# Patient Record
Sex: MALE | Race: WHITE | NOT HISPANIC OR LATINO | Employment: OTHER | URBAN - METROPOLITAN AREA
[De-identification: names, ages, dates, MRNs, and addresses within clinical notes are randomized per-mention and may not be internally consistent; named-entity substitution may affect disease eponyms.]

---

## 2017-06-13 ENCOUNTER — CONVERSION ENCOUNTER (OUTPATIENT)
Dept: FAMILY MEDICINE CLINIC | Facility: CLINIC | Age: 77
End: 2017-06-13

## 2017-08-03 ENCOUNTER — CONVERSION ENCOUNTER (OUTPATIENT)
Dept: FAMILY MEDICINE CLINIC | Facility: CLINIC | Age: 77
End: 2017-08-03

## 2017-08-18 ENCOUNTER — CONVERSION ENCOUNTER (OUTPATIENT)
Dept: FAMILY MEDICINE CLINIC | Facility: CLINIC | Age: 77
End: 2017-08-18

## 2017-11-09 ENCOUNTER — CONVERSION ENCOUNTER (OUTPATIENT)
Dept: FAMILY MEDICINE CLINIC | Facility: CLINIC | Age: 77
End: 2017-11-09

## 2017-11-17 ENCOUNTER — HOSPITAL ENCOUNTER (OUTPATIENT)
Dept: CARDIOLOGY | Facility: HOSPITAL | Age: 77
Discharge: HOME | End: 2017-11-17
Attending: INTERNAL MEDICINE | Admitting: INTERNAL MEDICINE

## 2017-12-08 ENCOUNTER — CONVERSION ENCOUNTER (OUTPATIENT)
Dept: FAMILY MEDICINE CLINIC | Facility: CLINIC | Age: 77
End: 2017-12-08

## 2018-03-13 ENCOUNTER — CONVERSION ENCOUNTER (OUTPATIENT)
Dept: FAMILY MEDICINE CLINIC | Facility: CLINIC | Age: 78
End: 2018-03-13

## 2018-06-08 ENCOUNTER — CONVERSION ENCOUNTER (OUTPATIENT)
Dept: FAMILY MEDICINE CLINIC | Facility: CLINIC | Age: 78
End: 2018-06-08

## 2018-11-02 ENCOUNTER — CONVERSION ENCOUNTER (OUTPATIENT)
Dept: FAMILY MEDICINE CLINIC | Facility: CLINIC | Age: 78
End: 2018-11-02

## 2019-02-01 ENCOUNTER — CONVERSION ENCOUNTER (OUTPATIENT)
Dept: FAMILY MEDICINE CLINIC | Facility: CLINIC | Age: 79
End: 2019-02-01

## 2019-04-05 ENCOUNTER — CONVERSION ENCOUNTER (OUTPATIENT)
Dept: FAMILY MEDICINE CLINIC | Facility: CLINIC | Age: 79
End: 2019-04-05

## 2019-05-17 ENCOUNTER — HOSPITAL ENCOUNTER (OUTPATIENT)
Dept: CARDIOLOGY | Facility: HOSPITAL | Age: 79
Discharge: HOME | End: 2019-05-17
Attending: INTERNAL MEDICINE | Admitting: INTERNAL MEDICINE

## 2019-06-04 VITALS
HEIGHT: 70 IN | HEIGHT: 70 IN | DIASTOLIC BLOOD PRESSURE: 62 MMHG | BODY MASS INDEX: 28.49 KG/M2 | OXYGEN SATURATION: 98 % | DIASTOLIC BLOOD PRESSURE: 70 MMHG | HEIGHT: 70 IN | WEIGHT: 210.4 LBS | OXYGEN SATURATION: 95 % | BODY MASS INDEX: 28.87 KG/M2 | WEIGHT: 212 LBS | RESPIRATION RATE: 18 BRPM | DIASTOLIC BLOOD PRESSURE: 69 MMHG | HEIGHT: 70 IN | DIASTOLIC BLOOD PRESSURE: 74 MMHG | HEIGHT: 70 IN | DIASTOLIC BLOOD PRESSURE: 71 MMHG | WEIGHT: 211 LBS | WEIGHT: 201.2 LBS | WEIGHT: 197.2 LBS | HEART RATE: 88 BPM | SYSTOLIC BLOOD PRESSURE: 122 MMHG | HEART RATE: 69 BPM | RESPIRATION RATE: 18 BRPM | HEART RATE: 84 BPM | BODY MASS INDEX: 29.12 KG/M2 | BODY MASS INDEX: 30.21 KG/M2 | DIASTOLIC BLOOD PRESSURE: 81 MMHG | HEIGHT: 70 IN | HEART RATE: 79 BPM | OXYGEN SATURATION: 95 % | BODY MASS INDEX: 30.42 KG/M2 | BODY MASS INDEX: 28.23 KG/M2 | HEIGHT: 70 IN | OXYGEN SATURATION: 97 % | RESPIRATION RATE: 18 BRPM | DIASTOLIC BLOOD PRESSURE: 65 MMHG | SYSTOLIC BLOOD PRESSURE: 124 MMHG | OXYGEN SATURATION: 97 % | HEIGHT: 70 IN | SYSTOLIC BLOOD PRESSURE: 118 MMHG | BODY MASS INDEX: 30.35 KG/M2 | BODY MASS INDEX: 30.12 KG/M2 | HEART RATE: 88 BPM | BODY MASS INDEX: 28.55 KG/M2 | DIASTOLIC BLOOD PRESSURE: 64 MMHG | SYSTOLIC BLOOD PRESSURE: 104 MMHG | WEIGHT: 212 LBS | SYSTOLIC BLOOD PRESSURE: 110 MMHG | HEART RATE: 115 BPM | WEIGHT: 212 LBS | OXYGEN SATURATION: 94 % | HEART RATE: 97 BPM | OXYGEN SATURATION: 95 % | OXYGEN SATURATION: 95 % | DIASTOLIC BLOOD PRESSURE: 69 MMHG | SYSTOLIC BLOOD PRESSURE: 99 MMHG | SYSTOLIC BLOOD PRESSURE: 119 MMHG | HEART RATE: 57 BPM | HEART RATE: 89 BPM | SYSTOLIC BLOOD PRESSURE: 116 MMHG | SYSTOLIC BLOOD PRESSURE: 119 MMHG | BODY MASS INDEX: 30.35 KG/M2 | WEIGHT: 203.4 LBS | OXYGEN SATURATION: 96 % | WEIGHT: 199 LBS | DIASTOLIC BLOOD PRESSURE: 69 MMHG | SYSTOLIC BLOOD PRESSURE: 128 MMHG | HEART RATE: 84 BPM | RESPIRATION RATE: 18 BRPM

## 2019-06-18 ENCOUNTER — TELEPHONE (OUTPATIENT)
Dept: CARDIOLOGY | Facility: CLINIC | Age: 79
End: 2019-06-18

## 2019-06-18 NOTE — TELEPHONE ENCOUNTER
Returned patient's call.  Patient states that he found his prescription in question.  Pt states no other questions at this time.

## 2019-06-19 ENCOUNTER — TELEPHONE (OUTPATIENT)
Dept: CARDIOLOGY | Facility: CLINIC | Age: 79
End: 2019-06-19

## 2019-06-19 NOTE — TELEPHONE ENCOUNTER
Returned call to patient in regards to question about Clindamycin interacting with any of his current medications.  Patient made aware that Dr. Sanford reviewed his medications and said he is ok to take Clindamycin.  Patient verbalized understanding.

## 2019-06-22 ENCOUNTER — APPOINTMENT (OUTPATIENT)
Dept: GENERAL RADIOLOGY | Facility: HOSPITAL | Age: 79
End: 2019-06-22

## 2019-06-22 ENCOUNTER — APPOINTMENT (OUTPATIENT)
Dept: CT IMAGING | Facility: HOSPITAL | Age: 79
End: 2019-06-22

## 2019-06-22 ENCOUNTER — HOSPITAL ENCOUNTER (INPATIENT)
Facility: HOSPITAL | Age: 79
LOS: 1 days | Discharge: HOME | End: 2019-06-25
Attending: HOSPITALIST | Admitting: INTERNAL MEDICINE

## 2019-06-22 DIAGNOSIS — I30.9 ACUTE PERICARDIAL EFFUSION: ICD-10-CM

## 2019-06-22 DIAGNOSIS — R06.02 SHORTNESS OF BREATH: Primary | ICD-10-CM

## 2019-06-22 DIAGNOSIS — R13.19 ESOPHAGEAL DYSPHAGIA: ICD-10-CM

## 2019-06-22 LAB
ALBUMIN SERPL-MCNC: 3.8 G/DL (ref 3.5–4.8)
ALBUMIN/GLOB SERPL: 1.2 G/DL (ref 1–1.7)
ALP SERPL-CCNC: 72 U/L (ref 32–91)
ALT SERPL W P-5'-P-CCNC: 19 U/L (ref 17–63)
ANION GAP SERPL CALCULATED.3IONS-SCNC: 9 MMOL/L (ref 10–20)
AST SERPL-CCNC: 22 U/L (ref 15–41)
BASOPHILS # BLD AUTO: 0 10*3/MM3 (ref 0–0.2)
BASOPHILS NFR BLD AUTO: 0.4 % (ref 0–1.5)
BILIRUB SERPL-MCNC: 0.6 MG/DL (ref 0.3–1.2)
BUN BLD-MCNC: 20 MG/DL (ref 8–20)
BUN/CREAT SERPL: 20 (ref 6.2–20.3)
CALCIUM SPEC-SCNC: 9.2 MG/DL (ref 8.9–10.3)
CHLORIDE SERPL-SCNC: 106 MMOL/L (ref 101–111)
CO2 SERPL-SCNC: 18 MMOL/L (ref 22–32)
CREAT BLD-MCNC: 1 MG/DL (ref 0.7–1.2)
D DIMER PPP FEU-MCNC: 3.85 MCGFEU/ML (ref 0.17–0.59)
DEPRECATED RDW RBC AUTO: 47.7 FL (ref 37–54)
EOSINOPHIL # BLD AUTO: 0.1 10*3/MM3 (ref 0–0.4)
EOSINOPHIL NFR BLD AUTO: 0.7 % (ref 0.3–6.2)
ERYTHROCYTE [DISTWIDTH] IN BLOOD BY AUTOMATED COUNT: 13.9 % (ref 12.3–15.4)
GFR SERPL CREATININE-BSD FRML MDRD: 73 ML/MIN/1.73
GLOBULIN UR ELPH-MCNC: 3.3 GM/DL (ref 2.5–3.8)
GLUCOSE BLD-MCNC: 162 MG/DL (ref 65–99)
HCT VFR BLD AUTO: 35.8 % (ref 37.5–51)
HGB BLD-MCNC: 12 G/DL (ref 13–17.7)
HOLD SPECIMEN: NORMAL
HOLD SPECIMEN: NORMAL
LIPASE SERPL-CCNC: 23 U/L (ref 22–51)
LYMPHOCYTES # BLD AUTO: 1.3 10*3/MM3 (ref 0.7–3.1)
LYMPHOCYTES NFR BLD AUTO: 14.1 % (ref 19.6–45.3)
MCH RBC QN AUTO: 32.9 PG (ref 26.6–33)
MCHC RBC AUTO-ENTMCNC: 33.6 G/DL (ref 31.5–35.7)
MCV RBC AUTO: 98 FL (ref 79–97)
MONOCYTES # BLD AUTO: 0.5 10*3/MM3 (ref 0.1–0.9)
MONOCYTES NFR BLD AUTO: 6 % (ref 5–12)
NEUTROPHILS # BLD AUTO: 7.1 10*3/MM3 (ref 1.7–7)
NEUTROPHILS NFR BLD AUTO: 78.8 % (ref 42.7–76)
NRBC BLD AUTO-RTO: 0.1 /100 WBC (ref 0–0.2)
PLATELET # BLD AUTO: 312 10*3/MM3 (ref 140–450)
PMV BLD AUTO: 7.6 FL (ref 6–12)
POTASSIUM BLD-SCNC: 4.3 MMOL/L (ref 3.6–5.1)
PROT SERPL-MCNC: 7.1 G/DL (ref 6.1–7.9)
RBC # BLD AUTO: 3.66 10*6/MM3 (ref 4.14–5.8)
SODIUM BLD-SCNC: 133 MMOL/L (ref 136–144)
TROPONIN I SERPL-MCNC: 0.03 NG/ML (ref 0–0.03)
WBC NRBC COR # BLD: 9 10*3/MM3 (ref 3.4–10.8)
WHOLE BLOOD HOLD SPECIMEN: NORMAL
WHOLE BLOOD HOLD SPECIMEN: NORMAL

## 2019-06-22 PROCEDURE — 99284 EMERGENCY DEPT VISIT MOD MDM: CPT

## 2019-06-22 PROCEDURE — 80053 COMPREHEN METABOLIC PANEL: CPT | Performed by: NURSE PRACTITIONER

## 2019-06-22 PROCEDURE — 85379 FIBRIN DEGRADATION QUANT: CPT | Performed by: NURSE PRACTITIONER

## 2019-06-22 PROCEDURE — 85025 COMPLETE CBC W/AUTO DIFF WBC: CPT | Performed by: NURSE PRACTITIONER

## 2019-06-22 PROCEDURE — 93005 ELECTROCARDIOGRAM TRACING: CPT

## 2019-06-22 PROCEDURE — 71275 CT ANGIOGRAPHY CHEST: CPT

## 2019-06-22 PROCEDURE — 71046 X-RAY EXAM CHEST 2 VIEWS: CPT

## 2019-06-22 PROCEDURE — 93005 ELECTROCARDIOGRAM TRACING: CPT | Performed by: HOSPITALIST

## 2019-06-22 PROCEDURE — 87040 BLOOD CULTURE FOR BACTERIA: CPT | Performed by: NURSE PRACTITIONER

## 2019-06-22 PROCEDURE — 84484 ASSAY OF TROPONIN QUANT: CPT | Performed by: NURSE PRACTITIONER

## 2019-06-22 PROCEDURE — 83690 ASSAY OF LIPASE: CPT | Performed by: NURSE PRACTITIONER

## 2019-06-22 RX ORDER — SODIUM CHLORIDE 0.9 % (FLUSH) 0.9 %
10 SYRINGE (ML) INJECTION AS NEEDED
Status: DISCONTINUED | OUTPATIENT
Start: 2019-06-22 | End: 2019-06-25 | Stop reason: HOSPADM

## 2019-06-23 ENCOUNTER — INPATIENT HOSPITAL (OUTPATIENT)
Dept: URBAN - METROPOLITAN AREA HOSPITAL 84 | Facility: HOSPITAL | Age: 79
End: 2019-06-23

## 2019-06-23 DIAGNOSIS — R13.10 DYSPHAGIA, UNSPECIFIED: ICD-10-CM

## 2019-06-23 DIAGNOSIS — K21.9 GASTRO-ESOPHAGEAL REFLUX DISEASE WITHOUT ESOPHAGITIS: ICD-10-CM

## 2019-06-23 DIAGNOSIS — K22.2 ESOPHAGEAL OBSTRUCTION: ICD-10-CM

## 2019-06-23 PROBLEM — R06.02 SHORTNESS OF BREATH: Status: ACTIVE | Noted: 2019-06-23

## 2019-06-23 PROBLEM — R13.19 ESOPHAGEAL DYSPHAGIA: Status: ACTIVE | Noted: 2019-06-22

## 2019-06-23 LAB
BILIRUB UR QL STRIP: NEGATIVE
CLARITY UR: CLEAR
COLOR UR: YELLOW
GLUCOSE UR STRIP-MCNC: NEGATIVE MG/DL
HGB UR QL STRIP.AUTO: NEGATIVE
KETONES UR QL STRIP: NEGATIVE
LEUKOCYTE ESTERASE UR QL STRIP.AUTO: NEGATIVE
NITRITE UR QL STRIP: NEGATIVE
PH UR STRIP.AUTO: 7 [PH] (ref 5–8)
PROT UR QL STRIP: NEGATIVE
SP GR UR STRIP: 1.02 (ref 1–1.03)
TROPONIN I SERPL-MCNC: 0.03 NG/ML (ref 0–0.03)
UROBILINOGEN UR QL STRIP: NORMAL

## 2019-06-23 PROCEDURE — G0378 HOSPITAL OBSERVATION PER HR: HCPCS

## 2019-06-23 PROCEDURE — 81003 URINALYSIS AUTO W/O SCOPE: CPT | Performed by: HOSPITALIST

## 2019-06-23 PROCEDURE — 92610 EVALUATE SWALLOWING FUNCTION: CPT

## 2019-06-23 PROCEDURE — 84484 ASSAY OF TROPONIN QUANT: CPT | Performed by: NURSE PRACTITIONER

## 2019-06-23 PROCEDURE — 99220 PR INITIAL OBSERVATION CARE/DAY 70 MINUTES: CPT | Performed by: HOSPITALIST

## 2019-06-23 PROCEDURE — 99214 OFFICE O/P EST MOD 30 MIN: CPT | Performed by: INTERNAL MEDICINE

## 2019-06-23 PROCEDURE — 0 IOPAMIDOL PER 1 ML: Performed by: NURSE PRACTITIONER

## 2019-06-23 PROCEDURE — 99222 1ST HOSP IP/OBS MODERATE 55: CPT | Performed by: NURSE PRACTITIONER

## 2019-06-23 RX ORDER — LISINOPRIL 5 MG/1
2.5 TABLET ORAL DAILY
Status: DISCONTINUED | OUTPATIENT
Start: 2019-06-23 | End: 2019-06-25 | Stop reason: HOSPADM

## 2019-06-23 RX ORDER — SUCRALFATE 1 G/1
1 TABLET ORAL
Status: DISCONTINUED | OUTPATIENT
Start: 2019-06-23 | End: 2019-06-25 | Stop reason: HOSPADM

## 2019-06-23 RX ORDER — CHLORAL HYDRATE 500 MG
2000 CAPSULE ORAL 2 TIMES DAILY WITH MEALS
COMMUNITY
End: 2021-05-14 | Stop reason: HOSPADM

## 2019-06-23 RX ORDER — AMIODARONE HYDROCHLORIDE 200 MG/1
200 TABLET ORAL DAILY
COMMUNITY
End: 2020-08-07

## 2019-06-23 RX ORDER — SODIUM CHLORIDE 0.9 % (FLUSH) 0.9 %
3 SYRINGE (ML) INJECTION EVERY 12 HOURS SCHEDULED
Status: DISCONTINUED | OUTPATIENT
Start: 2019-06-23 | End: 2019-06-25 | Stop reason: HOSPADM

## 2019-06-23 RX ORDER — SPIRONOLACTONE 25 MG/1
25 TABLET ORAL DAILY
COMMUNITY
End: 2019-06-25 | Stop reason: HOSPADM

## 2019-06-23 RX ORDER — SODIUM CHLORIDE 0.9 % (FLUSH) 0.9 %
3-10 SYRINGE (ML) INJECTION AS NEEDED
Status: DISCONTINUED | OUTPATIENT
Start: 2019-06-23 | End: 2019-06-25 | Stop reason: HOSPADM

## 2019-06-23 RX ORDER — MELATONIN
1000 DAILY
Status: DISCONTINUED | OUTPATIENT
Start: 2019-06-23 | End: 2019-06-25 | Stop reason: HOSPADM

## 2019-06-23 RX ORDER — AMIODARONE HYDROCHLORIDE 200 MG/1
200 TABLET ORAL 2 TIMES DAILY
Status: DISCONTINUED | OUTPATIENT
Start: 2019-06-23 | End: 2019-06-25 | Stop reason: HOSPADM

## 2019-06-23 RX ORDER — LISINOPRIL 2.5 MG/1
2.5 TABLET ORAL DAILY
COMMUNITY
End: 2019-07-08 | Stop reason: SINTOL

## 2019-06-23 RX ORDER — CLOPIDOGREL BISULFATE 75 MG/1
75 TABLET ORAL DAILY
Status: DISCONTINUED | OUTPATIENT
Start: 2019-06-23 | End: 2019-06-23

## 2019-06-23 RX ORDER — ONDANSETRON 2 MG/ML
4 INJECTION INTRAMUSCULAR; INTRAVENOUS EVERY 6 HOURS PRN
Status: DISCONTINUED | OUTPATIENT
Start: 2019-06-23 | End: 2019-06-25 | Stop reason: HOSPADM

## 2019-06-23 RX ORDER — CYANOCOBALAMIN 1000 UG/ML
1000 INJECTION, SOLUTION INTRAMUSCULAR; SUBCUTANEOUS
COMMUNITY

## 2019-06-23 RX ORDER — PANTOPRAZOLE SODIUM 40 MG/1
40 TABLET, DELAYED RELEASE ORAL
Status: DISCONTINUED | OUTPATIENT
Start: 2019-06-24 | End: 2019-06-25 | Stop reason: HOSPADM

## 2019-06-23 RX ORDER — ASPIRIN 81 MG/1
81 TABLET ORAL DAILY
Status: DISCONTINUED | OUTPATIENT
Start: 2019-06-23 | End: 2019-06-23

## 2019-06-23 RX ORDER — OMEGA-3S/DHA/EPA/FISH OIL/D3 300MG-1000
800 CAPSULE ORAL DAILY
COMMUNITY
End: 2019-08-02 | Stop reason: SDUPTHER

## 2019-06-23 RX ORDER — EZETIMIBE 10 MG/1
10 TABLET ORAL DAILY
COMMUNITY

## 2019-06-23 RX ORDER — SODIUM CHLORIDE 9 MG/ML
100 INJECTION, SOLUTION INTRAVENOUS CONTINUOUS
Status: DISCONTINUED | OUTPATIENT
Start: 2019-06-23 | End: 2019-06-23

## 2019-06-23 RX ORDER — ASPIRIN 81 MG/1
81 TABLET ORAL DAILY
Status: ON HOLD | COMMUNITY
End: 2022-10-27

## 2019-06-23 RX ORDER — CYANOCOBALAMIN 1000 UG/ML
1000 INJECTION, SOLUTION INTRAMUSCULAR; SUBCUTANEOUS
Status: DISCONTINUED | OUTPATIENT
Start: 2019-06-26 | End: 2019-06-25 | Stop reason: HOSPADM

## 2019-06-23 RX ORDER — CLOPIDOGREL BISULFATE 75 MG/1
75 TABLET ORAL DAILY
COMMUNITY
End: 2019-07-08

## 2019-06-23 RX ADMIN — SUCRALFATE 1 G: 1 TABLET ORAL at 21:46

## 2019-06-23 RX ADMIN — LISINOPRIL 2.5 MG: 5 TABLET ORAL at 09:23

## 2019-06-23 RX ADMIN — AMIODARONE HYDROCHLORIDE 200 MG: 200 TABLET ORAL at 21:45

## 2019-06-23 RX ADMIN — MELATONIN 1000 UNITS: at 09:23

## 2019-06-23 RX ADMIN — AMIODARONE HYDROCHLORIDE 200 MG: 200 TABLET ORAL at 09:23

## 2019-06-23 RX ADMIN — SODIUM CHLORIDE, PRESERVATIVE FREE 3 ML: 5 INJECTION INTRAVENOUS at 21:48

## 2019-06-23 RX ADMIN — IOPAMIDOL 100 ML: 755 INJECTION, SOLUTION INTRAVENOUS at 00:29

## 2019-06-23 RX ADMIN — SUCRALFATE 1 G: 1 TABLET ORAL at 17:30

## 2019-06-23 RX ADMIN — CLOPIDOGREL BISULFATE 75 MG: 75 TABLET ORAL at 09:23

## 2019-06-23 RX ADMIN — SODIUM CHLORIDE, PRESERVATIVE FREE 3 ML: 5 INJECTION INTRAVENOUS at 09:32

## 2019-06-23 RX ADMIN — SODIUM CHLORIDE 100 ML/HR: 900 INJECTION, SOLUTION INTRAVENOUS at 05:32

## 2019-06-24 ENCOUNTER — ANESTHESIA EVENT (OUTPATIENT)
Dept: GASTROENTEROLOGY | Facility: HOSPITAL | Age: 79
End: 2019-06-24

## 2019-06-24 ENCOUNTER — ANESTHESIA (OUTPATIENT)
Dept: GASTROENTEROLOGY | Facility: HOSPITAL | Age: 79
End: 2019-06-24

## 2019-06-24 ENCOUNTER — INPATIENT HOSPITAL (OUTPATIENT)
Dept: URBAN - METROPOLITAN AREA HOSPITAL 84 | Facility: HOSPITAL | Age: 79
End: 2019-06-24

## 2019-06-24 DIAGNOSIS — R13.10 DYSPHAGIA, UNSPECIFIED: ICD-10-CM

## 2019-06-24 DIAGNOSIS — K20.8 OTHER ESOPHAGITIS: ICD-10-CM

## 2019-06-24 DIAGNOSIS — K22.2 ESOPHAGEAL OBSTRUCTION: ICD-10-CM

## 2019-06-24 DIAGNOSIS — K44.9 DIAPHRAGMATIC HERNIA WITHOUT OBSTRUCTION OR GANGRENE: ICD-10-CM

## 2019-06-24 PROBLEM — D64.9 ANEMIA: Status: ACTIVE | Noted: 2019-06-24

## 2019-06-24 PROBLEM — R06.02 SHORTNESS OF BREATH: Status: RESOLVED | Noted: 2019-06-23 | Resolved: 2019-06-24

## 2019-06-24 PROBLEM — R13.19 ESOPHAGEAL DYSPHAGIA: Status: RESOLVED | Noted: 2019-06-22 | Resolved: 2019-06-24

## 2019-06-24 PROBLEM — R06.02 SHORTNESS OF BREATH: Status: ACTIVE | Noted: 2019-06-24

## 2019-06-24 LAB
ALBUMIN SERPL-MCNC: 3.5 G/DL (ref 3.5–4.8)
ALBUMIN/GLOB SERPL: 1.1 G/DL (ref 1–1.7)
ALP SERPL-CCNC: 74 U/L (ref 32–91)
ALT SERPL W P-5'-P-CCNC: 17 U/L (ref 17–63)
ANION GAP SERPL CALCULATED.3IONS-SCNC: 8 MMOL/L (ref 10–20)
AST SERPL-CCNC: 18 U/L (ref 15–41)
BASOPHILS # BLD AUTO: 0 10*3/MM3 (ref 0–0.2)
BASOPHILS NFR BLD AUTO: 0.7 % (ref 0–1.5)
BILIRUB SERPL-MCNC: 0.7 MG/DL (ref 0.3–1.2)
BUN BLD-MCNC: 12 MG/DL (ref 8–20)
BUN/CREAT SERPL: 13.3 (ref 6.2–20.3)
CALCIUM SPEC-SCNC: 9 MG/DL (ref 8.9–10.3)
CHLORIDE SERPL-SCNC: 103 MMOL/L (ref 101–111)
CO2 SERPL-SCNC: 22 MMOL/L (ref 22–32)
CREAT BLD-MCNC: 0.9 MG/DL (ref 0.7–1.2)
DEPRECATED RDW RBC AUTO: 47.7 FL (ref 37–54)
EOSINOPHIL # BLD AUTO: 0.2 10*3/MM3 (ref 0–0.4)
EOSINOPHIL NFR BLD AUTO: 3 % (ref 0.3–6.2)
ERYTHROCYTE [DISTWIDTH] IN BLOOD BY AUTOMATED COUNT: 14.1 % (ref 12.3–15.4)
GFR SERPL CREATININE-BSD FRML MDRD: 82 ML/MIN/1.73
GLOBULIN UR ELPH-MCNC: 3.3 GM/DL (ref 2.5–3.8)
GLUCOSE BLD-MCNC: 106 MG/DL (ref 65–99)
HCT VFR BLD AUTO: 36.7 % (ref 37.5–51)
HGB BLD-MCNC: 12.5 G/DL (ref 13–17.7)
INR PPP: 1.02 (ref 0.9–1.1)
LYMPHOCYTES # BLD AUTO: 1.5 10*3/MM3 (ref 0.7–3.1)
LYMPHOCYTES NFR BLD AUTO: 19.6 % (ref 19.6–45.3)
MCH RBC QN AUTO: 32.8 PG (ref 26.6–33)
MCHC RBC AUTO-ENTMCNC: 34 G/DL (ref 31.5–35.7)
MCV RBC AUTO: 96.4 FL (ref 79–97)
MONOCYTES # BLD AUTO: 0.6 10*3/MM3 (ref 0.1–0.9)
MONOCYTES NFR BLD AUTO: 7.8 % (ref 5–12)
NEUTROPHILS # BLD AUTO: 5.2 10*3/MM3 (ref 1.7–7)
NEUTROPHILS NFR BLD AUTO: 68.9 % (ref 42.7–76)
NRBC BLD AUTO-RTO: 0.1 /100 WBC (ref 0–0.2)
PLATELET # BLD AUTO: 274 10*3/MM3 (ref 140–450)
PMV BLD AUTO: 7.6 FL (ref 6–12)
POTASSIUM BLD-SCNC: 4.4 MMOL/L (ref 3.6–5.1)
PROT SERPL-MCNC: 6.8 G/DL (ref 6.1–7.9)
PROTHROMBIN TIME: 10.5 SECONDS (ref 9.6–11.7)
RBC # BLD AUTO: 3.81 10*6/MM3 (ref 4.14–5.8)
SODIUM BLD-SCNC: 133 MMOL/L (ref 136–144)
WBC NRBC COR # BLD: 7.5 10*3/MM3 (ref 3.4–10.8)

## 2019-06-24 PROCEDURE — 0D738ZZ DILATION OF LOWER ESOPHAGUS, VIA NATURAL OR ARTIFICIAL OPENING ENDOSCOPIC: ICD-10-PCS | Performed by: INTERNAL MEDICINE

## 2019-06-24 PROCEDURE — 99232 SBSQ HOSP IP/OBS MODERATE 35: CPT | Performed by: INTERNAL MEDICINE

## 2019-06-24 PROCEDURE — 85025 COMPLETE CBC W/AUTO DIFF WBC: CPT | Performed by: NURSE PRACTITIONER

## 2019-06-24 PROCEDURE — 25010000002 PROPOFOL 10 MG/ML EMULSION: Performed by: ANESTHESIOLOGY

## 2019-06-24 PROCEDURE — 92526 ORAL FUNCTION THERAPY: CPT

## 2019-06-24 PROCEDURE — 43248 EGD GUIDE WIRE INSERTION: CPT | Performed by: INTERNAL MEDICINE

## 2019-06-24 PROCEDURE — 0DJ08ZZ INSPECTION OF UPPER INTESTINAL TRACT, VIA NATURAL OR ARTIFICIAL OPENING ENDOSCOPIC: ICD-10-PCS | Performed by: INTERNAL MEDICINE

## 2019-06-24 PROCEDURE — 85610 PROTHROMBIN TIME: CPT | Performed by: NURSE PRACTITIONER

## 2019-06-24 PROCEDURE — 94799 UNLISTED PULMONARY SVC/PX: CPT

## 2019-06-24 PROCEDURE — 80053 COMPREHEN METABOLIC PANEL: CPT | Performed by: NURSE PRACTITIONER

## 2019-06-24 RX ORDER — SODIUM CHLORIDE 0.9 % (FLUSH) 0.9 %
3 SYRINGE (ML) INJECTION EVERY 12 HOURS SCHEDULED
Status: DISCONTINUED | OUTPATIENT
Start: 2019-06-24 | End: 2019-06-24 | Stop reason: HOSPADM

## 2019-06-24 RX ORDER — SODIUM CHLORIDE 0.9 % (FLUSH) 0.9 %
3-10 SYRINGE (ML) INJECTION AS NEEDED
Status: DISCONTINUED | OUTPATIENT
Start: 2019-06-24 | End: 2019-06-24 | Stop reason: HOSPADM

## 2019-06-24 RX ORDER — SODIUM CHLORIDE, SODIUM LACTATE, POTASSIUM CHLORIDE, CALCIUM CHLORIDE 600; 310; 30; 20 MG/100ML; MG/100ML; MG/100ML; MG/100ML
9 INJECTION, SOLUTION INTRAVENOUS CONTINUOUS PRN
Status: DISCONTINUED | OUTPATIENT
Start: 2019-06-24 | End: 2019-06-25 | Stop reason: HOSPADM

## 2019-06-24 RX ORDER — PROPOFOL 10 MG/ML
VIAL (ML) INTRAVENOUS AS NEEDED
Status: DISCONTINUED | OUTPATIENT
Start: 2019-06-24 | End: 2019-06-24 | Stop reason: SURG

## 2019-06-24 RX ADMIN — SODIUM CHLORIDE, PRESERVATIVE FREE 3 ML: 5 INJECTION INTRAVENOUS at 21:17

## 2019-06-24 RX ADMIN — SUCRALFATE 1 G: 1 TABLET ORAL at 21:17

## 2019-06-24 RX ADMIN — MELATONIN 1000 UNITS: at 08:31

## 2019-06-24 RX ADMIN — AMIODARONE HYDROCHLORIDE 200 MG: 200 TABLET ORAL at 21:17

## 2019-06-24 RX ADMIN — SODIUM CHLORIDE, PRESERVATIVE FREE 3 ML: 5 INJECTION INTRAVENOUS at 08:32

## 2019-06-24 RX ADMIN — PANTOPRAZOLE SODIUM 40 MG: 40 TABLET, DELAYED RELEASE ORAL at 08:31

## 2019-06-24 RX ADMIN — SUCRALFATE 1 G: 1 TABLET ORAL at 08:31

## 2019-06-24 RX ADMIN — SUCRALFATE 1 G: 1 TABLET ORAL at 16:58

## 2019-06-24 RX ADMIN — AMIODARONE HYDROCHLORIDE 200 MG: 200 TABLET ORAL at 08:31

## 2019-06-24 RX ADMIN — Medication 3 ML: at 10:24

## 2019-06-24 RX ADMIN — PROPOFOL 130 MG: 10 INJECTION, EMULSION INTRAVENOUS at 09:05

## 2019-06-24 RX ADMIN — LISINOPRIL 2.5 MG: 5 TABLET ORAL at 08:31

## 2019-06-24 RX ADMIN — SUCRALFATE 1 G: 1 TABLET ORAL at 11:18

## 2019-06-24 NOTE — ANESTHESIA POSTPROCEDURE EVALUATION
Patient: Deion Nicholas    Procedure Summary     Date:  06/24/19 Room / Location:  [facility] ENDOSCOPY 1 / [facility] ENDOSCOPY    Anesthesia Start:  0900 Anesthesia Stop:  0926    Procedure:  ESOPHAGOGASTRODUODENOSCOPY with dilitation Bougie 50, 54 (N/A ) Diagnosis:       Esophageal dysphagia      (Esophageal dysphagia [R13.10])    Surgeon:  Roddy Coronel MD Provider:  Aroldo Dc MD    Anesthesia Type:  MAC ASA Status:  4          Anesthesia Type: MAC  Last vitals  BP   120/71 (06/24/19 0833)   Temp   98 °F (36.7 °C) (06/24/19 0250)   Pulse   82 (06/24/19 0833)   Resp   18 (06/24/19 0250)     SpO2   97 % (06/24/19 0250)     Post Anesthesia Care and Evaluation    Patient location during evaluation: PACU  Patient participation: complete - patient participated  Level of consciousness: awake  Pain management: adequate  Airway patency: patent  Anesthetic complications: No anesthetic complications  PONV Status: none  Cardiovascular status: acceptable  Respiratory status: acceptable  Hydration status: acceptable    Comments: Patient seen and examined postoperatively; vital signs stable; SpO2 greater than or equal to 90%; cardiopulmonary status stable; nausea/vomiting adequately controlled; pain adequately controlled; no apparent anesthesia complications; patient discharged from anesthesia care when discharge criteria were met

## 2019-06-24 NOTE — ANESTHESIA PREPROCEDURE EVALUATION
Anesthesia Evaluation     Patient summary reviewed                Airway   Mallampati: II  Dental    (+) partials    Pulmonary - normal exam   Cardiovascular - normal exam    (+) pacemaker pacemaker, ICD, hypertension, past MI , CAD, CABG,       Neuro/Psych  GI/Hepatic/Renal/Endo      Musculoskeletal     Abdominal    Substance History      OB/GYN          Other                      Anesthesia Plan    ASA 4     MAC     intravenous induction   Anesthetic plan, all risks, benefits, and alternatives have been provided, discussed and informed consent has been obtained with: patient.

## 2019-06-25 VITALS
DIASTOLIC BLOOD PRESSURE: 69 MMHG | RESPIRATION RATE: 12 BRPM | OXYGEN SATURATION: 97 % | HEART RATE: 85 BPM | BODY MASS INDEX: 27.2 KG/M2 | WEIGHT: 205.25 LBS | HEIGHT: 73 IN | SYSTOLIC BLOOD PRESSURE: 120 MMHG | TEMPERATURE: 97.8 F

## 2019-06-25 LAB
ANION GAP SERPL CALCULATED.3IONS-SCNC: 8 MMOL/L (ref 10–20)
BASOPHILS # BLD AUTO: 0.1 10*3/MM3 (ref 0–0.2)
BASOPHILS NFR BLD AUTO: 0.6 % (ref 0–1.5)
BUN BLD-MCNC: 17 MG/DL (ref 8–20)
BUN/CREAT SERPL: 15.5 (ref 6.2–20.3)
CALCIUM SPEC-SCNC: 9.1 MG/DL (ref 8.9–10.3)
CHLORIDE SERPL-SCNC: 103 MMOL/L (ref 101–111)
CO2 SERPL-SCNC: 23 MMOL/L (ref 22–32)
CREAT BLD-MCNC: 1.1 MG/DL (ref 0.7–1.2)
DEPRECATED RDW RBC AUTO: 48.1 FL (ref 37–54)
EOSINOPHIL # BLD AUTO: 0.3 10*3/MM3 (ref 0–0.4)
EOSINOPHIL NFR BLD AUTO: 3.2 % (ref 0.3–6.2)
ERYTHROCYTE [DISTWIDTH] IN BLOOD BY AUTOMATED COUNT: 14.2 % (ref 12.3–15.4)
GFR SERPL CREATININE-BSD FRML MDRD: 65 ML/MIN/1.73
GLUCOSE BLD-MCNC: 117 MG/DL (ref 65–99)
HCT VFR BLD AUTO: 38.5 % (ref 37.5–51)
HGB BLD-MCNC: 13.1 G/DL (ref 13–17.7)
LYMPHOCYTES # BLD AUTO: 1.5 10*3/MM3 (ref 0.7–3.1)
LYMPHOCYTES NFR BLD AUTO: 16.8 % (ref 19.6–45.3)
MCH RBC QN AUTO: 32.8 PG (ref 26.6–33)
MCHC RBC AUTO-ENTMCNC: 33.9 G/DL (ref 31.5–35.7)
MCV RBC AUTO: 96.8 FL (ref 79–97)
MONOCYTES # BLD AUTO: 0.8 10*3/MM3 (ref 0.1–0.9)
MONOCYTES NFR BLD AUTO: 9.3 % (ref 5–12)
NEUTROPHILS # BLD AUTO: 6.3 10*3/MM3 (ref 1.7–7)
NEUTROPHILS NFR BLD AUTO: 70.1 % (ref 42.7–76)
NRBC BLD AUTO-RTO: 0.1 /100 WBC (ref 0–0.2)
PLATELET # BLD AUTO: 271 10*3/MM3 (ref 140–450)
PMV BLD AUTO: 7.7 FL (ref 6–12)
POTASSIUM BLD-SCNC: 5.1 MMOL/L (ref 3.6–5.1)
RBC # BLD AUTO: 3.98 10*6/MM3 (ref 4.14–5.8)
SODIUM BLD-SCNC: 134 MMOL/L (ref 136–144)
WBC NRBC COR # BLD: 9 10*3/MM3 (ref 3.4–10.8)

## 2019-06-25 PROCEDURE — 99239 HOSP IP/OBS DSCHRG MGMT >30: CPT | Performed by: INTERNAL MEDICINE

## 2019-06-25 PROCEDURE — 80048 BASIC METABOLIC PNL TOTAL CA: CPT | Performed by: INTERNAL MEDICINE

## 2019-06-25 PROCEDURE — 85025 COMPLETE CBC W/AUTO DIFF WBC: CPT | Performed by: INTERNAL MEDICINE

## 2019-06-25 RX ORDER — PANTOPRAZOLE SODIUM 40 MG/1
40 TABLET, DELAYED RELEASE ORAL DAILY
Qty: 30 TABLET | Refills: 0 | Status: SHIPPED | OUTPATIENT
Start: 2019-06-25 | End: 2019-07-25

## 2019-06-25 RX ORDER — SUCRALFATE 1 G/1
1 TABLET ORAL
Qty: 120 TABLET | Refills: 0 | Status: SHIPPED | OUTPATIENT
Start: 2019-06-25 | End: 2019-07-25

## 2019-06-25 RX ADMIN — PANTOPRAZOLE SODIUM 40 MG: 40 TABLET, DELAYED RELEASE ORAL at 06:14

## 2019-06-25 RX ADMIN — LISINOPRIL 2.5 MG: 5 TABLET ORAL at 09:07

## 2019-06-25 RX ADMIN — AMIODARONE HYDROCHLORIDE 200 MG: 200 TABLET ORAL at 09:05

## 2019-06-25 RX ADMIN — MELATONIN 1000 UNITS: at 09:05

## 2019-06-25 RX ADMIN — SODIUM CHLORIDE, PRESERVATIVE FREE 3 ML: 5 INJECTION INTRAVENOUS at 09:09

## 2019-06-25 RX ADMIN — SUCRALFATE 1 G: 1 TABLET ORAL at 09:05

## 2019-06-25 RX ADMIN — SUCRALFATE 1 G: 1 TABLET ORAL at 11:33

## 2019-06-26 ENCOUNTER — READMISSION MANAGEMENT (OUTPATIENT)
Dept: CALL CENTER | Facility: HOSPITAL | Age: 79
End: 2019-06-26

## 2019-06-27 ENCOUNTER — READMISSION MANAGEMENT (OUTPATIENT)
Dept: CALL CENTER | Facility: HOSPITAL | Age: 79
End: 2019-06-27

## 2019-06-27 LAB — BACTERIA SPEC AEROBE CULT: NORMAL

## 2019-06-27 NOTE — OUTREACH NOTE
Medical Week 1 Survey      Responses   Facility patient discharged from?  [facility]   Does the patient have one of the following disease processes/diagnoses(primary or secondary)?  Other   Is there a successful TCM telephone encounter documented?  No   Week 1 attempt successful?  Yes   Call start time  0920   Call end time  0930   Discharge diagnosis  Esophageal dysphagia   Meds reviewed with patient/caregiver?  Yes   Is the patient having any side effects they believe may be caused by any medication additions or changes?  No   Does the patient have all medications ordered at discharge?  Yes   Is the patient taking all medications as directed (includes completed medication regime)?  Yes   Comments regarding appointments  PT QUESTIONED APPT WITH DR. CARRILLO JULY 1 STATING HE CALLED OFFICE TO CHANGE THIS APPT TO A LATER TIME AND  STATED HE DID NOT HAVE AN APPT ON JULY 1. THIS APPT CLEARLY SHOWS IN EPIC. PT REQUESTED THIS NURSE TO CALL SAME OFFICE TO VERIFY THIS VISIBLE APPT.    Does the patient have a primary care provider?   Yes   Does the patient have an appointment with their PCP within 7 days of discharge?  No   What is preventing the patient from scheduling follow up appointments within 7 days of discharge?  Haven't had time   Nursing Interventions  Educated patient on importance of making appointment   Urgent appointment interventions  Facilitated patient appointment [PT REQUESTED THIS NURSE CALL AND ASSIST IN GETTING FOLLOW [facility]. ]   Has home health visited the patient within 72 hours of discharge?  N/A   Did the patient receive a copy of their discharge instructions?  Yes   Nursing interventions  Reviewed instructions with patient   What is the patient's perception of their health status since discharge?  Improving   Is the patient/caregiver able to teach back signs and symptoms related to disease process for when to call PCP?  Yes   Is the patient/caregiver able to teach back signs  and symptoms related to disease process for when to call 911?  Yes   Is the patient/caregiver able to teach back the hierarchy of who to call/visit for symptoms/problems? PCP, Specialist, Home health nurse, Urgent Care, ED, 911  Yes   Week 1 call completed?  Yes   Graduated  Yes   Did the patient feel the follow up calls were helpful during their recovery period?  Yes   Was the number of calls appropriate?  Yes      APPOINTMENT AT [facility] IN [place] MADE FOR July 12, 2019 AT 1200, AND 7/1/19 APPT WITH DR CARRILLO CHANGED TO 7/8/19 AT 1430. CALL TO PATIENT TO NOTIFY OF BOTH APPTS MADE ON HIS BEHALF. PT VOICED UNDERSTANDING AND PUT SAME APPTS ON HIS CALENDAR.     Estefania Price LPN

## 2019-06-27 NOTE — OUTREACH NOTE
Prep Survey      Responses   Facility patient discharged from?  [facility]   Is patient eligible?  Yes   Discharge diagnosis  Esophageal dysphagia   Does the patient have one of the following disease processes/diagnoses(primary or secondary)?  Other   Does the patient have Home health ordered?  No   Is there a DME ordered?  No   Prep survey completed?  Yes          Luz Maria Delgado RN

## 2019-07-03 NOTE — TELEPHONE ENCOUNTER
Returned patient's call in regards to sending in a refill on his spironolactone 25 mg daily.  Pt made aware that his discharge instructions from 06/25/19 state he is to stop taking the medication.  Pt states he is still supposed to be taking the medication.  Pt made aware that this will need to be clarified by Dr. Arvizu.  Pt verbalized understanding.  Pt. Confirms appointment with Dr. Arvizu on 07/08/19.

## 2019-07-05 ENCOUNTER — TELEPHONE (OUTPATIENT)
Dept: CARDIOLOGY | Facility: CLINIC | Age: 79
End: 2019-07-05

## 2019-07-08 ENCOUNTER — OFFICE VISIT (OUTPATIENT)
Dept: CARDIOLOGY | Facility: CLINIC | Age: 79
End: 2019-07-08

## 2019-07-08 VITALS
WEIGHT: 192 LBS | SYSTOLIC BLOOD PRESSURE: 105 MMHG | OXYGEN SATURATION: 96 % | DIASTOLIC BLOOD PRESSURE: 59 MMHG | HEART RATE: 80 BPM | HEIGHT: 73 IN | RESPIRATION RATE: 16 BRPM | BODY MASS INDEX: 25.45 KG/M2

## 2019-07-08 DIAGNOSIS — I25.5 ISCHEMIC CARDIOMYOPATHY: Primary | ICD-10-CM

## 2019-07-08 DIAGNOSIS — I47.20 VT (VENTRICULAR TACHYCARDIA) (HCC): ICD-10-CM

## 2019-07-08 DIAGNOSIS — I50.22 CHRONIC SYSTOLIC CONGESTIVE HEART FAILURE (HCC): ICD-10-CM

## 2019-07-08 DIAGNOSIS — I10 ESSENTIAL HYPERTENSION: ICD-10-CM

## 2019-07-08 PROBLEM — I50.9 CONGESTIVE HEART FAILURE (CHF) (HCC): Status: ACTIVE | Noted: 2017-12-19

## 2019-07-08 PROBLEM — I47.29 PAROXYSMAL VENTRICULAR TACHYCARDIA: Status: ACTIVE | Noted: 2019-05-03

## 2019-07-08 PROBLEM — R09.89 DECREASED CARDIAC FUNCTION: Status: ACTIVE | Noted: 2017-12-19

## 2019-07-08 PROBLEM — Z95.810 PRESENCE OF AUTOMATIC IMPLANTABLE CARDIOVERTER-DEFIBRILLATOR: Status: ACTIVE | Noted: 2018-01-31

## 2019-07-08 PROCEDURE — 93000 ELECTROCARDIOGRAM COMPLETE: CPT | Performed by: INTERNAL MEDICINE

## 2019-07-08 PROCEDURE — 99214 OFFICE O/P EST MOD 30 MIN: CPT | Performed by: INTERNAL MEDICINE

## 2019-07-08 RX ORDER — CHLORHEXIDINE/GLYCERIN/HE-CELL
JELLY (GRAM) TOPICAL
COMMUNITY
End: 2019-08-02 | Stop reason: SDUPTHER

## 2019-07-08 RX ORDER — SPIRONOLACTONE 25 MG/1
25 TABLET ORAL DAILY
Qty: 30 TABLET | Refills: 2 | Status: SHIPPED | OUTPATIENT
Start: 2019-07-08 | End: 2021-11-01

## 2019-07-08 RX ORDER — EZETIMIBE 10 MG/1
10 TABLET ORAL EVERY OTHER DAY
COMMUNITY
Start: 2018-11-16 | End: 2019-08-02 | Stop reason: SDUPTHER

## 2019-07-08 RX ORDER — ASPIRIN 325 MG
TABLET ORAL EVERY 24 HOURS
COMMUNITY
Start: 2017-06-13 | End: 2019-07-08

## 2019-07-08 RX ORDER — CLOPIDOGREL BISULFATE 75 MG/1
75 TABLET ORAL DAILY
COMMUNITY
Start: 2015-09-10 | End: 2021-11-11

## 2019-07-08 RX ORDER — ISOSORBIDE MONONITRATE 30 MG/1
TABLET, EXTENDED RELEASE ORAL
COMMUNITY
Start: 2017-08-03 | End: 2019-08-02

## 2019-07-08 RX ORDER — NITROGLYCERIN 0.4 MG/1
0.4 TABLET SUBLINGUAL
COMMUNITY

## 2019-07-08 NOTE — PROGRESS NOTES
History of Present Illness:  CC--Recurrent VT    .  Pt here for follow up s/p ablation.  patient started having recurrent VT needing a redo VT ablation few weeks ago and post ablation a week later developed dysphagia and needed endoscopy the patient underwent esophageal stricture dilatation with improvement of symptoms and resolution of dysphagia and comes in for follow-up   Patient is on amiodarone because of extensive scarring and multiple episodes of VT and since ablation without recurrent ICD therapy-- patient is a pleasant 78 years old gentleman, a  with history of chronic ischemic heart disease previous myocardial infarction, s/p previous CABG,  LV systolic dysfunction with   Last  LV ejection fraction of 25 30% which came up to 30- 35% by latest echocardiogram.   He is    status post ICD implant.  denies any chest pain or shortness of breath or syncope  Patient complains of dry cough with lisinopril and low normal blood pressure     assessment plan   recent VT storm status post VT re do ablation without any postprocedure complications  Without  recurrent VT   Dysphagia status post esophageal stricture dilatation with resolution of symptoms   single-chamber ICD in situ VT VDD lead without recurrent arrhythmias with normal function   hypertension--well-controlled   coronary artery disease   ischemic cardiomyopathy   medications were reviewed    follow-up in 1 month  Stop lisinopril because of side effects  Continue spironolactone and a refill given      Vital Signs:    Blood pressure 105/60 pulse rate is 80 patient is afebrile and respiration 12 times a minute    Medications: Medications were reviewed with the patient during this visit.  Allergies: Allergies were reviewed with the patient during this visit.        Current Allergies (reviewed today):  LOVASTATIN (LOVASTATIN TABS) (Critical)  * PRAVASTATIN (Critical)  ZOCOR (Moderate)      Past Medical History:     Reviewed history from 03/13/2018 and no  changes required:        Coronary Heart Disease:S/P CABG and PCI         Hypertension        Myocardial Infarction: Inferior MI         Hx: Cellulitis of left lower leg        Dyslipidemia         Pinched nerve L4-L5         Prostate cancer         Cardiomyopathy : ICD implantation     Past Surgical History:     Reviewed history from 03/13/2018 and no changes required:        Angioplasty/Stent: April 1996- Palmaz-Huy stent / LAD:         July 1996- RCA:         2000- Tetra stent Guidant  to proximal RCA, mid to distal artery 2 sequential Guidant Tetra stents         C A B G:May 2010 x 5 vessel: LIMA to diagonal , LAD, intermedius, obtuse marginal, RCA        Cardiac Cath: 1996 x 2, Nov. 2000, May 2010 - Cath 8/2015        Left Knee open reduction         Multiple colonoscopy's for polyp resection         Appendectomy        Prostate Robiotic surgery - Cyber Knife : 2015        Heart Catherization 7/2017 - No Stents         ICD implantation : Jan. 2018        Social History:     Reviewed history from 10/02/2013 and no changes required:        Disability , due to shrapnel of left heel         Marital Status:          Children: 3             Review of Systems   General: No fatigue or tiredness, No change in weight   Eyes: No redness  Cardiovascular: No chest pain, no palpitations  Respiratory: No shortness of breath  Gastrointestinal: No nausea or vomiting, bleeding  Genitourinary: no hematuria or dysuria  Musculoskeletal: No arthralgia or myalgia  Skin: No rash  Neurologic: No numbness, tingling, syncope  Hematologic/Lymphatic: No abnormal bleeding          Physical Exam    General:      well developed, well nourished, in no acute distress.    Head:      normocephalic and atraumatic.    Eyes:      PERRL/EOM intact, conjunctiva and sclera clear with out nystagmus.    Neck:      no masses, thyromegaly, trachea central with normal respiratory effort  Lungs:      clear bilaterally to auscultation.    Heart:       non-displaced PMI, chest non-tender; regular rate and rhythm, S1, S2 without murmurs, rubs, or gallops  Skin:      intact without lesions or rashes.    Psych:      alert and cooperative; normal mood and affect; normal attention span and concentration.      Extremities with trace ankle edema without any cyanosis or clubbing    Muscular skeletal patient walks with a cane with mild kyphosis    Neurological no focal deficits and alert oriented x3    Abdomen soft nontender no hepatomegaly      EKG shows sinus rhythm with occasional PVC

## 2019-07-12 ENCOUNTER — TELEPHONE (OUTPATIENT)
Dept: CARDIOLOGY | Facility: CLINIC | Age: 79
End: 2019-07-12

## 2019-07-12 NOTE — TELEPHONE ENCOUNTER
Patient made aware that Dr. Arvizu took him off lisionpril during his last appointment.  Pt verbalized understanding.

## 2019-08-02 ENCOUNTER — OFFICE VISIT (OUTPATIENT)
Dept: CARDIOLOGY | Facility: CLINIC | Age: 79
End: 2019-08-02

## 2019-08-02 VITALS
WEIGHT: 197 LBS | HEIGHT: 70 IN | SYSTOLIC BLOOD PRESSURE: 142 MMHG | BODY MASS INDEX: 28.2 KG/M2 | OXYGEN SATURATION: 96 % | HEART RATE: 81 BPM | DIASTOLIC BLOOD PRESSURE: 67 MMHG

## 2019-08-02 VITALS
OXYGEN SATURATION: 95 % | SYSTOLIC BLOOD PRESSURE: 157 MMHG | RESPIRATION RATE: 18 BRPM | BODY MASS INDEX: 28.27 KG/M2 | WEIGHT: 197 LBS | HEART RATE: 88 BPM | DIASTOLIC BLOOD PRESSURE: 78 MMHG

## 2019-08-02 DIAGNOSIS — I25.5 ISCHEMIC CARDIOMYOPATHY: Primary | ICD-10-CM

## 2019-08-02 DIAGNOSIS — I25.10 CORONARY ARTERY DISEASE INVOLVING NATIVE CORONARY ARTERY OF NATIVE HEART WITHOUT ANGINA PECTORIS: Primary | ICD-10-CM

## 2019-08-02 DIAGNOSIS — I10 ESSENTIAL HYPERTENSION: ICD-10-CM

## 2019-08-02 DIAGNOSIS — I47.29 VENTRICULAR TACHYCARDIA (PAROXYSMAL) (HCC): ICD-10-CM

## 2019-08-02 DIAGNOSIS — Z95.810 ICD (IMPLANTABLE CARDIOVERTER-DEFIBRILLATOR), SINGLE, IN SITU: ICD-10-CM

## 2019-08-02 DIAGNOSIS — Z95.810 PRESENCE OF BIVENTRICULAR IMPLANTABLE CARDIOVERTER-DEFIBRILLATOR (ICD): ICD-10-CM

## 2019-08-02 DIAGNOSIS — I25.5 ISCHEMIC CARDIOMYOPATHY: ICD-10-CM

## 2019-08-02 DIAGNOSIS — Z95.1 S/P CABG (CORONARY ARTERY BYPASS GRAFT): ICD-10-CM

## 2019-08-02 PROCEDURE — 99214 OFFICE O/P EST MOD 30 MIN: CPT | Performed by: INTERNAL MEDICINE

## 2019-08-02 PROCEDURE — 93000 ELECTROCARDIOGRAM COMPLETE: CPT | Performed by: INTERNAL MEDICINE

## 2019-08-02 PROCEDURE — 99213 OFFICE O/P EST LOW 20 MIN: CPT | Performed by: INTERNAL MEDICINE

## 2019-08-02 RX ORDER — AMLODIPINE BESYLATE 5 MG/1
5 TABLET ORAL DAILY
Qty: 30 TABLET | Refills: 11 | Status: SHIPPED | OUTPATIENT
Start: 2019-08-02 | End: 2020-06-30 | Stop reason: ALTCHOICE

## 2019-08-02 RX ORDER — LISINOPRIL 2.5 MG/1
2.5 TABLET ORAL DAILY
COMMUNITY
End: 2019-08-02 | Stop reason: ALTCHOICE

## 2019-08-02 NOTE — PROGRESS NOTES
History of Present Illness:  CC--Recurrent VT    .  Pt here for follow up s/p ablation.  patient started having recurrent VT needing a redo VT ablation few weeks ago and post ablation a week later developed dysphagia and needed endoscopy the patient underwent esophageal stricture dilatation with improvement of symptoms and resolution of dysphagia and comes in for follow-up   Patient is on amiodarone because of extensive scarring and multiple episodes of VT and since ablation without recurrent ICD therapy-- patient is a pleasant 78 years old gentleman, a  with history of chronic ischemic heart disease previous myocardial infarction, s/p previous CABG,  LV systolic dysfunction with   Last  LV ejection fraction of 25 30% which came up to 30- 35% by latest echocardiogram.   He is    status post ICD implant.  denies any chest pain or shortness of breath or syncope  Patient complains of dry cough with lisinopril and lisinopril has been stopped--started noticing increasing blood pressure with mild hypertension and denies any recurrent palpitations  He was intolerant of beta-blockers     assessment plan   recent VT storm status post VT re do ablation without any postprocedure complications  Without  recurrent VT   Dysphagia status post esophageal stricture dilatation with resolution of symptoms   single-chamber ICD in situ VT VDD lead without recurrent arrhythmias with normal function   hypertension--to be optimized with addition of amlodipine 5 mg a day and prescription given   coronary artery disease   ischemic cardiomyopathy   medications were reviewed    follow-up in 1 month  Reduce amiodarone to 200 mg once daily    Vital Signs:    Blood pressure 157/78  pulse rate is 88 patient is afebrile and respiration 12 times a minute    Medications: Medications were reviewed with the patient during this visit.  Allergies: Allergies were reviewed with the patient during this visit.    EKG shows sinus rhythm with diffuse  ST-T wave changes with a heart rate of 88 with left atrial enlargement and no significant changes compared to prior ECG and indication for ECG includes prior history of ventricular tachycardia and patient is on antiarrhythmic treatment and has normal axis    Current Allergies (reviewed today):  LOVASTATIN (LOVASTATIN TABS) (Critical)  * PRAVASTATIN (Critical)  ZOCOR (Moderate)      Past Medical History:     Reviewed history from 03/13/2018 and no changes required:        Coronary Heart Disease:S/P CABG and PCI         Hypertension        Myocardial Infarction: Inferior MI         Hx: Cellulitis of left lower leg        Dyslipidemia         Pinched nerve L4-L5         Prostate cancer         Cardiomyopathy : ICD implantation     Past Surgical History:     Reviewed history from 03/13/2018 and no changes required:        Angioplasty/Stent: April 1996- Palmaz-Huy stent / LAD:         July 1996- RCA:         2000- Tetra stent Guidant  to proximal RCA, mid to distal artery 2 sequential Guidant Tetra stents         C A B G:May 2010 x 5 vessel: LIMA to diagonal , LAD, intermedius, obtuse marginal, RCA        Cardiac Cath: 1996 x 2, Nov. 2000, May 2010 - Cath 8/2015        Left Knee open reduction         Multiple colonoscopy's for polyp resection         Appendectomy        Prostate Robiotic surgery - Cyber Knife : 2015        Heart Catherization 7/2017 - No Stents         ICD implantation : Jan. 2018        Social History:     Reviewed history from 10/02/2013 and no changes required:        Disability , due to shrapnel of left heel         Marital Status:          Children: 3             Review of Systems   General: No fatigue or tiredness, No change in weight   Eyes: No redness  Cardiovascular: No chest pain, no palpitations  Respiratory: No shortness of breath  Gastrointestinal: No nausea or vomiting, bleeding  Genitourinary: no hematuria or dysuria  Musculoskeletal: No arthralgia or myalgia  Skin: No  rash  Neurologic: No numbness, tingling, syncope  Hematologic/Lymphatic: No abnormal bleeding          Physical Exam    General:      well developed, well nourished, in no acute distress.    Head:      normocephalic and atraumatic.    Eyes:      PERRL/EOM intact, conjunctiva and sclera clear with out nystagmus.    Neck:      no masses, thyromegaly, trachea central with normal respiratory effort  Lungs:      clear bilaterally to auscultation.    Heart:      non-displaced PMI, chest non-tender; regular rate and rhythm, S1, S2 without murmurs, rubs, or gallops  Skin:      intact without lesions or rashes.    Psych:      alert and cooperative; normal mood and affect; normal attention span and concentration.      Extremities with trace ankle edema without any cyanosis or clubbing    Muscular skeletal patient walks with a cane with mild kyphosis    Neurological no focal deficits and alert oriented x3    Abdomen soft nontender no hepatomegaly

## 2019-08-02 NOTE — PROGRESS NOTES
"Visit Type:  Follow-up Visit  Referring Provider:  PA Sanford MD  Primary Provider:  [facility]- Dr. Velarde         Dear Dr. Velarde      History of Present Illness:    CC--Recurrent VTCC: Edema of both lower extremeties, CAD, Previous CABG,  Ischemic cardiomyopathy, status post ICD implant.     .  Mr. Deion Nicholas is a pleasant 78 years old gentleman, a  with history of chronic ischemic heart disease previous myocardial infarction, s/p previous CABG,  LV systolic dysfunction with   Last  LV ejection fraction of 25 30% which came up to 30- 35% by latest echocardiogram.   He is  status post ICD implant.  He was admitted on at least 2 occasions with his ICD discharge and he was found to be in ventricular tachycardia with rapid rate.  Patient underwent ablation twice.  The last one was in May 2019.  He feels weak and has very labile hypertension now.  He has been reportedly off of his metoprolol and lisinopril although lisinopril was listed but I took him off because he does have cough particularly at night.    He denies any angina dyspnea palpitations presyncope or syncope.  Vitals:    08/02/19 1203   BP: 142/67   BP Location: Left arm   Pulse: 81   SpO2: 96%   Weight: 89.4 kg (197 lb)   Height: 177.8 cm (70\")            /P  1-history of ventricular tachycardia storm with recurrent ICD discharges.  Status post ablation and is currently on amiodarone 200 mg twice daily.  He has an appointment with Dr. REED this afternoon    2-Bilateral lower leg edema probable cellulitis.  He is currently on spinal lactone 25 mg daily ibiotic therapy  2-  coronary artery disease ischemic cardiomyopathy status post ICD implant.        Problems: Active problems were reviewed with the patient during this visit.  Medications: Medications were reviewed with the patient during this visit.  Allergies: Allergies were reviewed with the patient during this visit.              Past Medical History:     Reviewed history from 03/13/2018 " and no changes required:        Coronary Heart Disease:S/P CABG and PCI         Hypertension        Myocardial Infarction: Inferior MI         Hx: Cellulitis of left lower leg        Dyslipidemia         Pinched nerve L4-L5         Prostate cancer         Cardiomyopathy : ICD implantation     Past Surgical History:     Reviewed history from 03/13/2018 and no changes required:        Angioplasty/Stent: April 1996- Palmaz-Huy stent / LAD:         July 1996- RCA:         2000- Tetra stent Guidant  to proximal RCA, mid to distal artery 2 sequential Guidant Tetra stents         C A B G:May 2010 x 5 vessel: LIMA to diagonal , LAD, intermedius, obtuse marginal, RCA        Cardiac Cath: 1996 x 2, Nov. 2000, May 2010 - Cath 8/2015        Left Knee open reduction         Multiple colonoscopy's for polyp resection         Appendectomy        Prostate Robiotic surgery - Cyber Knife : 2015        Heart Catherization 7/2017 - No Stents         ICD implantation : Jan. 2018    Active Medications (reviewed today):  ZETIA 10 MG ORAL TABLET (EZETIMIBE) Take 1 tablet by mouth daily  FUROSEMIDE 20 MG ORAL TABLET (FUROSEMIDE) Take 1 tablet by mouth daily  ISOSORBIDE MONONITRATE ER 30 MG ORAL TABLET EXTENDED RELEASE 24 HOUR (ISOSORBIDE MONONITRATE) Take 1 tablet by mouth daily  NITROSTAT 0.4 MG SUBLINGUAL TABLET SUBLINGUAL (NITROGLYCERIN) Place one tablet under tongue for chest pain as directed - PRN  * AQUAFOR CREAM daily    Amiodarone 200 mg twice daily    Spironolactone 25 mg daily  PLAVIX 75 MG ORAL TABLET (CLOPIDOGREL BISULFATE) Take 1 tablet by mouth daily  * B 12 INJECTION Twice monthly  VITAMIN D-400 400 UNIT ORAL TABLET (CHOLECALCIFEROL) Take 2 by mouth daily    ASPIRIN 325 MG ORAL TABLET (ASPIRIN) Take one by mouth daily    Current Allergies (reviewed today):  LOVASTATIN (LOVASTATIN TABS) (Critical)  * PRAVASTATIN (Critical)  ZOCOR (Moderate)    Family History Summary:      Reviewed history Last on 02/01/2019 and no changes  required:04/05/2019      General Comments - FH:  FH Other Cancer: mother / pancreatic cancer   father liver cancer   FH Heart Disease: father       Social History:     Reviewed history from 10/02/2013 and no changes required:        Disability , due to shrapnel of left heel         Marital Status:          Children: 3                 Risk Factors:     Smoked Tobacco Use:  Former smoker     Cigarettes:  Yes -- 1 pack(s) per day,      Year started:  age 27         Year quit:  2-1-2010  Passive smoke exposure:  no  Drug use:  no  HIV high-risk behavior:  no  Caffeine use:  4 drinks per day  Alcohol use:  no  Exercise:  yes     Type of Exercise:  stationary exercises  Seatbelt use:  100 %  Sun Exposure:  occasionally    Family History Risk Factors:     Family History of MI in females < 65 years old:  no     Family History of MI in males < 55 years old:  no        Review of Systems   General: denies fevers, chills, sweats, anorexia, fatigue, malaise, weight loss  Cardiovascular:   Coronary artery disease previous CABG.  Ischemic cardiomyopathy.  Bilateral leg edema with possible cellulitis.  History of ICD implant  Respiratory: Denies cough, dyspnea, excessive sputum, hemoptysis, wheezing  Gastrointestinal: Denies nausea, vomiting, diarrhea, constipation, change in bowel habits, abdominal pain, melena, hematochezia, jaundice  Musculoskeletal: denies back pain, joint pain, joint swelling, muscle cramps, muscle weakness, stiffness, arthritis  Neurologic: denies transient paralysis, weakness, paresthesias, seizures, syncope, tremors, vertigo  Endocrine: denies cold intolerance, heat intolerance, polydipsia, polyphagia, polyuria, weight change      Physical Exam    General:      well developed, well nourished, in no acute distress.    Neck:      no masses, thyromegaly, or abnormal cervical nodes.   no JVD. No carotid bruit  Lungs:      clear bilaterally to auscultation.    Heart:      non-displaced PMI, chest  non-tender; regular rate and rhythm, S1, S2 without murmurs, rubs, or gallops  Pulses:       distal pulses in both lower extremities of febrile.  Extremities:        2+ edema involving left lower leg and 1+ edema right lower leg with redness suggestive of cellulitis.  Homans sign is negative bilaterally

## 2019-08-09 ENCOUNTER — OFFICE (OUTPATIENT)
Dept: URBAN - METROPOLITAN AREA CLINIC 64 | Facility: CLINIC | Age: 79
End: 2019-08-09

## 2019-08-09 VITALS
HEIGHT: 73 IN | SYSTOLIC BLOOD PRESSURE: 132 MMHG | DIASTOLIC BLOOD PRESSURE: 70 MMHG | WEIGHT: 196 LBS | HEART RATE: 84 BPM

## 2019-08-09 DIAGNOSIS — R13.10 DYSPHAGIA, UNSPECIFIED: ICD-10-CM

## 2019-08-09 DIAGNOSIS — K22.2 ESOPHAGEAL OBSTRUCTION: ICD-10-CM

## 2019-08-09 PROCEDURE — 99213 OFFICE O/P EST LOW 20 MIN: CPT | Performed by: NURSE PRACTITIONER

## 2019-08-24 ENCOUNTER — HOSPITAL ENCOUNTER (EMERGENCY)
Facility: HOSPITAL | Age: 79
Discharge: HOME | End: 2019-08-24
Attending: EMERGENCY MEDICINE | Admitting: EMERGENCY MEDICINE

## 2019-08-24 ENCOUNTER — APPOINTMENT (OUTPATIENT)
Dept: GENERAL RADIOLOGY | Facility: HOSPITAL | Age: 79
End: 2019-08-24

## 2019-08-24 ENCOUNTER — APPOINTMENT (OUTPATIENT)
Dept: CT IMAGING | Facility: HOSPITAL | Age: 79
End: 2019-08-24

## 2019-08-24 VITALS
BODY MASS INDEX: 25.95 KG/M2 | TEMPERATURE: 98 F | WEIGHT: 195.77 LBS | OXYGEN SATURATION: 100 % | RESPIRATION RATE: 16 BRPM | SYSTOLIC BLOOD PRESSURE: 127 MMHG | HEART RATE: 74 BPM | HEIGHT: 73 IN | DIASTOLIC BLOOD PRESSURE: 89 MMHG

## 2019-08-24 DIAGNOSIS — I25.10 CORONARY ARTERY DISEASE INVOLVING NATIVE HEART WITHOUT ANGINA PECTORIS, UNSPECIFIED VESSEL OR LESION TYPE: ICD-10-CM

## 2019-08-24 DIAGNOSIS — R07.9 CHEST PAIN IN ADULT: Primary | ICD-10-CM

## 2019-08-24 DIAGNOSIS — F41.9 ANXIETY: ICD-10-CM

## 2019-08-24 LAB
ANION GAP SERPL CALCULATED.3IONS-SCNC: 16.4 MMOL/L (ref 5–15)
BASOPHILS # BLD AUTO: 0 10*3/MM3 (ref 0–0.2)
BASOPHILS NFR BLD AUTO: 0.5 % (ref 0–1.5)
BNP SERPL-MCNC: 107 PG/ML
BUN BLD-MCNC: 20 MG/DL (ref 8–20)
BUN/CREAT SERPL: 16.7 (ref 6.2–20.3)
CALCIUM SPEC-SCNC: 9.1 MG/DL (ref 8.9–10.3)
CHLORIDE SERPL-SCNC: 103 MMOL/L (ref 101–111)
CO2 SERPL-SCNC: 19 MMOL/L (ref 22–32)
CREAT BLD-MCNC: 1.2 MG/DL (ref 0.7–1.2)
D DIMER PPP FEU-MCNC: 0.75 MCGFEU/ML (ref 0.17–0.59)
DEPRECATED RDW RBC AUTO: 47.7 FL (ref 37–54)
EOSINOPHIL # BLD AUTO: 0.2 10*3/MM3 (ref 0–0.4)
EOSINOPHIL NFR BLD AUTO: 2.4 % (ref 0.3–6.2)
ERYTHROCYTE [DISTWIDTH] IN BLOOD BY AUTOMATED COUNT: 14.1 % (ref 12.3–15.4)
GFR SERPL CREATININE-BSD FRML MDRD: 59 ML/MIN/1.73
GLUCOSE BLD-MCNC: 118 MG/DL (ref 65–99)
HCT VFR BLD AUTO: 39 % (ref 37.5–51)
HGB BLD-MCNC: 13.3 G/DL (ref 13–17.7)
LYMPHOCYTES # BLD AUTO: 1.5 10*3/MM3 (ref 0.7–3.1)
LYMPHOCYTES NFR BLD AUTO: 21.3 % (ref 19.6–45.3)
MCH RBC QN AUTO: 32.9 PG (ref 26.6–33)
MCHC RBC AUTO-ENTMCNC: 34.1 G/DL (ref 31.5–35.7)
MCV RBC AUTO: 96.2 FL (ref 79–97)
MONOCYTES # BLD AUTO: 0.5 10*3/MM3 (ref 0.1–0.9)
MONOCYTES NFR BLD AUTO: 7.3 % (ref 5–12)
NEUTROPHILS # BLD AUTO: 4.7 10*3/MM3 (ref 1.7–7)
NEUTROPHILS NFR BLD AUTO: 68.5 % (ref 42.7–76)
NRBC BLD AUTO-RTO: 0 /100 WBC (ref 0–0.2)
PLATELET # BLD AUTO: 224 10*3/MM3 (ref 140–450)
PMV BLD AUTO: 8 FL (ref 6–12)
POTASSIUM BLD-SCNC: 4.4 MMOL/L (ref 3.6–5.1)
RBC # BLD AUTO: 4.05 10*6/MM3 (ref 4.14–5.8)
SODIUM BLD-SCNC: 134 MMOL/L (ref 136–144)
TROPONIN I SERPL-MCNC: <0.03 NG/ML (ref 0–0.03)
WBC NRBC COR # BLD: 6.9 10*3/MM3 (ref 3.4–10.8)

## 2019-08-24 PROCEDURE — 93005 ELECTROCARDIOGRAM TRACING: CPT | Performed by: EMERGENCY MEDICINE

## 2019-08-24 PROCEDURE — 71045 X-RAY EXAM CHEST 1 VIEW: CPT

## 2019-08-24 PROCEDURE — 71275 CT ANGIOGRAPHY CHEST: CPT

## 2019-08-24 PROCEDURE — 85379 FIBRIN DEGRADATION QUANT: CPT | Performed by: EMERGENCY MEDICINE

## 2019-08-24 PROCEDURE — 96374 THER/PROPH/DIAG INJ IV PUSH: CPT

## 2019-08-24 PROCEDURE — 80048 BASIC METABOLIC PNL TOTAL CA: CPT | Performed by: EMERGENCY MEDICINE

## 2019-08-24 PROCEDURE — 0 IOPAMIDOL PER 1 ML: Performed by: EMERGENCY MEDICINE

## 2019-08-24 PROCEDURE — 85025 COMPLETE CBC W/AUTO DIFF WBC: CPT | Performed by: EMERGENCY MEDICINE

## 2019-08-24 PROCEDURE — 25010000002 LORAZEPAM PER 2 MG: Performed by: EMERGENCY MEDICINE

## 2019-08-24 PROCEDURE — 83880 ASSAY OF NATRIURETIC PEPTIDE: CPT | Performed by: EMERGENCY MEDICINE

## 2019-08-24 PROCEDURE — 84484 ASSAY OF TROPONIN QUANT: CPT | Performed by: EMERGENCY MEDICINE

## 2019-08-24 PROCEDURE — 99284 EMERGENCY DEPT VISIT MOD MDM: CPT

## 2019-08-24 RX ORDER — LORAZEPAM 2 MG/ML
1 INJECTION INTRAMUSCULAR ONCE
Status: COMPLETED | OUTPATIENT
Start: 2019-08-24 | End: 2019-08-24

## 2019-08-24 RX ORDER — LORAZEPAM 2 MG/ML
INJECTION INTRAMUSCULAR
Status: DISCONTINUED
Start: 2019-08-24 | End: 2019-08-24 | Stop reason: HOSPADM

## 2019-08-24 RX ORDER — SODIUM CHLORIDE 0.9 % (FLUSH) 0.9 %
10 SYRINGE (ML) INJECTION AS NEEDED
Status: DISCONTINUED | OUTPATIENT
Start: 2019-08-24 | End: 2019-08-24 | Stop reason: HOSPADM

## 2019-08-24 RX ORDER — NITROGLYCERIN 0.4 MG/1
0.4 TABLET SUBLINGUAL
Status: DISCONTINUED | OUTPATIENT
Start: 2019-08-24 | End: 2019-08-24 | Stop reason: HOSPADM

## 2019-08-24 RX ADMIN — SODIUM CHLORIDE 1000 ML: 900 INJECTION, SOLUTION INTRAVENOUS at 18:59

## 2019-08-24 RX ADMIN — LORAZEPAM 1 MG: 2 INJECTION, SOLUTION INTRAMUSCULAR; INTRAVENOUS at 16:48

## 2019-08-24 RX ADMIN — NITROGLYCERIN 0.4 MG: 0.4 TABLET SUBLINGUAL at 16:48

## 2019-08-24 RX ADMIN — IOPAMIDOL 100 ML: 755 INJECTION, SOLUTION INTRAVENOUS at 18:36

## 2019-08-26 ENCOUNTER — APPOINTMENT (OUTPATIENT)
Dept: GENERAL RADIOLOGY | Facility: HOSPITAL | Age: 79
End: 2019-08-26

## 2019-08-26 ENCOUNTER — HOSPITAL ENCOUNTER (OUTPATIENT)
Facility: HOSPITAL | Age: 79
Setting detail: OBSERVATION
Discharge: HOME | End: 2019-08-28
Attending: EMERGENCY MEDICINE | Admitting: HOSPITALIST

## 2019-08-26 DIAGNOSIS — R07.9 CHEST PAIN, UNSPECIFIED TYPE: Primary | ICD-10-CM

## 2019-08-26 LAB
ALBUMIN SERPL-MCNC: 3.9 G/DL (ref 3.5–4.8)
ALBUMIN/GLOB SERPL: 1.2 G/DL (ref 1–1.7)
ALP SERPL-CCNC: 73 U/L (ref 32–91)
ALT SERPL W P-5'-P-CCNC: 24 U/L (ref 17–63)
ANION GAP SERPL CALCULATED.3IONS-SCNC: 15.7 MMOL/L (ref 5–15)
AST SERPL-CCNC: 32 U/L (ref 15–41)
BASOPHILS # BLD AUTO: 0 10*3/MM3 (ref 0–0.2)
BASOPHILS NFR BLD AUTO: 0.6 % (ref 0–1.5)
BILIRUB SERPL-MCNC: 0.7 MG/DL (ref 0.3–1.2)
BUN BLD-MCNC: 18 MG/DL (ref 8–20)
BUN/CREAT SERPL: 16.4 (ref 6.2–20.3)
CALCIUM SPEC-SCNC: 9.3 MG/DL (ref 8.9–10.3)
CHLORIDE SERPL-SCNC: 101 MMOL/L (ref 101–111)
CO2 SERPL-SCNC: 22 MMOL/L (ref 22–32)
CREAT BLD-MCNC: 1.1 MG/DL (ref 0.7–1.2)
DEPRECATED RDW RBC AUTO: 46.8 FL (ref 37–54)
EOSINOPHIL # BLD AUTO: 0.1 10*3/MM3 (ref 0–0.4)
EOSINOPHIL NFR BLD AUTO: 1.8 % (ref 0.3–6.2)
ERYTHROCYTE [DISTWIDTH] IN BLOOD BY AUTOMATED COUNT: 14.1 % (ref 12.3–15.4)
GFR SERPL CREATININE-BSD FRML MDRD: 65 ML/MIN/1.73
GLOBULIN UR ELPH-MCNC: 3.2 GM/DL (ref 2.5–3.8)
GLUCOSE BLD-MCNC: 118 MG/DL (ref 65–99)
HCT VFR BLD AUTO: 37.5 % (ref 37.5–51)
HGB BLD-MCNC: 13.3 G/DL (ref 13–17.7)
HOLD SPECIMEN: NORMAL
HOLD SPECIMEN: NORMAL
LYMPHOCYTES # BLD AUTO: 1.3 10*3/MM3 (ref 0.7–3.1)
LYMPHOCYTES NFR BLD AUTO: 15.8 % (ref 19.6–45.3)
MCH RBC QN AUTO: 33.8 PG (ref 26.6–33)
MCHC RBC AUTO-ENTMCNC: 35.4 G/DL (ref 31.5–35.7)
MCV RBC AUTO: 95.4 FL (ref 79–97)
MONOCYTES # BLD AUTO: 0.6 10*3/MM3 (ref 0.1–0.9)
MONOCYTES NFR BLD AUTO: 7.6 % (ref 5–12)
NEUTROPHILS # BLD AUTO: 5.9 10*3/MM3 (ref 1.7–7)
NEUTROPHILS NFR BLD AUTO: 74.2 % (ref 42.7–76)
NRBC BLD AUTO-RTO: 0 /100 WBC (ref 0–0.2)
PLATELET # BLD AUTO: 214 10*3/MM3 (ref 140–450)
PMV BLD AUTO: 8 FL (ref 6–12)
POTASSIUM BLD-SCNC: 4.7 MMOL/L (ref 3.6–5.1)
PROT SERPL-MCNC: 7.1 G/DL (ref 6.1–7.9)
RBC # BLD AUTO: 3.93 10*6/MM3 (ref 4.14–5.8)
SODIUM BLD-SCNC: 134 MMOL/L (ref 136–144)
TROPONIN I SERPL-MCNC: 0.03 NG/ML (ref 0–0.03)
TROPONIN I SERPL-MCNC: <0.03 NG/ML (ref 0–0.03)
WBC NRBC COR # BLD: 8 10*3/MM3 (ref 3.4–10.8)
WHOLE BLOOD HOLD SPECIMEN: NORMAL
WHOLE BLOOD HOLD SPECIMEN: NORMAL

## 2019-08-26 PROCEDURE — 80053 COMPREHEN METABOLIC PANEL: CPT | Performed by: NURSE PRACTITIONER

## 2019-08-26 PROCEDURE — 84484 ASSAY OF TROPONIN QUANT: CPT | Performed by: NURSE PRACTITIONER

## 2019-08-26 PROCEDURE — G0378 HOSPITAL OBSERVATION PER HR: HCPCS

## 2019-08-26 PROCEDURE — 85025 COMPLETE CBC W/AUTO DIFF WBC: CPT | Performed by: NURSE PRACTITIONER

## 2019-08-26 PROCEDURE — 99219 PR INITIAL OBSERVATION CARE/DAY 50 MINUTES: CPT | Performed by: PHYSICIAN ASSISTANT

## 2019-08-26 PROCEDURE — 71045 X-RAY EXAM CHEST 1 VIEW: CPT

## 2019-08-26 PROCEDURE — 99284 EMERGENCY DEPT VISIT MOD MDM: CPT

## 2019-08-26 PROCEDURE — 93005 ELECTROCARDIOGRAM TRACING: CPT | Performed by: NURSE PRACTITIONER

## 2019-08-26 RX ORDER — SODIUM CHLORIDE 0.9 % (FLUSH) 0.9 %
10 SYRINGE (ML) INJECTION AS NEEDED
Status: DISCONTINUED | OUTPATIENT
Start: 2019-08-26 | End: 2019-08-28 | Stop reason: HOSPADM

## 2019-08-26 RX ORDER — ASPIRIN 81 MG/1
324 TABLET, CHEWABLE ORAL ONCE
Status: COMPLETED | OUTPATIENT
Start: 2019-08-26 | End: 2019-08-26

## 2019-08-26 RX ADMIN — ASPIRIN 81 MG 324 MG: 81 TABLET ORAL at 18:29

## 2019-08-27 ENCOUNTER — TELEPHONE (OUTPATIENT)
Dept: CARDIOLOGY | Facility: CLINIC | Age: 79
End: 2019-08-27

## 2019-08-27 ENCOUNTER — APPOINTMENT (OUTPATIENT)
Dept: NUCLEAR MEDICINE | Facility: HOSPITAL | Age: 79
End: 2019-08-27

## 2019-08-27 PROBLEM — E78.49 OTHER HYPERLIPIDEMIA: Chronic | Status: ACTIVE | Noted: 2019-08-27

## 2019-08-27 PROBLEM — I10 ESSENTIAL HYPERTENSION: Chronic | Status: ACTIVE | Noted: 2019-08-27

## 2019-08-27 LAB
ANION GAP SERPL CALCULATED.3IONS-SCNC: 12.6 MMOL/L (ref 5–15)
ANION GAP SERPL CALCULATED.3IONS-SCNC: 15.3 MMOL/L (ref 5–15)
BASOPHILS # BLD AUTO: 0 10*3/MM3 (ref 0–0.2)
BASOPHILS # BLD AUTO: 0 10*3/MM3 (ref 0–0.2)
BASOPHILS NFR BLD AUTO: 0.8 % (ref 0–1.5)
BASOPHILS NFR BLD AUTO: 1 % (ref 0–1.5)
BUN BLD-MCNC: 13 MG/DL (ref 8–20)
BUN BLD-MCNC: 14 MG/DL (ref 8–20)
BUN/CREAT SERPL: 13 (ref 6.2–20.3)
BUN/CREAT SERPL: 14 (ref 6.2–20.3)
CALCIUM SPEC-SCNC: 8.7 MG/DL (ref 8.9–10.3)
CALCIUM SPEC-SCNC: 9 MG/DL (ref 8.9–10.3)
CHLORIDE SERPL-SCNC: 103 MMOL/L (ref 101–111)
CHLORIDE SERPL-SCNC: 105 MMOL/L (ref 101–111)
CO2 SERPL-SCNC: 23 MMOL/L (ref 22–32)
CO2 SERPL-SCNC: 26 MMOL/L (ref 22–32)
CREAT BLD-MCNC: 1 MG/DL (ref 0.7–1.2)
CREAT BLD-MCNC: 1 MG/DL (ref 0.7–1.2)
DEPRECATED RDW RBC AUTO: 47.3 FL (ref 37–54)
DEPRECATED RDW RBC AUTO: 48.6 FL (ref 37–54)
EOSINOPHIL # BLD AUTO: 0.2 10*3/MM3 (ref 0–0.4)
EOSINOPHIL # BLD AUTO: 0.2 10*3/MM3 (ref 0–0.4)
EOSINOPHIL NFR BLD AUTO: 4 % (ref 0.3–6.2)
EOSINOPHIL NFR BLD AUTO: 4.8 % (ref 0.3–6.2)
ERYTHROCYTE [DISTWIDTH] IN BLOOD BY AUTOMATED COUNT: 14.1 % (ref 12.3–15.4)
ERYTHROCYTE [DISTWIDTH] IN BLOOD BY AUTOMATED COUNT: 14.3 % (ref 12.3–15.4)
GFR SERPL CREATININE-BSD FRML MDRD: 73 ML/MIN/1.73
GFR SERPL CREATININE-BSD FRML MDRD: 73 ML/MIN/1.73
GLUCOSE BLD-MCNC: 103 MG/DL (ref 65–99)
GLUCOSE BLD-MCNC: 119 MG/DL (ref 65–99)
HCT VFR BLD AUTO: 36.6 % (ref 37.5–51)
HCT VFR BLD AUTO: 37.6 % (ref 37.5–51)
HGB BLD-MCNC: 12.7 G/DL (ref 13–17.7)
HGB BLD-MCNC: 12.9 G/DL (ref 13–17.7)
LYMPHOCYTES # BLD AUTO: 1.3 10*3/MM3 (ref 0.7–3.1)
LYMPHOCYTES # BLD AUTO: 1.5 10*3/MM3 (ref 0.7–3.1)
LYMPHOCYTES NFR BLD AUTO: 26.3 % (ref 19.6–45.3)
LYMPHOCYTES NFR BLD AUTO: 28.9 % (ref 19.6–45.3)
MCH RBC QN AUTO: 33.3 PG (ref 26.6–33)
MCH RBC QN AUTO: 33.4 PG (ref 26.6–33)
MCHC RBC AUTO-ENTMCNC: 34.3 G/DL (ref 31.5–35.7)
MCHC RBC AUTO-ENTMCNC: 34.7 G/DL (ref 31.5–35.7)
MCV RBC AUTO: 96.4 FL (ref 79–97)
MCV RBC AUTO: 97 FL (ref 79–97)
MONOCYTES # BLD AUTO: 0.5 10*3/MM3 (ref 0.1–0.9)
MONOCYTES # BLD AUTO: 0.6 10*3/MM3 (ref 0.1–0.9)
MONOCYTES NFR BLD AUTO: 10.7 % (ref 5–12)
MONOCYTES NFR BLD AUTO: 10.8 % (ref 5–12)
NEUTROPHILS # BLD AUTO: 2.9 10*3/MM3 (ref 1.7–7)
NEUTROPHILS # BLD AUTO: 2.9 10*3/MM3 (ref 1.7–7)
NEUTROPHILS NFR BLD AUTO: 55.5 % (ref 42.7–76)
NEUTROPHILS NFR BLD AUTO: 57.2 % (ref 42.7–76)
NRBC BLD AUTO-RTO: 0.1 /100 WBC (ref 0–0.2)
NRBC BLD AUTO-RTO: 0.1 /100 WBC (ref 0–0.2)
PLATELET # BLD AUTO: 192 10*3/MM3 (ref 140–450)
PLATELET # BLD AUTO: 197 10*3/MM3 (ref 140–450)
PMV BLD AUTO: 7.6 FL (ref 6–12)
PMV BLD AUTO: 8.2 FL (ref 6–12)
POTASSIUM BLD-SCNC: 4.3 MMOL/L (ref 3.6–5.1)
POTASSIUM BLD-SCNC: 4.6 MMOL/L (ref 3.6–5.1)
RBC # BLD AUTO: 3.79 10*6/MM3 (ref 4.14–5.8)
RBC # BLD AUTO: 3.88 10*6/MM3 (ref 4.14–5.8)
SODIUM BLD-SCNC: 137 MMOL/L (ref 136–144)
SODIUM BLD-SCNC: 139 MMOL/L (ref 136–144)
TROPONIN I SERPL-MCNC: <0.03 NG/ML (ref 0–0.03)
WBC NRBC COR # BLD: 5.1 10*3/MM3 (ref 3.4–10.8)
WBC NRBC COR # BLD: 5.3 10*3/MM3 (ref 3.4–10.8)

## 2019-08-27 PROCEDURE — 80048 BASIC METABOLIC PNL TOTAL CA: CPT | Performed by: PHYSICIAN ASSISTANT

## 2019-08-27 PROCEDURE — G0378 HOSPITAL OBSERVATION PER HR: HCPCS

## 2019-08-27 PROCEDURE — 93016 CV STRESS TEST SUPVJ ONLY: CPT | Performed by: INTERNAL MEDICINE

## 2019-08-27 PROCEDURE — 0 TECHNETIUM SESTAMIBI: Performed by: HOSPITALIST

## 2019-08-27 PROCEDURE — 96372 THER/PROPH/DIAG INJ SC/IM: CPT

## 2019-08-27 PROCEDURE — 84484 ASSAY OF TROPONIN QUANT: CPT | Performed by: PHYSICIAN ASSISTANT

## 2019-08-27 PROCEDURE — A9500 TC99M SESTAMIBI: HCPCS | Performed by: HOSPITALIST

## 2019-08-27 PROCEDURE — 93017 CV STRESS TEST TRACING ONLY: CPT

## 2019-08-27 PROCEDURE — 96374 THER/PROPH/DIAG INJ IV PUSH: CPT

## 2019-08-27 PROCEDURE — 85025 COMPLETE CBC W/AUTO DIFF WBC: CPT | Performed by: PHYSICIAN ASSISTANT

## 2019-08-27 PROCEDURE — 99214 OFFICE O/P EST MOD 30 MIN: CPT | Performed by: INTERNAL MEDICINE

## 2019-08-27 PROCEDURE — 25010000002 CYANOCOBALAMIN PER 1000 MCG: Performed by: PHYSICIAN ASSISTANT

## 2019-08-27 PROCEDURE — 99225 PR SBSQ OBSERVATION CARE/DAY 25 MINUTES: CPT | Performed by: HOSPITALIST

## 2019-08-27 PROCEDURE — 78452 HT MUSCLE IMAGE SPECT MULT: CPT

## 2019-08-27 PROCEDURE — 25010000002 ONDANSETRON PER 1 MG: Performed by: PHYSICIAN ASSISTANT

## 2019-08-27 RX ORDER — POTASSIUM CHLORIDE 20 MEQ/1
40 TABLET, EXTENDED RELEASE ORAL AS NEEDED
Status: DISCONTINUED | OUTPATIENT
Start: 2019-08-27 | End: 2019-08-28 | Stop reason: HOSPADM

## 2019-08-27 RX ORDER — ALUMINA, MAGNESIA, AND SIMETHICONE 2400; 2400; 240 MG/30ML; MG/30ML; MG/30ML
15 SUSPENSION ORAL EVERY 6 HOURS PRN
Status: DISCONTINUED | OUTPATIENT
Start: 2019-08-27 | End: 2019-08-28 | Stop reason: HOSPADM

## 2019-08-27 RX ORDER — ASPIRIN 81 MG/1
81 TABLET ORAL DAILY
Status: DISCONTINUED | OUTPATIENT
Start: 2019-08-27 | End: 2019-08-28 | Stop reason: HOSPADM

## 2019-08-27 RX ORDER — MAGNESIUM SULFATE HEPTAHYDRATE 40 MG/ML
2 INJECTION, SOLUTION INTRAVENOUS AS NEEDED
Status: DISCONTINUED | OUTPATIENT
Start: 2019-08-27 | End: 2019-08-28 | Stop reason: HOSPADM

## 2019-08-27 RX ORDER — AMLODIPINE BESYLATE 5 MG/1
5 TABLET ORAL DAILY
Status: DISCONTINUED | OUTPATIENT
Start: 2019-08-27 | End: 2019-08-28 | Stop reason: HOSPADM

## 2019-08-27 RX ORDER — ACETAMINOPHEN 160 MG/5ML
650 SOLUTION ORAL EVERY 4 HOURS PRN
Status: DISCONTINUED | OUTPATIENT
Start: 2019-08-27 | End: 2019-08-28 | Stop reason: HOSPADM

## 2019-08-27 RX ORDER — MAGNESIUM SULFATE HEPTAHYDRATE 40 MG/ML
4 INJECTION, SOLUTION INTRAVENOUS AS NEEDED
Status: DISCONTINUED | OUTPATIENT
Start: 2019-08-27 | End: 2019-08-28 | Stop reason: HOSPADM

## 2019-08-27 RX ORDER — POTASSIUM CHLORIDE 7.45 MG/ML
10 INJECTION INTRAVENOUS
Status: DISCONTINUED | OUTPATIENT
Start: 2019-08-27 | End: 2019-08-28 | Stop reason: HOSPADM

## 2019-08-27 RX ORDER — ONDANSETRON 2 MG/ML
4 INJECTION INTRAMUSCULAR; INTRAVENOUS EVERY 6 HOURS PRN
Status: DISCONTINUED | OUTPATIENT
Start: 2019-08-27 | End: 2019-08-28 | Stop reason: HOSPADM

## 2019-08-27 RX ORDER — ACETAMINOPHEN 325 MG/1
650 TABLET ORAL EVERY 4 HOURS PRN
Status: DISCONTINUED | OUTPATIENT
Start: 2019-08-27 | End: 2019-08-28 | Stop reason: HOSPADM

## 2019-08-27 RX ORDER — BISACODYL 10 MG
10 SUPPOSITORY, RECTAL RECTAL DAILY PRN
Status: DISCONTINUED | OUTPATIENT
Start: 2019-08-27 | End: 2019-08-28 | Stop reason: HOSPADM

## 2019-08-27 RX ORDER — CYANOCOBALAMIN 1000 UG/ML
1000 INJECTION, SOLUTION INTRAMUSCULAR; SUBCUTANEOUS
Status: DISCONTINUED | OUTPATIENT
Start: 2019-08-27 | End: 2019-08-28 | Stop reason: HOSPADM

## 2019-08-27 RX ORDER — CLOPIDOGREL BISULFATE 75 MG/1
75 TABLET ORAL DAILY
Status: DISCONTINUED | OUTPATIENT
Start: 2019-08-27 | End: 2019-08-28 | Stop reason: HOSPADM

## 2019-08-27 RX ORDER — SPIRONOLACTONE 25 MG/1
25 TABLET ORAL DAILY
Status: DISCONTINUED | OUTPATIENT
Start: 2019-08-27 | End: 2019-08-28 | Stop reason: HOSPADM

## 2019-08-27 RX ORDER — OMEGA-3S/DHA/EPA/FISH OIL/D3 300MG-1000
400 CAPSULE ORAL DAILY
Status: DISCONTINUED | OUTPATIENT
Start: 2019-08-27 | End: 2019-08-27

## 2019-08-27 RX ORDER — ONDANSETRON 4 MG/1
4 TABLET, FILM COATED ORAL EVERY 6 HOURS PRN
Status: DISCONTINUED | OUTPATIENT
Start: 2019-08-27 | End: 2019-08-28 | Stop reason: HOSPADM

## 2019-08-27 RX ORDER — MELATONIN
1000 DAILY
Status: DISCONTINUED | OUTPATIENT
Start: 2019-08-27 | End: 2019-08-28 | Stop reason: HOSPADM

## 2019-08-27 RX ORDER — DOCUSATE SODIUM 100 MG/1
100 CAPSULE, LIQUID FILLED ORAL 2 TIMES DAILY PRN
Status: DISCONTINUED | OUTPATIENT
Start: 2019-08-27 | End: 2019-08-28 | Stop reason: HOSPADM

## 2019-08-27 RX ORDER — NITROGLYCERIN 0.4 MG/1
0.4 TABLET SUBLINGUAL
Status: DISCONTINUED | OUTPATIENT
Start: 2019-08-27 | End: 2019-08-28 | Stop reason: HOSPADM

## 2019-08-27 RX ORDER — ACETAMINOPHEN 650 MG/1
650 SUPPOSITORY RECTAL EVERY 4 HOURS PRN
Status: DISCONTINUED | OUTPATIENT
Start: 2019-08-27 | End: 2019-08-28 | Stop reason: HOSPADM

## 2019-08-27 RX ORDER — SODIUM CHLORIDE 0.9 % (FLUSH) 0.9 %
3 SYRINGE (ML) INJECTION EVERY 12 HOURS SCHEDULED
Status: DISCONTINUED | OUTPATIENT
Start: 2019-08-27 | End: 2019-08-28 | Stop reason: HOSPADM

## 2019-08-27 RX ORDER — POTASSIUM CHLORIDE 1.5 G/1.77G
40 POWDER, FOR SOLUTION ORAL AS NEEDED
Status: DISCONTINUED | OUTPATIENT
Start: 2019-08-27 | End: 2019-08-28 | Stop reason: HOSPADM

## 2019-08-27 RX ORDER — AMIODARONE HYDROCHLORIDE 200 MG/1
200 TABLET ORAL DAILY
Status: DISCONTINUED | OUTPATIENT
Start: 2019-08-27 | End: 2019-08-28 | Stop reason: HOSPADM

## 2019-08-27 RX ORDER — SODIUM CHLORIDE 0.9 % (FLUSH) 0.9 %
3-10 SYRINGE (ML) INJECTION AS NEEDED
Status: DISCONTINUED | OUTPATIENT
Start: 2019-08-27 | End: 2019-08-28 | Stop reason: HOSPADM

## 2019-08-27 RX ADMIN — MELATONIN 1000 UNITS: at 09:18

## 2019-08-27 RX ADMIN — CLOPIDOGREL BISULFATE 75 MG: 75 TABLET ORAL at 09:19

## 2019-08-27 RX ADMIN — Medication 3 ML: at 09:18

## 2019-08-27 RX ADMIN — Medication 3 ML: at 20:31

## 2019-08-27 RX ADMIN — TECHNETIUM TC 99M SESTAMIBI 1 DOSE: 1 INJECTION INTRAVENOUS at 13:30

## 2019-08-27 RX ADMIN — CYANOCOBALAMIN 1000 MCG: 1000 INJECTION, SOLUTION INTRAMUSCULAR; SUBCUTANEOUS at 15:09

## 2019-08-27 RX ADMIN — ASPIRIN 81 MG: 81 TABLET ORAL at 09:18

## 2019-08-27 RX ADMIN — DOCUSATE SODIUM 100 MG: 100 CAPSULE, LIQUID FILLED ORAL at 22:20

## 2019-08-27 RX ADMIN — OYSTER SHELL CALCIUM WITH VITAMIN D 1 TABLET: 500; 200 TABLET, FILM COATED ORAL at 09:19

## 2019-08-27 RX ADMIN — SPIRONOLACTONE 25 MG: 25 TABLET ORAL at 09:18

## 2019-08-27 RX ADMIN — AMIODARONE HYDROCHLORIDE 200 MG: 200 TABLET ORAL at 09:18

## 2019-08-27 RX ADMIN — ONDANSETRON 4 MG: 2 INJECTION INTRAMUSCULAR; INTRAVENOUS at 23:22

## 2019-08-27 RX ADMIN — AMLODIPINE BESYLATE 5 MG: 5 TABLET ORAL at 09:19

## 2019-08-28 ENCOUNTER — APPOINTMENT (OUTPATIENT)
Dept: NUCLEAR MEDICINE | Facility: HOSPITAL | Age: 79
End: 2019-08-28

## 2019-08-28 VITALS
TEMPERATURE: 97.5 F | OXYGEN SATURATION: 98 % | SYSTOLIC BLOOD PRESSURE: 114 MMHG | HEART RATE: 57 BPM | HEIGHT: 74 IN | RESPIRATION RATE: 15 BRPM | WEIGHT: 192.02 LBS | DIASTOLIC BLOOD PRESSURE: 73 MMHG | BODY MASS INDEX: 24.64 KG/M2

## 2019-08-28 LAB
ANION GAP SERPL CALCULATED.3IONS-SCNC: 16.2 MMOL/L (ref 5–15)
BASOPHILS # BLD AUTO: 0 10*3/MM3 (ref 0–0.2)
BASOPHILS NFR BLD AUTO: 0.3 % (ref 0–1.5)
BH CV NUCLEAR PRIOR STUDY: 3
BH CV STRESS BP STAGE 1: NORMAL
BH CV STRESS BP STAGE 2: NORMAL
BH CV STRESS COMMENTS STAGE 1: NORMAL
BH CV STRESS COMMENTS STAGE 2: NORMAL
BH CV STRESS DOSE REGADENOSON STAGE 1: 0.4
BH CV STRESS DURATION MIN STAGE 1: 0
BH CV STRESS DURATION MIN STAGE 2: 4
BH CV STRESS DURATION SEC STAGE 1: 10
BH CV STRESS DURATION SEC STAGE 2: 0
BH CV STRESS HR STAGE 1: 87
BH CV STRESS HR STAGE 2: 80
BH CV STRESS PROTOCOL 1: NORMAL
BH CV STRESS RECOVERY BP: NORMAL MMHG
BH CV STRESS RECOVERY HR: 82 BPM
BH CV STRESS STAGE 1: 1
BH CV STRESS STAGE 2: 2
BUN BLD-MCNC: 19 MG/DL (ref 8–20)
BUN/CREAT SERPL: 19 (ref 6.2–20.3)
CALCIUM SPEC-SCNC: 9.1 MG/DL (ref 8.9–10.3)
CHLORIDE SERPL-SCNC: 97 MMOL/L (ref 101–111)
CO2 SERPL-SCNC: 22 MMOL/L (ref 22–32)
CREAT BLD-MCNC: 1 MG/DL (ref 0.7–1.2)
DEPRECATED RDW RBC AUTO: 47.7 FL (ref 37–54)
EOSINOPHIL # BLD AUTO: 0.2 10*3/MM3 (ref 0–0.4)
EOSINOPHIL NFR BLD AUTO: 1.9 % (ref 0.3–6.2)
ERYTHROCYTE [DISTWIDTH] IN BLOOD BY AUTOMATED COUNT: 14.2 % (ref 12.3–15.4)
GFR SERPL CREATININE-BSD FRML MDRD: 73 ML/MIN/1.73
GLUCOSE BLD-MCNC: 119 MG/DL (ref 65–99)
HCT VFR BLD AUTO: 44.3 % (ref 37.5–51)
HGB BLD-MCNC: 15.3 G/DL (ref 13–17.7)
LV EF NUC BP: 30 %
LYMPHOCYTES # BLD AUTO: 1.6 10*3/MM3 (ref 0.7–3.1)
LYMPHOCYTES NFR BLD AUTO: 12.5 % (ref 19.6–45.3)
MAXIMAL PREDICTED HEART RATE: 144 BPM
MCH RBC QN AUTO: 33.6 PG (ref 26.6–33)
MCHC RBC AUTO-ENTMCNC: 34.5 G/DL (ref 31.5–35.7)
MCV RBC AUTO: 97.4 FL (ref 79–97)
MONOCYTES # BLD AUTO: 0.8 10*3/MM3 (ref 0.1–0.9)
MONOCYTES NFR BLD AUTO: 6.4 % (ref 5–12)
NEUTROPHILS # BLD AUTO: 10.3 10*3/MM3 (ref 1.7–7)
NEUTROPHILS NFR BLD AUTO: 78.9 % (ref 42.7–76)
NRBC BLD AUTO-RTO: 0 /100 WBC (ref 0–0.2)
PERCENT MAX PREDICTED HR: 62.5 %
PLATELET # BLD AUTO: 248 10*3/MM3 (ref 140–450)
PMV BLD AUTO: 7.8 FL (ref 6–12)
POTASSIUM BLD-SCNC: 4.2 MMOL/L (ref 3.6–5.1)
RBC # BLD AUTO: 4.55 10*6/MM3 (ref 4.14–5.8)
SODIUM BLD-SCNC: 131 MMOL/L (ref 136–144)
STRESS BASELINE BP: NORMAL MMHG
STRESS BASELINE HR: 70 BPM
STRESS PERCENT HR: 74 %
STRESS POST PEAK BP: NORMAL MMHG
STRESS POST PEAK HR: 90 BPM
STRESS TARGET HR: 122 BPM
WBC NRBC COR # BLD: 13 10*3/MM3 (ref 3.4–10.8)

## 2019-08-28 PROCEDURE — 25010000002 REGADENOSON 0.4 MG/5ML SOLUTION: Performed by: HOSPITALIST

## 2019-08-28 PROCEDURE — 80048 BASIC METABOLIC PNL TOTAL CA: CPT | Performed by: PHYSICIAN ASSISTANT

## 2019-08-28 PROCEDURE — 78452 HT MUSCLE IMAGE SPECT MULT: CPT | Performed by: INTERNAL MEDICINE

## 2019-08-28 PROCEDURE — 99217 PR OBSERVATION CARE DISCHARGE MANAGEMENT: CPT | Performed by: HOSPITALIST

## 2019-08-28 PROCEDURE — 93018 CV STRESS TEST I&R ONLY: CPT | Performed by: INTERNAL MEDICINE

## 2019-08-28 PROCEDURE — 0 TECHNETIUM SESTAMIBI: Performed by: HOSPITALIST

## 2019-08-28 PROCEDURE — A9500 TC99M SESTAMIBI: HCPCS | Performed by: HOSPITALIST

## 2019-08-28 PROCEDURE — G0378 HOSPITAL OBSERVATION PER HR: HCPCS

## 2019-08-28 PROCEDURE — 85025 COMPLETE CBC W/AUTO DIFF WBC: CPT | Performed by: PHYSICIAN ASSISTANT

## 2019-08-28 PROCEDURE — 99214 OFFICE O/P EST MOD 30 MIN: CPT | Performed by: INTERNAL MEDICINE

## 2019-08-28 RX ADMIN — MELATONIN 1000 UNITS: at 09:42

## 2019-08-28 RX ADMIN — AMIODARONE HYDROCHLORIDE 200 MG: 200 TABLET ORAL at 14:43

## 2019-08-28 RX ADMIN — REGADENOSON 0.4 MG: 0.08 INJECTION, SOLUTION INTRAVENOUS at 11:15

## 2019-08-28 RX ADMIN — Medication 3 ML: at 14:46

## 2019-08-28 RX ADMIN — ASPIRIN 81 MG: 81 TABLET ORAL at 09:41

## 2019-08-28 RX ADMIN — SPIRONOLACTONE 25 MG: 25 TABLET ORAL at 14:44

## 2019-08-28 RX ADMIN — AMLODIPINE BESYLATE 5 MG: 5 TABLET ORAL at 09:42

## 2019-08-28 RX ADMIN — TECHNETIUM TC 99M SESTAMIBI 1 DOSE: 1 INJECTION INTRAVENOUS at 11:15

## 2019-08-28 RX ADMIN — OYSTER SHELL CALCIUM WITH VITAMIN D 1 TABLET: 500; 200 TABLET, FILM COATED ORAL at 09:41

## 2019-08-28 RX ADMIN — CLOPIDOGREL BISULFATE 75 MG: 75 TABLET ORAL at 09:42

## 2019-08-28 RX ADMIN — AMLODIPINE BESYLATE 5 MG: 5 TABLET ORAL at 14:43

## 2019-08-29 ENCOUNTER — READMISSION MANAGEMENT (OUTPATIENT)
Dept: CALL CENTER | Facility: HOSPITAL | Age: 79
End: 2019-08-29

## 2019-08-30 ENCOUNTER — READMISSION MANAGEMENT (OUTPATIENT)
Dept: CALL CENTER | Facility: HOSPITAL | Age: 79
End: 2019-08-30

## 2019-08-30 ENCOUNTER — OFFICE VISIT (OUTPATIENT)
Dept: CARDIOLOGY | Facility: CLINIC | Age: 79
End: 2019-08-30

## 2019-08-30 ENCOUNTER — CLINICAL SUPPORT NO REQUIREMENTS (OUTPATIENT)
Dept: CARDIOLOGY | Facility: CLINIC | Age: 79
End: 2019-08-30

## 2019-08-30 VITALS
DIASTOLIC BLOOD PRESSURE: 71 MMHG | WEIGHT: 192 LBS | BODY MASS INDEX: 24.65 KG/M2 | HEART RATE: 85 BPM | OXYGEN SATURATION: 95 % | SYSTOLIC BLOOD PRESSURE: 120 MMHG

## 2019-08-30 DIAGNOSIS — I47.29 PAROXYSMAL VENTRICULAR TACHYCARDIA (HCC): ICD-10-CM

## 2019-08-30 DIAGNOSIS — I47.29 PAROXYSMAL VENTRICULAR TACHYCARDIA (HCC): Primary | ICD-10-CM

## 2019-08-30 DIAGNOSIS — I10 ESSENTIAL HYPERTENSION: Primary | Chronic | ICD-10-CM

## 2019-08-30 DIAGNOSIS — Z95.810 PRESENCE OF AUTOMATIC IMPLANTABLE CARDIOVERTER-DEFIBRILLATOR: ICD-10-CM

## 2019-08-30 DIAGNOSIS — I25.5 ISCHEMIC CARDIOMYOPATHY: ICD-10-CM

## 2019-08-30 PROCEDURE — 99213 OFFICE O/P EST LOW 20 MIN: CPT | Performed by: INTERNAL MEDICINE

## 2019-08-30 PROCEDURE — 93282 PRGRMG EVAL IMPLANTABLE DFB: CPT | Performed by: NURSE PRACTITIONER

## 2019-08-30 NOTE — OUTREACH NOTE
Medical Week 1 Survey      Responses   Facility patient discharged from?  [facility]   Does the patient have one of the following disease processes/diagnoses(primary or secondary)?  Other   Is there a successful TCM telephone encounter documented?  No   Week 1 attempt successful?  Yes   Call start time  1313   Call end time  1318   Discharge diagnosis  Chest pain   Meds reviewed with patient/caregiver?  Yes   Is the patient having any side effects they believe may be caused by any medication additions or changes?  No   Does the patient have all medications ordered at discharge?  Yes   Is the patient taking all medications as directed (includes completed medication regime)?  Yes   Does the patient have a primary care provider?   Yes   Does the patient have an appointment with their PCP within 7 days of discharge?  Greater than 7 days   What is preventing the patient from scheduling follow up appointments within 7 days of discharge?  Earlier appointment not available   Nursing Interventions  Verified appointment date/time/provider   Has the patient kept scheduled appointments due by today?  N/A   Has home health visited the patient within 72 hours of discharge?  N/A   Did the patient receive a copy of their discharge instructions?  Yes   Nursing interventions  Reviewed instructions with patient   What is the patient's perception of their health status since discharge?  Improving   Is the patient/caregiver able to teach back signs and symptoms related to disease process for when to call PCP?  Yes   Is the patient/caregiver able to teach back signs and symptoms related to disease process for when to call 911?  Yes   Is the patient/caregiver able to teach back the hierarchy of who to call/visit for symptoms/problems? PCP, Specialist, Home health nurse, Urgent Care, ED, 911  Yes   Week 1 call completed?  Yes   Graduated  Yes   Did the patient feel the follow up calls were helpful during their recovery period?  Yes   Was the  number of calls appropriate?  Yes          Estefania Price LPN

## 2019-08-30 NOTE — PROGRESS NOTES
"Cardiology Office Follow Up Visit      Primary Care Provider:  Makenna Motta MD    Reason for f/u: Ventricular tachycardia      Subjective     \"Just got out of the hospital\"       Makenna Motta MD    History of Present Illness       It was nice to see Deion Nicholas in follow-up for recurrent ventricular tachycardia. He is a 78 y.o. male with a history of recurrent VT status post redo VT ablation.  He was recently hospitalized and underwent a stress test that did not demonstrate any reversible ischemia on 8/27/2019, his EF was 30%.  He comes in today for routine follow-up for recent hospitalization and follow-up ablation.  His device was interrogated and has normal functionality, see attached for specifics.  He denies shortness of breath, chest/back pain, syncopal or near syncopal events since his hospitalization.    Primary history: Ischemic cardiomyopathy, VT storm status post redo VT ablation, dysphasia status post esophageal stricture dilatation, hypertension-beta-blocker intolerant and chronic cough with lisinopril, CAD status post CABG 2010, hyperlipidemia- statin intolerant, PTSD/anxiety      Past Medical History:   Diagnosis Date   • Cardiomyopathy (CMS/HCC)     ICD implantation   • Cellulitis of lower leg    • CHD (coronary heart disease)     S/P CABG & PCI   • Dyslipidemia    • History of ventricular tachycardia    • Hypertension    • Myocardial infarction (CMS/HCC)     Inferior MI   • Pinched nerve     L4-L5   • Prostate cancer (CMS/HCC)        Past Surgical History:   Procedure Laterality Date   • ANGIOPLASTY  2000 04/1996 Stent: Palmaz- Huy stent/LAD 07/1996-RCA: 2000-Tetra stent Guidant to proximal RCA, mid to distal artery 2 sequential Guidant Tetra stents   • APPENDECTOMY     • CARDIAC ABLATION  April and May 2019   • CARDIAC CATHETERIZATION  07/2017    1996 x2, Nov. 2000, 05/2010-cath 08/2015-no stents 2017   • CARDIAC DEFIBRILLATOR PLACEMENT     • COLONOSCOPY W/ POLYPECTOMY "      Mult colonoscopy's for polyp resection    • CORONARY ARTERY BYPASS GRAFT  05/2010    x5 vessel: LMA to diagonal, LAD, intermedius, obtuse marginal, RCA   • CORONARY STENT PLACEMENT     • ENDOSCOPY N/A 6/24/2019    Procedure: ESOPHAGOGASTRODUODENOSCOPY with dilitation Bougie 50, 54;  Surgeon: Roddy Coronel MD;  Location: [facility] ENDOSCOPY;  Service: Gastroenterology   • INSERT / REPLACE / REMOVE PACEMAKER     • KNEE OPEN LATERAL RELEASE Left     reduction   • OTHER SURGICAL HISTORY  01/2018    ICD implantation   • PROSTATE SURGERY  2015    Robiotic surgery- Cyber Knife:         Current Outpatient Medications:   •  amiodarone (PACERONE) 200 MG tablet, Take 200 mg by mouth Daily., Disp: , Rfl:   •  amLODIPine (NORVASC) 5 MG tablet, Take 1 tablet by mouth Daily., Disp: 30 tablet, Rfl: 11  •  aspirin 81 MG EC tablet, Take 81 mg by mouth Daily., Disp: , Rfl:   •  Cholecalciferol (VITAMIN D-400) 400 units tablet, Take 2 tablets daily, Disp: , Rfl:   •  clopidogrel (PLAVIX) 75 MG tablet, Take 75 mg by mouth Daily., Disp: , Rfl:   •  cyanocobalamin 1000 MCG/ML injection, Inject 1,000 mcg into the appropriate muscle as directed by prescriber Every 14 (Fourteen) Days., Disp: , Rfl:   •  ezetimibe (ZETIA) 10 MG tablet, Take 10 mg by mouth Every Other Day., Disp: , Rfl:   •  nitroglycerin (NITROSTAT) 0.4 MG SL tablet, NITROSTAT 0.4 MG SUBL, Disp: , Rfl:   •  Omega-3 Fatty Acids (FISH OIL) 1000 MG capsule capsule, Take 2,000 mg by mouth 2 (Two) Times a Day With Meals., Disp: , Rfl:   •  spironolactone (ALDACTONE) 25 MG tablet, Take 1 tablet by mouth Daily., Disp: 30 tablet, Rfl: 2    Social History     Socioeconomic History   • Marital status:      Spouse name: Not on file   • Number of children: Not on file   • Years of education: Not on file   • Highest education level: Not on file   Tobacco Use   • Smoking status: Former Smoker     Packs/day: 1.00     Years: 17.00     Pack years: 17.00     Types: Cigarettes      Last attempt to quit: 2002     Years since quittin.1   • Smokeless tobacco: Never Used   Substance and Sexual Activity   • Alcohol use: No     Frequency: Never   • Drug use: No   • Sexual activity: Defer       Family History   Problem Relation Age of Onset   • Pancreatic cancer Mother    • Heart disease Father        The following portions of the patient's history were reviewed and updated as appropriate: allergies, current medications, past medical history, past social history, past surgical history and problem list.        Review of Systems   Constitution: Negative for diaphoresis, fever and weight gain.   Cardiovascular: Negative for chest pain, dyspnea on exertion, leg swelling, near-syncope, palpitations and syncope.   Respiratory: Negative for hemoptysis and shortness of breath.    Gastrointestinal: Negative for nausea and vomiting.   Neurological: Negative for light-headedness, numbness, paresthesias and seizures.   Psychiatric/Behavioral: The patient is nervous/anxious.    All other systems reviewed and are negative.    /71   Pulse 85   Wt 87.1 kg (192 lb)   SpO2 95%   BMI 24.65 kg/m² .  Objective     Physical Exam   Constitutional: He is oriented to person, place, and time. He appears well-developed and well-nourished. He is cooperative.   Neck: No JVD present.   Cardiovascular: Normal rate, regular rhythm, normal heart sounds and intact distal pulses. Exam reveals no gallop and no friction rub.   No murmur heard.  Pulses:       Carotid pulses are 2+ on the right side, and 2+ on the left side.       Radial pulses are 2+ on the right side, and 2+ on the left side.        Posterior tibial pulses are 2+ on the right side, and 2+ on the left side.   Pulmonary/Chest: Effort normal and breath sounds normal. He has no decreased breath sounds. He has no wheezes. He has no rhonchi. He has no rales.   Abdominal: Soft. Bowel sounds are normal. He exhibits no distension and no mass. There is no  tenderness. There is no guarding.   Musculoskeletal: He exhibits no edema.   Neurological: He is alert and oriented to person, place, and time.   Skin: Skin is warm and dry. No rash noted. No erythema. No pallor.   Psychiatric: He has a normal mood and affect. His speech is normal and behavior is normal. Judgment and thought content normal.         Procedures    Assessment/Plan:    1.  VT storm status post VT ablation----no recurrent arrhythmia since last interrogation, continue amiodarone  2.  Hypertension with multiple medication intolerances-----well-controlled on current regimen  3.  Hyperlipidemia-----statin intolerant, on Zetia and fish oil    Follow-up in my office in 1 month for reevaluation, return sooner should he develop any issues     NIMCO Zarco  EP cardiology

## 2019-08-30 NOTE — OUTREACH NOTE
Prep Survey      Responses   Facility patient discharged from?  [facility]   Is patient eligible?  Yes   Discharge diagnosis  Chest pain   Does the patient have one of the following disease processes/diagnoses(primary or secondary)?  Other   Does the patient have Home health ordered?  No   Is there a DME ordered?  No   Prep survey completed?  Yes          Mariana Butcher RN

## 2019-10-25 ENCOUNTER — CLINICAL SUPPORT NO REQUIREMENTS (OUTPATIENT)
Dept: CARDIOLOGY | Facility: CLINIC | Age: 79
End: 2019-10-25

## 2019-10-25 ENCOUNTER — OFFICE VISIT (OUTPATIENT)
Dept: CARDIOLOGY | Facility: CLINIC | Age: 79
End: 2019-10-25

## 2019-10-25 VITALS
WEIGHT: 190 LBS | OXYGEN SATURATION: 96 % | RESPIRATION RATE: 18 BRPM | SYSTOLIC BLOOD PRESSURE: 133 MMHG | BODY MASS INDEX: 24.39 KG/M2 | DIASTOLIC BLOOD PRESSURE: 68 MMHG | HEART RATE: 87 BPM

## 2019-10-25 DIAGNOSIS — I47.20 VT (VENTRICULAR TACHYCARDIA) (HCC): Primary | ICD-10-CM

## 2019-10-25 DIAGNOSIS — I83.892 VARICOSE VEINS OF LEFT LOWER EXTREMITY WITH EDEMA: ICD-10-CM

## 2019-10-25 DIAGNOSIS — I25.5 ISCHEMIC CARDIOMYOPATHY: ICD-10-CM

## 2019-10-25 DIAGNOSIS — I47.29 PAROXYSMAL VENTRICULAR TACHYCARDIA (HCC): Primary | ICD-10-CM

## 2019-10-25 DIAGNOSIS — I10 ESSENTIAL HYPERTENSION: ICD-10-CM

## 2019-10-25 DIAGNOSIS — Z95.810 ICD (IMPLANTABLE CARDIOVERTER-DEFIBRILLATOR), SINGLE, IN SITU: ICD-10-CM

## 2019-10-25 PROCEDURE — 93282 PRGRMG EVAL IMPLANTABLE DFB: CPT | Performed by: INTERNAL MEDICINE

## 2019-10-25 PROCEDURE — 99214 OFFICE O/P EST MOD 30 MIN: CPT | Performed by: INTERNAL MEDICINE

## 2019-10-25 NOTE — PROGRESS NOTES
History of Present Illness:  CC--Recurrent VT    .  Pt here for follow up s/p ablation.  patient started having recurrent VT needing a redo VT ablation few weeks ago and post ablation a week later developed dysphagia and needed endoscopy the patient underwent esophageal stricture dilatation with improvement of symptoms and resolution of dysphagia and comes in for follow-up   Patient is on amiodarone because of extensive scarring and multiple episodes of VT and since ablation without recurrent ICD therapy-- patient is a pleasant 78 years old gentleman, a  with history of chronic ischemic heart disease previous myocardial infarction, s/p previous CABG,  LV systolic dysfunction with   Last  LV ejection fraction of 25 30% which came up to 30- 35% by latest echocardiogram.   He is    status post ICD implant.  denies any chest pain or shortness of breath or syncope  Patient complains of dry cough with lisinopril and lisinopril has been stopped--started noticing increasing blood pressure with mild hypertension which is optimized with amlodipine addition   He was intolerant of beta-blockers  Patient complains of recurrent left lower extremity edema with redness     assessment plan   recent VT storm status post VT re do ablation without any postprocedure complications  Without  recurrent VT   Dysphagia status post esophageal stricture dilatation with resolution of symptoms   single-chamber ICD in situ VT VDD lead without recurrent arrhythmias with normal function   hypertension--optimized    coronary artery disease   ischemic cardiomyopathy  Left lower extremity edema with prominent varicose veins--with mild redness with normal peripheral pulses and no groin bruit--patient to be referred to vascular surgery for symptomatic relief    Vital Signs:    Blood pressure 133/68  pulse rate is 87 patient is afebrile and respiration 12 times a minute    Medications: Medications were reviewed with the patient during this  visit.  Allergies: Allergies were reviewed with the patient during this visit.    Current Allergies (reviewed today):  LOVASTATIN (LOVASTATIN TABS) (Critical)  * PRAVASTATIN (Critical)  ZOCOR (Moderate)      Past Medical History:     Reviewed history from 03/13/2018 and no changes required:        Coronary Heart Disease:S/P CABG and PCI         Hypertension        Myocardial Infarction: Inferior MI         Hx: Cellulitis of left lower leg        Dyslipidemia         Pinched nerve L4-L5         Prostate cancer         Cardiomyopathy : ICD implantation     Past Surgical History:     Reviewed history from 03/13/2018 and no changes required:        Angioplasty/Stent: April 1996- Palmaz-Huy stent / LAD:         July 1996- RCA:         2000- Tetra stent Guidant  to proximal RCA, mid to distal artery 2 sequential Guidant Tetra stents         C A B G:May 2010 x 5 vessel: LIMA to diagonal , LAD, intermedius, obtuse marginal, RCA        Cardiac Cath: 1996 x 2, Nov. 2000, May 2010 - Cath 8/2015        Left Knee open reduction         Multiple colonoscopy's for polyp resection         Appendectomy        Prostate Robiotic surgery - Cyber Knife : 2015        Heart Catherization 7/2017 - No Stents         ICD implantation : Jan. 2018        Social History:     Reviewed history from 10/02/2013 and no changes required:        Disability , due to shrapnel of left heel         Marital Status:          Children: 3             Review of Systems   General: No fatigue or tiredness, No change in weight   Eyes: No redness  Cardiovascular: No chest pain, no palpitations  Respiratory: No shortness of breath  Gastrointestinal: No nausea or vomiting, bleeding  Genitourinary: no hematuria or dysuria  Musculoskeletal: No arthralgia or myalgia  Skin: No rash  Neurologic: No numbness, tingling, syncope  Hematologic/Lymphatic: No abnormal bleeding          Physical Exam    General:      well developed, well nourished, in no acute  distress.    Head:      normocephalic and atraumatic.    Eyes:      PERRL/EOM intact, conjunctiva and sclera clear with out nystagmus.    Neck:      no masses, thyromegaly, trachea central with normal respiratory effort  Lungs:      clear bilaterally to auscultation.    Heart:      non-displaced PMI, chest non-tender; regular rate and rhythm, S1, S2 without murmurs, rubs, or gallops  Skin:      intact without lesions or rashes.    Psych:      alert and cooperative; normal mood and affect; normal attention span and concentration.      Extremities with trace ankle edema without any cyanosis or clubbing--left lower extremity edema more than right side with varicose veins    Muscular skeletal patient walks with a cane with mild kyphosis    Neurological no focal deficits and alert oriented x3    Abdomen soft nontender no hepatomegaly

## 2019-11-11 ENCOUNTER — TELEPHONE (OUTPATIENT)
Dept: CARDIOLOGY | Facility: CLINIC | Age: 79
End: 2019-11-11

## 2019-11-11 NOTE — TELEPHONE ENCOUNTER
Pt left a voicemail wanting to know about his referral for vascular surgeon appt that he missed.  I called pt let him know he can call the vascular surgeons  office and reschedule with them.

## 2019-11-22 ENCOUNTER — TELEPHONE (OUTPATIENT)
Dept: CARDIOLOGY | Facility: CLINIC | Age: 79
End: 2019-11-22

## 2019-11-22 ENCOUNTER — OFFICE VISIT (OUTPATIENT)
Dept: CARDIOLOGY | Facility: CLINIC | Age: 79
End: 2019-11-22

## 2019-11-22 VITALS
WEIGHT: 193.6 LBS | SYSTOLIC BLOOD PRESSURE: 123 MMHG | OXYGEN SATURATION: 97 % | HEART RATE: 92 BPM | DIASTOLIC BLOOD PRESSURE: 64 MMHG | HEIGHT: 74 IN | BODY MASS INDEX: 24.85 KG/M2

## 2019-11-22 DIAGNOSIS — I25.5 ISCHEMIC CARDIOMYOPATHY: ICD-10-CM

## 2019-11-22 DIAGNOSIS — I47.29 VENTRICULAR TACHYCARDIA (PAROXYSMAL) (HCC): ICD-10-CM

## 2019-11-22 DIAGNOSIS — I25.810 CORONARY ARTERY DISEASE INVOLVING CORONARY BYPASS GRAFT OF NATIVE HEART WITHOUT ANGINA PECTORIS: Primary | ICD-10-CM

## 2019-11-22 PROCEDURE — 99213 OFFICE O/P EST LOW 20 MIN: CPT | Performed by: INTERNAL MEDICINE

## 2019-11-22 RX ORDER — CARVEDILOL 3.12 MG/1
3.12 TABLET ORAL 2 TIMES DAILY WITH MEALS
Qty: 180 TABLET | Refills: 4 | Status: SHIPPED | OUTPATIENT
Start: 2019-11-22 | End: 2020-01-22 | Stop reason: SDUPTHER

## 2019-11-22 RX ORDER — LISINOPRIL 2.5 MG/1
2.5 TABLET ORAL DAILY
Qty: 90 TABLET | Refills: 3 | Status: SHIPPED | OUTPATIENT
Start: 2019-11-22 | End: 2019-11-25 | Stop reason: ALTCHOICE

## 2019-11-22 NOTE — PROGRESS NOTES
"History of Present Illness:  CC--Recurrent VT, ischemic cardiomyopathy previous CABG, history of chronic cellulitis involving the left lower leg.  Status post ICD implant.    Mr.Daniel Nicholas is a delightful 79 years old  with multiple comorbidities including history of chronic ischemic heart disease previous myocardial infarction ischemic cardiomyopathy with LV ejection fraction of 25 to 30% which came up to 30 to 35% by latest echocardiographic study.  He had a history of recurrent ventricular tachycardia and does have ICD and was shocked multiple times.  He underwent ventricular tachycardia ablation and a redo ventricular tachycardia ablation.  He is being followed by Dr. REED on a regular basis.    He is status post ICD implant.  He denies any chest pain dyspnea palpitations presyncope or syncope.  He is going to see a vascular surgeon for his chronic cellulitis problem involving left lower leg and has chronic venous insufficiency.      /64   Pulse 92 Comment: rare skip beat  Ht 188 cm (74\")   Wt 87.8 kg (193 lb 9.6 oz)   SpO2 97%   BMI 24.86 kg/m²       assessment plan   1-history of ventricular tachycardia storm status post ablation on 2 occasions.      2-  single-chamber ICD in situ VT VDD lead without recurrent arrhythmias with normal function  3-  hypertension--under excellent control.    4. coronary artery disease.  No history of angina.  5-  ischemic cardiomyopathy patient is on spinal lactone.  I will start him on carvedilol 3.125 mg twice daily and lisinopril 2.5 mg daily.  6- Left lower extremity edema with prominent varicose veins--        Medications: Medications were reviewed with the patient during this visit.  Allergies: Allergies were reviewed with the patient during this visit.     Current Allergies (reviewed today):  LOVASTATIN (LOVASTATIN TABS) (Critical)  * PRAVASTATIN (Critical)  ZOCOR (Moderate)        Past Medical History:     Reviewed history from 03/13/2018 and no changes " required:        Coronary Heart Disease:S/P CABG and PCI         Hypertension        Myocardial Infarction: Inferior MI         Hx: Cellulitis of left lower leg        Dyslipidemia         Pinched nerve L4-L5         Prostate cancer         Cardiomyopathy : ICD implantation      Past Surgical History:     Reviewed history from 03/13/2018 and no changes required:        Angioplasty/Stent: April 1996- Palmaz-Huy stent / LAD:         July 1996- RCA:         2000- Tetra stent Guidant  to proximal RCA, mid to distal artery 2 sequential Guidant Tetra stents         C A B G:May 2010 x 5 vessel: LIMA to diagonal , LAD, intermedius, obtuse marginal, RCA        Cardiac Cath: 1996 x 2, Nov. 2000, May 2010 - Cath 8/2015        Left Knee open reduction         Multiple colonoscopy's for polyp resection         Appendectomy        Prostate Robiotic surgery - Cyber Knife : 2015        Heart Catherization 7/2017 - No Stents         ICD implantation : Jan. 2018           Social History:     Reviewed history from 10/02/2013 and no changes required:        Disability , due to shrapnel of left heel         Marital Status:          Children: 3              Review of Systems   General: No fatigue or tiredness, No change in weight   Eyes: No redness  Cardiovascular: No chest pain, no palpitations  Respiratory: No shortness of breath  Gastrointestinal: No nausea or vomiting, bleeding  Genitourinary: no hematuria or dysuria  Musculoskeletal: No arthralgia or myalgia  Skin: No rash  Neurologic: No numbness, tingling, syncope  Hematologic/Lymphatic: No abnormal bleeding              Physical Exam     General:      well developed, well nourished, in no acute distress.    Head:      normocephalic and atraumatic.    Eyes:      PERRL/EOM intact, conjunctiva and sclera clear with out nystagmus.    Neck:      no masses, thyromegaly, trachea central with normal respiratory effort  Lungs:      clear bilaterally to auscultation.    Heart:       non-displaced PMI, chest non-tender; regular rate and rhythm, S1, S2 without murmurs, rubs, or gallops  Skin:      intact without lesions or rashes.    Psych:      alert and cooperative; normal mood and affect; normal attention span and concentration.       Extremities with trace ankle edema without any cyanosis or clubbing--left lower extremity edema more than right side with varicose veins     Muscular skeletal patient walks with a cane with mild kyphosis     Neurological no focal deficits and alert oriented x3     Abdomen soft nontender no hepatomegaly

## 2019-11-22 NOTE — TELEPHONE ENCOUNTER
Patient seen by Dr. Sanford today / after patient left office Dr. Sanford ordered to call patient and inform him with his cardiac history he needed to be on ACE and Beta Blocker therapy.     I noted in chart that Coreg 3.125mg BID and Lisinopril 2.5mg were already listed on prior OV's with Dr. Arvizu.   Called patient to verify .   Patient stated intolerant of Lisinopril / cough and it was stopped.   Patient checked home med bottles and did not see Carvedilol or Coreg bottle.   Then noted in Dr. Arvizu's OV dictation of 10-25-19 listed: intolerant of beta blockers and d/c Lisinopril d/t cough.     Instructed patient if [facility] contacts him for new rx's for this meds sent today to inform them to hold for now / will gagan with Dr. Sanford.     Unsuccessful call to Dr. Sanford to address today.

## 2019-11-25 NOTE — TELEPHONE ENCOUNTER
Dr. Sanford returned call at end of day .   Ordered Losartan 25mg daily in place of Lisinopril. D/C Lisinopril and Carvedilol ordered .     Called patient today and informed of Losartan order to replace Lisinopril.   Patient stated will try , but if has dizziness on this medication he will not take it.   Instructed patient to call and report if has problems/ symptoms on Losartan.   Informed him the only response to Lisinopril noted in chart was cough , but is intolerant of beta blockers.   Called [facility] and cancelled rx's sent Friday for Lisinopril and Carvediolol.     When attempted to send rx for Losartan today a contraindication flag came up between Losartan and Aldactone.   Will check with Dr. Sanford first before sending. Dr. Sanford out this week.

## 2019-12-02 RX ORDER — LOSARTAN POTASSIUM 25 MG/1
25 TABLET ORAL DAILY
Qty: 30 TABLET | Refills: 6 | OUTPATIENT
Start: 2019-12-02 | End: 2020-06-30 | Stop reason: ALTCHOICE

## 2019-12-05 DIAGNOSIS — I10 HYPERTENSION, UNSPECIFIED TYPE: ICD-10-CM

## 2019-12-05 DIAGNOSIS — E87.8 ELECTROLYTE IMBALANCE: Primary | ICD-10-CM

## 2020-01-22 ENCOUNTER — OFFICE VISIT (OUTPATIENT)
Dept: CARDIOLOGY | Facility: CLINIC | Age: 80
End: 2020-01-22

## 2020-01-22 VITALS
WEIGHT: 202 LBS | BODY MASS INDEX: 25.94 KG/M2 | HEART RATE: 91 BPM | DIASTOLIC BLOOD PRESSURE: 71 MMHG | SYSTOLIC BLOOD PRESSURE: 130 MMHG | OXYGEN SATURATION: 95 %

## 2020-01-22 DIAGNOSIS — I10 ESSENTIAL HYPERTENSION: Primary | Chronic | ICD-10-CM

## 2020-01-22 DIAGNOSIS — I25.5 ISCHEMIC CARDIOMYOPATHY: ICD-10-CM

## 2020-01-22 DIAGNOSIS — Z95.810 PRESENCE OF AUTOMATIC IMPLANTABLE CARDIOVERTER-DEFIBRILLATOR: ICD-10-CM

## 2020-01-22 DIAGNOSIS — I25.810 CORONARY ARTERY DISEASE INVOLVING CORONARY BYPASS GRAFT OF NATIVE HEART WITHOUT ANGINA PECTORIS: ICD-10-CM

## 2020-01-22 DIAGNOSIS — E78.2 HYPERLIPIDEMIA, MIXED: Chronic | ICD-10-CM

## 2020-01-22 DIAGNOSIS — I50.22 CHRONIC SYSTOLIC CONGESTIVE HEART FAILURE (HCC): ICD-10-CM

## 2020-01-22 PROCEDURE — 99214 OFFICE O/P EST MOD 30 MIN: CPT | Performed by: INTERNAL MEDICINE

## 2020-01-22 RX ORDER — CARVEDILOL 6.25 MG/1
6.25 TABLET ORAL 2 TIMES DAILY
Qty: 180 TABLET | Refills: 3 | Status: SHIPPED | OUTPATIENT
Start: 2020-01-22 | End: 2020-06-30 | Stop reason: ALTCHOICE

## 2020-01-22 NOTE — PROGRESS NOTES
Cardiology Office Visit      Encounter Date:  01/22/2020    Patient ID:   Deion Nicholas is a 79 y.o. male.    Reason For Followup:  Ischemic cardiomyopathy  Coronary artery disease  Hypertension  Hypertensive cardiovascular disease  Amiodarone therapy  Antiplatelet therapy  Hyperlipidemia    Brief Clinical History:  Dear Makenna Jimenez MD    I had the pleasure of seeing Deion Nicholas today. As you are well aware, this is a 79 y.o. male with an established history of ischemic heart disease.  He has undergone coronary arterial bypass grafting in the past.  He is additional history that includes ischemic cardiomyopathy with most recent ejection fraction of 30 to 35%, ICD implantation, hypertension, hypertensive cardiovascular disease, amiodarone therapy, antiplatelet therapy, and hyperlipidemia.  He presents today for follow-up on the above conditions.    Interval History:  He denies any chest pain pressure heaviness or tightness.  He denies any shortness of breath out of character.  He denies any PND orthopnea.  He denies any syncope or near syncope.  He continues to report unsteady gait.    His initial blood pressure was markedly elevated at 161/94.  A recheck of his blood pressure was 130/71.  Although better this is still not at target.  We discussed options and will increase his beta-blocker.  He will notify us if this exacerbates his unsteady gait.    Assessment & Plan    Impressions:  Ischemic cardiomyopathy  Coronary artery disease  Hypertension-suboptimal  Hypertensive cardiovascular disease  Amiodarone therapy  Antiplatelet therapy  Hyperlipidemia  AICD in situ  History of VT storm status post radiofrequency ablation    Recommendations:  Increase Coreg to 6.25 mg twice daily.  Continuation of his current cardiovascular regimen with above changes  Follow-up in 6 months time sooner should there be difficulties  Follow-up with EP as scheduled.    Objective:    Vitals:  Vitals:    01/22/20 1403  01/22/20 1427   BP: 161/94 130/71   Pulse: 91    SpO2: 95%    Weight: 91.6 kg (202 lb)        Physical Exam:    General: Alert, cooperative, no distress, appears stated age  Head:  Normocephalic, atraumatic, mucous membranes moist  Eyes:  Conjunctiva/corneas clear, EOM's intact     Neck:  Supple,  no bruit  Lungs: Clear to auscultation bilaterally, no wheezes rhonchi rales are noted  Chest wall: No tenderness  Heart::  Regular rate and rhythm, S1 and S2 normal, 1/6 holosystolic murmur.  No rub or gallop  Abdomen: Soft, non-tender, nondistended bowel sounds active  Extremities: No cyanosis, clubbing, or edema.  Ambulates with a cane  Pulses: 2+ and symmetric all extremities  Skin:  No rashes or lesions  Neuro/psych: A&O x3. CN II through XII are grossly intact with appropriate affect      Allergies:  Allergies   Allergen Reactions   • Lovastatin Unknown (See Comments) and Myalgia     unknown   • Pravastatin Unknown (See Comments) and Myalgia     unknown   • Simvastatin Unknown (See Comments) and Myalgia     unknown       Medication Review:     Current Outpatient Medications:   •  amiodarone (PACERONE) 200 MG tablet, Take 200 mg by mouth Daily., Disp: , Rfl:   •  amLODIPine (NORVASC) 5 MG tablet, Take 1 tablet by mouth Daily., Disp: 30 tablet, Rfl: 11  •  aspirin 81 MG EC tablet, Take 81 mg by mouth Daily., Disp: , Rfl:   •  carvedilol (COREG) 6.25 MG tablet, Take 1 tablet by mouth 2 (Two) Times a Day., Disp: 180 tablet, Rfl: 3  •  Cholecalciferol (VITAMIN D-400) 400 units tablet, Take 2 tablets daily, Disp: , Rfl:   •  clopidogrel (PLAVIX) 75 MG tablet, Take 75 mg by mouth Daily., Disp: , Rfl:   •  cyanocobalamin 1000 MCG/ML injection, Inject 1,000 mcg into the appropriate muscle as directed by prescriber Every 14 (Fourteen) Days., Disp: , Rfl:   •  ezetimibe (ZETIA) 10 MG tablet, Take 10 mg by mouth Every Other Day., Disp: , Rfl:   •  losartan (COZAAR) 25 MG tablet, Take 1 tablet by mouth Daily., Disp: 30 tablet,  Rfl: 6  •  nitroglycerin (NITROSTAT) 0.4 MG SL tablet, NITROSTAT 0.4 MG SUBL, Disp: , Rfl:   •  Omega-3 Fatty Acids (FISH OIL) 1000 MG capsule capsule, Take 2,000 mg by mouth 2 (Two) Times a Day With Meals., Disp: , Rfl:   •  spironolactone (ALDACTONE) 25 MG tablet, Take 1 tablet by mouth Daily., Disp: 30 tablet, Rfl: 2    Family History:  Family History   Problem Relation Age of Onset   • Pancreatic cancer Mother    • Heart disease Father        Past Medical History:  Past Medical History:   Diagnosis Date   • Cardiomyopathy (CMS/HCC)     ICD implantation   • Cellulitis of lower leg    • CHD (coronary heart disease)     S/P CABG & PCI   • Dyslipidemia    • History of ventricular tachycardia    • Hypertension    • Myocardial infarction (CMS/HCC)     Inferior MI   • Pinched nerve     L4-L5   • Prostate cancer (CMS/HCC)        Past surgical History:  Past Surgical History:   Procedure Laterality Date   • ANGIOPLASTY  2000 04/1996 Stent: Palmaz- Huy stent/LAD 07/1996-RCA: 2000-Tetra stent Guidant to proximal RCA, mid to distal artery 2 sequential Guidant Tetra stents   • APPENDECTOMY     • CARDIAC ABLATION  April and May 2019   • CARDIAC CATHETERIZATION  07/2017    1996 x2, Nov. 2000, 05/2010-cath 08/2015-no stents 2017   • CARDIAC DEFIBRILLATOR PLACEMENT     • COLONOSCOPY W/ POLYPECTOMY      Mult colonoscopy's for polyp resection    • CORONARY ARTERY BYPASS GRAFT  05/2010    x5 vessel: LMA to diagonal, LAD, intermedius, obtuse marginal, RCA   • CORONARY STENT PLACEMENT     • ENDOSCOPY N/A 6/24/2019    Procedure: ESOPHAGOGASTRODUODENOSCOPY with dilitation Bougie 50, 54;  Surgeon: Roddy Coronel MD;  Location: [facility] ENDOSCOPY;  Service: Gastroenterology   • INSERT / REPLACE / REMOVE PACEMAKER     • KNEE OPEN LATERAL RELEASE Left     reduction   • OTHER SURGICAL HISTORY  01/2018    ICD implantation   • PROSTATE SURGERY  2015    Robiotic surgery- Cyber Knife:       Social History:  Social History      Socioeconomic History   • Marital status:      Spouse name: Not on file   • Number of children: Not on file   • Years of education: Not on file   • Highest education level: Not on file   Tobacco Use   • Smoking status: Former Smoker     Packs/day: 1.00     Years: 17.00     Pack years: 17.00     Types: Cigarettes     Last attempt to quit: 2002     Years since quittin.5   • Smokeless tobacco: Never Used   Substance and Sexual Activity   • Alcohol use: No     Frequency: Never   • Drug use: No   • Sexual activity: Defer       Review of Systems:  The following systems were reviewed as they relate to the cardiovascular system: Constitutional, Eyes, ENT, Cardiovascular, Respiratory, Gastrointestinal, Integumentary, Neurological, Psychiatric, Hematologic, Endocrine, Musculoskeletal, and Genitourinary. The pertinent cardiovascular findings are reported above with all other cardiovascular points within those systems being negative.    Diagnostic Study Review:     Current Electrocardiogram:  Procedures no new EKG noted.  EKG dated 2019 demonstrates sinus rhythm with a ventricular rate of 75 bpm.  Ventricular premature complexes noted.  Normal QT interval is noted.  Normal QRS axis noted.      NOTE: The following portions of the patient's history were reviewed and updated this visit as appropriate: allergies, current medications, past family history, past medical history, past social history, past surgical history and problem list.

## 2020-01-31 ENCOUNTER — CLINICAL SUPPORT NO REQUIREMENTS (OUTPATIENT)
Dept: CARDIOLOGY | Facility: CLINIC | Age: 80
End: 2020-01-31

## 2020-01-31 ENCOUNTER — OFFICE VISIT (OUTPATIENT)
Dept: CARDIOLOGY | Facility: CLINIC | Age: 80
End: 2020-01-31

## 2020-01-31 VITALS
BODY MASS INDEX: 24.52 KG/M2 | WEIGHT: 191 LBS | SYSTOLIC BLOOD PRESSURE: 129 MMHG | DIASTOLIC BLOOD PRESSURE: 67 MMHG | HEART RATE: 99 BPM

## 2020-01-31 DIAGNOSIS — Z95.810 PRESENCE OF AUTOMATIC IMPLANTABLE CARDIOVERTER-DEFIBRILLATOR: ICD-10-CM

## 2020-01-31 DIAGNOSIS — I10 ESSENTIAL HYPERTENSION: ICD-10-CM

## 2020-01-31 DIAGNOSIS — I47.29 VENTRICULAR TACHYCARDIA (PAROXYSMAL) (HCC): Primary | ICD-10-CM

## 2020-01-31 DIAGNOSIS — I25.5 ISCHEMIC CARDIOMYOPATHY: ICD-10-CM

## 2020-01-31 DIAGNOSIS — I25.5 ISCHEMIC CARDIOMYOPATHY: Primary | ICD-10-CM

## 2020-01-31 PROCEDURE — 99214 OFFICE O/P EST MOD 30 MIN: CPT | Performed by: INTERNAL MEDICINE

## 2020-01-31 PROCEDURE — 93000 ELECTROCARDIOGRAM COMPLETE: CPT | Performed by: INTERNAL MEDICINE

## 2020-01-31 NOTE — PROGRESS NOTES
CC--Recurrent VT    .  Sub--Pt here for follow up and he had incessant VT needing an ablation several months ago .  patient started having recurrent VT needing a redo VT ablation several months  ago and post ablation a week later developed dysphagia and needed endoscopy the patient underwent esophageal stricture dilatation with improvement of symptoms and resolution of dysphagia   Patient is on amiodarone because of extensive scarring and multiple episodes of VT and since ablation without recurrent ICD therapy--patient has history of chronic ischemic heart disease previous myocardial infarction, s/p previous CABG,  LV systolic dysfunction with   Last  LV ejection fraction of 25 30% which came up to 30- 35% by latest echocardiogram.   He is  status post ICD implant.  denies any chest pain or shortness of breath or syncope--patient had prior ACE inhibitor induced cough and currently is on losartan  Patient denies any new symptoms of VT or syncope and is compliant with current medical regimen       assessment plan   recent VT storm status post VT re do ablation without any   recurrent VT   Dysphagia status post esophageal stricture dilatation with resolution of symptoms   single-chamber ICD in situ with VDD lead without recurrent arrhythmias with normal function--thoracic impedance sensor was activated today under my guidance   hypertension--optimized    coronary artery disease   ischemic cardiomyopathy  Occasions reviewed and follow-up in 3 months        Vital Signs:Blood pressure 129/67  pulse rate is 87 patient is afebrile and respiration 12 times a minute      EKG shows sinus rhythm with a heart rate of 87 VA interval 159 QRS of 120  nonspecific diffuse ST-T wave changes and compared to prior EKG no significant EKG changes noted an EKG indication includes prior history of ventricular arrhythmias and antiarrhythmic treatment      Current Allergies (reviewed today):  LOVASTATIN (LOVASTATIN TABS) (Critical)  *  PRAVASTATIN (Critical)  ZOCOR (Moderate)      Past Medical History:     Reviewed history from 03/13/2018 and no changes required:        Coronary Heart Disease:S/P CABG and PCI         Hypertension        Myocardial Infarction: Inferior MI         Hx: Cellulitis of left lower leg        Dyslipidemia         Pinched nerve L4-L5         Prostate cancer         Cardiomyopathy : ICD implantation     Past Surgical History:     Reviewed history from 03/13/2018 and no changes required:        Angioplasty/Stent: April 1996- Palmaz-Huy stent / LAD:         July 1996- RCA:         2000- Tetra stent Guidant  to proximal RCA, mid to distal artery 2 sequential Guidant Tetra stents         C A B G:May 2010 x 5 vessel: LIMA to diagonal , LAD, intermedius, obtuse marginal, RCA        Cardiac Cath: 1996 x 2, Nov. 2000, May 2010 - Cath 8/2015        Left Knee open reduction         Multiple colonoscopy's for polyp resection         Appendectomy        Prostate Robiotic surgery - Cyber Knife : 2015        Heart Catherization 7/2017 - No Stents         ICD implantation : Jan. 2018      Review of Systems   General: No fatigue or tiredness, No change in weight   Eyes: No redness  Cardiovascular: No chest pain, no palpitations  Respiratory: No shortness of breath  Gastrointestinal: No nausea or vomiting, bleeding  Genitourinary: no hematuria or dysuria  Musculoskeletal: No arthralgia or myalgia  Skin: No rash  Neurologic: No numbness, tingling, syncope  Hematologic/Lymphatic: No abnormal bleeding          Physical Exam    General:      well developed, well nourished, in no acute distress.    Head:      normocephalic and atraumatic.    Eyes:      PERRL/EOM intact, conjunctiva and sclera clear with out nystagmus.    Neck:      no masses, thyromegaly, trachea central with normal respiratory effort  Lungs:      clear bilaterally to auscultation.    Heart:      non-displaced PMI, chest non-tender; regular rate and rhythm, S1, S2 without  murmurs, rubs, or gallops  Skin:      intact without lesions or rashes.    Psych:      alert and cooperative; normal mood and affect; normal attention span and concentration.      Extremities with trace ankle edema without any cyanosis or clubbing  Muscular skeletal patient walks with a cane with mild kyphosis    Neurological no focal deficits and alert oriented x3    Abdomen soft nontender no hepatomegaly      Electronically signed by Constantino Arvizu MD, 01/31/20, 2:10 PM.

## 2020-02-06 PROCEDURE — 93282 PRGRMG EVAL IMPLANTABLE DFB: CPT | Performed by: INTERNAL MEDICINE

## 2020-02-14 ENCOUNTER — TELEPHONE (OUTPATIENT)
Dept: CARDIOLOGY | Facility: CLINIC | Age: 80
End: 2020-02-14

## 2020-02-14 NOTE — TELEPHONE ENCOUNTER
Patient called to report Dr. Childers gave new rx for BID at OV 1-22-20. ( Chart has to increase Coreg to 6.25mg BID)   BP dropped low and Dr. Velarde [facility] instructed him to take it only daily.   Reported bp dropped to ~ 109/60 and he is dizzy quite often.   Quoted bp now has been 119/59 and today 125/65. HR 91bpm.   Noted in chart OV with Dr. Arvizu on 1-31-20 was 129/67, p-87.   Instructed patient to follow Dr. Velarde's orders for now and will have addressed by Dr. Childers.     See phone note 11-22-19 re: meds.

## 2020-02-23 NOTE — TELEPHONE ENCOUNTER
Have patient go back to 3.125 mg twice daily and continue to monitor blood pressure.  Follow-up as scheduled.  Notify if pressure remains too low.

## 2020-02-26 NOTE — TELEPHONE ENCOUNTER
Called patient to address approval by Dr. Childers  to reduce Coreg to daily d/t low bp.   Patient stated went to [facility] ER on Monday d/t passed out at home / bp low. Stated he felt like he was having MI or felt like he did last time ICD fired.   Patient reported  ER MD instructed him if bp  < 120 systolic to hold Coreg and call cardiologist's office to make f/u appt.   Patient stated his bp has been < 120 systolic since home . Instructed patient to call and make f/u to be seen and I will call [facility] for those records.   Patient verbalized understanding.

## 2020-05-01 ENCOUNTER — OFFICE VISIT (OUTPATIENT)
Dept: CARDIOLOGY | Facility: CLINIC | Age: 80
End: 2020-05-01

## 2020-05-01 ENCOUNTER — CLINICAL SUPPORT NO REQUIREMENTS (OUTPATIENT)
Dept: CARDIOLOGY | Facility: CLINIC | Age: 80
End: 2020-05-01

## 2020-05-01 VITALS
WEIGHT: 195 LBS | DIASTOLIC BLOOD PRESSURE: 70 MMHG | SYSTOLIC BLOOD PRESSURE: 117 MMHG | BODY MASS INDEX: 25.04 KG/M2 | HEART RATE: 86 BPM

## 2020-05-01 VITALS
HEART RATE: 86 BPM | WEIGHT: 195 LBS | OXYGEN SATURATION: 97 % | BODY MASS INDEX: 25.04 KG/M2 | DIASTOLIC BLOOD PRESSURE: 70 MMHG | SYSTOLIC BLOOD PRESSURE: 117 MMHG

## 2020-05-01 DIAGNOSIS — I25.5 ISCHEMIC CARDIOMYOPATHY: ICD-10-CM

## 2020-05-01 DIAGNOSIS — I47.29 PAROXYSMAL VENTRICULAR TACHYCARDIA (HCC): ICD-10-CM

## 2020-05-01 DIAGNOSIS — I25.5 ISCHEMIC CARDIOMYOPATHY: Primary | ICD-10-CM

## 2020-05-01 DIAGNOSIS — I10 ESSENTIAL HYPERTENSION: Chronic | ICD-10-CM

## 2020-05-01 DIAGNOSIS — Z95.810 PRESENCE OF AUTOMATIC IMPLANTABLE CARDIOVERTER-DEFIBRILLATOR: ICD-10-CM

## 2020-05-01 DIAGNOSIS — E78.2 HYPERLIPIDEMIA, MIXED: Chronic | ICD-10-CM

## 2020-05-01 DIAGNOSIS — I50.22 CHRONIC SYSTOLIC CONGESTIVE HEART FAILURE (HCC): ICD-10-CM

## 2020-05-01 DIAGNOSIS — I25.810 CORONARY ARTERY DISEASE INVOLVING CORONARY BYPASS GRAFT OF NATIVE HEART WITHOUT ANGINA PECTORIS: Primary | ICD-10-CM

## 2020-05-01 PROCEDURE — 99214 OFFICE O/P EST MOD 30 MIN: CPT | Performed by: INTERNAL MEDICINE

## 2020-05-01 PROCEDURE — 93282 PRGRMG EVAL IMPLANTABLE DFB: CPT | Performed by: INTERNAL MEDICINE

## 2020-05-01 NOTE — PROGRESS NOTES
CC--Recurrent VT, ischemic cardiomyopathy    Sub--Pt here for follow up and he had incessant VT needing an ablation several months ago .  patient started having recurrent VT needing a redo VT ablation several months  ago and post ablation a week later developed dysphagia and needed endoscopy the patient underwent esophageal stricture dilatation with improvement of symptoms and resolution of dysphagia   Patient is on amiodarone because of extensive scarring and multiple episodes of VT and since ablation without recurrent ICD therapy--patient has history of chronic ischemic heart disease previous myocardial infarction, s/p previous CABG,  LV systolic dysfunction with   Last  LV ejection fraction of 25 30% which came up to 30- 35% by latest echocardiogram.   He is  status post ICD implant.  denies any chest pain or shortness of breath or syncope--patient had prior ACE inhibitor induced cough and currently is on losartan  Patient denies any new symptoms of VT or syncope and is compliant with current medical regimen  Patient was intolerant to Coreg at 6.25 mg twice daily because of orthostatic hypotension  He also developed some right shoulder pain being [facility] and uses Biofreeze for relief of symptoms since last 1 month  He denies any palpitations or syncope or any chest pain or dyspnea         assessment plan   recent VT storm status post VT re do ablation without any   recurrent VT --ICD interrogated without recurrent arrhythmias  Dysphagia status post esophageal stricture dilatation with resolution of symptoms   single-chamber ICD in situ with VDD lead without recurrent arrhythmias with normal function-ICD interrogation attached to the chart   hypertension--optimized    coronary artery disease   ischemic cardiomyopathy  Medications  reviewed and follow-up in 3 months        Vital Signs:Blood pressure 117/70,  pulse rate is 70 patient is afebrile and respiration 12 times a minute      Past medical  history reviewed below and unchanged and verified    Current Allergies (reviewed today):  LOVASTATIN (LOVASTATIN TABS) (Critical)  * PRAVASTATIN (Critical)  ZOCOR (Moderate)      Past Medical History:     Reviewed history from 03/13/2018 and no changes required:        Coronary Heart Disease:S/P CABG and PCI         Hypertension        Myocardial Infarction: Inferior MI         Hx: Cellulitis of left lower leg        Dyslipidemia         Pinched nerve L4-L5         Prostate cancer         Cardiomyopathy : ICD implantation     Past Surgical History:     Reviewed history from 03/13/2018 and no changes required:        Angioplasty/Stent: April 1996- Palmaz-Huy stent / LAD:         July 1996- RCA:         2000- Tetra stent Guidant  to proximal RCA, mid to distal artery 2 sequential Guidant Tetra stents         C A B G:May 2010 x 5 vessel: LIMA to diagonal , LAD, intermedius, obtuse marginal, RCA        Cardiac Cath: 1996 x 2, Nov. 2000, May 2010 - Cath 8/2015        Left Knee open reduction         Multiple colonoscopy's for polyp resection         Appendectomy        Prostate Robiotic surgery - Cyber Knife : 2015        Heart Catherization 7/2017 - No Stents         ICD implantation : Jan. 2018      Review of Systems   General: No fatigue or tiredness, No change in weight   Eyes: No redness  Cardiovascular: No chest pain, no palpitations  Respiratory: No shortness of breath  Gastrointestinal: No nausea or vomiting, bleeding  Genitourinary: no hematuria or dysuria  Musculoskeletal: Positive for right shoulder arthralgia  Skin: No rash  Neurologic: No numbness, tingling, syncope  Hematologic/Lymphatic: No abnormal bleeding          Physical Exam    General:      well developed, well nourished, in no acute distress.    Head:      normocephalic and atraumatic.    Eyes:      PERRL/EOM intact, conjunctiva and sclera clear with out nystagmus.    Neck:      no masses, thyromegaly, trachea central with normal respiratory  effort  Lungs:      clear bilaterally to auscultation.    Heart:      non-displaced PMI, chest non-tender; regular rate and rhythm, S1, S2 without murmurs, rubs, or gallops  Skin:      intact without lesions or rashes.    Psych:      alert and cooperative; normal mood and affect; normal attention span and concentration.      Extremities with trace ankle edema without any cyanosis or clubbing  Muscular skeletal patient walks with a cane with mild kyphosis    Neurological no focal deficits and alert oriented x3    Electronically signed by Constantino Arvizu MD, 05/01/20, 11:33 AM.

## 2020-05-01 NOTE — PROGRESS NOTES
In clinic device interrogation. No episodes. See scanned report. Battery 75%. Charges per Dr. Arvizu's clinic .

## 2020-06-29 ENCOUNTER — OFFICE VISIT (OUTPATIENT)
Dept: CARDIOLOGY | Facility: CLINIC | Age: 80
End: 2020-06-29

## 2020-06-29 VITALS
DIASTOLIC BLOOD PRESSURE: 72 MMHG | SYSTOLIC BLOOD PRESSURE: 143 MMHG | BODY MASS INDEX: 23.88 KG/M2 | HEART RATE: 93 BPM | OXYGEN SATURATION: 94 % | WEIGHT: 186 LBS

## 2020-06-29 DIAGNOSIS — I47.20 VT (VENTRICULAR TACHYCARDIA) (HCC): Primary | ICD-10-CM

## 2020-06-29 DIAGNOSIS — I50.22 CHRONIC SYSTOLIC CONGESTIVE HEART FAILURE (HCC): ICD-10-CM

## 2020-06-29 DIAGNOSIS — I47.29 PAROXYSMAL VENTRICULAR TACHYCARDIA (HCC): ICD-10-CM

## 2020-06-29 DIAGNOSIS — Z95.810 PRESENCE OF AUTOMATIC IMPLANTABLE CARDIOVERTER-DEFIBRILLATOR: ICD-10-CM

## 2020-06-29 DIAGNOSIS — I25.810 CORONARY ARTERY DISEASE INVOLVING CORONARY BYPASS GRAFT OF NATIVE HEART WITHOUT ANGINA PECTORIS: ICD-10-CM

## 2020-06-29 DIAGNOSIS — I25.5 ISCHEMIC CARDIOMYOPATHY: ICD-10-CM

## 2020-06-29 PROCEDURE — 93000 ELECTROCARDIOGRAM COMPLETE: CPT | Performed by: INTERNAL MEDICINE

## 2020-06-29 PROCEDURE — 99214 OFFICE O/P EST MOD 30 MIN: CPT | Performed by: INTERNAL MEDICINE

## 2020-06-29 NOTE — PROGRESS NOTES
CC--Recurrent VT, ischemic cardiomyopathy    Sub--Pt here for follow up and he had incessant VT needing an ablation several months ago .  patient started having recurrent VT needing a redo VT ablation several months  ago and post ablation a week later developed dysphagia and needed endoscopy the patient underwent esophageal stricture dilatation with improvement of symptoms and resolution of dysphagia   Patient is on amiodarone because of extensive scarring and multiple episodes of VT and since ablation without recurrent ICD therapy--patient has history of chronic ischemic heart disease previous myocardial infarction, s/p previous CABG,  LV systolic dysfunction with   Last  LV ejection fraction of 25 30% which came up to 30- 35% by latest echocardiogram.   He is  status post ICD implant.  denies any chest pain or shortness of breath or syncope--patient had prior ACE inhibitor induced cough and currently is on losartan  Patient denies any new symptoms of VT or syncope and is compliant with current medical regimen  He also developed some right shoulder pain being [facility] and uses Biofreeze for relief of symptoms   He denies any palpitations or syncope or any chest pain or dyspnea  Patient has noticed photosensitivity of his skin in the last few weeks and came for evaluation         assessment plan   recent VT storm status post VT re do ablation without any   recurrent VT   Dysphagia status post esophageal stricture dilatation with resolution of symptoms   single-chamber ICD in situ with VDD lead   hypertension--optimized    coronary artery disease   ischemic cardiomyopathy  Photosensitivity--likely from amiodarone and amiodarone dose to be reduced to 100 mg a day and reevaluate in 4 weeks and patient educated about drug profile of amiodarone        Vital Signs:Blood pressure 143/72,  pulse rate is 84 patient is afebrile and respiration 12 times a minute  EKG shows sinus rhythm with diffuse ST-T wave  changes with mild interventricular conduction delay and left atrial enlargement and NJ interval is 164 heart rate of 84 QRS is 126 QTC is 465 and indication for EKG includes ventricular arrhythmias cardiomyopathy and no significant EKG changes compared to prior EKG      Past medical history reviewed below and unchanged and verified      Past Medical History:     Reviewed history from 03/13/2018 and no changes required:        Coronary Heart Disease:S/P CABG and PCI         Hypertension        Myocardial Infarction: Inferior MI         Hx: Cellulitis of left lower leg        Dyslipidemia         Pinched nerve L4-L5         Prostate cancer         Cardiomyopathy : ICD implantation     Past Surgical History:     Reviewed history from 03/13/2018 and no changes required:        Angioplasty/Stent: April 1996- Palmaz-Huy stent / LAD:         July 1996- RCA:         2000- Tetra stent Guidant  to proximal RCA, mid to distal artery 2 sequential Guidant Tetra stents         C A B G:May 2010 x 5 vessel: LIMA to diagonal , LAD, intermedius, obtuse marginal, RCA        Cardiac Cath: 1996 x 2, Nov. 2000, May 2010 - Cath 8/2015        Left Knee open reduction         Multiple colonoscopy's for polyp resection         Appendectomy        Prostate Robiotic surgery - Cyber Knife : 2015        Heart Catherization 7/2017 - No Stents         ICD implantation : Jan. 2018      Review of Systems   General: No fatigue or tiredness, No change in weight   Eyes: No redness  Cardiovascular: No chest pain, no palpitations  Respiratory: No shortness of breath  Gastrointestinal: No nausea or vomiting, bleeding  Genitourinary: no hematuria or dysuria  Musculoskeletal: Positive for right shoulder arthralgia  Skin: Positive for photosensitivity   Neurologic: No numbness, tingling, syncope  Hematologic/Lymphatic: No abnormal bleeding          Physical Exam    General:      well developed, well nourished, in no acute distress.    Head:       normocephalic and atraumatic.    Eyes:      PERRL/EOM intact, conjunctiva and sclera clear with out nystagmus.    Neck:      no masses, thyromegaly, trachea central with normal respiratory effort  Lungs:      clear bilaterally to auscultation.    Heart:      non-displaced PMI, chest non-tender; regular rate and rhythm, S1, S2 without murmurs, rubs, or gallops  Skin:      intact without lesions or rashes.    Psych:      alert and cooperative; normal mood and affect; normal attention span and concentration.      Extremities with trace ankle edema without any cyanosis or clubbing  Muscular skeletal patient walks with a cane with mild kyphosis    Neurological no focal deficits and alert oriented x3    Electronically signed by Constantino Arvizu MD, 06/29/20, 12:24 PM.

## 2020-06-30 ENCOUNTER — TELEPHONE (OUTPATIENT)
Dept: CARDIOLOGY | Facility: CLINIC | Age: 80
End: 2020-06-30

## 2020-06-30 RX ORDER — PANTOPRAZOLE SODIUM 40 MG/1
40 TABLET, DELAYED RELEASE ORAL DAILY
COMMUNITY

## 2020-06-30 RX ORDER — ISOSORBIDE MONONITRATE 30 MG/1
30 TABLET, EXTENDED RELEASE ORAL DAILY
COMMUNITY
End: 2021-02-01

## 2020-06-30 RX ORDER — SUCRALFATE 1 G/1
1 TABLET ORAL 4 TIMES DAILY
COMMUNITY

## 2020-06-30 NOTE — TELEPHONE ENCOUNTER
Pt called to update medications list.  I went through all medications to verify with pt that list is up to date as of 6/30/20 per pt information.

## 2020-08-07 ENCOUNTER — CLINICAL SUPPORT NO REQUIREMENTS (OUTPATIENT)
Dept: CARDIOLOGY | Facility: CLINIC | Age: 80
End: 2020-08-07

## 2020-08-07 ENCOUNTER — OFFICE VISIT (OUTPATIENT)
Dept: CARDIOLOGY | Facility: CLINIC | Age: 80
End: 2020-08-07

## 2020-08-07 VITALS
OXYGEN SATURATION: 97 % | SYSTOLIC BLOOD PRESSURE: 128 MMHG | HEART RATE: 95 BPM | WEIGHT: 186 LBS | DIASTOLIC BLOOD PRESSURE: 64 MMHG | BODY MASS INDEX: 23.88 KG/M2

## 2020-08-07 DIAGNOSIS — I47.29 PAROXYSMAL VENTRICULAR TACHYCARDIA (HCC): Primary | ICD-10-CM

## 2020-08-07 DIAGNOSIS — I47.29 PAROXYSMAL VENTRICULAR TACHYCARDIA (HCC): ICD-10-CM

## 2020-08-07 DIAGNOSIS — Z95.810 PRESENCE OF AUTOMATIC IMPLANTABLE CARDIOVERTER-DEFIBRILLATOR: ICD-10-CM

## 2020-08-07 DIAGNOSIS — I25.5 ISCHEMIC CARDIOMYOPATHY: Primary | ICD-10-CM

## 2020-08-07 DIAGNOSIS — I10 ESSENTIAL HYPERTENSION: ICD-10-CM

## 2020-08-07 DIAGNOSIS — I25.5 ISCHEMIC CARDIOMYOPATHY: ICD-10-CM

## 2020-08-07 PROCEDURE — 93000 ELECTROCARDIOGRAM COMPLETE: CPT | Performed by: INTERNAL MEDICINE

## 2020-08-07 PROCEDURE — 99213 OFFICE O/P EST LOW 20 MIN: CPT | Performed by: INTERNAL MEDICINE

## 2020-08-07 RX ORDER — AMIODARONE HYDROCHLORIDE 200 MG/1
100 TABLET ORAL DAILY
COMMUNITY
End: 2021-10-29 | Stop reason: SINTOL

## 2020-08-07 NOTE — PROGRESS NOTES
CC--Recurrent VT, ischemic cardiomyopathy    Sub--Pt here for follow up and he had incessant VT needing an ablation several months ago .  patient started having recurrent VT needing a redo VT ablation several months  ago and post ablation a week later developed dysphagia and needed endoscopy the patient underwent esophageal stricture dilatation with improvement of symptoms and resolution of dysphagia   Patient is on amiodarone because of extensive scarring and multiple episodes of VT and since ablation without recurrent ICD therapy--patient has history of chronic ischemic heart disease previous myocardial infarction, s/p previous CABG,  LV systolic dysfunction with   Last  LV ejection fraction of 25 30% which came up to 30- 35% by latest echocardiogram.   He is  status post ICD implant.  denies any chest pain or shortness of breath or syncope--patient had prior ACE inhibitor induced cough and currently is on losartan  Patient denies any new symptoms of VT or syncope and is compliant with current medical regimen  He also developed some right shoulder pain [facility] and uses Biofreeze for relief of symptoms   He denies any palpitations or syncope or any chest pain or dyspnea  Patient has noticed photosensitivity of his skin in the last few weeks and amiodarone dose reduced to 100 mg a day and his symptoms have reduced and comes in for follow-up          assessment plan   recent VT storm status post VT re do ablation without any   recurrent VT   Dysphagia status post esophageal stricture dilatation with resolution of symptoms   single-chamber ICD in situ with VDD lead   hypertension--optimized    coronary artery disease   ischemic cardiomyopathy  Photosensitivity--likely from amiodarone and amiodarone dose  reduced to 100 mg a day and symptoms reduced and follow-up in few months         Vital Signs:Blood pressure 128/64,  pulse rate is 95 patient is afebrile and respiration 12 times a minute  EKG  shows sinus rhythm with diffuse ST-T wave changes with mild interventricular conduction delay and left atrial enlargement and KS interval is 166 heart rate of 84 QRS is 126 QTC is 419 and indication for EKG includes ventricular arrhythmias cardiomyopathy and no significant EKG changes compared to prior EKG, 1 PVC noted on EKG       Past medical history reviewed below and unchanged and verified      Past Medical History:     Reviewed history from 03/13/2018 and no changes required:        Coronary Heart Disease:S/P CABG and PCI         Hypertension        Myocardial Infarction: Inferior MI         Hx: Cellulitis of left lower leg        Dyslipidemia         Pinched nerve L4-L5         Prostate cancer         Cardiomyopathy : ICD implantation     Past Surgical History:     Reviewed history from 03/13/2018 and no changes required:        Angioplasty/Stent: April 1996- Palmaz-Huy stent / LAD:         July 1996- RCA:         2000- Tetra stent Guidant  to proximal RCA, mid to distal artery 2 sequential Guidant Tetra stents         C A B G:May 2010 x 5 vessel: LIMA to diagonal , LAD, intermedius, obtuse marginal, RCA        Cardiac Cath: 1996 x 2, Nov. 2000, May 2010 - Cath 8/2015        Left Knee open reduction         Multiple colonoscopy's for polyp resection         Appendectomy        Prostate Robiotic surgery - Cyber Knife : 2015        Heart Catherization 7/2017 - No Stents         ICD implantation : Jan. 2018      Review of Systems   General: No fatigue or tiredness, No change in weight   Eyes: No redness  Cardiovascular: No chest pain, no palpitations  Respiratory: No shortness of breath  Gastrointestinal: No nausea or vomiting, bleeding  Genitourinary: no hematuria or dysuria  Musculoskeletal: Positive for right shoulder arthralgia  Skin: Positive for photosensitivity   Neurologic: No numbness, tingling, syncope  Hematologic/Lymphatic: No abnormal bleeding          Physical Exam    General:      well  developed, well nourished, in no acute distress.    Head:      normocephalic and atraumatic.    Eyes:      PERRL/EOM intact, conjunctiva and sclera clear with out nystagmus.    Neck:      no masses, thyromegaly, trachea central with normal respiratory effort  Lungs:      clear bilaterally to auscultation.    Heart:      non-displaced PMI, chest non-tender; regular rate and rhythm, S1, S2 without murmurs, rubs, or gallops  Skin:      intact without lesions or rashes.    Psych:      alert and cooperative; normal mood and affect; normal attention span and concentration.      Extremities with trace ankle edema without any cyanosis or clubbing  Muscular skeletal patient walks with a cane with mild kyphosis    Neurological no focal deficits and alert oriented x3    Electronically signed by Constantino Arvizu MD, 08/07/20, 1:19 PM.

## 2020-08-07 NOTE — PROGRESS NOTES
In clinic device interrogation. No new episodes. No new events. Battery Ok. Charges per Dr. Arvizu.

## 2020-09-25 ENCOUNTER — TELEPHONE (OUTPATIENT)
Dept: CARDIOLOGY | Facility: CLINIC | Age: 80
End: 2020-09-25

## 2020-09-25 NOTE — TELEPHONE ENCOUNTER
Mr. Nicholas called and wanted his device checked remotely. I reassured him that the interrogation and shown no new episodes. Mr. Nicholas was complaining of a lot of arthritis type pain in his shoulders. I asked if him he has ever tried the drug Neurontin. He said no . I do believed with the type of pain he is experiencing he may benefit from this drug.

## 2020-10-22 ENCOUNTER — OFFICE VISIT (OUTPATIENT)
Dept: CARDIOLOGY | Facility: CLINIC | Age: 80
End: 2020-10-22

## 2020-10-22 VITALS
HEART RATE: 94 BPM | DIASTOLIC BLOOD PRESSURE: 72 MMHG | BODY MASS INDEX: 23.62 KG/M2 | OXYGEN SATURATION: 95 % | SYSTOLIC BLOOD PRESSURE: 137 MMHG | WEIGHT: 184 LBS

## 2020-10-22 DIAGNOSIS — E78.2 HYPERLIPIDEMIA, MIXED: ICD-10-CM

## 2020-10-22 DIAGNOSIS — I73.9 PVD (PERIPHERAL VASCULAR DISEASE) WITH CLAUDICATION (HCC): ICD-10-CM

## 2020-10-22 DIAGNOSIS — Z95.810 PRESENCE OF AUTOMATIC IMPLANTABLE CARDIOVERTER-DEFIBRILLATOR: ICD-10-CM

## 2020-10-22 DIAGNOSIS — I47.20 VT (VENTRICULAR TACHYCARDIA) (HCC): ICD-10-CM

## 2020-10-22 DIAGNOSIS — I25.810 CORONARY ARTERY DISEASE INVOLVING CORONARY BYPASS GRAFT OF NATIVE HEART WITHOUT ANGINA PECTORIS: Primary | ICD-10-CM

## 2020-10-22 DIAGNOSIS — I50.22 CHRONIC SYSTOLIC CONGESTIVE HEART FAILURE (HCC): ICD-10-CM

## 2020-10-22 PROCEDURE — 93000 ELECTROCARDIOGRAM COMPLETE: CPT | Performed by: INTERNAL MEDICINE

## 2020-10-22 PROCEDURE — 99214 OFFICE O/P EST MOD 30 MIN: CPT | Performed by: INTERNAL MEDICINE

## 2020-10-22 RX ORDER — GABAPENTIN 400 MG/1
400 CAPSULE ORAL 4 TIMES DAILY
COMMUNITY

## 2020-10-22 NOTE — PROGRESS NOTES
CC--Recurrent VT, ischemic cardiomyopathy    Sub--Pt here for follow up and he had incessant VT needing an ablation several months ago .  patient started having recurrent VT needing a redo VT ablation several months  ago and post ablation a week later developed dysphagia and needed endoscopy the patient underwent esophageal stricture dilatation with improvement of symptoms and resolution of dysphagia .Patient is on amiodarone because of extensive scarring and multiple episodes of VT and since ablation without recurrent ICD therapy--patient has history of chronic ischemic heart disease previous myocardial infarction, s/p previous CABG,  LV systolic dysfunction with   Last  LV ejection fraction of 25 30% which came up to 30- 35% by latest echocardiogram.   He is  status post ICD implant.  denies any chest pain or shortness of breath or syncope--patient had prior ACE inhibitor induced cough and currently is on Aldactone .Patient denies any new symptoms of VT or syncope and is compliant with current medical regimen  He also developed some right shoulder pain [facility] and uses Biofreeze for relief of symptoms   He denies any palpitations or syncope or any chest pain or dyspnea.  Patient could not tolerate additional beta-blockers on top of amiodarone in the past  Patient has noticed photosensitivity of his skin in the past and amiodarone dose reduced to 100 mg a day and his symptoms have reduced and comes in for follow-up   Patient has noticed some burning sensation in lower extremities and was seen at [facility] on last clinic prescription for gabapentin and he also complains of some calf pain when he is ambulating with some blocks gets better after increased activity and it is not nocturnal in nature         assessment plan   recent VT storm status post VT re do ablation without any   recurrent VT   Dysphagia status post esophageal stricture dilatation with resolution of symptoms    single-chamber ICD in situ with VDD lead   hypertension--optimized    coronary artery disease   ischemic cardiomyopathy  Photosensitivity--likely from amiodarone and amiodarone dose  reduced to 100 mg a day with resolution of symptoms   B12 deficiency on supplementation  Symptoms of burning and possible claudication in lower extremities with slightly reduced pulses distally to be evaluated by arterial Doppler  Follow-up in 3 months and medications reviewed        Vital Signs:Blood pressure 137/72,  pulse rate is 94 patient is afebrile and respiration 12 times a minute    Past medical history reviewed below and unchanged and verified      Past Medical History:     Reviewed history from 03/13/2018 and no changes required:        Coronary Heart Disease:S/P CABG and PCI         Hypertension        Myocardial Infarction: Inferior MI         Hx: Cellulitis of left lower leg        Dyslipidemia         Pinched nerve L4-L5         Prostate cancer         Cardiomyopathy : ICD implantation     Past Surgical History:     Reviewed history from 03/13/2018 and no changes required:        Angioplasty/Stent: April 1996- Palmaz-Huy stent / LAD:         July 1996- RCA:         2000- Tetra stent Guidant  to proximal RCA, mid to distal artery 2 sequential Guidant Tetra stents         C A B G:May 2010 x 5 vessel: LIMA to diagonal , LAD, intermedius, obtuse marginal, RCA        Cardiac Cath: 1996 x 2, Nov. 2000, May 2010 - Cath 8/2015        Left Knee open reduction         Multiple colonoscopy's for polyp resection         Appendectomy        Prostate Robiotic surgery - Cyber Knife : 2015        Heart Catherization 7/2017 - No Stents         ICD implantation : Jan. 2018      Review of Systems   General: No fatigue or tiredness, No change in weight   Eyes: No redness  Cardiovascular: No chest pain, no palpitations  Respiratory: No shortness of breath  Gastrointestinal: No nausea or vomiting, bleeding  Genitourinary: no hematuria or  dysuria  Musculoskeletal: Positive for right shoulder arthralgia  Skin: Positive for photosensitivity   Neurologic: Complains of burning sensation in lower extremities , no syncope  Hematologic/Lymphatic: No abnormal bleeding          Physical Exam    General:      well developed, well nourished, in no acute distress.    Head:      normocephalic and atraumatic.    Eyes:      PERRL/EOM intact, conjunctiva and sclera clear with out nystagmus.    Neck:      no masses, thyromegaly, trachea central with normal respiratory effort  Lungs:      clear bilaterally to auscultation.    Heart:      non-displaced PMI, chest non-tender; regular rate and rhythm, S1, S2 without murmurs, rubs, or gallops  Skin:      intact without lesions or rashes.    Psych:      alert and cooperative; normal mood and affect; normal attention span and concentration.      Extremities with trace ankle edema without any cyanosis or clubbing  Muscular skeletal patient walks with a cane with mild kyphosis    Neurological no focal deficits and alert oriented x3    Peripheral pulses mildly diminished bilaterally in the lower extremities          ECG 12 Lead    Date/Time: 10/22/2020 4:05 PM  Performed by: Constantino Arvizu MD  Authorized by: Constantino Arvizu MD   Comparison: compared with previous ECG   Similar to previous ECG  Rhythm: sinus rhythm  Rate: normal  BPM: 94  Conduction: non-specific intraventricular conduction delay  QRS axis: normal  Other findings: non-specific ST-T wave changes and left atrial abnormality    Clinical impression: non-specific ECG            Electronically signed by Constantino Arvizu MD, 10/22/20, 4:05 PM EDT.

## 2020-10-30 ENCOUNTER — OFFICE VISIT (OUTPATIENT)
Dept: CARDIOLOGY | Facility: CLINIC | Age: 80
End: 2020-10-30

## 2020-10-30 VITALS
DIASTOLIC BLOOD PRESSURE: 74 MMHG | WEIGHT: 184 LBS | BODY MASS INDEX: 23.62 KG/M2 | HEART RATE: 95 BPM | SYSTOLIC BLOOD PRESSURE: 130 MMHG | OXYGEN SATURATION: 97 % | RESPIRATION RATE: 18 BRPM

## 2020-10-30 DIAGNOSIS — E78.2 HYPERLIPIDEMIA, MIXED: Chronic | ICD-10-CM

## 2020-10-30 DIAGNOSIS — Z95.810 PRESENCE OF AUTOMATIC IMPLANTABLE CARDIOVERTER-DEFIBRILLATOR: ICD-10-CM

## 2020-10-30 DIAGNOSIS — I50.22 CHRONIC SYSTOLIC CONGESTIVE HEART FAILURE (HCC): ICD-10-CM

## 2020-10-30 DIAGNOSIS — I10 ESSENTIAL HYPERTENSION: Primary | Chronic | ICD-10-CM

## 2020-10-30 DIAGNOSIS — I25.810 CORONARY ARTERY DISEASE INVOLVING CORONARY BYPASS GRAFT OF NATIVE HEART WITHOUT ANGINA PECTORIS: ICD-10-CM

## 2020-10-30 DIAGNOSIS — I25.5 ISCHEMIC CARDIOMYOPATHY: ICD-10-CM

## 2020-10-30 PROCEDURE — 99214 OFFICE O/P EST MOD 30 MIN: CPT | Performed by: INTERNAL MEDICINE

## 2020-10-30 RX ORDER — MIDODRINE HYDROCHLORIDE 5 MG/1
5 TABLET ORAL
Qty: 270 TABLET | Refills: 3 | Status: SHIPPED | OUTPATIENT
Start: 2020-10-30 | End: 2021-04-22 | Stop reason: SDUPTHER

## 2020-10-30 NOTE — PATIENT INSTRUCTIONS
[facility] in [facility] last 6 months  Get most recent office note from Dr Motta  F/U 3 months

## 2020-10-30 NOTE — PROGRESS NOTES
Cardiology Office Visit      Encounter Date:  10/30/2020    Patient ID:   Deion Nicholas is a 80 y.o. male.    Reason For Followup:  Ischemic cardiomyopathy  Coronary artery disease  Hypertension  Hypertensive cardiovascular disease  Amiodarone therapy  Antiplatelet therapy  Hyperlipidemia    Brief Clinical History:  Dear Makenna Jimenez MD    I had the pleasure of seeing Deion Nicholas today. As you are well aware, this is a 80 y.o. male with an established history of ischemic heart disease.  He has undergone coronary arterial bypass grafting in the past.  He is additional history that includes ischemic cardiomyopathy with most recent ejection fraction of 30 to 35%, ICD implantation, hypertension, hypertensive cardiovascular disease, amiodarone therapy, antiplatelet therapy, and hyperlipidemia.  He presents today for follow-up on the above conditions.     Interval History:  He denies any chest pain pressure heaviness or tightness.  He denies any shortness of breath out of character.  He denies any PND orthopnea.  He denies any syncope or near syncope.  He continues to report unsteady gait.    Since his last visit all of his antihypertensives have been discontinued because of his gait instability and low blood pressure.  Specifically, he is not been able to tolerate his beta-blocker and/or ACE inhibitor.  He additionally is been suffering from significant neuropathy.  He was advised that he had an [facility].  This information is not available for my review today.    He asked about the appropriateness of nonsteroidal anti-inflammatories.  I have advised him that he would be at high risk for cardiovascular events given his past history.  I did advise him to discuss some low back with you as the data on this 1 appears a little more favorable than other typical nonsteroidals.     Assessment & Plan     Impressions:  Ischemic cardiomyopathy  Coronary artery disease  Hypotension with  an orthostatic component  Hypertensive cardiovascular disease  Amiodarone therapy  Antiplatelet therapy  Hyperlipidemia  AICD in situ  History of VT storm status post radiofrequency ablation     Recommendations:  Midodrine 5 mg 3 times daily.  Follow-up with electrophysiology as scheduled  [facility] and [facility]  Follow-up in 3 months time sooner should there be difficulties.    Objective:    Vitals:  Vitals:    10/30/20 1108   BP: 130/74   BP Location: Left arm   Patient Position: Sitting   Cuff Size: Large Adult   Pulse: 95   Resp: 18   SpO2: 97%   Weight: 83.5 kg (184 lb)       Physical Exam:    General:          Alert, cooperative, no distress, appears stated age  Head:              Normocephalic, atraumatic, mucous membranes moist  Eyes:               Conjunctiva/corneas clear, EOM's intact     Neck:              Supple,  no bruit  Lungs:            Clear to auscultation bilaterally, no wheezes rhonchi rales are noted  Chest wall:     No tenderness  Heart::             Regular rate and rhythm, S1 and S2 normal, 1/6 holosystolic murmur.  No rub or gallop  Abdomen:      Soft, non-tender, nondistended bowel sounds active  Extremities:    No cyanosis, clubbing, or edema.  Ambulates with a cane.  Significant varus deformity of lower extremities.  Pulses:           2+ and symmetric all extremities  Skin:                No rashes or lesions  Neuro/psych: A&O x3. CN II through XII are grossly intact with appropriate affect      Allergies:  Allergies   Allergen Reactions   • Lovastatin Unknown (See Comments) and Myalgia     unknown   • Pravastatin Unknown (See Comments) and Myalgia     unknown   • Simvastatin Unknown (See Comments) and Myalgia     unknown       Medication Review:     Current Outpatient Medications:   •  amiodarone (PACERONE) 200 MG tablet, Take 100 mg by mouth Daily., Disp: , Rfl:   •  aspirin 81 MG EC tablet, Take 81 mg by mouth Daily., Disp: , Rfl:   •  Cholecalciferol (VITAMIN D-400) 400  units tablet, Take 2 tablets daily, Disp: , Rfl:   •  clopidogrel (PLAVIX) 75 MG tablet, Take 75 mg by mouth Daily., Disp: , Rfl:   •  cyanocobalamin 1000 MCG/ML injection, Inject 1,000 mcg into the appropriate muscle as directed by prescriber Every 14 (Fourteen) Days., Disp: , Rfl:   •  ezetimibe (ZETIA) 10 MG tablet, Take 10 mg by mouth Every Other Day., Disp: , Rfl:   •  gabapentin (NEURONTIN) 300 MG capsule, Take 300 mg by mouth 3 (Three) Times a Day., Disp: , Rfl:   •  isosorbide mononitrate (IMDUR) 30 MG 24 hr tablet, Take 30 mg by mouth Daily., Disp: , Rfl:   •  nitroglycerin (NITROSTAT) 0.4 MG SL tablet, NITROSTAT 0.4 MG SUBL, Disp: , Rfl:   •  Omega-3 Fatty Acids (FISH OIL) 1000 MG capsule capsule, Take 2,000 mg by mouth 2 (Two) Times a Day With Meals., Disp: , Rfl:   •  pantoprazole (PROTONIX) 40 MG EC tablet, Take 40 mg by mouth Daily., Disp: , Rfl:   •  spironolactone (ALDACTONE) 25 MG tablet, Take 1 tablet by mouth Daily., Disp: 30 tablet, Rfl: 2  •  sucralfate (CARAFATE) 1 g tablet, Take 1 g by mouth 3 (Three) Times a Day., Disp: , Rfl:   •  midodrine (PROAMATINE) 5 MG tablet, Take 1 tablet by mouth 3 (Three) Times a Day Before Meals., Disp: 270 tablet, Rfl: 3    Family History:  Family History   Problem Relation Age of Onset   • Pancreatic cancer Mother    • Heart disease Father        Past Medical History:  Past Medical History:   Diagnosis Date   • Aneurysm (CMS/HCC)    • Cardiomyopathy (CMS/HCC)     ICD implantation   • Cellulitis of lower leg    • CHD (coronary heart disease)     S/P CABG & PCI   • Dyslipidemia    • History of ventricular tachycardia    • Hypertension    • Myocardial infarction (CMS/HCC)     Inferior MI   • Pinched nerve     L4-L5   • Prostate cancer (CMS/HCC)        Past surgical History:  Past Surgical History:   Procedure Laterality Date   • ANGIOPLASTY  2000 04/1996 Stent: Palmaz- Huy stent/LAD 07/1996-RCA: 2000-Tetra stent Guidant to proximal RCA, mid to distal artery 2  sequential Guidant Tetra stents   • APPENDECTOMY     • CARDIAC ABLATION  April and May 2019   • CARDIAC CATHETERIZATION  2017    1996 x2, Nov. 2000, 2010-cath 2015-no stents    • CARDIAC DEFIBRILLATOR PLACEMENT     • COLONOSCOPY W/ POLYPECTOMY      Mult colonoscopy's for polyp resection    • CORONARY ARTERY BYPASS GRAFT  05/2010    x5 vessel: LMA to diagonal, LAD, intermedius, obtuse marginal, RCA   • CORONARY STENT PLACEMENT     • ENDOSCOPY N/A 2019    Procedure: ESOPHAGOGASTRODUODENOSCOPY with dilitation Bougie 50, 54;  Surgeon: Roddy Coronel MD;  Location: [facility] ENDOSCOPY;  Service: Gastroenterology   • INSERT / REPLACE / REMOVE PACEMAKER     • KNEE OPEN LATERAL RELEASE Left     reduction   • OTHER SURGICAL HISTORY  2018    ICD implantation   • PROSTATE SURGERY      Robiotic surgery- Cyber Knife:       Social History:  Social History     Socioeconomic History   • Marital status:      Spouse name: Not on file   • Number of children: Not on file   • Years of education: Not on file   • Highest education level: Not on file   Tobacco Use   • Smoking status: Former Smoker     Packs/day: 1.00     Years: 17.00     Pack years: 17.00     Types: Cigarettes     Quit date: 2002     Years since quittin.3   • Smokeless tobacco: Never Used   Substance and Sexual Activity   • Alcohol use: No     Frequency: Never   • Drug use: No   • Sexual activity: Defer       Review of Systems:  The following systems were reviewed as they relate to the cardiovascular system: Constitutional, Eyes, ENT, Cardiovascular, Respiratory, Gastrointestinal, Integumentary, Neurological, Psychiatric, Hematologic, Endocrine, Musculoskeletal, and Genitourinary. The pertinent cardiovascular findings are reported above with all other cardiovascular points within those systems being negative.    Diagnostic Study Review:     Current Electrocardiogram:  Procedures no new EKG. EKG dated 10/22/2020 demonstrates sinus  rhythm with a ventricular rate of 93 bpm.      NOTE: The following portions of the patient's history were reviewed and updated this visit as appropriate: allergies, current medications, past family history, past medical history, past social history, past surgical history and problem list.

## 2020-11-17 NOTE — TELEPHONE ENCOUNTER
RX completed and signed by [employer]  Faxed to [phone]      Marcin Childers DO [place], [place], MA             See if Richa can do this since I will be gone for 2 more weeks        Previous Messages    ----- Message -----   From: Ruba Metz MA   Sent: 11/17/2020   3:27 PM EST   To: Marcin Childers DO   Subject: Written script                                   Patient called states you were to hand write [facility].     RX was escribed to [facility].   Patient went to [facility] and states it was 70.00 for 3 months and [facility]. He did get enough for 2 weeks.     midodrine (PROAMATINE) 5 MG tablet [[id number]]     Order Details   Dose: 5 mg Route: Oral Frequency: 3 Times Daily Before Meals   Dispense Quantity: 270 tablet Refills: 3 Fills remaining: --       Sig: Take 1 tablet by mouth 3 (Three) Times a Day Before Meals.     Explained to him that you are out of the office this week and we may not be able to get a RX faxed this week.     He told me to reach out to regardless.     Please advise

## 2020-11-20 ENCOUNTER — HOSPITAL ENCOUNTER (OUTPATIENT)
Dept: CARDIOLOGY | Facility: HOSPITAL | Age: 80
Discharge: HOME | End: 2020-11-20
Admitting: INTERNAL MEDICINE

## 2020-11-20 DIAGNOSIS — I73.9 PVD (PERIPHERAL VASCULAR DISEASE) WITH CLAUDICATION (HCC): ICD-10-CM

## 2020-11-20 LAB
BH CV LOWER ARTERIAL LEFT ABI RATIO: 1.02
BH CV LOWER ARTERIAL LEFT DORSALIS PEDIS SYS MAX: 124 MMHG
BH CV LOWER ARTERIAL LEFT GREAT TOE SYS MAX: 125 MMHG
BH CV LOWER ARTERIAL LEFT POST TIBIAL SYS MAX: 133 MMHG
BH CV LOWER ARTERIAL LEFT TBI RATIO: 0.96
BH CV LOWER ARTERIAL RIGHT ABI RATIO: 1.07
BH CV LOWER ARTERIAL RIGHT DORSALIS PEDIS SYS MAX: 127 MMHG
BH CV LOWER ARTERIAL RIGHT GREAT TOE SYS MAX: 83 MMHG
BH CV LOWER ARTERIAL RIGHT POST TIBIAL SYS MAX: 139 MMHG
BH CV LOWER ARTERIAL RIGHT TBI RATIO: 0.64
UPPER ARTERIAL LEFT ARM BRACHIAL SYS MAX: 130 MMHG
UPPER ARTERIAL RIGHT ARM BRACHIAL SYS MAX: 128 MMHG

## 2020-11-20 PROCEDURE — 93922 UPR/L XTREMITY ART 2 LEVELS: CPT

## 2020-12-01 ENCOUNTER — TELEPHONE (OUTPATIENT)
Dept: CARDIOLOGY | Facility: CLINIC | Age: 80
End: 2020-12-01

## 2020-12-01 NOTE — TELEPHONE ENCOUNTER
Pt left message with after hours service, I attempted to return call.  I left voice mail requesting he return call during regular office hours.

## 2020-12-09 ENCOUNTER — TELEPHONE (OUTPATIENT)
Dept: CARDIOLOGY | Facility: CLINIC | Age: 80
End: 2020-12-09
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
## 2021-01-26 ENCOUNTER — TELEPHONE (OUTPATIENT)
Dept: CARDIOLOGY | Facility: CLINIC | Age: 81
End: 2021-01-26

## 2021-01-26 NOTE — TELEPHONE ENCOUNTER
Called patient and relayed instructions per Dr Childers, patient verbalized understanding.      ------Message-------  Marcin Childers, Ruba Alonso MA             I do not see anything in his cardiac history or in his allergies that would preclude him from getting this.  I think it is a good idea.      Previous Messages    ----- Message -----   From: Ruba Metz MA   Sent: 1/25/2021   3:28 PM EST   To: Marcin Childers DO   Subject: Covid Vaccine                                     [facility]. He wants to know if this ok.     Please advise

## 2021-02-01 ENCOUNTER — OFFICE VISIT (OUTPATIENT)
Dept: CARDIOLOGY | Facility: CLINIC | Age: 81
End: 2021-02-01

## 2021-02-01 ENCOUNTER — CLINICAL SUPPORT NO REQUIREMENTS (OUTPATIENT)
Dept: CARDIOLOGY | Facility: CLINIC | Age: 81
End: 2021-02-01

## 2021-02-01 VITALS
OXYGEN SATURATION: 98 % | BODY MASS INDEX: 23.62 KG/M2 | DIASTOLIC BLOOD PRESSURE: 74 MMHG | HEART RATE: 98 BPM | SYSTOLIC BLOOD PRESSURE: 144 MMHG | WEIGHT: 184 LBS

## 2021-02-01 DIAGNOSIS — I47.29 PAROXYSMAL VENTRICULAR TACHYCARDIA (HCC): Primary | ICD-10-CM

## 2021-02-01 DIAGNOSIS — I50.22 CHRONIC SYSTOLIC CONGESTIVE HEART FAILURE (HCC): ICD-10-CM

## 2021-02-01 DIAGNOSIS — I10 ESSENTIAL HYPERTENSION: ICD-10-CM

## 2021-02-01 DIAGNOSIS — I47.20 VT (VENTRICULAR TACHYCARDIA) (HCC): ICD-10-CM

## 2021-02-01 DIAGNOSIS — Z95.810 PRESENCE OF AUTOMATIC IMPLANTABLE CARDIOVERTER-DEFIBRILLATOR: Primary | ICD-10-CM

## 2021-02-01 DIAGNOSIS — I25.810 CORONARY ARTERY DISEASE INVOLVING CORONARY BYPASS GRAFT OF NATIVE HEART WITHOUT ANGINA PECTORIS: ICD-10-CM

## 2021-02-01 PROCEDURE — 93000 ELECTROCARDIOGRAM COMPLETE: CPT | Performed by: INTERNAL MEDICINE

## 2021-02-01 PROCEDURE — 99214 OFFICE O/P EST MOD 30 MIN: CPT | Performed by: INTERNAL MEDICINE

## 2021-02-01 NOTE — PROGRESS NOTES
CC--Recurrent VT, ischemic cardiomyopathy    Sub--Pt here for follow up and he had incessant VT needing an ablation several months ago .  patient started having recurrent VT needing a redo VT ablation several months  ago and post ablation a week later developed dysphagia and needed endoscopy the patient underwent esophageal stricture dilatation with improvement of symptoms and resolution of dysphagia .Patient is on amiodarone because of extensive scarring and multiple episodes of VT and since ablation without recurrent ICD therapy--patient has history of chronic ischemic heart disease previous myocardial infarction, s/p previous CABG,  LV systolic dysfunction with   Last  LV ejection fraction of 25 30% which came up to 30- 35% by latest echocardiogram.   He is  status post ICD implant.  denies any chest pain or shortness of breath or syncope--patient had prior ACE inhibitor induced cough and currently is on Aldactone .Patient denies any new symptoms of VT or syncope and is compliant with current medical regimen  He also developed some right shoulder pain [facility] and uses Biofreeze for relief of symptoms   He denies any palpitations or syncope or any chest pain or dyspnea.  Patient could not tolerate additional beta-blockers on top of amiodarone in the past  Patient has noticed photosensitivity of his skin in the past and amiodarone dose reduced to 100 mg a day and his symptoms have reduced and comes in for follow-up   Patient has noticed some burning sensation in lower extremities and was seen at [facility] on last clinic prescription for gabapentin and he also complains of some calf pain when he is ambulating with some blocks gets better after increased activity and it is not nocturnal in nature--arterial duplex scan without any significant abnormalities  He also has history of orthostatic hypotension started on midodrine and complains of intermittent headache            Past Medical  History:     Reviewed history from 03/13/2018 and no changes required:        Coronary Heart Disease:S/P CABG and PCI         Hypertension        Myocardial Infarction: Inferior MI         Hx: Cellulitis of left lower leg        Dyslipidemia         Pinched nerve L4-L5         Prostate cancer         Cardiomyopathy : ICD implantation         Physical Exam    General:      well developed, well nourished, in no acute distress.    Head:      normocephalic and atraumatic.    Eyes:      PERRL/EOM intact, conjunctiva and sclera clear with out nystagmus.    Neck:      no masses, thyromegaly, trachea central with normal respiratory effort  Lungs:      clear bilaterally to auscultation.    Heart:      regular rate and rhythm, S1, S2 without murmurs, rubs, or gallops               assessment plan  Orthostatic hypotension--stop isosorbide, titration of midodrine to reduce headache educated to the patient  History of  VT storm status post VT re do ablation without any   recurrent VT   Dysphagia status post esophageal stricture dilatation with resolution of symptoms   single-chamber ICD in situ with VDD lead    coronary artery disease   ischemic cardiomyopathy  Recent vascular screening revealing normal arterial flow in the lower extremities educated  Medications reviewed  ICD home monitoring and in office recordings discussed with the patient  Follow-up in 3 months      ECG 12 Lead    Date/Time: 2/1/2021 4:33 PM  Performed by: Constantino Arvizu MD  Authorized by: Constantino Arvizu MD   Comparison: compared with previous ECG   Similar to previous ECG  Rhythm: sinus rhythm  Rate: normal  Conduction: non-specific intraventricular conduction delay  QRS axis: right  Other findings: non-specific ST-T wave changes            Electronically signed by Constantino Arvizu MD, 02/01/21, 4:33 PM EST.

## 2021-03-02 PROCEDURE — 93295 DEV INTERROG REMOTE 1/2/MLT: CPT | Performed by: INTERNAL MEDICINE

## 2021-03-02 PROCEDURE — 93296 REM INTERROG EVL PM/IDS: CPT | Performed by: INTERNAL MEDICINE

## 2021-03-12 ENCOUNTER — OFFICE VISIT (OUTPATIENT)
Dept: CARDIOLOGY | Facility: CLINIC | Age: 81
End: 2021-03-12

## 2021-03-12 VITALS
BODY MASS INDEX: 24.92 KG/M2 | SYSTOLIC BLOOD PRESSURE: 153 MMHG | RESPIRATION RATE: 18 BRPM | HEIGHT: 73 IN | HEART RATE: 88 BPM | WEIGHT: 188 LBS | DIASTOLIC BLOOD PRESSURE: 71 MMHG | OXYGEN SATURATION: 98 %

## 2021-03-12 DIAGNOSIS — I25.810 CORONARY ARTERY DISEASE INVOLVING CORONARY BYPASS GRAFT OF NATIVE HEART WITHOUT ANGINA PECTORIS: ICD-10-CM

## 2021-03-12 DIAGNOSIS — R07.9 CHEST PAIN, UNSPECIFIED TYPE: Primary | ICD-10-CM

## 2021-03-12 DIAGNOSIS — E78.2 HYPERLIPIDEMIA, MIXED: Chronic | ICD-10-CM

## 2021-03-12 DIAGNOSIS — I10 ESSENTIAL HYPERTENSION: Chronic | ICD-10-CM

## 2021-03-12 DIAGNOSIS — I25.5 ISCHEMIC CARDIOMYOPATHY: ICD-10-CM

## 2021-03-12 DIAGNOSIS — Z95.810 PRESENCE OF AUTOMATIC IMPLANTABLE CARDIOVERTER-DEFIBRILLATOR: ICD-10-CM

## 2021-03-12 DIAGNOSIS — I50.22 CHRONIC SYSTOLIC CONGESTIVE HEART FAILURE (HCC): ICD-10-CM

## 2021-03-12 PROCEDURE — 99214 OFFICE O/P EST MOD 30 MIN: CPT | Performed by: INTERNAL MEDICINE

## 2021-03-12 PROCEDURE — 93000 ELECTROCARDIOGRAM COMPLETE: CPT | Performed by: INTERNAL MEDICINE

## 2021-03-12 NOTE — PROGRESS NOTES
Cardiology Office Visit      Encounter Date:  03/12/2021    Patient ID:   Deion Nicholas is a 80 y.o. male.    Reason For Followup:  Ischemic cardiomyopathy  Coronary artery disease  Hypertension  Hypertensive cardiovascular disease  Amiodarone therapy  Antiplatelet therapy  Hyperlipidemia    Brief Clinical History:  Dear Makenna Jimenez MD    I had the pleasure of seeing Deion Nicholas today. As you are well aware, this is a 80 y.o. male with an established history of ischemic heart disease.  He has undergone coronary arterial bypass grafting in the past.  He is additional history that includes ischemic cardiomyopathy with most recent ejection fraction of 30 to 35%, ICD implantation, hypertension, hypertensive cardiovascular disease, amiodarone therapy, antiplatelet therapy, and hyperlipidemia.  He presents today for follow-up on the above conditions.     Interval History:  He denies any chest pain pressure heaviness or tightness.  He denies any shortness of breath out of character.  He denies any PND orthopnea.  He denies any syncope or near syncope.  He continues to report unsteady gait.    The patient is concerned about his elevated blood pressure.  We discussed how under normal circumstances we would be more aggressive with managing his pressure however because of his history of orthostasis and neuropathy we have been generous with his blood pressure control to prevent falls.  Indeed he is on midodrine due to his significant orthostasis.  In fact he had an episode a few months ago where he fell in the garage when he was helping his son with a project.  He hit his head and had a mild concussion.    He is reporting some musculoskeletal chest discomfort on his right side.  It is reproducible on examination today.  He is reporting that he had his first Covid shot and will be getting his next one soon.      Assessment & Plan     Impressions:  Ischemic cardiomyopathy  Coronary artery disease  Hypotension  "with an orthostatic component  Hypertensive cardiovascular disease  Amiodarone therapy  Antiplatelet therapy  Hyperlipidemia  AICD in situ  History of VT storm status post radiofrequency ablation     Recommendations:  Continuation of his current cardiovascular regimen at the present time.  Follow-up in 6 months time sooner should there be difficulties.    Objective:    Vitals:  Vitals:    03/12/21 1118   BP: 153/71   BP Location: Left arm   Patient Position: Sitting   Cuff Size: Large Adult   Pulse: 88   Resp: 18   SpO2: 98%   Weight: 85.3 kg (188 lb)   Height: 185.4 cm (73\")       Physical Exam:    General:          Alert, cooperative, no distress, appears stated age  Head:              Normocephalic, atraumatic, mucous membranes moist  Eyes:               Conjunctiva/corneas clear, EOM's intact     Neck:              Supple,  no bruit  Lungs:            Clear to auscultation bilaterally, no wheezes rhonchi rales are noted  Chest wall:      Reproducible chest discomfort on the right pectoral area  Heart::             Regular rate and rhythm, S1 and S2 normal, 1/6 holosystolic murmur.  No rub or gallop  Abdomen:      Soft, non-tender, nondistended bowel sounds active  Extremities:    No cyanosis, clubbing, or edema.  Ambulates with a cane.  Significant varus deformity of lower extremities.  Pulses:           2+ and symmetric all extremities  Skin:                No rashes or lesions  Neuro/psych: A&O x3. CN II through XII are grossly intact with appropriate affect      Allergies:  Allergies   Allergen Reactions   • Lovastatin Unknown (See Comments) and Myalgia     unknown   • Pravastatin Unknown (See Comments) and Myalgia     unknown   • Simvastatin Unknown (See Comments) and Myalgia     unknown       Medication Review:     Current Outpatient Medications:   •  amiodarone (PACERONE) 200 MG tablet, Take 100 mg by mouth Daily., Disp: , Rfl:   •  aspirin 81 MG EC tablet, Take 81 mg by mouth Daily., Disp: , Rfl:   •  " Cholecalciferol (VITAMIN D-400) 400 units tablet, Take 2 tablets daily, Disp: , Rfl:   •  clopidogrel (PLAVIX) 75 MG tablet, Take 75 mg by mouth Daily., Disp: , Rfl:   •  cyanocobalamin 1000 MCG/ML injection, Inject 1,000 mcg into the appropriate muscle as directed by prescriber Every 14 (Fourteen) Days., Disp: , Rfl:   •  ezetimibe (ZETIA) 10 MG tablet, Take 10 mg by mouth Every Other Day., Disp: , Rfl:   •  gabapentin (NEURONTIN) 300 MG capsule, Take 300 mg by mouth 3 (Three) Times a Day., Disp: , Rfl:   •  midodrine (PROAMATINE) 5 MG tablet, Take 1 tablet by mouth 3 (Three) Times a Day Before Meals., Disp: 270 tablet, Rfl: 3  •  nitroglycerin (NITROSTAT) 0.4 MG SL tablet, NITROSTAT 0.4 MG SUBL, Disp: , Rfl:   •  Omega-3 Fatty Acids (FISH OIL) 1000 MG capsule capsule, Take 2,000 mg by mouth 2 (Two) Times a Day With Meals., Disp: , Rfl:   •  pantoprazole (PROTONIX) 40 MG EC tablet, Take 40 mg by mouth Daily., Disp: , Rfl:   •  spironolactone (ALDACTONE) 25 MG tablet, Take 1 tablet by mouth Daily., Disp: 30 tablet, Rfl: 2  •  sucralfate (CARAFATE) 1 g tablet, Take 1 g by mouth 3 (Three) Times a Day., Disp: , Rfl:     Family History:  Family History   Problem Relation Age of Onset   • Pancreatic cancer Mother    • Heart disease Father        Past Medical History:  Past Medical History:   Diagnosis Date   • Aneurysm (CMS/HCC)    • Cardiomyopathy (CMS/HCC)     ICD implantation   • Cellulitis of lower leg    • CHD (coronary heart disease)     S/P CABG & PCI   • Dyslipidemia    • History of ventricular tachycardia    • Hypertension    • Myocardial infarction (CMS/HCC)     Inferior MI   • Pinched nerve     L4-L5   • Prostate cancer (CMS/HCC)        Past surgical History:  Past Surgical History:   Procedure Laterality Date   • ANGIOPLASTY  2000 04/1996 Stent: Palmaz- Huy stent/LAD 07/1996-RCA: 2000-Tetra stent Guidant to proximal RCA, mid to distal artery 2 sequential Guidant Tetra stents   • APPENDECTOMY     •  CARDIAC ABLATION  April and May 2019   • CARDIAC CATHETERIZATION  2017    1996 x2, Nov. 2000, 2010-cath 2015-no stents    • CARDIAC DEFIBRILLATOR PLACEMENT     • COLONOSCOPY W/ POLYPECTOMY      Mult colonoscopy's for polyp resection    • CORONARY ARTERY BYPASS GRAFT  05/2010    x5 vessel: LMA to diagonal, LAD, intermedius, obtuse marginal, RCA   • CORONARY STENT PLACEMENT     • ENDOSCOPY N/A 2019    Procedure: ESOPHAGOGASTRODUODENOSCOPY with dilitation Bougie 50, 54;  Surgeon: Roddy Coronel MD;  Location: [facility] ENDOSCOPY;  Service: Gastroenterology   • INSERT / REPLACE / REMOVE PACEMAKER     • KNEE OPEN LATERAL RELEASE Left     reduction   • OTHER SURGICAL HISTORY  2018    ICD implantation   • PROSTATE SURGERY      Robiotic surgery- Cyber Knife:       Social History:  Social History     Socioeconomic History   • Marital status:      Spouse name: Not on file   • Number of children: Not on file   • Years of education: Not on file   • Highest education level: Not on file   Tobacco Use   • Smoking status: Former Smoker     Packs/day: 1.00     Years: 17.00     Pack years: 17.00     Types: Cigarettes     Quit date: 2002     Years since quittin.6   • Smokeless tobacco: Never Used   Vaping Use   • Vaping Use: Never used   Substance and Sexual Activity   • Alcohol use: No   • Drug use: No   • Sexual activity: Defer       Review of Systems:  The following systems were reviewed as they relate to the cardiovascular system: Constitutional, Eyes, ENT, Cardiovascular, Respiratory, Gastrointestinal, Integumentary, Neurological, Psychiatric, Hematologic, Endocrine, Musculoskeletal, and Genitourinary. The pertinent cardiovascular findings are reported above with all other cardiovascular points within those systems being negative.    Diagnostic Study Review:     Current Electrocardiogram:    ECG 12 Lead    Date/Time: 3/12/2021 2:08 PM  Performed by: Marcin Childers,  DO  Authorized by: Marcin Childers DO   Comparison: not compared with previous ECG   Previous ECG: no previous ECG available  Comments: Normal sinus rhythm with a ventricular rate of 86 bpm.  Consider left atrial enlargement.  Nonspecific interventricular conduction delay.  Normal QT and QTc intervals.               NOTE: The following portions of the patient's note were reviewed, confirmed and/or updated this visit as appropriate: History of present illness/Interval history, physical examination, assessment & plan, allergies, current medications, past family history, past medical history, past social history, past surgical history and problem list.

## 2021-04-22 RX ORDER — MIDODRINE HYDROCHLORIDE 10 MG/1
10 TABLET ORAL
Qty: 45 TABLET | Refills: 0 | Status: SHIPPED | OUTPATIENT
Start: 2021-04-22 | End: 2021-10-18 | Stop reason: SDUPTHER

## 2021-04-22 NOTE — TELEPHONE ENCOUNTER
Patient asked for just a half of months qty be [facility] to [facility]. Sent to Dr Childers for approval

## 2021-04-23 ENCOUNTER — TELEPHONE (OUTPATIENT)
Dept: CARDIOLOGY | Facility: CLINIC | Age: 81
End: 2021-04-23

## 2021-04-23 NOTE — TELEPHONE ENCOUNTER
Patient has been called and notified of medication changes per Dr Childers. Patient verified understanding.    ------Message------  Marcin Childers DO [place], [place], [place]  Phone Number: [phone]   Increase midodrine to 10 mg TID   Send new RX if needed           Previous Messages       ----- Message -----   From: Ruba Metz MA   Sent: 4/20/2021   2:54 PM EDT   To: Marcin Childers DO   Subject: b/p                                               Patient called and states his b/p has been between 88//56.   He states he hasn't had any symptoms but it very worried about the bottom number be very low.   He said he has been taking the midodrine 3 times daily.   He states the [facility] told him to take an extra pill at bedtime to see if his b/p would be normal in the morning. He states there was no change. He said he has to jump up and do pushups to raise his b/p because he is so nervous.     Please advise

## 2021-05-07 ENCOUNTER — OFFICE VISIT (OUTPATIENT)
Dept: CARDIOLOGY | Facility: CLINIC | Age: 81
End: 2021-05-07

## 2021-05-07 VITALS
OXYGEN SATURATION: 94 % | DIASTOLIC BLOOD PRESSURE: 73 MMHG | SYSTOLIC BLOOD PRESSURE: 137 MMHG | HEART RATE: 90 BPM | WEIGHT: 187.8 LBS | BODY MASS INDEX: 24.78 KG/M2

## 2021-05-07 DIAGNOSIS — I25.810 CORONARY ARTERY DISEASE INVOLVING CORONARY BYPASS GRAFT OF NATIVE HEART WITHOUT ANGINA PECTORIS: ICD-10-CM

## 2021-05-07 DIAGNOSIS — I47.29 PAROXYSMAL VENTRICULAR TACHYCARDIA (HCC): ICD-10-CM

## 2021-05-07 DIAGNOSIS — I25.5 ISCHEMIC CARDIOMYOPATHY: Primary | ICD-10-CM

## 2021-05-07 DIAGNOSIS — I50.22 CHRONIC SYSTOLIC CONGESTIVE HEART FAILURE (HCC): ICD-10-CM

## 2021-05-07 DIAGNOSIS — Z95.810 PRESENCE OF AUTOMATIC IMPLANTABLE CARDIOVERTER-DEFIBRILLATOR: ICD-10-CM

## 2021-05-07 PROCEDURE — 99214 OFFICE O/P EST MOD 30 MIN: CPT | Performed by: INTERNAL MEDICINE

## 2021-05-07 PROCEDURE — 93000 ELECTROCARDIOGRAM COMPLETE: CPT | Performed by: INTERNAL MEDICINE

## 2021-05-07 NOTE — PROGRESS NOTES
CC--Recurrent VT, ischemic cardiomyopathy    Sub--Pt here for follow up and he had prior history of incessant VT needing and ablation several months ago .  patient started having recurrent VT needing a redo VT ablation  and post ablation a week later developed dysphagia and needed endoscopy the patient underwent esophageal stricture dilatation with improvement of symptoms and resolution of dysphagia .Patient is on amiodarone because of extensive scarring and multiple episodes of VT and since ablation without recurrent ICD therapy--patient has history of chronic ischemic heart disease previous myocardial infarction, s/p previous CABG,  LV systolic dysfunction with   Last  LV ejection fraction of 25 30% which came up to 30- 35% by latest echocardiogram.   He is  status post ICD implant.  denies any chest pain or shortness of breath or syncope--patient had prior ACE inhibitor induced cough and currently is on Aldactone .Patient denies any new symptoms of VT or syncope and is compliant with current medical regimen  He also developed some right shoulder pain being [facility] and uses Biofreeze for relief of symptoms   He denies any palpitations or syncope or any chest pain or dyspnea.  Patient could not tolerate additional beta-blockers on top of amiodarone in the past  Patient has noticed photosensitivity of his skin in the past and amiodarone dose reduced to 100 mg a day and his symptoms have reduced and comes in for follow-up   Patient has noticed some burning sensation in lower extremities and was seen at [facility] on last clinic prescription for gabapentin and he also complains of some calf pain when he is ambulating with some blocks gets better after increased activity and it is not nocturnal in nature--arterial duplex scan without any significant abnormalities  He also has history of orthostatic hypotension started on midodrine and complains of intermittent headache  Patient is doing  clinically well from cardiac standpoint of view  Complains of recurrent redness in the left lower extremity with tenderness and warmth around the ankle joint and is being treated with antibiotics and has been to [place] [facility] twice            Past Medical History:     Reviewed history from 03/13/2018 and no changes required:        Coronary Heart Disease:S/P CABG and PCI         Hypertension        Myocardial Infarction: Inferior MI         Hx: Cellulitis of left lower leg        Dyslipidemia         Pinched nerve L4-L5         Prostate cancer         Cardiomyopathy : ICD implantation         Physical Exam    General:      well developed, well nourished, in no acute distress.    Head:      normocephalic and atraumatic.    Eyes:      PERRL/EOM intact, conjunctiva and sclera clear with out nystagmus.    Neck:      no masses, thyromegaly, trachea central with normal respiratory effort  Lungs:      clear bilaterally to auscultation.    Heart:      regular rate and rhythm, S1, S2 without murmurs, rubs, or gallops    Left ankle area and left calf area and below the knee on the left side is mildly erythematous and mildly warm to touch and the ankle area is mildly tender           assessment plan  Possible left lower extremity cellulitis and tender over the left ankle and patient may need admission to the hospital with intravenous antibiotics and possible exclusion for any underlying osteomyelitis--discussed with the patient and patient wants to continue current oral antibiotic regimen and go to the ER if needed  Orthostatic hypotension  History of  VT storm status post VT re do ablation without any   recurrent VT   Dysphagia status post esophageal stricture dilatation with resolution of symptoms   single-chamber ICD in situ with VDD lead    coronary artery disease   ischemic cardiomyopathy  Recent vascular screening revealing normal arterial flow in the lower extremities educated  Medications reviewed  ICD home monitoring and  in office recordings discussed with the patient  Follow-up in 3 months      ECG 12 Lead    Date/Time: 5/7/2021 12:59 PM  Performed by: Constantino Arvizu MD  Authorized by: Constantino Arvizu MD   Comparison: compared with previous ECG   Similar to previous ECG  Rhythm: sinus rhythm  Ectopy: infrequent PVCs  Rate: normal  QRS axis: normal  Other findings: non-specific ST-T wave changes and left atrial abnormality          Electronically signed by Constantino Arvizu MD, 05/07/21, 12:59 PM EDT.

## 2021-05-10 ENCOUNTER — TELEPHONE (OUTPATIENT)
Dept: CARDIOLOGY | Facility: CLINIC | Age: 81
End: 2021-05-10

## 2021-05-10 NOTE — TELEPHONE ENCOUNTER
Pt called in regards to his cellulitis in his legs. I let him know per Dr Arvizu he should go to the er for possible need for Iv antibiotic. Pt states he will go tomorrow morning.

## 2021-05-11 ENCOUNTER — HOSPITAL ENCOUNTER (INPATIENT)
Facility: HOSPITAL | Age: 81
LOS: 3 days | Discharge: HOME | End: 2021-05-14
Attending: EMERGENCY MEDICINE | Admitting: INTERNAL MEDICINE

## 2021-05-11 ENCOUNTER — APPOINTMENT (OUTPATIENT)
Dept: CARDIOLOGY | Facility: HOSPITAL | Age: 81
End: 2021-05-11

## 2021-05-11 DIAGNOSIS — L03.116 LEFT LEG CELLULITIS: Primary | ICD-10-CM

## 2021-05-11 DIAGNOSIS — M79.605 LEFT LEG PAIN: ICD-10-CM

## 2021-05-11 LAB
ANION GAP SERPL CALCULATED.3IONS-SCNC: 8 MMOL/L (ref 5–15)
BASOPHILS # BLD AUTO: 0.1 10*3/MM3 (ref 0–0.2)
BASOPHILS NFR BLD AUTO: 0.9 % (ref 0–1.5)
BH CV LOWER VASCULAR LEFT COMMON FEMORAL AUGMENT: NORMAL
BH CV LOWER VASCULAR LEFT COMMON FEMORAL COMPETENT: NORMAL
BH CV LOWER VASCULAR LEFT COMMON FEMORAL COMPRESS: NORMAL
BH CV LOWER VASCULAR LEFT COMMON FEMORAL PHASIC: NORMAL
BH CV LOWER VASCULAR LEFT COMMON FEMORAL SPONT: NORMAL
BH CV LOWER VASCULAR LEFT DISTAL FEMORAL COMPRESS: NORMAL
BH CV LOWER VASCULAR LEFT GASTRONEMIUS COMPRESS: NORMAL
BH CV LOWER VASCULAR LEFT GREATER SAPH AK COMPRESS: NORMAL
BH CV LOWER VASCULAR LEFT GREATER SAPH BK COMPRESS: NORMAL
BH CV LOWER VASCULAR LEFT LESSER SAPH COMPRESS: NORMAL
BH CV LOWER VASCULAR LEFT MID FEMORAL AUGMENT: NORMAL
BH CV LOWER VASCULAR LEFT MID FEMORAL COMPETENT: NORMAL
BH CV LOWER VASCULAR LEFT MID FEMORAL COMPRESS: NORMAL
BH CV LOWER VASCULAR LEFT MID FEMORAL PHASIC: NORMAL
BH CV LOWER VASCULAR LEFT MID FEMORAL SPONT: NORMAL
BH CV LOWER VASCULAR LEFT PERONEAL COMPRESS: NORMAL
BH CV LOWER VASCULAR LEFT POPLITEAL AUGMENT: NORMAL
BH CV LOWER VASCULAR LEFT POPLITEAL COMPETENT: NORMAL
BH CV LOWER VASCULAR LEFT POPLITEAL COMPRESS: NORMAL
BH CV LOWER VASCULAR LEFT POPLITEAL PHASIC: NORMAL
BH CV LOWER VASCULAR LEFT POPLITEAL SPONT: NORMAL
BH CV LOWER VASCULAR LEFT POSTERIOR TIBIAL COMPRESS: NORMAL
BH CV LOWER VASCULAR LEFT PROXIMAL FEMORAL COMPRESS: NORMAL
BH CV LOWER VASCULAR LEFT SAPHENOFEMORAL JUNCTION COMPRESS: NORMAL
BH CV LOWER VASCULAR RIGHT COMMON FEMORAL AUGMENT: NORMAL
BH CV LOWER VASCULAR RIGHT COMMON FEMORAL COMPETENT: NORMAL
BH CV LOWER VASCULAR RIGHT COMMON FEMORAL COMPRESS: NORMAL
BH CV LOWER VASCULAR RIGHT COMMON FEMORAL PHASIC: NORMAL
BH CV LOWER VASCULAR RIGHT COMMON FEMORAL SPONT: NORMAL
BUN SERPL-MCNC: 23 MG/DL (ref 8–23)
BUN/CREAT SERPL: 23.2 (ref 7–25)
CALCIUM SPEC-SCNC: 9.4 MG/DL (ref 8.6–10.5)
CHLORIDE SERPL-SCNC: 103 MMOL/L (ref 98–107)
CO2 SERPL-SCNC: 25 MMOL/L (ref 22–29)
CREAT SERPL-MCNC: 0.99 MG/DL (ref 0.76–1.27)
CRP SERPL-MCNC: 0.34 MG/DL (ref 0–0.5)
DEPRECATED RDW RBC AUTO: 45.1 FL (ref 37–54)
EOSINOPHIL # BLD AUTO: 0.2 10*3/MM3 (ref 0–0.4)
EOSINOPHIL NFR BLD AUTO: 3.3 % (ref 0.3–6.2)
ERYTHROCYTE [DISTWIDTH] IN BLOOD BY AUTOMATED COUNT: 13.8 % (ref 12.3–15.4)
ERYTHROCYTE [SEDIMENTATION RATE] IN BLOOD: 37 MM/HR (ref 0–20)
GFR SERPL CREATININE-BSD FRML MDRD: 73 ML/MIN/1.73
GLUCOSE SERPL-MCNC: 101 MG/DL (ref 65–99)
HCT VFR BLD AUTO: 33.2 % (ref 37.5–51)
HGB BLD-MCNC: 11.3 G/DL (ref 13–17.7)
LYMPHOCYTES # BLD AUTO: 1.4 10*3/MM3 (ref 0.7–3.1)
LYMPHOCYTES NFR BLD AUTO: 20.7 % (ref 19.6–45.3)
MAXIMAL PREDICTED HEART RATE: 142 BPM
MCH RBC QN AUTO: 32.2 PG (ref 26.6–33)
MCHC RBC AUTO-ENTMCNC: 34 G/DL (ref 31.5–35.7)
MCV RBC AUTO: 94.7 FL (ref 79–97)
MONOCYTES # BLD AUTO: 0.7 10*3/MM3 (ref 0.1–0.9)
MONOCYTES NFR BLD AUTO: 10.2 % (ref 5–12)
NEUTROPHILS NFR BLD AUTO: 4.4 10*3/MM3 (ref 1.7–7)
NEUTROPHILS NFR BLD AUTO: 64.9 % (ref 42.7–76)
NRBC BLD AUTO-RTO: 0 /100 WBC (ref 0–0.2)
PLATELET # BLD AUTO: 191 10*3/MM3 (ref 140–450)
PMV BLD AUTO: 7.1 FL (ref 6–12)
POTASSIUM SERPL-SCNC: 4.5 MMOL/L (ref 3.5–5.2)
RBC # BLD AUTO: 3.5 10*6/MM3 (ref 4.14–5.8)
SARS-COV-2 RNA PNL SPEC NAA+PROBE: NOT DETECTED
SODIUM SERPL-SCNC: 136 MMOL/L (ref 136–145)
STRESS TARGET HR: 121 BPM
WBC # BLD AUTO: 6.8 10*3/MM3 (ref 3.4–10.8)
WHOLE BLOOD HOLD SPECIMEN: NORMAL

## 2021-05-11 PROCEDURE — 99283 EMERGENCY DEPT VISIT LOW MDM: CPT

## 2021-05-11 PROCEDURE — 93971 EXTREMITY STUDY: CPT

## 2021-05-11 PROCEDURE — G0378 HOSPITAL OBSERVATION PER HR: HCPCS

## 2021-05-11 PROCEDURE — 87040 BLOOD CULTURE FOR BACTERIA: CPT | Performed by: NURSE PRACTITIONER

## 2021-05-11 PROCEDURE — 80048 BASIC METABOLIC PNL TOTAL CA: CPT | Performed by: NURSE PRACTITIONER

## 2021-05-11 PROCEDURE — U0003 INFECTIOUS AGENT DETECTION BY NUCLEIC ACID (DNA OR RNA); SEVERE ACUTE RESPIRATORY SYNDROME CORONAVIRUS 2 (SARS-COV-2) (CORONAVIRUS DISEASE [COVID-19]), AMPLIFIED PROBE TECHNIQUE, MAKING USE OF HIGH THROUGHPUT TECHNOLOGIES AS DESCRIBED BY CMS-2020-01-R: HCPCS | Performed by: EMERGENCY MEDICINE

## 2021-05-11 PROCEDURE — 85025 COMPLETE CBC W/AUTO DIFF WBC: CPT | Performed by: NURSE PRACTITIONER

## 2021-05-11 PROCEDURE — 85652 RBC SED RATE AUTOMATED: CPT | Performed by: NURSE PRACTITIONER

## 2021-05-11 PROCEDURE — 86140 C-REACTIVE PROTEIN: CPT | Performed by: NURSE PRACTITIONER

## 2021-05-11 PROCEDURE — 25010000002 CEFTRIAXONE PER 250 MG: Performed by: NURSE PRACTITIONER

## 2021-05-11 RX ORDER — ACETAMINOPHEN 325 MG/1
650 TABLET ORAL EVERY 4 HOURS PRN
Status: DISCONTINUED | OUTPATIENT
Start: 2021-05-11 | End: 2021-05-14 | Stop reason: HOSPADM

## 2021-05-11 RX ORDER — SODIUM CHLORIDE 0.9 % (FLUSH) 0.9 %
10 SYRINGE (ML) INJECTION EVERY 12 HOURS SCHEDULED
Status: DISCONTINUED | OUTPATIENT
Start: 2021-05-11 | End: 2021-05-14 | Stop reason: HOSPADM

## 2021-05-11 RX ORDER — SUCRALFATE 1 G/1
1 TABLET ORAL 3 TIMES DAILY
Status: DISCONTINUED | OUTPATIENT
Start: 2021-05-11 | End: 2021-05-14 | Stop reason: HOSPADM

## 2021-05-11 RX ORDER — CLOPIDOGREL BISULFATE 75 MG/1
75 TABLET ORAL DAILY
Status: DISCONTINUED | OUTPATIENT
Start: 2021-05-11 | End: 2021-05-14 | Stop reason: HOSPADM

## 2021-05-11 RX ORDER — SODIUM CHLORIDE 0.9 % (FLUSH) 0.9 %
10 SYRINGE (ML) INJECTION AS NEEDED
Status: DISCONTINUED | OUTPATIENT
Start: 2021-05-11 | End: 2021-05-14 | Stop reason: HOSPADM

## 2021-05-11 RX ORDER — MIDODRINE HYDROCHLORIDE 5 MG/1
10 TABLET ORAL
Status: DISCONTINUED | OUTPATIENT
Start: 2021-05-12 | End: 2021-05-14 | Stop reason: HOSPADM

## 2021-05-11 RX ORDER — PANTOPRAZOLE SODIUM 40 MG/1
40 TABLET, DELAYED RELEASE ORAL DAILY
Status: DISCONTINUED | OUTPATIENT
Start: 2021-05-11 | End: 2021-05-14 | Stop reason: HOSPADM

## 2021-05-11 RX ORDER — ASPIRIN 81 MG/1
81 TABLET ORAL DAILY
Status: DISCONTINUED | OUTPATIENT
Start: 2021-05-11 | End: 2021-05-14 | Stop reason: HOSPADM

## 2021-05-11 RX ORDER — GABAPENTIN 300 MG/1
300 CAPSULE ORAL 3 TIMES DAILY
Status: DISCONTINUED | OUTPATIENT
Start: 2021-05-11 | End: 2021-05-14 | Stop reason: HOSPADM

## 2021-05-11 RX ORDER — ONDANSETRON 2 MG/ML
4 INJECTION INTRAMUSCULAR; INTRAVENOUS EVERY 6 HOURS PRN
Status: DISCONTINUED | OUTPATIENT
Start: 2021-05-11 | End: 2021-05-14 | Stop reason: HOSPADM

## 2021-05-11 RX ORDER — CYANOCOBALAMIN 1000 UG/ML
1000 INJECTION, SOLUTION INTRAMUSCULAR; SUBCUTANEOUS
Status: DISCONTINUED | OUTPATIENT
Start: 2021-05-11 | End: 2021-05-11

## 2021-05-11 RX ORDER — AMIODARONE HYDROCHLORIDE 200 MG/1
100 TABLET ORAL DAILY
Status: DISCONTINUED | OUTPATIENT
Start: 2021-05-12 | End: 2021-05-14 | Stop reason: HOSPADM

## 2021-05-11 RX ORDER — NITROGLYCERIN 0.4 MG/1
0.4 TABLET SUBLINGUAL
Status: DISCONTINUED | OUTPATIENT
Start: 2021-05-11 | End: 2021-05-14 | Stop reason: HOSPADM

## 2021-05-11 RX ORDER — CHOLECALCIFEROL (VITAMIN D3) 125 MCG
5 CAPSULE ORAL NIGHTLY PRN
Status: DISCONTINUED | OUTPATIENT
Start: 2021-05-11 | End: 2021-05-14 | Stop reason: HOSPADM

## 2021-05-11 RX ORDER — SPIRONOLACTONE 25 MG/1
25 TABLET ORAL DAILY
Status: DISCONTINUED | OUTPATIENT
Start: 2021-05-11 | End: 2021-05-14 | Stop reason: HOSPADM

## 2021-05-11 RX ADMIN — Medication 10 ML: at 20:33

## 2021-05-11 RX ADMIN — SUCRALFATE 1 G: 1 TABLET ORAL at 20:33

## 2021-05-11 RX ADMIN — WATER 1 G: 100 INJECTION, SOLUTION INTRAVENOUS at 16:45

## 2021-05-11 RX ADMIN — GABAPENTIN 300 MG: 300 CAPSULE ORAL at 20:33

## 2021-05-12 PROBLEM — I95.1 ORTHOSTATIC HYPOTENSION: Chronic | Status: ACTIVE | Noted: 2021-05-12

## 2021-05-12 PROBLEM — K21.9 GERD WITHOUT ESOPHAGITIS: Chronic | Status: ACTIVE | Noted: 2021-05-12

## 2021-05-12 PROBLEM — I47.29 PAROXYSMAL VENTRICULAR TACHYCARDIA: Chronic | Status: ACTIVE | Noted: 2019-05-03

## 2021-05-12 PROBLEM — L03.90 CELLULITIS: Status: ACTIVE | Noted: 2021-05-12

## 2021-05-12 PROBLEM — I10 ESSENTIAL HYPERTENSION: Status: ACTIVE | Noted: 2019-08-27

## 2021-05-12 PROBLEM — R06.02 SHORTNESS OF BREATH: Status: RESOLVED | Noted: 2019-06-24 | Resolved: 2021-05-12

## 2021-05-12 PROBLEM — Z95.810 PRESENCE OF AUTOMATIC IMPLANTABLE CARDIOVERTER-DEFIBRILLATOR: Chronic | Status: ACTIVE | Noted: 2018-01-31

## 2021-05-12 PROBLEM — R07.9 CHEST PAIN: Status: RESOLVED | Noted: 2019-08-26 | Resolved: 2021-05-12

## 2021-05-12 PROBLEM — G62.9 PERIPHERAL NEUROPATHY: Chronic | Status: ACTIVE | Noted: 2021-05-11

## 2021-05-12 PROBLEM — I47.20 PAROXYSMAL VENTRICULAR TACHYCARDIA: Chronic | Status: ACTIVE | Noted: 2019-05-03

## 2021-05-12 PROBLEM — E53.8 B12 DEFICIENCY: Chronic | Status: ACTIVE | Noted: 2021-05-12

## 2021-05-12 PROBLEM — L03.116 CELLULITIS OF LEFT LOWER EXTREMITY: Status: ACTIVE | Noted: 2021-05-12

## 2021-05-12 LAB
ANION GAP SERPL CALCULATED.3IONS-SCNC: 9 MMOL/L (ref 5–15)
BASOPHILS # BLD AUTO: 0 10*3/MM3 (ref 0–0.2)
BASOPHILS NFR BLD AUTO: 0.8 % (ref 0–1.5)
BUN SERPL-MCNC: 19 MG/DL (ref 8–23)
BUN/CREAT SERPL: 21.3 (ref 7–25)
CALCIUM SPEC-SCNC: 9.1 MG/DL (ref 8.6–10.5)
CHLORIDE SERPL-SCNC: 102 MMOL/L (ref 98–107)
CO2 SERPL-SCNC: 25 MMOL/L (ref 22–29)
CREAT SERPL-MCNC: 0.89 MG/DL (ref 0.76–1.27)
DEPRECATED RDW RBC AUTO: 45.9 FL (ref 37–54)
EOSINOPHIL # BLD AUTO: 0.3 10*3/MM3 (ref 0–0.4)
EOSINOPHIL NFR BLD AUTO: 4.9 % (ref 0.3–6.2)
ERYTHROCYTE [DISTWIDTH] IN BLOOD BY AUTOMATED COUNT: 13.8 % (ref 12.3–15.4)
GFR SERPL CREATININE-BSD FRML MDRD: 83 ML/MIN/1.73
GLUCOSE SERPL-MCNC: 102 MG/DL (ref 65–99)
HCT VFR BLD AUTO: 33.3 % (ref 37.5–51)
HGB BLD-MCNC: 11.5 G/DL (ref 13–17.7)
LYMPHOCYTES # BLD AUTO: 1.4 10*3/MM3 (ref 0.7–3.1)
LYMPHOCYTES NFR BLD AUTO: 25.7 % (ref 19.6–45.3)
MCH RBC QN AUTO: 32.7 PG (ref 26.6–33)
MCHC RBC AUTO-ENTMCNC: 34.4 G/DL (ref 31.5–35.7)
MCV RBC AUTO: 95 FL (ref 79–97)
MONOCYTES # BLD AUTO: 0.6 10*3/MM3 (ref 0.1–0.9)
MONOCYTES NFR BLD AUTO: 10.4 % (ref 5–12)
NEUTROPHILS NFR BLD AUTO: 3.2 10*3/MM3 (ref 1.7–7)
NEUTROPHILS NFR BLD AUTO: 58.2 % (ref 42.7–76)
NRBC BLD AUTO-RTO: 0.1 /100 WBC (ref 0–0.2)
PLATELET # BLD AUTO: 178 10*3/MM3 (ref 140–450)
PMV BLD AUTO: 7.5 FL (ref 6–12)
POTASSIUM SERPL-SCNC: 4.4 MMOL/L (ref 3.5–5.2)
PROCALCITONIN SERPL-MCNC: 0.03 NG/ML (ref 0–0.25)
RBC # BLD AUTO: 3.51 10*6/MM3 (ref 4.14–5.8)
SODIUM SERPL-SCNC: 136 MMOL/L (ref 136–145)
WBC # BLD AUTO: 5.5 10*3/MM3 (ref 3.4–10.8)

## 2021-05-12 PROCEDURE — G0378 HOSPITAL OBSERVATION PER HR: HCPCS

## 2021-05-12 PROCEDURE — 25010000002 LINEZOLID 600 MG/300ML SOLUTION: Performed by: INTERNAL MEDICINE

## 2021-05-12 PROCEDURE — 84145 PROCALCITONIN (PCT): CPT | Performed by: PHYSICIAN ASSISTANT

## 2021-05-12 PROCEDURE — 25010000002 VANCOMYCIN 10 G RECONSTITUTED SOLUTION: Performed by: INTERNAL MEDICINE

## 2021-05-12 PROCEDURE — 99220 PR INITIAL OBSERVATION CARE/DAY 70 MINUTES: CPT | Performed by: INTERNAL MEDICINE

## 2021-05-12 PROCEDURE — 80048 BASIC METABOLIC PNL TOTAL CA: CPT | Performed by: NURSE PRACTITIONER

## 2021-05-12 PROCEDURE — 85025 COMPLETE CBC W/AUTO DIFF WBC: CPT | Performed by: NURSE PRACTITIONER

## 2021-05-12 RX ORDER — TRIAMCINOLONE ACETONIDE 1 MG/G
1 CREAM TOPICAL DAILY PRN
COMMUNITY
End: 2021-05-14 | Stop reason: HOSPADM

## 2021-05-12 RX ORDER — LIDOCAINE 50 MG/G
1 PATCH TOPICAL DAILY
Status: DISCONTINUED | OUTPATIENT
Start: 2021-05-12 | End: 2021-05-14 | Stop reason: HOSPADM

## 2021-05-12 RX ORDER — LINEZOLID 2 MG/ML
600 INJECTION, SOLUTION INTRAVENOUS EVERY 12 HOURS
Status: DISCONTINUED | OUTPATIENT
Start: 2021-05-12 | End: 2021-05-13

## 2021-05-12 RX ORDER — LIDOCAINE 50 MG/G
1 PATCH TOPICAL EVERY 24 HOURS
COMMUNITY

## 2021-05-12 RX ORDER — METHOCARBAMOL 500 MG/1
500 TABLET, FILM COATED ORAL 3 TIMES DAILY PRN
Status: DISCONTINUED | OUTPATIENT
Start: 2021-05-12 | End: 2021-05-14 | Stop reason: HOSPADM

## 2021-05-12 RX ORDER — METHOCARBAMOL 500 MG/1
500 TABLET, FILM COATED ORAL 3 TIMES DAILY PRN
COMMUNITY

## 2021-05-12 RX ORDER — VANCOMYCIN 1.75 GRAM/500 ML IN 0.9 % SODIUM CHLORIDE INTRAVENOUS
20 ONCE
Status: COMPLETED | OUTPATIENT
Start: 2021-05-12 | End: 2021-05-12

## 2021-05-12 RX ORDER — MELATONIN
500 DAILY
Status: DISCONTINUED | OUTPATIENT
Start: 2021-05-12 | End: 2021-05-14 | Stop reason: HOSPADM

## 2021-05-12 RX ORDER — CLINDAMYCIN PHOSPHATE 900 MG/50ML
900 INJECTION, SOLUTION INTRAVENOUS EVERY 8 HOURS
Status: DISCONTINUED | OUTPATIENT
Start: 2021-05-12 | End: 2021-05-13

## 2021-05-12 RX ADMIN — VANCOMYCIN HYDROCHLORIDE 1750 MG: 10 INJECTION, POWDER, LYOPHILIZED, FOR SOLUTION INTRAVENOUS at 14:16

## 2021-05-12 RX ADMIN — LINEZOLID 600 MG: 600 INJECTION, SOLUTION INTRAVENOUS at 21:04

## 2021-05-12 RX ADMIN — MIDODRINE HYDROCHLORIDE 10 MG: 5 TABLET ORAL at 08:19

## 2021-05-12 RX ADMIN — MICONAZOLE NITRATE: 20 CREAM TOPICAL at 21:04

## 2021-05-12 RX ADMIN — MIDODRINE HYDROCHLORIDE 10 MG: 5 TABLET ORAL at 16:31

## 2021-05-12 RX ADMIN — GABAPENTIN 300 MG: 300 CAPSULE ORAL at 21:03

## 2021-05-12 RX ADMIN — SUCRALFATE 1 G: 1 TABLET ORAL at 21:03

## 2021-05-12 RX ADMIN — SUCRALFATE 1 G: 1 TABLET ORAL at 08:19

## 2021-05-12 RX ADMIN — CLOPIDOGREL BISULFATE 75 MG: 75 TABLET ORAL at 08:19

## 2021-05-12 RX ADMIN — SPIRONOLACTONE 25 MG: 25 TABLET ORAL at 08:19

## 2021-05-12 RX ADMIN — GABAPENTIN 300 MG: 300 CAPSULE ORAL at 08:19

## 2021-05-12 RX ADMIN — PANTOPRAZOLE SODIUM 40 MG: 40 TABLET, DELAYED RELEASE ORAL at 08:19

## 2021-05-12 RX ADMIN — ASPIRIN 81 MG: 81 TABLET, COATED ORAL at 08:19

## 2021-05-12 RX ADMIN — Medication 10 ML: at 08:20

## 2021-05-12 RX ADMIN — AMIODARONE HYDROCHLORIDE 100 MG: 200 TABLET ORAL at 08:20

## 2021-05-12 RX ADMIN — MIDODRINE HYDROCHLORIDE 10 MG: 5 TABLET ORAL at 12:35

## 2021-05-12 RX ADMIN — Medication 500 UNITS: at 12:35

## 2021-05-12 RX ADMIN — SUCRALFATE 1 G: 1 TABLET ORAL at 16:32

## 2021-05-12 RX ADMIN — GABAPENTIN 300 MG: 300 CAPSULE ORAL at 16:32

## 2021-05-12 RX ADMIN — LIDOCAINE 1 PATCH: 50 PATCH TOPICAL at 12:35

## 2021-05-12 RX ADMIN — CLINDAMYCIN PHOSPHATE 900 MG: 900 INJECTION, SOLUTION INTRAVENOUS at 20:59

## 2021-05-13 PROBLEM — L03.116 LEFT LEG CELLULITIS: Status: ACTIVE | Noted: 2021-05-13

## 2021-05-13 PROBLEM — B35.3 TINEA PEDIS OF LEFT FOOT: Chronic | Status: ACTIVE | Noted: 2021-05-13

## 2021-05-13 LAB
ALBUMIN SERPL-MCNC: 3.4 G/DL (ref 3.5–5.2)
ALBUMIN/GLOB SERPL: 1 G/DL
ALP SERPL-CCNC: 75 U/L (ref 39–117)
ALT SERPL W P-5'-P-CCNC: 19 U/L (ref 1–41)
ANION GAP SERPL CALCULATED.3IONS-SCNC: 8 MMOL/L (ref 5–15)
AST SERPL-CCNC: 23 U/L (ref 1–40)
BASOPHILS # BLD AUTO: 0.1 10*3/MM3 (ref 0–0.2)
BASOPHILS NFR BLD AUTO: 1.3 % (ref 0–1.5)
BILIRUB SERPL-MCNC: 0.2 MG/DL (ref 0–1.2)
BUN SERPL-MCNC: 18 MG/DL (ref 8–23)
BUN/CREAT SERPL: 18 (ref 7–25)
CALCIUM SPEC-SCNC: 9.2 MG/DL (ref 8.6–10.5)
CHLORIDE SERPL-SCNC: 103 MMOL/L (ref 98–107)
CHOLEST SERPL-MCNC: 113 MG/DL (ref 0–200)
CO2 SERPL-SCNC: 23 MMOL/L (ref 22–29)
CREAT SERPL-MCNC: 1 MG/DL (ref 0.76–1.27)
DEPRECATED RDW RBC AUTO: 45.5 FL (ref 37–54)
EOSINOPHIL # BLD AUTO: 0.3 10*3/MM3 (ref 0–0.4)
EOSINOPHIL NFR BLD AUTO: 5.6 % (ref 0.3–6.2)
ERYTHROCYTE [DISTWIDTH] IN BLOOD BY AUTOMATED COUNT: 13.8 % (ref 12.3–15.4)
GFR SERPL CREATININE-BSD FRML MDRD: 72 ML/MIN/1.73
GLOBULIN UR ELPH-MCNC: 3.3 GM/DL
GLUCOSE SERPL-MCNC: 117 MG/DL (ref 65–99)
HCT VFR BLD AUTO: 32.6 % (ref 37.5–51)
HDLC SERPL-MCNC: 42 MG/DL (ref 40–60)
HGB BLD-MCNC: 11.4 G/DL (ref 13–17.7)
LDLC SERPL CALC-MCNC: 58 MG/DL (ref 0–100)
LDLC/HDLC SERPL: 1.41 {RATIO}
LYMPHOCYTES # BLD AUTO: 1.5 10*3/MM3 (ref 0.7–3.1)
LYMPHOCYTES NFR BLD AUTO: 27.9 % (ref 19.6–45.3)
MCH RBC QN AUTO: 33.1 PG (ref 26.6–33)
MCHC RBC AUTO-ENTMCNC: 35.1 G/DL (ref 31.5–35.7)
MCV RBC AUTO: 94.5 FL (ref 79–97)
MONOCYTES # BLD AUTO: 0.7 10*3/MM3 (ref 0.1–0.9)
MONOCYTES NFR BLD AUTO: 12.4 % (ref 5–12)
NEUTROPHILS NFR BLD AUTO: 2.8 10*3/MM3 (ref 1.7–7)
NEUTROPHILS NFR BLD AUTO: 52.8 % (ref 42.7–76)
NRBC BLD AUTO-RTO: 0.1 /100 WBC (ref 0–0.2)
PLATELET # BLD AUTO: 200 10*3/MM3 (ref 140–450)
PMV BLD AUTO: 7.4 FL (ref 6–12)
POTASSIUM SERPL-SCNC: 4.4 MMOL/L (ref 3.5–5.2)
PROT SERPL-MCNC: 6.7 G/DL (ref 6–8.5)
RBC # BLD AUTO: 3.45 10*6/MM3 (ref 4.14–5.8)
SODIUM SERPL-SCNC: 134 MMOL/L (ref 136–145)
TRIGL SERPL-MCNC: 59 MG/DL (ref 0–150)
VIT B12 BLD-MCNC: 300 PG/ML (ref 211–946)
VLDLC SERPL-MCNC: 13 MG/DL (ref 5–40)
WBC # BLD AUTO: 5.4 10*3/MM3 (ref 3.4–10.8)

## 2021-05-13 PROCEDURE — 82607 VITAMIN B-12: CPT | Performed by: PHYSICIAN ASSISTANT

## 2021-05-13 PROCEDURE — 80053 COMPREHEN METABOLIC PANEL: CPT | Performed by: INTERNAL MEDICINE

## 2021-05-13 PROCEDURE — 99233 SBSQ HOSP IP/OBS HIGH 50: CPT | Performed by: INTERNAL MEDICINE

## 2021-05-13 PROCEDURE — 80061 LIPID PANEL: CPT | Performed by: PHYSICIAN ASSISTANT

## 2021-05-13 PROCEDURE — 25010000002 LINEZOLID 600 MG/300ML SOLUTION: Performed by: INTERNAL MEDICINE

## 2021-05-13 PROCEDURE — 85025 COMPLETE CBC W/AUTO DIFF WBC: CPT | Performed by: INTERNAL MEDICINE

## 2021-05-13 RX ORDER — LINEZOLID 600 MG/1
600 TABLET, FILM COATED ORAL EVERY 12 HOURS SCHEDULED
Status: DISCONTINUED | OUTPATIENT
Start: 2021-05-13 | End: 2021-05-14 | Stop reason: HOSPADM

## 2021-05-13 RX ADMIN — LIDOCAINE 1 PATCH: 50 PATCH TOPICAL at 08:53

## 2021-05-13 RX ADMIN — CLOPIDOGREL BISULFATE 75 MG: 75 TABLET ORAL at 08:51

## 2021-05-13 RX ADMIN — Medication 10 ML: at 21:17

## 2021-05-13 RX ADMIN — LINEZOLID 600 MG: 600 INJECTION, SOLUTION INTRAVENOUS at 08:53

## 2021-05-13 RX ADMIN — SPIRONOLACTONE 25 MG: 25 TABLET ORAL at 08:51

## 2021-05-13 RX ADMIN — CLINDAMYCIN PHOSPHATE 900 MG: 900 INJECTION, SOLUTION INTRAVENOUS at 12:15

## 2021-05-13 RX ADMIN — GABAPENTIN 300 MG: 300 CAPSULE ORAL at 21:16

## 2021-05-13 RX ADMIN — Medication 10 ML: at 08:52

## 2021-05-13 RX ADMIN — SUCRALFATE 1 G: 1 TABLET ORAL at 21:16

## 2021-05-13 RX ADMIN — SUCRALFATE 1 G: 1 TABLET ORAL at 08:51

## 2021-05-13 RX ADMIN — AMIODARONE HYDROCHLORIDE 100 MG: 200 TABLET ORAL at 08:51

## 2021-05-13 RX ADMIN — MICONAZOLE NITRATE: 20 CREAM TOPICAL at 08:53

## 2021-05-13 RX ADMIN — GABAPENTIN 300 MG: 300 CAPSULE ORAL at 08:51

## 2021-05-13 RX ADMIN — CLINDAMYCIN PHOSPHATE 900 MG: 900 INJECTION, SOLUTION INTRAVENOUS at 03:01

## 2021-05-13 RX ADMIN — LINEZOLID 600 MG: 600 TABLET, FILM COATED ORAL at 21:16

## 2021-05-13 RX ADMIN — MIDODRINE HYDROCHLORIDE 10 MG: 5 TABLET ORAL at 08:51

## 2021-05-13 RX ADMIN — SUCRALFATE 1 G: 1 TABLET ORAL at 15:16

## 2021-05-13 RX ADMIN — GABAPENTIN 300 MG: 300 CAPSULE ORAL at 15:16

## 2021-05-13 RX ADMIN — PANTOPRAZOLE SODIUM 40 MG: 40 TABLET, DELAYED RELEASE ORAL at 08:51

## 2021-05-13 RX ADMIN — ASPIRIN 81 MG: 81 TABLET, COATED ORAL at 08:51

## 2021-05-13 RX ADMIN — MIDODRINE HYDROCHLORIDE 10 MG: 5 TABLET ORAL at 12:15

## 2021-05-13 RX ADMIN — Medication 500 UNITS: at 08:50

## 2021-05-13 RX ADMIN — MICONAZOLE NITRATE: 20 CREAM TOPICAL at 21:16

## 2021-05-14 VITALS
WEIGHT: 196.65 LBS | TEMPERATURE: 98 F | HEART RATE: 77 BPM | OXYGEN SATURATION: 96 % | DIASTOLIC BLOOD PRESSURE: 71 MMHG | BODY MASS INDEX: 26.06 KG/M2 | RESPIRATION RATE: 20 BRPM | HEIGHT: 73 IN | SYSTOLIC BLOOD PRESSURE: 132 MMHG

## 2021-05-14 PROCEDURE — 99238 HOSP IP/OBS DSCHRG MGMT 30/<: CPT | Performed by: INTERNAL MEDICINE

## 2021-05-14 RX ORDER — LINEZOLID 600 MG/1
600 TABLET, FILM COATED ORAL EVERY 12 HOURS SCHEDULED
Qty: 34 TABLET | Refills: 0 | Status: SHIPPED | OUTPATIENT
Start: 2021-05-14 | End: 2021-06-02 | Stop reason: HOSPADM

## 2021-05-14 RX ADMIN — PANTOPRAZOLE SODIUM 40 MG: 40 TABLET, DELAYED RELEASE ORAL at 08:28

## 2021-05-14 RX ADMIN — Medication 10 ML: at 08:28

## 2021-05-14 RX ADMIN — ASPIRIN 81 MG: 81 TABLET, COATED ORAL at 08:28

## 2021-05-14 RX ADMIN — GABAPENTIN 300 MG: 300 CAPSULE ORAL at 08:19

## 2021-05-14 RX ADMIN — LINEZOLID 600 MG: 600 TABLET, FILM COATED ORAL at 08:19

## 2021-05-14 RX ADMIN — LIDOCAINE 1 PATCH: 50 PATCH TOPICAL at 08:32

## 2021-05-14 RX ADMIN — CLOPIDOGREL BISULFATE 75 MG: 75 TABLET ORAL at 08:19

## 2021-05-14 RX ADMIN — AMIODARONE HYDROCHLORIDE 100 MG: 200 TABLET ORAL at 08:28

## 2021-05-14 RX ADMIN — Medication 500 UNITS: at 08:27

## 2021-05-14 RX ADMIN — MICONAZOLE NITRATE: 20 CREAM TOPICAL at 08:29

## 2021-05-14 RX ADMIN — SUCRALFATE 1 G: 1 TABLET ORAL at 08:27

## 2021-05-14 RX ADMIN — SPIRONOLACTONE 25 MG: 25 TABLET ORAL at 08:28

## 2021-05-16 LAB
BACTERIA SPEC AEROBE CULT: NORMAL
BACTERIA SPEC AEROBE CULT: NORMAL

## 2021-05-29 ENCOUNTER — HOSPITAL ENCOUNTER (INPATIENT)
Facility: HOSPITAL | Age: 81
LOS: 3 days | Discharge: HOME | End: 2021-06-02
Attending: EMERGENCY MEDICINE | Admitting: INTERNAL MEDICINE

## 2021-05-29 DIAGNOSIS — M79.605 LOWER EXTREMITY PAIN, LEFT: Primary | ICD-10-CM

## 2021-05-29 DIAGNOSIS — L03.116 CELLULITIS OF LEFT LOWER EXTREMITY WITHOUT FOOT: ICD-10-CM

## 2021-05-29 LAB
ANION GAP SERPL CALCULATED.3IONS-SCNC: 13 MMOL/L (ref 5–15)
BASOPHILS # BLD AUTO: 0 10*3/MM3 (ref 0–0.2)
BASOPHILS NFR BLD AUTO: 0.9 % (ref 0–1.5)
BUN SERPL-MCNC: 22 MG/DL (ref 8–23)
BUN/CREAT SERPL: 22.7 (ref 7–25)
CALCIUM SPEC-SCNC: 8.8 MG/DL (ref 8.6–10.5)
CHLORIDE SERPL-SCNC: 100 MMOL/L (ref 98–107)
CO2 SERPL-SCNC: 21 MMOL/L (ref 22–29)
CREAT SERPL-MCNC: 0.97 MG/DL (ref 0.76–1.27)
D DIMER PPP FEU-MCNC: 0.56 MG/L (FEU) (ref 0–0.59)
DEPRECATED RDW RBC AUTO: 44.2 FL (ref 37–54)
EOSINOPHIL # BLD AUTO: 0.2 10*3/MM3 (ref 0–0.4)
EOSINOPHIL NFR BLD AUTO: 3.8 % (ref 0.3–6.2)
ERYTHROCYTE [DISTWIDTH] IN BLOOD BY AUTOMATED COUNT: 13.6 % (ref 12.3–15.4)
GFR SERPL CREATININE-BSD FRML MDRD: 75 ML/MIN/1.73
GLUCOSE SERPL-MCNC: 107 MG/DL (ref 65–99)
HCT VFR BLD AUTO: 30.8 % (ref 37.5–51)
HGB BLD-MCNC: 10.5 G/DL (ref 13–17.7)
HOLD SPECIMEN: NORMAL
LYMPHOCYTES # BLD AUTO: 1.4 10*3/MM3 (ref 0.7–3.1)
LYMPHOCYTES NFR BLD AUTO: 26.7 % (ref 19.6–45.3)
MCH RBC QN AUTO: 32.3 PG (ref 26.6–33)
MCHC RBC AUTO-ENTMCNC: 34.2 G/DL (ref 31.5–35.7)
MCV RBC AUTO: 94.4 FL (ref 79–97)
MONOCYTES # BLD AUTO: 0.5 10*3/MM3 (ref 0.1–0.9)
MONOCYTES NFR BLD AUTO: 8.7 % (ref 5–12)
NEUTROPHILS NFR BLD AUTO: 3.2 10*3/MM3 (ref 1.7–7)
NEUTROPHILS NFR BLD AUTO: 59.9 % (ref 42.7–76)
NRBC BLD AUTO-RTO: 0 /100 WBC (ref 0–0.2)
PLATELET # BLD AUTO: 99 10*3/MM3 (ref 140–450)
PMV BLD AUTO: 7.9 FL (ref 6–12)
POTASSIUM SERPL-SCNC: 4.7 MMOL/L (ref 3.5–5.2)
RBC # BLD AUTO: 3.26 10*6/MM3 (ref 4.14–5.8)
SODIUM SERPL-SCNC: 134 MMOL/L (ref 136–145)
WBC # BLD AUTO: 5.3 10*3/MM3 (ref 3.4–10.8)

## 2021-05-29 PROCEDURE — 99284 EMERGENCY DEPT VISIT MOD MDM: CPT

## 2021-05-29 PROCEDURE — 85025 COMPLETE CBC W/AUTO DIFF WBC: CPT | Performed by: NURSE PRACTITIONER

## 2021-05-29 PROCEDURE — G0378 HOSPITAL OBSERVATION PER HR: HCPCS

## 2021-05-29 PROCEDURE — 87040 BLOOD CULTURE FOR BACTERIA: CPT | Performed by: NURSE PRACTITIONER

## 2021-05-29 PROCEDURE — 80048 BASIC METABOLIC PNL TOTAL CA: CPT | Performed by: NURSE PRACTITIONER

## 2021-05-29 PROCEDURE — 85379 FIBRIN DEGRADATION QUANT: CPT | Performed by: NURSE PRACTITIONER

## 2021-05-29 RX ORDER — SODIUM CHLORIDE 0.9 % (FLUSH) 0.9 %
10 SYRINGE (ML) INJECTION AS NEEDED
Status: DISCONTINUED | OUTPATIENT
Start: 2021-05-29 | End: 2021-06-02 | Stop reason: HOSPADM

## 2021-05-29 RX ORDER — VANCOMYCIN 1.75 GRAM/500 ML IN 0.9 % SODIUM CHLORIDE INTRAVENOUS
20 ONCE
Status: COMPLETED | OUTPATIENT
Start: 2021-05-29 | End: 2021-05-30

## 2021-05-29 RX ORDER — LINEZOLID 600 MG/1
600 TABLET, FILM COATED ORAL ONCE
Status: DISCONTINUED | OUTPATIENT
Start: 2021-05-29 | End: 2021-05-29

## 2021-05-30 LAB
ALBUMIN SERPL-MCNC: 3.8 G/DL (ref 3.5–5.2)
ALP SERPL-CCNC: 77 U/L (ref 39–117)
ALT SERPL W P-5'-P-CCNC: 40 U/L (ref 1–41)
ANION GAP SERPL CALCULATED.3IONS-SCNC: 9 MMOL/L (ref 5–15)
APTT PPP: 28.8 SECONDS (ref 24–31)
AST SERPL-CCNC: 33 U/L (ref 1–40)
BASOPHILS # BLD AUTO: 0 10*3/MM3 (ref 0–0.2)
BASOPHILS NFR BLD AUTO: 0.8 % (ref 0–1.5)
BILIRUB CONJ SERPL-MCNC: <0.2 MG/DL (ref 0–0.3)
BILIRUB INDIRECT SERPL-MCNC: NORMAL MG/DL
BILIRUB SERPL-MCNC: 0.4 MG/DL (ref 0–1.2)
BUN SERPL-MCNC: 18 MG/DL (ref 8–23)
BUN/CREAT SERPL: 20 (ref 7–25)
CALCIUM SPEC-SCNC: 9 MG/DL (ref 8.6–10.5)
CHLORIDE SERPL-SCNC: 102 MMOL/L (ref 98–107)
CO2 SERPL-SCNC: 26 MMOL/L (ref 22–29)
CREAT SERPL-MCNC: 0.9 MG/DL (ref 0.76–1.27)
D-LACTATE SERPL-SCNC: 1.9 MMOL/L (ref 0.5–2)
DEPRECATED RDW RBC AUTO: 44.2 FL (ref 37–54)
EOSINOPHIL # BLD AUTO: 0.2 10*3/MM3 (ref 0–0.4)
EOSINOPHIL NFR BLD AUTO: 5.1 % (ref 0.3–6.2)
ERYTHROCYTE [DISTWIDTH] IN BLOOD BY AUTOMATED COUNT: 13.3 % (ref 12.3–15.4)
GFR SERPL CREATININE-BSD FRML MDRD: 82 ML/MIN/1.73
GLUCOSE SERPL-MCNC: 87 MG/DL (ref 65–99)
HCT VFR BLD AUTO: 31.3 % (ref 37.5–51)
HGB BLD-MCNC: 10.7 G/DL (ref 13–17.7)
INR PPP: 0.99 (ref 0.93–1.1)
LYMPHOCYTES # BLD AUTO: 1.3 10*3/MM3 (ref 0.7–3.1)
LYMPHOCYTES NFR BLD AUTO: 32.9 % (ref 19.6–45.3)
MCH RBC QN AUTO: 32.3 PG (ref 26.6–33)
MCHC RBC AUTO-ENTMCNC: 34.3 G/DL (ref 31.5–35.7)
MCV RBC AUTO: 94 FL (ref 79–97)
MONOCYTES # BLD AUTO: 0.4 10*3/MM3 (ref 0.1–0.9)
MONOCYTES NFR BLD AUTO: 10.7 % (ref 5–12)
NEUTROPHILS NFR BLD AUTO: 1.9 10*3/MM3 (ref 1.7–7)
NEUTROPHILS NFR BLD AUTO: 50.5 % (ref 42.7–76)
NRBC BLD AUTO-RTO: 0.5 /100 WBC (ref 0–0.2)
PLATELET # BLD AUTO: 101 10*3/MM3 (ref 140–450)
PMV BLD AUTO: 8.2 FL (ref 6–12)
POTASSIUM SERPL-SCNC: 4.6 MMOL/L (ref 3.5–5.2)
PROCALCITONIN SERPL-MCNC: 0.05 NG/ML (ref 0–0.25)
PROT SERPL-MCNC: 6.5 G/DL (ref 6–8.5)
PROTHROMBIN TIME: 10.9 SECONDS (ref 9.6–11.7)
RBC # BLD AUTO: 3.33 10*6/MM3 (ref 4.14–5.8)
SODIUM SERPL-SCNC: 137 MMOL/L (ref 136–145)
VIT B12 BLD-MCNC: 296 PG/ML (ref 211–946)
WBC # BLD AUTO: 3.8 10*3/MM3 (ref 3.4–10.8)

## 2021-05-30 PROCEDURE — 25010000002 PIPERACILLIN SOD-TAZOBACTAM PER 1 G: Performed by: NURSE PRACTITIONER

## 2021-05-30 PROCEDURE — 25010000002 VANCOMYCIN 10 G RECONSTITUTED SOLUTION: Performed by: NURSE PRACTITIONER

## 2021-05-30 PROCEDURE — 85730 THROMBOPLASTIN TIME PARTIAL: CPT | Performed by: PHYSICIAN ASSISTANT

## 2021-05-30 PROCEDURE — 80076 HEPATIC FUNCTION PANEL: CPT | Performed by: PHYSICIAN ASSISTANT

## 2021-05-30 PROCEDURE — 83605 ASSAY OF LACTIC ACID: CPT

## 2021-05-30 PROCEDURE — 25010000002 CEFTAROLINE FOSAMIL PER 10 MG: Performed by: INTERNAL MEDICINE

## 2021-05-30 PROCEDURE — 99222 1ST HOSP IP/OBS MODERATE 55: CPT | Performed by: INTERNAL MEDICINE

## 2021-05-30 PROCEDURE — 82607 VITAMIN B-12: CPT | Performed by: PHYSICIAN ASSISTANT

## 2021-05-30 PROCEDURE — 84145 PROCALCITONIN (PCT): CPT | Performed by: PHYSICIAN ASSISTANT

## 2021-05-30 PROCEDURE — 25010000002 VANCOMYCIN PER 500 MG: Performed by: NURSE PRACTITIONER

## 2021-05-30 PROCEDURE — 85025 COMPLETE CBC W/AUTO DIFF WBC: CPT | Performed by: PHYSICIAN ASSISTANT

## 2021-05-30 PROCEDURE — 80048 BASIC METABOLIC PNL TOTAL CA: CPT | Performed by: PHYSICIAN ASSISTANT

## 2021-05-30 PROCEDURE — 85610 PROTHROMBIN TIME: CPT | Performed by: PHYSICIAN ASSISTANT

## 2021-05-30 RX ORDER — ACETAMINOPHEN 325 MG/1
650 TABLET ORAL EVERY 4 HOURS PRN
Status: DISCONTINUED | OUTPATIENT
Start: 2021-05-30 | End: 2021-06-02 | Stop reason: HOSPADM

## 2021-05-30 RX ORDER — ASPIRIN 81 MG/1
81 TABLET ORAL DAILY
Status: DISCONTINUED | OUTPATIENT
Start: 2021-05-30 | End: 2021-06-02 | Stop reason: HOSPADM

## 2021-05-30 RX ORDER — MIDODRINE HYDROCHLORIDE 5 MG/1
10 TABLET ORAL
Status: DISCONTINUED | OUTPATIENT
Start: 2021-05-30 | End: 2021-06-02 | Stop reason: HOSPADM

## 2021-05-30 RX ORDER — SACCHAROMYCES BOULARDII 250 MG
500 CAPSULE ORAL 2 TIMES DAILY
Status: DISCONTINUED | OUTPATIENT
Start: 2021-05-30 | End: 2021-06-02 | Stop reason: HOSPADM

## 2021-05-30 RX ORDER — TRAMADOL HYDROCHLORIDE 50 MG/1
50 TABLET ORAL EVERY 6 HOURS PRN
Status: DISCONTINUED | OUTPATIENT
Start: 2021-05-30 | End: 2021-06-02 | Stop reason: HOSPADM

## 2021-05-30 RX ORDER — CHOLECALCIFEROL (VITAMIN D3) 125 MCG
5 CAPSULE ORAL NIGHTLY PRN
Status: DISCONTINUED | OUTPATIENT
Start: 2021-05-30 | End: 2021-06-02 | Stop reason: HOSPADM

## 2021-05-30 RX ORDER — AMIODARONE HYDROCHLORIDE 200 MG/1
100 TABLET ORAL DAILY
Status: DISCONTINUED | OUTPATIENT
Start: 2021-05-30 | End: 2021-06-02 | Stop reason: HOSPADM

## 2021-05-30 RX ORDER — SPIRONOLACTONE 25 MG/1
25 TABLET ORAL DAILY
Status: DISCONTINUED | OUTPATIENT
Start: 2021-05-30 | End: 2021-06-02 | Stop reason: HOSPADM

## 2021-05-30 RX ORDER — SODIUM CHLORIDE 0.9 % (FLUSH) 0.9 %
10 SYRINGE (ML) INJECTION EVERY 12 HOURS SCHEDULED
Status: DISCONTINUED | OUTPATIENT
Start: 2021-05-30 | End: 2021-06-02 | Stop reason: HOSPADM

## 2021-05-30 RX ORDER — PANTOPRAZOLE SODIUM 40 MG/1
40 TABLET, DELAYED RELEASE ORAL DAILY
Status: DISCONTINUED | OUTPATIENT
Start: 2021-05-30 | End: 2021-06-02 | Stop reason: HOSPADM

## 2021-05-30 RX ORDER — CLOPIDOGREL BISULFATE 75 MG/1
75 TABLET ORAL DAILY
Status: DISCONTINUED | OUTPATIENT
Start: 2021-05-30 | End: 2021-06-02 | Stop reason: HOSPADM

## 2021-05-30 RX ORDER — GABAPENTIN 400 MG/1
400 CAPSULE ORAL 3 TIMES DAILY
Status: DISCONTINUED | OUTPATIENT
Start: 2021-05-30 | End: 2021-06-02 | Stop reason: HOSPADM

## 2021-05-30 RX ORDER — ONDANSETRON 2 MG/ML
4 INJECTION INTRAMUSCULAR; INTRAVENOUS EVERY 6 HOURS PRN
Status: DISCONTINUED | OUTPATIENT
Start: 2021-05-30 | End: 2021-06-02 | Stop reason: HOSPADM

## 2021-05-30 RX ORDER — SUCRALFATE 1 G/1
1 TABLET ORAL 4 TIMES DAILY
Status: DISCONTINUED | OUTPATIENT
Start: 2021-05-30 | End: 2021-06-02 | Stop reason: HOSPADM

## 2021-05-30 RX ORDER — METHOCARBAMOL 500 MG/1
500 TABLET, FILM COATED ORAL 3 TIMES DAILY PRN
Status: DISCONTINUED | OUTPATIENT
Start: 2021-05-30 | End: 2021-06-02 | Stop reason: HOSPADM

## 2021-05-30 RX ORDER — SODIUM CHLORIDE 0.9 % (FLUSH) 0.9 %
10 SYRINGE (ML) INJECTION AS NEEDED
Status: DISCONTINUED | OUTPATIENT
Start: 2021-05-30 | End: 2021-06-02 | Stop reason: HOSPADM

## 2021-05-30 RX ORDER — LIDOCAINE 50 MG/G
1 PATCH TOPICAL EVERY 24 HOURS
Status: DISCONTINUED | OUTPATIENT
Start: 2021-05-30 | End: 2021-06-02 | Stop reason: HOSPADM

## 2021-05-30 RX ADMIN — GABAPENTIN 400 MG: 400 CAPSULE ORAL at 21:05

## 2021-05-30 RX ADMIN — VANCOMYCIN HYDROCHLORIDE 1750 MG: 10 INJECTION, POWDER, LYOPHILIZED, FOR SOLUTION INTRAVENOUS at 02:00

## 2021-05-30 RX ADMIN — CEFTAROLINE FOSAMIL 600 MG: 600 POWDER, FOR SOLUTION INTRAVENOUS at 16:51

## 2021-05-30 RX ADMIN — SUCRALFATE 1 G: 1 TABLET ORAL at 12:14

## 2021-05-30 RX ADMIN — MICONAZOLE NITRATE 1 APPLICATION: 20 CREAM TOPICAL at 21:06

## 2021-05-30 RX ADMIN — TRAMADOL HYDROCHLORIDE 50 MG: 50 TABLET, COATED ORAL at 07:48

## 2021-05-30 RX ADMIN — SUCRALFATE 1 G: 1 TABLET ORAL at 18:05

## 2021-05-30 RX ADMIN — MIDODRINE HYDROCHLORIDE 10 MG: 5 TABLET ORAL at 12:14

## 2021-05-30 RX ADMIN — GABAPENTIN 400 MG: 400 CAPSULE ORAL at 08:14

## 2021-05-30 RX ADMIN — Medication 10 ML: at 01:05

## 2021-05-30 RX ADMIN — Medication 500 MG: at 08:12

## 2021-05-30 RX ADMIN — PIPERACILLIN AND TAZOBACTAM 3.38 G: 3; .375 INJECTION, POWDER, LYOPHILIZED, FOR SOLUTION INTRAVENOUS at 07:53

## 2021-05-30 RX ADMIN — MIDODRINE HYDROCHLORIDE 10 MG: 5 TABLET ORAL at 18:05

## 2021-05-30 RX ADMIN — CLOPIDOGREL BISULFATE 75 MG: 75 TABLET ORAL at 08:12

## 2021-05-30 RX ADMIN — VANCOMYCIN HYDROCHLORIDE 1250 MG: 10 INJECTION, POWDER, LYOPHILIZED, FOR SOLUTION INTRAVENOUS at 13:43

## 2021-05-30 RX ADMIN — PANTOPRAZOLE SODIUM 40 MG: 40 TABLET, DELAYED RELEASE ORAL at 08:12

## 2021-05-30 RX ADMIN — SPIRONOLACTONE 25 MG: 25 TABLET ORAL at 08:14

## 2021-05-30 RX ADMIN — TRAMADOL HYDROCHLORIDE 50 MG: 50 TABLET, COATED ORAL at 01:03

## 2021-05-30 RX ADMIN — TRAMADOL HYDROCHLORIDE 50 MG: 50 TABLET, COATED ORAL at 15:44

## 2021-05-30 RX ADMIN — PIPERACILLIN AND TAZOBACTAM 3.38 G: 3; .375 INJECTION, POWDER, LYOPHILIZED, FOR SOLUTION INTRAVENOUS at 01:05

## 2021-05-30 RX ADMIN — Medication 10 ML: at 07:53

## 2021-05-30 RX ADMIN — GABAPENTIN 400 MG: 400 CAPSULE ORAL at 15:34

## 2021-05-30 RX ADMIN — ASPIRIN 81 MG: 81 TABLET, COATED ORAL at 08:14

## 2021-05-30 RX ADMIN — Medication 10 ML: at 21:07

## 2021-05-30 RX ADMIN — AMIODARONE HYDROCHLORIDE 100 MG: 200 TABLET ORAL at 08:12

## 2021-05-30 RX ADMIN — LIDOCAINE 1 PATCH: 50 PATCH TOPICAL at 21:06

## 2021-05-30 RX ADMIN — Medication 500 MG: at 21:05

## 2021-05-30 RX ADMIN — PHENOL 2 SPRAY: 1.4 SPRAY ORAL at 08:14

## 2021-05-31 LAB
ALBUMIN SERPL-MCNC: 3.5 G/DL (ref 3.5–5.2)
ALBUMIN/GLOB SERPL: 1.3 G/DL
ALP SERPL-CCNC: 72 U/L (ref 39–117)
ALT SERPL W P-5'-P-CCNC: 36 U/L (ref 1–41)
ANION GAP SERPL CALCULATED.3IONS-SCNC: 7 MMOL/L (ref 5–15)
AST SERPL-CCNC: 29 U/L (ref 1–40)
BASOPHILS # BLD AUTO: 0 10*3/MM3 (ref 0–0.2)
BASOPHILS NFR BLD AUTO: 0.8 % (ref 0–1.5)
BILIRUB SERPL-MCNC: 0.2 MG/DL (ref 0–1.2)
BUN SERPL-MCNC: 16 MG/DL (ref 8–23)
BUN/CREAT SERPL: 20.3 (ref 7–25)
CALCIUM SPEC-SCNC: 8.2 MG/DL (ref 8.6–10.5)
CHLORIDE SERPL-SCNC: 101 MMOL/L (ref 98–107)
CO2 SERPL-SCNC: 25 MMOL/L (ref 22–29)
CREAT SERPL-MCNC: 0.79 MG/DL (ref 0.76–1.27)
DEPRECATED RDW RBC AUTO: 43.8 FL (ref 37–54)
EOSINOPHIL # BLD AUTO: 0.2 10*3/MM3 (ref 0–0.4)
EOSINOPHIL NFR BLD AUTO: 4.5 % (ref 0.3–6.2)
ERYTHROCYTE [DISTWIDTH] IN BLOOD BY AUTOMATED COUNT: 13.3 % (ref 12.3–15.4)
GFR SERPL CREATININE-BSD FRML MDRD: 95 ML/MIN/1.73
GLOBULIN UR ELPH-MCNC: 2.8 GM/DL
GLUCOSE SERPL-MCNC: 93 MG/DL (ref 65–99)
HCT VFR BLD AUTO: 29.4 % (ref 37.5–51)
HGB BLD-MCNC: 10 G/DL (ref 13–17.7)
LYMPHOCYTES # BLD AUTO: 1.2 10*3/MM3 (ref 0.7–3.1)
LYMPHOCYTES NFR BLD AUTO: 28.3 % (ref 19.6–45.3)
MCH RBC QN AUTO: 31.9 PG (ref 26.6–33)
MCHC RBC AUTO-ENTMCNC: 33.9 G/DL (ref 31.5–35.7)
MCV RBC AUTO: 94.1 FL (ref 79–97)
MONOCYTES # BLD AUTO: 0.4 10*3/MM3 (ref 0.1–0.9)
MONOCYTES NFR BLD AUTO: 8.8 % (ref 5–12)
NEUTROPHILS NFR BLD AUTO: 2.5 10*3/MM3 (ref 1.7–7)
NEUTROPHILS NFR BLD AUTO: 57.6 % (ref 42.7–76)
NRBC BLD AUTO-RTO: 0 /100 WBC (ref 0–0.2)
PLATELET # BLD AUTO: 92 10*3/MM3 (ref 140–450)
PMV BLD AUTO: 8 FL (ref 6–12)
POTASSIUM SERPL-SCNC: 4.7 MMOL/L (ref 3.5–5.2)
PROT SERPL-MCNC: 6.3 G/DL (ref 6–8.5)
RBC # BLD AUTO: 3.13 10*6/MM3 (ref 4.14–5.8)
SODIUM SERPL-SCNC: 133 MMOL/L (ref 136–145)
WBC # BLD AUTO: 4.3 10*3/MM3 (ref 3.4–10.8)

## 2021-05-31 PROCEDURE — 99232 SBSQ HOSP IP/OBS MODERATE 35: CPT | Performed by: INTERNAL MEDICINE

## 2021-05-31 PROCEDURE — 25010000002 CEFTAROLINE FOSAMIL PER 10 MG: Performed by: INTERNAL MEDICINE

## 2021-05-31 PROCEDURE — 80053 COMPREHEN METABOLIC PANEL: CPT | Performed by: INTERNAL MEDICINE

## 2021-05-31 PROCEDURE — 85025 COMPLETE CBC W/AUTO DIFF WBC: CPT | Performed by: INTERNAL MEDICINE

## 2021-05-31 RX ADMIN — GABAPENTIN 400 MG: 400 CAPSULE ORAL at 14:38

## 2021-05-31 RX ADMIN — SPIRONOLACTONE 25 MG: 25 TABLET ORAL at 08:23

## 2021-05-31 RX ADMIN — Medication 10 ML: at 20:29

## 2021-05-31 RX ADMIN — LIDOCAINE 1 PATCH: 50 PATCH TOPICAL at 08:25

## 2021-05-31 RX ADMIN — MIDODRINE HYDROCHLORIDE 10 MG: 5 TABLET ORAL at 14:39

## 2021-05-31 RX ADMIN — CEFTAROLINE FOSAMIL 600 MG: 600 POWDER, FOR SOLUTION INTRAVENOUS at 03:53

## 2021-05-31 RX ADMIN — SUCRALFATE 1 G: 1 TABLET ORAL at 14:38

## 2021-05-31 RX ADMIN — MICONAZOLE NITRATE: 20 CREAM TOPICAL at 14:38

## 2021-05-31 RX ADMIN — SUCRALFATE 1 G: 1 TABLET ORAL at 16:29

## 2021-05-31 RX ADMIN — Medication 500 MG: at 08:24

## 2021-05-31 RX ADMIN — MIDODRINE HYDROCHLORIDE 10 MG: 5 TABLET ORAL at 08:23

## 2021-05-31 RX ADMIN — SUCRALFATE 1 G: 1 TABLET ORAL at 08:23

## 2021-05-31 RX ADMIN — MICONAZOLE NITRATE: 20 CREAM TOPICAL at 20:28

## 2021-05-31 RX ADMIN — CLOPIDOGREL BISULFATE 75 MG: 75 TABLET ORAL at 08:23

## 2021-05-31 RX ADMIN — ASPIRIN 81 MG: 81 TABLET, COATED ORAL at 08:24

## 2021-05-31 RX ADMIN — PANTOPRAZOLE SODIUM 40 MG: 40 TABLET, DELAYED RELEASE ORAL at 08:24

## 2021-05-31 RX ADMIN — SUCRALFATE 1 G: 1 TABLET ORAL at 20:28

## 2021-05-31 RX ADMIN — Medication 10 ML: at 08:25

## 2021-05-31 RX ADMIN — GABAPENTIN 400 MG: 400 CAPSULE ORAL at 20:28

## 2021-05-31 RX ADMIN — CEFTAROLINE FOSAMIL 600 MG: 600 POWDER, FOR SOLUTION INTRAVENOUS at 16:29

## 2021-05-31 RX ADMIN — AMIODARONE HYDROCHLORIDE 100 MG: 200 TABLET ORAL at 08:23

## 2021-05-31 RX ADMIN — Medication 500 MG: at 20:28

## 2021-05-31 RX ADMIN — GABAPENTIN 400 MG: 400 CAPSULE ORAL at 08:23

## 2021-06-01 LAB
ALBUMIN SERPL-MCNC: 3.9 G/DL (ref 3.5–5.2)
ALBUMIN/GLOB SERPL: 1.3 G/DL
ALP SERPL-CCNC: 81 U/L (ref 39–117)
ALT SERPL W P-5'-P-CCNC: 37 U/L (ref 1–41)
ANION GAP SERPL CALCULATED.3IONS-SCNC: 10 MMOL/L (ref 5–15)
AST SERPL-CCNC: 32 U/L (ref 1–40)
BASOPHILS # BLD AUTO: 0 10*3/MM3 (ref 0–0.2)
BASOPHILS NFR BLD AUTO: 0.7 % (ref 0–1.5)
BILIRUB SERPL-MCNC: 0.2 MG/DL (ref 0–1.2)
BUN SERPL-MCNC: 18 MG/DL (ref 8–23)
BUN/CREAT SERPL: 20.2 (ref 7–25)
CALCIUM SPEC-SCNC: 8.8 MG/DL (ref 8.6–10.5)
CHLORIDE SERPL-SCNC: 102 MMOL/L (ref 98–107)
CO2 SERPL-SCNC: 24 MMOL/L (ref 22–29)
CREAT SERPL-MCNC: 0.89 MG/DL (ref 0.76–1.27)
DEPRECATED RDW RBC AUTO: 43.3 FL (ref 37–54)
EOSINOPHIL # BLD AUTO: 0.2 10*3/MM3 (ref 0–0.4)
EOSINOPHIL NFR BLD AUTO: 4.9 % (ref 0.3–6.2)
ERYTHROCYTE [DISTWIDTH] IN BLOOD BY AUTOMATED COUNT: 13.3 % (ref 12.3–15.4)
GFR SERPL CREATININE-BSD FRML MDRD: 83 ML/MIN/1.73
GLOBULIN UR ELPH-MCNC: 2.9 GM/DL
GLUCOSE SERPL-MCNC: 109 MG/DL (ref 65–99)
HCT VFR BLD AUTO: 31.3 % (ref 37.5–51)
HGB BLD-MCNC: 11 G/DL (ref 13–17.7)
LYMPHOCYTES # BLD AUTO: 1.3 10*3/MM3 (ref 0.7–3.1)
LYMPHOCYTES NFR BLD AUTO: 29.4 % (ref 19.6–45.3)
MCH RBC QN AUTO: 33.1 PG (ref 26.6–33)
MCHC RBC AUTO-ENTMCNC: 35.1 G/DL (ref 31.5–35.7)
MCV RBC AUTO: 94.5 FL (ref 79–97)
MONOCYTES # BLD AUTO: 0.5 10*3/MM3 (ref 0.1–0.9)
MONOCYTES NFR BLD AUTO: 12.1 % (ref 5–12)
NEUTROPHILS NFR BLD AUTO: 2.3 10*3/MM3 (ref 1.7–7)
NEUTROPHILS NFR BLD AUTO: 52.9 % (ref 42.7–76)
NRBC BLD AUTO-RTO: 0.1 /100 WBC (ref 0–0.2)
PLATELET # BLD AUTO: 97 10*3/MM3 (ref 140–450)
PMV BLD AUTO: 7.7 FL (ref 6–12)
POTASSIUM SERPL-SCNC: 5.1 MMOL/L (ref 3.5–5.2)
PROT SERPL-MCNC: 6.8 G/DL (ref 6–8.5)
RBC # BLD AUTO: 3.31 10*6/MM3 (ref 4.14–5.8)
SODIUM SERPL-SCNC: 136 MMOL/L (ref 136–145)
WBC # BLD AUTO: 4.3 10*3/MM3 (ref 3.4–10.8)

## 2021-06-01 PROCEDURE — 85025 COMPLETE CBC W/AUTO DIFF WBC: CPT | Performed by: INTERNAL MEDICINE

## 2021-06-01 PROCEDURE — 05HB33Z INSERTION OF INFUSION DEVICE INTO RIGHT BASILIC VEIN, PERCUTANEOUS APPROACH: ICD-10-PCS | Performed by: INTERNAL MEDICINE

## 2021-06-01 PROCEDURE — 25010000002 CEFTAROLINE FOSAMIL PER 10 MG: Performed by: INTERNAL MEDICINE

## 2021-06-01 PROCEDURE — 93295 DEV INTERROG REMOTE 1/2/MLT: CPT | Performed by: INTERNAL MEDICINE

## 2021-06-01 PROCEDURE — C1751 CATH, INF, PER/CENT/MIDLINE: HCPCS

## 2021-06-01 PROCEDURE — 80053 COMPREHEN METABOLIC PANEL: CPT | Performed by: INTERNAL MEDICINE

## 2021-06-01 PROCEDURE — 36410 VNPNXR 3YR/> PHY/QHP DX/THER: CPT

## 2021-06-01 PROCEDURE — 93296 REM INTERROG EVL PM/IDS: CPT | Performed by: INTERNAL MEDICINE

## 2021-06-01 PROCEDURE — 99232 SBSQ HOSP IP/OBS MODERATE 35: CPT | Performed by: INTERNAL MEDICINE

## 2021-06-01 RX ORDER — SODIUM CHLORIDE 0.9 % (FLUSH) 0.9 %
10 SYRINGE (ML) INJECTION AS NEEDED
Status: DISCONTINUED | OUTPATIENT
Start: 2021-06-01 | End: 2021-06-02 | Stop reason: HOSPADM

## 2021-06-01 RX ORDER — SODIUM CHLORIDE 0.9 % (FLUSH) 0.9 %
10 SYRINGE (ML) INJECTION EVERY 12 HOURS SCHEDULED
Status: DISCONTINUED | OUTPATIENT
Start: 2021-06-01 | End: 2021-06-02 | Stop reason: HOSPADM

## 2021-06-01 RX ADMIN — CLOPIDOGREL BISULFATE 75 MG: 75 TABLET ORAL at 08:18

## 2021-06-01 RX ADMIN — GABAPENTIN 400 MG: 400 CAPSULE ORAL at 17:03

## 2021-06-01 RX ADMIN — Medication 10 ML: at 08:20

## 2021-06-01 RX ADMIN — Medication 500 MG: at 20:43

## 2021-06-01 RX ADMIN — Medication 500 MG: at 08:18

## 2021-06-01 RX ADMIN — CEFTAROLINE FOSAMIL 600 MG: 600 POWDER, FOR SOLUTION INTRAVENOUS at 17:03

## 2021-06-01 RX ADMIN — SPIRONOLACTONE 25 MG: 25 TABLET ORAL at 08:18

## 2021-06-01 RX ADMIN — MICONAZOLE NITRATE 1 APPLICATION: 20 CREAM TOPICAL at 08:20

## 2021-06-01 RX ADMIN — MIDODRINE HYDROCHLORIDE 5 MG: 5 TABLET ORAL at 12:46

## 2021-06-01 RX ADMIN — MICONAZOLE NITRATE: 20 CREAM TOPICAL at 20:43

## 2021-06-01 RX ADMIN — MIDODRINE HYDROCHLORIDE 10 MG: 5 TABLET ORAL at 08:17

## 2021-06-01 RX ADMIN — SUCRALFATE 1 G: 1 TABLET ORAL at 20:43

## 2021-06-01 RX ADMIN — SUCRALFATE 1 G: 1 TABLET ORAL at 17:03

## 2021-06-01 RX ADMIN — SUCRALFATE 1 G: 1 TABLET ORAL at 08:18

## 2021-06-01 RX ADMIN — PANTOPRAZOLE SODIUM 40 MG: 40 TABLET, DELAYED RELEASE ORAL at 08:18

## 2021-06-01 RX ADMIN — ASPIRIN 81 MG: 81 TABLET, COATED ORAL at 08:18

## 2021-06-01 RX ADMIN — Medication 10 ML: at 20:43

## 2021-06-01 RX ADMIN — GABAPENTIN 400 MG: 400 CAPSULE ORAL at 20:43

## 2021-06-01 RX ADMIN — Medication 10 ML: at 20:44

## 2021-06-01 RX ADMIN — GABAPENTIN 400 MG: 400 CAPSULE ORAL at 08:18

## 2021-06-01 RX ADMIN — AMIODARONE HYDROCHLORIDE 100 MG: 200 TABLET ORAL at 08:18

## 2021-06-01 RX ADMIN — CEFTAROLINE FOSAMIL 600 MG: 600 POWDER, FOR SOLUTION INTRAVENOUS at 03:56

## 2021-06-01 RX ADMIN — SUCRALFATE 1 G: 1 TABLET ORAL at 12:46

## 2021-06-01 NOTE — PROGRESS NOTES
Infectious Diseases Progress Note      LOS: 2 days   Patient Care Team:  Makenna Motta MD as PCP - General  Makenna Motta MD as PCP - Family Medicine    Chief Complaint: Left leg edema    Subjective     The patient has no fever during the last 24 hours.  The patient not having any new complaints today.  Patient is feeling better with less pain and edema of the left leg    Review of Systems:   Review of Systems   Constitutional: Negative.    HENT: Negative.    Eyes: Negative.    Respiratory: Negative.    Cardiovascular: Positive for leg swelling.   Gastrointestinal: Negative.    Genitourinary: Negative.    Musculoskeletal: Negative.    Skin: Negative.    Neurological: Negative.    Hematological: Negative.    Psychiatric/Behavioral: Negative.         Objective     Vital Signs  Temp:  [97.5 °F (36.4 °C)-98.1 °F (36.7 °C)] 97.8 °F (36.6 °C)  Heart Rate:  [72-93] 79  Resp:  [14-18] 14  BP: (122-149)/(54-73) 126/56    Physical Exam:  Physical Exam  Vitals and nursing note reviewed.   Constitutional:       Appearance: He is well-developed.   HENT:      Head: Normocephalic and atraumatic.   Eyes:      Pupils: Pupils are equal, round, and reactive to light.   Cardiovascular:      Rate and Rhythm: Normal rate and regular rhythm.      Heart sounds: Normal heart sounds.   Pulmonary:      Effort: Pulmonary effort is normal. No respiratory distress.      Breath sounds: Normal breath sounds. No wheezing or rales.   Abdominal:      General: Bowel sounds are normal. There is no distension.      Palpations: Abdomen is soft. There is no mass.      Tenderness: There is no abdominal tenderness. There is no guarding or rebound.   Musculoskeletal:         General: No deformity. Normal range of motion.      Cervical back: Normal range of motion and neck supple.      Left lower leg: Edema present.      Comments: Vague erythema of the left leg    Signs of mild tinea pedis   Skin:     General: Skin is warm.      Findings:  No erythema or rash.   Neurological:      Mental Status: He is alert and oriented to person, place, and time.      Cranial Nerves: No cranial nerve deficit.          Results Review:    I have reviewed all clinical data, test, lab, and imaging results.     Radiology  No Radiology Exams Resulted Within Past 24 Hours    Cardiology    Laboratory    Results from last 7 days   Lab Units 06/01/21 0327 05/31/21  0345 05/30/21  0943 05/29/21 2125   WBC 10*3/mm3 4.30 4.30 3.80 5.30   HEMOGLOBIN g/dL 11.0* 10.0* 10.7* 10.5*   HEMATOCRIT % 31.3* 29.4* 31.3* 30.8*   PLATELETS 10*3/mm3 97* 92* 101* 99*     Results from last 7 days   Lab Units 06/01/21 0327 05/31/21  0257 05/30/21 0943 05/29/21 2125   SODIUM mmol/L 136 133* 137 134*   POTASSIUM mmol/L 5.1 4.7 4.6 4.7   CHLORIDE mmol/L 102 101 102 100   CO2 mmol/L 24.0 25.0 26.0 21.0*   BUN mg/dL 18 16 18 22   CREATININE mg/dL 0.89 0.79 0.90 0.97   GLUCOSE mg/dL 109* 93 87 107*   ALBUMIN g/dL 3.90 3.50 3.80  --    BILIRUBIN mg/dL 0.2 0.2 0.4  --    ALK PHOS U/L 81 72 77  --    AST (SGOT) U/L 32 29 33  --    ALT (SGPT) U/L 37 36 40  --    CALCIUM mg/dL 8.8 8.2* 9.0 8.8                 Microbiology   Microbiology Results (last 10 days)     Procedure Component Value - Date/Time    Blood Culture - Blood, Arm, Right [[id number]] Collected: 05/29/21 2357    Lab Status: Preliminary result Specimen: Blood from Arm, Right Updated: 06/01/21 0015     Blood Culture No growth at 2 days    Blood Culture - Blood, Blood, Venous Line [[id number]] Collected: 05/29/21 2357    Lab Status: Preliminary result Specimen: Blood, Venous Line Updated: 06/01/21 0015     Blood Culture No growth at 2 days          Medication Review:       Schedule Meds  amiodarone, 100 mg, Oral, Daily  aspirin, 81 mg, Oral, Daily  ceftaroline, 600 mg, Intravenous, Q12H  clopidogrel, 75 mg, Oral, Daily  gabapentin, 400 mg, Oral, TID  lidocaine, 1 patch, Transdermal, Q24H  miconazole, , Topical, Q12H  midodrine, 10 mg, Oral,  TID AC  pantoprazole, 40 mg, Oral, Daily  saccharomyces boulardii, 500 mg, Oral, BID  sodium chloride, 10 mL, Intravenous, Q12H  sodium chloride, 10 mL, Intravenous, Q12H  spironolactone, 25 mg, Oral, Daily  sucralfate, 1 g, Oral, 4x Daily        Infusion Meds       PRN Meds  •  acetaminophen  •  melatonin  •  methocarbamol  •  ondansetron  •  phenol  •  [COMPLETED] Insert peripheral IV **AND** sodium chloride  •  sodium chloride  •  sodium chloride  •  traMADol        Assessment/Plan       Antimicrobial Therapy   1.  Teflaro      day  2.      Day  3.      Day  4.      Day  5.      Day      Assessment     Recurrent left leg cellulitis. The patient had breakthrough edema and erythema during Zyvox treatment. The patient was supposed to finish Zyvox on May 23, 2021 but apparently he still has 2 days left with his medication according to him. I'm not certain if patient was prescribed longer duration of Zyvox on discharge.   This type of infection usually we see associated with Staph aureus or beta-hemolytic Streptococcus.  Symptoms had improved on today's examination     Tinea pedis slightly better     History of CABG with vein harvested from the left leg in the past     Mild thrombocytopenia. Possibly secondary to Zyvox     Plan     Continue Teflaro 600 mg IV every 12 hours for 7 more days  Place midline before discharge and arrange for home IV antibiotic  Remove midline after finishing antibiotic  Okay to discharge home from [facility]  Try to keep the left leg elevated as much as possible  Recommend compressing dressing but patient prefers to wear his on compressing hose  Continue miconazole ointment twice daily between the toes of both feet        Panda Lopes MD  06/01/21  17:51 EDT      Note is dictated utilizing voice recognition software/Dragon

## 2021-06-01 NOTE — PLAN OF CARE
Goal Outcome Evaluation:  Plan of Care Reviewed With: patient  Progress: no change  Outcome Summary: Patient continues with IV antibiotics. Refuses bed alarm. Ambulates with asssit x1 and cane. No complaints of pain noted during shift. Vital signs stable. Call light within reach. Care plan on going,

## 2021-06-01 NOTE — PROGRESS NOTES
Case Management Readmission Assessment Note       Case Management Readmission Assessment (all recorded)      Readmission Interview     [facility] Name 06/01/21 1413             Readmission Indications    Is this hospitalization related to the prior hospital diagnosis?  Yes      What was the reason you were admitted?  On 5/11-5/14, patient presented to ER with cellulitis to left lower extremity that had failed abx. He was given iv abx and dc home on PO zyvox. On 5/29 patient presented with worsening pain to left leg. He is admitted for lower extremity pain and cellulits      [facility] Name 06/01/21 1413             Recommendation for rehospitalization    Did you speak with your physician prior to coming to the hospital  No      Who recommended you return to the hospital?  Other (comment) self      Row Name 06/01/21 1413             Follow up appointment    Do you have a PCP?  Yes      Did you have an appointment with PCP/specialist after hospitalization within 7 days?  Yes      Did you keep your appointment?  Yes      Row Name 06/01/21 1413             Medications    Did you have newly prescribed medications at discharge?  Yes      Did you understand the reasons for your medications at discharge and how to take them?  Yes      Did you understand the side effects of your medications?  Yes      Are you taking all of you prescribed medications?  Yes      Row Name 06/01/21 1413             Discharge Instructions    Did you understand your discharge instructions?  Yes      Did your family/caregiver hear your instructions?  No      Were you told to eat a special diet?  No      Did you adhere to the diet?  No      Row Name 06/01/21 [facility]3             Index discharge location/services    Where did you go upon discharge?  Home      Do you have supportive family or friends in the home?  Yes      Row Name 06/01/21 1413             Discharge Readiness    On a scale of 1-5 (5 being well prepared), how ready were you for discharge  3       Recommendation based on interview  Education on diagnosis/self management;Additional caregiver support         Phone communication or documentation only - no physical contact with patient or family.  Ingrid Vargas RN  Complex Case Manager  [facility] Care Coordination  [phone]-cell  [phone]-office  [phone]-fax  [email or web address]

## 2021-06-01 NOTE — PLAN OF CARE
Pt resting. Pt not complaining of pain at this time. Pt ambulatory with one person assist with cane. Pt from home by self. Will continue to monitor.    Problem: Adult Inpatient Plan of Care  Goal: Plan of Care Review  Outcome: Ongoing, Progressing  Flowsheets (Taken 6/1/2021 0122)  Progress: improving  Plan of Care Reviewed With: patient

## 2021-06-01 NOTE — CASE MANAGEMENT/SOCIAL WORK
Discharge Planning Assessment   [facility]     Patient Name: Deion Nicholas  MRN: [id number]  Today's Date: 6/1/2021    Admit Date: 5/29/2021    Discharge Needs Assessment     Row Name 06/01/21 1452       Resource/Environmental Concerns    Transportation Concerns  car, none       Discharge Needs Assessment    Readmission Within the Last 30 Days  no previous admission in last 30 days    Equipment Currently Used at Home  cane, straight;walker, standard    Concerns to be Addressed  discharge planning    Anticipated Changes Related to Illness  none    Equipment Needed After Discharge  none    Outpatient/Agency/Support Group Needs  homecare agency    Discharge Facility/Level of Care Needs  home with home health    Provided Post Acute Provider List?  N/A    Provided Post Acute Provider Quality & Resource List?  N/A    Row Name 06/01/21 1448       Living Environment    Lives With  alone    Current Living Arrangements  home/apartment/condo    Primary Care Provided by  self    Provides Primary Care For  no one    Family Caregiver if Needed  child(tai), adult    Able to Return to Prior Arrangements  yes       Resource/Environmental Concerns    Resource/Environmental Concerns  none       Transition Planning    Patient/Family Anticipates Transition to  home with help/services        Discharge Plan     Row Name 06/01/21 1452       Plan    Plan  anticipate routine home with HHC and Infusion- [facility] referral pending 6/1. [facility] referral sent 6/1-pending    Patient/Family in Agreement with Plan  yes    Plan Comments  met with patient at bedside. patient lives at home alone. patient drives. patient son will provide transportation at DC. patient pcp and pharm confirmed. patient denies issues affording food or meds. patient independent with ADLs. patient denies DME at this time. Patient will DC with Iv abx. Patient wishes to receive abx at home. Patient does not wish to use his VA benefits at this time. Referral sent  to [facility]- denied. Referral sent to [facility]- denied. [facility]. [facility] following and sent in [facility] and [place] notified. [place] barriers: ID following, iv abx        Continued Care and Services - Admitted Since 5/29/2021     Dialysis/Infusion     Service Provider Request Status Selected Services Address Phone Fax Patient Preferred    [facility]  Pending - Request Sent N/A [id number] [address] [phone] --          Home Medical Care     Service Provider Request Status Selected Services Address Phone Fax Patient Preferred    [facility]  Pending - Request Sent N/A [address] [place] [address] [phone] --    [facility]  Declined  Insurance Denial N/A [address] [address] [phone] --    [facility] [place]  Declined  unable to accept payer at this time N/A [address] [place] [address] [phone] --              Expected Discharge Date and Time     Expected Discharge Date Expected Discharge Time    Jun 2, 2021         Demographic Summary     Row Name 06/01/21 1447       General Information    Admission Type  inpatient    Required Notices Provided  Important Message from Medicare    Referral Source  admission list    Reason for Consult  discharge planning    Preferred Language  English     Used During This Interaction  no        Functional Status     Row Name 06/01/21 1447       Functional Status    Usual Activity Tolerance  good    Current Activity Tolerance  good       Functional Status, IADL    Medications  independent    Meal Preparation  independent    Housekeeping  independent    Laundry  independent    Shopping  independent        Patient Forms     Row Name 06/01/21 1458       Patient Forms    Important Message from Medicare (IMM)   Delivered IMM 6/1    Delivered to  Patient        Met with patient in room wearing PPE: mask, face shield/goggles    Maintained distance greater than six feet and spent less than 15 minutes in the room.          Saray Clayton RN

## 2021-06-01 NOTE — CASE MANAGEMENT/SOCIAL WORK
Continued Stay Note  [facility]     Patient Name: Deion Nicholas  MRN: [id number]  Today's Date: 6/1/2021    Admit Date: 5/29/2021    Discharge Plan     Row Name 06/01/21 1653       Plan    Plan  anticipate routine home with [facility] and home infusion. Critical access hospital referral pending 6/1. [facility] infusion following.    Plan Comments  update: received notification that [facility] cannot take patient at [place]. referral sent to Critical access hospital. liaison notified.    Row Name 06/01/21 6284       Plan    Plan  anticipate routine home with [facility] and Infusion- [facility] referral pending 6/1. [facility] referral sent 6/1-pending    Patient/Family in Agreement with Plan  yes    Plan Comments  met with patient at bedside. patient lives at home alone. patient drives. patient son will provide transportation at [place]. patient pcp and pharm confirmed. patient denies issues affording food or meds. patient independent with ADLs. patient denies DME at this time. Patient will DC with Iv abx. Patient wishes to receive abx at home. Patient does not wish to use his VA benefits at this time. Referral sent to [facility]- denied. Referral sent to [facility]- denied. Referral sent to [facility]- pending. [facility] infusion following and sent in [facility] and [facility] notified. [place] barriers: ID following, iv abx        Expected Discharge Date and Time     Expected Discharge Date Expected Discharge Time    Jun 2, 2021         Phone communication or documentation only - no physical contact with patient or family.      Saray Clayton RN

## 2021-06-01 NOTE — DISCHARGE PLACEMENT REQUEST
"Deion Cantu (80 y.o. Male)     Date of Birth Social Security Number Address Home Phone MRN    1940  [address] [place] [address] [phone] [id number]    Jainism Marital Status          Confucianist        Admission Date Admission Type Admitting Provider Attending Provider Department, Room/Bed    5/29/21 Emergency Thaddeus Paul MD Salcedo, Federico N, MD [facility] SURGICAL INPATIENT, 4124/1    Discharge Date Discharge Disposition Discharge Destination                       Attending Provider: Lei Álvarez MD    Allergies: Lovastatin, Pravastatin, Simvastatin    Isolation: None   Infection: None   Code Status: CPR    Ht: 185.4 cm (73\")   Wt: 90 kg (198 lb 6.6 oz)    Admission Cmt: None   Principal Problem: None                Active Insurance as of 5/29/2021     Primary Coverage     Payor Plan Insurance Group Employer/Plan Group    ANTHEM MEDICARE REPLACEMENT ANTHEM MEDICARE ADVANTAGE [id number]     Payor Plan Address Payor Plan Phone Number Payor Plan Fax Number Effective Dates    [address] [phone]  10/1/2019 - None Entered    [facility] [address]       Subscriber Name Subscriber Birth Date Member ID       DEION CANTU 1940 [id number]                 Emergency Contacts      (Rel.) Home Phone Work Phone Mobile Phone    suze cantu (Son) [phone] -- --    SIM MOROCHO (Friend) [phone] -- --              "

## 2021-06-01 NOTE — PROGRESS NOTES
[facility] Medicine Services Daily Progress Note      Hospitalist Team  LOS 2 days      Patient Care Team:  Makenna Motta MD as PCP - General  Makenna Motta MD as PCP - Family Medicine    Patient Location: 4124/1      Subjective   Subjective     Chief Complaint / Subjective  Chief Complaint   Patient presents with   • Leg Swelling         Brief Synopsis of Hospital Course/HPI  80 y.o. history of CAD status post CABG, HLD, recurrent cellulitis of the left lower extremity, presents to the ER again with complaints of left lower extremity redness and swelling from his ankle all the way to his mid leg.  Recently seen in the hospital about 4 weeks ago at that time was treated with IV clindamycin and IV Zyvox which was then weaned to p.o. Zyvox was discharged about 2 weeks ago.  Patient apparently was supposed to be on p.o. Zyvox for 10 more days which she claims to have been taking but started having worsening redness in his left leg again in the last 3 to 4 days with pain for which he presented to the ER.  He was seen in the ER yesterday and was started on IV vancomycin and Zosyn.  Infectious disease was consulted who recommended switching of antibiotics to IV ceftaroline recommending ongoing IV treatment inpatient.  Patient seen at bedside denies any headache, no blurry vision, no cough, no shortness of breath, no fever, denies abdominal pain, no urinary frequency no dysuria or hematuria.      Date:5/31/21:doing better today, some knee pain    6/1/21 patient seen and examined in bed no acute distress, edema erythema and left leg pain improving.      Review of Systems   Constitutional: Negative.   HENT: Negative.    Eyes: Negative.    Cardiovascular: Negative.    Respiratory: Negative.    Endocrine: Negative.    Hematologic/Lymphatic: Negative.    Skin: Negative.    Musculoskeletal: Positive for joint pain.   Gastrointestinal: Negative.    Genitourinary: Negative.   "  Neurological: Negative.    Psychiatric/Behavioral: Negative.    Allergic/Immunologic: Negative.    All other systems reviewed and are negative.        Objective   Objective      Vital Signs  Temp:  [97.3 °F (36.3 °C)-98.1 °F (36.7 °C)] 97.8 °F (36.6 °C)  Heart Rate:  [72-93] 93  Resp:  [14-18] 14  BP: (122-149)/(54-73) 146/73  Oxygen Therapy  SpO2: 96 %  Pulse Oximetry Type: Intermittent  Device (Oxygen Therapy): room air  Flowsheet Rows      First Filed Value   Admission Height  185.4 cm (73\") Documented at 05/29/2021 2039   Admission Weight  88.5 kg (195 lb) Documented at 05/29/2021 2039        Intake & Output (last 3 days)       05/29 0701 - 05/30 0700 05/30 0701 - 05/31 0700 05/31 0701 - 06/01 0700 06/01 0701 - 06/02 0700    P.O.  360 720 480    Total Intake(mL/kg)  360 (4) 720 (8) 480 (5.3)    Urine (mL/kg/hr) 1200 950 (0.4) 3400 (1.6) 915 (1.8)    Stool  0      Total Output 0883 664 1887 915    Net -1200 -590 -2680 -435            Stool Unmeasured Occurrence  2 x          Lines, Drains & Airways    Active LDAs     Name:   Placement date:   Placement time:   Site:   Days:    Peripheral IV 05/29/21 2124 Left Forearm   05/29/21 2124    Forearm   1                Physical Exam  Vitals and nursing note reviewed.   Constitutional:       General: He is not in acute distress.     Appearance: Normal appearance. He is well-developed. He is not ill-appearing, toxic-appearing or diaphoretic.   HENT:      Head: Normocephalic and atraumatic.      Nose: Nose normal. No congestion or rhinorrhea.      Mouth/Throat:      Mouth: Mucous membranes are moist.      Pharynx: No oropharyngeal exudate.   Eyes:      General: No scleral icterus.        Right eye: No discharge.         Left eye: No discharge.      Extraocular Movements: Extraocular movements intact.      Conjunctiva/sclera: Conjunctivae normal.      Pupils: Pupils are equal, round, and reactive to light.   Neck:      Thyroid: No thyromegaly.      Vascular: No carotid " bruit or JVD.      Trachea: No tracheal deviation.   Cardiovascular:      Rate and Rhythm: Normal rate and regular rhythm.      Pulses: Normal pulses.      Heart sounds: Normal heart sounds. No murmur heard.   No friction rub. No gallop.    Pulmonary:      Effort: Pulmonary effort is normal. No respiratory distress.      Breath sounds: Normal breath sounds. No stridor. No wheezing, rhonchi or rales.   Chest:      Chest wall: No tenderness.   Abdominal:      General: Bowel sounds are normal. There is no distension.      Palpations: Abdomen is soft. There is no mass.      Tenderness: There is no abdominal tenderness. There is no guarding or rebound.      Hernia: No hernia is present.   Musculoskeletal:         General: Tenderness present. No swelling, deformity or signs of injury. Normal range of motion.      Cervical back: Normal range of motion and neck supple. No rigidity. No muscular tenderness.      Right lower leg: No edema.      Left lower leg: No edema.      Comments: Left knee pain   Lymphadenopathy:      Cervical: No cervical adenopathy.   Skin:     General: Skin is warm and dry.      Coloration: Skin is not jaundiced or pale.      Findings: No bruising, erythema or rash.   Neurological:      General: No focal deficit present.      Mental Status: He is alert and oriented to person, place, and time. Mental status is at baseline.      Cranial Nerves: No cranial nerve deficit.      Sensory: No sensory deficit.      Motor: No weakness or abnormal muscle tone.      Coordination: Coordination normal.   Psychiatric:         Mood and Affect: Mood normal.         Behavior: Behavior normal.         Thought Content: Thought content normal.         Judgment: Judgment normal.       Procedures:    Results Review:     I reviewed the patient's new clinical results.    Lab Results (last 24 hours)     Procedure Component Value Units Date/Time    Comprehensive Metabolic Panel [[id number]]  (Abnormal) Collected: 06/01/21 0291     Specimen: Blood Updated: 06/01/21 0425     Glucose 109 mg/dL      BUN 18 mg/dL      Creatinine 0.89 mg/dL      Sodium 136 mmol/L      Potassium 5.1 mmol/L      Comment: Slight hemolysis detected by analyzer. Results may be affected.        Chloride 102 mmol/L      CO2 24.0 mmol/L      Calcium 8.8 mg/dL      Total Protein 6.8 g/dL      Albumin 3.90 g/dL      ALT (SGPT) 37 U/L      AST (SGOT) 32 U/L      Alkaline Phosphatase 81 U/L      Total Bilirubin 0.2 mg/dL      eGFR Non African Amer 83 mL/min/1.73      Globulin 2.9 gm/dL      A/G Ratio 1.3 g/dL      BUN/Creatinine Ratio 20.2     Anion Gap 10.0 mmol/L     Narrative:      GFR Normal >60  Chronic Kidney Disease <60  Kidney Failure <15      CBC & Differential [[id number]]  (Abnormal) Collected: 06/01/21 0327    Specimen: Blood Updated: 06/01/21 0354    Narrative:      The following orders were created for panel order CBC & Differential.  Procedure                               Abnormality         Status                     ---------                               -----------         ------                     CBC Auto Differential[[id number]]        Abnormal            Final result                 Please view results for these tests on the individual orders.    CBC Auto Differential [[id number]]  (Abnormal) Collected: 06/01/21 0327    Specimen: Blood Updated: 06/01/21 0354     WBC 4.30 10*3/mm3      RBC 3.31 10*6/mm3      Hemoglobin 11.0 g/dL      Hematocrit 31.3 %      MCV 94.5 fL      MCH 33.1 pg      MCHC 35.1 g/dL      RDW 13.3 %      RDW-SD 43.3 fl      MPV 7.7 fL      Platelets 97 10*3/mm3      Neutrophil % 52.9 %      Lymphocyte % 29.4 %      Monocyte % 12.1 %      Eosinophil % 4.9 %      Basophil % 0.7 %      Neutrophils, Absolute 2.30 10*3/mm3      Lymphocytes, Absolute 1.30 10*3/mm3      Monocytes, Absolute 0.50 10*3/mm3      Eosinophils, Absolute 0.20 10*3/mm3      Basophils, Absolute 0.00 10*3/mm3      nRBC 0.1 /100 WBC     Blood Culture - Blood, Arm, Right  [[id number]] Collected: 05/29/21 2357    Specimen: Blood from Arm, Right Updated: 06/01/21 0015     Blood Culture No growth at 2 days    Blood Culture - Blood, Blood, Venous Line [[id number]] Collected: 05/29/21 2357    Specimen: Blood, Venous Line Updated: 06/01/21 0015     Blood Culture No growth at 2 days        No results found for: HGBA1C  Results from last 7 days   Lab Units 05/30/21  0943   INR  0.99           Lab Results   Component Value Date    LIPASE 23 06/22/2019     Lab Results   Component Value Date    CHOL 113 05/13/2021    TRIG 59 05/13/2021    HDL 42 05/13/2021    LDL 58 05/13/2021       No results found for: INTRAOP, PREDX, FINALDX, COMDX    Microbiology Results (last 10 days)     Procedure Component Value - Date/Time    Blood Culture - Blood, Arm, Right [[id number]] Collected: 05/29/21 2357    Lab Status: Preliminary result Specimen: Blood from Arm, Right Updated: 06/01/21 0015     Blood Culture No growth at 2 days    Blood Culture - Blood, Blood, Venous Line [[id number]] Collected: 05/29/21 2357    Lab Status: Preliminary result Specimen: Blood, Venous Line Updated: 06/01/21 0015     Blood Culture No growth at 2 days          ECG/EMG Results (most recent)     None          Results for orders placed during the hospital encounter of 05/11/21    Duplex Venous Lower Extremity - Left    Interpretation Summary  · Normal left lower extremity venous duplex scan.  · Gastrocnemius veins not identified.      No radiology results for the last 7 days    Xrays, labs reviewed personally by physician.    Medication Review:   I have reviewed the patient's current medication list      Scheduled Meds  amiodarone, 100 mg, Oral, Daily  aspirin, 81 mg, Oral, Daily  ceftaroline, 600 mg, Intravenous, Q12H  clopidogrel, 75 mg, Oral, Daily  gabapentin, 400 mg, Oral, TID  lidocaine, 1 patch, Transdermal, Q24H  miconazole, , Topical, Q12H  midodrine, 10 mg, Oral, TID AC  pantoprazole, 40 mg, Oral, Daily  saccharomyces  boulardii, 500 mg, Oral, BID  sodium chloride, 10 mL, Intravenous, Q12H  spironolactone, 25 mg, Oral, Daily  sucralfate, 1 g, Oral, 4x Daily      Meds Infusions     Meds PRN  •  acetaminophen  •  melatonin  •  methocarbamol  •  ondansetron  •  phenol  •  [COMPLETED] Insert peripheral IV **AND** sodium chloride  •  sodium chloride  •  traMADol    Assessment/Plan   Assessment/Plan     Active Hospital Problems    Diagnosis  POA   • Lower extremity pain, left [M79.605]  Yes   • Cellulitis of left lower extremity without foot [L03.116]  Unknown      Resolved Hospital Problems   No resolved problems to display.       MEDICAL DECISION MAKING COMPLEXITY BY PROBLEM:     Assessment and plan.  Left leg cellulitis, extensive requiring IV antibiotic therapy.  -Admitted, continue IV ceftaroline for 10 days  -Infectious disease consulted, appreciate their input.  -Continue wound care.     Ischemic cardiomyopathy EF of 30%  -Status post AICD placement, euvolemic     CAD status post CABG-PCI  -Continue aspirin, Plavix, statin therapy     History of V. tach, stable  -Continue amiodarone     -Hypertension, controlled  -Continue cardioprotective therapy     Hyperlipidemia-controlled  -Statin therapy     Prostate cancer, in remission    VTE Prophylaxis -   Mechanical Order History:      Ordered        05/30/21 0809  Maintain Sequential Compression Device  Continuous     Comments: On unaffected extremity       05/30/21 0018  Place Sequential Compression Device  Once         05/30/21 0018  Maintain Sequential Compression Device  Continuous,   Status:  Canceled                 Pharmalogical Order History:     None        Code Status -   Code Status and Medical Interventions:   Ordered at: 05/29/21 2311     Code Status:    CPR     Medical Interventions (Level of Support Prior to Arrest):    Full       This patient has been examined wearing appropriate Personal Protective Equipment and discussed with rn. 06/01/21    Discharge  Planning  home    Electronically signed by Lei Álvarez MD, 06/01/21, 12:46 EDT.  [facility] Hospitalist Team

## 2021-06-01 NOTE — CONSULTS
powerglide midline placed in right upper arm using u/s guidance. Flushes easily and blood return noted. Pt tolerated well. nurse notified ok to use.

## 2021-06-02 ENCOUNTER — APPOINTMENT (OUTPATIENT)
Dept: CARDIOLOGY | Facility: HOSPITAL | Age: 81
End: 2021-06-02

## 2021-06-02 VITALS
SYSTOLIC BLOOD PRESSURE: 108 MMHG | DIASTOLIC BLOOD PRESSURE: 64 MMHG | RESPIRATION RATE: 16 BRPM | HEART RATE: 86 BPM | WEIGHT: 198.41 LBS | BODY MASS INDEX: 26.3 KG/M2 | HEIGHT: 73 IN | TEMPERATURE: 97.4 F | OXYGEN SATURATION: 96 %

## 2021-06-02 LAB
ANION GAP SERPL CALCULATED.3IONS-SCNC: 10 MMOL/L (ref 5–15)
BASOPHILS # BLD AUTO: 0.1 10*3/MM3 (ref 0–0.2)
BASOPHILS NFR BLD AUTO: 1.2 % (ref 0–1.5)
BH CV LOWER VASCULAR LEFT COMMON FEMORAL AUGMENT: NORMAL
BH CV LOWER VASCULAR LEFT COMMON FEMORAL COMPETENT: NORMAL
BH CV LOWER VASCULAR LEFT COMMON FEMORAL COMPRESS: NORMAL
BH CV LOWER VASCULAR LEFT COMMON FEMORAL PHASIC: NORMAL
BH CV LOWER VASCULAR LEFT COMMON FEMORAL SPONT: NORMAL
BH CV LOWER VASCULAR LEFT DISTAL FEMORAL COMPRESS: NORMAL
BH CV LOWER VASCULAR LEFT GASTRONEMIUS COMPRESS: NORMAL
BH CV LOWER VASCULAR LEFT GREATER SAPH BK COMPRESS: NORMAL
BH CV LOWER VASCULAR LEFT LESSER SAPH COMPRESS: NORMAL
BH CV LOWER VASCULAR LEFT MID FEMORAL AUGMENT: NORMAL
BH CV LOWER VASCULAR LEFT MID FEMORAL COMPETENT: NORMAL
BH CV LOWER VASCULAR LEFT MID FEMORAL COMPRESS: NORMAL
BH CV LOWER VASCULAR LEFT MID FEMORAL PHASIC: NORMAL
BH CV LOWER VASCULAR LEFT MID FEMORAL SPONT: NORMAL
BH CV LOWER VASCULAR LEFT PERONEAL COMPRESS: NORMAL
BH CV LOWER VASCULAR LEFT POPLITEAL AUGMENT: NORMAL
BH CV LOWER VASCULAR LEFT POPLITEAL COMPETENT: NORMAL
BH CV LOWER VASCULAR LEFT POPLITEAL COMPRESS: NORMAL
BH CV LOWER VASCULAR LEFT POPLITEAL PHASIC: NORMAL
BH CV LOWER VASCULAR LEFT POPLITEAL SPONT: NORMAL
BH CV LOWER VASCULAR LEFT POSTERIOR TIBIAL COMPRESS: NORMAL
BH CV LOWER VASCULAR LEFT PROXIMAL FEMORAL COMPRESS: NORMAL
BH CV LOWER VASCULAR LEFT SAPHENOFEMORAL JUNCTION COMPRESS: NORMAL
BH CV LOWER VASCULAR RIGHT COMMON FEMORAL AUGMENT: NORMAL
BH CV LOWER VASCULAR RIGHT COMMON FEMORAL COMPETENT: NORMAL
BH CV LOWER VASCULAR RIGHT COMMON FEMORAL COMPRESS: NORMAL
BH CV LOWER VASCULAR RIGHT COMMON FEMORAL PHASIC: NORMAL
BH CV LOWER VASCULAR RIGHT COMMON FEMORAL SPONT: NORMAL
BH CV POP FLUID COLLECT LEFT: 1
BUN SERPL-MCNC: 20 MG/DL (ref 8–23)
BUN/CREAT SERPL: 24.7 (ref 7–25)
CALCIUM SPEC-SCNC: 8.7 MG/DL (ref 8.6–10.5)
CHLORIDE SERPL-SCNC: 103 MMOL/L (ref 98–107)
CO2 SERPL-SCNC: 24 MMOL/L (ref 22–29)
CREAT SERPL-MCNC: 0.81 MG/DL (ref 0.76–1.27)
DEPRECATED RDW RBC AUTO: 43.3 FL (ref 37–54)
EOSINOPHIL # BLD AUTO: 0.2 10*3/MM3 (ref 0–0.4)
EOSINOPHIL NFR BLD AUTO: 4.4 % (ref 0.3–6.2)
ERYTHROCYTE [DISTWIDTH] IN BLOOD BY AUTOMATED COUNT: 13.3 % (ref 12.3–15.4)
GFR SERPL CREATININE-BSD FRML MDRD: 92 ML/MIN/1.73
GLUCOSE SERPL-MCNC: 105 MG/DL (ref 65–99)
HCT VFR BLD AUTO: 29.9 % (ref 37.5–51)
HGB BLD-MCNC: 10.3 G/DL (ref 13–17.7)
LYMPHOCYTES # BLD AUTO: 1.6 10*3/MM3 (ref 0.7–3.1)
LYMPHOCYTES NFR BLD AUTO: 33.8 % (ref 19.6–45.3)
MAXIMAL PREDICTED HEART RATE: 142 BPM
MCH RBC QN AUTO: 32 PG (ref 26.6–33)
MCHC RBC AUTO-ENTMCNC: 34.4 G/DL (ref 31.5–35.7)
MCV RBC AUTO: 93 FL (ref 79–97)
MONOCYTES # BLD AUTO: 0.7 10*3/MM3 (ref 0.1–0.9)
MONOCYTES NFR BLD AUTO: 14.5 % (ref 5–12)
NEUTROPHILS NFR BLD AUTO: 2.1 10*3/MM3 (ref 1.7–7)
NEUTROPHILS NFR BLD AUTO: 46.1 % (ref 42.7–76)
NRBC BLD AUTO-RTO: 0.1 /100 WBC (ref 0–0.2)
PLATELET # BLD AUTO: 102 10*3/MM3 (ref 140–450)
PMV BLD AUTO: 7.5 FL (ref 6–12)
POTASSIUM SERPL-SCNC: 4.9 MMOL/L (ref 3.5–5.2)
RBC # BLD AUTO: 3.22 10*6/MM3 (ref 4.14–5.8)
SODIUM SERPL-SCNC: 137 MMOL/L (ref 136–145)
STRESS TARGET HR: 121 BPM
WBC # BLD AUTO: 4.6 10*3/MM3 (ref 3.4–10.8)

## 2021-06-02 PROCEDURE — 99239 HOSP IP/OBS DSCHRG MGMT >30: CPT | Performed by: INTERNAL MEDICINE

## 2021-06-02 PROCEDURE — 25010000002 CEFTAROLINE FOSAMIL PER 10 MG: Performed by: INTERNAL MEDICINE

## 2021-06-02 PROCEDURE — 80048 BASIC METABOLIC PNL TOTAL CA: CPT | Performed by: PHYSICIAN ASSISTANT

## 2021-06-02 PROCEDURE — 93971 EXTREMITY STUDY: CPT

## 2021-06-02 PROCEDURE — 85025 COMPLETE CBC W/AUTO DIFF WBC: CPT | Performed by: PHYSICIAN ASSISTANT

## 2021-06-02 RX ORDER — CEPHALEXIN 500 MG/1
500 CAPSULE ORAL 3 TIMES DAILY
Qty: 21 CAPSULE | Refills: 0 | Status: SHIPPED | OUTPATIENT
Start: 2021-06-02 | End: 2021-06-16

## 2021-06-02 RX ORDER — DOXYCYCLINE HYCLATE 100 MG/1
100 TABLET, DELAYED RELEASE ORAL 2 TIMES DAILY
Qty: 14 TABLET | Refills: 0 | Status: SHIPPED | OUTPATIENT
Start: 2021-06-02 | End: 2021-06-16

## 2021-06-02 RX ADMIN — Medication 10 ML: at 09:07

## 2021-06-02 RX ADMIN — CEFTAROLINE FOSAMIL 600 MG: 600 POWDER, FOR SOLUTION INTRAVENOUS at 03:13

## 2021-06-02 RX ADMIN — AMIODARONE HYDROCHLORIDE 100 MG: 200 TABLET ORAL at 09:06

## 2021-06-02 RX ADMIN — GABAPENTIN 400 MG: 400 CAPSULE ORAL at 09:06

## 2021-06-02 RX ADMIN — Medication 500 MG: at 09:05

## 2021-06-02 RX ADMIN — GABAPENTIN 400 MG: 400 CAPSULE ORAL at 18:19

## 2021-06-02 RX ADMIN — PANTOPRAZOLE SODIUM 40 MG: 40 TABLET, DELAYED RELEASE ORAL at 09:06

## 2021-06-02 RX ADMIN — SUCRALFATE 1 G: 1 TABLET ORAL at 12:53

## 2021-06-02 RX ADMIN — MIDODRINE HYDROCHLORIDE 5 MG: 5 TABLET ORAL at 07:21

## 2021-06-02 RX ADMIN — MICONAZOLE NITRATE: 20 CREAM TOPICAL at 09:06

## 2021-06-02 RX ADMIN — ASPIRIN 81 MG: 81 TABLET, COATED ORAL at 09:06

## 2021-06-02 RX ADMIN — MIDODRINE HYDROCHLORIDE 10 MG: 5 TABLET ORAL at 12:53

## 2021-06-02 RX ADMIN — SPIRONOLACTONE 25 MG: 25 TABLET ORAL at 09:06

## 2021-06-02 RX ADMIN — CLOPIDOGREL BISULFATE 75 MG: 75 TABLET ORAL at 09:06

## 2021-06-02 RX ADMIN — SUCRALFATE 1 G: 1 TABLET ORAL at 07:21

## 2021-06-02 NOTE — PLAN OF CARE
Detail Level: Detailed Goal Outcome Evaluation:        Outcome Summary: pt is now going home with PO abx and not IV abx due to anxiety with Midline and Abx. He will discharge today after doppler is completed.

## 2021-06-02 NOTE — PROGRESS NOTES
Infectious Diseases Progress Note      LOS: 3 days   Patient Care Team:  Makenna Motta MD as PCP - General  Makenna Motta MD as PCP - Family Medicine    Chief Complaint: Left leg edema    Subjective     The patient has no fever during the last 24 hours.  The patient not having any new complaints today.  Patient is feeling better with less pain and edema of the left leg    Review of Systems:   Review of Systems   Constitutional: Negative.    HENT: Negative.    Eyes: Negative.    Respiratory: Negative.    Cardiovascular: Positive for leg swelling.   Gastrointestinal: Negative.    Genitourinary: Negative.    Musculoskeletal: Negative.    Skin: Negative.    Neurological: Negative.    Hematological: Negative.    Psychiatric/Behavioral: Negative.         Objective     Vital Signs  Temp:  [97.4 °F (36.3 °C)-97.5 °F (36.4 °C)] 97.4 °F (36.3 °C)  Heart Rate:  [79-90] 86  Resp:  [15-16] 16  BP: (108-149)/(56-73) 108/64    Physical Exam:  Physical Exam  Vitals and nursing note reviewed.   Constitutional:       Appearance: He is well-developed.   HENT:      Head: Normocephalic and atraumatic.   Eyes:      Pupils: Pupils are equal, round, and reactive to light.   Cardiovascular:      Rate and Rhythm: Normal rate and regular rhythm.      Heart sounds: Normal heart sounds.   Pulmonary:      Effort: Pulmonary effort is normal. No respiratory distress.      Breath sounds: Normal breath sounds. No wheezing or rales.   Abdominal:      General: Bowel sounds are normal. There is no distension.      Palpations: Abdomen is soft. There is no mass.      Tenderness: There is no abdominal tenderness. There is no guarding or rebound.   Musculoskeletal:         General: No deformity. Normal range of motion.      Cervical back: Normal range of motion and neck supple.      Left lower leg: Edema present.      Comments: Vague erythema of the left leg    Signs of mild tinea pedis   Skin:     General: Skin is warm.      Findings:  No erythema or rash.   Neurological:      Mental Status: He is alert and oriented to person, place, and time.      Cranial Nerves: No cranial nerve deficit.          Results Review:    I have reviewed all clinical data, test, lab, and imaging results.     Radiology  No Radiology Exams Resulted Within Past 24 Hours    Cardiology    Laboratory    Results from last 7 days   Lab Units 06/02/21  0314 06/01/21  0327 05/31/21  0345 05/30/21  0943 05/29/21  2125   WBC 10*3/mm3 4.60 4.30 4.30 3.80 5.30   HEMOGLOBIN g/dL 10.3* 11.0* 10.0* 10.7* 10.5*   HEMATOCRIT % 29.9* 31.3* 29.4* 31.3* 30.8*   PLATELETS 10*3/mm3 102* 97* 92* 101* 99*     Results from last 7 days   Lab Units 06/02/21  0314 06/01/21  0327 05/31/21  0257 05/30/21  0943 05/29/21  2125   SODIUM mmol/L 137 136 133* 137 134*   POTASSIUM mmol/L 4.9 5.1 4.7 4.6 4.7   CHLORIDE mmol/L 103 102 101 102 100   CO2 mmol/L 24.0 24.0 25.0 26.0 21.0*   BUN mg/dL 20 18 16 18 22   CREATININE mg/dL 0.81 0.89 0.79 0.90 0.97   GLUCOSE mg/dL 105* 109* 93 87 107*   ALBUMIN g/dL  --  3.90 3.50 3.80  --    BILIRUBIN mg/dL  --  0.2 0.2 0.4  --    ALK PHOS U/L  --  81 72 77  --    AST (SGOT) U/L  --  32 29 33  --    ALT (SGPT) U/L  --  37 36 40  --    CALCIUM mg/dL 8.7 8.8 8.2* 9.0 8.8                 Microbiology   Microbiology Results (last 10 days)     Procedure Component Value - Date/Time    Blood Culture - Blood, Arm, Right [[id number]] Collected: 05/29/21 6320    Lab Status: Preliminary result Specimen: Blood from Arm, Right Updated: 06/02/21 0015     Blood Culture No growth at 3 days    Blood Culture - Blood, Blood, Venous Line [[id number]] Collected: 05/29/21 3839    Lab Status: Preliminary result Specimen: Blood, Venous Line Updated: 06/02/21 0015     Blood Culture No growth at 3 days          Medication Review:       Schedule Meds  amiodarone, 100 mg, Oral, Daily  aspirin, 81 mg, Oral, Daily  clopidogrel, 75 mg, Oral, Daily  gabapentin, 400 mg, Oral, TID  lidocaine, 1 patch,  Transdermal, Q24H  miconazole, , Topical, Q12H  midodrine, 10 mg, Oral, TID AC  pantoprazole, 40 mg, Oral, Daily  saccharomyces boulardii, 500 mg, Oral, BID  sodium chloride, 10 mL, Intravenous, Q12H  sodium chloride, 10 mL, Intravenous, Q12H  spironolactone, 25 mg, Oral, Daily  sucralfate, 1 g, Oral, 4x Daily        Infusion Meds       PRN Meds  •  acetaminophen  •  melatonin  •  methocarbamol  •  ondansetron  •  phenol  •  [COMPLETED] Insert peripheral IV **AND** sodium chloride  •  sodium chloride  •  sodium chloride  •  traMADol        Assessment/Plan       Antimicrobial Therapy   1.  Teflaro      day  2.      Day  3.      Day  4.      Day  5.      Day      Assessment     Recurrent left leg cellulitis. The patient had breakthrough edema and erythema during Zyvox treatment. The patient was supposed to finish Zyvox on May 23, 2021 but apparently he still has 2 days left with his medication according to him. I'm not certain if patient was prescribed longer duration of Zyvox on discharge.   This type of infection usually we see associated with Staph aureus or beta-hemolytic Streptococcus.  Symptoms had improved on today's examination     Tinea pedis slightly better     History of CABG with vein harvested from the left leg in the past     Mild thrombocytopenia. Possibly secondary to Zyvox     Plan     Okay to discharge home today  Can switch to p.o. antibiotics on discharge.  Patient is very nervous about going home with IV antibiotics.  His left leg looks much better and the edema has almost resolved  Request venous Doppler of the left leg before discharge to rule out DVT  He can be discharged home on p.o. Keflex 500 mg 3 times daily for 7 days and p.o. doxycycline 100 mg twice daily for 7 days    Try to keep the left leg elevated as much as possible  Recommend compressing dressing but patient prefers to wear his on compressing hose  Continue miconazole ointment twice daily between the toes of both feet        Ammar  MD Moses  06/02/21  15:53 EDT      Note is dictated utilizing voice recognition software/Dragon

## 2021-06-02 NOTE — PLAN OF CARE
Pt resting. Pt not complaining of pain at this time. Pt ambulatory with stand by assist and cane. Pt to discharge home with home health when appropriate. Will continue to monitor.    Problem: Adult Inpatient Plan of Care  Goal: Plan of Care Review  Outcome: Ongoing, Progressing  Flowsheets (Taken 6/2/2021 0153)  Progress: improving  Plan of Care Reviewed With: patient

## 2021-06-02 NOTE — CASE MANAGEMENT/SOCIAL WORK
Continued Stay Note   [facility]     Patient Name: Deion Nicholas  MRN: [id number]  Today's Date: 6/2/2021    Admit Date: 5/29/2021    Discharge Plan     Row Name 06/02/21 0938       Plan    Plan Comments  caretenders denied due to insurance denial. CM called [facility]- denied due to staffing, CM called [facility]- denied due to staffing, referral sent to [facility] and liaison notified.        Expected Discharge Date and Time     Expected Discharge Date Expected Discharge Time    Jun 2, 2021         Phone communication or documentation only - no physical contact with patient or family.      Saray Clayton RN

## 2021-06-02 NOTE — CASE MANAGEMENT/SOCIAL WORK
Continued Stay Note  EMIR Sanchez     Patient Name: Deion Nicholas  MRN: [id number]  Today's Date: 6/2/2021    Admit Date: 5/29/2021    Discharge Plan     Row Name 06/02/21 1334       Plan    Plan  anticipate routine home on PO abx per ID MD.    Patient/Family in Agreement with Plan  yes    Plan Comments  [facility] denied due to insurance. Met with patient at bedside and discussed different options if HHC cannot be arranged in time of DC. patient agreeable to going to ambulatory care for line care. CM called Mat with [facility] and updated that patient will be getting line care at ambulatory care center and is DC today. [facility] and updated [facility] that patient will be going to ambulatory care center for line care and will get iv abx through [facility]. update: received call from Mat with [facility]- patient now refusing to do home infusion. CM called patient- patient states he no longer wants to do iv abx at home. CM offered for patient to come to ambulatory care for iv abx or to go to inpt rehab for duration of IV abx. patient states that neither one of those options will work for him and patient would rather stay in hospital to receive iv abx. CM informed patient that this was not an option and went over options of home infusion, ambulatory care infusion, or inpatient rehab for iv abx infusion. patient then stated that he wants to go to ambulatory care for infusion. patient states he can drive but does not have a car and will have to arrange transportation with his family and friends, CM attempted to call patients son and did not receive an answer. CM reached out to ID MD about patient wanted ambulatory care and MD states that Ambulatory care cannot accomodate BID infusions and that patient malka DC on something PO. LORETTA reached out to Ambulatory care and pallavi at ambulatory care states that it is difficult to accomodate BID transfusions but it is difficult and often times complicated for patients and decreases  patient satisfaction rate. Bedside RN states that patient prefers PO abx. DC barriers: ID Following.        Expected Discharge Date and Time     Expected Discharge Date Expected Discharge Time    Jun 2, 2021         Phone communication or documentation only - no physical contact with patient or family.      Saray Clayton, RN

## 2021-06-02 NOTE — DISCHARGE SUMMARY
[facility]  DISCHARGE SUMMARY        Prepared For PCP:  Makenna Motta MD    Patient Name: Deion Nicholas  : 1940  MRN: [id number]      Date of Admission:   2021    Date of Discharge:  2021    Length of stay:  LOS: 3 days     Hospital Course     Presenting Problem:   Lower extremity pain, left [M79.605]  Cellulitis of left lower extremity without foot [L03.116]      Active Hospital Problems    Diagnosis  POA   • **Cellulitis of left lower extremity without foot [L03.116]  Unknown   • Lower extremity pain, left [M79.605]  Yes      Resolved Hospital Problems   No resolved problems to display.       Assessment and plan.  Left leg cellulitis, extensive requiring IV antibiotic therapy.  -Admitted, tx IV ceftaroline>po doxy, keflex for 7 days  -Infectious disease consulted, appreciate their input.  -Continue wound care.     Ischemic cardiomyopathy EF of 30%  -Status post AICD placement, euvolemic     CAD status post CABG-PCI  -Continue aspirin, Plavix, statin therapy     History of V. tach, stable  -Continue amiodarone     -Hypertension, controlled  -Continue cardioprotective therapy     Hyperlipidemia-controlled  -Statin therapy     Prostate cancer, in remission       Hospital Course:  Deion Nicholas is a 80 y.o. male with history of CAD status post CABG, HLD, recurrent cellulitis of the left lower extremity, presents to the ER again with complaints of left lower extremity redness and swelling from his ankle all the way to his mid leg.  Recently seen in the hospital about 4 weeks ago at that time was treated with IV clindamycin and IV Zyvox which was then weaned to p.o. Zyvox was discharged about 2 weeks ago.  Patient apparently was supposed to be on p.o. Zyvox for 10 more days which she claims to have been taking but started having worsening redness in his left leg again in the last 3 to 4 days with pain for which he presented to the ER.  He was seen in  the ER yesterday and was started on IV vancomycin and Zosyn.  Infectious disease was consulted who recommended switching of antibiotics to IV ceftaroline recommending ongoing IV treatment inpatient.  Patient seen at bedside denies any headache, no blurry vision, no cough, no shortness of breath, no fever, denies abdominal pain, no urinary frequency no dysuria or hematuria.        Date:  5/31/21:doing better today, some knee pain    6/1/21 patient seen and examined in bed no acute distress, edema erythema and left leg pain improving.   6/2/21 doing better today will dc home.      Reasons For Change In Medications and Indications for New Medications:  Doxy and keflex  Day of Discharge     Vital Signs:   Temp:  [97.4 °F (36.3 °C)-97.5 °F (36.4 °C)] 97.4 °F (36.3 °C)  Heart Rate:  [79-90] 86  Resp:  [15-16] 16  BP: (108-149)/(56-73) 108/64     Physical Exam  Vitals and nursing note reviewed.   Constitutional:       General: He is not in acute distress.     Appearance: Normal appearance. He is well-developed. He is not ill-appearing, toxic-appearing or diaphoretic.   HENT:      Head: Normocephalic and atraumatic.      Nose: Nose normal. No congestion or rhinorrhea.      Mouth/Throat:      Mouth: Mucous membranes are moist.      Pharynx: No oropharyngeal exudate.   Eyes:      General: No scleral icterus.        Right eye: No discharge.         Left eye: No discharge.      Extraocular Movements: Extraocular movements intact.      Conjunctiva/sclera: Conjunctivae normal.      Pupils: Pupils are equal, round, and reactive to light.   Neck:      Thyroid: No thyromegaly.      Vascular: No carotid bruit or JVD.      Trachea: No tracheal deviation.   Cardiovascular:      Rate and Rhythm: Normal rate and regular rhythm.      Pulses: Normal pulses.      Heart sounds: Normal heart sounds. No murmur heard.   No friction rub. No gallop.    Pulmonary:      Effort: Pulmonary effort is normal. No respiratory distress.      Breath  sounds: Normal breath sounds. No stridor. No wheezing, rhonchi or rales.   Chest:      Chest wall: No tenderness.   Abdominal:      General: Bowel sounds are normal. There is no distension.      Palpations: Abdomen is soft. There is no mass.      Tenderness: There is no abdominal tenderness. There is no guarding or rebound.      Hernia: No hernia is present.   Musculoskeletal:         General: Tenderness present. No swelling, deformity or signs of injury. Normal range of motion.      Cervical back: Normal range of motion and neck supple. No rigidity. No muscular tenderness.      Right lower leg: No edema.      Left lower leg: No edema.   Lymphadenopathy:      Cervical: No cervical adenopathy.   Skin:     General: Skin is warm and dry.      Coloration: Skin is not jaundiced or pale.      Findings: No bruising, erythema or rash.   Neurological:      General: No focal deficit present.      Mental Status: He is alert and oriented to person, place, and time. Mental status is at baseline.      Cranial Nerves: No cranial nerve deficit.      Sensory: No sensory deficit.      Motor: No weakness or abnormal muscle tone.      Coordination: Coordination normal.   Psychiatric:         Mood and Affect: Mood normal.         Behavior: Behavior normal.         Thought Content: Thought content normal.         Pertinent  and/or Most Recent Results     Results from last 7 days   Lab Units 06/02/21  0314 06/01/21  0327 05/31/21  0345 05/31/21  0257 05/30/21  0943 05/29/21  2125   WBC 10*3/mm3 4.60 4.30 4.30  --  3.80 5.30   HEMOGLOBIN g/dL 10.3* 11.0* 10.0*  --  10.7* 10.5*   HEMATOCRIT % 29.9* 31.3* 29.4*  --  31.3* 30.8*   PLATELETS 10*3/mm3 102* 97* 92*  --  101* 99*   SODIUM mmol/L 137 136  --  133* 137 134*   POTASSIUM mmol/L 4.9 5.1  --  4.7 4.6 4.7   CHLORIDE mmol/L 103 102  --  101 102 100   CO2 mmol/L 24.0 24.0  --  25.0 26.0 21.0*   BUN mg/dL 20 18  --  16 18 22   CREATININE mg/dL 0.81 0.89  --  0.79 0.90 0.97   GLUCOSE mg/dL  105* 109*  --  93 87 107*   CALCIUM mg/dL 8.7 8.8  --  8.2* 9.0 8.8     Results from last 7 days   Lab Units 06/01/21  0327 05/31/21  0257 05/30/21  0943   BILIRUBIN mg/dL 0.2 0.2 0.4   ALK PHOS U/L 81 72 77   ALT (SGPT) U/L 37 36 40   AST (SGOT) U/L 32 29 33   PROTIME Seconds  --   --  10.9   INR   --   --  0.99   APTT seconds  --   --  28.8           Invalid input(s): TG, LDLCALC, LDLREALC  Results from last 7 days   Lab Units 05/30/21  0943 05/30/21  0001   PROCALCITONIN ng/mL 0.05  --    LACTATE mmol/L  --  1.9       Brief Urine Lab Results     None          Microbiology Results Abnormal     Procedure Component Value - Date/Time    Blood Culture - Blood, Arm, Right [[id number]] Collected: 05/29/21 2357    Lab Status: Preliminary result Specimen: Blood from Arm, Right Updated: 06/02/21 0015     Blood Culture No growth at 3 days    Blood Culture - Blood, Blood, Venous Line [[id number]] Collected: 05/29/21 2357    Lab Status: Preliminary result Specimen: Blood, Venous Line Updated: 06/02/21 0015     Blood Culture No growth at 3 days               Results for orders placed during the hospital encounter of 05/11/21    Duplex Venous Lower Extremity - Left    Interpretation Summary  · Normal left lower extremity venous duplex scan.  · Gastrocnemius veins not identified.      Results for orders placed during the hospital encounter of 05/11/21    Duplex Venous Lower Extremity - Left    Interpretation Summary  · Normal left lower extremity venous duplex scan.  · Gastrocnemius veins not identified.                  Test Results Pending at Discharge  Pending Labs     Order Current Status    Blood Culture - Blood, Arm, Right Preliminary result    Blood Culture - Blood, Blood, Venous Line Preliminary result            Procedures Performed           Consults:   Consults     Date and Time Order Name Status Description    5/30/2021  3:47 PM Inpatient Hospitalist Consult      5/30/2021  7:44 AM Inpatient Infectious Diseases Consult  Completed     5/12/2021 10:20 AM Inpatient Infectious Diseases Consult Completed         Assessment     Recurrent left leg cellulitis. The patient had breakthrough edema and erythema during Zyvox treatment. The patient was supposed to finish Zyvox on May 23, 2021 but apparently he still has 2 days left with his medication according to him. I'm not certain if patient was prescribed longer duration of Zyvox on discharge.   This type of infection usually we see associated with Staph aureus or beta-hemolytic Streptococcus.  Symptoms had improved on today's examination     Tinea pedis slightly better     History of CABG with vein harvested from the left leg in the past     Mild thrombocytopenia. Possibly secondary to Zyvox     Plan     Continue Teflaro 600 mg IV every 12 hours for 7 more days  Place midline before discharge and arrange for home IV antibiotic  Remove midline after finishing antibiotic  Okay to [facility]  Try to keep the left leg elevated as much as possible  Recommend compressing dressing but patient prefers to wear his on compressing hose  Continue miconazole ointment twice daily between the toes of both feet           Panda Lopes MD    Discharge Details        Discharge Medications      New Medications      Instructions Start Date   cephalexin 500 MG capsule  Commonly known as: Keflex   500 mg, Oral, 3 Times Daily      doxycycline 100 MG enteric coated tablet  Commonly known as: DORYX   100 mg, Oral, 2 Times Daily         Continue These Medications      Instructions Start Date   amiodarone 200 MG tablet  Commonly known as: PACERONE   100 mg, Oral, Daily      aspirin 81 MG EC tablet   81 mg, Oral, Daily      cyanocobalamin 1000 MCG/ML injection   1,000 mcg, Intramuscular, Every 14 Days      ezetimibe 10 MG tablet  Commonly known as: ZETIA   10 mg, Oral, Daily      gabapentin 400 MG capsule  Commonly known as: NEURONTIN   400 mg, Oral, 3 Times Daily      lidocaine 5  %  Commonly known as: LIDODERM   1 patch, Transdermal, Every 24 Hours, Remove & Discard patch within 12 hours or as directed by MD       methocarbamol 500 MG tablet  Commonly known as: ROBAXIN   500 mg, Oral, 3 Times Daily PRN      midodrine 10 MG tablet  Commonly known as: PROAMATINE   10 mg, Oral, 3 Times Daily Before Meals      Nitrostat 0.4 MG SL tablet  Generic drug: nitroglycerin   0.4 mg, Sublingual, Every 5 Minutes PRN      pantoprazole 40 MG EC tablet  Commonly known as: PROTONIX   40 mg, Oral, Daily      Plavix 75 MG tablet  Generic drug: clopidogrel   75 mg, Oral, Daily      spironolactone 25 MG tablet  Commonly known as: ALDACTONE   25 mg, Oral, Daily      sucralfate 1 g tablet  Commonly known as: CARAFATE   1 g, Oral, 4 Times Daily      Vitamin D-400 10 MCG (400 UNIT) tablet  Generic drug: Cholecalciferol   Take 2 tablets daily          Stop These Medications    linezolid 600 MG tablet  Commonly known as: ZYVOX        ASK your doctor about these medications      Instructions Start Date   miconazole 2 % cream  Commonly known as: MICOTIN  Ask about: Should I take this medication?   Topical, Every 12 Hours Scheduled             Allergies   Allergen Reactions   • Lovastatin Unknown (See Comments) and Myalgia     unknown   • Pravastatin Unknown (See Comments) and Myalgia     unknown   • Simvastatin Unknown (See Comments) and Myalgia     unknown         Discharge Disposition:  Home or Self Care    Diet:  Hospital:  Diet Order   Procedures   • Diet Regular         Discharge Activity:   Activity Instructions     Gradually Increase Activity Until at Pre-Hospitalization Level              CODE STATUS:    Code Status and Medical Interventions:   Ordered at: 05/29/21 7171     Code Status:    CPR     Medical Interventions (Level of Support Prior to Arrest):    Full         Follow-up Appointments  Future Appointments   Date Time Provider Department Center   8/20/2021 10:15 AM Constantino Arvizu MD [facility]    9/17/2021 11:15 AM Marcin Childers, DO [facility]       Additional Instructions for the Follow-ups that You Need to Schedule     Ambulatory Referral to Home Health   As directed      Face to Face Visit Date: 6/1/2021    Follow-up provider for Plan of Care?: I treated the patient in an acute care facility and will not continue treatment after discharge.    Follow-up provider: PRIYA MOTTA [[id number]]    Reason/Clinical Findings: post hospital admission    Describe mobility limitations that make leaving home difficult: weakness    Nursing/Therapeutic Services Requested: Other (HHC to eval )    Frequency: 1 Week 1         Discharge Follow-up with PCP   As directed       Currently Documented PCP:    Priya Motta MD    PCP Phone Number:    [phone]     Follow Up Details: 1 week                 Condition on Discharge:      Stable      This patient has been examined wearing appropriate Personal Protective Equipment and discussed with rn. 06/02/21      Electronically signed by Lei Álvarez MD, 06/02/21, 3:18 PM EDT.      Time: I spent  34  minutes on this discharge activity which included face-to-face encounter with the patient/reviewing the data in the system/coordination of the care with the nursing staff as well as consultants/documentation/entering orders.

## 2021-06-03 ENCOUNTER — READMISSION MANAGEMENT (OUTPATIENT)
Dept: CALL CENTER | Facility: HOSPITAL | Age: 81
End: 2021-06-03

## 2021-06-03 NOTE — OUTREACH NOTE
Prep Survey      Responses   [facility] patient discharged from?  [facility]   Is LACE score < 7 ?  No   Emergency Room discharge w/ pulse ox?  No   Eligibility  Readm Mgmt   Discharge diagnosis  Cellulitis of left lower extremity without foot Left leg cellulitis, extensive requiring IV antibiotic therapy   Does the patient have one of the following disease processes/diagnoses(primary or secondary)?  Other   Prep survey completed?  Yes          [facility] Almonte RN

## 2021-06-03 NOTE — CASE MANAGEMENT/SOCIAL WORK
Case Management Discharge Note      Final Note: home    Provided Post Acute Provider List?: N/A  Provided Post Acute Provider Quality & Resource List?: N/A    Selected Continued Care - Discharged on 6/2/2021 Admission date: 5/29/2021 - Discharge disposition: Home or Self Care        Final Discharge Disposition Code: 01 - home or self-care

## 2021-06-04 LAB
BACTERIA SPEC AEROBE CULT: NORMAL
BACTERIA SPEC AEROBE CULT: NORMAL

## 2021-06-09 ENCOUNTER — READMISSION MANAGEMENT (OUTPATIENT)
Dept: CALL CENTER | Facility: HOSPITAL | Age: 81
End: 2021-06-09

## 2021-06-09 NOTE — OUTREACH NOTE
Medical Week 1 Survey      Responses   [facility] patient discharged from?  [facility]   Does the patient have one of the following disease processes/diagnoses(primary or secondary)?  Other   Week 1 attempt successful?  Yes   Call start time  1023   Call end time  1030   Discharge diagnosis  Cellulitis of left lower extremity without foot Left leg cellulitis, extensive requiring IV antibiotic therapy   Meds reviewed with patient/caregiver?  Yes   Is the patient having any side effects they believe may be caused by any medication additions or changes?  No   Does the patient have all medications ordered at discharge?  Yes   Is the patient taking all medications as directed (includes completed medication regime)?  Yes   Does the patient have a primary care provider?   Yes   Does the patient have an appointment with their PCP within 7 days of discharge?  No   Comments regarding PCP  PATIENT STATES HE IS MAKING HIS FOLLOW UP APPOINTMENT TODAY   What is preventing the patient from scheduling follow up appointments within 7 days of discharge?  Haven't had time   Nursing Interventions  Educated patient on importance of making appointment, Advised patient to make appointment   Has the patient kept scheduled appointments due by today?  N/A   Has home health visited the patient within 72 hours of discharge?  N/A   Home health comments  PATIENT STATES HIS INSURANCE WOULD NOT COVER HOME HEALTH, BUT HE HAS A CALL OUT TO HIS INSURANCE COMPANY TO DISCUSS THIS AND TRY TO GET HOME HEALTH PAID FOR DUE TO HIS CELLULITIS LOOKING WORSE INSTEAD OF BETTER   Psychosocial issues?  No   Did the patient receive a copy of their discharge instructions?  Yes   Nursing interventions  Reviewed instructions with patient   What is the patient's perception of their health status since discharge?  Worsening [PATIENT HAS A CALL OUT TO HIS INSURANCE COMPANY TO SEE IF HE CAN GET APPROVED FOR HOME HEALTH AND POSSIBLY GET IV ANTIBIOTICS ORDERED. PATIENT  GIVEN WARNING SIGNS FOR GOING TO THE HOSPITAL. ]   Is the patient/caregiver able to teach back signs and symptoms related to disease process for when to call PCP?  Yes   Is the patient/caregiver able to teach back signs and symptoms related to disease process for when to call 911?  Yes   Is the patient/caregiver able to teach back the hierarchy of who to call/visit for symptoms/problems? PCP, Specialist, Home health nurse, Urgent Care, ED, 911  Yes   If the patient is a current smoker, are they able to teach back resources for cessation?  Not a smoker   Week 1 call completed?  Yes          Estefania Price LPN

## 2021-06-16 ENCOUNTER — HOSPITAL ENCOUNTER (INPATIENT)
Facility: HOSPITAL | Age: 81
LOS: 2 days | Discharge: HOME HEALTH | End: 2021-06-18
Attending: EMERGENCY MEDICINE | Admitting: INTERNAL MEDICINE

## 2021-06-16 DIAGNOSIS — L03.116 LEFT LEG CELLULITIS: Primary | ICD-10-CM

## 2021-06-16 LAB
ANION GAP SERPL CALCULATED.3IONS-SCNC: 10 MMOL/L (ref 5–15)
BASOPHILS # BLD AUTO: 0.1 10*3/MM3 (ref 0–0.2)
BASOPHILS NFR BLD AUTO: 0.9 % (ref 0–1.5)
BUN SERPL-MCNC: 17 MG/DL (ref 8–23)
BUN/CREAT SERPL: 17.9 (ref 7–25)
CALCIUM SPEC-SCNC: 9.3 MG/DL (ref 8.6–10.5)
CHLORIDE SERPL-SCNC: 104 MMOL/L (ref 98–107)
CO2 SERPL-SCNC: 24 MMOL/L (ref 22–29)
CREAT SERPL-MCNC: 0.95 MG/DL (ref 0.76–1.27)
DEPRECATED RDW RBC AUTO: 48.6 FL (ref 37–54)
EOSINOPHIL # BLD AUTO: 0.3 10*3/MM3 (ref 0–0.4)
EOSINOPHIL NFR BLD AUTO: 5.3 % (ref 0.3–6.2)
ERYTHROCYTE [DISTWIDTH] IN BLOOD BY AUTOMATED COUNT: 14.9 % (ref 12.3–15.4)
GFR SERPL CREATININE-BSD FRML MDRD: 77 ML/MIN/1.73
GLUCOSE SERPL-MCNC: 123 MG/DL (ref 65–99)
HCT VFR BLD AUTO: 30.5 % (ref 37.5–51)
HGB BLD-MCNC: 10.3 G/DL (ref 13–17.7)
LYMPHOCYTES # BLD AUTO: 1.3 10*3/MM3 (ref 0.7–3.1)
LYMPHOCYTES NFR BLD AUTO: 24.9 % (ref 19.6–45.3)
MCH RBC QN AUTO: 32.2 PG (ref 26.6–33)
MCHC RBC AUTO-ENTMCNC: 33.9 G/DL (ref 31.5–35.7)
MCV RBC AUTO: 95 FL (ref 79–97)
MONOCYTES # BLD AUTO: 0.6 10*3/MM3 (ref 0.1–0.9)
MONOCYTES NFR BLD AUTO: 11.3 % (ref 5–12)
NEUTROPHILS NFR BLD AUTO: 3.1 10*3/MM3 (ref 1.7–7)
NEUTROPHILS NFR BLD AUTO: 57.6 % (ref 42.7–76)
NRBC BLD AUTO-RTO: 0.1 /100 WBC (ref 0–0.2)
PLATELET # BLD AUTO: 295 10*3/MM3 (ref 140–450)
PMV BLD AUTO: 7 FL (ref 6–12)
POTASSIUM SERPL-SCNC: 4.4 MMOL/L (ref 3.5–5.2)
RBC # BLD AUTO: 3.21 10*6/MM3 (ref 4.14–5.8)
SODIUM SERPL-SCNC: 138 MMOL/L (ref 136–145)
WBC # BLD AUTO: 5.4 10*3/MM3 (ref 3.4–10.8)

## 2021-06-16 PROCEDURE — 99222 1ST HOSP IP/OBS MODERATE 55: CPT | Performed by: NURSE PRACTITIONER

## 2021-06-16 PROCEDURE — 85025 COMPLETE CBC W/AUTO DIFF WBC: CPT | Performed by: EMERGENCY MEDICINE

## 2021-06-16 PROCEDURE — 36415 COLL VENOUS BLD VENIPUNCTURE: CPT

## 2021-06-16 PROCEDURE — 80048 BASIC METABOLIC PNL TOTAL CA: CPT | Performed by: EMERGENCY MEDICINE

## 2021-06-16 PROCEDURE — 87040 BLOOD CULTURE FOR BACTERIA: CPT | Performed by: EMERGENCY MEDICINE

## 2021-06-16 PROCEDURE — 25010000002 CEFTAROLINE FOSAMIL PER 10 MG: Performed by: EMERGENCY MEDICINE

## 2021-06-16 PROCEDURE — 99284 EMERGENCY DEPT VISIT MOD MDM: CPT

## 2021-06-16 RX ORDER — NITROGLYCERIN 0.4 MG/1
0.4 TABLET SUBLINGUAL
Status: DISCONTINUED | OUTPATIENT
Start: 2021-06-16 | End: 2021-06-18 | Stop reason: HOSPADM

## 2021-06-16 RX ORDER — METHOCARBAMOL 500 MG/1
500 TABLET, FILM COATED ORAL 3 TIMES DAILY PRN
Status: DISCONTINUED | OUTPATIENT
Start: 2021-06-16 | End: 2021-06-18 | Stop reason: HOSPADM

## 2021-06-16 RX ORDER — PANTOPRAZOLE SODIUM 40 MG/1
40 TABLET, DELAYED RELEASE ORAL
Status: DISCONTINUED | OUTPATIENT
Start: 2021-06-17 | End: 2021-06-18 | Stop reason: HOSPADM

## 2021-06-16 RX ORDER — ACETAMINOPHEN 325 MG/1
650 TABLET ORAL EVERY 4 HOURS PRN
Status: DISCONTINUED | OUTPATIENT
Start: 2021-06-16 | End: 2021-06-18 | Stop reason: HOSPADM

## 2021-06-16 RX ORDER — CHLORAL HYDRATE 500 MG
2000 CAPSULE ORAL 2 TIMES DAILY WITH MEALS
COMMUNITY

## 2021-06-16 RX ORDER — ONDANSETRON 4 MG/1
4 TABLET, FILM COATED ORAL EVERY 6 HOURS PRN
Status: DISCONTINUED | OUTPATIENT
Start: 2021-06-16 | End: 2021-06-18 | Stop reason: HOSPADM

## 2021-06-16 RX ORDER — ONDANSETRON 2 MG/ML
4 INJECTION INTRAMUSCULAR; INTRAVENOUS EVERY 6 HOURS PRN
Status: DISCONTINUED | OUTPATIENT
Start: 2021-06-16 | End: 2021-06-18 | Stop reason: HOSPADM

## 2021-06-16 RX ORDER — LIDOCAINE 50 MG/G
1 PATCH TOPICAL NIGHTLY
Status: DISCONTINUED | OUTPATIENT
Start: 2021-06-17 | End: 2021-06-18 | Stop reason: HOSPADM

## 2021-06-16 RX ORDER — ACETAMINOPHEN 650 MG/1
650 SUPPOSITORY RECTAL EVERY 4 HOURS PRN
Status: DISCONTINUED | OUTPATIENT
Start: 2021-06-16 | End: 2021-06-18 | Stop reason: HOSPADM

## 2021-06-16 RX ORDER — CHOLECALCIFEROL (VITAMIN D3) 125 MCG
5 CAPSULE ORAL NIGHTLY PRN
Status: DISCONTINUED | OUTPATIENT
Start: 2021-06-16 | End: 2021-06-18 | Stop reason: HOSPADM

## 2021-06-16 RX ORDER — AMIODARONE HYDROCHLORIDE 200 MG/1
100 TABLET ORAL DAILY
Status: DISCONTINUED | OUTPATIENT
Start: 2021-06-17 | End: 2021-06-18 | Stop reason: HOSPADM

## 2021-06-16 RX ORDER — SODIUM CHLORIDE 0.9 % (FLUSH) 0.9 %
3-10 SYRINGE (ML) INJECTION AS NEEDED
Status: DISCONTINUED | OUTPATIENT
Start: 2021-06-16 | End: 2021-06-18 | Stop reason: HOSPADM

## 2021-06-16 RX ORDER — SODIUM CHLORIDE 9 MG/ML
75 INJECTION, SOLUTION INTRAVENOUS CONTINUOUS
Status: DISCONTINUED | OUTPATIENT
Start: 2021-06-16 | End: 2021-06-18 | Stop reason: HOSPADM

## 2021-06-16 RX ORDER — GABAPENTIN 400 MG/1
400 CAPSULE ORAL 3 TIMES DAILY
Status: DISCONTINUED | OUTPATIENT
Start: 2021-06-16 | End: 2021-06-16 | Stop reason: SDUPTHER

## 2021-06-16 RX ORDER — SUCRALFATE 1 G/1
1 TABLET ORAL
Status: DISCONTINUED | OUTPATIENT
Start: 2021-06-17 | End: 2021-06-18 | Stop reason: HOSPADM

## 2021-06-16 RX ORDER — MELATONIN
1000 DAILY
Status: DISCONTINUED | OUTPATIENT
Start: 2021-06-17 | End: 2021-06-18 | Stop reason: HOSPADM

## 2021-06-16 RX ORDER — SPIRONOLACTONE 25 MG/1
25 TABLET ORAL DAILY
Status: DISCONTINUED | OUTPATIENT
Start: 2021-06-17 | End: 2021-06-18 | Stop reason: HOSPADM

## 2021-06-16 RX ORDER — GABAPENTIN 400 MG/1
400 CAPSULE ORAL 3 TIMES DAILY
Status: DISCONTINUED | OUTPATIENT
Start: 2021-06-16 | End: 2021-06-18 | Stop reason: HOSPADM

## 2021-06-16 RX ORDER — CLOPIDOGREL BISULFATE 75 MG/1
75 TABLET ORAL DAILY
Status: DISCONTINUED | OUTPATIENT
Start: 2021-06-17 | End: 2021-06-18 | Stop reason: HOSPADM

## 2021-06-16 RX ORDER — SODIUM CHLORIDE 0.9 % (FLUSH) 0.9 %
3 SYRINGE (ML) INJECTION EVERY 12 HOURS SCHEDULED
Status: DISCONTINUED | OUTPATIENT
Start: 2021-06-16 | End: 2021-06-18 | Stop reason: HOSPADM

## 2021-06-16 RX ORDER — MIDODRINE HYDROCHLORIDE 5 MG/1
10 TABLET ORAL
Status: DISCONTINUED | OUTPATIENT
Start: 2021-06-17 | End: 2021-06-18 | Stop reason: HOSPADM

## 2021-06-16 RX ORDER — SODIUM CHLORIDE 0.9 % (FLUSH) 0.9 %
10 SYRINGE (ML) INJECTION AS NEEDED
Status: DISCONTINUED | OUTPATIENT
Start: 2021-06-16 | End: 2021-06-18 | Stop reason: HOSPADM

## 2021-06-16 RX ORDER — ASPIRIN 81 MG/1
81 TABLET ORAL DAILY
Status: DISCONTINUED | OUTPATIENT
Start: 2021-06-17 | End: 2021-06-18 | Stop reason: HOSPADM

## 2021-06-16 RX ORDER — ACETAMINOPHEN 160 MG/5ML
650 SOLUTION ORAL EVERY 4 HOURS PRN
Status: DISCONTINUED | OUTPATIENT
Start: 2021-06-16 | End: 2021-06-18 | Stop reason: HOSPADM

## 2021-06-16 RX ADMIN — LIDOCAINE 1 PATCH: 50 PATCH TOPICAL at 23:53

## 2021-06-16 RX ADMIN — CEFTAROLINE FOSAMIL 600 MG: 600 POWDER, FOR SOLUTION INTRAVENOUS at 18:20

## 2021-06-16 RX ADMIN — SODIUM CHLORIDE 75 ML/HR: 9 INJECTION, SOLUTION INTRAVENOUS at 23:47

## 2021-06-16 RX ADMIN — SUCRALFATE 1 G: 1 TABLET ORAL at 23:52

## 2021-06-16 RX ADMIN — GABAPENTIN 400 MG: 400 CAPSULE ORAL at 21:01

## 2021-06-16 NOTE — ED TRIAGE NOTES
Pt reports cellulitis dx to LLE last week-was hospitalized for, no longer on oral abx. Now with increased pain, redness and swelling

## 2021-06-16 NOTE — ED PROVIDER NOTES
Subjective   History of Present Illness  Left leg cellulitis  80-year-old male describes increasing redness and burning and fever in the left leg with history of recurrent cellulitis.  Is recently finished a course of oral antibiotics but states about 2 to 3 days after his discharge from the IV antibiotics he started having some recurrence and increasing redness and swelling despite the oral antibiotic treatment.  He reports no vomiting or chest pain or shortness of breath  Review of Systems   Constitutional: Negative.    HENT: Negative.    Eyes: Negative.    Respiratory: Negative.    Cardiovascular: Negative.    Gastrointestinal: Negative.    Genitourinary: Negative.    Musculoskeletal: Negative.    Skin: Positive for color change.   Neurological: Negative.    Psychiatric/Behavioral: Negative.        Past Medical History:   Diagnosis Date   • Aneurysm (CMS/HCC)    • Cardiomyopathy (CMS/HCC)     ICD implantation   • Cellulitis of left lower extremity 2010    recurrent   • CHD (coronary heart disease)     S/P CABG & PCI   • Dyslipidemia    • History of ventricular tachycardia    • Hypertension    • Myocardial infarction (CMS/HCC)     Inferior MI   • Pinched nerve     L4-L5   • Prostate cancer (CMS/HCC)        Allergies   Allergen Reactions   • Lovastatin Unknown (See Comments) and Myalgia     unknown   • Pravastatin Unknown (See Comments) and Myalgia     unknown   • Simvastatin Unknown (See Comments) and Myalgia     unknown       Past Surgical History:   Procedure Laterality Date   • ANGIOPLASTY  2000 04/1996 Stent: Palmaz- Huy stent/LAD 07/1996-RCA: 2000-Tetra stent Guidant to proximal RCA, mid to distal artery 2 sequential Guidant Tetra stents   • APPENDECTOMY     • CARDIAC ABLATION  April and May 2019   • CARDIAC CATHETERIZATION  07/2017    1996 x2, Nov. 2000, 05/2010-cath 08/2015-no stents 2017   • CARDIAC DEFIBRILLATOR PLACEMENT     • COLONOSCOPY W/ POLYPECTOMY      Mult colonoscopy's for polyp resection     • CORONARY ARTERY BYPASS GRAFT  05/2010    x5 vessel: LMA to diagonal, LAD, intermedius, obtuse marginal, RCA   • CORONARY STENT PLACEMENT     • ENDOSCOPY N/A 2019    Procedure: ESOPHAGOGASTRODUODENOSCOPY with dilitation Bougie 50, 54;  Surgeon: Roddy Coronel MD;  Location: [facility] ENDOSCOPY;  Service: Gastroenterology   • INSERT / REPLACE / REMOVE PACEMAKER     • KNEE OPEN LATERAL RELEASE Left     reduction   • OTHER SURGICAL HISTORY  2018    ICD implantation   • PROSTATE SURGERY      Robiotic surgery- Cyber Knife:       Family History   Problem Relation Age of Onset   • Pancreatic cancer Mother    • Heart disease Father        Social History     Socioeconomic History   • Marital status:      Spouse name: Not on file   • Number of children: Not on file   • Years of education: Not on file   • Highest education level: Not on file   Tobacco Use   • Smoking status: Former Smoker     Packs/day: 1.00     Years: 17.00     Pack years: 17.00     Types: Cigarettes     Quit date: 2002     Years since quittin.9   • Smokeless tobacco: Never Used   Vaping Use   • Vaping Use: Never used   Substance and Sexual Activity   • 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 Authorizing Provider   amiodarone (PACERONE) 200 MG tablet Take 100 mg by mouth Daily.    ProviderCyndee MD   aspirin 81 MG EC tablet Take 81 mg by mouth Daily.    Cyndee Melgar MD   cephalexin (Keflex) 500 MG capsule Take 1 capsule by mouth 3 (Three) Times a Day. 21   Lei Álvarez MD   Cholecalciferol (VITAMIN D-400) 400 units tablet Take 2 tablets daily    ProviderCyndee MD   clopidogrel (PLAVIX) 75 MG tablet Take 75 mg by mouth Daily. 9/10/15   Cyndee Melgar MD   cyanocobalamin 1000 MCG/ML injection Inject 1,000 mcg into the appropriate muscle as  "directed by prescriber Every 14 (Fourteen) Days.    Cyndee Melgar MD   doxycycline (DORYX) 100 MG enteric coated tablet Take 1 tablet by mouth 2 (Two) Times a Day. 6/2/21   Lei Álvarez MD   ezetimibe (ZETIA) 10 MG tablet Take 10 mg by mouth Daily.    Cyndee Melgar MD   gabapentin (NEURONTIN) 400 MG capsule Take 400 mg by mouth 3 (Three) Times a Day.    Cyndee Melgar MD   lidocaine (LIDODERM) 5 % Place 1 patch on the skin as directed by provider Daily. Remove & Discard patch within 12 hours or as directed by MD    Cyndee Melgar MD   methocarbamol (ROBAXIN) 500 MG tablet Take 500 mg by mouth 3 (Three) Times a Day As Needed for Muscle Spasms.    Cyndee Melgar MD   midodrine (PROAMATINE) 10 MG tablet Take 1 tablet by mouth 3 (Three) Times a Day Before Meals. 4/22/21   Marcin Childers DO   nitroglycerin (NITROSTAT) 0.4 MG SL tablet Place 0.4 mg under the tongue Every 5 (Five) Minutes As Needed.    Cyndee Melgar MD   pantoprazole (PROTONIX) 40 MG EC tablet Take 40 mg by mouth Daily.    Cyndee Melgar MD   spironolactone (ALDACTONE) 25 MG tablet Take 1 tablet by mouth Daily. 7/8/19   Constantino Arvizu MD   sucralfate (CARAFATE) 1 g tablet Take 1 g by mouth 4 (Four) Times a Day.    Cyndee Melgar MD     /68   Pulse 76   Temp 98.3 °F (36.8 °C) (Oral)   Resp 20   Ht 185.4 cm (73\")   Wt 90.1 kg (198 lb 10.2 oz)   SpO2 100%   BMI 26.21 kg/m²   I examined the patient using the appropriate personal protective equipment.        Objective   Physical Exam  General: Well-developed well-appearing, no acute distress, alert and appropriate  Eyes:  sclera nonicteric  HEENT: Mucous membranes moist, no mucosal swelling  Neck: Supple, no nuchal rigidity, no lymphadenopathy  Respirations: Respirations nonlabored, equal breath sounds bilaterally, clear lungs  Heart regular rate and rhythm, no murmurs rubs or gallops,   Abdomen soft nontender " nondistended, no hepatosplenomegaly,   Extremities there is some erythema and localized fever to the left lower leg, there is no subcu air or crepitus he has normal pulses distally  Neuro cranial nerves grossly intact, no focal limb deficits  Psych oriented, pleasant affect  Skin no rash, brisk cap refill  Procedures           ED Course      Results for orders placed or performed during the hospital encounter of 06/16/21   Basic Metabolic Panel    Specimen: Blood   Result Value Ref Range    Glucose 123 (H) 65 - 99 mg/dL    BUN 17 8 - 23 mg/dL    Creatinine 0.95 0.76 - 1.27 mg/dL    Sodium 138 136 - 145 mmol/L    Potassium 4.4 3.5 - 5.2 mmol/L    Chloride 104 98 - 107 mmol/L    CO2 24.0 22.0 - 29.0 mmol/L    Calcium 9.3 8.6 - 10.5 mg/dL    eGFR Non African Amer 77 >60 mL/min/1.73    BUN/Creatinine Ratio 17.9 7.0 - 25.0    Anion Gap 10.0 5.0 - 15.0 mmol/L   CBC Auto Differential    Specimen: Blood   Result Value Ref Range    WBC 5.40 3.40 - 10.80 10*3/mm3    RBC 3.21 (L) 4.14 - 5.80 10*6/mm3    Hemoglobin 10.3 (L) 13.0 - 17.7 g/dL    Hematocrit 30.5 (L) 37.5 - 51.0 %    MCV 95.0 79.0 - 97.0 fL    MCH 32.2 26.6 - 33.0 pg    MCHC 33.9 31.5 - 35.7 g/dL    RDW 14.9 12.3 - 15.4 %    RDW-SD 48.6 37.0 - 54.0 fl    MPV 7.0 6.0 - 12.0 fL    Platelets 295 140 - 450 10*3/mm3    Neutrophil % 57.6 42.7 - 76.0 %    Lymphocyte % 24.9 19.6 - 45.3 %    Monocyte % 11.3 5.0 - 12.0 %    Eosinophil % 5.3 0.3 - 6.2 %    Basophil % 0.9 0.0 - 1.5 %    Neutrophils, Absolute 3.10 1.70 - 7.00 10*3/mm3    Lymphocytes, Absolute 1.30 0.70 - 3.10 10*3/mm3    Monocytes, Absolute 0.60 0.10 - 0.90 10*3/mm3    Eosinophils, Absolute 0.30 0.00 - 0.40 10*3/mm3    Basophils, Absolute 0.10 0.00 - 0.20 10*3/mm3    nRBC 0.1 0.0 - 0.2 /100 WBC                                          MDM  Patient has a recurrent apparent left leg cellulitis.  Findings discussed with Dr. Lopes, infectious disease and patient was restarted on Teflaro.  Case discussed with the  hospitalist service and patient mated for further care.  Notably patient has had negative vascular Doppler studies for DVT and has no sign of abscess or compartment syndrome or joint space infection on today's physical exam.  He has normal pulses distally and motor function and sensory function of the foot  Final diagnoses:   Left leg cellulitis       ED Disposition  ED Disposition     ED Disposition Condition Comment    Decision to Admit  Level of Care: Med/Surg [1]   Diagnosis: Left leg cellulitis [346654]   Admitting Physician: SHAWNA HAWKINS [[id number]]   Certification: I Certify That Inpatient Hospital Services Are Medically Necessary For Greater Than 2 Midnights            No follow-up provider specified.       Medication List      No changes were made to your prescriptions during this visit.          Han Faustin MD  06/16/21 8558

## 2021-06-17 ENCOUNTER — READMISSION MANAGEMENT (OUTPATIENT)
Dept: CALL CENTER | Facility: HOSPITAL | Age: 81
End: 2021-06-17

## 2021-06-17 LAB
ANION GAP SERPL CALCULATED.3IONS-SCNC: 7 MMOL/L (ref 5–15)
BASOPHILS # BLD AUTO: 0 10*3/MM3 (ref 0–0.2)
BASOPHILS NFR BLD AUTO: 1 % (ref 0–1.5)
BUN SERPL-MCNC: 16 MG/DL (ref 8–23)
BUN/CREAT SERPL: 19.3 (ref 7–25)
CALCIUM SPEC-SCNC: 8.6 MG/DL (ref 8.6–10.5)
CHLORIDE SERPL-SCNC: 106 MMOL/L (ref 98–107)
CO2 SERPL-SCNC: 27 MMOL/L (ref 22–29)
CREAT SERPL-MCNC: 0.83 MG/DL (ref 0.76–1.27)
D-LACTATE SERPL-SCNC: 0.5 MMOL/L (ref 0.5–2)
DEPRECATED RDW RBC AUTO: 47.7 FL (ref 37–54)
EOSINOPHIL # BLD AUTO: 0.2 10*3/MM3 (ref 0–0.4)
EOSINOPHIL NFR BLD AUTO: 6.4 % (ref 0.3–6.2)
ERYTHROCYTE [DISTWIDTH] IN BLOOD BY AUTOMATED COUNT: 14.5 % (ref 12.3–15.4)
GFR SERPL CREATININE-BSD FRML MDRD: 90 ML/MIN/1.73
GLUCOSE SERPL-MCNC: 111 MG/DL (ref 65–99)
HCT VFR BLD AUTO: 30.3 % (ref 37.5–51)
HGB BLD-MCNC: 10.2 G/DL (ref 13–17.7)
LYMPHOCYTES # BLD AUTO: 1.4 10*3/MM3 (ref 0.7–3.1)
LYMPHOCYTES NFR BLD AUTO: 35.5 % (ref 19.6–45.3)
MCH RBC QN AUTO: 31.6 PG (ref 26.6–33)
MCHC RBC AUTO-ENTMCNC: 33.8 G/DL (ref 31.5–35.7)
MCV RBC AUTO: 93.7 FL (ref 79–97)
MONOCYTES # BLD AUTO: 0.6 10*3/MM3 (ref 0.1–0.9)
MONOCYTES NFR BLD AUTO: 14.8 % (ref 5–12)
NEUTROPHILS NFR BLD AUTO: 1.6 10*3/MM3 (ref 1.7–7)
NEUTROPHILS NFR BLD AUTO: 42.3 % (ref 42.7–76)
NRBC BLD AUTO-RTO: 0.1 /100 WBC (ref 0–0.2)
PLATELET # BLD AUTO: 283 10*3/MM3 (ref 140–450)
PMV BLD AUTO: 7.2 FL (ref 6–12)
POTASSIUM SERPL-SCNC: 4.5 MMOL/L (ref 3.5–5.2)
PROCALCITONIN SERPL-MCNC: 0.03 NG/ML (ref 0–0.25)
RBC # BLD AUTO: 3.23 10*6/MM3 (ref 4.14–5.8)
SARS-COV-2 ORF1AB RESP QL NAA+PROBE: NOT DETECTED
SODIUM SERPL-SCNC: 140 MMOL/L (ref 136–145)
WBC # BLD AUTO: 3.9 10*3/MM3 (ref 3.4–10.8)

## 2021-06-17 PROCEDURE — 25010000002 ENOXAPARIN PER 10 MG: Performed by: NURSE PRACTITIONER

## 2021-06-17 PROCEDURE — 84145 PROCALCITONIN (PCT): CPT | Performed by: NURSE PRACTITIONER

## 2021-06-17 PROCEDURE — 80048 BASIC METABOLIC PNL TOTAL CA: CPT | Performed by: NURSE PRACTITIONER

## 2021-06-17 PROCEDURE — 83605 ASSAY OF LACTIC ACID: CPT | Performed by: NURSE PRACTITIONER

## 2021-06-17 PROCEDURE — 25010000002 CEFTAROLINE FOSAMIL PER 10 MG: Performed by: INTERNAL MEDICINE

## 2021-06-17 PROCEDURE — U0004 COV-19 TEST NON-CDC HGH THRU: HCPCS | Performed by: INTERNAL MEDICINE

## 2021-06-17 PROCEDURE — 99232 SBSQ HOSP IP/OBS MODERATE 35: CPT | Performed by: INTERNAL MEDICINE

## 2021-06-17 PROCEDURE — 85025 COMPLETE CBC W/AUTO DIFF WBC: CPT | Performed by: NURSE PRACTITIONER

## 2021-06-17 RX ADMIN — CEFTAROLINE FOSAMIL 600 MG: 600 POWDER, FOR SOLUTION INTRAVENOUS at 11:05

## 2021-06-17 RX ADMIN — SUCRALFATE 1 G: 1 TABLET ORAL at 21:45

## 2021-06-17 RX ADMIN — GABAPENTIN 400 MG: 400 CAPSULE ORAL at 07:50

## 2021-06-17 RX ADMIN — AMIODARONE HYDROCHLORIDE 100 MG: 200 TABLET ORAL at 07:50

## 2021-06-17 RX ADMIN — SODIUM CHLORIDE 75 ML/HR: 9 INJECTION, SOLUTION INTRAVENOUS at 15:11

## 2021-06-17 RX ADMIN — SPIRONOLACTONE 25 MG: 25 TABLET ORAL at 07:49

## 2021-06-17 RX ADMIN — LIDOCAINE 1 PATCH: 50 PATCH TOPICAL at 21:53

## 2021-06-17 RX ADMIN — GABAPENTIN 400 MG: 400 CAPSULE ORAL at 21:45

## 2021-06-17 RX ADMIN — Medication 1000 UNITS: at 07:49

## 2021-06-17 RX ADMIN — SUCRALFATE 1 G: 1 TABLET ORAL at 11:06

## 2021-06-17 RX ADMIN — PANTOPRAZOLE SODIUM 40 MG: 40 TABLET, DELAYED RELEASE ORAL at 07:49

## 2021-06-17 RX ADMIN — Medication 3 ML: at 00:33

## 2021-06-17 RX ADMIN — CLOPIDOGREL BISULFATE 75 MG: 75 TABLET ORAL at 07:49

## 2021-06-17 RX ADMIN — SUCRALFATE 1 G: 1 TABLET ORAL at 07:53

## 2021-06-17 RX ADMIN — MICONAZOLE NITRATE: 20 CREAM TOPICAL at 21:53

## 2021-06-17 RX ADMIN — GABAPENTIN 400 MG: 400 CAPSULE ORAL at 15:11

## 2021-06-17 RX ADMIN — MIDODRINE HYDROCHLORIDE 10 MG: 5 TABLET ORAL at 16:35

## 2021-06-17 RX ADMIN — MIDODRINE HYDROCHLORIDE 10 MG: 5 TABLET ORAL at 07:49

## 2021-06-17 RX ADMIN — Medication 3 ML: at 21:53

## 2021-06-17 RX ADMIN — CEFTAROLINE FOSAMIL 600 MG: 600 POWDER, FOR SOLUTION INTRAVENOUS at 21:45

## 2021-06-17 RX ADMIN — SUCRALFATE 1 G: 1 TABLET ORAL at 16:35

## 2021-06-17 RX ADMIN — ASPIRIN 81 MG: 81 TABLET, COATED ORAL at 07:50

## 2021-06-17 RX ADMIN — MIDODRINE HYDROCHLORIDE 10 MG: 5 TABLET ORAL at 11:06

## 2021-06-17 RX ADMIN — ENOXAPARIN SODIUM 40 MG: 40 INJECTION SUBCUTANEOUS at 15:11

## 2021-06-17 NOTE — DISCHARGE PLACEMENT REQUEST
"Deion Nicholas (80 y.o. Male)     Date of Birth Social Security Number Address Home Phone MRN    1940  [id number] [place] [address] [phone] [id number]    Episcopalian Marital Status          Mormonism        Admission Date Admission Type Admitting Provider Attending Provider Department, Room/Bed    6/16/21 Emergency Dao Heart DO Riddle, Lee M, DO [facility] 3C MEDICAL INPATIENT, 381/1    Discharge Date Discharge Disposition Discharge Destination                       Attending Provider: Dao Heart DO    Allergies: Lovastatin, Pravastatin, Simvastatin    Isolation: None   Infection: COVID Screen (preop/placement) (06/17/21)   Code Status: CPR    Ht: 185.4 cm (73\")   Wt: 90.1 kg (198 lb 10.2 oz)    Admission Cmt: None   Principal Problem: None                Active Insurance as of 6/16/2021     Primary Coverage     Payor Plan Insurance Group Employer/Plan Group    [employer] MEDICARE REPLACEMENT [employer] MEDICARE ADVANTAGE [id number]     Payor Plan Address Payor Plan Phone Number Payor Plan Fax Number Effective Dates    [address] [phone]  10/1/2019 - None Entered    [facility] [place] [address]       Subscriber Name Subscriber Birth Date Member ID       DEION NICHOLAS 1940 [id number]           Secondary Coverage     Payor Plan Insurance Group Employer/Plan Group    [id number]     Payor Plan Address Payor Plan Phone Number Payor Plan Fax Number Effective Dates    [facility] OFF[facility] COMM[facility]ITY [facility] [phone]  1/1/2020 - None Entered    [address] [address]       [facility] [address]       Subscriber Name Subscriber Birth Date Member ID       DEION NICHOLAS 1940 [id number]                 Emergency Contacts      (Rel.) Home Phone Work Phone Mobile Phone    pepesuze galindo (Son) [phone] -- --    SIM MOROCHO (Friend) [phone] -- --              "

## 2021-06-17 NOTE — CASE MANAGEMENT/SOCIAL WORK
Discharge Planning Assessment   [facility]     Patient Name: Deion Nicholas  MRN: [id number]  Today's Date: 6/17/2021    Admit Date: 6/16/2021    Discharge Needs Assessment     Row Name 06/17/21 1438       Living Environment    Lives With  alone    Current Living Arrangements  home/apartment/condo    Potentially Unsafe Housing Conditions  unable to assess    Primary Care Provided by  self    Provides Primary Care For  no one, unable/limited ability to care for self    Family Caregiver if Needed  child(tai), adult    Able to Return to Prior Arrangements  yes       Transition Planning    Patient/Family Anticipated Services at Transition  none    Transportation Anticipated  car, drives self;family or friend will provide       Discharge Needs Assessment    Readmission Within the Last 30 Days  previous discharge plan unsuccessful    Equipment Currently Used at Home  cane, straight;walker, rolling    Concerns to be Addressed  discharge planning         Plan    Plan  return home with referral to [facility] home helath and option care for iv antibiotics    Plan Comments  spoke to patient in room with ppe(mask and goggles) staying less than 15 mins and 6 feet away; he is frustrated regarding previous hospital stay and unable to have home helath services; he states that [employer] has notified him he is eligible for nurse services; [facility] primary [facility] [place]; he gets medication from the hospital pharmacy or [facility]; referral to [facility] home health and option care since these were referrals last visit  for possible iv antibiotics; barrier is awaiting dr hong consult         General Information    Admission Type  inpatient    Arrived From  emergency department    Required Notices Provided  Important Message from Medicare    Preferred Language  English     Used During This Interaction  no        Functional Status     Row Name 06/17/21 1438       Functional Status    Usual Activity Tolerance  fair     Current Activity Tolerance  fair       Functional Status, IADL    Medications  independent    Meal Preparation  independent      Patient Forms    Important Message from Medicare ([facility])  Delivered per documentation im letter given 06/16            Carol naegele rn  Case management  Office number [phone]  Cell phone [phone]

## 2021-06-17 NOTE — PROGRESS NOTES
"      [facility] Medicine Services Daily Progress Note      Hospitalist Team  LOS 1 days      Patient Care Team:  Makenna Motta MD as PCP - General  Makenna Motta MD as PCP - Family Medicine    Patient Location: [facility]/1      Subjective   Subjective     Chief Complaint / Subjective  Chief Complaint   Patient presents with   • Cellulitis         Brief Synopsis of Hospital Course/HPI        Date::    6/17/2021: Reports improved swelling in the left lower extremity and redness improving.  No fever      Review of Systems   Constitutional: Negative for fever.   Skin: Negative for color change.         Objective   Objective      Vital Signs  Temp:  [98 °F (36.7 °C)] 98 °F (36.7 °C)  Heart Rate:  [62-78] 67  Resp:  [16] 16  BP: (121-140)/(55-73) 134/68  Oxygen Therapy  SpO2: 99 %  Pulse Oximetry Type: Intermittent  Device (Oxygen Therapy): room air  Flowsheet Rows      First Filed Value   Admission Height  185.4 cm (73\") Documented at 06/16/2021 1652   Admission Weight  90.1 kg (198 lb 10.2 oz) Documented at 06/16/2021 1652        Intake & Output (last 3 days)       06/14 0701 - 06/15 0700 06/15 0701 - 06/16 0700 06/16 0701 - 06/17 0700 06/17 0701 - 06/18 0700    P.O.   480     I.V. (mL/kg)   1000 (11.1)     Total Intake(mL/kg)   1480 (16.4)     Urine (mL/kg/hr)    1575 (1.7)    Total Output    1575    Net   +1480 -1575                Lines, Drains & Airways    Active LDAs     Name:   Placement date:   Placement time:   Site:   Days:    Peripheral IV 06/16/21 1742 Left Antecubital   06/16/21    1742    Antecubital   less than 1                  Physical Exam:    Physical Exam  Vitals reviewed.   Cardiovascular:      Rate and Rhythm: Normal rate.   Pulmonary:      Effort: Pulmonary effort is normal.   Abdominal:      Palpations: Abdomen is soft.   Musculoskeletal:      Left lower leg: No edema.   Skin:     Comments: Minimal erythema to the left lower extremity shin   Neurological:      " Mental Status: He is alert and oriented to person, place, and time.   Psychiatric:         Mood and Affect: Mood normal.              Wounds (last 24 hours)      LDA Wound     Row Name 06/17/21 0800 06/17/21 0325 06/17/21 0110       Wound 06/17/21 0049 Left lower leg Other (comment)    Wound - Properties Group Placement Date: 06/17/21  -CM Placement Time: 0049  -CM Present on Hospital Admission: Y  -CM Side: Left  -CM Orientation: lower  -CM Location: leg  -CM Primary Wound Type: Other  -CM Stage, Pressure Injury : other (see comments)  -CM Additional Comments: Redness and cellulitis  -CM    Dressing Appearance  open to air  -AK  open to air  -CM  open to air  -CM    Closure  Open to air  -AK  --  --    Base  blanchable;red cellulitis  -AK  blanchable;red  -CM  blanchable;red  -CM    Retired Wound - Properties Group Date first assessed: 06/17/21  -CM Time first assessed: 0049  -CM Present on Hospital Admission: Y  -CM Side: Left  -CM Location: leg  -CM Primary Wound Type: Other  -CM Additional Comments: Redness and cellulitis  -CM      User Key  (r) = Recorded By, (t) = Taken By, (c) = Cosigned By    Initials Name Provider Type    Brooklyn Lux RN Registered Nurse    Brian Londono LPN Licensed Nurse          Procedures:              Results Review:     I reviewed the patient's new clinical results.      Lab Results (last 24 hours)     Procedure Component Value Units Date/Time    COVID PRE-OP / PRE-PROCEDURE SCREENING ORDER (NO ISOLATION) - Swab, Nasopharynx [[id number]]  (Normal) Collected: 06/17/21 0756    Specimen: Swab from Nasopharynx Updated: 06/17/21 1449    Narrative:      The following orders were created for panel order COVID PRE-OP / PRE-PROCEDURE SCREENING ORDER (NO ISOLATION) - Swab, Nasopharynx.  Procedure                               Abnormality         Status                     ---------                               -----------         ------                     COVID-19,APTIMA  "PANTHER,...[[id number]]  Normal              Final result                 Please view results for these tests on the individual orders.    COVID-19,APTIMA PANTHER,[facility], NP/OP SWAB IN UTM/VTM/SALINE TRANSPORT MEDIA,24 HR TAT - Swab, Nasopharynx [[id number]]  (Normal) Collected: 06/17/21 0756    Specimen: Swab from Nasopharynx Updated: 06/17/21 1449     COVID19 Not Detected    Narrative:      Fact sheet for providers: https://www.fda.gov/media/584426/download     Fact sheet for patients: https://www.fda.gov/media/153800/download    Test performed by RT PCR.    Procalcitonin [[id number]]  (Normal) Collected: 06/17/21 0632    Specimen: Blood Updated: 06/17/21 0730     Procalcitonin 0.03 ng/mL     Narrative:      As a Marker for Sepsis (Non-Neonates):     1. <0.5 ng/mL represents a low risk of severe sepsis and/or septic shock.  2. >2 ng/mL represents a high risk of severe sepsis and/or septic shock.    As a Marker for Lower Respiratory Tract Infections that require antibiotic therapy:  PCT on Admission     Antibiotic Therapy             6-12 Hrs later  >0.5                          Strongly Recommended            >0.25 - <0.5             Recommended  0.1 - 0.25                  Discouraged                       Remeasure/reassess PCT  <0.1                         Strongly Discouraged         Remeasure/reassess PCT      As 28 day mortality risk marker: \"Change in Procalcitonin Result\" (>80% or <=80%) if Day 0 (or Day 1) and Day 4 values are available. Refer to http://www.[employer]-pct-calculator.com    Change in PCT <=80%   A decrease of PCT levels below or equal to 80% defines a positive change in PCT test result representing a higher risk for 28-day all-cause mortality of patients diagnosed with severe sepsis or septic shock.    Change in PCT >80%   A decrease of PCT levels of more than 80% defines a negative change in PCT result representing a lower risk for 28-day all-cause mortality of patients diagnosed with " severe sepsis or septic shock.    This test is Prognostic not Diagnostic, if elevated correlate with clinical findings before administering antibiotic treatment.      Results may be falsely decreased if patient taking Biotin.     Lactic Acid, Plasma [[id number]]  (Normal) Collected: 06/17/21 0632    Specimen: Blood Updated: 06/17/21 0727     Lactate 0.5 mmol/L     Basic Metabolic Panel [[id number]]  (Abnormal) Collected: 06/17/21 0632    Specimen: Blood Updated: 06/17/21 0725     Glucose 111 mg/dL      BUN 16 mg/dL      Creatinine 0.83 mg/dL      Sodium 140 mmol/L      Potassium 4.5 mmol/L      Chloride 106 mmol/L      CO2 27.0 mmol/L      Calcium 8.6 mg/dL      eGFR Non African Amer 90 mL/min/1.73      BUN/Creatinine Ratio 19.3     Anion Gap 7.0 mmol/L     Narrative:      GFR Normal >60  Chronic Kidney Disease <60  Kidney Failure <15      CBC Auto Differential [[id number]]  (Abnormal) Collected: 06/17/21 0632    Specimen: Blood Updated: 06/17/21 0703     WBC 3.90 10*3/mm3      RBC 3.23 10*6/mm3      Hemoglobin 10.2 g/dL      Hematocrit 30.3 %      MCV 93.7 fL      MCH 31.6 pg      MCHC 33.8 g/dL      RDW 14.5 %      RDW-SD 47.7 fl      MPV 7.2 fL      Platelets 283 10*3/mm3      Neutrophil % 42.3 %      Lymphocyte % 35.5 %      Monocyte % 14.8 %      Eosinophil % 6.4 %      Basophil % 1.0 %      Neutrophils, Absolute 1.60 10*3/mm3      Lymphocytes, Absolute 1.40 10*3/mm3      Monocytes, Absolute 0.60 10*3/mm3      Eosinophils, Absolute 0.20 10*3/mm3      Basophils, Absolute 0.00 10*3/mm3      nRBC 0.1 /100 WBC         No results found for: HGBA1C            Lab Results   Component Value Date    LIPASE 23 06/22/2019     Lab Results   Component Value Date    CHOL 113 05/13/2021    TRIG 59 05/13/2021    HDL 42 05/13/2021    LDL 58 05/13/2021       No results found for: INTRAOP, PREDX, FINALDX, COMDX    Microbiology Results (last 10 days)     Procedure Component Value - Date/Time    COVID PRE-OP / PRE-PROCEDURE SCREENING  ORDER (NO ISOLATION) - Swab, Nasopharynx [[id number]]  (Normal) Collected: 06/17/21 0756    Lab Status: Final result Specimen: Swab from Nasopharynx Updated: 06/17/21 1449    Narrative:      The following orders were created for panel order COVID PRE-OP / PRE-PROCEDURE SCREENING ORDER (NO ISOLATION) - Swab, Nasopharynx.  Procedure                               Abnormality         Status                     ---------                               -----------         ------                     COVID-19,APTIMA PANTHER,...[[id number]]  Normal              Final result                 Please view results for these tests on the individual orders.    COVID-19,APTIMA PANTHER,[facility], NP/OP SWAB IN UTM/VTM/SALINE TRANSPORT MEDIA,24 HR TAT - Swab, Nasopharynx [[id number]]  (Normal) Collected: 06/17/21 0756    Lab Status: Final result Specimen: Swab from Nasopharynx Updated: 06/17/21 1449     COVID19 Not Detected    Narrative:      Fact sheet for providers: https://www.fda.gov/media/488055/download     Fact sheet for patients: https://www.fda.gov/media/542704/download    Test performed by RT PCR.          ECG/EMG Results (most recent)     None          Results for orders placed during the hospital encounter of 05/29/21    Duplex Venous Lower Extremity - Left CAR    Interpretation Summary  · Left popliteal fossa fluid collection.  · Normal left lower extremity venous duplex scan.          No radiology results for the last 7 days        Xrays, labs reviewed personally by physician.    Medication Review:   I have reviewed the patient's current medication list      Scheduled Meds  amiodarone, 100 mg, Oral, Daily  aspirin, 81 mg, Oral, Daily  ceftaroline, 600 mg, Intravenous, BID  cholecalciferol, 1,000 Units, Oral, Daily  clopidogrel, 75 mg, Oral, Daily  enoxaparin, 40 mg, Subcutaneous, Q24H  gabapentin, 400 mg, Oral, TID  lidocaine, 1 patch, Transdermal, Nightly  midodrine, 10 mg, Oral, TID AC  pantoprazole, 40 mg, Oral, QAM  AC  pharmacy, 1 each, Does not apply, Once  sodium chloride, 3 mL, Intravenous, Q12H  spironolactone, 25 mg, Oral, Daily  sucralfate, 1 g, Oral, 4x Daily AC & at Bedtime        Meds Infusions  sodium chloride, 75 mL/hr, Last Rate: 75 mL/hr (06/17/21 1511)        Meds PRN  •  acetaminophen **OR** acetaminophen **OR** acetaminophen  •  melatonin  •  methocarbamol  •  nitroglycerin  •  ondansetron **OR** ondansetron  •  [COMPLETED] Insert peripheral IV **AND** sodium chloride  •  sodium chloride        Assessment/Plan   Assessment/Plan     Active Hospital Problems    Diagnosis  POA   • Left leg cellulitis [L03.116]  Yes      Resolved Hospital Problems   No resolved problems to display.       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 14 days not reordered     Normocytic anemia  - hemoglobin 1stable per previous labs.      VTE Prophylaxis -   Mechanical Order History:     None      Pharmalogical Order History:      Ordered     Dose Route Frequency Stop    06/16/21 2030  enoxaparin (LOVENOX) syringe 40 mg      40 mg SC Every 24 Hours --                  Code Status -   Code Status and Medical Interventions:   Ordered at: 06/16/21 2030     Code Status:    CPR     Medical Interventions (Level of Support Prior to Arrest):    Full       This patient has been examined wearing appropriate Personal Protective Equipment . 06/17/21        Discharge Planning  Home when cleared by  infectious disease        Electronically signed by Dao Heart DO, 06/17/21, 17:33 EDT.  [facility] [place]

## 2021-06-17 NOTE — CASE MANAGEMENT/SOCIAL WORK
Case Management Readmission Assessment Note       Case Management Readmission Assessment (all recorded)      Readmission Interview     Row Name 06/17/21 1122             Readmission Indications    Is this hospitalization related to the prior hospital diagnosis?  Yes      What was the reason you were admitted?  On 5/29-6/2 patient was admitted with left leg cellulitis. He dc to home with PO abx. On 6/16 he presented back to ER with recurrent left leg cellulitis > Per notes he has edema from knee to ankle on affected extremity.      Row Name 06/17/21 1122             Follow up appointment    Do you have a PCP?  Yes      Did you have an appointment with PCP/specialist after hospitalization within 7 days?  Yes      Did you keep your appointment?  Yes      [facility] Name 06/17/21 1122             Medications    Did you have newly prescribed medications at discharge?  Yes      Did you understand the reasons for your medications at discharge and how to take them?  Yes      Did you understand the side effects of your medications?  Yes      Are you taking all of you prescribed medications?  Yes      Row Name 06/17/21 1122             Discharge Instructions    Did you understand your discharge instructions?  Yes      Did your family/caregiver hear your instructions?  No      Were you told to eat a special diet?  No      Were you given a number of someone to call if you had questions or concerns?  Yes      [facility] Name 06/17/21 1122             Index discharge location/services    Where did you go upon discharge?  Home      Do you have supportive family or friends in the home?  -- son checks on him      [facility] Name 06/17/21 1122             Discharge Readiness    On a scale of 1-5 (5 being well prepared), how ready were you for discharge  4      Recommendation based on interview  Education on diagnosis/self management;Additional caregiver support         Phone communication or documentation only - no physical contact with patient or  family.  Ingrid Vargas RN  Complex Case Manager  [facility] Care Coordination  [phone]  [phone]-office  [phone]-fax  [email or web address]

## 2021-06-17 NOTE — H&P
[facility] Medicine Services      Patient Name: Deion Nicholas  : 1940  MRN: [id number]  Primary Care Physician: Makenna Motta MD  Date of admission: 2021    Patient Care Team:  Makenna Motta MD as PCP - General  Makenna Motta MD as PCP - Family Medicine          Subjective   History Present Illness     Chief Complaint:   Chief Complaint   Patient presents with   • Cellulitis                  Very pleasant 80-year-old gentleman awake alert and good historian presents with complaints of left leg lower extremity cellulitis.  He reports this is a chronic problem which initially started in  but since May has resurfaced.  He reports frustration in the inability to get it resolved.  His left lower extremity from knee to ankle is edematous, erythematous, tender, without open wounds or weeping.  He reports he has hardware in his left knee secondary to a combat injury in [place] and also has had a saphenous vein graft removal for a CABG in the past from the left lower extremity.  He denies any fevers chills or diaphoresis.  WBC is 5.4.  Labs are unremarkable except for hemoglobin 10.3 which is stable per previous labs.  Review of records shows he was admitted here on May 11, 2021 through May 14, 2021 for treatment of the cellulitis.  Blood cultures from that time had no growth.  He was then admitted  through 2021 for increased edema and erythema to left lower extremity and again treated with ceftaroline IV.  He was discharged with 7 more days of IVceftaroline IV  which she reports he finished.  In the past he had been on ceftriaxone, vancomycin IV and Zyvox IV.  He had a duplex venous lower extremity Doppler done  which showed no DVT.  Infectious disease was consulted from ED and ceftaroline has been restarted per ID.  Blood cultures were taken in ED and pending he reports a past medical history of CABG, ischemic cardiomyopathy, paroxysmal V.  tach SP ablation x2 and AICD placement.  EF reported to be 30%.  Past medical history also includes hypertension, hyperlipidemia, prostate cancer in remission, and a former smoker.  He will be admitted for further evaluation and treatment.      Review of Systems   Constitutional: Negative.   HENT: Negative.    Eyes: Negative.    Cardiovascular: Negative.    Respiratory: Negative.    Endocrine: Negative.    Hematologic/Lymphatic: Negative.    Skin: Positive for color change.   Musculoskeletal: Positive for joint swelling.   Gastrointestinal: Negative.    Genitourinary: Negative.    Neurological: Negative.    Psychiatric/Behavioral: Negative.    Allergic/Immunologic: Negative.            Personal History     Past Medical History:   Past Medical History:   Diagnosis Date   • Aneurysm (CMS/HCC)    • Cardiomyopathy (CMS/HCC)     ICD implantation   • Cellulitis of left lower extremity 2010    recurrent   • CHD (coronary heart disease)     S/P CABG & PCI   • Dyslipidemia    • History of ventricular tachycardia    • Hypertension    • Myocardial infarction (CMS/HCC)     Inferior MI   • Pinched nerve     L4-L5   • Prostate cancer (CMS/HCC)        Surgical History:      Past Surgical History:   Procedure Laterality Date   • ANGIOPLASTY  2000 04/1996 Stent: Palmaz- Huy stent/LAD 07/1996-RCA: 2000-Tetra stent Guidant to proximal RCA, mid to distal artery 2 sequential Guidant Tetra stents   • APPENDECTOMY     • CARDIAC ABLATION  April and May 2019   • CARDIAC CATHETERIZATION  07/2017    1996 x2, Nov. 2000, 05/2010-cath 08/2015-no stents 2017   • CARDIAC DEFIBRILLATOR PLACEMENT     • COLONOSCOPY W/ POLYPECTOMY      Mult colonoscopy's for polyp resection    • CORONARY ARTERY BYPASS GRAFT  05/2010    x5 vessel: LMA to diagonal, LAD, intermedius, obtuse marginal, RCA   • CORONARY STENT PLACEMENT     • ENDOSCOPY N/A 6/24/2019    Procedure: ESOPHAGOGASTRODUODENOSCOPY with dilitation Bougie 50, 54;  Surgeon: Roddy Coronel MD;   Location: [facility] ENDOSCOPY;  Service: Gastroenterology   • INSERT / REPLACE / REMOVE PACEMAKER     • KNEE OPEN LATERAL RELEASE Left     reduction   • OTHER SURGICAL HISTORY  01/2018    ICD implantation   • PROSTATE SURGERY  2015    Robiotic surgery- Cyber Knife:           Family History: family history includes Heart disease in his father; Pancreatic cancer in his mother. Family History was reviewed.     Social History:  reports that he quit smoking about 18 years ago. His smoking use included cigarettes. He has a 17.00 pack-year smoking history. He has never used smokeless tobacco. He reports previous alcohol use. He reports that he does not use drugs.      Medications:  Prior to Admission medications    Medication Sig Start Date End Date Taking? Authorizing Provider   amiodarone (PACERONE) 200 MG tablet Take 100 mg by mouth Daily.   Yes Cyndee Melgar MD   aspirin 81 MG EC tablet Take 81 mg by mouth Daily.   Yes Cyndee Melgar MD   Cholecalciferol (VITAMIN D-400) 400 units tablet Take 2 tablets daily   Yes Cyndee Melgar MD   clopidogrel (PLAVIX) 75 MG tablet Take 75 mg by mouth Daily. 9/10/15  Yes Cyndee Melgar MD   cyanocobalamin 1000 MCG/ML injection Inject 1,000 mcg into the appropriate muscle as directed by prescriber Every 14 (Fourteen) Days.   Yes Cyndee Meglar MD   gabapentin (NEURONTIN) 400 MG capsule Take 400 mg by mouth 3 (Three) Times a Day.   Yes Cyndee Melgar MD   lidocaine (LIDODERM) 5 % Place 1 patch on the skin as directed by provider Daily. Remove & Discard patch within 12 hours or as directed by MD   Yes Cyndee Melgar MD   methocarbamol (ROBAXIN) 500 MG tablet Take 500 mg by mouth 3 (Three) Times a Day As Needed for Muscle Spasms.   Yes Cyndee Melgar MD   midodrine (PROAMATINE) 10 MG tablet Take 1 tablet by mouth 3 (Three) Times a Day Before Meals. 4/22/21  Yes Marcin Childers,    nitroglycerin (NITROSTAT) 0.4 MG SL  tablet Place 0.4 mg under the tongue Every 5 (Five) Minutes As Needed.   Yes Cyndee Melgar MD   Omega-3 Fatty Acids (fish oil) 1000 MG capsule capsule Take 2,000 mg by mouth 2 (Two) Times a Day With Meals.   Yes Cyndee Melgar MD   pantoprazole (PROTONIX) 40 MG EC tablet Take 40 mg by mouth Daily.   Yes Cyndee Melgar MD   spironolactone (ALDACTONE) 25 MG tablet Take 1 tablet by mouth Daily. 7/8/19  Yes Constantino Arvizu MD   sucralfate (CARAFATE) 1 g tablet Take 1 g by mouth 4 (Four) Times a Day.   Yes Cyndee Melgar MD   ezetimibe (ZETIA) 10 MG tablet Take 10 mg by mouth Daily.    Cyndee Melgar MD   cephalexin (Keflex) 500 MG capsule Take 1 capsule by mouth 3 (Three) Times a Day. 6/2/21 6/16/21  Lei Álvarez MD   doxycycline (DORYX) 100 MG enteric coated tablet Take 1 tablet by mouth 2 (Two) Times a Day. 6/2/21 6/16/21  Lei Álvarez MD       Allergies:    Allergies   Allergen Reactions   • Lovastatin Unknown (See Comments) and Myalgia     unknown   • Pravastatin Unknown (See Comments) and Myalgia     unknown   • Simvastatin Unknown (See Comments) and Myalgia     unknown       Objective   Objective     Vital Signs  Temp:  [98.3 °F (36.8 °C)] 98.3 °F (36.8 °C)  Heart Rate:  [72-96] 77  Resp:  [20] 20  BP: (121-156)/(55-75) 135/73  SpO2:  [96 %-100 %] 100 %  on   ;   Device (Oxygen Therapy): room air  Body mass index is 26.21 kg/m².    Physical Exam  Vitals reviewed.   Constitutional:       Appearance: Normal appearance. He is obese.   HENT:      Head: Normocephalic and atraumatic.      Right Ear: External ear normal.      Left Ear: External ear normal.      Nose: Nose normal.      Mouth/Throat:      Mouth: Mucous membranes are moist.   Eyes:      Extraocular Movements: Extraocular movements intact.   Cardiovascular:      Rate and Rhythm: Normal rate and regular rhythm.      Pulses: Normal pulses.      Heart sounds: Normal heart sounds.   Pulmonary:      Effort:  Pulmonary effort is normal.      Breath sounds: Normal breath sounds.   Abdominal:      Palpations: Abdomen is soft.   Genitourinary:     Comments: Deferred  Musculoskeletal:         General: Swelling and tenderness present.      Cervical back: Normal range of motion and neck supple.      Left lower leg: Edema present.   Skin:     General: Skin is warm.      Findings: Erythema present.   Neurological:      General: No focal deficit present.      Mental Status: He is alert and oriented to person, place, and time.   Psychiatric:         Mood and Affect: Mood normal.         Behavior: Behavior normal.         Thought Content: Thought content normal.         Judgment: Judgment normal.           Results Review:  I have personally reviewed most recent lab results and agree with findings, most notably: cbc and clinical presentation..    Results from last 7 days   Lab Units 06/16/21  1741   WBC 10*3/mm3 5.40   HEMOGLOBIN g/dL 10.3*   HEMATOCRIT % 30.5*   PLATELETS 10*3/mm3 295     Results from last 7 days   Lab Units 06/16/21  1741   SODIUM mmol/L 138   POTASSIUM mmol/L 4.4   CHLORIDE mmol/L 104   CO2 mmol/L 24.0   BUN mg/dL 17   CREATININE mg/dL 0.95   GLUCOSE mg/dL 123*   CALCIUM mg/dL 9.3     Estimated Creatinine Clearance: 81.7 mL/min (by C-G formula based on SCr of 0.95 mg/dL).  Brief Urine Lab Results     None          Microbiology Results (last 10 days)     ** No results found for the last 240 hours. **          ECG/EMG Results (most recent)     None          Results for orders placed during the hospital encounter of 05/29/21    Duplex Venous Lower Extremity - Left CAR    Interpretation Summary  · Left popliteal fossa fluid collection.  · Normal left lower extremity venous duplex scan.          No radiology results for the last 7 days      Estimated Creatinine Clearance: 81.7 mL/min (by C-G formula based on SCr of 0.95 mg/dL).    Assessment/Plan   Assessment/Plan       Active Hospital Problems    Diagnosis  POA   •  Left leg cellulitis [L03.116]  Yes      Resolved Hospital Problems   No resolved problems to display.     Recurrent left leg cellulitis, infectious disease following, ceftaroline IV ordered x2 doses with ID to follow-up, IV fluid hydration WBC not elevated and afebrile, monitor CBC    Ischemic cardiomyopathy with AICD in place, denies chest pain or shortness of air, continuous cardiac monitoring on home amiodarone, midodrine and spironolactone    Coronary artery disease status post CABG, on home aspirin, Plavix, no Zetia or omega-3 fatty acids in formulary    GERD on PPI until Carafate    Chronic back pain on home Robaxin and gabapentin and lidocaine patch    Dietary supplementation, on vitamin D reordered, cyanocobalamin injection, q. 14 days not reordered    Normocytic anemia hemoglobin 10.3 stable per previous labs.      VTE Prophylaxis -   Mechanical Order History:     None      Pharmalogical Order History:      Ordered     Dose Route Frequency Stop    06/16/21 2030  enoxaparin (LOVENOX) syringe 40 mg      40 mg SC Every 24 Hours --                CODE STATUS:    Code Status and Medical Interventions:   Ordered at: 06/16/21 2030     Code Status:    CPR     Medical Interventions (Level of Support Prior to Arrest):    Full       This patient has been examined wearing appropriate Personal Protective Equipment and     I discussed the patient's findings and my recommendations with patient.      Signature:Electronically signed by NIMCO Wright, 06/16/21, 10:27 PM EDT.    [facility] Hospitalist Team

## 2021-06-17 NOTE — CONSULTS
Infectious Diseases Consult Note    Referring Provider: Dao Heart DO    Reason for Consultation: Recurrent left leg cellulitis    Patient Care Team:  Makenna Motta MD as PCP - General  Makenna Motta MD as PCP - Family Medicine    Chief complaint     Left leg swelling and pain    Subjective     History of present illness:      This is 80-year-old white male who was hospitalized [facility] on June 16, 2021.  The patient presented emergency room with complaints of swelling and pain in his left leg.  According to ER physician who contacted me last night he has significant edema and erythema of the left leg but below the knee.  The patient was discharged from [facility] on June 2, 2021.  He was admitted for left leg cellulitis and apparently had another episode 3 weeks prior to that.  The first time he was treated with IV Zyvox and was switched to p.o. Zyvox and he was readmitted with edema and erythema of the left leg on treatment with p.o. Zyvox.  Last admission he was put on IV Teflaro.  Was recommended that patient to stay on IV Teflaro for 10 days.  The patient was initially interested in going home with IV antibiotics then he changed his mind and the last minute and was discharged on p.o. Keflex and doxycycline for 7 days.  The patient took the whole duration of the antibiotics and stated that he started having swelling and pain in his left leg shortly after stopping antibiotics.  The patient white count was normal and had no fever at home he is afebrile here.  The patient was started back on IV Teflaro yesterday.  His left leg edema and erythema had improved significantly according to him.    Review of Systems   Review of Systems   Constitutional: Negative.    HENT: Negative.    Eyes: Negative.    Respiratory: Negative.    Cardiovascular: Positive for leg swelling.   Gastrointestinal: Negative.    Genitourinary: Negative.    Musculoskeletal: Negative.    Skin: Negative.     Neurological: Negative.    Hematological: Negative.    Psychiatric/Behavioral: Negative.        Medications  Medications Prior to Admission   Medication Sig Dispense Refill Last Dose   • amiodarone (PACERONE) 200 MG tablet Take 100 mg by mouth Daily.   6/16/2021 at 0800   • aspirin 81 MG EC tablet Take 81 mg by mouth Daily.   6/16/2021 at Unknown time   • Cholecalciferol (VITAMIN D-400) 400 units tablet Take 2 tablets daily   6/16/2021 at 0800   • clopidogrel (PLAVIX) 75 MG tablet Take 75 mg by mouth Daily.   6/16/2021 at 0800   • cyanocobalamin 1000 MCG/ML injection Inject 1,000 mcg into the appropriate muscle as directed by prescriber Every 14 (Fourteen) Days.      • gabapentin (NEURONTIN) 400 MG capsule Take 400 mg by mouth 3 (Three) Times a Day.   6/15/2021 at 0800   • lidocaine (LIDODERM) 5 % Place 1 patch on the skin as directed by provider Daily. Remove & Discard patch within 12 hours or as directed by MD   6/15/2021 at Unknown time   • methocarbamol (ROBAXIN) 500 MG tablet Take 500 mg by mouth 3 (Three) Times a Day As Needed for Muscle Spasms.      • midodrine (PROAMATINE) 10 MG tablet Take 1 tablet by mouth 3 (Three) Times a Day Before Meals. 45 tablet 0 6/16/2021 at 0800   • nitroglycerin (NITROSTAT) 0.4 MG SL tablet Place 0.4 mg under the tongue Every 5 (Five) Minutes As Needed.      • Omega-3 Fatty Acids (fish oil) 1000 MG capsule capsule Take 2,000 mg by mouth 2 (Two) Times a Day With Meals.   6/16/2021 at 0800   • pantoprazole (PROTONIX) 40 MG EC tablet Take 40 mg by mouth Daily.   6/16/2021 at 0800   • spironolactone (ALDACTONE) 25 MG tablet Take 1 tablet by mouth Daily. 30 tablet 2 6/16/2021 at 0800   • sucralfate (CARAFATE) 1 g tablet Take 1 g by mouth 4 (Four) Times a Day.   6/16/2021 at 0800   • ezetimibe (ZETIA) 10 MG tablet Take 10 mg by mouth Daily.          History  Past Medical History:   Diagnosis Date   • Aneurysm (CMS/HCC)    • Cardiomyopathy (CMS/HCC)     ICD implantation   •  Cellulitis of left lower extremity 2010    recurrent   • CHD (coronary heart disease)     S/P CABG & PCI   • Dyslipidemia    • History of ventricular tachycardia    • Hypertension    • Myocardial infarction (CMS/HCC)     Inferior MI   • Pinched nerve     L4-L5   • Prostate cancer (CMS/HCC)      Past Surgical History:   Procedure Laterality Date   • ANGIOPLASTY  2000 04/1996 Stent: Palmaz- Huy stent/LAD 07/1996-RCA: 2000-Tetra stent Guidant to proximal RCA, mid to distal artery 2 sequential Guidant Tetra stents   • APPENDECTOMY     • CARDIAC ABLATION  April and May 2019   • CARDIAC CATHETERIZATION  07/2017    1996 x2, Nov. 2000, 05/2010-cath 08/2015-no stents 2017   • CARDIAC DEFIBRILLATOR PLACEMENT     • COLONOSCOPY W/ POLYPECTOMY      Mult colonoscopy's for polyp resection    • CORONARY ARTERY BYPASS GRAFT  05/2010    x5 vessel: LMA to diagonal, LAD, intermedius, obtuse marginal, RCA   • CORONARY STENT PLACEMENT     • ENDOSCOPY N/A 6/24/2019    Procedure: ESOPHAGOGASTRODUODENOSCOPY with dilitation Bougie 50, 54;  Surgeon: Roddy Coronel MD;  Location: [facility] ENDOSCOPY;  Service: Gastroenterology   • INSERT / REPLACE / REMOVE PACEMAKER     • KNEE OPEN LATERAL RELEASE Left     reduction   • OTHER SURGICAL HISTORY  01/2018    ICD implantation   • PROSTATE SURGERY  2015    Robiotic surgery- Cyber Knife:       Family History  Family History   Problem Relation Age of Onset   • Pancreatic cancer Mother    • Heart disease Father        Social History   reports that he quit smoking about 18 years ago. His smoking use included cigarettes. He has a 17.00 pack-year smoking history. He has never used smokeless tobacco. He reports previous alcohol use. He reports that he does not use drugs.    Allergies  Lovastatin, Pravastatin, and Simvastatin    Objective     Vital Signs   Vital Signs (last 24 hours)       06/16 0700  -  06/17 0659 06/17 0700  -  06/17 1739   Most Recent    Temp (°F) 98 -  98.3       98 (36.7)     Heart Rate 62 -  96      67     67    Resp 16 -  20       16    /60 -  156/75      134/68     134/68    SpO2 (%) 96 -  100       99          Physical Exam:  Physical Exam  Vitals and nursing note reviewed.   Constitutional:       Appearance: He is well-developed.   HENT:      Head: Normocephalic and atraumatic.   Eyes:      Pupils: Pupils are equal, round, and reactive to light.   Cardiovascular:      Rate and Rhythm: Normal rate and regular rhythm.      Heart sounds: Normal heart sounds.   Pulmonary:      Effort: Pulmonary effort is normal. No respiratory distress.      Breath sounds: Normal breath sounds. No wheezing or rales.   Abdominal:      General: Bowel sounds are normal. There is no distension.      Palpations: Abdomen is soft. There is no mass.      Tenderness: There is no abdominal tenderness. There is no guarding or rebound.   Musculoskeletal:         General: No deformity. Normal range of motion.      Cervical back: Normal range of motion and neck supple.      Left lower leg: Edema present.      Comments: Left leg edema apparently improving since I am seeing wrinkly skin.  The erythema is very vague and very light pinkish discoloration and there is no streaking above the knee.  There was no induration or fluctuance or open wound.   Skin:     General: Skin is warm.      Findings: No erythema or rash.   Neurological:      Mental Status: He is alert and oriented to person, place, and time.      Cranial Nerves: No cranial nerve deficit.         Microbiology  Microbiology Results (last 10 days)     Procedure Component Value - Date/Time    COVID PRE-OP / PRE-PROCEDURE SCREENING ORDER (NO ISOLATION) - Swab, Nasopharynx [[id number]]  (Normal) Collected: 06/17/21 0756    Lab Status: Final result Specimen: Swab from Nasopharynx Updated: 06/17/21 4622    Narrative:      The following orders were created for panel order COVID PRE-OP / PRE-PROCEDURE SCREENING ORDER (NO ISOLATION) - Swab, Nasopharynx.  Procedure                                Abnormality         Status                     ---------                               -----------         ------                     COVID-19,APTIMA PANTHER,...[[id number]]  Normal              Final result                 Please view results for these tests on the individual orders.    COVID-19,APTIMA PANTHER,[facility] IN-HOUSE, NP/OP SWAB IN UTM/VTM/[facility] TRANSPORT MEDIA,24 HR TAT - Swab, Nasopharynx [[id number]]  (Normal) Collected: 06/17/21 0756    Lab Status: Final result Specimen: Swab from Nasopharynx Updated: 06/17/21 1449     COVID19 Not Detected    Narrative:      Fact sheet for providers: https://www.fda.gov/media/683957/download     Fact sheet for patients: https://www.fda.gov/media/369691/download    Test performed by RT PCR.          Laboratory  Results from last 7 days   Lab Units 06/17/21  0632   WBC 10*3/mm3 3.90   HEMOGLOBIN g/dL 10.2*   HEMATOCRIT % 30.3*   PLATELETS 10*3/mm3 283     Results from last 7 days   Lab Units 06/17/21  0632   SODIUM mmol/L 140   POTASSIUM mmol/L 4.5   CHLORIDE mmol/L 106   CO2 mmol/L 27.0   BUN mg/dL 16   CREATININE mg/dL 0.83   GLUCOSE mg/dL 111*   CALCIUM mg/dL 8.6     Results from last 7 days   Lab Units 06/17/21  0632   SODIUM mmol/L 140   POTASSIUM mmol/L 4.5   CHLORIDE mmol/L 106   CO2 mmol/L 27.0   BUN mg/dL 16   CREATININE mg/dL 0.83   GLUCOSE mg/dL 111*   CALCIUM mg/dL 8.6                   Radiology  Imaging Results (Last 72 Hours)     ** No results found for the last 72 hours. **          Cardiology      Results Review:  I have reviewed all clinical data, test, lab, and imaging results.       Schedule Meds  amiodarone, 100 mg, Oral, Daily  aspirin, 81 mg, Oral, Daily  ceftaroline, 600 mg, Intravenous, BID  cholecalciferol, 1,000 Units, Oral, Daily  clopidogrel, 75 mg, Oral, Daily  enoxaparin, 40 mg, Subcutaneous, Q24H  gabapentin, 400 mg, Oral, TID  lidocaine, 1 patch, Transdermal, Nightly  midodrine, 10 mg, Oral, TID AC  pantoprazole,  40 mg, Oral, QAM AC  pharmacy, 1 each, Does not apply, Once  sodium chloride, 3 mL, Intravenous, Q12H  spironolactone, 25 mg, Oral, Daily  sucralfate, 1 g, Oral, 4x Daily AC & at Bedtime        Infusion Meds  sodium chloride, 75 mL/hr, Last Rate: 75 mL/hr (06/17/21 1511)        PRN Meds  •  acetaminophen **OR** acetaminophen **OR** acetaminophen  •  melatonin  •  methocarbamol  •  nitroglycerin  •  ondansetron **OR** ondansetron  •  [COMPLETED] Insert peripheral IV **AND** sodium chloride  •  sodium chloride      Assessment/Plan       Assessment    Recurrent left leg edema and erythema.  The patient was ruled out for DVT with a negative Doppler last admission.  The patient was treated with different courses of IV antibiotics during the 2 last hospital admission in May and early June 2021.  And was discharged home the first time on p.o. Zyvox and the second time on p.o. doxycycline and Keflex.  The patient does not appear to be toxic with no fever and normal white count.  This is the third time I see this patient in a very short time.  The patient is known to have history of CABG with a left leg harvested vein site which probably responsible for chronic edema.  Superimposed cellulitis is very hard to determine on today's physical examination but I was informed that it was significantly worse on admission regarding edema and erythema.  We need to treat the patient for gram-positive infection such as Staphylococcus aureus or beta-hemolytic Streptococcus.  Venous Doppler of the left leg was negative last admission    History of CABG with vein harvested from the left leg in the past    History of tinea pedis was on treatment with miconazole ointment    Plan    Continue Teflaro 600 mg IV every 12 hours, I prefer to treat patient with Teflaro for 10 days  A.m. labs  Supportive care  Keep left leg elevated at the heart level or higher as much as possible  Restart miconazole cream to be applied between toes twice  daily    Panda Lopes MD  06/17/21  17:39 EDT      Note is dictated utilizing voice recognition software/Dragon

## 2021-06-17 NOTE — OUTREACH NOTE
Medical Week 2 Survey      Responses   [facility] patient discharged from?  [facility]   Does the patient have one of the following disease processes/diagnoses(primary or secondary)?  Other   Week 2 attempt successful?  No   Unsuccessful attempts  Attempt 1   Revoke  Readmitted          Vivian Vitale RN

## 2021-06-17 NOTE — PLAN OF CARE
Goal Outcome Evaluation:                   Patient on IV antibiotics, will need ambulatory care or placement for IV antibiotics on discharge per ID.

## 2021-06-17 NOTE — PLAN OF CARE
Goal Outcome Evaluation:        Patient continues with IV fluids. He is alert and orientated and uses the urinal.  No complaints voiced.  Will continue to monitor.

## 2021-06-18 VITALS
SYSTOLIC BLOOD PRESSURE: 176 MMHG | BODY MASS INDEX: 26.33 KG/M2 | HEIGHT: 73 IN | RESPIRATION RATE: 20 BRPM | HEART RATE: 74 BPM | WEIGHT: 198.63 LBS | OXYGEN SATURATION: 97 % | DIASTOLIC BLOOD PRESSURE: 63 MMHG | TEMPERATURE: 97.4 F

## 2021-06-18 PROBLEM — L03.116 LEFT LEG CELLULITIS: Status: RESOLVED | Noted: 2021-06-16 | Resolved: 2021-06-18

## 2021-06-18 LAB
ALBUMIN SERPL-MCNC: 3.7 G/DL (ref 3.5–5.2)
ALBUMIN/GLOB SERPL: 1.3 G/DL
ALP SERPL-CCNC: 85 U/L (ref 39–117)
ALT SERPL W P-5'-P-CCNC: 14 U/L (ref 1–41)
ANION GAP SERPL CALCULATED.3IONS-SCNC: 8 MMOL/L (ref 5–15)
AST SERPL-CCNC: 19 U/L (ref 1–40)
BASOPHILS # BLD AUTO: 0 10*3/MM3 (ref 0–0.2)
BASOPHILS NFR BLD AUTO: 1 % (ref 0–1.5)
BILIRUB SERPL-MCNC: 0.2 MG/DL (ref 0–1.2)
BUN SERPL-MCNC: 15 MG/DL (ref 8–23)
BUN/CREAT SERPL: 17.4 (ref 7–25)
CALCIUM SPEC-SCNC: 8.6 MG/DL (ref 8.6–10.5)
CHLORIDE SERPL-SCNC: 102 MMOL/L (ref 98–107)
CO2 SERPL-SCNC: 25 MMOL/L (ref 22–29)
CREAT SERPL-MCNC: 0.86 MG/DL (ref 0.76–1.27)
DEPRECATED RDW RBC AUTO: 47.7 FL (ref 37–54)
EOSINOPHIL # BLD AUTO: 0.3 10*3/MM3 (ref 0–0.4)
EOSINOPHIL NFR BLD AUTO: 6.5 % (ref 0.3–6.2)
ERYTHROCYTE [DISTWIDTH] IN BLOOD BY AUTOMATED COUNT: 14.6 % (ref 12.3–15.4)
GFR SERPL CREATININE-BSD FRML MDRD: 86 ML/MIN/1.73
GLOBULIN UR ELPH-MCNC: 2.9 GM/DL
GLUCOSE SERPL-MCNC: 118 MG/DL (ref 65–99)
HCT VFR BLD AUTO: 32.4 % (ref 37.5–51)
HGB BLD-MCNC: 11.1 G/DL (ref 13–17.7)
LYMPHOCYTES # BLD AUTO: 1.5 10*3/MM3 (ref 0.7–3.1)
LYMPHOCYTES NFR BLD AUTO: 32.8 % (ref 19.6–45.3)
MCH RBC QN AUTO: 32.2 PG (ref 26.6–33)
MCHC RBC AUTO-ENTMCNC: 34.3 G/DL (ref 31.5–35.7)
MCV RBC AUTO: 93.9 FL (ref 79–97)
MONOCYTES # BLD AUTO: 0.5 10*3/MM3 (ref 0.1–0.9)
MONOCYTES NFR BLD AUTO: 10.8 % (ref 5–12)
NEUTROPHILS NFR BLD AUTO: 2.3 10*3/MM3 (ref 1.7–7)
NEUTROPHILS NFR BLD AUTO: 48.9 % (ref 42.7–76)
NRBC BLD AUTO-RTO: 0 /100 WBC (ref 0–0.2)
PLATELET # BLD AUTO: 276 10*3/MM3 (ref 140–450)
PMV BLD AUTO: 7.2 FL (ref 6–12)
POTASSIUM SERPL-SCNC: 4.6 MMOL/L (ref 3.5–5.2)
PROT SERPL-MCNC: 6.6 G/DL (ref 6–8.5)
RBC # BLD AUTO: 3.45 10*6/MM3 (ref 4.14–5.8)
SODIUM SERPL-SCNC: 135 MMOL/L (ref 136–145)
WBC # BLD AUTO: 4.7 10*3/MM3 (ref 3.4–10.8)

## 2021-06-18 PROCEDURE — 25010000002 ONDANSETRON PER 1 MG: Performed by: NURSE PRACTITIONER

## 2021-06-18 PROCEDURE — 25010000002 CEFTAROLINE FOSAMIL PER 10 MG: Performed by: INTERNAL MEDICINE

## 2021-06-18 PROCEDURE — 25010000002 ENOXAPARIN PER 10 MG: Performed by: NURSE PRACTITIONER

## 2021-06-18 PROCEDURE — 36410 VNPNXR 3YR/> PHY/QHP DX/THER: CPT

## 2021-06-18 PROCEDURE — 85025 COMPLETE CBC W/AUTO DIFF WBC: CPT | Performed by: INTERNAL MEDICINE

## 2021-06-18 PROCEDURE — 99239 HOSP IP/OBS DSCHRG MGMT >30: CPT | Performed by: INTERNAL MEDICINE

## 2021-06-18 PROCEDURE — C1751 CATH, INF, PER/CENT/MIDLINE: HCPCS

## 2021-06-18 PROCEDURE — 80053 COMPREHEN METABOLIC PANEL: CPT | Performed by: INTERNAL MEDICINE

## 2021-06-18 PROCEDURE — 02HV33Z INSERTION OF INFUSION DEVICE INTO SUPERIOR VENA CAVA, PERCUTANEOUS APPROACH: ICD-10-PCS | Performed by: INTERNAL MEDICINE

## 2021-06-18 RX ORDER — SODIUM CHLORIDE 0.9 % (FLUSH) 0.9 %
10 SYRINGE (ML) INJECTION AS NEEDED
Status: CANCELLED | OUTPATIENT
Start: 2021-06-18

## 2021-06-18 RX ORDER — SODIUM CHLORIDE 0.9 % (FLUSH) 0.9 %
20 SYRINGE (ML) INJECTION AS NEEDED
Status: DISCONTINUED | OUTPATIENT
Start: 2021-06-18 | End: 2021-06-18 | Stop reason: HOSPADM

## 2021-06-18 RX ORDER — SODIUM CHLORIDE 0.9 % (FLUSH) 0.9 %
10 SYRINGE (ML) INJECTION EVERY 12 HOURS SCHEDULED
Status: CANCELLED | OUTPATIENT
Start: 2021-06-18

## 2021-06-18 RX ORDER — SODIUM CHLORIDE 0.9 % (FLUSH) 0.9 %
10 SYRINGE (ML) INJECTION EVERY 12 HOURS SCHEDULED
Status: DISCONTINUED | OUTPATIENT
Start: 2021-06-18 | End: 2021-06-18 | Stop reason: HOSPADM

## 2021-06-18 RX ORDER — SODIUM CHLORIDE 0.9 % (FLUSH) 0.9 %
10 SYRINGE (ML) INJECTION AS NEEDED
Status: DISCONTINUED | OUTPATIENT
Start: 2021-06-18 | End: 2021-06-18 | Stop reason: HOSPADM

## 2021-06-18 RX ORDER — SODIUM CHLORIDE 0.9 % (FLUSH) 0.9 %
20 SYRINGE (ML) INJECTION AS NEEDED
Status: CANCELLED | OUTPATIENT
Start: 2021-06-18

## 2021-06-18 RX ADMIN — CEFTAROLINE FOSAMIL 600 MG: 600 POWDER, FOR SOLUTION INTRAVENOUS at 16:47

## 2021-06-18 RX ADMIN — GABAPENTIN 400 MG: 400 CAPSULE ORAL at 16:47

## 2021-06-18 RX ADMIN — CLOPIDOGREL BISULFATE 75 MG: 75 TABLET ORAL at 08:04

## 2021-06-18 RX ADMIN — SUCRALFATE 1 G: 1 TABLET ORAL at 11:45

## 2021-06-18 RX ADMIN — SPIRONOLACTONE 25 MG: 25 TABLET ORAL at 08:05

## 2021-06-18 RX ADMIN — PANTOPRAZOLE SODIUM 40 MG: 40 TABLET, DELAYED RELEASE ORAL at 08:05

## 2021-06-18 RX ADMIN — GABAPENTIN 400 MG: 400 CAPSULE ORAL at 08:04

## 2021-06-18 RX ADMIN — SODIUM CHLORIDE 75 ML/HR: 9 INJECTION, SOLUTION INTRAVENOUS at 11:57

## 2021-06-18 RX ADMIN — SUCRALFATE 1 G: 1 TABLET ORAL at 16:47

## 2021-06-18 RX ADMIN — MICONAZOLE NITRATE: 20 CREAM TOPICAL at 08:05

## 2021-06-18 RX ADMIN — ENOXAPARIN SODIUM 40 MG: 40 INJECTION SUBCUTANEOUS at 16:47

## 2021-06-18 RX ADMIN — Medication 1000 UNITS: at 08:04

## 2021-06-18 RX ADMIN — ASPIRIN 81 MG: 81 TABLET, COATED ORAL at 08:04

## 2021-06-18 RX ADMIN — CEFTAROLINE FOSAMIL 600 MG: 600 POWDER, FOR SOLUTION INTRAVENOUS at 08:08

## 2021-06-18 RX ADMIN — ONDANSETRON HYDROCHLORIDE 4 MG: 2 SOLUTION INTRAMUSCULAR; INTRAVENOUS at 18:13

## 2021-06-18 RX ADMIN — AMIODARONE HYDROCHLORIDE 100 MG: 200 TABLET ORAL at 08:04

## 2021-06-18 RX ADMIN — MIDODRINE HYDROCHLORIDE 10 MG: 5 TABLET ORAL at 08:04

## 2021-06-18 RX ADMIN — SUCRALFATE 1 G: 1 TABLET ORAL at 08:04

## 2021-06-18 RX ADMIN — Medication 3 ML: at 08:05

## 2021-06-18 RX ADMIN — MIDODRINE HYDROCHLORIDE 10 MG: 5 TABLET ORAL at 16:47

## 2021-06-18 RX ADMIN — MIDODRINE HYDROCHLORIDE 10 MG: 5 TABLET ORAL at 11:57

## 2021-06-18 NOTE — CASE MANAGEMENT/SOCIAL WORK
Discharge Planning Assessment   [facility]     Patient Name: Deion Nicholas  MRN: [id number]  Today's Date: 6/18/2021    Admit Date: 6/16/2021          Plan    Plan  home with [facility]    Patient/Family in Agreement with Plan  yes    Plan Comments  spoke to patient in room with ppe(mask and goggles) staying 6 feet away and less than 15 mins;  spoke to patient with healther from infectious disease and discussed how patient would discharge home and would not be able to go to rehab or have anyone to help with administrations of iv antibiotics; he denies any faily that can assist with ths and states he will attempt to give iv antibiotics twice a day; isidro with infectious informed him that there was not a one time a day medication to use; caitie with [facility] and audrey with [facility] are both aware that patient will discharge today to go home with iv medication; both aware patient will be giving his own medication with no one to learn       Carol naegele rn  Case management  Office number [phone]  Cell phone [phone]

## 2021-06-18 NOTE — PLAN OF CARE
Goal Outcome Evaluation:      Patient is alert and orientated x4.  Iv atb therapy continues r/t cellulitis. No adverse effects noted.  Will continue to monitor.

## 2021-06-18 NOTE — CONSULTS
Nutrition Services    Patient Name: Deion Nicholas  YOB: 1940  MRN: [id number]  Admission date: 6/16/2021    PPE Documentation        PPE Worn By Provider Mask, eyewear   PPE Worn By Patient  None     PROGRESS NOTE      Encounter Information: RD to see patient for MST score. Upon entering patient room pt is eating full lunch try. Note pt is hard to direct, conversation got off topic a lot with many stories of his time spent in the . States his weight loss was due to a cancer treatment some years ago. Patient states he lost ~20 pounds. No weight loss recorded per EMR with weight dating to 8/7/2020Pt stated he eats well at home, cooks for himself and eats chicken and other protein with vegetable and starch. Sometimes a frozen dinner but not often. Will follow up with patient per protcol.     Note pt is hard to direct, conversation got off topic a lot with many stories of his time spent in the .        PO Diet: Diet Regular   PO Supplements: None - refused   PO Intake:  100% two meals documented       Nutrition support orders: -    Nutrition support review: -       Labs (reviewed below): Reviewed        GI Function:  BM 6/16       Nutrition Intervention: Continue current Diet order  Will see PRN or review LOS       Results from last 7 days   Lab Units 06/18/21  0231 06/17/21  0632 06/16/21  1741   SODIUM mmol/L 135* 140 138   POTASSIUM mmol/L 4.6 4.5 4.4   CHLORIDE mmol/L 102 106 104   CO2 mmol/L 25.0 27.0 24.0   BUN mg/dL 15 16 17   CREATININE mg/dL 0.86 0.83 0.95   CALCIUM mg/dL 8.6 8.6 9.3   BILIRUBIN mg/dL 0.2  --   --    ALK PHOS U/L 85  --   --    ALT (SGPT) U/L 14  --   --    AST (SGOT) U/L 19  --   --    GLUCOSE mg/dL 118* 111* 123*     Results from last 7 days   Lab Units 06/18/21  0231   HEMOGLOBIN g/dL 11.1*   HEMATOCRIT % 32.4*     COVID19   Date Value Ref Range Status   06/17/2021 Not Detected Not Detected - Ref. Range Final     No results found for: HGBA1C    RD to follow up  per protocol.    Electronically signed by:  Jaymie Veloz RD  06/18/21 16:22 EDT

## 2021-06-18 NOTE — PROGRESS NOTES
Infectious Diseases Progress Note      LOS: 2 days   Patient Care Team:  Makenna Motta MD as PCP - General  Makenna Motta MD as PCP - Family Medicine    Chief Complaint: Left leg swelling and pain    Subjective       The patient has been afebrile for the last 24 hours.  The patient is on room air, hemodynamically stable, and is tolerating antimicrobial therapy.      Review of Systems:   Review of Systems   Constitutional: Negative.    HENT: Negative.    Eyes: Negative.    Respiratory: Negative.    Cardiovascular: Positive for leg swelling.   Gastrointestinal: Negative.    Genitourinary: Negative.    Musculoskeletal: Negative.    Skin: Negative.    Neurological: Negative.    Psychiatric/Behavioral: Negative.         Objective     Vital Signs  Temp:  [97.3 °F (36.3 °C)-97.9 °F (36.6 °C)] 97.4 °F (36.3 °C)  Heart Rate:  [65-74] 74  Resp:  [20] 20  BP: (123-176)/(62-71) 176/63    Physical Exam:  Physical Exam  Vitals and nursing note reviewed.   Constitutional:       General: He is not in acute distress.     Appearance: Normal appearance. He is well-developed and normal weight. He is not diaphoretic.   HENT:      Head: Normocephalic and atraumatic.   Eyes:      Conjunctiva/sclera: Conjunctivae normal.      Pupils: Pupils are equal, round, and reactive to light.   Cardiovascular:      Rate and Rhythm: Normal rate and regular rhythm.      Heart sounds: Normal heart sounds, S1 normal and S2 normal.   Pulmonary:      Effort: Pulmonary effort is normal. No respiratory distress.      Breath sounds: Normal breath sounds. No stridor. No wheezing or rales.   Abdominal:      General: Bowel sounds are normal. There is no distension.      Palpations: Abdomen is soft. There is no mass.      Tenderness: There is no abdominal tenderness. There is no guarding.   Musculoskeletal:         General: No deformity. Normal range of motion.      Cervical back: Neck supple.   Skin:     General: Skin is warm and dry.       Coloration: Skin is not pale.      Findings: No erythema or rash.      Comments: No significant swelling or erythema-does appear to be tender to palpation   Neurological:      Mental Status: He is alert and oriented to person, place, and time.      Cranial Nerves: No cranial nerve deficit.   Psychiatric:         Mood and Affect: Mood normal.          Results Review:    I have reviewed all clinical data, test, lab, and imaging results.     Radiology  No Radiology Exams Resulted Within Past 24 Hours    Cardiology    Laboratory    Results from last 7 days   Lab Units 06/18/21  0231 06/17/21  0632 06/16/21  1741   WBC 10*3/mm3 4.70 3.90 5.40   HEMOGLOBIN g/dL 11.1* 10.2* 10.3*   HEMATOCRIT % 32.4* 30.3* 30.5*   PLATELETS 10*3/mm3 276 283 295     Results from last 7 days   Lab Units 06/18/21  0231 06/17/21  0632 06/16/21  1741   SODIUM mmol/L 135* 140 138   POTASSIUM mmol/L 4.6 4.5 4.4   CHLORIDE mmol/L 102 106 104   CO2 mmol/L 25.0 27.0 24.0   BUN mg/dL 15 16 17   CREATININE mg/dL 0.86 0.83 0.95   GLUCOSE mg/dL 118* 111* 123*   ALBUMIN g/dL 3.70  --   --    BILIRUBIN mg/dL 0.2  --   --    ALK PHOS U/L 85  --   --    AST (SGOT) U/L 19  --   --    ALT (SGPT) U/L 14  --   --    CALCIUM mg/dL 8.6 8.6 9.3                 Microbiology   Microbiology Results (last 10 days)     Procedure Component Value - Date/Time    COVID PRE-OP / PRE-PROCEDURE SCREENING ORDER (NO ISOLATION) - Swab, Nasopharynx [[id number]]  (Normal) Collected: 06/17/21 0756    Lab Status: Final result Specimen: Swab from Nasopharynx Updated: 06/17/21 8129    Narrative:      The following orders were created for panel order COVID PRE-OP / PRE-PROCEDURE SCREENING ORDER (NO ISOLATION) - Swab, Nasopharynx.  Procedure                               Abnormality         Status                     ---------                               -----------         ------                     COVID-19,APTIMA PANTHER,...[[id number]]  Normal              Final result                  Please view results for these tests on the individual orders.    COVID-19,APTIMA P[facility] IN-HOUSE, NP/OP SWAB IN UTM/VTM/SALINE TRANSPORT MEDIA,24 HR TAT - Swab, Nasopharynx [[id number]]  (Normal) Collected: 06/17/21 0756    Lab Status: Final result Specimen: Swab from Nasopharynx Updated: 06/17/21 1449     COVID19 Not Detected    Narrative:      Fact sheet for providers: https://www.fda.gov/media/680928/download     Fact sheet for patients: https://www.fda.gov/media/365125/download    Test performed by RT PCR.    Blood Culture - Blood, Arm, Right [[id number]] Collected: 06/16/21 1753    Lab Status: Preliminary result Specimen: Blood from Arm, Right Updated: 06/18/21 1800     Blood Culture No growth at 2 days    Blood Culture - Blood, Arm, Left [[id number]] Collected: 06/16/21 1741    Lab Status: Preliminary result Specimen: Blood from Arm, Left Updated: 06/18/21 1800     Blood Culture No growth at 2 days          Medication Review:       Schedule Meds  amiodarone, 100 mg, Oral, Daily  aspirin, 81 mg, Oral, Daily  ceftaroline, 600 mg, Intravenous, BID  cholecalciferol, 1,000 Units, Oral, Daily  clopidogrel, 75 mg, Oral, Daily  enoxaparin, 40 mg, Subcutaneous, Q24H  gabapentin, 400 mg, Oral, TID  lidocaine, 1 patch, Transdermal, Nightly  miconazole, , Topical, Q12H  midodrine, 10 mg, Oral, TID AC  pantoprazole, 40 mg, Oral, QAM AC  pharmacy, 1 each, Does not apply, Once  sodium chloride, 10 mL, Intravenous, Q12H  sodium chloride, 3 mL, Intravenous, Q12H  spironolactone, 25 mg, Oral, Daily  sucralfate, 1 g, Oral, 4x Daily AC & at Bedtime        Infusion Meds  sodium chloride, 75 mL/hr, Last Rate: 75 mL/hr (06/18/21 1157)        PRN Meds  •  acetaminophen **OR** acetaminophen **OR** acetaminophen  •  melatonin  •  methocarbamol  •  nitroglycerin  •  ondansetron **OR** ondansetron  •  [COMPLETED] Insert peripheral IV **AND** sodium chloride  •  sodium chloride  •  sodium chloride  •  sodium  chloride        Assessment/Plan       Antimicrobial Therapy   1.  Teflaro      day  2.      Day  3.      Day  4.      Day  5.      Day      Assessment     Recurrent left leg edema and erythema.  The patient was ruled out for DVT with a negative Doppler last admission.  The patient was treated with different courses of IV antibiotics during the 2 last hospital admission in May and early June 2021.  And was discharged home the first time on p.o. Zyvox and the second time on p.o. doxycycline and Keflex.  The patient does not appear to be toxic with no fever and normal white count.  This is the third time I see this patient in a very short time.  The patient is known to have history of CABG with a left leg harvested vein site which probably responsible for chronic edema.  Superimposed cellulitis is very hard to determine on today's physical examination but I was informed that it was significantly worse on admission regarding edema and erythema.  We need to treat the patient for gram-positive infection such as Staphylococcus aureus or beta-hemolytic Streptococcus.  Venous Doppler of the left leg was negative last admission  -No significant erythema or swelling seen today     History of CABG with vein harvested from the left leg in the past     History of tinea pedis was on treatment with miconazole ointment     Plan     Continue Teflaro 600 mg IV every 12 hours, I prefer to treat patient with Teflaro for 10 days  Patient will need a midline before discharge-please remove when IV antibiotics are completed  Will need a CBC and creatinine in 5 days  Continue supportive care  Keep left leg elevated at the heart level or higher as much as possible  Restart miconazole cream to be applied between toes twice daily  Case discussed with , care tenders, hospitalist    Please fax all post discharge lab results, imaging studies and correspondence to this fax number [phone]  For any question or concern please  contact our service number [phone]    Tamiko Victor, NIMCO  06/18/21  19:07 EDT

## 2021-06-18 NOTE — CASE MANAGEMENT/SOCIAL WORK
Social Work Assessment  [facility]     Patient Name: Deion Nicholas  MRN: [id number]  Today's Date: 6/18/2021    Admit Date: 6/16/2021    Discharge Plan     Row Name 06/18/21 1440       Plan    Plan Comments  Spoke to patient kristie horton regarding inpatient rehab vs. HHC vs. ambulatory. Patient politely declines to go to rehab, stating he has a house to take care of. Ambulatory not an option due to needing IV abxs twice a day and not having consistent transport. Wants services at home with C, however denies having a family/friend to be taught to assist at home. Email to [facility] (Ingrid Garcia) to inquire about home services patient may qualify for.      Met with patient in room wearing PPE: mask, face shield/goggles, gloves, gown.      Maintained distance greater than six feet and spent less than 15 minutes in the room.      MICHAEL Orozco    Phone: [phone]  Cell: [phone]  Fax: [phone]  [email or web address]

## 2021-06-18 NOTE — DISCHARGE SUMMARY
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       Active Hospital Problems   No active problems to display.      Resolved Hospital Problems    Diagnosis Date Resolved POA   • Left leg cellulitis [L03.116] 06/18/2021 Yes         Hospital Course  Patient is a 80 y.o. male presented with recurrent left lower extremity cellulitis.  Was admitted improved on IV Teflaro.  Work with case management patient would not be to come to ambulatory care for IV antibiotic administration and thus it was arranged for the patient to have IV Teflaro for 10 days at home and he will do antibiotic administration himself as he would not agree to any other modality.  Stable for DC home.      Procedures Performed:as noted above         Consults:   Consults     Date and Time Order Name Status Description    6/16/2021  8:30 PM Inpatient Infectious Diseases Consult Completed     6/16/2021  7:33 PM Hospitalist (on-call MD unless specified) Completed     6/16/2021  5:20 PM Inpatient Infectious Diseases Consult Completed     5/30/2021  7:44 AM Inpatient Infectious Diseases Consult Completed           Pertinent  Test Results:     Results from last 7 days   Lab Units 06/18/21  0231 06/17/21  0632 06/16/21  1741   WBC 10*3/mm3 4.70 3.90 5.40   HEMOGLOBIN g/dL 11.1* 10.2* 10.3*   HEMATOCRIT % 32.4* 30.3* 30.5*   PLATELETS 10*3/mm3 276 283 295     Results from last 7 days   Lab Units 06/18/21  0231 06/17/21  0632 06/16/21  1741   SODIUM mmol/L 135* 140 138   POTASSIUM mmol/L 4.6 4.5 4.4   CHLORIDE mmol/L 102 106 104   CO2 mmol/L 25.0 27.0 24.0   BUN mg/dL 15 16 17   CREATININE mg/dL 0.86 0.83 0.95   CALCIUM mg/dL 8.6 8.6 9.3   BILIRUBIN mg/dL 0.2  --   --    ALK PHOS U/L 85  --   --    ALT (SGPT) U/L 14  --   --    AST (SGOT) U/L 19  --   --    GLUCOSE mg/dL 118* 111* 123*       Microbiology Results (last 10 days)     Procedure Component Value - Date/Time    COVID PRE-OP / PRE-PROCEDURE SCREENING ORDER (NO ISOLATION) - Swab, Nasopharynx [[id number]]  (Normal) Collected: 06/17/21 0756    Lab Status: Final result Specimen: Swab from Nasopharynx Updated: 06/17/21 1449    Narrative:      The following orders were created for panel order COVID PRE-OP / PRE-PROCEDURE SCREENING ORDER (NO ISOLATION) - Swab, Nasopharynx.  Procedure                               Abnormality         Status                     ---------                               -----------         ------                     COVID-19,APTIMA PANTHER,...[[id number]]  Normal              Final result                 Please view results for these tests on the individual orders.    COVID-19,[facility]TIMA [facility] IN-HOUSE, NP/OP SWAB IN UTM/VTM/SALINE TRANSPORT MEDIA,24 HR TAT - Swab, Nasopharynx [[id number]]  (Normal) Collected: 06/17/21 0756    Lab Status: Final result Specimen: Swab from Nasopharynx Updated: 06/17/21 1449     COVID19 Not Detected    Narrative:      Fact sheet for providers: https://www.fda.gov/media/684883/download     Fact sheet for patients: https://www.fda.gov/media/046312/download    Test performed by RT PCR.    Blood Culture - Blood, Arm, Right [[id number]]  Collected: 06/16/21 1753    Lab Status: Preliminary result Specimen: Blood from Arm, Right Updated: 06/17/21 1800     Blood Culture No growth at 24 hours    Blood Culture - Blood, Arm, Left [[id number]] Collected: 06/16/21 1741    Lab Status: Preliminary result Specimen: Blood from Arm, Left Updated: 06/17/21 1800     Blood Culture No growth at 24 hours              Imaging Results (All)     None            Condition on Discharge:  As noted above    Vital Signs  Temp:  [97.3 °F (36.3 °C)-97.9 °F (36.6 °C)] 97.3 °F (36.3 °C)  Heart Rate:  [65-69] 68  Resp:  [20] 20  BP: (123-136)/(62-71) 131/62    Physical Exam:  Physical Exam  Vitals reviewed.   Cardiovascular:      Rate and Rhythm: Normal rate.   Pulmonary:      Effort: Pulmonary effort is normal.   Abdominal:      Palpations: Abdomen is soft.   Musculoskeletal:      Left lower leg: No edema.   Neurological:      Mental Status: He is alert and oriented to person, place, and time.   Psychiatric:         Behavior: Behavior normal.         Discharge Disposition  Home-Health Care [facility]    Discharge Medications     Discharge Medications      New Medications      Instructions Start Date   ceftaroline 600 mg in sodium chloride 0.9 % 100 mL IVPB   600 mg, Intravenous, 2 Times Daily         Continue These Medications      Instructions Start Date   amiodarone 200 MG tablet  Commonly known as: PACERONE   100 mg, Oral, Daily      aspirin 81 MG EC tablet   81 mg, Oral, Daily      cyanocobalamin 1000 MCG/ML injection   1,000 mcg, Intramuscular, Every 14 Days      ezetimibe 10 MG tablet  Commonly known as: ZETIA   10 mg, Oral, Daily      fish oil 1000 MG capsule capsule   2,000 mg, Oral, 2 Times Daily With Meals      gabapentin 400 MG capsule  Commonly known as: NEURONTIN   400 mg, Oral, 3 Times Daily      lidocaine 5 %  Commonly known as: LIDODERM   1 patch, Transdermal, Every 24 Hours, Remove & Discard patch within 12 hours or as directed by MD      methocarbamol 500 MG  tablet  Commonly known as: ROBAXIN   500 mg, Oral, 3 Times Daily PRN      midodrine 10 MG tablet  Commonly known as: PROAMATINE   10 mg, Oral, 3 Times Daily Before Meals      Nitrostat 0.4 MG SL tablet  Generic drug: nitroglycerin   0.4 mg, Sublingual, Every 5 Minutes PRN      pantoprazole 40 MG EC tablet  Commonly known as: PROTONIX   40 mg, Oral, Daily      Plavix 75 MG tablet  Generic drug: clopidogrel   75 mg, Oral, Daily      spironolactone 25 MG tablet  Commonly known as: ALDACTONE   25 mg, Oral, Daily      sucralfate 1 g tablet  Commonly known as: CARAFATE   1 g, Oral, 4 Times Daily      Vitamin D-400 10 MCG (400 UNIT) tablet  Generic drug: Cholecalciferol   Take 2 tablets daily             Discharge Diet:     Activity at Discharge:     Follow-up Appointments  Future Appointments   Date Time Provider Department Center   8/20/2021 10:15 AM Constantino Arvizu MD [facility]   9/17/2021 11:15 AM Marcin Childers DO [facility]         Test Results Pending at Discharge  Pending Labs     Order Current Status    Blood Culture - Blood, Arm, Left Preliminary result    Blood Culture - Blood, Arm, Right Preliminary result           Risk for Readmission (LACE) Score: 8 (6/18/2021  6:02 AM)      This patient has been examined wearing appropriate Personal Protective Equipment . 06/18/21      Time: Discharge 34 min with face-to-face history exam, writing of prescriptions, and documenting discharge data including care coordination with the nursing staff.      Electronically signed by Dao Heart DO, 06/18/21, 14:17 EDT.

## 2021-06-18 NOTE — CONSULTS
picc team consult:    Procedure explained to patient and line care instructions discussed, written instructions provided.  Power glide midline catheter placed RUE basilic vessel utilizing US guidance and sterile technique without difficulty.  Dark venous blood return noted and line flushes without difficulty.

## 2021-06-19 ENCOUNTER — READMISSION MANAGEMENT (OUTPATIENT)
Dept: CALL CENTER | Facility: HOSPITAL | Age: 81
End: 2021-06-19

## 2021-06-19 NOTE — OUTREACH NOTE
Prep Survey      Responses   [facility] discharged from?  [facility]   Is LACE score < 7 ?  No   Emergency Room discharge w/ pulse ox?  No   Eligibility  Readm Mgmt   Discharge diagnosis  Cellulitis of left lower extremity without foot Left leg cellulitis, extensive requiring IV antibiotic therapy   Does the patient have one of the following disease processes/diagnoses(primary or secondary)?  Other   Does the patient have Home health ordered?  Yes   What is the Home health agency?   [facility]   Is there a DME ordered?  No   Prep survey completed?  Yes          Kristina Glasgow RN

## 2021-06-21 LAB
BACTERIA SPEC AEROBE CULT: NORMAL
BACTERIA SPEC AEROBE CULT: NORMAL

## 2021-06-21 NOTE — CASE MANAGEMENT/SOCIAL WORK
Problem: Patient Care Overview  Goal: Plan of Care Review  Outcome: Ongoing (interventions implemented as appropriate)  Note:   Pt is a postop day 1 of a left total knee. Dressing is clean dry and intact. Pt continues with the ACE wrap,HV and YANDEL. Pt continues with PO pain meds that provide relief. Pt has ambulated to the bathroom with an assist x1. Voiding function intact. Pt educated on the importance of monitoring blood sugars related to comorbidity of DM. Pt voiced understanding. Pt is resting at this time, will continue to monitor.         Discharge Planning Assessment   [facility]     Patient Name: Deion Nicholas  MRN: [id number]  Today's Date: 6/21/2021    Admit Date: 6/16/2021          Plan    Final Discharge Disposition Code  06 - home with home health care    Final Note  home with [facility]       Carol naegele rn  Case management  Office number [phone]  Cell phone [phone]

## 2021-06-22 ENCOUNTER — READMISSION MANAGEMENT (OUTPATIENT)
Dept: CALL CENTER | Facility: HOSPITAL | Age: 81
End: 2021-06-22

## 2021-06-22 NOTE — OUTREACH NOTE
Medical Week 1 Survey      Responses   [facility] patient discharged from?  [facility]   Does the patient have one of the following disease processes/diagnoses(primary or secondary)?  Other   Week 1 attempt successful?  Yes   Call start time  1616   Call end time  1628   Discharge diagnosis  Cellulitis of left lower extremity without foot Left leg cellulitis, extensive requiring IV antibiotic therapy   Meds reviewed with patient/caregiver?  Yes   Is the patient having any side effects they believe may be caused by any medication additions or changes?  No   Does the patient have all medications ordered at discharge?  Yes   Is the patient taking all medications as directed (includes completed medication regime)?  Yes   Medication comments  PICC line for IV antibiotics.   Comments  Will call Dr Childers tomorrow.  Does not know about ID f/u.  PCP appt in July.   What is the Home health agency?   [facility]   Has home health visited the patient within 72 hours of discharge?  Yes   Home health comments  SN comes once a week   Has all DME been delivered?  Yes   Did the patient receive a copy of their discharge instructions?  Yes   What is the patient's perception of their health status since discharge?  Same   Week 1 call completed?  Yes          Kristie Bunch RN

## 2021-06-25 ENCOUNTER — HOSPITAL ENCOUNTER (EMERGENCY)
Facility: HOSPITAL | Age: 81
Discharge: HOME | End: 2021-06-26
Admitting: EMERGENCY MEDICINE

## 2021-06-25 VITALS
RESPIRATION RATE: 16 BRPM | HEIGHT: 73 IN | WEIGHT: 195.55 LBS | BODY MASS INDEX: 25.92 KG/M2 | SYSTOLIC BLOOD PRESSURE: 123 MMHG | OXYGEN SATURATION: 100 % | TEMPERATURE: 97.8 F | DIASTOLIC BLOOD PRESSURE: 57 MMHG | HEART RATE: 73 BPM

## 2021-06-25 DIAGNOSIS — M79.89 LEFT LEG SWELLING: Primary | ICD-10-CM

## 2021-06-25 DIAGNOSIS — R79.89 POSITIVE D DIMER: ICD-10-CM

## 2021-06-25 LAB
ANION GAP SERPL CALCULATED.3IONS-SCNC: 9 MMOL/L (ref 5–15)
BASOPHILS # BLD AUTO: 0.1 10*3/MM3 (ref 0–0.2)
BASOPHILS NFR BLD AUTO: 0.9 % (ref 0–1.5)
BUN SERPL-MCNC: 18 MG/DL (ref 8–23)
BUN/CREAT SERPL: 17.1 (ref 7–25)
CALCIUM SPEC-SCNC: 9.3 MG/DL (ref 8.6–10.5)
CHLORIDE SERPL-SCNC: 101 MMOL/L (ref 98–107)
CO2 SERPL-SCNC: 25 MMOL/L (ref 22–29)
CREAT SERPL-MCNC: 1.05 MG/DL (ref 0.76–1.27)
D DIMER PPP FEU-MCNC: 0.65 MG/L (FEU) (ref 0–0.59)
DEPRECATED RDW RBC AUTO: 49.4 FL (ref 37–54)
EOSINOPHIL # BLD AUTO: 0.4 10*3/MM3 (ref 0–0.4)
EOSINOPHIL NFR BLD AUTO: 6.9 % (ref 0.3–6.2)
ERYTHROCYTE [DISTWIDTH] IN BLOOD BY AUTOMATED COUNT: 14.9 % (ref 12.3–15.4)
GFR SERPL CREATININE-BSD FRML MDRD: 68 ML/MIN/1.73
GLUCOSE SERPL-MCNC: 104 MG/DL (ref 65–99)
HCT VFR BLD AUTO: 32.5 % (ref 37.5–51)
HGB BLD-MCNC: 10.9 G/DL (ref 13–17.7)
LYMPHOCYTES # BLD AUTO: 1.6 10*3/MM3 (ref 0.7–3.1)
LYMPHOCYTES NFR BLD AUTO: 26.4 % (ref 19.6–45.3)
MCH RBC QN AUTO: 31.7 PG (ref 26.6–33)
MCHC RBC AUTO-ENTMCNC: 33.5 G/DL (ref 31.5–35.7)
MCV RBC AUTO: 94.5 FL (ref 79–97)
MONOCYTES # BLD AUTO: 0.6 10*3/MM3 (ref 0.1–0.9)
MONOCYTES NFR BLD AUTO: 10.4 % (ref 5–12)
NEUTROPHILS NFR BLD AUTO: 3.3 10*3/MM3 (ref 1.7–7)
NEUTROPHILS NFR BLD AUTO: 55.4 % (ref 42.7–76)
NRBC BLD AUTO-RTO: 0 /100 WBC (ref 0–0.2)
PLATELET # BLD AUTO: 179 10*3/MM3 (ref 140–450)
PMV BLD AUTO: 7.8 FL (ref 6–12)
POTASSIUM SERPL-SCNC: 4.5 MMOL/L (ref 3.5–5.2)
RBC # BLD AUTO: 3.44 10*6/MM3 (ref 4.14–5.8)
SODIUM SERPL-SCNC: 135 MMOL/L (ref 136–145)
WBC # BLD AUTO: 6 10*3/MM3 (ref 3.4–10.8)

## 2021-06-25 PROCEDURE — 99283 EMERGENCY DEPT VISIT LOW MDM: CPT

## 2021-06-25 PROCEDURE — 25010000002 ENOXAPARIN PER 10 MG: Performed by: NURSE PRACTITIONER

## 2021-06-25 PROCEDURE — 80048 BASIC METABOLIC PNL TOTAL CA: CPT | Performed by: NURSE PRACTITIONER

## 2021-06-25 PROCEDURE — 96372 THER/PROPH/DIAG INJ SC/IM: CPT

## 2021-06-25 PROCEDURE — 85025 COMPLETE CBC W/AUTO DIFF WBC: CPT | Performed by: NURSE PRACTITIONER

## 2021-06-25 PROCEDURE — 85379 FIBRIN DEGRADATION QUANT: CPT | Performed by: NURSE PRACTITIONER

## 2021-06-25 RX ADMIN — ENOXAPARIN SODIUM 90 MG: 100 INJECTION SUBCUTANEOUS at 21:34

## 2021-06-26 ENCOUNTER — APPOINTMENT (OUTPATIENT)
Dept: CARDIOLOGY | Facility: HOSPITAL | Age: 81
End: 2021-06-26

## 2021-06-26 LAB
BH CV LOWER VASCULAR LEFT COMMON FEMORAL AUGMENT: NORMAL
BH CV LOWER VASCULAR LEFT COMMON FEMORAL COMPETENT: NORMAL
BH CV LOWER VASCULAR LEFT COMMON FEMORAL COMPRESS: NORMAL
BH CV LOWER VASCULAR LEFT COMMON FEMORAL PHASIC: NORMAL
BH CV LOWER VASCULAR LEFT COMMON FEMORAL SPONT: NORMAL
BH CV LOWER VASCULAR LEFT DISTAL FEMORAL COMPRESS: NORMAL
BH CV LOWER VASCULAR LEFT GASTRONEMIUS COMPRESS: NORMAL
BH CV LOWER VASCULAR LEFT GREATER SAPH AK COMPRESS: NORMAL
BH CV LOWER VASCULAR LEFT GREATER SAPH BK COMPRESS: NORMAL
BH CV LOWER VASCULAR LEFT LESSER SAPH COMPRESS: NORMAL
BH CV LOWER VASCULAR LEFT MID FEMORAL AUGMENT: NORMAL
BH CV LOWER VASCULAR LEFT MID FEMORAL COMPETENT: NORMAL
BH CV LOWER VASCULAR LEFT MID FEMORAL COMPRESS: NORMAL
BH CV LOWER VASCULAR LEFT MID FEMORAL PHASIC: NORMAL
BH CV LOWER VASCULAR LEFT MID FEMORAL SPONT: NORMAL
BH CV LOWER VASCULAR LEFT PERONEAL COMPRESS: NORMAL
BH CV LOWER VASCULAR LEFT POPLITEAL AUGMENT: NORMAL
BH CV LOWER VASCULAR LEFT POPLITEAL COMPETENT: NORMAL
BH CV LOWER VASCULAR LEFT POPLITEAL COMPRESS: NORMAL
BH CV LOWER VASCULAR LEFT POPLITEAL PHASIC: NORMAL
BH CV LOWER VASCULAR LEFT POPLITEAL SPONT: NORMAL
BH CV LOWER VASCULAR LEFT POSTERIOR TIBIAL COMPRESS: NORMAL
BH CV LOWER VASCULAR LEFT PROXIMAL FEMORAL COMPRESS: NORMAL
BH CV LOWER VASCULAR LEFT SAPHENOFEMORAL JUNCTION COMPRESS: NORMAL
BH CV LOWER VASCULAR RIGHT COMMON FEMORAL AUGMENT: NORMAL
BH CV LOWER VASCULAR RIGHT COMMON FEMORAL COMPETENT: NORMAL
BH CV LOWER VASCULAR RIGHT COMMON FEMORAL COMPRESS: NORMAL
BH CV LOWER VASCULAR RIGHT COMMON FEMORAL PHASIC: NORMAL
BH CV LOWER VASCULAR RIGHT COMMON FEMORAL SPONT: NORMAL
MAXIMAL PREDICTED HEART RATE: 142 BPM
STRESS TARGET HR: 121 BPM

## 2021-06-26 PROCEDURE — 93971 EXTREMITY STUDY: CPT

## 2021-06-29 ENCOUNTER — READMISSION MANAGEMENT (OUTPATIENT)
Dept: CALL CENTER | Facility: HOSPITAL | Age: 81
End: 2021-06-29

## 2021-06-29 NOTE — OUTREACH NOTE
Medical Week 2 Survey      Responses   [facility] patient discharged from?  [facility]   Does the patient have one of the following disease processes/diagnoses(primary or secondary)?  Other   Week 2 attempt successful?  Yes   Call start time  0933   Discharge diagnosis  Cellulitis of left lower extremity without foot Left leg cellulitis, extensive requiring IV antibiotic therapy   Call end time  0940   Is the patient taking all medications as directed (includes completed medication regime)?  Yes   Medication comments  PICC line for IV antibiotics.   Does the patient have a primary care provider?   Yes   Does the patient have an appointment with their PCP within 7 days of discharge?  Yes   Has the patient kept scheduled appointments due by today?  Yes   What is the Home health agency?   [facility]   Has home health visited the patient within 72 hours of discharge?  Yes   Home health comments  Nursing comes once a week   Psychosocial issues?  No   Did the patient receive a copy of their discharge instructions?  Yes   Nursing interventions  Reviewed instructions with patient   What is the patient's perception of their health status since discharge?  Same   Is the patient/caregiver able to teach back signs and symptoms related to disease process for when to call PCP?  Yes   Is the patient/caregiver able to teach back signs and symptoms related to disease process for when to call 911?  Yes   Is the patient/caregiver able to teach back the hierarchy of who to call/visit for symptoms/problems? PCP, Specialist, Home health nurse, Urgent Care, ED, 911  Yes   If the patient is a current smoker, are they able to teach back resources for cessation?  Not a smoker   Additional teach back comments  Pt states when he is here for a few days he improves but once he gets out his leg swells and worsens.  [facility] at [facility].  Was in ED to check for blood clots and  there were none noted and was told to cont antibiotics.  He is going to talk with [facility] PCP to see about seeing a specialist in infection control.   Week 2 Call Completed?  Yes   Wrap up additional comments  Denies questions at this time and has discussed needs with his [facility] provider          Taylor Fournier LPN

## 2021-07-06 ENCOUNTER — READMISSION MANAGEMENT (OUTPATIENT)
Dept: CALL CENTER | Facility: HOSPITAL | Age: 81
End: 2021-07-06

## 2021-07-06 NOTE — OUTREACH NOTE
Medical Week 3 Survey      Responses   [facility] patient discharged from?  [facility]   Does the patient have one of the following disease processes/diagnoses(primary or secondary)?  Other   Week 3 attempt successful?  Yes   Call start time  0812   Call end time  0847   Discharge diagnosis  Cellulitis of left lower extremity without foot Left leg cellulitis, extensive requiring IV antibiotic therapy   Meds reviewed with patient/caregiver?  Yes   Is the patient taking all medications as directed (includes completed medication regime)?  Yes   Has the patient kept scheduled appointments due by today?  Yes   What is the patient's perception of their health status since discharge?  Same   Additional teach back comments  Leg swelling again.  He is expecting a call from his [facility] PCP about  seeing a vscular doctor.   Week 3 Call Completed?  Yes   Wrap up additional comments  Awaiting a call from his [facility] PCP about seeing a vascular surgeon.  Still has swelling.          Carla Mccallum, RN

## 2021-07-13 ENCOUNTER — HOSPITAL ENCOUNTER (OUTPATIENT)
Facility: HOSPITAL | Age: 81
Setting detail: OBSERVATION
LOS: 1 days | Discharge: HOME | End: 2021-07-15
Attending: EMERGENCY MEDICINE | Admitting: INTERNAL MEDICINE

## 2021-07-13 DIAGNOSIS — M71.22 SYNOVIAL CYST OF LEFT POPLITEAL SPACE: ICD-10-CM

## 2021-07-13 DIAGNOSIS — L03.116 CELLULITIS OF LEFT LEG: Primary | ICD-10-CM

## 2021-07-13 DIAGNOSIS — L03.116 CELLULITIS OF LEFT LOWER EXTREMITY WITHOUT FOOT: ICD-10-CM

## 2021-07-13 DIAGNOSIS — M79.605 LOWER EXTREMITY PAIN, LEFT: ICD-10-CM

## 2021-07-13 LAB
ANION GAP SERPL CALCULATED.3IONS-SCNC: 11 MMOL/L (ref 5–15)
BASOPHILS # BLD AUTO: 0.1 10*3/MM3 (ref 0–0.2)
BASOPHILS NFR BLD AUTO: 1.2 % (ref 0–1.5)
BUN SERPL-MCNC: 16 MG/DL (ref 8–23)
BUN/CREAT SERPL: 16 (ref 7–25)
CALCIUM SPEC-SCNC: 8.9 MG/DL (ref 8.6–10.5)
CHLORIDE SERPL-SCNC: 104 MMOL/L (ref 98–107)
CO2 SERPL-SCNC: 25 MMOL/L (ref 22–29)
CREAT SERPL-MCNC: 1 MG/DL (ref 0.76–1.27)
CRP SERPL-MCNC: <0.3 MG/DL (ref 0–0.5)
DEPRECATED RDW RBC AUTO: 49 FL (ref 37–54)
EOSINOPHIL # BLD AUTO: 0.4 10*3/MM3 (ref 0–0.4)
EOSINOPHIL NFR BLD AUTO: 6.8 % (ref 0.3–6.2)
ERYTHROCYTE [DISTWIDTH] IN BLOOD BY AUTOMATED COUNT: 14.9 % (ref 12.3–15.4)
ERYTHROCYTE [SEDIMENTATION RATE] IN BLOOD: 32 MM/HR (ref 0–20)
GFR SERPL CREATININE-BSD FRML MDRD: 72 ML/MIN/1.73
GLUCOSE SERPL-MCNC: 107 MG/DL (ref 65–99)
HCT VFR BLD AUTO: 35.1 % (ref 37.5–51)
HGB BLD-MCNC: 11.9 G/DL (ref 13–17.7)
LYMPHOCYTES # BLD AUTO: 1.7 10*3/MM3 (ref 0.7–3.1)
LYMPHOCYTES NFR BLD AUTO: 26.7 % (ref 19.6–45.3)
MCH RBC QN AUTO: 32.1 PG (ref 26.6–33)
MCHC RBC AUTO-ENTMCNC: 33.8 G/DL (ref 31.5–35.7)
MCV RBC AUTO: 95 FL (ref 79–97)
MONOCYTES # BLD AUTO: 0.7 10*3/MM3 (ref 0.1–0.9)
MONOCYTES NFR BLD AUTO: 10.4 % (ref 5–12)
NEUTROPHILS NFR BLD AUTO: 3.5 10*3/MM3 (ref 1.7–7)
NEUTROPHILS NFR BLD AUTO: 54.9 % (ref 42.7–76)
NRBC BLD AUTO-RTO: 0 /100 WBC (ref 0–0.2)
PLATELET # BLD AUTO: 228 10*3/MM3 (ref 140–450)
PMV BLD AUTO: 7.4 FL (ref 6–12)
POTASSIUM SERPL-SCNC: 4.5 MMOL/L (ref 3.5–5.2)
RBC # BLD AUTO: 3.7 10*6/MM3 (ref 4.14–5.8)
SARS-COV-2 RNA PNL SPEC NAA+PROBE: NOT DETECTED
SODIUM SERPL-SCNC: 140 MMOL/L (ref 136–145)
WBC # BLD AUTO: 6.3 10*3/MM3 (ref 3.4–10.8)

## 2021-07-13 PROCEDURE — 84145 PROCALCITONIN (PCT): CPT | Performed by: INTERNAL MEDICINE

## 2021-07-13 PROCEDURE — 25010000002 HEPARIN (PORCINE) PER 1000 UNITS: Performed by: INTERNAL MEDICINE

## 2021-07-13 PROCEDURE — C9803 HOPD COVID-19 SPEC COLLECT: HCPCS

## 2021-07-13 PROCEDURE — 85025 COMPLETE CBC W/AUTO DIFF WBC: CPT | Performed by: EMERGENCY MEDICINE

## 2021-07-13 PROCEDURE — 80048 BASIC METABOLIC PNL TOTAL CA: CPT | Performed by: EMERGENCY MEDICINE

## 2021-07-13 PROCEDURE — 85652 RBC SED RATE AUTOMATED: CPT | Performed by: EMERGENCY MEDICINE

## 2021-07-13 PROCEDURE — 99284 EMERGENCY DEPT VISIT MOD MDM: CPT

## 2021-07-13 PROCEDURE — 25010000002 CEFTAROLINE FOSAMIL PER 10 MG: Performed by: EMERGENCY MEDICINE

## 2021-07-13 PROCEDURE — 96372 THER/PROPH/DIAG INJ SC/IM: CPT

## 2021-07-13 PROCEDURE — 96374 THER/PROPH/DIAG INJ IV PUSH: CPT

## 2021-07-13 PROCEDURE — 99220 PR INITIAL OBSERVATION CARE/DAY 70 MINUTES: CPT | Performed by: INTERNAL MEDICINE

## 2021-07-13 PROCEDURE — 87635 SARS-COV-2 COVID-19 AMP PRB: CPT | Performed by: EMERGENCY MEDICINE

## 2021-07-13 PROCEDURE — 86140 C-REACTIVE PROTEIN: CPT | Performed by: EMERGENCY MEDICINE

## 2021-07-13 RX ORDER — CLOPIDOGREL BISULFATE 75 MG/1
75 TABLET ORAL DAILY
Status: DISCONTINUED | OUTPATIENT
Start: 2021-07-14 | End: 2021-07-15 | Stop reason: HOSPADM

## 2021-07-13 RX ORDER — SUCRALFATE 1 G/1
1 TABLET ORAL 4 TIMES DAILY
Status: DISCONTINUED | OUTPATIENT
Start: 2021-07-13 | End: 2021-07-15 | Stop reason: HOSPADM

## 2021-07-13 RX ORDER — HEPARIN SODIUM 5000 [USP'U]/ML
5000 INJECTION, SOLUTION INTRAVENOUS; SUBCUTANEOUS EVERY 8 HOURS SCHEDULED
Status: DISCONTINUED | OUTPATIENT
Start: 2021-07-13 | End: 2021-07-15 | Stop reason: HOSPADM

## 2021-07-13 RX ORDER — LIDOCAINE 50 MG/G
1 PATCH TOPICAL EVERY 24 HOURS
Status: DISCONTINUED | OUTPATIENT
Start: 2021-07-14 | End: 2021-07-15 | Stop reason: HOSPADM

## 2021-07-13 RX ORDER — HYDROCODONE BITARTRATE AND ACETAMINOPHEN 5; 325 MG/1; MG/1
1 TABLET ORAL EVERY 4 HOURS PRN
Status: DISCONTINUED | OUTPATIENT
Start: 2021-07-13 | End: 2021-07-15 | Stop reason: HOSPADM

## 2021-07-13 RX ORDER — METHOCARBAMOL 500 MG/1
500 TABLET, FILM COATED ORAL 3 TIMES DAILY PRN
Status: DISCONTINUED | OUTPATIENT
Start: 2021-07-13 | End: 2021-07-15 | Stop reason: HOSPADM

## 2021-07-13 RX ORDER — SODIUM CHLORIDE 0.9 % (FLUSH) 0.9 %
10 SYRINGE (ML) INJECTION AS NEEDED
Status: DISCONTINUED | OUTPATIENT
Start: 2021-07-13 | End: 2021-07-15 | Stop reason: HOSPADM

## 2021-07-13 RX ORDER — GABAPENTIN 400 MG/1
400 CAPSULE ORAL 3 TIMES DAILY
Status: DISCONTINUED | OUTPATIENT
Start: 2021-07-13 | End: 2021-07-15 | Stop reason: HOSPADM

## 2021-07-13 RX ORDER — MELATONIN
500 DAILY
Status: DISCONTINUED | OUTPATIENT
Start: 2021-07-14 | End: 2021-07-15 | Stop reason: HOSPADM

## 2021-07-13 RX ORDER — ASPIRIN 81 MG/1
81 TABLET ORAL DAILY
Status: DISCONTINUED | OUTPATIENT
Start: 2021-07-14 | End: 2021-07-15 | Stop reason: HOSPADM

## 2021-07-13 RX ORDER — PANTOPRAZOLE SODIUM 40 MG/1
40 TABLET, DELAYED RELEASE ORAL DAILY
Status: DISCONTINUED | OUTPATIENT
Start: 2021-07-14 | End: 2021-07-15 | Stop reason: HOSPADM

## 2021-07-13 RX ORDER — NITROGLYCERIN 0.4 MG/1
0.4 TABLET SUBLINGUAL
Status: DISCONTINUED | OUTPATIENT
Start: 2021-07-13 | End: 2021-07-15 | Stop reason: HOSPADM

## 2021-07-13 RX ORDER — CYANOCOBALAMIN 1000 UG/ML
1000 INJECTION, SOLUTION INTRAMUSCULAR; SUBCUTANEOUS
Status: DISCONTINUED | OUTPATIENT
Start: 2021-07-14 | End: 2021-07-15 | Stop reason: HOSPADM

## 2021-07-13 RX ORDER — AMIODARONE HYDROCHLORIDE 200 MG/1
100 TABLET ORAL DAILY
Status: DISCONTINUED | OUTPATIENT
Start: 2021-07-14 | End: 2021-07-15 | Stop reason: HOSPADM

## 2021-07-13 RX ORDER — SODIUM CHLORIDE 0.9 % (FLUSH) 0.9 %
10 SYRINGE (ML) INJECTION EVERY 12 HOURS SCHEDULED
Status: DISCONTINUED | OUTPATIENT
Start: 2021-07-13 | End: 2021-07-15 | Stop reason: HOSPADM

## 2021-07-13 RX ORDER — ONDANSETRON 2 MG/ML
4 INJECTION INTRAMUSCULAR; INTRAVENOUS EVERY 6 HOURS PRN
Status: DISCONTINUED | OUTPATIENT
Start: 2021-07-13 | End: 2021-07-15 | Stop reason: HOSPADM

## 2021-07-13 RX ORDER — ACETAMINOPHEN 325 MG/1
650 TABLET ORAL EVERY 4 HOURS PRN
Status: DISCONTINUED | OUTPATIENT
Start: 2021-07-13 | End: 2021-07-15 | Stop reason: HOSPADM

## 2021-07-13 RX ORDER — MIDODRINE HYDROCHLORIDE 5 MG/1
10 TABLET ORAL
Status: DISCONTINUED | OUTPATIENT
Start: 2021-07-14 | End: 2021-07-15 | Stop reason: HOSPADM

## 2021-07-13 RX ORDER — SPIRONOLACTONE 25 MG/1
25 TABLET ORAL DAILY
Status: DISCONTINUED | OUTPATIENT
Start: 2021-07-14 | End: 2021-07-15 | Stop reason: HOSPADM

## 2021-07-13 RX ADMIN — HEPARIN SODIUM 5000 UNITS: 5000 INJECTION INTRAVENOUS; SUBCUTANEOUS at 23:55

## 2021-07-13 RX ADMIN — GABAPENTIN 400 MG: 400 CAPSULE ORAL at 23:55

## 2021-07-13 RX ADMIN — SUCRALFATE 1 G: 1 TABLET ORAL at 23:55

## 2021-07-13 RX ADMIN — CEFTAROLINE FOSAMIL 600 MG: 600 POWDER, FOR SOLUTION INTRAVENOUS at 22:03

## 2021-07-14 ENCOUNTER — READMISSION MANAGEMENT (OUTPATIENT)
Dept: CALL CENTER | Facility: HOSPITAL | Age: 81
End: 2021-07-14

## 2021-07-14 ENCOUNTER — APPOINTMENT (OUTPATIENT)
Dept: GENERAL RADIOLOGY | Facility: HOSPITAL | Age: 81
End: 2021-07-14

## 2021-07-14 LAB — PROCALCITONIN SERPL-MCNC: 0.03 NG/ML (ref 0–0.25)

## 2021-07-14 PROCEDURE — G0378 HOSPITAL OBSERVATION PER HR: HCPCS

## 2021-07-14 PROCEDURE — 25010000002 HEPARIN (PORCINE) PER 1000 UNITS: Performed by: INTERNAL MEDICINE

## 2021-07-14 PROCEDURE — 25010000002 CYANOCOBALAMIN PER 1000 MCG: Performed by: INTERNAL MEDICINE

## 2021-07-14 PROCEDURE — 99226 PR SBSQ OBSERVATION CARE/DAY 35 MINUTES: CPT | Performed by: INTERNAL MEDICINE

## 2021-07-14 PROCEDURE — 73560 X-RAY EXAM OF KNEE 1 OR 2: CPT

## 2021-07-14 PROCEDURE — 96372 THER/PROPH/DIAG INJ SC/IM: CPT

## 2021-07-14 RX ADMIN — HEPARIN SODIUM 5000 UNITS: 5000 INJECTION INTRAVENOUS; SUBCUTANEOUS at 21:33

## 2021-07-14 RX ADMIN — HEPARIN SODIUM 5000 UNITS: 5000 INJECTION INTRAVENOUS; SUBCUTANEOUS at 05:53

## 2021-07-14 RX ADMIN — GABAPENTIN 400 MG: 400 CAPSULE ORAL at 17:00

## 2021-07-14 RX ADMIN — PANTOPRAZOLE SODIUM 40 MG: 40 TABLET, DELAYED RELEASE ORAL at 08:46

## 2021-07-14 RX ADMIN — Medication 500 UNITS: at 08:45

## 2021-07-14 RX ADMIN — Medication 10 ML: at 22:18

## 2021-07-14 RX ADMIN — SPIRONOLACTONE 25 MG: 25 TABLET ORAL at 08:45

## 2021-07-14 RX ADMIN — MIDODRINE HYDROCHLORIDE 10 MG: 5 TABLET ORAL at 08:45

## 2021-07-14 RX ADMIN — AMIODARONE HYDROCHLORIDE 100 MG: 200 TABLET ORAL at 08:46

## 2021-07-14 RX ADMIN — Medication 10 ML: at 00:01

## 2021-07-14 RX ADMIN — SUCRALFATE 1 G: 1 TABLET ORAL at 08:46

## 2021-07-14 RX ADMIN — CYANOCOBALAMIN 1000 MCG: 1000 INJECTION, SOLUTION INTRAMUSCULAR at 08:45

## 2021-07-14 RX ADMIN — GABAPENTIN 400 MG: 400 CAPSULE ORAL at 21:33

## 2021-07-14 RX ADMIN — HEPARIN SODIUM 5000 UNITS: 5000 INJECTION INTRAVENOUS; SUBCUTANEOUS at 14:28

## 2021-07-14 RX ADMIN — Medication 10 ML: at 08:45

## 2021-07-14 RX ADMIN — SUCRALFATE 1 G: 1 TABLET ORAL at 17:22

## 2021-07-14 RX ADMIN — GABAPENTIN 400 MG: 400 CAPSULE ORAL at 08:45

## 2021-07-14 RX ADMIN — SUCRALFATE 1 G: 1 TABLET ORAL at 12:21

## 2021-07-14 RX ADMIN — CLOPIDOGREL BISULFATE 75 MG: 75 TABLET ORAL at 08:46

## 2021-07-14 RX ADMIN — SUCRALFATE 1 G: 1 TABLET ORAL at 21:33

## 2021-07-14 RX ADMIN — ASPIRIN 81 MG: 81 TABLET, COATED ORAL at 08:45

## 2021-07-14 NOTE — H&P
[facility] Medicine [facility]      Patient Name: Deion Nicholas  : 1940  MRN: [id number]  Primary Care Physician: Makenna Motta MD  Date of admission: 2021    Patient Care Team:  Makenna Motta MD as PCP - General  Makenna Motta MD as PCP - Family Medicine          Subjective   History Present Illness     Chief Complaint:   Chief Complaint   Patient presents with   • Leg Pain     pt c/o left leg redness and swelling. states I have cellulitis       History of Present Illness  80-year-old  male, a  with past medical history as outlined below, presented to the ED complaining of burning pain, swelling, and redness of the left leg for the last few days.  Patient has recurrent left leg cellulitis for which he was admitted 3 times in our hospital this year, and this time will be his fourth episode.  Last time patient was discharged on 2021 to continue 10 days of IV Teflaro at home which helped to resolve the infection. He has had ultrasounds of his leg which have been negative for DVT but 6 weeks ago at [facility] he was told that he has Baker's cyst in the left knee and the [facility] doctor told him that the cyst might be involved in the etiology of him having recurrent leg cellulitis and advised him to follow-up with vascular surgery.  He states that he awaits [facility] to tell him about appointment with vascular surgery.  He denies having fever or chills, chest pain or shortness of breath or any other complaint.  al pain.  He reports no fever.    Emergency department course:  Afebrile, hemodynamically stable, no acute distress.  Physical examination per ED physician was significant for presence of warm erythematous and swollen left leg below the knee and presence of fullness in the left popliteal fossa.  Only abnormal labs where ESR of 32 and hemoglobin of 11.9.  WBC count and CRP were normal.  Patient was given 600 mg IV  Teflaro.    ROS  Please refer to HPI. All other systems were reviewed and were negative.     Personal History     Past Medical History:   Past Medical History:   Diagnosis Date   • Aneurysm (CMS/HCC)    • Cardiomyopathy (CMS/HCC)     ICD implantation   • Cellulitis of left lower extremity 2010    recurrent   • CHD (coronary heart disease)     S/P CABG & PCI   • Dyslipidemia    • History of ventricular tachycardia    • Hypertension    • Myocardial infarction (CMS/HCC)     Inferior MI   • Pinched nerve     L4-L5   • Prostate cancer (CMS/HCC)        Surgical History:      Past Surgical History:   Procedure Laterality Date   • ANGIOPLASTY  2000 04/1996 Stent: Palmaz- Huy stent/LAD 07/1996-RCA: 2000-Tetra stent Guidant to proximal RCA, mid to distal artery 2 sequential Guidant Tetra stents   • APPENDECTOMY     • CARDIAC ABLATION  April and May 2019   • CARDIAC CATHETERIZATION  07/2017    1996 x2, Nov. 2000, 05/2010-cath 08/2015-no stents 2017   • CARDIAC DEFIBRILLATOR PLACEMENT     • COLONOSCOPY W/ POLYPECTOMY      Mult colonoscopy's for polyp resection    • CORONARY ARTERY BYPASS GRAFT  05/2010    x5 vessel: LMA to diagonal, LAD, intermedius, obtuse marginal, RCA   • CORONARY STENT PLACEMENT     • ENDOSCOPY N/A 6/24/2019    Procedure: ESOPHAGOGASTRODUODENOSCOPY with dilitation Bougie 50, 54;  Surgeon: Roddy Coronel MD;  Location: [facility] ENDOSCOPY;  Service: Gastroenterology   • INSERT / REPLACE / REMOVE PACEMAKER     • KNEE OPEN LATERAL RELEASE Left     reduction   • OTHER SURGICAL HISTORY  01/2018    ICD implantation   • PROSTATE SURGERY  2015    Robiotic surgery- Cyber Knife:           Family History: family history includes Heart disease in his father; Pancreatic cancer in his mother. Otherwise pertinent FHx was reviewed and unremarkable.     Social History:  reports that he quit smoking about 19 years ago. His smoking use included cigarettes. He has a 17.00 pack-year smoking history. He has never used  smokeless tobacco. He reports previous alcohol use. He reports that he does not use drugs.      Medications:  Prior to Admission medications    Medication Sig Start Date End Date Taking? Authorizing Provider   amiodarone (PACERONE) 200 MG tablet Take 100 mg by mouth Daily.   Yes Cyndee Melgar MD   aspirin 81 MG EC tablet Take 81 mg by mouth Daily.   Yes Cyndee Melgar MD   Cholecalciferol (VITAMIN D-400) 400 units tablet Take 2 tablets daily   Yes Cyndee Melgar MD   clopidogrel (PLAVIX) 75 MG tablet Take 75 mg by mouth Daily. 9/10/15  Yes Cyndee Melgar MD   cyanocobalamin 1000 MCG/ML injection Inject 1,000 mcg into the appropriate muscle as directed by prescriber Every 14 (Fourteen) Days.   Yes Cyndee Mlegar MD   ezetimibe (ZETIA) 10 MG tablet Take 10 mg by mouth Daily.   Yes Cyndee Melgar MD   gabapentin (NEURONTIN) 400 MG capsule Take 400 mg by mouth 3 (Three) Times a Day.   Yes Cyndee Melgar MD   lidocaine (LIDODERM) 5 % Place 1 patch on the skin as directed by provider Daily. Remove & Discard patch within 12 hours or as directed by MD   Yes Cyndee Melgar MD   methocarbamol (ROBAXIN) 500 MG tablet Take 500 mg by mouth 3 (Three) Times a Day As Needed for Muscle Spasms.   Yes Cyndee Melgar MD   midodrine (PROAMATINE) 10 MG tablet Take 1 tablet by mouth 3 (Three) Times a Day Before Meals. 4/22/21  Yes Marcin Childers,    nitroglycerin (NITROSTAT) 0.4 MG SL tablet Place 0.4 mg under the tongue Every 5 (Five) Minutes As Needed.   Yes Cyndee Melgar MD   Omega-3 Fatty Acids (fish oil) 1000 MG capsule capsule Take 2,000 mg by mouth 2 (Two) Times a Day With Meals.   Yes Cyndee Melgar MD   pantoprazole (PROTONIX) 40 MG EC tablet Take 40 mg by mouth Daily.   Yes Cyndee Melgar MD   spironolactone (ALDACTONE) 25 MG tablet Take 1 tablet by mouth Daily. 7/8/19  Yes Constantino Arvizu MD   sucralfate (CARAFATE) 1 g  tablet Take 1 g by mouth 4 (Four) Times a Day.   Yes Provider, Historical, MD       Allergies:    Allergies   Allergen Reactions   • Lovastatin Unknown (See Comments) and Myalgia     unknown   • Pravastatin Unknown (See Comments) and Myalgia     unknown   • Simvastatin Unknown (See Comments) and Myalgia     unknown       Objective   Objective     Vital Signs  Temp:  [97.9 °F (36.6 °C)] 97.9 °F (36.6 °C)  Heart Rate:  [88] 88  Resp:  [16] 16  BP: (121-141)/(62-74) 131/63  SpO2:  [94 %-100 %] 100 %  on   ;   Device (Oxygen Therapy): room air  Body mass index is 28.71 kg/m².    Physical Exam  General: WD, WN, alert and oriented x3, no acute distress.   Eyes:  Show anicteric sclerae, moist conjunctivae with no lig lag; PERRLA.  HENT:  Normocephalic, atraumatic, moist oral mucosa.  Neck: Supple, faint right carotid bruit, no JVP, no thyroid or lymph node enlargement, trachea central,   Lungs:  Good air entry. Clear to auscultation.   Heart: RRR, no murmur or rub.   Abdomen:  Soft, not tender, not distended, no organomegaly, bowel sounds positive.   Extremities: Warm erythematous swollen and tender left leg below the knee, soft cystic swelling in the left popliteal fossa, normal range of movement, pedal pulses intact.   Skin: No rash, lesions, or ulcers.  Normal texture and turgor.  Neurology:  Grossly intact.   Psychiatric exam: Pleasant, cooperative, appropriate mood and affect, intact judgment and insight.    Results Review:  I have personally reviewed most recent lab results and agree with findings, most notably: As below.    Results from last 7 days   Lab Units 07/13/21 2040   WBC 10*3/mm3 6.30   HEMOGLOBIN g/dL 11.9*   HEMATOCRIT % 35.1*   PLATELETS 10*3/mm3 228     Results from last 7 days   Lab Units 07/13/21 2040   SODIUM mmol/L 140   POTASSIUM mmol/L 4.5   CHLORIDE mmol/L 104   CO2 mmol/L 25.0   BUN mg/dL 16   CREATININE mg/dL 1.00   GLUCOSE mg/dL 107*   CALCIUM mg/dL 8.9     Estimated Creatinine Clearance:  75.3 mL/min (by C-G formula based on SCr of 1 mg/dL).  Brief Urine Lab Results     None          Microbiology Results (last 10 days)     ** No results found for the last 240 hours. **          ECG/EMG Results (most recent)     None          Results for orders placed during the hospital encounter of 06/25/21    Duplex Venous Lower Extremity - Left CAR    Interpretation Summary  · Normal left lower extremity venous duplex scan.          No radiology results for the last 7 days      Estimated Creatinine Clearance: 75.3 mL/min (by C-G formula based on SCr of 1 mg/dL).    Assessment/Plan   Assessment/Plan       Active Hospital Problems    Diagnosis  POA   • **Cellulitis of left leg [L03.116]  Yes   • Baker's cyst of knee, left [M71.22]  Yes      Resolved Hospital Problems   No resolved problems to display.     Assessment:  1.  Recurrent left leg cellulitis.    2.  Left knee Baker's cyst.    3.  CAD/Hx of MI-s/p CABG.    4.  Ischemic cardiomyopathy-stable.    5.  Hx of paroxysmal ventricular tachycardia-s/p ICD placement.    6.  Hypertension-controlled.    7.  Hyperlipidemia.    8.  Hx of prostate cancer.    9.  GERD.    10.  Mild normocytic normochromic anemia.    Plan:  -Admission to med/surg.  -Consult infectious disease regarding recurrent leg cellulitis.  -Consult orthopedic surgeon regarding Baker's cyst.  -Continue antibiotic treatment with Teflaro pending ID recommendations.  -Continue patient's home medications.  -DVT prophylaxis with subcutaneous heparin.            VTE Prophylaxis -   Mechanical Order History:     None      Pharmalogical Order History:      Ordered     Dose Route Frequency Stop    Signed and Held  heparin (porcine) 5000 UNIT/ML injection 5,000 Units      5,000 Units SC Every 8 Hours Scheduled --                CODE STATUS:    Code Status and Medical Interventions:   Ordered at: 07/13/21 2220     Level Of Support Discussed With:    Patient     Code Status:    CPR     Medical Interventions (Level  of Support Prior to Arrest):    Full       This patient has been examined wearing appropriate Personal Protective Equipment and discussed with hospital infection control department. 07/13/21      I discussed the patient's findings and my recommendations with patient.      Signature:mirella Sanchez Hospitalist Team

## 2021-07-14 NOTE — CONSULTS
"[facility]   Consult Note    Patient Name: Deion Nicholas  : 1940  MRN: [id number]  Primary Care Physician:  Makenna Motta MD  Referring Physician: Makenna Motta, *  Date of admission: 2021    Inpatient Orthopedic Surgery Consult  Consult performed by: Tommy Amaral Jr., MD  Consult ordered by: Juan Pablo Quiles MD        Subjective   Subjective     Reason for Consult/ Chief Complaint: Mild left knee pain    History of Present Illness  Deion Nicholas is a 80 y.o. male  with history of hypertension, CAD status post CABG, history of prior ORIF left proximal tibia fracture in [place] admitted for recurrent left leg cellulitis.  Per the electronic medical record, the patient has been admitted several times this year for recurrent left leg cellulitis.  This has been managed successfully with antibiotics in the past.  [facility] he had a Baker's cyst identified on ultrasound and suggested this may have an etiology for recurrent cellulitis.  Orthopedics was consulted for evaluation and management.    Of note, the ultrasound dated  describes a mild posterior fossa fluid collection.  However, a more recent ultrasound on  does not describe any posterior fossa fluid collection.    Of note, the patient reports having a \"fullness\" in the posterior knee \"forever\".  He denies any significant pain in the posterior knee.  He denies any redness or swelling or sign of infection in the posterior knee.  He has some chronic mild knee pain and stiffness due to his remote injuries and surgery.    In the emergency department, CRP was normal.  ESR mildly elevated at 32.  Patient has been afebrile since admission.    Review of Systems   Review of Systems:  Constitutional: Negative  HENT: Negative  Eyes: Negative  Respiratory: Negative; no shortness of breath  Cardiovascular: Negative; no current chest pain  Gastrointestinal: Negative  : Negative  Skin: Negative " except as listed in HPI  Neurological: Negative, no numbness or deficits  Hematological: Negative  Muscoloskeletal: Per HPI                     Personal History     Past Medical History:   Diagnosis Date   • Aneurysm (CMS/HCC)    • Cardiomyopathy (CMS/HCC)     ICD implantation   • Cellulitis of left lower extremity 2010    recurrent   • CHD (coronary heart disease)     S/P CABG & PCI   • Dyslipidemia    • History of ventricular tachycardia    • Hypertension    • Myocardial infarction (CMS/HCC)     Inferior MI   • Pinched nerve     L4-L5   • Prostate cancer (CMS/HCC)        Past Surgical History:   Procedure Laterality Date   • ANGIOPLASTY  2000 04/1996 Stent: Palmaz- Huy stent/LAD 07/1996-RCA: 2000-Tetra stent Guidant to proximal RCA, mid to distal artery 2 sequential Guidant Tetra stents   • APPENDECTOMY     • CARDIAC ABLATION  April and May 2019   • CARDIAC CATHETERIZATION  07/2017    1996 x2, Nov. 2000, 05/2010-cath 08/2015-no stents 2017   • CARDIAC DEFIBRILLATOR PLACEMENT     • COLONOSCOPY W/ POLYPECTOMY      Mult colonoscopy's for polyp resection    • CORONARY ARTERY BYPASS GRAFT  05/2010    x5 vessel: LMA to diagonal, LAD, intermedius, obtuse marginal, RCA   • CORONARY STENT PLACEMENT     • ENDOSCOPY N/A 6/24/2019    Procedure: ESOPHAGOGASTRODUODENOSCOPY with dilitation Bougie 50, 54;  Surgeon: Roddy Coronel MD;  Location: [facility] ENDOSCOPY;  Service: Gastroenterology   • INSERT / REPLACE / REMOVE PACEMAKER     • KNEE OPEN LATERAL RELEASE Left     reduction   • OTHER SURGICAL HISTORY  01/2018    ICD implantation   • PROSTATE SURGERY  2015    Robiotic surgery- Cyber Knife:       Family History: family history includes Heart disease in his father; Pancreatic cancer in his mother. Otherwise pertinent FHx was reviewed and not pertinent to current issue.    Social History:  reports that he quit smoking about 19 years ago. His smoking use included cigarettes. He has a 17.00 pack-year smoking history. He  has never used smokeless tobacco. He reports previous alcohol use. He reports that he does not use drugs.    Home Medications:   Cholecalciferol, amiodarone, aspirin, clopidogrel, cyanocobalamin, ezetimibe, fish oil, gabapentin, lidocaine, methocarbamol, midodrine, nitroglycerin, pantoprazole, spironolactone, and sucralfate    Allergies:  Allergies   Allergen Reactions   • Lovastatin Unknown (See Comments) and Myalgia     unknown   • Pravastatin Unknown (See Comments) and Myalgia     unknown   • Simvastatin Unknown (See Comments) and Myalgia     unknown       Objective    Objective     Vitals:  Temp:  [97.9 °F (36.6 °C)-98.1 °F (36.7 °C)] 97.9 °F (36.6 °C)  Heart Rate:  [77-88] 80  Resp:  [16] 16  BP: (121-141)/(60-74) 127/65    Physical Exam   Physical Exam:  Vital signs reviewed.  Constitutional:  Appears well-developed, well nourished.  Patient nontoxic-appearing.  HEENT:  Head: normocephalic, atraumatic  Eyes: EOMI, grossly normal.  No scleral icterus.  Neck: Normal range of motion.  Supple, no tracheal deviation.  Cardiovascular: Regular rate.  Pulmonary: Effort normal, symmetric chest expansion, no labored breathing.  Abdominal: Soft, non distended  Neurological: No gross deficits, oriented to person, place, and time.  Skin: Warm and dry  Psychiatric: Normal mood and affect  Musculoskeletal:    Examination of his left knee shows a well-healed incision over the proximal lateral tibia consistent with prior ORIF tibial plateau.  Mild chronic swelling and stiffness at the knee.  He has a faintly palpable fullness consistent with Baker's cyst in the posterior knee.  There is absolutely no erythema or sign of infection in the posterior knee or knee.    He tolerates 0 to 90 degrees of motion at the knee with minimal pain; he reports some mild stiffness and pain that has been chronic for many years.  No sign of septic arthritis.    He does have a fairly mild cellulitis/erythema in the pretibial region.  This is  nontender to palpation.       Active ankle dorsiflexion and plantarflexion.  Sensation is intact to light touch in sural, saphenous, deep and superficial peroneal, tibial nerve distribution.  Toes are warm and well perfused with brisk capillary refill.             Result Review    Result Review:  No imaging studies of the left knee have been obtained.    Assessment/Plan   Assessment / Plan     Brief Patient Summary:  Deion Nicholas is a 80 y.o. male with multiple medical comorbidities including history of coronary artery disease/coronary bypass surgery, remote history of ORIF left proximal tibia due to [place] injury, suspected mild chronic left knee arthritis, suspected left knee Baker's cyst in the setting of recurrent mild lower extremity cellulitis.    Active Hospital Problems:  Active Hospital Problems    Diagnosis    • **Cellulitis of left leg    • Baker's cyst of knee, left      Plan:   I had a long discussion with the patient today about his clinical and radiographic findings.  I suspect he does have some posttraumatic arthritis of the knee joint which would explain the presence of a Baker's cyst.  However, he has no sign of infection at the knee itself.  No sign of septic arthritis.  No sign of an infected posterior fossa soft tissue collection.  He really has no significant symptoms in the posterior knee.    We discussed Baker's cyst typically are not associated with recurrent cellulitis in the pretibial region.  I doubt they are related, particularly given the benign appearance of the knee itself.    -I will order an x-ray of the left knee to evaluate the underlying hardware and arthritic change in the knee.  We discussed MRIs are the ideal study to evaluate a Baker's cyst, but significant hardware will degrade image quality and given the above may not be necessary during this hospitalization  -PT: Weightbearing as tolerated, left lower extremity  -Antibiotics per primary team  -DVT: Teds/SCDs,  chemoprophylaxis per primary team  -Disposition: Pending      Thank you for this consultation, please call with questions.    Electronically signed by Tommy Amaral Jr, MD, 07/14/21, 9:35 AM EDT.

## 2021-07-14 NOTE — CONSULTS
Name: Deion Nicholas ADMIT: 2021   : 1940  PCP: Makenna Motta MD    MRN: [id number] LOS: 1 days   AGE/SEX: 80 y.o. male  ROOM: [address] [place] [address]      Patient Care Team:  Makenna Motta MD as PCP - General  Makenna Motta MD as PCP - Family Medicine  Chief Complaint   Patient presents with   • Leg Pain     pt c/o left leg redness and swelling. states I have cellulitis     CC: Left lower extremity swelling    Subjective     Inpatient Vascular Surgery Consult  Consult performed by: Brii Topete PA-C  Consult ordered by: Tamiko Victor APRN        History of Present Illness   Deion Nicholas is a 80-year-old male patient who presented to [facility] emergency department on 2021 with recurrent redness, swelling, burning and pain to the left lower extremity.  Patient failed outpatient antibiotics and was admitted for IV antibiotic therapy.  Patient reports he has had left lower extremity swelling for 20 years, this after having had a tibial plateau fracture with repair and left greater saphenous vein harvesting.  He states in the 20 years, he has cellulitis approximately every 2 years which is treated with IV antibiotic therapy.  Since the beginning of this year he began to have swelling, achiness and burning that will resolve but returns soon after and antibiotic therapy is not resolving the issue.  He states when he has flareup, his foot will become swollen at times as well, currently it is not.  He denies any past history of DVT.  He reports he has been faithfully wearing compression stockings daily for approximately 20 years, [facility] and renewed every 3 months, which appear to be in good shape. He states [facility] but not the [facility]. He denies history of peripheral vascular intervention. He is a non smoker. He is on DAPT and is intolerant to statin therapy.     Review of Systems   Constitutional: Positive for  fatigue.   Respiratory: Negative for shortness of breath.    Cardiovascular: Positive for leg swelling. Negative for chest pain.   Gastrointestinal: Negative for abdominal pain.   Musculoskeletal: Negative for back pain.   Skin: Negative for wound.   All other systems reviewed and are negative.      Past Medical History:   Diagnosis Date   • Aneurysm (CMS/HCC)    • Cardiomyopathy (CMS/HCC)     ICD implantation   • Cellulitis of left lower extremity 2010    recurrent   • CHD (coronary heart disease)     S/P CABG & PCI   • Dyslipidemia    • History of ventricular tachycardia    • Hypertension    • Myocardial infarction (CMS/HCC)     Inferior MI   • Pinched nerve     L4-L5   • Prostate cancer (CMS/HCC)      Past Surgical History:   Procedure Laterality Date   • ANGIOPLASTY  2000 04/1996 Stent: Palmaz- Huy stent/LAD 07/1996-RCA: 2000-Tetra stent Guidant to proximal RCA, mid to distal artery 2 sequential Guidant Tetra stents   • APPENDECTOMY     • CARDIAC ABLATION  April and May 2019   • CARDIAC CATHETERIZATION  07/2017    1996 x2, Nov. 2000, 05/2010-cath 08/2015-no stents 2017   • CARDIAC DEFIBRILLATOR PLACEMENT     • COLONOSCOPY W/ POLYPECTOMY      Mult colonoscopy's for polyp resection    • CORONARY ARTERY BYPASS GRAFT  05/2010    x5 vessel: LMA to diagonal, LAD, intermedius, obtuse marginal, RCA   • CORONARY STENT PLACEMENT     • ENDOSCOPY N/A 6/24/2019    Procedure: ESOPHAGOGASTRODUODENOSCOPY with dilitation Bougie 50, 54;  Surgeon: Roddy Coronel MD;  Location: [facility] ENDOSCOPY;  Service: Gastroenterology   • INSERT / REPLACE / REMOVE PACEMAKER     • KNEE OPEN LATERAL RELEASE Left     reduction   • OTHER SURGICAL HISTORY  01/2018    ICD implantation   • PROSTATE SURGERY  2015    Robiotic surgery- Cyber Knife:     Family History   Problem Relation Age of Onset   • Pancreatic cancer Mother    • Heart disease Father      Social History     Tobacco Use   • Smoking status: Former Smoker     Packs/day: 1.00      Years: 17.00     Pack years: 17.00     Types: Cigarettes     Quit date: 2002     Years since quittin.0   • Smokeless tobacco: Never Used   Vaping Use   • Vaping Use: Never used   Substance Use Topics   • 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      • aspirin 81 MG EC tablet Take 81 mg by mouth Daily.      • Cholecalciferol (VITAMIN D-400) 400 units tablet Take 2 tablets daily      • clopidogrel (PLAVIX) 75 MG tablet Take 75 mg by mouth Daily.      • cyanocobalamin 1000 MCG/ML injection Inject 1,000 mcg into the appropriate muscle as directed by prescriber Every 14 (Fourteen) Days.      • ezetimibe (ZETIA) 10 MG tablet Take 10 mg by mouth Daily.      • gabapentin (NEURONTIN) 400 MG capsule Take 400 mg by mouth 3 (Three) Times a Day.      • lidocaine (LIDODERM) 5 % Place 1 patch on the skin as directed by provider Daily. Remove & Discard patch within 12 hours or as directed by MD      • methocarbamol (ROBAXIN) 500 MG tablet Take 500 mg by mouth 3 (Three) Times a Day As Needed for Muscle Spasms.      • midodrine (PROAMATINE) 10 MG tablet Take 1 tablet by mouth 3 (Three) Times a Day Before Meals. 45 tablet 0    • nitroglycerin (NITROSTAT) 0.4 MG SL tablet Place 0.4 mg under the tongue Every 5 (Five) Minutes As Needed.      • Omega-3 Fatty Acids (fish oil) 1000 MG capsule capsule Take 2,000 mg by mouth 2 (Two) Times a Day With Meals.      • pantoprazole (PROTONIX) 40 MG EC tablet Take 40 mg by mouth Daily.      • spironolactone (ALDACTONE) 25 MG tablet Take 1 tablet by mouth Daily. 30 tablet 2    • sucralfate (CARAFATE) 1 g tablet Take 1 g by mouth 4 (Four) Times a Day.        amiodarone, 100 mg, Oral, Daily  aspirin, 81 mg, Oral, Daily  cholecalciferol, 500 Units, Oral, Daily  clopidogrel, 75 mg, Oral, Daily  cyanocobalamin,  1,000 mcg, Intramuscular, Q14 Days  gabapentin, 400 mg, Oral, TID  heparin (porcine), 5,000 Units, Subcutaneous, Q8H  lidocaine, 1 patch, Transdermal, Q24H  midodrine, 10 mg, Oral, TID AC  pantoprazole, 40 mg, Oral, Daily  sodium chloride, 10 mL, Intravenous, Q12H  spironolactone, 25 mg, Oral, Daily  sucralfate, 1 g, Oral, 4x Daily         •  acetaminophen  •  HYDROcodone-acetaminophen  •  methocarbamol  •  nitroglycerin  •  ondansetron  •  [COMPLETED] Insert peripheral IV **AND** sodium chloride  •  sodium chloride  Lovastatin, Pravastatin, and Simvastatin    Objective     Physical Exam:  Physical Exam  Constitutional:       Appearance: He is well-developed.   HENT:      Head: Normocephalic and atraumatic.   Eyes:      Pupils: Pupils are equal, round, and reactive to light.   Neck:      Trachea: No tracheal deviation.   Cardiovascular:      Rate and Rhythm: Normal rate and regular rhythm.      Comments: Patient has easily palpable radial, femoral and pedal pulses bilaterally.  No pulsatile mass appreciated in abdomen or popliteal arteries.  Feet warm and well-perfused.  No ulcerations noted.  Left lower extremity nonpitting edema, left lower extremity warm to touch.  No buffalo hump deformity  Pulmonary:      Effort: Pulmonary effort is normal. No respiratory distress.   Abdominal:      Palpations: Abdomen is soft. There is no mass.      Tenderness: There is no abdominal tenderness. There is no guarding.   Musculoskeletal:      Cervical back: Normal range of motion.   Skin:     General: Skin is warm and dry.   Neurological:      Mental Status: He is alert.          Vital Signs and Labs:  Vital Signs Patient Vitals for the past 24 hrs:   BP Temp Temp src Pulse Resp SpO2 Height Weight   07/14/21 1206 152/80 97.6 °F (36.4 °C) Oral 75 18 97 % -- --   07/14/21 0845 127/65 -- -- 80 -- -- -- --   07/14/21 0342 134/68 97.9 °F (36.6 °C) Oral 77 16 96 % -- 98.2 kg (216 lb 7.9 oz)   07/13/21 2347 125/60 98.1 °F (36.7 °C) Oral  "77 16 100 % -- --   07/13/21 2308 -- -- -- -- -- -- -- 98.2 kg (216 lb 7.9 oz)   07/13/21 2236 133/71 -- -- -- -- 100 % -- --   07/13/21 2121 131/63 -- -- -- -- 100 % -- --   07/13/21 2051 121/62 -- -- -- -- 99 % -- --   07/13/21 1753 141/74 97.9 °F (36.6 °C) Oral 88 16 94 % 185.4 cm (73\") 98.7 kg (217 lb 9.5 oz)     I/O:  I/O last 3 completed shifts:  In: 720 [P.O.:720]  Out: 650 [Urine:650]  BMI:  Body mass index is 28.56 kg/m².    CBC    Results from last 7 days   Lab Units 07/13/21 2040   WBC 10*3/mm3 6.30   HEMOGLOBIN g/dL 11.9*   PLATELETS 10*3/mm3 228     BMP   Results from last 7 days   Lab Units 07/13/21  2040   SODIUM mmol/L 140   POTASSIUM mmol/L 4.5   CHLORIDE mmol/L 104   CO2 mmol/L 25.0   BUN mg/dL 16   CREATININE mg/dL 1.00   GLUCOSE mg/dL 107*     Coag     HbA1C No results found for: HGBA1C  Infection   Results from last 7 days   Lab Units 07/13/21  2040   PROCALCITONIN ng/mL 0.03     Radiology(recent) XR Knee 1 or 2 View Left    Result Date: 7/14/2021  1.Advanced tricompartment osteoarthritis. 2.Ossification within the medial soft tissues about the knee that could relate to old MCL ligament injury. 3.Hardware is seen along the proximal tibia.  Electronically Signed By-Nicola Cade MD On:7/14/2021 11:33 AM This report was finalized on [id number] by  Nicola Cade MD.        Active Hospital Problems    Diagnosis  POA   • **Cellulitis of left leg [L03.116]  Yes   • Baker's cyst of knee, left [M71.22]  Yes      Resolved Hospital Problems   No resolved problems to display.     Left lower extremity venous duplex on 6/26/2021 normal  Left lower extremity venous duplex performed on 6/2/2021, normal with left popliteal fossa fluid collection noted  Left lower extremity venous duplex performed on 5/11/2021 normal.    ABIs 11/20/20           [id number]    Assessment/Plan       Cellulitis of left leg    Baker's cyst of knee, left      80 y.o. male we have been asked to evaluate for lymphedema.  Patient with " history of left lower extremity trauma and vein harvesting with chronic intermittent swelling.  Patient was admitted for treatment of cellulitis with IV antibiotic therapy.  Patient without leukocytosis, he is afebrile, normal C-reactive protein and slightly elevated SedRate.     Patient with chronic intermittent left lower extremity swelling issues requiring compression stocking may have early stages lymphedema,  he may benefit with visits to lymphedema clinic for wraps and massage, can consider outpatient vein interrogation at dedicated vein center for evaluation.  Continue elevation and compression.    Patient also reports history AAA, but is unsure of size.Will obtain AAA duplex for evaluation.     I discussed the patients findings and my recommendations with patient.    Brii Aden PA-C  07/14/21  16:41 EDT    Please call my office with any question: [phone]

## 2021-07-14 NOTE — ED NOTES
Patient came to the ED for an evaluation of leg pain.  Patient states his cellulitis has came back.  Patient rates his pain 8/10.  He states his leg has a burning sensation.  Patient is A&O times 4.  Call light is in reach awaiting for MD orders.     Tamiko Mazariegos RN  07/13/21 2029

## 2021-07-14 NOTE — PROGRESS NOTES
[facility] Medicine Services Daily Progress Note    Patient Name: Deion Nicholas  : 1940  MRN: [id number]  Primary Care Physician:  Makenna Motta MD  Date of admission: 2021      Subjective      Chief Complaint: Left leg redness, swelling and pain.      Patient Reports no overnight events.    Review of Systems   Constitutional: Negative.   HENT: Negative.    Eyes: Negative.    Cardiovascular: Negative.    Respiratory: Negative.    Endocrine: Negative.    Hematologic/Lymphatic: Negative.    Skin: Negative.    Musculoskeletal: Negative.    Gastrointestinal: Negative.    Genitourinary: Negative.    Neurological: Negative.    Psychiatric/Behavioral: Negative.    Allergic/Immunologic: Negative.           Objective      Vitals:   Temp:  [97.9 °F (36.6 °C)-98.1 °F (36.7 °C)] 97.9 °F (36.6 °C)  Heart Rate:  [77-88] 80  Resp:  [16] 16  BP: (121-141)/(60-74) 127/65    Physical Exam  Vitals and nursing note reviewed.   Constitutional:       General: He is not in acute distress.  HENT:      Head: Normocephalic.      Nose: Nose normal.      Mouth/Throat:      Mouth: Mucous membranes are dry.      Pharynx: Oropharynx is clear.   Eyes:      Extraocular Movements: Extraocular movements intact.      Conjunctiva/sclera: Conjunctivae normal.      Pupils: Pupils are equal, round, and reactive to light.   Cardiovascular:      Pulses: Normal pulses.      Heart sounds: No murmur heard.   No friction rub. No gallop.       Comments: S1 and S2 present.  No tachycardia.  Pulmonary:      Effort: Pulmonary effort is normal.      Breath sounds: No stridor. No wheezing or rales.   Chest:      Chest wall: No tenderness.   Abdominal:      General: Bowel sounds are normal. There is no distension.      Palpations: Abdomen is soft.      Tenderness: There is no abdominal tenderness. There is no right CVA tenderness or guarding.   Musculoskeletal:         General: Tenderness present. No swelling, deformity  or signs of injury.      Cervical back: Normal range of motion. No rigidity.      Right lower leg: No edema.      Left lower leg: Edema present.      Comments: Positive left lower extremity erythema, edema and TTP.   Skin:     General: Skin is warm and dry.      Capillary Refill: Capillary refill takes less than 2 seconds.      Coloration: Skin is not jaundiced.      Findings: No bruising, erythema, lesion or rash.   Neurological:      General: No focal deficit present.   Psychiatric:      Comments: No agitation.             Result Review    Result Review:  I have personally reviewed the results from the time of this admission to 7/14/2021 10:38 EDT and agree with these findings:  [x]  Laboratory  [x]  Microbiology  [x]  Radiology  []  EKG/Telemetry   []  Cardiology/Vascular   []  Pathology  []  Old records  []  Other:  Most notable findings include: Sed rate was 32.    Wound 06/17/21 0049 Left lower leg Other (comment) (Active)   Placement Date/Time: 06/17/21 0049   Present on Hospital Admission: Yes  Side: Left  Orientation: lower  Location: leg  Primary Wound Type: Other (comment)  Stage, Pressure Injury : other (see comments)  Additional Comments: Redness and cellulitis      Assessments 6/17/2021  1:10 AM 6/18/2021  7:15 AM   Dressing Appearance open to air --   Closure -- Open to air   Base blanchable;red blanchable;red       No Linked orders to display         Assessment/Plan      Brief Patient Summary:  Deion Nicholas is a 80 y.o. male with past medical history of a cardiomyopathy status post AICD placement, aneurysm, recurrent cellulitis of the left lower extremity, coronary artery disease status post a CABG and PCI, hyperlipidemia, ventricular tachycardia, hypertension, pinched nerve at the L4-L5 and prostate cancer who presented to the emergency room complaining of left leg swelling redness and pain.  Patient was seen in the emergency room and was diagnosed with a recurrent cellulitis of the left lower  extremity.  Patient was recommended for admission for further treatment and management.  Patient also mentioned that he tended to scratch the skin of the left lower extremity.        Active Hospital Problems:  Active Hospital Problems    Diagnosis    • **Cellulitis of left leg  Treat with antibiotics.      • Baker's cyst of knee, left  Follow orthopedic surgery recommendations.    Coronary artery disease.  Treat with aspirin.    Hypertension.  Stable.    Hyperlipidemia.  Treat with Lipitor.    GERD.  Treat with Protonix.    Anemia.  Monitor H&H.    Paroxysmal VT  Status post AICD placement.          Continue appropriate patient's home medications for other chronic medical conditions.  Continue the present level of care.  Patient and family agreed with the plan of care.      DVT prophylaxis:  Medical DVT prophylaxis orders are present.    CODE STATUS:    Level Of Support Discussed With: Patient  Code Status: CPR  Medical Interventions (Level of Support Prior to Arrest): Full    Disposition:  I expect this wonderful patient to discharge tomorrow.    Electronically signed by Alexy Banuelos MD, 07/14/21, 10:38 EDT.  [facility] Hospitalist Team

## 2021-07-14 NOTE — PLAN OF CARE
Problem: Skin or Soft Tissue Infection  Goal: Infection Symptom Resolution  Outcome: Ongoing, Progressing  Intervention: Provide Meticulous Infection Site Care  Recent Flowsheet Documentation  Taken 7/14/2021 1326 by Apurva Carias RN  Infection Prevention:   rest/sleep promoted   single patient room provided  Taken 7/14/2021 1125 by Apurva Carias RN  Infection Prevention:   rest/sleep promoted   single patient room provided  Taken 7/14/2021 0954 by Apurva Carias RN  Infection Prevention:   rest/sleep promoted   single patient room provided  Taken 7/14/2021 0755 by Apurva Carias RN  Infection Prevention:   single patient room provided   rest/sleep promoted  Intervention: Minimize and Manage Infection Progression  Recent Flowsheet Documentation  Taken 7/14/2021 0755 by Apurva Carias RN  Infection Management: aseptic technique maintained     Problem: Adult Inpatient Plan of Care  Goal: Plan of Care Review  Outcome: Ongoing, Progressing  Flowsheets (Taken 7/14/2021 0340 by Tanika Grigsby LPN)  Plan of Care Reviewed With: patient  Goal: Patient-Specific Goal (Individualized)  Outcome: Ongoing, Progressing  Goal: Absence of Hospital-Acquired Illness or Injury  Outcome: Ongoing, Progressing  Intervention: Identify and Manage Fall Risk  Recent Flowsheet Documentation  Taken 7/14/2021 1326 by Apurva Carias RN  Safety Promotion/Fall Prevention:   safety round/check completed   nonskid shoes/slippers when out of bed  Taken 7/14/2021 1125 by Apurva Carias RN  Safety Promotion/Fall Prevention:   safety round/check completed   nonskid shoes/slippers when out of bed  Taken 7/14/2021 0954 by Apurva Carias RN  Safety Promotion/Fall Prevention:   safety round/check completed   nonskid shoes/slippers when out of bed  Taken 7/14/2021 0755 by pAurva Carias RN  Safety Promotion/Fall Prevention:   safety round/check completed   nonskid shoes/slippers when out of bed  Intervention: Prevent Skin  Injury  Recent Flowsheet Documentation  Taken 7/14/2021 0755 by Apurva Carias RN  Body Position: position changed independently  Intervention: Prevent Infection  Recent Flowsheet Documentation  Taken 7/14/2021 1326 by Apurva Carias RN  Infection Prevention:   rest/sleep promoted   single patient room provided  Taken 7/14/2021 1125 by Apurva Carias RN  Infection Prevention:   rest/sleep promoted   single patient room provided  Taken 7/14/2021 0954 by Apurva Carias RN  Infection Prevention:   rest/sleep promoted   single patient room provided  Taken 7/14/2021 0755 by Apurva Carias RN  Infection Prevention:   single patient room provided   rest/sleep promoted  Goal: Optimal Comfort and Wellbeing  Outcome: Ongoing, Progressing  Intervention: Provide Person-Centered Care  Recent Flowsheet Documentation  Taken 7/14/2021 0755 by Apurva Carias RN  Trust Relationship/Rapport:   care explained   thoughts/feelings acknowledged  Goal: Readiness for Transition of Care  Outcome: Ongoing, Progressing     Problem: Pain Acute  Goal: Optimal Pain Control  Outcome: Ongoing, Progressing  Intervention: Prevent or Manage Pain  Recent Flowsheet Documentation  Taken 7/14/2021 0755 by Apurva Carias RN  Sensory Stimulation Regulation: quiet environment promoted  Sleep/Rest Enhancement:   awakenings minimized   consistent schedule promoted  Intervention: Optimize Psychosocial Wellbeing  Recent Flowsheet Documentation  Taken 7/14/2021 0755 by Apurva Carias RN  Supportive Measures: active listening utilized  Diversional Activities: television   Goal Outcome Evaluation:               Pt rested throughout the shift. Pt had minor c/o pain in left leg. Pt had XRAY performed. Vascular surgery consulted. Will continue to observe.

## 2021-07-14 NOTE — PLAN OF CARE
Goal Outcome Evaluation:  Plan of Care Reviewed With: patient      Pt admitted with cellulitis in left leg, no complaints throughout the night pt is sleeping. Waiting on consult for Ortho and Infectious disease for recurring cellulitis.

## 2021-07-14 NOTE — CONSULTS
Infectious Diseases Consult Note    Referring Provider: Dr. Quiles    Reason for Consultation: Left leg cellulitis    Patient Care Team:  Makenna Motta MD as PCP - General  Makenna Motta MD as PCP - Family Medicine    Chief complaint left leg pain and swelling    Subjective     The patient has been afebrile for the last 24 hours.  The patient is on room air, hemodynamically stable, and is tolerating antimicrobial therapy.    History of present illness:      This is a a 80-year-old male presents the hospital 7/13/2021 with complaints of left leg swelling, pain, redness.  Patient has been admitted multiple times for left leg cellulitis and was last treated in June 2021 with 10 days of Teflaro.  Patient denies fever, chills, diaphoresis, shortness of breath, cough, or GI symptoms.  Patient was post to see vascular surgery for issues relating to a Baker's cyst behind the left knee but states that he has not seen him as of yet.    The patient is currently on room air and does not appear to be in acute distress.  Patient's been afebrile since admission.  Patient has a creatinine of 1, white blood cell count of 6.3, hemoglobin 121, and platelets 228.  Doppler from 6/26/2021 was negative and COVID-19 screen is negative.  Patient has had 1 dose of Teflaro since admission and has no known antibiotic allergies.    Patient has a remote history of injury and surgery to the left knee back from the [place] along with a ICD implantation, CABG with cardiac catheterization, hyperlipidemia, hypertension, prostate cancer, appendectomy.  Patient is a former smoker and denies any alcohol or illicit drug abuse.    Review of Systems   Review of Systems   Constitutional: Negative.    HENT: Negative.    Eyes: Negative.    Respiratory: Negative.    Cardiovascular: Positive for leg swelling.   Gastrointestinal: Negative.    Endocrine: Negative.    Genitourinary: Negative.    Musculoskeletal: Negative.    Skin: Positive  for color change.   Neurological: Negative.    Psychiatric/Behavioral: Negative.    All other systems reviewed and are negative.      Medications  Medications Prior to Admission   Medication Sig Dispense Refill Last Dose   • amiodarone (PACERONE) 200 MG tablet Take 100 mg by mouth Daily.      • aspirin 81 MG EC tablet Take 81 mg by mouth Daily.      • Cholecalciferol (VITAMIN D-400) 400 units tablet Take 2 tablets daily      • clopidogrel (PLAVIX) 75 MG tablet Take 75 mg by mouth Daily.      • cyanocobalamin 1000 MCG/ML injection Inject 1,000 mcg into the appropriate muscle as directed by prescriber Every 14 (Fourteen) Days.      • ezetimibe (ZETIA) 10 MG tablet Take 10 mg by mouth Daily.      • gabapentin (NEURONTIN) 400 MG capsule Take 400 mg by mouth 3 (Three) Times a Day.      • lidocaine (LIDODERM) 5 % Place 1 patch on the skin as directed by provider Daily. Remove & Discard patch within 12 hours or as directed by MD      • methocarbamol (ROBAXIN) 500 MG tablet Take 500 mg by mouth 3 (Three) Times a Day As Needed for Muscle Spasms.      • midodrine (PROAMATINE) 10 MG tablet Take 1 tablet by mouth 3 (Three) Times a Day Before Meals. 45 tablet 0    • nitroglycerin (NITROSTAT) 0.4 MG SL tablet Place 0.4 mg under the tongue Every 5 (Five) Minutes As Needed.      • Omega-3 Fatty Acids (fish oil) 1000 MG capsule capsule Take 2,000 mg by mouth 2 (Two) Times a Day With Meals.      • pantoprazole (PROTONIX) 40 MG EC tablet Take 40 mg by mouth Daily.      • spironolactone (ALDACTONE) 25 MG tablet Take 1 tablet by mouth Daily. 30 tablet 2    • sucralfate (CARAFATE) 1 g tablet Take 1 g by mouth 4 (Four) Times a Day.          History  Past Medical History:   Diagnosis Date   • Aneurysm (CMS/HCC)    • Cardiomyopathy (CMS/HCC)     ICD implantation   • Cellulitis of left lower extremity 2010    recurrent   • CHD (coronary heart disease)     S/P CABG & PCI   • Dyslipidemia    • History of ventricular tachycardia    •  Hypertension    • Myocardial infarction (CMS/HCC)     Inferior MI   • Pinched nerve     L4-L5   • Prostate cancer (CMS/HCC)      Past Surgical History:   Procedure Laterality Date   • ANGIOPLASTY  2000 04/1996 Stent: Palmaz- Huy stent/LAD 07/1996-RCA: 2000-Tetra stent Guidant to proximal RCA, mid to distal artery 2 sequential Guidant Tetra stents   • APPENDECTOMY     • CARDIAC ABLATION  April and May 2019   • CARDIAC CATHETERIZATION  07/2017    1996 x2, Nov. 2000, 05/2010-cath 08/2015-no stents 2017   • CARDIAC DEFIBRILLATOR PLACEMENT     • COLONOSCOPY W/ POLYPECTOMY      Mult colonoscopy's for polyp resection    • CORONARY ARTERY BYPASS GRAFT  05/2010    x5 vessel: LMA to diagonal, LAD, intermedius, obtuse marginal, RCA   • CORONARY STENT PLACEMENT     • ENDOSCOPY N/A 6/24/2019    Procedure: ESOPHAGOGASTRODUODENOSCOPY with dilitation Bougie 50, 54;  Surgeon: Roddy Coronel MD;  Location: [facility] ENDOSCOPY;  Service: Gastroenterology   • INSERT / REPLACE / REMOVE PACEMAKER     • KNEE OPEN LATERAL RELEASE Left     reduction   • OTHER SURGICAL HISTORY  01/2018    ICD implantation   • PROSTATE SURGERY  2015    Robiotic surgery- Cyber Knife:       Family History  Family History   Problem Relation Age of Onset   • Pancreatic cancer Mother    • Heart disease Father        Social History   reports that he quit smoking about 19 years ago. His smoking use included cigarettes. He has a 17.00 pack-year smoking history. He has never used smokeless tobacco. He reports previous alcohol use. He reports that he does not use drugs.    Allergies  Lovastatin, Pravastatin, and Simvastatin    Objective     Vital Signs   Vital Signs (last 24 hours)       07/13 0700  -  07/14 0659 07/14 0700  -  07/14 1336   Most Recent    Temp (°F) 97.9 -  98.1      97.6     97.6 (36.4)    Heart Rate 77 -  88    75 -  80     75    Resp   16      18     18    /62 -  141/74    127/65 -  152/80     152/80    SpO2 (%) 94 -  100      97     97           Physical Exam:  Physical Exam  Vitals and nursing note reviewed.   Constitutional:       General: He is not in acute distress.     Appearance: Normal appearance. He is well-developed and normal weight. He is not diaphoretic.   HENT:      Head: Normocephalic and atraumatic.   Eyes:      Conjunctiva/sclera: Conjunctivae normal.      Pupils: Pupils are equal, round, and reactive to light.   Cardiovascular:      Rate and Rhythm: Normal rate and regular rhythm.      Heart sounds: Normal heart sounds, S1 normal and S2 normal.   Pulmonary:      Effort: Pulmonary effort is normal. No respiratory distress.      Breath sounds: Normal breath sounds. No stridor. No wheezing or rales.   Abdominal:      General: Bowel sounds are normal. There is no distension.      Palpations: Abdomen is soft. There is no mass.      Tenderness: There is no abdominal tenderness. There is no guarding.   Musculoskeletal:         General: No deformity. Normal range of motion.      Cervical back: Neck supple.      Left lower leg: Edema present.   Skin:     General: Skin is warm and dry.      Coloration: Skin is not pale.      Findings: No erythema or rash.      Comments: Very minor swelling and erythema to the left leg without warmth   Neurological:      Mental Status: He is alert and oriented to person, place, and time.      Cranial Nerves: No cranial nerve deficit.   Psychiatric:         Mood and Affect: Mood normal.         Microbiology  Microbiology Results (last 10 days)     Procedure Component Value - Date/Time    COVID PRE-OP / PRE-PROCEDURE SCREENING ORDER (NO ISOLATION) - Swab, Nasopharynx [[id number]]  (Normal) Collected: 07/13/21 2216    Lab Status: Final result Specimen: Swab from Nasopharynx Updated: 07/13/21 2240    Narrative:      The following orders were created for panel order COVID PRE-OP / PRE-PROCEDURE SCREENING ORDER (NO ISOLATION) - Swab, Nasopharynx.  Procedure                               Abnormality         Status                      ---------                               -----------         ------                     COVID-19,CEPHEID,COR/EROS...[[id number]]  Normal              Final result                 Please view results for these tests on the individual orders.    COVID-19,CEPHEID,COR/EROS/PAD/ADALBERTO IN-HOUSE(OR EMERGENT/ADD-ON),NP SWAB IN TRANSPORT MEDIA 3-4 HR TAT, RT-PCR - Swab, Nasopharynx [[id number]]  (Normal) Collected: 07/13/21 2216    Lab Status: Final result Specimen: Swab from Nasopharynx Updated: 07/13/21 2240     COVID19 Not Detected    Narrative:      Fact sheet for providers: https://www.fda.gov/media/995006/download     Fact sheet for patients: https://www.fda.gov/media/314092/download  Fact sheet for providers: https://www.fda.gov/media/866861/download    Fact sheet for patients: https://www.fda.gov/media/630729/download    Test performed by PCR.          Laboratory  Results from last 7 days   Lab Units 07/13/21  2040   WBC 10*3/mm3 6.30   HEMOGLOBIN g/dL 11.9*   HEMATOCRIT % 35.1*   PLATELETS 10*3/mm3 228     Results from last 7 days   Lab Units 07/13/21  2040   SODIUM mmol/L 140   POTASSIUM mmol/L 4.5   CHLORIDE mmol/L 104   CO2 mmol/L 25.0   BUN mg/dL 16   CREATININE mg/dL 1.00   GLUCOSE mg/dL 107*   CALCIUM mg/dL 8.9     Results from last 7 days   Lab Units 07/13/21  2040   SODIUM mmol/L 140   POTASSIUM mmol/L 4.5   CHLORIDE mmol/L 104   CO2 mmol/L 25.0   BUN mg/dL 16   CREATININE mg/dL 1.00   GLUCOSE mg/dL 107*   CALCIUM mg/dL 8.9         Results from last 7 days   Lab Units 07/13/21  2040   SED RATE mm/hr 32*           Radiology  Imaging Results (Last 72 Hours)     Procedure Component Value Units Date/Time    XR Knee 1 or 2 View Left [[id number]] Collected: 07/14/21 1132     Updated: 07/14/21 1136    Narrative:      DATE OF EXAM:  7/14/2021 11:27 AM     PROCEDURE:  XR KNEE 1 OR 2 VW LEFT-     INDICATIONS:  s/p ORIF, two views; L03.116-Cellulitis of left lower limb;  M71.22-Synovial cyst of popliteal space  ([place]), left knee     COMPARISON:  No Comparisons Available     TECHNIQUE:      One to two radiologic views of left knee were obtained.     FINDINGS:  There is a screw-plate device along the proximal tibia. There are marked  tricompartment degenerative changes involving the knee. There are  ossifications along the medial compartment of the knee that could  reflect more remote medial collateral ligament injury. There is vascular  calcification. There is no large joint effusion.        Impression:      1.Advanced tricompartment osteoarthritis.  2.Ossification within the medial soft tissues about the knee that could  relate to old MCL ligament injury.  3.Hardware is seen along the proximal tibia.     Electronically Signed By-Nicola Cade MD On:7/14/2021 11:33 AM  This report was finalized on [id number] by  Nicola Cade MD.          Cardiology      Results Review:  I have reviewed all clinical data, test, lab, and imaging results.       Schedule Meds  amiodarone, 100 mg, Oral, Daily  aspirin, 81 mg, Oral, Daily  cholecalciferol, 500 Units, Oral, Daily  clopidogrel, 75 mg, Oral, Daily  cyanocobalamin, 1,000 mcg, Intramuscular, Q14 Days  gabapentin, 400 mg, Oral, TID  heparin (porcine), 5,000 Units, Subcutaneous, Q8H  lidocaine, 1 patch, Transdermal, Q24H  midodrine, 10 mg, Oral, TID AC  pantoprazole, 40 mg, Oral, Daily  sodium chloride, 10 mL, Intravenous, Q12H  spironolactone, 25 mg, Oral, Daily  sucralfate, 1 g, Oral, 4x Daily        Infusion Meds       PRN Meds  •  acetaminophen  •  HYDROcodone-acetaminophen  •  methocarbamol  •  nitroglycerin  •  ondansetron  •  [COMPLETED] Insert peripheral IV **AND** sodium chloride  •  sodium chloride      Assessment/Plan       Assessment    Chronic left lower leg swelling.  Patient has been afebrile, white blood cell count is normal, CRP and procalcitonin are all normal.  Overall picture is not indicative of cellulitis but chronic swelling from a remote injury.    -Patient  [facility] with [facility] and was supposed to be seen by vascular surgery but has not been able to see them yet    CAD with history of CABG x5    Pacemaker placement    Prostate cancer      Plan    Monitor off antimicrobial therapy  Orthopedic surgery has already seen the patient  Consult vascular surgery  Continue supportive care  Daisy Victor, APRN  07/14/21  13:36 EDT

## 2021-07-14 NOTE — ED PROVIDER NOTES
Subjective   History of Present Illness  Context: 80-year-old male presents with redness swelling burning pain to the left lower leg.  He has had this several times this year.  He has been admitted.  He has failed outpatient antibiotics but has improved with IV antibiotics.  He has had ultrasounds of his leg which have been negative for DVT but have been positive for fluid collection in popliteal fossa reported 1.  He has had no chest pain shortness of breath.  No abdominal pain.  He reports no fever.  Location: Left leg  Quality: Burning pain swelling  Duration: 3 to 4 days  Timing: Constant  Severity:   Modifying factors: He has had this 3 or 4 times this year has responded to IV antibiotics but not oral antibiotics  Associated signs and symptoms:    Review of Systems   Constitutional: Negative for fever and unexpected weight change.   HENT: Negative for congestion and sore throat.    Eyes: Negative for pain.   Respiratory: Negative for cough and shortness of breath.    Cardiovascular: Positive for leg swelling. Negative for chest pain.   Gastrointestinal: Negative for abdominal pain, diarrhea and vomiting.   Genitourinary: Negative for dysuria and urgency.   Musculoskeletal: Negative for back pain.        Leg pain   Skin: Positive for color change. Negative for rash.   Neurological: Negative for dizziness, weakness and headaches.   Psychiatric/Behavioral: Negative for confusion.       Past Medical History:   Diagnosis Date   • Aneurysm (CMS/HCC)    • Cardiomyopathy (CMS/HCC)     ICD implantation   • Cellulitis of left lower extremity 2010    recurrent   • CHD (coronary heart disease)     S/P CABG & PCI   • Dyslipidemia    • History of ventricular tachycardia    • Hypertension    • Myocardial infarction (CMS/HCC)     Inferior MI   • Pinched nerve     L4-L5   • Prostate cancer (CMS/HCC)        Allergies   Allergen Reactions   • Lovastatin Unknown (See Comments) and Myalgia     unknown   • Pravastatin Unknown (See  Comments) and Myalgia     unknown   • Simvastatin Unknown (See Comments) and Myalgia     unknown       Past Surgical History:   Procedure Laterality Date   • ANGIOPLASTY  1996 Stent: Palmaz- Huy stent/LAD 1996-RCA: -Tetra stent Guidant to proximal RCA, mid to distal artery 2 sequential Guidant Tetra stents   • APPENDECTOMY     • CARDIAC ABLATION  April and May 2019   • CARDIAC CATHETERIZATION  2017    1996 x2, Nov. , 2010-cath 2015-no stents    • CARDIAC DEFIBRILLATOR PLACEMENT     • COLONOSCOPY W/ POLYPECTOMY      Mult colonoscopy's for polyp resection    • CORONARY ARTERY BYPASS GRAFT  05/2010    x5 vessel: LMA to diagonal, LAD, intermedius, obtuse marginal, RCA   • CORONARY STENT PLACEMENT     • ENDOSCOPY N/A 2019    Procedure: ESOPHAGOGASTRODUODENOSCOPY with dilitation Bougie 50, 54;  Surgeon: Roddy Coronel MD;  Location: [facility] ENDOSCOPY;  Service: Gastroenterology   • INSERT / REPLACE / REMOVE PACEMAKER     • KNEE OPEN LATERAL RELEASE Left     reduction   • OTHER SURGICAL HISTORY  2018    ICD implantation   • PROSTATE SURGERY      Robiotic surgery- Cyber Knife:       Family History   Problem Relation Age of Onset   • Pancreatic cancer Mother    • Heart disease Father        Social History     Socioeconomic History   • Marital status:      Spouse name: Not on file   • Number of children: Not on file   • Years of education: Not on file   • Highest education level: Not on file   Tobacco Use   • Smoking status: Former Smoker     Packs/day: 1.00     Years: 17.00     Pack years: 17.00     Types: Cigarettes     Quit date: 2002     Years since quittin.0   • Smokeless tobacco: Never Used   Vaping Use   • Vaping Use: Never used   Substance and Sexual Activity   • 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  Authorizing Provider   amiodarone (PACERONE) 200 MG tablet Take 100 mg by mouth Daily.    Cyndee Melgar MD   aspirin 81 MG EC tablet Take 81 mg by mouth Daily.    Cyndee Melgar MD   Cholecalciferol (VITAMIN D-400) 400 units tablet Take 2 tablets daily    Cyndee Melgar MD   clopidogrel (PLAVIX) 75 MG tablet Take 75 mg by mouth Daily. 9/10/15   Cyndee Melgar MD   cyanocobalamin 1000 MCG/ML injection Inject 1,000 mcg into the appropriate muscle as directed by prescriber Every 14 (Fourteen) Days.    Cyndee Melgar MD   ezetimibe (ZETIA) 10 MG tablet Take 10 mg by mouth Daily.    Cyndee Melgar MD   gabapentin (NEURONTIN) 400 MG capsule Take 400 mg by mouth 3 (Three) Times a Day.    Cyndee Melgar MD   lidocaine (LIDODERM) 5 % Place 1 patch on the skin as directed by provider Daily. Remove & Discard patch within 12 hours or as directed by MD    Cyndee Melgar MD   methocarbamol (ROBAXIN) 500 MG tablet Take 500 mg by mouth 3 (Three) Times a Day As Needed for Muscle Spasms.    Cyndee Melgar MD   midodrine (PROAMATINE) 10 MG tablet Take 1 tablet by mouth 3 (Three) Times a Day Before Meals. 4/22/21   Marcin Childers,    nitroglycerin (NITROSTAT) 0.4 MG SL tablet Place 0.4 mg under the tongue Every 5 (Five) Minutes As Needed.    Cyndee Melgar MD   Omega-3 Fatty Acids (fish oil) 1000 MG capsule capsule Take 2,000 mg by mouth 2 (Two) Times a Day With Meals.    Cyndee Melgar MD   pantoprazole (PROTONIX) 40 MG EC tablet Take 40 mg by mouth Daily.    Cyndee Melgar MD   spironolactone (ALDACTONE) 25 MG tablet Take 1 tablet by mouth Daily. 7/8/19   Constantino Arvizu MD   sucralfate (CARAFATE) 1 g tablet Take 1 g by mouth 4 (Four) Times a Day.    Cyndee Melgar MD             Objective   Physical Exam  80-year-old male awake alert.  Generally well-developed well-nourished.  Pupils equal round at light.  Neck supple chest  clear cardiovascular regular rate and rhythm.  Abdomen soft nontender.  Examination of left leg he is noted to have fullness in popliteal fossa on the left.  He has erythema mild warmth swelling to left leg below his knee.  Procedures           ED Course      Results for orders placed or performed during the hospital encounter of 07/13/21   Basic Metabolic Panel    Specimen: Blood   Result Value Ref Range    Glucose 107 (H) 65 - 99 mg/dL    BUN 16 8 - 23 mg/dL    Creatinine 1.00 0.76 - 1.27 mg/dL    Sodium 140 136 - 145 mmol/L    Potassium 4.5 3.5 - 5.2 mmol/L    Chloride 104 98 - 107 mmol/L    CO2 25.0 22.0 - 29.0 mmol/L    Calcium 8.9 8.6 - 10.5 mg/dL    eGFR Non African Amer 72 >60 mL/min/1.73    BUN/Creatinine Ratio 16.0 7.0 - 25.0    Anion Gap 11.0 5.0 - 15.0 mmol/L   Sedimentation Rate    Specimen: Blood   Result Value Ref Range    Sed Rate 32 (H) 0 - 20 mm/hr   C-reactive Protein    Specimen: Blood   Result Value Ref Range    C-Reactive Protein <0.30 0.00 - 0.50 mg/dL   CBC Auto Differential    Specimen: Blood   Result Value Ref Range    WBC 6.30 3.40 - 10.80 10*3/mm3    RBC 3.70 (L) 4.14 - 5.80 10*6/mm3    Hemoglobin 11.9 (L) 13.0 - 17.7 g/dL    Hematocrit 35.1 (L) 37.5 - 51.0 %    MCV 95.0 79.0 - 97.0 fL    MCH 32.1 26.6 - 33.0 pg    MCHC 33.8 31.5 - 35.7 g/dL    RDW 14.9 12.3 - 15.4 %    RDW-SD 49.0 37.0 - 54.0 fl    MPV 7.4 6.0 - 12.0 fL    Platelets 228 140 - 450 10*3/mm3    Neutrophil % 54.9 42.7 - 76.0 %    Lymphocyte % 26.7 19.6 - 45.3 %    Monocyte % 10.4 5.0 - 12.0 %    Eosinophil % 6.8 (H) 0.3 - 6.2 %    Basophil % 1.2 0.0 - 1.5 %    Neutrophils, Absolute 3.50 1.70 - 7.00 10*3/mm3    Lymphocytes, Absolute 1.70 0.70 - 3.10 10*3/mm3    Monocytes, Absolute 0.70 0.10 - 0.90 10*3/mm3    Eosinophils, Absolute 0.40 0.00 - 0.40 10*3/mm3    Basophils, Absolute 0.10 0.00 - 0.20 10*3/mm3    nRBC 0.0 0.0 - 0.2 /100 WBC     No radiology results for the last day  Medications   sodium chloride 0.9 % flush 10 mL  "(has no administration in time range)   ceftaroline (TEFLARO) 600 mg in sodium chloride 0.9 % 100 mL IVPB (has no administration in time range)     /63   Pulse 88   Temp 97.9 °F (36.6 °C) (Oral)   Resp 16   Ht 185.4 cm (73\")   Wt 98.7 kg (217 lb 9.5 oz)   SpO2 100%   BMI 28.71 kg/m²                                          MDM  Chart review: Patient has had several admissions for left leg cellulitis this year.  He has had ID consultation.  His most recent admission was last month when he was treated with Teflaro per ID.  Comorbidity: As per past history  Differential: Cellulitis, Baker's cyst,  My EKG interpretation:   Lab: Sed rate mildly elevated at 32 CRP less than 0.3 basic metabolic panel glucose 107 CBC normal white count hemoglobin 7.9 platelet count of 228 with 54 segs no bands  Radiology:   Discussion/treatment: Patient has been evaluated by I&D.  Since he was placed on Teflaro most recently he will be started back on this.  This was discussed with pharmacy.  He was discussed with  and will be admitted.  He may benefit from MRI of his knee as his symptoms may be related to Baker's cyst as he does appear to have a large Baker's cyst on physical exam.  Patient was evaluated using appropriate PPE      Final diagnoses:   Cellulitis of left leg   Synovial cyst of left popliteal space       ED Disposition  ED Disposition     ED Disposition Condition Comment    Decision to Admit  Level of Care: Med/Surg [1]   Diagnosis: Cellulitis of left leg [275193]   Admitting Physician: KEYUR CISNEROS [[id number]]            No follow-up provider specified.       Medication List      No changes were made to your prescriptions during this visit.          Neymar Murray MD  07/13/21 [id number]    "

## 2021-07-14 NOTE — OUTREACH NOTE
Medical Week 4 Survey      Responses   [facility] patient discharged from?  [facility]   Does the patient have one of the following disease processes/diagnoses(primary or secondary)?  Other   Week 4 attempt successful?  No   Revoke  Readmitted          Vivian Vitale RN

## 2021-07-15 ENCOUNTER — APPOINTMENT (OUTPATIENT)
Dept: CARDIOLOGY | Facility: HOSPITAL | Age: 81
End: 2021-07-15

## 2021-07-15 VITALS
HEART RATE: 70 BPM | DIASTOLIC BLOOD PRESSURE: 58 MMHG | WEIGHT: 216.45 LBS | RESPIRATION RATE: 16 BRPM | BODY MASS INDEX: 28.69 KG/M2 | OXYGEN SATURATION: 97 % | HEIGHT: 73 IN | SYSTOLIC BLOOD PRESSURE: 118 MMHG | TEMPERATURE: 97.9 F

## 2021-07-15 LAB
ABDOMINAL DIST AORTA AP: 1.67 CM
ABDOMINAL DIST AORTA TRANS: 1.55 CM
ABDOMINAL DIST AORTA VEL: 69.5 CM/S
ABDOMINAL LT COM ILIAC AP: 0.97 CM
ABDOMINAL LT COM ILIAC TRANS: 1.1 CM
ABDOMINAL LT COM ILIAC VEL: 133 CM/S
ABDOMINAL MID AORTA AP: 3.15 CM
ABDOMINAL MID AORTA TRANS: 3.3 CM
ABDOMINAL MID AORTA VEL: 54.4 CM/S
ABDOMINAL PROX AORTA AP: 1.98 CM
ABDOMINAL PROX AORTA TRANS: 2.2 CM
ABDOMINAL PROX AORTA VEL: 61.9 CM/S
ABDOMINAL RT COM ILIAC AP: 0.97 CM
ABDOMINAL RT COM ILIAC TRANS: 0.79 CM
ABDOMINAL RT COM ILIAC VEL: 162 CM/S
ANION GAP SERPL CALCULATED.3IONS-SCNC: 8 MMOL/L (ref 5–15)
BASOPHILS # BLD AUTO: 0.1 10*3/MM3 (ref 0–0.2)
BASOPHILS NFR BLD AUTO: 1.1 % (ref 0–1.5)
BH CV VAS SMA 1ST PP TIME: 15 MIN
BH CV VAS SMA 2ND PP TIME: 30 MIN
BH CV VAS SMA 3RD PP TIME: 45 MIN
BUN SERPL-MCNC: 14 MG/DL (ref 8–23)
BUN/CREAT SERPL: 18.2 (ref 7–25)
CALCIUM SPEC-SCNC: 9.3 MG/DL (ref 8.6–10.5)
CHLORIDE SERPL-SCNC: 104 MMOL/L (ref 98–107)
CO2 SERPL-SCNC: 26 MMOL/L (ref 22–29)
CREAT SERPL-MCNC: 0.77 MG/DL (ref 0.76–1.27)
DEPRECATED RDW RBC AUTO: 49.4 FL (ref 37–54)
EOSINOPHIL # BLD AUTO: 0.4 10*3/MM3 (ref 0–0.4)
EOSINOPHIL NFR BLD AUTO: 8.8 % (ref 0.3–6.2)
ERYTHROCYTE [DISTWIDTH] IN BLOOD BY AUTOMATED COUNT: 14.9 % (ref 12.3–15.4)
GFR SERPL CREATININE-BSD FRML MDRD: 98 ML/MIN/1.73
GLUCOSE BLDC GLUCOMTR-MCNC: 114 MG/DL (ref 70–105)
GLUCOSE SERPL-MCNC: 122 MG/DL (ref 65–99)
HCT VFR BLD AUTO: 36.4 % (ref 37.5–51)
HGB BLD-MCNC: 12 G/DL (ref 13–17.7)
LYMPHOCYTES # BLD AUTO: 1.4 10*3/MM3 (ref 0.7–3.1)
LYMPHOCYTES NFR BLD AUTO: 28.8 % (ref 19.6–45.3)
MAXIMAL PREDICTED HEART RATE: 142 BPM
MCH RBC QN AUTO: 31.2 PG (ref 26.6–33)
MCHC RBC AUTO-ENTMCNC: 32.9 G/DL (ref 31.5–35.7)
MCV RBC AUTO: 94.9 FL (ref 79–97)
MONOCYTES # BLD AUTO: 0.6 10*3/MM3 (ref 0.1–0.9)
MONOCYTES NFR BLD AUTO: 11.6 % (ref 5–12)
NEUTROPHILS NFR BLD AUTO: 2.5 10*3/MM3 (ref 1.7–7)
NEUTROPHILS NFR BLD AUTO: 49.7 % (ref 42.7–76)
NRBC BLD AUTO-RTO: 0.1 /100 WBC (ref 0–0.2)
PLATELET # BLD AUTO: 220 10*3/MM3 (ref 140–450)
PMV BLD AUTO: 7.4 FL (ref 6–12)
POTASSIUM SERPL-SCNC: 4.5 MMOL/L (ref 3.5–5.2)
RBC # BLD AUTO: 3.83 10*6/MM3 (ref 4.14–5.8)
SODIUM SERPL-SCNC: 138 MMOL/L (ref 136–145)
STRESS TARGET HR: 121 BPM
WBC # BLD AUTO: 5 10*3/MM3 (ref 3.4–10.8)

## 2021-07-15 PROCEDURE — 99217 PR OBSERVATION CARE DISCHARGE MANAGEMENT: CPT | Performed by: INTERNAL MEDICINE

## 2021-07-15 PROCEDURE — 85025 COMPLETE CBC W/AUTO DIFF WBC: CPT | Performed by: NURSE PRACTITIONER

## 2021-07-15 PROCEDURE — 93979 VASCULAR STUDY: CPT

## 2021-07-15 PROCEDURE — 96372 THER/PROPH/DIAG INJ SC/IM: CPT

## 2021-07-15 PROCEDURE — 80048 BASIC METABOLIC PNL TOTAL CA: CPT | Performed by: NURSE PRACTITIONER

## 2021-07-15 PROCEDURE — 82962 GLUCOSE BLOOD TEST: CPT

## 2021-07-15 PROCEDURE — 25010000002 HEPARIN (PORCINE) PER 1000 UNITS: Performed by: INTERNAL MEDICINE

## 2021-07-15 PROCEDURE — G0378 HOSPITAL OBSERVATION PER HR: HCPCS

## 2021-07-15 RX ORDER — OXYCODONE HYDROCHLORIDE 5 MG/1
5 TABLET ORAL EVERY 8 HOURS PRN
Qty: 12 TABLET | Refills: 0 | Status: SHIPPED | OUTPATIENT
Start: 2021-07-15 | End: 2021-07-19

## 2021-07-15 RX ADMIN — Medication 500 UNITS: at 08:42

## 2021-07-15 RX ADMIN — MIDODRINE HYDROCHLORIDE 10 MG: 5 TABLET ORAL at 11:13

## 2021-07-15 RX ADMIN — PANTOPRAZOLE SODIUM 40 MG: 40 TABLET, DELAYED RELEASE ORAL at 08:42

## 2021-07-15 RX ADMIN — SUCRALFATE 1 G: 1 TABLET ORAL at 11:13

## 2021-07-15 RX ADMIN — SUCRALFATE 1 G: 1 TABLET ORAL at 08:42

## 2021-07-15 RX ADMIN — GABAPENTIN 400 MG: 400 CAPSULE ORAL at 08:41

## 2021-07-15 RX ADMIN — Medication 10 ML: at 08:41

## 2021-07-15 RX ADMIN — MIDODRINE HYDROCHLORIDE 10 MG: 5 TABLET ORAL at 08:42

## 2021-07-15 RX ADMIN — CLOPIDOGREL BISULFATE 75 MG: 75 TABLET ORAL at 08:41

## 2021-07-15 RX ADMIN — AMIODARONE HYDROCHLORIDE 100 MG: 200 TABLET ORAL at 08:41

## 2021-07-15 RX ADMIN — HEPARIN SODIUM 5000 UNITS: 5000 INJECTION INTRAVENOUS; SUBCUTANEOUS at 06:21

## 2021-07-15 RX ADMIN — SPIRONOLACTONE 25 MG: 25 TABLET ORAL at 08:42

## 2021-07-15 RX ADMIN — ASPIRIN 81 MG: 81 TABLET, COATED ORAL at 08:42

## 2021-07-15 NOTE — DISCHARGE SUMMARY
[facility] Medicine Services  DISCHARGE SUMMARY    Patient Name: Deion Nicholas  : 1940  MRN: [id number]    Date of Admission: 2021  Date of Discharge: 7/15/2021  Primary Care Physician: Makenna Motta MD      Presenting Problem:   Cellulitis of left leg [L03.116]  Synovial cyst of left popliteal space [M71.22]    Active and Resolved Hospital Problems:  Active Hospital Problems    Diagnosis POA   • **Cellulitis of left leg [L03.116] Yes     Priority: High   • Baker's cyst of knee, left [M71.22] Yes     Priority: High   • GERD without esophagitis [K21.9] Yes   • Essential hypertension [I10] Yes   • Hyperlipidemia, mixed [E78.2] Yes   • Paroxysmal ventricular tachycardia (CMS/HCC) [I47.2] Yes   • Coronary artery disease [I25.10] Yes      Resolved Hospital Problems   No resolved problems to display.         Hospital Course     Hospital Course:  Deion Nicholas is a 80 y.o. male     Deion Nicholas is a 80 y.o. male with past medical history of a ventricular tachycardia, cardiomyopathy status post AICD placement, aneurysm, recurrent cellulitis of the left lower extremity, coronary artery disease status post a CABG and PCI, hyperlipidemia, hypertension, pinched nerve at the L4-L5 and prostate cancer who presented to the emergency room complaining of left leg swelling redness and pain.  Patient was seen in the emergency room and was diagnosed with a recurrent cellulitis of the left lower extremity.  Patient was recommended for admission for further treatment and management.  Patient also mentioned that he tended to scratch the skin of the left lower extremity.  Recurrent cellulitis of left lower extremity was treated with antibiotics.  Infectious disease consult was completed.  Orthopedic surgery consult was completed because of left knee Baker's cyst.  Vascular surgery consult was also completed because of recurrent cellulitis and Baker's cyst.  Hypertension was  monitored.  Coronary artery disease was treated with aspirin.  Hyperlipidemia was treated with Lipitor.  GERD was treated with Protonix.  Hemoglobin and hematocrit were monitored because of anemia.  Paroxysmal ventricular tachycardia status post AICD placement was monitored.  Appropriate patient's home medications were resumed in the hospital for other chronic medical conditions.  Patient reported complete resolution of his symptoms after over 48 hours in the hospital and requested to be discharged home.  Patient was advised to take his medications as prescribed.  Discharge medications are as per medication reconciliation list.  Patient was advised to follow-up with his primary care physician within 3 to 5 days of discharge.  Patient was advised to follow-up with her orthopedic surgery within 14 days of discharge.  Patient was advised to follow-up with vascular surgery within 14 days of discharge.  Patient was advised to return to the emergency department if he experiences any recurrence of his symptoms.  Patient and family agreed with the plan and patient was discharged in a stable condition.      DISCHARGE Follow Up Recommendations for labs and diagnostics:     Patient was advised to follow-up with his primary care physician who will reassess his cellulitis of the lower extremity and review his current medications.  Patient was also advised to follow-up with orthopedic surgery who will reassess the Baker's cyst.  Patient was also advised to follow-up with the vascular surgery who will reassess peripheral vascular disease.      Reasons For Change In Medications and Indications for New Medications:      Day of Discharge     Vital Signs:  Temp:  [97.9 °F (36.6 °C)-98.2 °F (36.8 °C)] 97.9 °F (36.6 °C)  Heart Rate:  [70-81] 70  Resp:  [16-17] 16  BP: (110-143)/(58-72) 118/58    Physical Exam:  Physical Exam  Vitals and nursing note reviewed.   Constitutional:       General: He is not in acute distress.  HENT:       Head: Normocephalic.      Nose: Nose normal.      Mouth/Throat:      Mouth: Mucous membranes are dry.      Pharynx: Oropharynx is clear.   Eyes:      Extraocular Movements: Extraocular movements intact.      Conjunctiva/sclera: Conjunctivae normal.      Pupils: Pupils are equal, round, and reactive to light.   Cardiovascular:      Pulses: Normal pulses.      Heart sounds: No murmur heard.   No friction rub. No gallop.       Comments: S1 and S2 present.  No tachycardia.  Pulmonary:      Effort: Pulmonary effort is normal.      Breath sounds: No stridor. No wheezing or rales.   Chest:      Chest wall: No tenderness.   Abdominal:      General: Bowel sounds are normal. There is no distension.      Palpations: Abdomen is soft.      Tenderness: There is no abdominal tenderness. There is no right CVA tenderness or guarding.   Musculoskeletal:         General: No swelling, tenderness, deformity or signs of injury.      Cervical back: Normal range of motion. No rigidity.      Right lower leg: No edema.      Left lower leg: No edema.   Skin:     General: Skin is warm and dry.      Capillary Refill: Capillary refill takes less than 2 seconds.      Coloration: Skin is not jaundiced.      Findings: No bruising, erythema, lesion or rash.   Neurological:      Comments: No facial asymmetry noted.  Gait and station not tested.   Psychiatric:      Comments: No agitation.              Pertinent  and/or Most Recent Results     LAB RESULTS:      Lab 07/15/21  0606 07/13/21 2040   WBC 5.00 6.30   HEMOGLOBIN 12.0* 11.9*   HEMATOCRIT 36.4* 35.1*   PLATELETS 220 228   NEUTROS ABS 2.50 3.50   LYMPHS ABS 1.40 1.70   MONOS ABS 0.60 0.70   EOS ABS 0.40 0.40   MCV 94.9 95.0   SED RATE  --  32*   CRP  --  <0.30   PROCALCITONIN  --  0.03         Lab 07/15/21  0606 07/13/21 2040   SODIUM 138 140   POTASSIUM 4.5 4.5   CHLORIDE 104 104   CO2 26.0 25.0   ANION GAP 8.0 11.0   BUN 14 16   CREATININE 0.77 1.00   GLUCOSE 122* 107*   CALCIUM 9.3 8.9                          Brief Urine Lab Results     None        Microbiology Results (last 10 days)     Procedure Component Value - Date/Time    COVID PRE-OP / PRE-PROCEDURE SCREENING ORDER (NO ISOLATION) - Swab, Nasopharynx [[id number]]  (Normal) Collected: 07/13/21 2216    Lab Status: Final result Specimen: Swab from Nasopharynx Updated: 07/13/21 2240    Narrative:      The following orders were created for panel order COVID PRE-OP / PRE-PROCEDURE SCREENING ORDER (NO ISOLATION) - Swab, Nasopharynx.  Procedure                               Abnormality         Status                     ---------                               -----------         ------                     COVID-19,CEPHEID,COR/EROS...[[id number]]  Normal              Final result                 Please view results for these tests on the individual orders.    COVID-19,CEPHEID,COR/EROS/PAD/ADALBERTO IN-HOUSE(OR EMERGENT/ADD-ON),NP SWAB IN TRANSPORT MEDIA 3-4 HR TAT, RT-PCR - Swab, Nasopharynx [[id number]]  (Normal) Collected: 07/13/21 2216    Lab Status: Final result Specimen: Swab from Nasopharynx Updated: 07/13/21 2240     COVID19 Not Detected    Narrative:      Fact sheet for providers: https://www.fda.gov/media/366480/download     Fact sheet for patients: https://www.fda.gov/media/077798/download  Fact sheet for providers: https://www.fda.gov/media/048495/download    Fact sheet for patients: https://www.fda.gov/media/628461/download    Test performed by PCR.          XR Knee 1 or 2 View Left    Result Date: 7/14/2021  Impression: 1.Advanced tricompartment osteoarthritis. 2.Ossification within the medial soft tissues about the knee that could relate to old MCL ligament injury. 3.Hardware is seen along the proximal tibia.  Electronically Signed By-Nicola Cade MD On:7/14/2021 11:33 AM This report was finalized on [id number] by  Nicola Cade MD.      Results for orders placed during the hospital encounter of 07/13/21    Ultrasound Abdominal Aorta Limited  CAR    Interpretation Summary  · 3.3 cm abdominal aortic aneurysm seen in the mid infrarenal aorta.      Results for orders placed during the hospital encounter of 07/13/21    Ultrasound Abdominal Aorta Limited CAR    Interpretation Summary  · 3.3 cm abdominal aortic aneurysm seen in the mid infrarenal aorta.          Labs Pending at Discharge:      Procedures Performed           Consults:   Consults     Date and Time Order Name Status Description    7/14/2021  1:51 PM Inpatient Vascular Surgery Consult Completed     7/13/2021 11:07 PM Inpatient Orthopedic Surgery Consult Completed     7/13/2021 11:07 PM Inpatient Infectious Diseases Consult Completed     7/13/2021  9:42 PM Hospitalist (on-call MD unless specified) Completed     6/16/2021  8:30 PM Inpatient Infectious Diseases Consult Completed     6/16/2021  7:33 PM Hospitalist (on-call MD unless specified) Completed     6/16/2021  5:20 PM Inpatient Infectious Diseases Consult Completed             Discharge Details        Discharge Medications      New Medications      Instructions Start Date   oxyCODONE 5 MG immediate release tablet  Commonly known as: Roxicodone   5 mg, Oral, Every 8 Hours PRN         Continue These Medications      Instructions Start Date   amiodarone 200 MG tablet  Commonly known as: PACERONE   100 mg, Oral, Daily      aspirin 81 MG EC tablet   81 mg, Oral, Daily      cyanocobalamin 1000 MCG/ML injection   1,000 mcg, Intramuscular, Every 14 Days      ezetimibe 10 MG tablet  Commonly known as: ZETIA   10 mg, Oral, Daily      fish oil 1000 MG capsule capsule   2,000 mg, Oral, 2 Times Daily With Meals      gabapentin 400 MG capsule  Commonly known as: NEURONTIN   400 mg, Oral, 3 Times Daily      lidocaine 5 %  Commonly known as: LIDODERM   1 patch, Transdermal, Every 24 Hours, Remove & Discard patch within 12 hours or as directed by MD      methocarbamol 500 MG tablet  Commonly known as: ROBAXIN   500 mg, Oral, 3 Times Daily PRN      midodrine 10  MG tablet  Commonly known as: PROAMATINE   10 mg, Oral, 3 Times Daily Before Meals      Nitrostat 0.4 MG SL tablet  Generic drug: nitroglycerin   0.4 mg, Sublingual, Every 5 Minutes PRN      pantoprazole 40 MG EC tablet  Commonly known as: PROTONIX   40 mg, Oral, Daily      Plavix 75 MG tablet  Generic drug: clopidogrel   75 mg, Oral, Daily      spironolactone 25 MG tablet  Commonly known as: ALDACTONE   25 mg, Oral, Daily      sucralfate 1 g tablet  Commonly known as: CARAFATE   1 g, Oral, 4 Times Daily      Vitamin D-400 10 MCG (400 UNIT) tablet  Generic drug: Cholecalciferol   Take 2 tablets daily             Allergies   Allergen Reactions   • Lovastatin Unknown (See Comments) and Myalgia     unknown   • Pravastatin Unknown (See Comments) and Myalgia     unknown   • Simvastatin Unknown (See Comments) and Myalgia     unknown         Discharge Disposition:  Home or Self Care    Diet:  Hospital:  Diet Order   Procedures   • Diet Cardiac; Healthy Heart         Discharge Activity: As tolerated.        CODE STATUS:  Code Status and Medical Interventions:   Ordered at: 07/13/21 2221     Level Of Support Discussed With:    Patient     Code Status:    CPR     Medical Interventions (Level of Support Prior to Arrest):    Full         Future Appointments   Date Time Provider Department Center   8/20/2021 10:15 AM Constantino Arvizu MD [facility]   9/17/2021 11:15 AM Marcin Childers DO [facility]           Time spent on Discharge including face to face service:  40 minutes    This patient has been examined wearing appropriate Personal Protective Equipment and discussed with hospital infection control department, [facility] department, infectious disease specialist and pulmonologist. 07/15/21      Signature: Electronically signed by Alexy Banuelos MD, 07/15/21, 1:29 PM EDT.

## 2021-07-15 NOTE — PLAN OF CARE
Goal Outcome Evaluation:  Plan of Care Reviewed With: patient        Progress: no change  Pt rested throughout shift. Will continue to monitor.

## 2021-07-15 NOTE — PROGRESS NOTES
Infectious Diseases Progress Note      LOS: 1 day   Patient Care Team:  Makenna Motta MD as PCP - General  Makenna Motta MD as PCP - Family Medicine    Chief Complaint: Left lower leg swelling    Subjective       The patient has been afebrile for the last 24 hours.  The patient is on room air, hemodynamically stable, and is tolerating antimicrobial therapy.      Review of Systems:   Review of Systems   Constitutional: Negative.    HENT: Negative.    Eyes: Negative.    Respiratory: Negative.    Cardiovascular: Positive for leg swelling.   Gastrointestinal: Negative.    Endocrine: Negative.    Genitourinary: Negative.    Musculoskeletal: Negative.    Skin: Positive for color change.   Neurological: Negative.    Psychiatric/Behavioral: Negative.    All other systems reviewed and are negative.       Objective     Vital Signs  Temp:  [97.9 °F (36.6 °C)-98.2 °F (36.8 °C)] 97.9 °F (36.6 °C)  Heart Rate:  [70-81] 70  Resp:  [16-17] 16  BP: (110-143)/(58-72) 118/58    Physical Exam:  Physical Exam  Vitals and nursing note reviewed.   Constitutional:       General: He is not in acute distress.     Appearance: Normal appearance. He is well-developed and normal weight. He is not diaphoretic.   HENT:      Head: Normocephalic and atraumatic.   Eyes:      Conjunctiva/sclera: Conjunctivae normal.      Pupils: Pupils are equal, round, and reactive to light.   Cardiovascular:      Rate and Rhythm: Normal rate and regular rhythm.      Heart sounds: Normal heart sounds, S1 normal and S2 normal.   Pulmonary:      Effort: Pulmonary effort is normal. No respiratory distress.      Breath sounds: Normal breath sounds. No stridor. No wheezing or rales.   Abdominal:      General: Bowel sounds are normal. There is no distension.      Palpations: Abdomen is soft. There is no mass.      Tenderness: There is no abdominal tenderness. There is no guarding.   Musculoskeletal:         General: No deformity. Normal range of motion.       Cervical back: Neck supple.      Right lower leg: Edema present.      Comments: Very minor left lower leg swelling -erythema has almost completely resolved   Skin:     General: Skin is warm and dry.      Coloration: Skin is not pale.      Findings: No erythema or rash.   Neurological:      Mental Status: He is alert and oriented to person, place, and time.      Cranial Nerves: No cranial nerve deficit.   Psychiatric:         Mood and Affect: Mood normal.          Results Review:    I have reviewed all clinical data, test, lab, and imaging results.     Radiology  Ultrasound Abdominal Aorta Limited CAR    Result Date: 7/15/2021  · 3.3 cm abdominal aortic aneurysm seen in the mid infrarenal aorta.        Cardiology    Laboratory    Results from last 7 days   Lab Units 07/15/21  0606 07/13/21 2040   WBC 10*3/mm3 5.00 6.30   HEMOGLOBIN g/dL 12.0* 11.9*   HEMATOCRIT % 36.4* 35.1*   PLATELETS 10*3/mm3 220 228     Results from last 7 days   Lab Units 07/15/21  0606 07/13/21 2040   SODIUM mmol/L 138 140   POTASSIUM mmol/L 4.5 4.5   CHLORIDE mmol/L 104 104   CO2 mmol/L 26.0 25.0   BUN mg/dL 14 16   CREATININE mg/dL 0.77 1.00   GLUCOSE mg/dL 122* 107*   CALCIUM mg/dL 9.3 8.9         Results from last 7 days   Lab Units 07/13/21 2040   SED RATE mm/hr 32*         Microbiology   Microbiology Results (last 10 days)     Procedure Component Value - Date/Time    COVID PRE-OP / PRE-PROCEDURE SCREENING ORDER (NO ISOLATION) - Swab, Nasopharynx [[id number]]  (Normal) Collected: 07/13/21 2216    Lab Status: Final result Specimen: Swab from Nasopharynx Updated: 07/13/21 2240    Narrative:      The following orders were created for panel order COVID PRE-OP / PRE-PROCEDURE SCREENING ORDER (NO ISOLATION) - Swab, Nasopharynx.  Procedure                               Abnormality         Status                     ---------                               -----------         ------                     COVID-19,CEPHEID,COR/EROS...[[id number]]   Normal              Final result                 Please view results for these tests on the individual orders.    COVID-19,CEPHEID,COR/EROS/PAD/ADALBERTO IN-HOUSE(OR EMERGENT/ADD-ON),NP SWAB IN TRANSPORT MEDIA 3-4 HR TAT, RT-PCR - Swab, Nasopharynx [[id number]]  (Normal) Collected: 07/13/21 2216    Lab Status: Final result Specimen: Swab from Nasopharynx Updated: 07/13/21 2240     COVID19 Not Detected    Narrative:      Fact sheet for providers: https://www.fda.gov/media/380299/download     Fact sheet for patients: https://www.fda.gov/media/552532/download  Fact sheet for providers: https://www.fda.gov/media/121166/download    Fact sheet for patients: https://www.fda.gov/media/194841/download    Test performed by PCR.          Medication Review:       Schedule Meds  amiodarone, 100 mg, Oral, Daily  aspirin, 81 mg, Oral, Daily  cholecalciferol, 500 Units, Oral, Daily  clopidogrel, 75 mg, Oral, Daily  cyanocobalamin, 1,000 mcg, Intramuscular, Q14 Days  gabapentin, 400 mg, Oral, TID  heparin (porcine), 5,000 Units, Subcutaneous, Q8H  lidocaine, 1 patch, Transdermal, Q24H  midodrine, 10 mg, Oral, TID AC  pantoprazole, 40 mg, Oral, Daily  sodium chloride, 10 mL, Intravenous, Q12H  spironolactone, 25 mg, Oral, Daily  sucralfate, 1 g, Oral, 4x Daily        Infusion Meds       PRN Meds  •  acetaminophen  •  HYDROcodone-acetaminophen  •  methocarbamol  •  nitroglycerin  •  ondansetron  •  [COMPLETED] Insert peripheral IV **AND** sodium chloride  •  sodium chloride        Assessment/Plan       Antimicrobial Therapy   1.       Day  2.      Day  3.      Day  4.      Day  5.      Day      Assessment     Chronic left lower leg swelling.  Patient has been afebrile, white blood cell count is normal, CRP and procalcitonin are all normal.  Overall picture is not indicative of cellulitis but chronic swelling from a remote injury.    -[facility] and was supposed to be seen by vascular surgery but has  not been able to see them yet     CAD with history of CABG x5     Pacemaker placement     Prostate cancer        Plan     Monitor off antimicrobial therapy  Miconazole cream twice daily for 14 days  Continue supportive care  Okay to discharge from Infectious Disease standpoint    Tamiko Victor, APRN  07/15/21  14:42 EDT

## 2021-07-15 NOTE — CASE MANAGEMENT/SOCIAL WORK
Discharge Planning Assessment   [facility]     Patient Name: Deion Nicholas  MRN: [id number]  Today's Date: 7/15/2021    Admit Date: 7/13/2021           Living Environment    Lives With  alone    Current Living Arrangements  home/apartment/condo    Primary Care Provided by  self    Provides Primary Care For  no one, unable/limited ability to care for self    Able to Return to Prior Arrangements  yes       Transition Planning    Patient/Family Anticipates Transition to  home       Discharge Needs Assessment    Equipment Currently Used at Home  walker, rolling;cane, straight            Plan    Plan  anticipate return home    Plan Comments  [facility] is from home[facility]; per consultants patient is to follow as an outpt basis; discharge orders noted;         General Information    Admission Type  observation    Arrived From  emergency department    Preferred Language  English     Used During This Interaction  no        Functional Status     Row Name 07/15/21 1612       Functional Status    Usual Activity Tolerance  fair    Current Activity Tolerance  fair       Functional Status, IADL    Medications  independent       Carol naegele rn  Case management  Office number [phone]  Cell phone [phone]

## 2021-07-16 ENCOUNTER — READMISSION MANAGEMENT (OUTPATIENT)
Dept: CALL CENTER | Facility: HOSPITAL | Age: 81
End: 2021-07-16

## 2021-07-16 NOTE — CASE MANAGEMENT/SOCIAL WORK
Discharge Planning Assessment   [facility]     Patient Name: Deion Nicholas  MRN: [id number]  Today's Date: 7/16/2021    Admit Date: 7/13/2021       Plan    Final Discharge Disposition Code  01 - home or self-care    Final Note  return home       Carol naegele rn  Case management  Office number [phone]  Cell phone [phone]

## 2021-07-16 NOTE — OUTREACH NOTE
Prep Survey      Responses   [facility] discharged [facility]?  [facility]   Is LACE score < 7 ?  No   Emergency Room discharge w/ pulse ox?  No   Eligibility  Readm Mgmt   Discharge diagnosis  **Cellulitis of left leg    Does the patient have one of the following disease processes/diagnoses(primary or secondary)?  Other   Does the patient have Home health ordered?  No   Is there a DME ordered?  No   Prep survey completed?  Yes          Iva Mcarthur RN

## 2021-07-20 ENCOUNTER — READMISSION MANAGEMENT (OUTPATIENT)
Dept: CALL CENTER | Facility: HOSPITAL | Age: 81
End: 2021-07-20

## 2021-07-20 NOTE — OUTREACH NOTE
"Medical Week 1 Survey      Responses   [facility] [place] patient discharged from?  [facility]   Does the patient have one of the following disease processes/diagnoses(primary or secondary)?  Other   Week 1 attempt successful?  Yes   Call start time  1005   Call end time  1007   Discharge diagnosis  **Cellulitis of left leg    Meds reviewed with patient/caregiver?  Yes   Is the patient having any side effects they believe may be caused by any medication additions or changes?  No   Does the patient have all medications ordered at discharge?  Yes   Is the patient taking all medications as directed (includes completed medication regime)?  Yes   Does the patient have a primary care provider?   Yes   Does the patient have an appointment with their PCP within 7 days of discharge?  No   What is preventing the patient from scheduling follow up appointments within 7 days of discharge?  Haven't had time   Nursing Interventions  Educated patient on importance of making appointment, Advised patient to make appointment   Has the patient kept scheduled appointments due by today?  N/A   Psychosocial issues?  No   What is the patient's perception of their health status since discharge?  Improving   Is the patient/caregiver able to teach back signs and symptoms related to disease process for when to call PCP?  Yes   Is the patient/caregiver able to teach back signs and symptoms related to disease process for when to call 911?  Yes   Is the patient/caregiver able to teach back the hierarchy of who to call/visit for symptoms/problems? PCP, Specialist, Home health nurse, Urgent Care, ED, 911  Yes   If the patient is a current smoker, are they able to teach back resources for cessation?  Not a smoker   Additional teach back comments  Call was brief.  States \"I'm hanging in there\". Leg is doing well and not swelling.  States he is going to call his PCP today to get an appt.   Week 1 call completed?  Yes   Wrap up additional comments  Denies " questions or needs at this time          Taylor Fournier, PATRICIAN

## 2021-07-28 ENCOUNTER — READMISSION MANAGEMENT (OUTPATIENT)
Dept: CALL CENTER | Facility: HOSPITAL | Age: 81
End: 2021-07-28

## 2021-07-28 NOTE — OUTREACH NOTE
Medical Week 2 Survey      Responses   [facility] patient discharged from?  [facility]   Does the patient have one of the following disease processes/diagnoses(primary or secondary)?  Other   Week 2 attempt successful?  Yes   Call start time  1123   Call end time  1127   Meds reviewed with patient/caregiver?  Yes   Is the patient having any side effects they believe may be caused by any medication additions or changes?  No   Does the patient have all medications ordered at discharge?  Yes   Is the patient taking all medications as directed (includes completed medication regime)?  Yes   Does the patient have a primary care provider?   Yes   Comments regarding PCP  [facility]   Has the patient kept scheduled appointments due by today?  Yes   What is the Home health agency?   [facility]   Has home health visited the patient within 72 hours of discharge?  Yes   Psychosocial issues?  No   Did the patient receive a copy of their discharge instructions?  Yes   Nursing interventions  Reviewed instructions with patient   What is the patient's perception of their health status since discharge?  Improving   Is the patient/caregiver able to teach back signs and symptoms related to disease process for when to call PCP?  Yes   Is the patient/caregiver able to teach back signs and symptoms related to disease process for when to call 911?  Yes   Is the patient/caregiver able to teach back the hierarchy of who to call/visit for symptoms/problems? PCP, Specialist, Home health nurse, Urgent Care, ED, 911  Yes   If the patient is a current smoker, are they able to teach back resources for cessation?  Not a smoker   Week 2 Call Completed?  Yes          Estefania Price LPN

## 2021-08-05 ENCOUNTER — READMISSION MANAGEMENT (OUTPATIENT)
Dept: CALL CENTER | Facility: HOSPITAL | Age: 81
End: 2021-08-05

## 2021-08-05 NOTE — OUTREACH NOTE
Medical Week 3 Survey      Responses   [facility] patient discharged from?  [facility]   Does the patient have one of the following disease processes/diagnoses(primary or secondary)?  Other   Week 3 attempt successful?  Yes   Call start time  0811   Call end time  0820   Discharge diagnosis  **Cellulitis of left leg    Meds reviewed with patient/caregiver?  Yes   Is the patient having any side effects they believe may be caused by any medication additions or changes?  No   Does the patient have all medications ordered at discharge?  Yes   Is the patient taking all medications as directed (includes completed medication regime)?  Yes   Comments regarding PCP  [facility]   Has the patient kept scheduled appointments due by today?  Yes   What is the Home health agency?   [facility]   Has home health visited the patient within 72 hours of discharge?  Yes   Home health comments  PATIENT STATES HE HAS BEEN    Psychosocial issues?  No   Did the patient receive a copy of their discharge instructions?  Yes   Nursing interventions  Reviewed instructions with patient   What is the patient's perception of their health status since discharge?  Improving   Is the patient/caregiver able to teach back signs and symptoms related to disease process for when to call PCP?  Yes   Is the patient/caregiver able to teach back signs and symptoms related to disease process for when to call 911?  Yes   Is the patient/caregiver able to teach back the hierarchy of who to call/visit for symptoms/problems? PCP, Specialist, Home health nurse, Urgent Care, ED, 911  Yes   If the patient is a current smoker, are they able to teach back resources for cessation?  Not a smoker   Week 3 Call Completed?  Yes          Estefania Price LPN

## 2021-08-20 ENCOUNTER — OFFICE VISIT (OUTPATIENT)
Dept: CARDIOLOGY | Facility: CLINIC | Age: 81
End: 2021-08-20

## 2021-08-20 VITALS
DIASTOLIC BLOOD PRESSURE: 74 MMHG | BODY MASS INDEX: 24.28 KG/M2 | OXYGEN SATURATION: 94 % | WEIGHT: 184 LBS | SYSTOLIC BLOOD PRESSURE: 147 MMHG | HEART RATE: 108 BPM

## 2021-08-20 DIAGNOSIS — I25.5 ISCHEMIC CARDIOMYOPATHY: Primary | ICD-10-CM

## 2021-08-20 DIAGNOSIS — I47.29 PAROXYSMAL VENTRICULAR TACHYCARDIA (HCC): ICD-10-CM

## 2021-08-20 DIAGNOSIS — I25.810 CORONARY ARTERY DISEASE INVOLVING CORONARY BYPASS GRAFT OF NATIVE HEART WITHOUT ANGINA PECTORIS: ICD-10-CM

## 2021-08-20 DIAGNOSIS — Z95.810 PRESENCE OF AUTOMATIC IMPLANTABLE CARDIOVERTER-DEFIBRILLATOR: ICD-10-CM

## 2021-08-20 DIAGNOSIS — I50.22 CHRONIC SYSTOLIC CONGESTIVE HEART FAILURE (HCC): ICD-10-CM

## 2021-08-20 PROCEDURE — 99214 OFFICE O/P EST MOD 30 MIN: CPT | Performed by: INTERNAL MEDICINE

## 2021-08-20 PROCEDURE — 93000 ELECTROCARDIOGRAM COMPLETE: CPT | Performed by: INTERNAL MEDICINE

## 2021-08-20 NOTE — PROGRESS NOTES
CC--Recurrent VT, ischemic cardiomyopathy    Sub--Pt here for follow up and he had prior history of incessant VT needing and ablation several months ago .  patient started having recurrent VT needing a redo VT ablation  and post ablation a week later developed dysphagia and needed endoscopy the patient underwent esophageal stricture dilatation with improvement of symptoms and resolution of dysphagia .Patient is on amiodarone because of extensive scarring and multiple episodes of VT and since ablation without recurrent ICD therapy--patient has history of chronic ischemic heart disease previous myocardial infarction, s/p previous CABG,  LV systolic dysfunction with   Last  LV ejection fraction of 25 30% which came up to 30- 35% by latest echocardiogram.   He is  status post ICD implant.  denies any chest pain or shortness of breath or syncope--patient had prior ACE inhibitor induced cough and currently is on Aldactone .Patient denies any new symptoms of VT or syncope and is compliant with current medical regimen  He also developed some right shoulder pain [facility] and uses Biofreeze for relief of symptoms   He denies any palpitations or syncope or any chest pain or dyspnea.  Patient could not tolerate additional beta-blockers on top of amiodarone in the past  Patient has noticed photosensitivity of his skin in the past and amiodarone dose reduced to 100 mg a day and his symptoms have reduced and comes in for follow-up   Patient has noticed some burning sensation in lower extremities and was seen at [facility] on last clinic prescription for gabapentin and he also complains of some calf pain when he is ambulating with some blocks gets better after increased activity and it is not nocturnal in nature--arterial duplex scan without any significant abnormalities  He also has history of orthostatic hypotension started on midodrine and complains of intermittent headache  Patient is doing  clinically well from cardiac standpoint of view  Complains of recurrent redness in the left lower extremity with tenderness and warmth around the ankle joint and is being treated with antibiotics and has been to [place] [facility] twice--is being evaluated by vascular surgery and infectious disease specialist and he had his symptoms since 2010 with increasing frequency recently.  He denies any other cardiac symptoms at this point.            Past Medical History:     Reviewed history from 03/13/2018 and no changes required:        Coronary Heart Disease:S/P CABG and PCI         Hypertension        Myocardial Infarction: Inferior MI         Hx: Cellulitis of left lower leg        Dyslipidemia         Pinched nerve L4-L5         Prostate cancer         Cardiomyopathy : ICD implantation         Physical Exam    General:      well developed, well nourished, in no acute distress.    Head:      normocephalic and atraumatic.    Eyes:      PERRL/EOM intact, conjunctiva and sclera clear with out nystagmus.    Neck:      no masses, thyromegaly, trachea central with normal respiratory effort  Lungs:      clear bilaterally to auscultation.    Heart:      regular rate and rhythm, S1, S2 without murmurs, rubs, or gallops    Left ankle area and left calf area and below the knee on the left side is mildly erythematous and mildly warm to touch and the ankle area is mildly tender           assessment plan  Orthostatic hypotension--stable on midodrine  History of  VT storm status post VT re do ablation without any   recurrent VT   Dysphagia status post esophageal stricture dilatation with resolution of symptoms   single-chamber ICD in situ with VDD lead--ICD with normal function interrogation attached to chart    coronary artery disease   ischemic cardiomyopathy  Recent vascular screening revealing normal arterial flow in the lower extremities   Medications reviewed  Follow-up in 3 months  Recurrent left lower extremity cellulitis and history  of Baker's cyst being followed by multiple specialist  Cardiovascular wise patient is stable      ECG 12 Lead    Date/Time: 8/20/2021 12:46 PM  Performed by: Constantino Arvizu MD  Authorized by: Constantino Arvizu MD   Comparison: compared with previous ECG   Similar to previous ECG  Rhythm: sinus rhythm  Rate: normal  Conduction: non-specific intraventricular conduction delay  QRS axis: normal  Other findings: non-specific ST-T wave changes            Electronically signed by Constantino Arvizu MD, 08/20/21, 12:45 PM EDT.

## 2021-08-31 PROCEDURE — 93295 DEV INTERROG REMOTE 1/2/MLT: CPT | Performed by: INTERNAL MEDICINE

## 2021-08-31 PROCEDURE — 93296 REM INTERROG EVL PM/IDS: CPT | Performed by: INTERNAL MEDICINE

## 2021-09-13 ENCOUNTER — TELEPHONE (OUTPATIENT)
Dept: CARDIOLOGY | Facility: CLINIC | Age: 81
End: 2021-09-13

## 2021-09-13 NOTE — TELEPHONE ENCOUNTER
[facility] called and stated that the [facility] and [facility]. He was told to inform you.Please contact the patient for further information about this,he is asking if there is any testing required.    [phone]-patient's home phone

## 2021-09-17 ENCOUNTER — OFFICE VISIT (OUTPATIENT)
Dept: CARDIOLOGY | Facility: CLINIC | Age: 81
End: 2021-09-17

## 2021-09-17 VITALS
BODY MASS INDEX: 24.52 KG/M2 | DIASTOLIC BLOOD PRESSURE: 70 MMHG | SYSTOLIC BLOOD PRESSURE: 144 MMHG | HEART RATE: 99 BPM | OXYGEN SATURATION: 95 % | HEIGHT: 73 IN | WEIGHT: 185 LBS

## 2021-09-17 DIAGNOSIS — Z95.810 PRESENCE OF AUTOMATIC IMPLANTABLE CARDIOVERTER-DEFIBRILLATOR: Chronic | ICD-10-CM

## 2021-09-17 DIAGNOSIS — N62 GYNECOMASTIA, MALE: ICD-10-CM

## 2021-09-17 DIAGNOSIS — I25.810 CORONARY ARTERY DISEASE INVOLVING CORONARY BYPASS GRAFT OF NATIVE HEART WITHOUT ANGINA PECTORIS: Primary | Chronic | ICD-10-CM

## 2021-09-17 DIAGNOSIS — I25.5 ISCHEMIC CARDIOMYOPATHY: Chronic | ICD-10-CM

## 2021-09-17 DIAGNOSIS — I95.1 ORTHOSTATIC HYPOTENSION: Chronic | ICD-10-CM

## 2021-09-17 DIAGNOSIS — I10 ESSENTIAL HYPERTENSION: ICD-10-CM

## 2021-09-17 DIAGNOSIS — E78.2 HYPERLIPIDEMIA, MIXED: Chronic | ICD-10-CM

## 2021-09-17 PROCEDURE — 99214 OFFICE O/P EST MOD 30 MIN: CPT | Performed by: INTERNAL MEDICINE

## 2021-09-17 NOTE — PROGRESS NOTES
Cardiology Office Visit      Encounter Date:  09/17/2021    Patient ID:   Deion Nicholas is a 80 y.o. male.    Reason For Followup:  Ischemic cardiomyopathy  Coronary artery disease  Hypertension  Hypertensive cardiovascular disease  Amiodarone therapy  Antiplatelet therapy  Hyperlipidemia    Brief Clinical History:  Dear Makenna Jimenez MD    I had the pleasure of seeing Deion Nicholas today. As you are well aware, this is a 80 y.o. male with an established history of ischemic heart disease.  He has undergone coronary arterial bypass grafting in the past.  He is additional history that includes ischemic cardiomyopathy with most recent ejection fraction of 30 to 35%, ICD implantation, hypertension, hypertensive cardiovascular disease, amiodarone therapy, antiplatelet therapy, and hyperlipidemia.  He presents today for follow-up on the above conditions.     Interval History:  He denies any chest pain pressure heaviness or tightness.  He denies any shortness of breath out of character.  He denies any PND orthopnea.  He denies any syncope or near syncope.  He continues to report unsteady gait.    We discussed [facility]  He was concerned because of some breast tissue that was noted on the examination.  He has a prior history of a malignancy and was very concerned that this could represent metastatic disease.  In reviewing his medications, it appears the culprit may be spironolactone that he is on for his congestive heart failure.  This medication is well-documented for causing gynecomastia.  We discussed options and he will get a 2D echocardiogram to see if any significant improvement has been noted in his LV systolic function since his last echocardiogram.  If not I feel it is reasonable to discontinue the medication to prevent any further gynecomastia.    Assessment & Plan     Impressions:  Ischemic cardiomyopathy  Coronary artery disease  Hypotension with an orthostatic  "component  Hypertensive cardiovascular disease  Amiodarone therapy  Antiplatelet therapy  Hyperlipidemia  AICD in situ  History of VT storm status post radiofrequency ablation     Recommendations:  Continuation of his current cardiovascular regimen at the present time.  2D echocardiogram  Follow-up in 6 weeks after echocardiogram to decide on continuation of spironolactone.    Objective:    Vitals:  Vitals:    09/17/21 1132   BP: 144/70   BP Location: Left arm   Patient Position: Sitting   Cuff Size: Adult   Pulse: 99   SpO2: 95%   Weight: 83.9 kg (185 lb)   Height: 185.4 cm (73\")       Physical Exam:    General:          Alert, cooperative, no distress, appears stated age  Head:              Normocephalic, atraumatic, mucous membranes moist  Eyes:               Conjunctiva/corneas clear, EOM's intact     Neck:              Supple,  no bruit  Lungs:            Clear to auscultation bilaterally, no wheezes rhonchi rales are noted  Chest wall:      Reproducible chest discomfort on the right pectoral area  Heart::             Regular rate and rhythm, S1 and S2 normal, 1/6 holosystolic murmur.  No rub or gallop  Abdomen:      Soft, non-tender, nondistended bowel sounds active  Extremities:    No cyanosis, clubbing, or edema.  Ambulates with a cane.  Significant varus deformity of lower extremities.  Pulses:           2+ and symmetric all extremities  Skin:                No rashes or lesions  Neuro/psych: A&O x3. CN II through XII are grossly intact with appropriate affect      Allergies:  Allergies   Allergen Reactions   • Lovastatin Unknown (See Comments) and Myalgia     unknown   • Pravastatin Unknown (See Comments) and Myalgia     unknown   • Simvastatin Unknown (See Comments) and Myalgia     unknown       Medication Review:     Current Outpatient Medications:   •  amiodarone (PACERONE) 200 MG tablet, Take 100 mg by mouth Daily., Disp: , Rfl:   •  aspirin 81 MG EC tablet, Take 81 mg by mouth Daily., Disp: , Rfl:   • "  Cholecalciferol (VITAMIN D-400) 400 units tablet, Take 2 tablets daily, Disp: , Rfl:   •  clopidogrel (PLAVIX) 75 MG tablet, Take 75 mg by mouth Daily., Disp: , Rfl:   •  cyanocobalamin 1000 MCG/ML injection, Inject 1,000 mcg into the appropriate muscle as directed by prescriber Every 14 (Fourteen) Days., Disp: , Rfl:   •  gabapentin (NEURONTIN) 400 MG capsule, Take 400 mg by mouth 3 (Three) Times a Day., Disp: , Rfl:   •  lidocaine (LIDODERM) 5 %, Place 1 patch on the skin as directed by provider Daily. Remove & Discard patch within 12 hours or as directed by MD, Disp: , Rfl:   •  midodrine (PROAMATINE) 10 MG tablet, Take 1 tablet by mouth 3 (Three) Times a Day Before Meals., Disp: 45 tablet, Rfl: 0  •  nitroglycerin (NITROSTAT) 0.4 MG SL tablet, Place 0.4 mg under the tongue Every 5 (Five) Minutes As Needed., Disp: , Rfl:   •  Omega-3 Fatty Acids (fish oil) 1000 MG capsule capsule, Take 2,000 mg by mouth 2 (Two) Times a Day With Meals., Disp: , Rfl:   •  pantoprazole (PROTONIX) 40 MG EC tablet, Take 40 mg by mouth Daily., Disp: , Rfl:   •  spironolactone (ALDACTONE) 25 MG tablet, Take 1 tablet by mouth Daily., Disp: 30 tablet, Rfl: 2  •  sucralfate (CARAFATE) 1 g tablet, Take 1 g by mouth 4 (Four) Times a Day., Disp: , Rfl:   •  ezetimibe (ZETIA) 10 MG tablet, Take 10 mg by mouth Daily., Disp: , Rfl:   •  methocarbamol (ROBAXIN) 500 MG tablet, Take 500 mg by mouth 3 (Three) Times a Day As Needed for Muscle Spasms., Disp: , Rfl:     Family History:  Family History   Problem Relation Age of Onset   • Pancreatic cancer Mother    • Heart disease Father        Past Medical History:  Past Medical History:   Diagnosis Date   • Aneurysm (CMS/HCC)    • Cardiomyopathy (CMS/HCC)     ICD implantation   • Cellulitis of left lower extremity 2010    recurrent   • CHD (coronary heart disease)     S/P CABG & PCI   • Dyslipidemia    • History of ventricular tachycardia    • Hypertension    • Myocardial infarction (CMS/HCC)      Inferior MI   • Pinched nerve     L4-L5   • Prostate cancer (CMS/HCC)        Past surgical History:  Past Surgical History:   Procedure Laterality Date   • ANGIOPLASTY  1996 Stent: Palmaz- Huy stent/LAD 1996-RCA: -Tetra stent Guidant to proximal RCA, mid to distal artery 2 sequential Guidant Tetra stents   • APPENDECTOMY     • CARDIAC ABLATION  April and May 2019   • CARDIAC CATHETERIZATION  2017    1996 x2, Nov. , 2010-cath 2015-no stents    • CARDIAC DEFIBRILLATOR PLACEMENT     • COLONOSCOPY W/ POLYPECTOMY      Mult colonoscopy's for polyp resection    • CORONARY ARTERY BYPASS GRAFT  05/2010    x5 vessel: LMA to diagonal, LAD, intermedius, obtuse marginal, RCA   • CORONARY STENT PLACEMENT     • ENDOSCOPY N/A 2019    Procedure: ESOPHAGOGASTRODUODENOSCOPY with dilitation Bougie 50, 54;  Surgeon: Roddy Coronel MD;  Location: [facility] ENDOSCOPY;  Service: Gastroenterology   • INSERT / REPLACE / REMOVE PACEMAKER     • KNEE OPEN LATERAL RELEASE Left     reduction   • OTHER SURGICAL HISTORY  2018    ICD implantation   • PROSTATE SURGERY      Robiotic surgery- Cyber Knife:       Social History:  Social History     Socioeconomic History   • Marital status:      Spouse name: Not on file   • Number of children: Not on file   • Years of education: Not on file   • Highest education level: Not on file   Tobacco Use   • Smoking status: Former Smoker     Packs/day: 1.00     Years: 17.00     Pack years: 17.00     Types: Cigarettes     Quit date: 2002     Years since quittin.2   • Smokeless tobacco: Never Used   Vaping Use   • Vaping Use: Never used   Substance and Sexual Activity   • 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 The pertinent cardiovascular findings are reported above with all other cardiovascular points within those systems being negative.    Diagnostic Study Review:     Current Electrocardiogram:  Procedures no new EKG.  EKG dated 8/20/2021 demonstrates sinus tachycardia with a ventricular rate of 108 bpm.      NOTE: The following portions of the patient's note were reviewed, confirmed and/or updated this visit as appropriate: History of present illness/Interval history, physical examination, assessment & plan, allergies, current medications, past family history, past medical history, past social history, past surgical history and problem list.

## 2021-10-13 ENCOUNTER — TELEPHONE (OUTPATIENT)
Dept: CARDIOLOGY | Facility: CLINIC | Age: 81
End: 2021-10-13

## 2021-10-13 NOTE — TELEPHONE ENCOUNTER
Pt called . Was shocked by ICD.  Was instructed to go to the hospital to be checked.  Instructed not to drive. Pt verbalized understanding.

## 2021-10-14 ENCOUNTER — TELEPHONE (OUTPATIENT)
Dept: CARDIOLOGY | Facility: CLINIC | Age: 81
End: 2021-10-14

## 2021-10-14 NOTE — TELEPHONE ENCOUNTER
[employer] rep traveling to patients house today to change settings on device as directed by Dr Arvizu.  Pt was called by [employer] and made aware.

## 2021-10-18 ENCOUNTER — TELEPHONE (OUTPATIENT)
Dept: CARDIOLOGY | Facility: CLINIC | Age: 81
End: 2021-10-18

## 2021-10-18 DIAGNOSIS — I95.1 ORTHOSTATIC HYPOTENSION: Primary | ICD-10-CM

## 2021-10-18 RX ORDER — MIDODRINE HYDROCHLORIDE 10 MG/1
10 TABLET ORAL
Qty: 21 TABLET | Refills: 0 | Status: ON HOLD | OUTPATIENT
Start: 2021-10-18 | End: 2022-10-27

## 2021-10-18 NOTE — TELEPHONE ENCOUNTER
I returned call to patient to let the patient know that I spoke with Maged the pharmacist at [facility].He put a verbal order in the system for the Midodrine 10 mg tablet-one by mouth three times daily with meals, with a quantity of 21.He verbalized understanding.

## 2021-10-19 ENCOUNTER — TELEPHONE (OUTPATIENT)
Dept: CARDIOLOGY | Facility: CLINIC | Age: 81
End: 2021-10-19

## 2021-10-19 NOTE — TELEPHONE ENCOUNTER
I received a call from the patient stating that he has not received his Midodrine-I called the pharmacy and spoke with Ed and he states that the RX was frozen.He said he was going to process the RX and have it ready within the hour.I informed the patient and he verbalized understanding.

## 2021-10-22 ENCOUNTER — HOSPITAL ENCOUNTER (OUTPATIENT)
Dept: CARDIOLOGY | Facility: HOSPITAL | Age: 81
Discharge: HOME | End: 2021-10-22
Admitting: INTERNAL MEDICINE

## 2021-10-22 VITALS
HEART RATE: 104 BPM | DIASTOLIC BLOOD PRESSURE: 65 MMHG | WEIGHT: 185 LBS | HEIGHT: 73 IN | SYSTOLIC BLOOD PRESSURE: 135 MMHG | BODY MASS INDEX: 24.52 KG/M2

## 2021-10-22 DIAGNOSIS — I25.5 ISCHEMIC CARDIOMYOPATHY: ICD-10-CM

## 2021-10-22 LAB
ASCENDING AORTA: 4.6 CM
BH CV ECHO MEAS - ACS: 2.9 CM
BH CV ECHO MEAS - AO MAX PG (FULL): 2.9 MMHG
BH CV ECHO MEAS - AO MAX PG: 7.2 MMHG
BH CV ECHO MEAS - AO MEAN PG (FULL): 1.5 MMHG
BH CV ECHO MEAS - AO MEAN PG: 3.8 MMHG
BH CV ECHO MEAS - AO ROOT AREA (BSA CORRECTED): 2.1
BH CV ECHO MEAS - AO ROOT AREA: 14.8 CM^2
BH CV ECHO MEAS - AO ROOT DIAM: 4.3 CM
BH CV ECHO MEAS - AO V2 MAX: 134.3 CM/SEC
BH CV ECHO MEAS - AO V2 MEAN: 91 CM/SEC
BH CV ECHO MEAS - AO V2 VTI: 22.5 CM
BH CV ECHO MEAS - ASC AORTA: 4.6 CM
BH CV ECHO MEAS - AVA(I,A): 4.6 CM^2
BH CV ECHO MEAS - AVA(I,D): 4.6 CM^2
BH CV ECHO MEAS - AVA(V,A): 4 CM^2
BH CV ECHO MEAS - AVA(V,D): 4 CM^2
BH CV ECHO MEAS - BSA(HAYCOCK): 2.1 M^2
BH CV ECHO MEAS - BSA: 2.1 M^2
BH CV ECHO MEAS - BZI_BMI: 24.4 KILOGRAMS/M^2
BH CV ECHO MEAS - BZI_METRIC_HEIGHT: 185.4 CM
BH CV ECHO MEAS - BZI_METRIC_WEIGHT: 83.9 KG
BH CV ECHO MEAS - EDV(CUBED): 379.9 ML
BH CV ECHO MEAS - EDV(MOD-SP2): 249.2 ML
BH CV ECHO MEAS - EDV(MOD-SP4): 204.9 ML
BH CV ECHO MEAS - EDV(TEICH): 275.8 ML
BH CV ECHO MEAS - EF(CUBED): 23.9 %
BH CV ECHO MEAS - EF(MOD-BP): 32 %
BH CV ECHO MEAS - EF(MOD-SP2): 31 %
BH CV ECHO MEAS - EF(MOD-SP4): 32.9 %
BH CV ECHO MEAS - EF(TEICH): 18.6 %
BH CV ECHO MEAS - ESV(CUBED): 289.1 ML
BH CV ECHO MEAS - ESV(MOD-SP2): 171.9 ML
BH CV ECHO MEAS - ESV(MOD-SP4): 137.6 ML
BH CV ECHO MEAS - ESV(TEICH): 224.6 ML
BH CV ECHO MEAS - FS: 8.7 %
BH CV ECHO MEAS - IVS/LVPW: 1.2
BH CV ECHO MEAS - IVSD: 1.1 CM
BH CV ECHO MEAS - LA DIMENSION(2D): 4.5 CM
BH CV ECHO MEAS - LA DIMENSION: 4.8 CM
BH CV ECHO MEAS - LA/AO: 1.1
BH CV ECHO MEAS - LAT PEAK E' VEL: 5 CM/SEC
BH CV ECHO MEAS - LV DIASTOLIC VOL/BSA (35-75): 98.4 ML/M^2
BH CV ECHO MEAS - LV IVRT: 0.07 SEC
BH CV ECHO MEAS - LV MASS(C)D: 339.9 GRAMS
BH CV ECHO MEAS - LV MASS(C)DI: 163.3 GRAMS/M^2
BH CV ECHO MEAS - LV MAX PG: 4.3 MMHG
BH CV ECHO MEAS - LV MEAN PG: 2.3 MMHG
BH CV ECHO MEAS - LV SYSTOLIC VOL/BSA (12-30): 66.1 ML/M^2
BH CV ECHO MEAS - LV V1 MAX: 103.6 CM/SEC
BH CV ECHO MEAS - LV V1 MEAN: 72.5 CM/SEC
BH CV ECHO MEAS - LV V1 VTI: 19.8 CM
BH CV ECHO MEAS - LVIDD: 7.2 CM
BH CV ECHO MEAS - LVIDS: 6.6 CM
BH CV ECHO MEAS - LVOT AREA: 5.2 CM^2
BH CV ECHO MEAS - LVOT DIAM: 2.6 CM
BH CV ECHO MEAS - LVPWD: 0.89 CM
BH CV ECHO MEAS - MED PEAK E' VEL: 5 CM/SEC
BH CV ECHO MEAS - MV A MAX VEL: 85.7 CM/SEC
BH CV ECHO MEAS - MV DEC SLOPE: 383.6 CM/SEC^2
BH CV ECHO MEAS - MV DEC TIME: 0.18 SEC
BH CV ECHO MEAS - MV E MAX VEL: 69.1 CM/SEC
BH CV ECHO MEAS - MV E/A: 0.81
BH CV ECHO MEAS - MV MAX PG: 3.6 MMHG
BH CV ECHO MEAS - MV MEAN PG: 1.8 MMHG
BH CV ECHO MEAS - MV P1/2T: 35 MSEC
BH CV ECHO MEAS - MV V2 MAX: 95 CM/SEC
BH CV ECHO MEAS - MV V2 MEAN: 64.3 CM/SEC
BH CV ECHO MEAS - MV V2 VTI: 18.2 CM
BH CV ECHO MEAS - MVA(P1/2T): 6.3 CM2
BH CV ECHO MEAS - MVA(VTI): 5.7 CM^2
BH CV ECHO MEAS - PA ACC SLOPE: 905 CM/SEC2
BH CV ECHO MEAS - PA ACC TIME: 0.1 SEC
BH CV ECHO MEAS - PA MAX PG (FULL): 2.1 MMHG
BH CV ECHO MEAS - PA MAX PG: 5.5 MMHG
BH CV ECHO MEAS - PA MEAN PG (FULL): 1.3 MMHG
BH CV ECHO MEAS - PA MEAN PG: 3.3 MMHG
BH CV ECHO MEAS - PA PR(ACCEL): 35.6 MMHG
BH CV ECHO MEAS - PA V2 MAX: 116.8 CM/SEC
BH CV ECHO MEAS - PA V2 MEAN: 86.3 CM/SEC
BH CV ECHO MEAS - PA V2 VTI: 20.4 CM
BH CV ECHO MEAS - PULM A REVS DUR: 0.09 SEC
BH CV ECHO MEAS - PULM A REVS VEL: 35.3 CM/SEC
BH CV ECHO MEAS - PULM DIAS VEL: 52.6 CM/SEC
BH CV ECHO MEAS - PULM S/D: 1.3
BH CV ECHO MEAS - PULM SYS VEL: 67.7 CM/SEC
BH CV ECHO MEAS - PVA(I,A): 4.1 CM^2
BH CV ECHO MEAS - PVA(I,D): 4.1 CM^2
BH CV ECHO MEAS - PVA(V,A): 4 CM^2
BH CV ECHO MEAS - PVA(V,D): 4 CM^2
BH CV ECHO MEAS - QP/QS: 0.81
BH CV ECHO MEAS - RAP SYSTOLE: 8 MMHG
BH CV ECHO MEAS - RV MAX PG: 3.4 MMHG
BH CV ECHO MEAS - RV MEAN PG: 2 MMHG
BH CV ECHO MEAS - RV V1 MAX: 91.6 CM/SEC
BH CV ECHO MEAS - RV V1 MEAN: 67.4 CM/SEC
BH CV ECHO MEAS - RV V1 VTI: 16.6 CM
BH CV ECHO MEAS - RVOT AREA: 5.1 CM^2
BH CV ECHO MEAS - RVOT DIAM: 2.5 CM
BH CV ECHO MEAS - RVSP: 37.7 MMHG
BH CV ECHO MEAS - SI(AO): 160.2 ML/M^2
BH CV ECHO MEAS - SI(CUBED): 43.6 ML/M^2
BH CV ECHO MEAS - SI(LVOT): 49.8 ML/M^2
BH CV ECHO MEAS - SI(MOD-SP2): 37.1 ML/M^2
BH CV ECHO MEAS - SI(MOD-SP4): 32.4 ML/M^2
BH CV ECHO MEAS - SI(TEICH): 24.6 ML/M^2
BH CV ECHO MEAS - SV(AO): 333.6 ML
BH CV ECHO MEAS - SV(CUBED): 90.7 ML
BH CV ECHO MEAS - SV(LVOT): 103.7 ML
BH CV ECHO MEAS - SV(MOD-SP2): 77.3 ML
BH CV ECHO MEAS - SV(MOD-SP4): 67.3 ML
BH CV ECHO MEAS - SV(RVOT): 83.7 ML
BH CV ECHO MEAS - SV(TEICH): 51.2 ML
BH CV ECHO MEAS - TAPSE (>1.6): 2 CM
BH CV ECHO MEAS - TR MAX PG: 30 MMHG
BH CV ECHO MEAS - TR MAX VEL: 272.6 CM/SEC
BH CV ECHO MEASUREMENTS AVERAGE E/E' RATIO: 13.82
BH CV VAS BP LEFT ARM: NORMAL MMHG
BH CV XLRA - RV BASE: 4.3 CM
BH CV XLRA - RV MID: 3.4 CM
BH CV XLRA - TDI S': 15 CM/SEC
IVRT: 73 MSEC
LEFT ATRIUM VOLUME INDEX: 51 ML/M2
LEFT ATRIUM VOLUME: 106 CM3
PV VALVE AREA: 4.1 CM2
STJ: 36 CM

## 2021-10-22 PROCEDURE — 93306 TTE W/DOPPLER COMPLETE: CPT | Performed by: INTERNAL MEDICINE

## 2021-10-22 PROCEDURE — 93306 TTE W/DOPPLER COMPLETE: CPT

## 2021-10-29 ENCOUNTER — OFFICE VISIT (OUTPATIENT)
Dept: CARDIOLOGY | Facility: CLINIC | Age: 81
End: 2021-10-29

## 2021-10-29 ENCOUNTER — PREP FOR SURGERY (OUTPATIENT)
Dept: OTHER | Facility: HOSPITAL | Age: 81
End: 2021-10-29

## 2021-10-29 VITALS
HEART RATE: 99 BPM | WEIGHT: 168 LBS | OXYGEN SATURATION: 98 % | DIASTOLIC BLOOD PRESSURE: 74 MMHG | HEIGHT: 73 IN | SYSTOLIC BLOOD PRESSURE: 128 MMHG | BODY MASS INDEX: 22.26 KG/M2 | RESPIRATION RATE: 18 BRPM

## 2021-10-29 DIAGNOSIS — I48.0 PAROXYSMAL ATRIAL FIBRILLATION (HCC): ICD-10-CM

## 2021-10-29 DIAGNOSIS — E05.90 HYPERTHYROIDISM: ICD-10-CM

## 2021-10-29 DIAGNOSIS — Z95.810 PRESENCE OF AUTOMATIC IMPLANTABLE CARDIOVERTER-DEFIBRILLATOR: ICD-10-CM

## 2021-10-29 DIAGNOSIS — I10 ESSENTIAL HYPERTENSION: ICD-10-CM

## 2021-10-29 DIAGNOSIS — I25.5 ISCHEMIC CARDIOMYOPATHY: Primary | ICD-10-CM

## 2021-10-29 DIAGNOSIS — I47.20 VT (VENTRICULAR TACHYCARDIA) (HCC): ICD-10-CM

## 2021-10-29 DIAGNOSIS — I25.810 CORONARY ARTERY DISEASE INVOLVING CORONARY BYPASS GRAFT OF NATIVE HEART WITHOUT ANGINA PECTORIS: ICD-10-CM

## 2021-10-29 DIAGNOSIS — I50.22 CHRONIC SYSTOLIC CONGESTIVE HEART FAILURE (HCC): ICD-10-CM

## 2021-10-29 PROCEDURE — 93000 ELECTROCARDIOGRAM COMPLETE: CPT | Performed by: INTERNAL MEDICINE

## 2021-10-29 PROCEDURE — 99214 OFFICE O/P EST MOD 30 MIN: CPT | Performed by: INTERNAL MEDICINE

## 2021-10-29 RX ORDER — SODIUM CHLORIDE 0.9 % (FLUSH) 0.9 %
3-10 SYRINGE (ML) INJECTION AS NEEDED
Status: CANCELLED | OUTPATIENT
Start: 2021-10-29

## 2021-10-29 RX ORDER — SODIUM CHLORIDE 9 MG/ML
80 INJECTION, SOLUTION INTRAVENOUS CONTINUOUS
Status: CANCELLED | OUTPATIENT
Start: 2021-10-29

## 2021-10-29 RX ORDER — SODIUM CHLORIDE 0.9 % (FLUSH) 0.9 %
3 SYRINGE (ML) INJECTION EVERY 12 HOURS SCHEDULED
Status: CANCELLED | OUTPATIENT
Start: 2021-10-29

## 2021-10-29 NOTE — TELEPHONE ENCOUNTER
Infectious Disease Spoke with patient regarding TSH results.  Pt to stop Amiodarone and start Toprol XL 50mg daily.  [facility]  Pt to also follow up with Dr Modi endocrinology regarding TSH level.  Dr Arvizu also spoke with patient regarding this.  Pt verbalized understanding.

## 2021-10-29 NOTE — PROGRESS NOTES
CC--Recurrent VT, ischemic cardiomyopathy    Sub--Pt here for follow up and he had prior history of incessant VT needing and ablation several months ago .  patient started having recurrent VT needing a redo VT ablation  and post ablation a week later developed dysphagia and needed endoscopy the patient underwent esophageal stricture dilatation with improvement of symptoms and resolution of dysphagia .Patient is on amiodarone because of extensive scarring and multiple episodes of VT and since ablation without recurrent ICD therapy--patient has history of chronic ischemic heart disease previous myocardial infarction, s/p previous CABG,  LV systolic dysfunction with   Last  LV ejection fraction of 25 30% which came up to 30- 35% by latest echocardiogram.   He is  status post ICD implant.  denies any chest pain or shortness of breath or syncope--patient had prior ACE inhibitor induced cough and currently is on Aldactone .Patient denies any new symptoms of VT or syncope and is compliant with current medical regimen  He also developed some right shoulder pain being [facility] and uses Biofreeze for relief of symptoms   He denies any palpitations or syncope or any chest pain or dyspnea.  Patient could not tolerate additional beta-blockers on top of amiodarone in the past  Patient has noticed photosensitivity of his skin in the past and amiodarone dose reduced to 100 mg a day and his symptoms have reduced and comes in for follow-up   Patient has noticed some burning sensation in lower extremities and was seen at [facility] on last clinic prescription for gabapentin and he also complains of some calf pain when he is ambulating with some blocks gets better after increased activity and it is not nocturnal in nature--arterial duplex scan without any significant abnormalities  He also has history of orthostatic hypotension started on midodrine and complains of intermittent headache  Patient is doing  clinically well from cardiac standpoint of view  Complains of recurrent redness in the left lower extremity with tenderness and warmth around the ankle joint and is being treated with antibiotics and has been to [place] ER twice--is being evaluated by vascular surgery and infectious disease specialist and he had his symptoms since 2010 with increasing frequency recently.    Since last seen patient had ICD shock therapy for conducted atrial fibrillation and was brought in back for evaluation  Atrial fibrillation was not seen with prior interrogation of his device and ICD was reprogrammed on recommendation to avoid unnecessary therapy for conducted atrial fibrillation  Patient complains of diffuse subcutaneous bleeding with dual antiplatelet agents and also complains of weight loss and is [facility]        Past Medical History:     Reviewed history from 03/13/2018 and no changes required:        Coronary Heart Disease:S/P CABG and PCI         Hypertension        Myocardial Infarction: Inferior MI         Hx: Cellulitis of left lower leg        Dyslipidemia         Pinched nerve L4-L5         Prostate cancer         Cardiomyopathy : ICD implantation         Physical Exam    General:      well developed, well nourished, in no acute distress.    Head:      normocephalic and atraumatic.    Eyes:      PERRL/EOM intact, conjunctiva and sclera clear with out nystagmus.    Neck:      no masses, thyromegaly, trachea central with normal respiratory effort  Lungs:      clear bilaterally to auscultation.    Heart:      regular rate and rhythm, S1, S2 without murmurs, rubs, or gallops         assessment plan  Inappropriate ICD therapy for conducted atrial fibrillation despite amiodarone--patient educated about AF and will be scheduled for AF ablation to avoid unnecessary therapy for conducted AF and orders placed and educated to the patient  Patient also educated about need for anticoagulation and  anticoagulation will be resumed with ablation  Orthostatic hypotension--stable on midodrine  History of  VT storm status post VT re do ablation without any   recurrent VT   Dysphagia status post esophageal stricture dilatation with resolution of symptoms   single-chamber ICD in situ with VDD lead    coronary artery disease--stable without angina   ischemic cardiomyopathy  Recent vascular screening revealing normal arterial flow in the lower extremities   Medications reviewed      After the patient left the office, [facility] which revealed a TSH less than 0.0083 and T4 2.1 which is elevated consistent with hyperthyroidism likely from amiodarone  Patient's procedure will be canceled  Patient will be contacted by phone and amiodarone to be stopped  Endocrinology consult will be placed  Start Toprol-XL 50 mg a day      ECG 12 Lead    Date/Time: 10/29/2021 1:05 PM  Performed by: Constantino Arvizu MD  Authorized by: Constantino Arvizu MD   Comparison: compared with previous ECG   Similar to previous ECG  Rhythm: sinus rhythm  Rate: normal  Conduction: non-specific intraventricular conduction delay  QRS axis: right            Electronically signed by Constantino Arvizu MD, 10/29/21, 1:05 PM EDT.

## 2021-11-01 ENCOUNTER — TELEPHONE (OUTPATIENT)
Dept: CARDIOLOGY | Facility: CLINIC | Age: 81
End: 2021-11-01

## 2021-11-11 NOTE — TELEPHONE ENCOUNTER
Pt called regarding plavix. I let him know per Dr Arvizu he is to stop plavix and start Eliquis 5 mg BID. Pt verbalized understanding and will  [facility].

## 2021-11-22 ENCOUNTER — TELEPHONE (OUTPATIENT)
Dept: CARDIOLOGY | Facility: CLINIC | Age: 81
End: 2021-11-22

## 2021-11-22 NOTE — TELEPHONE ENCOUNTER
----- Message from Constantino KNIGHT MD sent at 11/22/2021  2:20 PM EST -----  Reduce eliquis to 2.5 mg twice daily    Dr REED  ----- Message -----  From: Palmira Fortune MA  Sent: 11/22/2021   1:37 PM EST  To: Constantino KNIGHT MD    Pt called he is concerned about the amount of bruising on his arms, he states it is all over and has just recently started happening. He would like to know what you recommend?

## 2021-11-29 ENCOUNTER — OFFICE VISIT (OUTPATIENT)
Dept: ENDOCRINOLOGY | Facility: CLINIC | Age: 81
End: 2021-11-29

## 2021-11-29 VITALS
SYSTOLIC BLOOD PRESSURE: 122 MMHG | OXYGEN SATURATION: 95 % | TEMPERATURE: 97.3 F | DIASTOLIC BLOOD PRESSURE: 70 MMHG | HEIGHT: 73 IN | HEART RATE: 95 BPM | WEIGHT: 184 LBS | BODY MASS INDEX: 24.39 KG/M2

## 2021-11-29 DIAGNOSIS — E05.90 HYPERTHYROIDISM: Primary | ICD-10-CM

## 2021-11-29 PROCEDURE — 99204 OFFICE O/P NEW MOD 45 MIN: CPT | Performed by: INTERNAL MEDICINE

## 2021-11-29 NOTE — PROGRESS NOTES
Endocrine Progress Note Outpatient   Referred by Dr. Arvizu for hyperthyroidism  Patient Care Team:  Makenna Motta MD as PCP - General  Makenna Motta MD as PCP - Family Medicine    Chief Complaint: Hyperthyroidism          HPI: 81-year-old male with history of atrial fibrillation was recently found to have undetectable TSH and high free T4 is now referred here for further evaluation management he was on amiodarone which was stopped about a month ago by Dr. Arvizu.  He feels like he at least took it for 3 months.  He has lost about 30 pounds in the 4months without any effort.  He may have some anxiety depression could have PTSD as well.  Denies any excessive fatigue or tiredness or any shaking or palpitation or sweating.  Sleeps well.    Past Medical History:   Diagnosis Date   • Aneurysm (HCC)    • Cardiomyopathy (HCC)     ICD implantation   • Cellulitis of left lower extremity     recurrent   • CHD (coronary heart disease)     S/P CABG & PCI   • Dyslipidemia    • History of ventricular tachycardia    • Hypertension    • Myocardial infarction (HCC)     Inferior MI   • Pinched nerve     L4-L5   • Prostate cancer (HCC)        Social History     Socioeconomic History   • Marital status:    Tobacco Use   • Smoking status: Former Smoker     Packs/day: 1.00     Years: 17.00     Pack years: 17.00     Types: Cigarettes     Quit date: 2002     Years since quittin.4   • Smokeless tobacco: Never Used   Vaping Use   • Vaping Use: Never used   Substance and Sexual Activity   • 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    Eyes:  Denies change in visual acuity   HENT:  Denies nasal congestion or sore throat   Respiratory: denies cough, shortness of breath.   Cardiovascular:  denies chest pain, edema   GI:  Denies abdominal pain, nausea, vomiting.   Musculoskeletal:  Denies back pain or joint pain   Integument:  Denies dry skin and rash   Neurologic:  Denies headache, focal weakness or sensory changes   Endocrine:  Denies polyuria or polydipsia   Psychiatric:  Denies depression or anxiety      Vitals reviewed: Include pulse of 95/min, blood pressure 122/70, is 73 inches tall and weighs 184 pounds with a BMI of 24.3.    Physical Exam:  GEN: NAD, conversant  EYES: EOMI, PERRL, no conjunctival erythema  NECK: no thyromegaly, full ROM   CV: RRR, no murmurs/rubs/gallops, no peripheral edema  LUNG: CTAB, no wheezes/rales/ronchi  SKIN: no rashes, no acanthosis  MSK: no deformities, full ROM of all extremities  NEURO: no tremors, DTR normal  PSYCH: AOX3, appropriate mood, affect normal      Results Review:     I reviewed the patient's new clinical results.      Lab Results   Component Value Date    GLUCOSE 122 (H) 07/15/2021    BUN 14 07/15/2021    CREATININE 0.77 07/15/2021    EGFRIFNONA 98 07/15/2021    BCR 18.2 07/15/2021    K 4.5 07/15/2021    CO2 26.0 07/15/2021    CALCIUM 9.3 07/15/2021    ALBUMIN 3.70 06/18/2021    LABIL2 1.1 06/04/2019    AST 19 06/18/2021    ALT 14 06/18/2021    CHOL 113 05/13/2021    TRIG 59 05/13/2021    LDL 58 05/13/2021    HDL 42 05/13/2021     Labs: 10/01/2021: TSH <0.0083, Free T4 2.10.     Medication Review: Reviewed.       Current Outpatient Medications:   •  apixaban (ELIQUIS) 5 MG tablet tablet, Take 1 tablet by mouth 2 (Two) Times a Day., Disp: 60 tablet, Rfl: 3  •  aspirin 81 MG EC tablet, Take 81 mg by mouth Daily., Disp: , Rfl:   •  Cholecalciferol  (VITAMIN D-400) 400 units tablet, Take 2 tablets daily, Disp: , Rfl:   •  cyanocobalamin 1000 MCG/ML injection, Inject 1,000 mcg into the appropriate muscle as directed by prescriber Every 14 (Fourteen) Days., Disp: , Rfl:   •  ezetimibe (ZETIA) 10 MG tablet, Take 10 mg by mouth Daily., Disp: , Rfl:   •  Ferrous Sulfate (IRON PO), Take  by mouth., Disp: , Rfl:   •  gabapentin (NEURONTIN) 400 MG capsule, Take 400 mg by mouth 3 (Three) Times a Day., Disp: , Rfl:   •  lidocaine (LIDODERM) 5 %, Place 1 patch on the skin as directed by provider Daily. Remove & Discard patch within 12 hours or as directed by MD, Disp: , Rfl:   •  methocarbamol (ROBAXIN) 500 MG tablet, Take 500 mg by mouth 3 (Three) Times a Day As Needed for Muscle Spasms., Disp: , Rfl:   •  midodrine (PROAMATINE) 10 MG tablet, Take 1 tablet by mouth 3 (Three) Times a Day Before Meals., Disp: 21 tablet, Rfl: 0  •  nitroglycerin (NITROSTAT) 0.4 MG SL tablet, Place 0.4 mg under the tongue Every 5 (Five) Minutes As Needed., Disp: , Rfl:   •  Omega-3 Fatty Acids (fish oil) 1000 MG capsule capsule, Take 2,000 mg by mouth 2 (Two) Times a Day With Meals., Disp: , Rfl:   •  pantoprazole (PROTONIX) 40 MG EC tablet, Take 40 mg by mouth Daily., Disp: , Rfl:   •  sucralfate (CARAFATE) 1 g tablet, Take 1 g by mouth 4 (Four) Times a Day., Disp: , Rfl:       Assessment/Plan   Hyperthyroidism: Etiology unknown, 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  Has been off amiodarone for last 4 weeks at least.  I will recheck TSH with free T4, free T3, TSI, TPO antibodies, I will also check interleukin-6 as well as ESR and CBC and CMP and arrange for thyroid ultrasound for further evaluation.  Further recommendations will be based upon the labs.            Sam Modi MD FACE.

## 2021-11-30 PROCEDURE — 93296 REM INTERROG EVL PM/IDS: CPT | Performed by: INTERNAL MEDICINE

## 2021-11-30 PROCEDURE — 93295 DEV INTERROG REMOTE 1/2/MLT: CPT | Performed by: INTERNAL MEDICINE

## 2021-12-01 ENCOUNTER — LAB (OUTPATIENT)
Dept: LAB | Facility: HOSPITAL | Age: 81
End: 2021-12-01

## 2021-12-01 DIAGNOSIS — E05.90 HYPERTHYROIDISM: ICD-10-CM

## 2021-12-01 LAB
ALBUMIN SERPL-MCNC: 4 G/DL (ref 3.5–5.2)
ALBUMIN/GLOB SERPL: 1.4 G/DL
ALP SERPL-CCNC: 123 U/L (ref 39–117)
ALT SERPL W P-5'-P-CCNC: 21 U/L (ref 1–41)
ANION GAP SERPL CALCULATED.3IONS-SCNC: 8.5 MMOL/L (ref 5–15)
AST SERPL-CCNC: 21 U/L (ref 1–40)
BASOPHILS # BLD AUTO: 0.05 10*3/MM3 (ref 0–0.2)
BASOPHILS NFR BLD AUTO: 0.7 % (ref 0–1.5)
BILIRUB SERPL-MCNC: <0.2 MG/DL (ref 0–1.2)
BUN SERPL-MCNC: 17 MG/DL (ref 8–23)
BUN/CREAT SERPL: 22.7 (ref 7–25)
CALCIUM SPEC-SCNC: 9.7 MG/DL (ref 8.6–10.5)
CHLORIDE SERPL-SCNC: 103 MMOL/L (ref 98–107)
CO2 SERPL-SCNC: 25.5 MMOL/L (ref 22–29)
CREAT SERPL-MCNC: 0.75 MG/DL (ref 0.76–1.27)
DEPRECATED RDW RBC AUTO: 44.2 FL (ref 37–54)
EOSINOPHIL # BLD AUTO: 0.27 10*3/MM3 (ref 0–0.4)
EOSINOPHIL NFR BLD AUTO: 3.7 % (ref 0.3–6.2)
ERYTHROCYTE [DISTWIDTH] IN BLOOD BY AUTOMATED COUNT: 14 % (ref 12.3–15.4)
ERYTHROCYTE [SEDIMENTATION RATE] IN BLOOD: 34 MM/HR (ref 0–20)
GFR SERPL CREATININE-BSD FRML MDRD: 100 ML/MIN/1.73
GLOBULIN UR ELPH-MCNC: 2.9 GM/DL
GLUCOSE SERPL-MCNC: 114 MG/DL (ref 65–99)
HCT VFR BLD AUTO: 36.7 % (ref 37.5–51)
HGB BLD-MCNC: 12.6 G/DL (ref 13–17.7)
IMM GRANULOCYTES # BLD AUTO: 0.03 10*3/MM3 (ref 0–0.05)
IMM GRANULOCYTES NFR BLD AUTO: 0.4 % (ref 0–0.5)
LYMPHOCYTES # BLD AUTO: 2.24 10*3/MM3 (ref 0.7–3.1)
LYMPHOCYTES NFR BLD AUTO: 30.4 % (ref 19.6–45.3)
MCH RBC QN AUTO: 30.1 PG (ref 26.6–33)
MCHC RBC AUTO-ENTMCNC: 34.3 G/DL (ref 31.5–35.7)
MCV RBC AUTO: 87.6 FL (ref 79–97)
MONOCYTES # BLD AUTO: 0.65 10*3/MM3 (ref 0.1–0.9)
MONOCYTES NFR BLD AUTO: 8.8 % (ref 5–12)
NEUTROPHILS NFR BLD AUTO: 4.14 10*3/MM3 (ref 1.7–7)
NEUTROPHILS NFR BLD AUTO: 56 % (ref 42.7–76)
NRBC BLD AUTO-RTO: 0 /100 WBC (ref 0–0.2)
PLATELET # BLD AUTO: 228 10*3/MM3 (ref 140–450)
PMV BLD AUTO: 10 FL (ref 6–12)
POTASSIUM SERPL-SCNC: 4.2 MMOL/L (ref 3.5–5.2)
PROT SERPL-MCNC: 6.9 G/DL (ref 6–8.5)
RBC # BLD AUTO: 4.19 10*6/MM3 (ref 4.14–5.8)
SODIUM SERPL-SCNC: 137 MMOL/L (ref 136–145)
T3FREE SERPL-MCNC: 3.69 PG/ML (ref 2–4.4)
T4 FREE SERPL-MCNC: 1.23 NG/DL (ref 0.93–1.7)
TSH SERPL DL<=0.05 MIU/L-ACNC: <0.005 UIU/ML (ref 0.27–4.2)
WBC NRBC COR # BLD: 7.38 10*3/MM3 (ref 3.4–10.8)

## 2021-12-01 PROCEDURE — 80053 COMPREHEN METABOLIC PANEL: CPT

## 2021-12-01 PROCEDURE — 83520 IMMUNOASSAY QUANT NOS NONAB: CPT

## 2021-12-01 PROCEDURE — 84443 ASSAY THYROID STIM HORMONE: CPT

## 2021-12-01 PROCEDURE — 84481 FREE ASSAY (FT-3): CPT

## 2021-12-01 PROCEDURE — 84445 ASSAY OF TSI GLOBULIN: CPT

## 2021-12-01 PROCEDURE — 85025 COMPLETE CBC W/AUTO DIFF WBC: CPT

## 2021-12-01 PROCEDURE — 84439 ASSAY OF FREE THYROXINE: CPT

## 2021-12-01 PROCEDURE — 86376 MICROSOMAL ANTIBODY EACH: CPT

## 2021-12-01 PROCEDURE — 85652 RBC SED RATE AUTOMATED: CPT

## 2021-12-01 PROCEDURE — 36415 COLL VENOUS BLD VENIPUNCTURE: CPT

## 2021-12-02 LAB
IL6 SERPL-MCNC: 8.6 PG/ML (ref 0–13)
THYROPEROXIDASE AB SERPL-ACNC: 10 IU/ML (ref 0–34)

## 2021-12-03 ENCOUNTER — OFFICE VISIT (OUTPATIENT)
Dept: CARDIOLOGY | Facility: CLINIC | Age: 81
End: 2021-12-03

## 2021-12-03 VITALS
BODY MASS INDEX: 23.19 KG/M2 | SYSTOLIC BLOOD PRESSURE: 112 MMHG | HEART RATE: 87 BPM | DIASTOLIC BLOOD PRESSURE: 65 MMHG | HEIGHT: 73 IN | OXYGEN SATURATION: 97 % | WEIGHT: 175 LBS

## 2021-12-03 DIAGNOSIS — I10 ESSENTIAL HYPERTENSION: ICD-10-CM

## 2021-12-03 DIAGNOSIS — I95.1 ORTHOSTATIC HYPOTENSION: ICD-10-CM

## 2021-12-03 DIAGNOSIS — I25.5 ISCHEMIC CARDIOMYOPATHY: ICD-10-CM

## 2021-12-03 DIAGNOSIS — I50.22 CHRONIC SYSTOLIC CONGESTIVE HEART FAILURE (HCC): ICD-10-CM

## 2021-12-03 DIAGNOSIS — I48.0 PAROXYSMAL ATRIAL FIBRILLATION (HCC): Primary | ICD-10-CM

## 2021-12-03 DIAGNOSIS — Z95.810 PRESENCE OF AUTOMATIC IMPLANTABLE CARDIOVERTER-DEFIBRILLATOR: ICD-10-CM

## 2021-12-03 DIAGNOSIS — E05.90 HYPERTHYROIDISM: ICD-10-CM

## 2021-12-03 DIAGNOSIS — I47.20 VT (VENTRICULAR TACHYCARDIA) (HCC): ICD-10-CM

## 2021-12-03 DIAGNOSIS — I25.810 CORONARY ARTERY DISEASE INVOLVING CORONARY BYPASS GRAFT OF NATIVE HEART WITHOUT ANGINA PECTORIS: ICD-10-CM

## 2021-12-03 PROCEDURE — 99214 OFFICE O/P EST MOD 30 MIN: CPT | Performed by: INTERNAL MEDICINE

## 2021-12-03 PROCEDURE — 93000 ELECTROCARDIOGRAM COMPLETE: CPT | Performed by: INTERNAL MEDICINE

## 2021-12-03 RX ORDER — METOPROLOL SUCCINATE 50 MG/1
50 TABLET, EXTENDED RELEASE ORAL DAILY
COMMUNITY

## 2021-12-03 NOTE — PROGRESS NOTES
CC--Recurrent VT, ischemic cardiomyopathy    Sub--Pt here for follow up and he had prior history of incessant VT needing and ablation several months ago .  patient started having recurrent VT needing a redo VT ablation  and post ablation a week later developed dysphagia and needed endoscopy the patient underwent esophageal stricture dilatation with improvement of symptoms and resolution of dysphagia .Patient is on amiodarone because of extensive scarring and multiple episodes of VT and since ablation without recurrent ICD therapy--patient has history of chronic ischemic heart disease previous myocardial infarction, s/p previous CABG,  LV systolic dysfunction with   Last  LV ejection fraction of 25 30% which came up to 30- 35% by latest echocardiogram.   He is  status post ICD implant.  denies any chest pain or shortness of breath or syncope--patient had prior ACE inhibitor induced cough and currently is on Aldactone .Patient denies any new symptoms of VT or syncope and is compliant with current medical regimen  He also developed some right shoulder pain being [facility] and uses Biofreeze for relief of symptoms   He denies any palpitations or syncope or any chest pain or dyspnea.  Patient could not tolerate additional beta-blockers on top of amiodarone in the past  Patient has noticed photosensitivity of his skin in the past and amiodarone dose reduced to 100 mg a day and his symptoms have reduced and comes in for follow-up   Patient has noticed some burning sensation in lower extremities and was seen at [facility] on last clinic prescription for gabapentin and he also complains of some calf pain when he is ambulating with some blocks gets better after increased activity and it is not nocturnal in nature--arterial duplex scan without any significant abnormalities  He also has history of orthostatic hypotension started on midodrine and complains of intermittent headache  Patient is doing  clinically well from cardiac standpoint of view  Complains of recurrent redness in the left lower extremity with tenderness and warmth around the ankle joint and is being treated with antibiotics and has been to [facility] twice--is being evaluated by vascular surgery and infectious disease specialist and he had his symptoms since 2010 with increasing frequency recently.    Since last seen patient had ICD shock therapy for conducted atrial fibrillation and was brought in back for evaluation  Atrial fibrillation was not seen with prior interrogation of his device and ICD was reprogrammed on recommendation to avoid unnecessary therapy for conducted atrial fibrillation  Patient complains of diffuse subcutaneous bleeding with dual antiplatelet agents and also complains of weight loss and is being [facility]  Patient was noted to have markedly abnormal thyroid function test consistent with hyperthyroidism and immediately amiodarone was stopped and started on beta-blockers and Eliquis an appointment was made with endocrinologist and comes in for follow-up        Past Medical History:     Reviewed history from 03/13/2018 and no changes required:        Coronary Heart Disease:S/P CABG and PCI         Hypertension        Myocardial Infarction: Inferior MI         Hx: Cellulitis of left lower leg        Dyslipidemia         Pinched nerve L4-L5         Prostate cancer         Cardiomyopathy : ICD implantation         Physical Exam    General:      well developed, well nourished, in no acute distress.    Head:      normocephalic and atraumatic.    Eyes:      PERRL/EOM intact, conjunctiva and sclera clear with out nystagmus.    Neck:      no masses, thyromegaly, trachea central with normal respiratory effort  Lungs:      clear bilaterally to auscultation.    Heart:      regular rate and rhythm, S1, S2 without murmurs, rubs, or gallops         assessment plan  Inappropriate ICD therapy for conducted atrial  fibrillation despite amiodarone--patient is hyperthyroid --repeat TSH is less 0.005, normal T4 normal T3.  Normal renal panel.  Patient currently off amiodarone and started on beta-blockers and no recurrent A. fib in the last few weeks.  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  Unable to tolerate 5 mg of Eliquis which has been reduced to 2.5 mg twice daily and patient was also educated this was temporary since Eliquis can be stopped after patient becomes euthyroid.  Orthostatic hypotension--stable on midodrine  History of  VT storm status post VT re do ablation without any   recurrent VT   Dysphagia status post esophageal stricture dilatation with resolution of symptoms   single-chamber ICD in situ with VDD lead    coronary artery disease--stable without angina   ischemic cardiomyopathy  Recent vascular screening revealing normal arterial flow in the lower extremities   Medications reviewed    Patient extensively educated about amiodarone induced hyperthyroidism and relation of atrial fibrillation and hyperthyroidism and relevance of anticoagulation till the patient becomes euthyroid.    Medications  reviewed and follow-up in 8 weeks        ECG 12 Lead    Date/Time: 12/3/2021 1:23 PM  Performed by: Constantino Arvizu MD  Authorized by: Constantino Arvizu MD   Comparison: compared with previous ECG   Similar to previous ECG  Rhythm: sinus rhythm  Ectopy: infrequent PVCs  Rate: normal  Conduction: non-specific intraventricular conduction delay  QRS axis: normal  Other findings: non-specific ST-T wave changes            Electronically signed by Constantino Arvizu MD, 12/03/21, 1:22 PM EST.

## 2021-12-04 LAB — TSI SER-ACNC: <0.1 IU/L (ref 0–0.55)

## 2021-12-10 ENCOUNTER — OFFICE VISIT (OUTPATIENT)
Dept: CARDIOLOGY | Facility: CLINIC | Age: 81
End: 2021-12-10

## 2021-12-10 VITALS
OXYGEN SATURATION: 94 % | SYSTOLIC BLOOD PRESSURE: 137 MMHG | WEIGHT: 187 LBS | DIASTOLIC BLOOD PRESSURE: 61 MMHG | BODY MASS INDEX: 24.78 KG/M2 | HEART RATE: 84 BPM | HEIGHT: 73 IN

## 2021-12-10 DIAGNOSIS — E78.2 HYPERLIPIDEMIA, MIXED: Chronic | ICD-10-CM

## 2021-12-10 DIAGNOSIS — Z95.810 PRESENCE OF AUTOMATIC IMPLANTABLE CARDIOVERTER-DEFIBRILLATOR: Chronic | ICD-10-CM

## 2021-12-10 DIAGNOSIS — N62 GYNECOMASTIA, MALE: ICD-10-CM

## 2021-12-10 DIAGNOSIS — I47.29 PAROXYSMAL VENTRICULAR TACHYCARDIA (HCC): Chronic | ICD-10-CM

## 2021-12-10 DIAGNOSIS — I95.1 ORTHOSTATIC HYPOTENSION: Chronic | ICD-10-CM

## 2021-12-10 DIAGNOSIS — I71.21 ASCENDING AORTIC ANEURYSM (HCC): ICD-10-CM

## 2021-12-10 DIAGNOSIS — I25.810 CORONARY ARTERY DISEASE INVOLVING CORONARY BYPASS GRAFT OF NATIVE HEART WITHOUT ANGINA PECTORIS: Primary | Chronic | ICD-10-CM

## 2021-12-10 DIAGNOSIS — I25.5 ISCHEMIC CARDIOMYOPATHY: Chronic | ICD-10-CM

## 2021-12-10 PROCEDURE — 99214 OFFICE O/P EST MOD 30 MIN: CPT | Performed by: INTERNAL MEDICINE

## 2021-12-10 NOTE — PROGRESS NOTES
Cardiology Office Visit      Encounter Date:  12/10/2021    Patient ID:   Deion Nicholas is a 81 y.o. male.    Reason For Followup:  Ischemic cardiomyopathy  Coronary artery disease  Hypertension  Hypertensive cardiovascular disease  Amiodarone therapy  Antiplatelet therapy  Hyperlipidemia    Brief Clinical History:  Dear Makenna Jimenez MD    I had the pleasure of seeing Deion Nicholas today. As you are well aware, this is a 81 y.o. male with an established history of ischemic heart disease.  He has undergone coronary arterial bypass grafting in the past.  He is additional history that includes ischemic cardiomyopathy with most recent ejection fraction of 30 to 35%, ICD implantation, hypertension, hypertensive cardiovascular disease, amiodarone therapy, antiplatelet therapy, and hyperlipidemia.  He presents today for follow-up on the above conditions.     Interval History:  He denies any chest pain pressure heaviness or tightness.  He denies any shortness of breath out of character.  He denies any PND orthopnea.  He denies any syncope or near syncope.  He continues to report unsteady gait.    Complaining of gynecomastia on his last visit we discontinued his spironolactone and he has done well without this.  Although the breast tissue still remains, the tenderness has resolved.    He also states that he had a follow-up CT scan performed recently that demonstrated an aneurysm of his ascending aorta.  The patient recalls it being 4.2 cm.  I do not readily have this available but we'll request it for review.    His most recent echocardiogram was performed in October 2021.  Significant left ventricular systolic dysfunction with an ejection fraction of 30 to 35% was noted.  Aneurysmal dilatation of the ascending aorta was present.  The aorta measured 4.6 cm.    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     This includes beta-blockers, midodrine, antiplatelet therapy, and anticoagulant therapy.  Request CT scan from [facility]  Follow-up in 6 months time    Objective:    Vitals:  Vitals:    12/10/21 1134   BP: 137/61   BP Location: Left arm   Patient Position: Sitting   Cuff Size: Adult   Pulse: 84   SpO2: 94%   Weight: 84.8 kg (187 lb)   Height: 185.4 cm (73\")       Physical Exam:    General:          Alert, cooperative, no distress, appears stated age  Head:              Normocephalic, atraumatic, mucous membranes moist  Eyes:               Conjunctiva/corneas clear, EOM's intact     Neck:              Supple,  no bruit  Lungs:            Clear to auscultation bilaterally, no wheezes rhonchi rales are noted  Chest wall:      Reproducible chest discomfort on the right pectoral area  Heart::             Regular rate and rhythm, S1 and S2 normal, 1/6 holosystolic murmur.  No rub or gallop  Abdomen:      Soft, non-tender, nondistended bowel sounds active  Extremities:    No cyanosis, clubbing, or edema.  Ambulates with a cane.  Significant varus deformity of lower extremities.  Pulses:           2+ and symmetric all extremities  Skin:                No rashes or lesions  Neuro/psych: A&O x3. CN II through XII are grossly intact with appropriate affect      Allergies:  Allergies   Allergen Reactions   • Lovastatin Unknown (See Comments) and Myalgia     unknown   • Pravastatin Unknown (See Comments) and Myalgia     unknown   • Simvastatin Unknown (See Comments) and Myalgia     unknown   • Spironolactone Other (See Comments)     Gynecomastia        Medication Review:     Current Outpatient " Medications:   •  apixaban (ELIQUIS) 5 MG tablet tablet, Take 1 tablet by mouth 2 (Two) Times a Day. (Patient taking differently: Take 5 mg by mouth 2 (Two) Times a Day. Pt taking .05 tab), Disp: 60 tablet, Rfl: 3  •  aspirin 81 MG EC tablet, Take 81 mg by mouth Daily., Disp: , Rfl:   •  Cholecalciferol (VITAMIN D-400) 400 units tablet, Take 2 tablets daily, Disp: , Rfl:   •  cyanocobalamin 1000 MCG/ML injection, Inject 1,000 mcg into the appropriate muscle as directed by prescriber Every 14 (Fourteen) Days., Disp: , Rfl:   •  ezetimibe (ZETIA) 10 MG tablet, Take 10 mg by mouth Daily., Disp: , Rfl:   •  Ferrous Sulfate (IRON PO), Take  by mouth., Disp: , Rfl:   •  gabapentin (NEURONTIN) 400 MG capsule, Take 400 mg by mouth 3 (Three) Times a Day., Disp: , Rfl:   •  lidocaine (LIDODERM) 5 %, Place 1 patch on the skin as directed by provider Daily. Remove & Discard patch within 12 hours or as directed by MD, Disp: , Rfl:   •  methocarbamol (ROBAXIN) 500 MG tablet, Take 500 mg by mouth 3 (Three) Times a Day As Needed for Muscle Spasms., Disp: , Rfl:   •  metoprolol succinate XL (TOPROL-XL) 50 MG 24 hr tablet, Take 50 mg by mouth Daily., Disp: , Rfl:   •  midodrine (PROAMATINE) 10 MG tablet, Take 1 tablet by mouth 3 (Three) Times a Day Before Meals., Disp: 21 tablet, Rfl: 0  •  nitroglycerin (NITROSTAT) 0.4 MG SL tablet, Place 0.4 mg under the tongue Every 5 (Five) Minutes As Needed., Disp: , Rfl:   •  Omega-3 Fatty Acids (fish oil) 1000 MG capsule capsule, Take 2,000 mg by mouth 2 (Two) Times a Day With Meals., Disp: , Rfl:   •  pantoprazole (PROTONIX) 40 MG EC tablet, Take 40 mg by mouth Daily., Disp: , Rfl:   •  sucralfate (CARAFATE) 1 g tablet, Take 1 g by mouth 4 (Four) Times a Day., Disp: , Rfl:     Family History:  Family History   Problem Relation Age of Onset   • Pancreatic cancer Mother    • Heart disease Father        Past Medical History:  Past Medical History:   Diagnosis Date   • Aneurysm (HCC)    •  Cardiomyopathy (HCC)     ICD implantation   • Cellulitis of left lower extremity     recurrent   • CHD (coronary heart disease)     S/P CABG & PCI   • Dyslipidemia    • History of ventricular tachycardia    • Hypertension    • Myocardial infarction (HCC)     Inferior MI   • Pinched nerve     L4-L5   • Prostate cancer (HCC)        Past surgical History:  Past Surgical History:   Procedure Laterality Date   • ANGIOPLASTY  1996 Stent: Palmaz- Huy stent/LAD 1996-RCA: -Tetra stent Guidant to proximal RCA, mid to distal artery 2 sequential Guidant Tetra stents   • APPENDECTOMY     • CARDIAC ABLATION  April and May 2019   • CARDIAC CATHETERIZATION  2017    1996 x2, Nov. , 2010-cath 2015-no stents    • CARDIAC DEFIBRILLATOR PLACEMENT     • COLONOSCOPY W/ POLYPECTOMY      Mult colonoscopy's for polyp resection    • CORONARY ARTERY BYPASS GRAFT  05/2010    x5 vessel: LMA to diagonal, LAD, intermedius, obtuse marginal, RCA   • CORONARY STENT PLACEMENT     • ENDOSCOPY N/A 2019    Procedure: ESOPHAGOGASTRODUODENOSCOPY with dilitation Bougie 50, 54;  Surgeon: Roddy Coronel MD;  Location: [facility] ENDOSCOPY;  Service: Gastroenterology   • INSERT / REPLACE / REMOVE PACEMAKER     • KNEE OPEN LATERAL RELEASE Left     reduction   • OTHER SURGICAL HISTORY  2018    ICD implantation   • PROSTATE SURGERY  2015    Robiotic surgery- Cyber Knife:       Social History:  Social History     Socioeconomic History   • Marital status:    Tobacco Use   • Smoking status: Former Smoker     Packs/day: 1.00     Years: 17.00     Pack years: 17.00     Types: Cigarettes     Quit date: 2002     Years since quittin.4   • Smokeless tobacco: Never Used   Vaping Use   • Vaping Use: Never used   Substance and Sexual Activity   • 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 The pertinent cardiovascular findings are reported above with all other cardiovascular points within those systems being negative.    Diagnostic Study Review:     Current Electrocardiogram:  Procedures no new EKG.  EKG dated 12/3/2021 demonstrates sinus rhythm with a ventricular rate of 87 bpm.      NOTE: The following portions of the patient's note were reviewed, confirmed and/or updated this visit as appropriate: History of present illness/Interval history, physical examination, assessment & plan, allergies, current medications, past family history, past medical history, past social history, past surgical history and problem list.

## 2021-12-11 PROBLEM — I71.21 ASCENDING AORTIC ANEURYSM: Status: ACTIVE | Noted: 2021-12-11

## 2021-12-13 ENCOUNTER — HOSPITAL ENCOUNTER (OUTPATIENT)
Dept: ULTRASOUND IMAGING | Facility: HOSPITAL | Age: 81
Discharge: HOME | End: 2021-12-13
Admitting: INTERNAL MEDICINE

## 2021-12-13 DIAGNOSIS — E05.90 HYPERTHYROIDISM: Primary | ICD-10-CM

## 2021-12-13 DIAGNOSIS — E05.90 HYPERTHYROIDISM: ICD-10-CM

## 2021-12-13 PROCEDURE — 76536 US EXAM OF HEAD AND NECK: CPT

## 2022-03-01 PROCEDURE — 93296 REM INTERROG EVL PM/IDS: CPT | Performed by: INTERNAL MEDICINE

## 2022-03-01 PROCEDURE — 93295 DEV INTERROG REMOTE 1/2/MLT: CPT | Performed by: INTERNAL MEDICINE

## 2022-03-03 ENCOUNTER — OFFICE VISIT (OUTPATIENT)
Dept: ENDOCRINOLOGY | Facility: CLINIC | Age: 82
End: 2022-03-03

## 2022-03-03 ENCOUNTER — LAB (OUTPATIENT)
Dept: LAB | Facility: HOSPITAL | Age: 82
End: 2022-03-03

## 2022-03-03 VITALS
OXYGEN SATURATION: 93 % | HEART RATE: 76 BPM | BODY MASS INDEX: 26.37 KG/M2 | HEIGHT: 73 IN | WEIGHT: 199 LBS | DIASTOLIC BLOOD PRESSURE: 75 MMHG | SYSTOLIC BLOOD PRESSURE: 132 MMHG | TEMPERATURE: 98.9 F

## 2022-03-03 DIAGNOSIS — E04.1 THYROID NODULE: ICD-10-CM

## 2022-03-03 DIAGNOSIS — E05.90 HYPERTHYROIDISM: Primary | ICD-10-CM

## 2022-03-03 DIAGNOSIS — E05.90 HYPERTHYROIDISM: ICD-10-CM

## 2022-03-03 LAB
T4 FREE SERPL-MCNC: 0.84 NG/DL (ref 0.93–1.7)
TSH SERPL DL<=0.05 MIU/L-ACNC: 5.02 UIU/ML (ref 0.27–4.2)

## 2022-03-03 PROCEDURE — 84443 ASSAY THYROID STIM HORMONE: CPT

## 2022-03-03 PROCEDURE — 36415 COLL VENOUS BLD VENIPUNCTURE: CPT

## 2022-03-03 PROCEDURE — 84439 ASSAY OF FREE THYROXINE: CPT

## 2022-03-03 PROCEDURE — 99213 OFFICE O/P EST LOW 20 MIN: CPT | Performed by: INTERNAL MEDICINE

## 2022-03-03 NOTE — PROGRESS NOTES
Endocrine Progress Note Outpatient     Patient Care Team:  Makenna Motta MD as PCP - General  Makenna Motta MD as PCP - Family Medicine    Chief Complaint: Follow-up hyperthyroidism          HPI: This is a 81-year-old male with A. fib initially seen here for undetectable TSH and high free T4.  He was on amiodarone which was stopped in 2021.  He since has gained about 15 to 16 pounds, he feels well.  Eyes any tremor, sweating or palpitations.  Is not taking any thyroid medications at this time.  His work-up reviewed from December showed normal IL-6, normal TSI and TPO antibodies and free T4 was normal with free T3 was normal and TSH was still undetectable.    Past Medical History:   Diagnosis Date   • Aneurysm (HCC)    • Cardiomyopathy (HCC)     ICD implantation   • Cellulitis of left lower extremity     recurrent   • CHD (coronary heart disease)     S/P CABG & PCI   • Dyslipidemia    • History of ventricular tachycardia    • Hypertension    • Myocardial infarction (HCC)     Inferior MI   • Pinched nerve     L4-L5   • Prostate cancer (HCC)        Social History     Socioeconomic History   • Marital status:    Tobacco Use   • Smoking status: Former Smoker     Packs/day: 1.00     Years: 17.00     Pack years: 17.00     Types: Cigarettes     Quit date: 2002     Years since quittin.6   • Smokeless tobacco: Never Used   Vaping Use   • Vaping Use: Never used   Substance and Sexual Activity   • 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    Eyes:  Denies change in visual acuity   HENT:  Denies nasal congestion or sore throat   Respiratory: denies cough, shortness of breath.   Cardiovascular:  denies chest pain, edema   GI:  Denies abdominal pain, nausea, vomiting.   Musculoskeletal:  Denies back pain or joint pain   Integument:  Denies dry skin and rash   Neurologic:  Denies headache, focal weakness or sensory changes   Endocrine:  Denies polyuria or polydipsia   Psychiatric:  Denies depression or anxiety      Vitals:    03/03/22 1054   BP: 132/75   Pulse: 76   Temp: 98.9 °F (37.2 °C)   SpO2: 93%     Body mass index is 26.25 kg/m².     Physical Exam:  GEN: NAD, conversant  EYES: EOMI, PERRL, no conjunctival erythema  NECK: no thyromegaly, full ROM   CV: RRR, no murmurs/rubs/gallops, no peripheral edema  LUNG: CTAB, no wheezes/rales/ronchi  SKIN: no rashes, no acanthosis  MSK: no deformities, full ROM of all extremities  NEURO: no tremors, DTR normal  PSYCH: AOX3, appropriate mood, affect normal      Results Review:     I reviewed the patient's new clinical results.      Lab Results   Component Value Date    GLUCOSE 114 (H) 12/01/2021    BUN 17 12/01/2021    CREATININE 0.75 (L) 12/01/2021    EGFRIFNONA 100 12/01/2021    BCR 22.7 12/01/2021    K 4.2 12/01/2021    CO2 25.5 12/01/2021    CALCIUM 9.7 12/01/2021    ALBUMIN 4.00 12/01/2021    LABIL2 1.1 06/04/2019    AST 21 12/01/2021    ALT 21 12/01/2021    CHOL 113 05/13/2021    TRIG 59 05/13/2021    LDL 58 05/13/2021    HDL 42 05/13/2021     Lab Results   Component Value Date    TSH <0.005 (L) 12/01/2021    FREET4 1.23 12/01/2021    THYROIDAB 10 12/01/2021     US Thyroid (12/13/2021 15:09)   T4, Free (12/01/2021 13:46)   T3, Free (12/01/2021 13:46)   Thyroid Stimulating Immunoglobulin (12/01/2021 13:46)   Thyroid Peroxidase Antibody (12/01/2021 13:46)   TSH (12/01/2021 13:46)   Sedimentation Rate (12/01/2021 13:46)   Interleukin-6  (12/01/2021 13:46)   T4, Free (06/05/2019 03:10)   TSH (06/05/2019 03:10)       Medication Review: Reviewed.       Current Outpatient Medications:   •  apixaban (ELIQUIS) 5 MG tablet tablet, Take 1 tablet by mouth 2 (Two) Times a Day. (Patient taking differently: Take 5 mg by mouth 2 (Two) Times a Day. Pt taking .05 tab), Disp: 60 tablet, Rfl: 3  •  aspirin 81 MG EC tablet, Take 81 mg by mouth Daily., Disp: , Rfl:   •  Cholecalciferol (VITAMIN D-400) 400 units tablet, Take 2 tablets daily, Disp: , Rfl:   •  cyanocobalamin 1000 MCG/ML injection, Inject 1,000 mcg into the appropriate muscle as directed by prescriber Every 14 (Fourteen) Days., Disp: , Rfl:   •  ezetimibe (ZETIA) 10 MG tablet, Take 10 mg by mouth Daily., Disp: , Rfl:   •  Ferrous Sulfate (IRON PO), Take  by mouth., Disp: , Rfl:   •  gabapentin (NEURONTIN) 400 MG capsule, Take 400 mg by mouth 3 (Three) Times a Day., Disp: , Rfl:   •  lidocaine (LIDODERM) 5 %, Place 1 patch on the skin as directed by provider Daily. Remove & Discard patch within 12 hours or as directed by MD, Disp: , Rfl:   •  methocarbamol (ROBAXIN) 500 MG tablet, Take 500 mg by mouth 3 (Three) Times a Day As Needed for Muscle Spasms., Disp: , Rfl:   •  metoprolol succinate XL (TOPROL-XL) 50 MG 24 hr tablet, Take 50 mg by mouth Daily., Disp: , Rfl:   •  midodrine (PROAMATINE) 10 MG tablet, Take 1 tablet by mouth 3 (Three) Times a Day Before Meals., Disp: 21 tablet, Rfl: 0  •  nitroglycerin (NITROSTAT) 0.4 MG SL tablet, Place 0.4 mg under the tongue Every 5 (Five) Minutes As Needed., Disp: , Rfl:   •  Omega-3 Fatty Acids (fish oil) 1000 MG capsule capsule, Take 2,000 mg by mouth 2 (Two) Times a Day With Meals., Disp: , Rfl:   •  pantoprazole (PROTONIX) 40 MG EC tablet, Take 40 mg by mouth Daily., Disp: , Rfl:   •  sucralfate (CARAFATE) 1 g tablet, Take 1 g by mouth 4 (Four) Times a Day., Disp: , Rfl:       Assessment/Plan   Hyperthyroidism: Most likely due to amiodarone, he is off  amiodarone since at least October 2021.  Clinically doing well, gaining weight.  I will recheck TSH with free T4 today.  Further recommendations will be based upon the labs.    Thyroid nodule: Has a small 4 mm thyroid nodule.  Nonsuspicious.  No further intervention.            Sam Modi MD FACE.

## 2022-03-04 DIAGNOSIS — E05.90 HYPERTHYROIDISM: Primary | ICD-10-CM

## 2022-03-28 ENCOUNTER — OFFICE VISIT (OUTPATIENT)
Dept: CARDIOLOGY | Facility: CLINIC | Age: 82
End: 2022-03-28

## 2022-03-28 VITALS
HEIGHT: 73 IN | DIASTOLIC BLOOD PRESSURE: 71 MMHG | SYSTOLIC BLOOD PRESSURE: 138 MMHG | OXYGEN SATURATION: 95 % | HEART RATE: 74 BPM | BODY MASS INDEX: 25.31 KG/M2 | WEIGHT: 191 LBS

## 2022-03-28 DIAGNOSIS — I25.5 ISCHEMIC CARDIOMYOPATHY: Primary | ICD-10-CM

## 2022-03-28 DIAGNOSIS — I95.1 ORTHOSTATIC HYPOTENSION: ICD-10-CM

## 2022-03-28 DIAGNOSIS — I47.29 PAROXYSMAL VENTRICULAR TACHYCARDIA: ICD-10-CM

## 2022-03-28 DIAGNOSIS — I25.810 CORONARY ARTERY DISEASE INVOLVING CORONARY BYPASS GRAFT OF NATIVE HEART WITHOUT ANGINA PECTORIS: ICD-10-CM

## 2022-03-28 DIAGNOSIS — Z95.810 PRESENCE OF AUTOMATIC IMPLANTABLE CARDIOVERTER-DEFIBRILLATOR: ICD-10-CM

## 2022-03-28 PROCEDURE — 99214 OFFICE O/P EST MOD 30 MIN: CPT | Performed by: INTERNAL MEDICINE

## 2022-03-28 PROCEDURE — 93000 ELECTROCARDIOGRAM COMPLETE: CPT | Performed by: INTERNAL MEDICINE

## 2022-03-28 NOTE — PROGRESS NOTES
CC--Recurrent VT, ischemic cardiomyopathy    Sub--Pt here for follow up and he had prior history of incessant VT needing and ablation several months ago .  patient started having recurrent VT needing a redo VT ablation  and post ablation a week later developed dysphagia and needed endoscopy the patient underwent esophageal stricture dilatation with improvement of symptoms and resolution of dysphagia .Patient is on amiodarone because of extensive scarring and multiple episodes of VT and since ablation without recurrent ICD therapy--patient has history of chronic ischemic heart disease previous myocardial infarction, s/p previous CABG,  LV systolic dysfunction with   Last  LV ejection fraction of 25 30% which came up to 30- 35% by latest echocardiogram.   He is  status post ICD implant.  denies any chest pain or shortness of breath or syncope--patient had prior ACE inhibitor induced cough and currently is on Aldactone .Patient denies any new symptoms of VT or syncope and is compliant with current medical regimen  He also developed some right shoulder pain [facility] and uses Biofreeze for relief of symptoms   He denies any palpitations or syncope or any chest pain or dyspnea.  Patient could not tolerate additional beta-blockers on top of amiodarone in the past  Patient has noticed photosensitivity of his skin in the past and amiodarone dose reduced to 100 mg a day and his symptoms have reduced and comes in for follow-up   Patient has noticed some burning sensation in lower extremities and was seen at [facility] on last clinic prescription for gabapentin and he also complains of some calf pain when he is ambulating with some blocks gets better after increased activity and it is not nocturnal in nature--arterial duplex scan without any significant abnormalities  He also has history of orthostatic hypotension started on midodrine and complains of intermittent headache  Patient is doing  clinically well from cardiac standpoint of view  Complains of recurrent redness in the left lower extremity with tenderness and warmth around the ankle joint and is being treated with antibiotics and has been to [place] [facility] twice--is being evaluated by vascular surgery and infectious disease specialist and he had his symptoms since 2010 with increasing frequency recently.    Since last seen patient had ICD shock therapy for conducted atrial fibrillation and was brought in back for evaluation  Atrial fibrillation was not seen with prior interrogation of his device and ICD was reprogrammed on recommendation to avoid unnecessary therapy for conducted atrial fibrillation  Patient complains of diffuse subcutaneous bleeding with dual antiplatelet agents and also complains of weight loss and is being [facility]  Patient was noted to have markedly abnormal thyroid function test consistent with hyperthyroidism and immediately amiodarone was stopped and started on beta-blockers and Eliquis an appointment was made with endocrinologist and comes in for follow-up        Past Medical History:     Reviewed history from 03/13/2018 and no changes required:        Coronary Heart Disease:S/P CABG and PCI         Hypertension        Myocardial Infarction: Inferior MI         Hx: Cellulitis of left lower leg        Dyslipidemia         Pinched nerve L4-L5         Prostate cancer         Cardiomyopathy : ICD implantation         Physical Exam    General:      well developed, well nourished, in no acute distress.    Head:      normocephalic and atraumatic.    Eyes:      PERRL/EOM intact, conjunctiva and sclera clear with out nystagmus.    Neck:      no masses, thyromegaly, trachea central with normal respiratory effort  Lungs:      clear bilaterally to auscultation.    Heart:      regular rate and rhythm, S1, S2 without murmurs, rubs, or gallops         assessment plan  Inappropriate ICD therapy for conducted atrial  fibrillation despite amiodarone--patient is hyperthyroid --repeat TSH is less 0.005, normal T4 normal T3.  Normal renal panel.  Patient currently off amiodarone and started on beta-blockers and no recurrent A. fib in the last few weeks.  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  Unable to tolerate 5 mg of Eliquis which has been reduced to 2.5 mg twice daily and patient was also educated this was temporary since Eliquis can be stopped after patient becomes euthyroid.  Orthostatic hypotension--stable on midodrine  History of  VT storm status post VT re do ablation without any   recurrent VT   Dysphagia status post esophageal stricture dilatation with resolution of symptoms   single-chamber ICD in situ with VDD lead    coronary artery disease--stable without angina   ischemic cardiomyopathy  Recent vascular screening revealing normal arterial flow in the lower extremities   Medications reviewed  TSH is 5.04  Stop eliquis    Follow up in 3 months        ECG 12 Lead    Date/Time: 3/28/2022 11:51 AM  Performed by: Constantino Arvizu MD  Authorized by: Constantino Arvizu MD   Comparison: compared with previous ECG   Similar to previous ECG  Rhythm: sinus rhythm  Rate: normal  Other findings: left ventricular hypertrophy          Electronically signed by Constantino Arvizu MD, 03/28/22, 11:50 AM EDT.

## 2022-07-01 ENCOUNTER — OFFICE VISIT (OUTPATIENT)
Dept: CARDIOLOGY | Facility: CLINIC | Age: 82
End: 2022-07-01

## 2022-07-01 VITALS
HEIGHT: 73 IN | BODY MASS INDEX: 25.31 KG/M2 | HEART RATE: 100 BPM | SYSTOLIC BLOOD PRESSURE: 140 MMHG | OXYGEN SATURATION: 93 % | DIASTOLIC BLOOD PRESSURE: 78 MMHG | WEIGHT: 191 LBS

## 2022-07-01 DIAGNOSIS — I25.5 ISCHEMIC CARDIOMYOPATHY: Primary | ICD-10-CM

## 2022-07-01 DIAGNOSIS — I25.810 CORONARY ARTERY DISEASE INVOLVING CORONARY BYPASS GRAFT OF NATIVE HEART WITHOUT ANGINA PECTORIS: ICD-10-CM

## 2022-07-01 DIAGNOSIS — E78.2 HYPERLIPIDEMIA, MIXED: ICD-10-CM

## 2022-07-01 DIAGNOSIS — I95.1 ORTHOSTATIC HYPOTENSION: ICD-10-CM

## 2022-07-01 DIAGNOSIS — I47.29 PAROXYSMAL VENTRICULAR TACHYCARDIA: ICD-10-CM

## 2022-07-01 PROCEDURE — 93000 ELECTROCARDIOGRAM COMPLETE: CPT | Performed by: INTERNAL MEDICINE

## 2022-07-01 PROCEDURE — 99214 OFFICE O/P EST MOD 30 MIN: CPT | Performed by: INTERNAL MEDICINE

## 2022-07-01 NOTE — PROGRESS NOTES
CC--Recurrent VT, ischemic cardiomyopathy    Sub--Pt here for follow up and he had prior history of incessant VT needing and ablation several months ago .  patient started having recurrent VT needing a redo VT ablation  and post ablation a week later developed dysphagia and needed endoscopy the patient underwent esophageal stricture dilatation with improvement of symptoms and resolution of dysphagia .Patient is on amiodarone because of extensive scarring and multiple episodes of VT and since ablation without recurrent ICD therapy--patient has history of chronic ischemic heart disease previous myocardial infarction, s/p previous CABG,  LV systolic dysfunction with   Last  LV ejection fraction of 25 30% which came up to 30- 35% by latest echocardiogram.   He is  status post ICD implant.  denies any chest pain or shortness of breath or syncope--patient had prior ACE inhibitor induced cough and currently is on Aldactone .Patient denies any new symptoms of VT or syncope and is compliant with current medical regimen  He also developed some right shoulder pain being [facility] and uses Biofreeze for relief of symptoms   He denies any palpitations or syncope or any chest pain or dyspnea.  Patient could not tolerate additional beta-blockers on top of amiodarone in the past  Patient has noticed photosensitivity of his skin in the past and amiodarone dose reduced to 100 mg a day and his symptoms have reduced and comes in for follow-up   Patient has noticed some burning sensation in lower extremities and was seen at [facility] on last clinic prescription for gabapentin and he also complains of some calf pain when he is ambulating with some blocks gets better after increased activity and it is not nocturnal in nature--arterial duplex scan without any significant abnormalities  He also has history of orthostatic hypotension started on midodrine and complains of intermittent headache  Patient is doing  clinically well from cardiac standpoint of view  Complains of recurrent redness in the left lower extremity with tenderness and warmth around the ankle joint and is being treated with antibiotics and has been to [place] [facility] twice--is being evaluated by vascular surgery and infectious disease specialist and he had his symptoms since 2010 with increasing frequency recently.    Since last seen patient had ICD shock therapy for conducted atrial fibrillation and was brought in back for evaluation  Atrial fibrillation was not seen with prior interrogation of his device and ICD was reprogrammed on recommendation to avoid unnecessary therapy for conducted atrial fibrillation  Patient complains of diffuse subcutaneous bleeding with dual antiplatelet agents and also complains of weight loss and is being [facility]  Patient was noted to have markedly abnormal thyroid function test consistent with hyperthyroidism and immediately amiodarone was stopped and started on beta-blockers and off Eliquis         Past Medical History:     Reviewed history from 03/13/2018 and no changes required:        Coronary Heart Disease:S/P CABG and PCI         Hypertension        Myocardial Infarction: Inferior MI         Hx: Cellulitis of left lower leg        Dyslipidemia         Pinched nerve L4-L5         Prostate cancer         Cardiomyopathy : ICD implantation         Physical Exam    General:      well developed, well nourished, in no acute distress.    Head:      normocephalic and atraumatic.    Eyes:      PERRL/EOM intact, conjunctiva and sclera clear with out nystagmus.    Neck:      no masses, thyromegaly, trachea central with normal respiratory effort  Lungs:      clear bilaterally to auscultation.    Heart:      regular rate and rhythm, S1, S2 without murmurs, rubs, or gallops         assessment plan  Inappropriate ICD therapy for conducted atrial fibrillation despite amiodarone--patient is hyperthyroid --repeat TSH is  less 0.005, normal T4 normal T3.  Normal renal panel.  Patient currently off amiodarone and started on beta-blockers and no recurrent A. fib in the last few months.  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  Orthostatic hypotension--stable on midodrine  History of  VT storm status post VT re do ablation without any   recurrent VT   Dysphagia status post esophageal stricture dilatation with resolution of symptoms   single-chamber ICD in situ with VDD lead    coronary artery disease--stable without angina   ischemic cardiomyopathy  Recent vascular screening revealing normal arterial flow in the lower extremities   Medications reviewed  TSH is 5.04  off eliquis    Follow up in 3 months  Increased skin bruising--check cbc      ECG 12 Lead    Date/Time: 7/1/2022 11:55 AM  Performed by: Constantino Arvizu MD  Authorized by: Constantino Arvizu MD   Comparison: compared with previous ECG   Similar to previous ECG  Rhythm: sinus rhythm  Ectopy: atrial premature contractions  Conduction: conduction normal            Electronically signed by Constantino Arvizu MD, 07/01/22, 11:55 AM EDT.

## 2022-07-04 NOTE — PROGRESS NOTES
Cardiology Office Visit      Encounter Date:  07/05/2022    Patient ID:   Deion Nicholas is a 81 y.o. male.    Reason For Followup:  Ischemic cardiomyopathy  Coronary artery disease  Hypertension  Hypertensive cardiovascular disease  Amiodarone therapy  Antiplatelet therapy  Hyperlipidemia    Brief Clinical History:  Dear Makenna Jimenez MD    I had the pleasure of seeing Deion Nicholas today. As you are well aware, this is a 81 y.o. male with an established history of ischemic heart disease.  He has undergone coronary arterial bypass grafting in the past.  He is additional history that includes ischemic cardiomyopathy with most recent ejection fraction of 30 to 35%, ICD implantation, hypertension, hypertensive cardiovascular disease, amiodarone therapy, antiplatelet therapy, and hyperlipidemia.  He presents today for follow-up on the above conditions.     Interval History:  He denies any chest pain pressure heaviness or tightness.  He denies any shortness of breath out of character.  He denies any PND orthopnea.  He denies any syncope or near syncope.  He continues to report unsteady gait.    He does report that he had some palpitations over the weekend.  His device logs were reviewed by the device rep and no evidence of tacky or bradycardia arrhythmias were identified.  No therapy was delivered.  He is under a lot of stress.  His ex-wife is dying from a chronic illness and he is helping with this.    He also states that he had a follow-up CT scan performed in December 2021 that demonstrated an aneurysm of his ascending aorta.  The report reveals 4.2 cm which is stable from previous studies.    His most recent echocardiogram was performed in October 2021.  Significant left ventricular systolic dysfunction with an ejection fraction of 30 to 35% was noted.  Aneurysmal dilatation of the ascending aorta was present.  The aorta measured 4.6 cm.    Assessment & Plan     Impressions:  Ischemic cardiomyopathy  "most recent ejection fraction 30 to 35%  Coronary artery disease  Hypotension with an orthostatic component  Hypertensive cardiovascular disease  Amiodarone therapy  Antiplatelet therapy  Hyperlipidemia  AICD in situ  History of VT storm status post radiofrequency ablation  Gynecomastia secondary to spironolactone  Probable ascending aortic aneurysm     Recommendations:  Continuation of his current cardiovascular regimen at the present time.     This includes beta-blockers, midodrine, antiplatelet therapy, and anticoagulant therapy.  Follow-up in 6 months time    Diagnoses and all orders for this visit:    1. Ascending aortic aneurysm (HCC) (Primary)    2. Coronary artery disease involving coronary bypass graft of native heart without angina pectoris    3. Essential hypertension    4. Hyperlipidemia, mixed    5. Ischemic cardiomyopathy    6. Presence of automatic implantable cardioverter-defibrillator          Objective:    Vitals:  Vitals:    07/05/22 1323   BP: 132/72   Pulse: 86   SpO2: 94%   Weight: 86.6 kg (191 lb)   Height: 185.4 cm (73\")     Body mass index is 25.2 kg/m².      Physical Exam:    General: Alert, cooperative, no distress, appears stated age.  Ambulates with 2 canes.  Head:  Normocephalic, atraumatic, mucous membranes moist  Eyes:  Conjunctiva/corneas clear, EOM's intact     Neck:  Supple,  no bruit    Lungs: Clear to auscultation bilaterally, no wheezes rhonchi rales are noted  Chest wall: No tenderness  Heart::  Regular rate and rhythm, S1 and S2 normal, 1/6 holosystolic murmur.  No rub or gallop  Abdomen: Soft, non-tender, nondistended bowel sounds active  Extremities: No cyanosis, clubbing, or edema  Pulses: Diminished pedal pulses  Skin:  No rashes or lesions  Neuro/psych: A&O x3. CN II through XII are grossly intact with appropriate affect      Allergies:  Allergies   Allergen Reactions   • Lovastatin Myalgia   • Pravastatin Myalgia and Unknown (See Comments)     unknown   • Simvastatin " Unknown (See Comments) and Myalgia     unknown   • Spironolactone Other (See Comments)     Gynecomastia        Medication Review:     Current Outpatient Medications:   •  aspirin 81 MG EC tablet, Take 81 mg by mouth Daily., Disp: , Rfl:   •  Cholecalciferol 10 MCG (400 UNIT) tablet, Take 2 tablets daily, Disp: , Rfl:   •  cyanocobalamin 1000 MCG/ML injection, Inject 1,000 mcg into the appropriate muscle as directed by prescriber Every 14 (Fourteen) Days., Disp: , Rfl:   •  ezetimibe (ZETIA) 10 MG tablet, Take 10 mg by mouth Daily., Disp: , Rfl:   •  Ferrous Sulfate (IRON PO), Take  by mouth., Disp: , Rfl:   •  gabapentin (NEURONTIN) 400 MG capsule, Take 400 mg by mouth 4 (Four) Times a Day., Disp: , Rfl:   •  lidocaine (LIDODERM) 5 %, Place 1 patch on the skin as directed by provider Daily. Remove & Discard patch within 12 hours or as directed by MD, Disp: , Rfl:   •  methocarbamol (ROBAXIN) 500 MG tablet, Take 500 mg by mouth 3 (Three) Times a Day As Needed for Muscle Spasms., Disp: , Rfl:   •  metoprolol succinate XL (TOPROL-XL) 50 MG 24 hr tablet, Take 50 mg by mouth Daily., Disp: , Rfl:   •  midodrine (PROAMATINE) 10 MG tablet, Take 1 tablet by mouth 3 (Three) Times a Day Before Meals., Disp: 21 tablet, Rfl: 0  •  nitroglycerin (NITROSTAT) 0.4 MG SL tablet, Place 0.4 mg under the tongue Every 5 (Five) Minutes As Needed., Disp: , Rfl:   •  Omega-3 Fatty Acids (fish oil) 1000 MG capsule capsule, Take 2,000 mg by mouth 2 (Two) Times a Day With Meals., Disp: , Rfl:   •  pantoprazole (PROTONIX) 40 MG EC tablet, Take 40 mg by mouth Daily., Disp: , Rfl:   •  sucralfate (CARAFATE) 1 g tablet, Take 1 g by mouth 4 (Four) Times a Day., Disp: , Rfl:     Family History:  Family History   Problem Relation Age of Onset   • Pancreatic cancer Mother    • Heart disease Father        Past Medical History:  Past Medical History:   Diagnosis Date   • Aneurysm (HCC)    • Cardiomyopathy (HCC)     ICD implantation   • Cellulitis of  left lower extremity     recurrent   • CHD (coronary heart disease)     S/P CABG & PCI   • Dyslipidemia    • History of ventricular tachycardia    • Hypertension    • Myocardial infarction (HCC)     Inferior MI   • Pinched nerve     L4-L5   • Prostate cancer (HCC)        Past Surgical History:  Past Surgical History:   Procedure Laterality Date   • ANGIOPLASTY  1996 Stent: Palmaz- Huy stent/LAD 1996-RCA: -Tetra stent Guidant to proximal RCA, mid to distal artery 2 sequential Guidant Tetra stents   • APPENDECTOMY     • CARDIAC ABLATION  April and May 2019   • CARDIAC CATHETERIZATION  2017    1996 x2, Nov. , 2010-cath 2015-no stents    • CARDIAC DEFIBRILLATOR PLACEMENT     • COLONOSCOPY W/ POLYPECTOMY      Mult colonoscopy's for polyp resection    • CORONARY ARTERY BYPASS GRAFT  05/2010    x5 vessel: LMA to diagonal, LAD, intermedius, obtuse marginal, RCA   • CORONARY STENT PLACEMENT     • ENDOSCOPY N/A 2019    Procedure: ESOPHAGOGASTRODUODENOSCOPY with dilitation Bougie 50, 54;  Surgeon: Roddy Coronel MD;  Location: [facility] ENDOSCOPY;  Service: Gastroenterology   • INSERT / REPLACE / REMOVE PACEMAKER     • KNEE OPEN LATERAL RELEASE Left     reduction   • OTHER SURGICAL HISTORY  2018    ICD implantation   • PROSTATE SURGERY      Robiotic surgery- Cyber Knife:       Social History:  Social History     Socioeconomic History   • Marital status:    Tobacco Use   • Smoking status: Former Smoker     Packs/day: 1.00     Years: 17.00     Pack years: 17.00     Types: Cigarettes     Quit date: 2002     Years since quittin.0   • Smokeless tobacco: Never Used   Vaping Use   • Vaping Use: Never used   Substance and Sexual Activity   • 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 The pertinent cardiovascular findings are reported above with all other cardiovascular points within those systems being negative.    Diagnostic Study Review:     Current Electrocardiogram:  Procedures no new EKG.  EKG dated 1 July 2022 demonstrates sinus rhythm with PACs.  Nonspecific interventricular conduction delay.  Consider left ventricular hypertrophy.    Laboratory Data:  Lab Results   Component Value Date    GLUCOSE 114 (H) 12/01/2021    BUN 17 12/01/2021    CREATININE 0.75 (L) 12/01/2021    EGFRIFNONA 100 12/01/2021    BCR 22.7 12/01/2021    K 4.2 12/01/2021    CO2 25.5 12/01/2021    CALCIUM 9.7 12/01/2021    ALBUMIN 4.00 12/01/2021    LABIL2 1.1 06/04/2019    AST 21 12/01/2021    ALT 21 12/01/2021     Lab Results   Component Value Date    GLUCOSE 114 (H) 12/01/2021    CALCIUM 9.7 12/01/2021     12/01/2021    K 4.2 12/01/2021    CO2 25.5 12/01/2021     12/01/2021    BUN 17 12/01/2021    CREATININE 0.75 (L) 12/01/2021    EGFRIFNONA 100 12/01/2021    BCR 22.7 12/01/2021    ANIONGAP 8.5 12/01/2021     Lab Results   Component Value Date    WBC 7.38 12/01/2021    HGB 12.6 (L) 12/01/2021    HCT 36.7 (L) 12/01/2021    MCV 87.6 12/01/2021     12/01/2021     Lab Results   Component Value Date    CHOL 113 05/13/2021    TRIG 59 05/13/2021    HDL 42 05/13/2021    LDL 58 05/13/2021     No results found for: HGBA1C  Lab Results   Component Value Date    INR 0.99 05/30/2021    INR 1.02 06/24/2019    INR 1.0 06/04/2019    PROTIME 10.9 05/30/2021    PROTIME 10.5 06/24/2019    PROTIME 10.7 06/04/2019       Most Recent Echo:  Results for orders placed during the hospital encounter of 10/22/21    Adult Transthoracic Echo Complete W/ Cont if Necessary Per Protocol    Interpretation Summary  · Estimated left ventricular EF was in  agreement with the calculated left ventricular EF. Left ventricular ejection fraction appears to be 31 - 35%. Left ventricular systolic function is severely decreased.  · The left ventricular cavity is severely dilated.  · The right ventricular cavity is mildly dilated.  · Estimated right ventricular systolic pressure from tricuspid regurgitation is mildly elevated (35-45 mmHg).  · Aneurysmal dilation of the ascending aorta is present.  · Left ventricular diastolic function is consistent with (grade Ia w/high LAP) impaired relaxation.       Most Recent Stress Test:  Results for orders placed during the hospital encounter of 08/26/19    Stress Test With Myocardial Perfusion One Day    Interpretation Summary  · Myocardial perfusion imaging indicates a large-sized infarct located in the inferior wall and apex with no significant ischemia noted.  · Left ventricular ejection fraction is severely reduced (Calculated EF = 30%).  · Impressions are consistent with a low risk study.  · There is no prior study available for comparison.    Stress portion of this exam was supervised by: Basia Cruz MD       Most Recent Cardiac Catheterization:   No results found for this or any previous visit.       NOTE: The following portions of the patient's note were reviewed, confirmed and/or updated this visit as appropriate: History of present illness/Interval history, physical examination, assessment & plan, allergies, current medications, past family history, past medical history, past social history, past surgical history and problem list.

## 2022-07-05 ENCOUNTER — OFFICE VISIT (OUTPATIENT)
Dept: CARDIOLOGY | Facility: CLINIC | Age: 82
End: 2022-07-05

## 2022-07-05 VITALS
OXYGEN SATURATION: 94 % | HEART RATE: 86 BPM | SYSTOLIC BLOOD PRESSURE: 132 MMHG | WEIGHT: 191 LBS | BODY MASS INDEX: 25.31 KG/M2 | HEIGHT: 73 IN | DIASTOLIC BLOOD PRESSURE: 72 MMHG

## 2022-07-05 DIAGNOSIS — I25.5 ISCHEMIC CARDIOMYOPATHY: Chronic | ICD-10-CM

## 2022-07-05 DIAGNOSIS — I10 ESSENTIAL HYPERTENSION: ICD-10-CM

## 2022-07-05 DIAGNOSIS — I25.810 CORONARY ARTERY DISEASE INVOLVING CORONARY BYPASS GRAFT OF NATIVE HEART WITHOUT ANGINA PECTORIS: Chronic | ICD-10-CM

## 2022-07-05 DIAGNOSIS — Z95.810 PRESENCE OF AUTOMATIC IMPLANTABLE CARDIOVERTER-DEFIBRILLATOR: Chronic | ICD-10-CM

## 2022-07-05 DIAGNOSIS — E78.2 HYPERLIPIDEMIA, MIXED: Chronic | ICD-10-CM

## 2022-07-05 DIAGNOSIS — I71.21 ASCENDING AORTIC ANEURYSM: Primary | ICD-10-CM

## 2022-07-05 PROCEDURE — 99214 OFFICE O/P EST MOD 30 MIN: CPT | Performed by: INTERNAL MEDICINE

## 2022-07-07 ENCOUNTER — TELEPHONE (OUTPATIENT)
Dept: CARDIOLOGY | Facility: CLINIC | Age: 82
End: 2022-07-07

## 2022-07-07 NOTE — TELEPHONE ENCOUNTER
----- Message from Marcin Childers DO sent at 7/6/2022  8:40 AM EDT -----  Can you call the patient and let him know that I spoke with the device rep and he said there was no abnormalities noted on his device.  He could see the activity from all weekend including Monday.

## 2022-08-09 ENCOUNTER — TELEPHONE (OUTPATIENT)
Dept: CARDIOLOGY | Facility: CLINIC | Age: 82
End: 2022-08-09

## 2022-09-01 PROCEDURE — 93295 DEV INTERROG REMOTE 1/2/MLT: CPT | Performed by: INTERNAL MEDICINE

## 2022-09-01 PROCEDURE — 93296 REM INTERROG EVL PM/IDS: CPT | Performed by: INTERNAL MEDICINE

## 2022-09-07 ENCOUNTER — TELEPHONE (OUTPATIENT)
Dept: CARDIOLOGY | Facility: CLINIC | Age: 82
End: 2022-09-07

## 2022-09-07 NOTE — TELEPHONE ENCOUNTER
Caller: Deion Aden    Relationship: Self    Best call back number: [phone]    What is the best time to reach you: ANY    Who are you requesting to speak with (clinical staff, provider,  specific staff member): ANY        What was the call regarding: MR. ADEN WAS CALLING IN TO REPORT THAT HE [facility]ENT[facility] AND WANTED TO MAKE SURE. DR. MUÑOZ KNEW.     Do you require a callback: IF NEEDED

## 2022-09-26 NOTE — TELEPHONE ENCOUNTER
Patient called and stated that he hadn't heard back if he should make a sooner appt. Based on the results of his echo and CT of the chest.

## 2022-10-20 ENCOUNTER — OFFICE VISIT (OUTPATIENT)
Dept: CARDIOLOGY | Facility: CLINIC | Age: 82
End: 2022-10-20

## 2022-10-20 VITALS
DIASTOLIC BLOOD PRESSURE: 88 MMHG | HEIGHT: 73 IN | BODY MASS INDEX: 26.11 KG/M2 | WEIGHT: 197 LBS | SYSTOLIC BLOOD PRESSURE: 133 MMHG | HEART RATE: 67 BPM

## 2022-10-20 DIAGNOSIS — I25.810 CORONARY ARTERY DISEASE INVOLVING CORONARY BYPASS GRAFT OF NATIVE HEART WITHOUT ANGINA PECTORIS: ICD-10-CM

## 2022-10-20 DIAGNOSIS — I95.1 ORTHOSTATIC HYPOTENSION: ICD-10-CM

## 2022-10-20 DIAGNOSIS — I47.20 VT (VENTRICULAR TACHYCARDIA): ICD-10-CM

## 2022-10-20 DIAGNOSIS — Z95.810 PRESENCE OF AUTOMATIC IMPLANTABLE CARDIOVERTER-DEFIBRILLATOR: ICD-10-CM

## 2022-10-20 DIAGNOSIS — I25.5 ISCHEMIC CARDIOMYOPATHY: Primary | ICD-10-CM

## 2022-10-20 DIAGNOSIS — E78.2 HYPERLIPIDEMIA, MIXED: ICD-10-CM

## 2022-10-20 PROCEDURE — 99214 OFFICE O/P EST MOD 30 MIN: CPT | Performed by: INTERNAL MEDICINE

## 2022-10-20 PROCEDURE — 93000 ELECTROCARDIOGRAM COMPLETE: CPT | Performed by: INTERNAL MEDICINE

## 2022-10-20 NOTE — PROGRESS NOTES
CC--Recurrent VT, ischemic cardiomyopathy      Sub  81-year-old male patient extremely well-known to me with prior history of bypass surgery ischemic cardiomyopathy ICD in situ and history of ventricular arrhythmias for evaluation.  Patient developed hyperthyroidism with atrial fibrillation which is [facility].  No new sx  Had COVID in 9/22      My previous history is attached below which is for reference only and has been reviewed        Sub--Pt here for follow up and he had prior history of incessant VT needing and ablation several months ago .  patient started having recurrent VT needing a redo VT ablation  and post ablation a week later developed dysphagia and needed endoscopy the patient underwent esophageal stricture dilatation with improvement of symptoms and resolution of dysphagia .Patient is on amiodarone because of extensive scarring and multiple episodes of VT and since ablation without recurrent ICD therapy--patient has history of chronic ischemic heart disease previous myocardial infarction, s/p previous CABG,  LV systolic dysfunction with   Last  LV ejection fraction of 25 30% which came up to 30- 35% by latest echocardiogram.   He is  status post ICD implant.  denies any chest pain or shortness of breath or syncope--patient had prior ACE inhibitor induced cough and currently is on Aldactone .Patient denies any new symptoms of VT or syncope and is compliant with current medical regimen  He also developed some right shoulder pain being followed [facility] and uses Biofreeze for relief of symptoms   He denies any palpitations or syncope or any chest pain or dyspnea.  Patient could not tolerate additional beta-blockers on top of amiodarone in the past  Patient has noticed photosensitivity of his skin in the past and amiodarone dose reduced to 100 mg a day and his symptoms have reduced and comes in for follow-up   Patient has noticed some burning sensation in lower extremities and  was seen at [facility] on last clinic prescription for gabapentin and he also complains of some calf pain when he is ambulating with some blocks gets better after increased activity and it is not nocturnal in nature--arterial duplex scan without any significant abnormalities  He also has history of orthostatic hypotension started on midodrine and complains of intermittent headache  Patient is doing clinically well from cardiac standpoint of view  Complains of recurrent redness in the left lower extremity with tenderness and warmth around the ankle joint and is being treated with antibiotics and has been to [facility] twice--is being evaluated by vascular surgery and infectious disease specialist and he had his symptoms since 2010 with increasing frequency recently.    Since last seen patient had ICD shock therapy for conducted atrial fibrillation and was brought in back for evaluation  Atrial fibrillation was not seen with prior interrogation of his device and ICD was reprogrammed on recommendation to avoid unnecessary therapy for conducted atrial fibrillation  Patient complains of diffuse subcutaneous bleeding with dual antiplatelet agents and also complains of weight loss and is being [facility]  Patient was noted to have markedly abnormal thyroid function test consistent with hyperthyroidism and immediately amiodarone was stopped and started on beta-blockers and off Eliquis         Past Medical History:     Reviewed history from 03/13/2018 and no changes required:        Coronary Heart Disease:S/P CABG and PCI         Hypertension        Myocardial Infarction: Inferior MI         Hx: Cellulitis of left lower leg        Dyslipidemia         Pinched nerve L4-L5         Prostate cancer         Cardiomyopathy : ICD implantation       Physical Exam    General:      well developed, well nourished, in no acute distress.    Head:      normocephalic and atraumatic.    Eyes:      PERRL/EOM intact,  conjunctivae and sclerae clear without nystagmus.    Neck:      no  thyromegaly, trachea central with normal respiratory effort  Lungs:      clear bilaterally to auscultation.    Heart:       regular rate and rhythm, S1, S2 without murmurs, rubs, or gallops  Skin:      intact without lesions or rashes.    Psych:      alert and cooperative; normal mood and affect; normal attention span and concentration.             assessment plan    Ischemic cardiomyopathy  Prior history of VT storm needing 2 VT ablations in the past without recurrent sustained VT  [facility]  Atrial fibrillation associated with hyperthyroidism which has resolved after thyroid condition is corrected  Coronary artery disease without active angina  History of dysphagia needing esophageal stricture dilatation in the past  meds reviewed  Follow up in 3 months  ICD home monitoring reviewed      ECG 12 Lead    Date/Time: 10/20/2022 3:29 PM  Performed by: Constantino Arvizu MD  Authorized by: Constantino Arvizu MD   Comparison: compared with previous ECG   Similar to previous ECG  Rhythm: sinus rhythm  Ectopy: infrequent PVCs  Rate: normal  Conduction: non-specific intraventricular conduction delay  Other findings: non-specific ST-T wave changes            Electronically signed by Constantino Arvizu MD, 10/20/22, 3:29 PM EDT.

## 2022-10-26 ENCOUNTER — APPOINTMENT (OUTPATIENT)
Dept: CT IMAGING | Facility: HOSPITAL | Age: 82
End: 2022-10-26

## 2022-10-26 ENCOUNTER — APPOINTMENT (OUTPATIENT)
Dept: GENERAL RADIOLOGY | Facility: HOSPITAL | Age: 82
End: 2022-10-26

## 2022-10-26 ENCOUNTER — HOSPITAL ENCOUNTER (OUTPATIENT)
Facility: HOSPITAL | Age: 82
Setting detail: OBSERVATION
Discharge: HOME | End: 2022-10-28
Attending: EMERGENCY MEDICINE | Admitting: HOSPITALIST

## 2022-10-26 DIAGNOSIS — Z86.73 HISTORY OF STROKE: ICD-10-CM

## 2022-10-26 DIAGNOSIS — R42 DIZZINESS: Primary | ICD-10-CM

## 2022-10-26 DIAGNOSIS — R11.0 NAUSEA: ICD-10-CM

## 2022-10-26 LAB
ALBUMIN SERPL-MCNC: 4.16 G/DL (ref 3.5–5.2)
ALBUMIN/GLOB SERPL: 1.6 G/DL
ALP SERPL-CCNC: 94 U/L (ref 39–117)
ALT SERPL W P-5'-P-CCNC: 17 U/L (ref 1–41)
ANION GAP SERPL CALCULATED.3IONS-SCNC: 7.7 MMOL/L (ref 5–15)
AST SERPL-CCNC: 20 U/L (ref 1–40)
BASOPHILS # BLD AUTO: 0.04 10*3/MM3 (ref 0–0.2)
BASOPHILS NFR BLD AUTO: 0.6 % (ref 0–1.5)
BILIRUB SERPL-MCNC: 0.2 MG/DL (ref 0–1.2)
BUN SERPL-MCNC: 15 MG/DL (ref 8–23)
BUN/CREAT SERPL: 16.9 (ref 7–25)
CALCIUM SPEC-SCNC: 9.3 MG/DL (ref 8.6–10.5)
CHLORIDE SERPL-SCNC: 102 MMOL/L (ref 98–107)
CO2 SERPL-SCNC: 24.3 MMOL/L (ref 22–29)
CREAT SERPL-MCNC: 0.89 MG/DL (ref 0.76–1.27)
DEPRECATED RDW RBC AUTO: 47.5 FL (ref 37–54)
EGFRCR SERPLBLD CKD-EPI 2021: 86.6 ML/MIN/1.73
EOSINOPHIL # BLD AUTO: 0.23 10*3/MM3 (ref 0–0.4)
EOSINOPHIL NFR BLD AUTO: 3.3 % (ref 0.3–6.2)
ERYTHROCYTE [DISTWIDTH] IN BLOOD BY AUTOMATED COUNT: 13 % (ref 12.3–15.4)
GLOBULIN UR ELPH-MCNC: 2.6 GM/DL
GLUCOSE BLDC GLUCOMTR-MCNC: 112 MG/DL (ref 70–130)
GLUCOSE SERPL-MCNC: 125 MG/DL (ref 65–99)
HCT VFR BLD AUTO: 36.9 % (ref 37.5–51)
HGB BLD-MCNC: 12.5 G/DL (ref 13–17.7)
HOLD SPECIMEN: NORMAL
HOLD SPECIMEN: NORMAL
IMM GRANULOCYTES # BLD AUTO: 0.01 10*3/MM3 (ref 0–0.05)
IMM GRANULOCYTES NFR BLD AUTO: 0.1 % (ref 0–0.5)
INR PPP: 1 (ref 0.9–1.1)
LYMPHOCYTES # BLD AUTO: 1.5 10*3/MM3 (ref 0.7–3.1)
LYMPHOCYTES NFR BLD AUTO: 21.5 % (ref 19.6–45.3)
MCH RBC QN AUTO: 32.7 PG (ref 26.6–33)
MCHC RBC AUTO-ENTMCNC: 33.9 G/DL (ref 31.5–35.7)
MCV RBC AUTO: 96.6 FL (ref 79–97)
MONOCYTES # BLD AUTO: 0.63 10*3/MM3 (ref 0.1–0.9)
MONOCYTES NFR BLD AUTO: 9 % (ref 5–12)
NEUTROPHILS NFR BLD AUTO: 4.56 10*3/MM3 (ref 1.7–7)
NEUTROPHILS NFR BLD AUTO: 65.5 % (ref 42.7–76)
PLATELET # BLD AUTO: 191 10*3/MM3 (ref 140–450)
PMV BLD AUTO: 9.4 FL (ref 6–12)
POTASSIUM SERPL-SCNC: 4.1 MMOL/L (ref 3.5–5.2)
PROT SERPL-MCNC: 6.8 G/DL (ref 6–8.5)
PROTHROMBIN TIME: 13.7 SECONDS
RBC # BLD AUTO: 3.82 10*6/MM3 (ref 4.14–5.8)
SODIUM SERPL-SCNC: 134 MMOL/L (ref 136–145)
WBC NRBC COR # BLD: 6.97 10*3/MM3 (ref 3.4–10.8)
WHOLE BLOOD HOLD COAG: NORMAL
WHOLE BLOOD HOLD SPECIMEN: NORMAL

## 2022-10-26 PROCEDURE — 99285 EMERGENCY DEPT VISIT HI MDM: CPT

## 2022-10-26 PROCEDURE — 70498 CT ANGIOGRAPHY NECK: CPT

## 2022-10-26 PROCEDURE — 99284 EMERGENCY DEPT VISIT MOD MDM: CPT | Performed by: EMERGENCY MEDICINE

## 2022-10-26 PROCEDURE — 83036 HEMOGLOBIN GLYCOSYLATED A1C: CPT | Performed by: NURSE PRACTITIONER

## 2022-10-26 PROCEDURE — 70450 CT HEAD/BRAIN W/O DYE: CPT

## 2022-10-26 PROCEDURE — 82607 VITAMIN B-12: CPT | Performed by: NURSE PRACTITIONER

## 2022-10-26 PROCEDURE — 93010 ELECTROCARDIOGRAM REPORT: CPT | Performed by: EMERGENCY MEDICINE

## 2022-10-26 PROCEDURE — 70496 CT ANGIOGRAPHY HEAD: CPT

## 2022-10-26 PROCEDURE — 96374 THER/PROPH/DIAG INJ IV PUSH: CPT

## 2022-10-26 PROCEDURE — 25010000002 LORAZEPAM PER 2 MG: Performed by: EMERGENCY MEDICINE

## 2022-10-26 PROCEDURE — 85025 COMPLETE CBC W/AUTO DIFF WBC: CPT | Performed by: EMERGENCY MEDICINE

## 2022-10-26 PROCEDURE — 63710000001 PROMETHAZINE PER 25 MG: Performed by: EMERGENCY MEDICINE

## 2022-10-26 PROCEDURE — 82962 GLUCOSE BLOOD TEST: CPT

## 2022-10-26 PROCEDURE — 71045 X-RAY EXAM CHEST 1 VIEW: CPT

## 2022-10-26 PROCEDURE — 0 IOPAMIDOL PER 1 ML: Performed by: EMERGENCY MEDICINE

## 2022-10-26 PROCEDURE — 80053 COMPREHEN METABOLIC PANEL: CPT | Performed by: EMERGENCY MEDICINE

## 2022-10-26 PROCEDURE — 93005 ELECTROCARDIOGRAM TRACING: CPT | Performed by: EMERGENCY MEDICINE

## 2022-10-26 RX ORDER — ASPIRIN 81 MG/1
81 TABLET ORAL DAILY
Status: CANCELLED | OUTPATIENT
Start: 2022-10-27

## 2022-10-26 RX ORDER — PROMETHAZINE HYDROCHLORIDE 25 MG/1
25 TABLET ORAL ONCE
Status: COMPLETED | OUTPATIENT
Start: 2022-10-26 | End: 2022-10-26

## 2022-10-26 RX ORDER — ACETAMINOPHEN 325 MG/1
650 TABLET ORAL EVERY 4 HOURS PRN
Status: CANCELLED | OUTPATIENT
Start: 2022-10-26

## 2022-10-26 RX ORDER — ONDANSETRON 2 MG/ML
4 INJECTION INTRAMUSCULAR; INTRAVENOUS EVERY 6 HOURS PRN
Status: CANCELLED | OUTPATIENT
Start: 2022-10-26

## 2022-10-26 RX ORDER — SODIUM CHLORIDE 0.9 % (FLUSH) 0.9 %
10 SYRINGE (ML) INJECTION EVERY 12 HOURS SCHEDULED
Status: CANCELLED | OUTPATIENT
Start: 2022-10-26

## 2022-10-26 RX ORDER — SODIUM CHLORIDE 0.9 % (FLUSH) 0.9 %
10 SYRINGE (ML) INJECTION AS NEEDED
Status: DISCONTINUED | OUTPATIENT
Start: 2022-10-26 | End: 2022-10-28 | Stop reason: HOSPADM

## 2022-10-26 RX ORDER — LORAZEPAM 2 MG/ML
1 INJECTION INTRAMUSCULAR ONCE
Status: COMPLETED | OUTPATIENT
Start: 2022-10-26 | End: 2022-10-26

## 2022-10-26 RX ORDER — MECLIZINE HYDROCHLORIDE 25 MG/1
25 TABLET ORAL ONCE
Status: DISCONTINUED | OUTPATIENT
Start: 2022-10-26 | End: 2022-10-26

## 2022-10-26 RX ORDER — SODIUM CHLORIDE 0.9 % (FLUSH) 0.9 %
10 SYRINGE (ML) INJECTION AS NEEDED
Status: CANCELLED | OUTPATIENT
Start: 2022-10-26

## 2022-10-26 RX ORDER — MECLIZINE HYDROCHLORIDE 25 MG/1
25 TABLET ORAL ONCE
Status: COMPLETED | OUTPATIENT
Start: 2022-10-26 | End: 2022-10-26

## 2022-10-26 RX ADMIN — MECLIZINE HYDROCHLORIDE 25 MG: 25 TABLET ORAL at 19:55

## 2022-10-26 RX ADMIN — IOPAMIDOL 100 ML: 755 INJECTION, SOLUTION INTRAVENOUS at 21:00

## 2022-10-26 RX ADMIN — LORAZEPAM 1 MG: 2 INJECTION INTRAMUSCULAR; INTRAVENOUS at 21:15

## 2022-10-26 RX ADMIN — PROMETHAZINE HYDROCHLORIDE 25 MG: 25 TABLET ORAL at 22:09

## 2022-10-27 LAB
CHOLEST SERPL-MCNC: 182 MG/DL (ref 0–200)
HBA1C MFR BLD: 6.1 % (ref 3.5–5.6)
HDLC SERPL-MCNC: 48 MG/DL (ref 40–60)
LDLC SERPL CALC-MCNC: 115 MG/DL (ref 0–100)
LDLC/HDLC SERPL: 2.36 {RATIO}
QT INTERVAL: 421 MS
T4 FREE SERPL-MCNC: 0.96 NG/DL (ref 0.93–1.7)
TRIGL SERPL-MCNC: 104 MG/DL (ref 0–150)
TROPONIN T SERPL-MCNC: <0.01 NG/ML (ref 0–0.03)
TSH SERPL DL<=0.05 MIU/L-ACNC: 4.81 UIU/ML (ref 0.27–4.2)
VIT B12 BLD-MCNC: 245 PG/ML (ref 211–946)
VLDLC SERPL-MCNC: 19 MG/DL (ref 5–40)

## 2022-10-27 PROCEDURE — 97166 OT EVAL MOD COMPLEX 45 MIN: CPT

## 2022-10-27 PROCEDURE — G0378 HOSPITAL OBSERVATION PER HR: HCPCS

## 2022-10-27 PROCEDURE — 99214 OFFICE O/P EST MOD 30 MIN: CPT | Performed by: NURSE PRACTITIONER

## 2022-10-27 PROCEDURE — 97162 PT EVAL MOD COMPLEX 30 MIN: CPT

## 2022-10-27 PROCEDURE — 96372 THER/PROPH/DIAG INJ SC/IM: CPT

## 2022-10-27 PROCEDURE — 84443 ASSAY THYROID STIM HORMONE: CPT | Performed by: INTERNAL MEDICINE

## 2022-10-27 PROCEDURE — 80061 LIPID PANEL: CPT | Performed by: NURSE PRACTITIONER

## 2022-10-27 PROCEDURE — 84439 ASSAY OF FREE THYROXINE: CPT | Performed by: INTERNAL MEDICINE

## 2022-10-27 PROCEDURE — 25010000002 ENOXAPARIN PER 10 MG: Performed by: INTERNAL MEDICINE

## 2022-10-27 PROCEDURE — 84484 ASSAY OF TROPONIN QUANT: CPT | Performed by: INTERNAL MEDICINE

## 2022-10-27 RX ORDER — METOPROLOL SUCCINATE 50 MG/1
50 TABLET, EXTENDED RELEASE ORAL DAILY
Status: DISCONTINUED | OUTPATIENT
Start: 2022-10-27 | End: 2022-10-28 | Stop reason: HOSPADM

## 2022-10-27 RX ORDER — SODIUM CHLORIDE 0.9 % (FLUSH) 0.9 %
10 SYRINGE (ML) INJECTION AS NEEDED
Status: DISCONTINUED | OUTPATIENT
Start: 2022-10-27 | End: 2022-10-28 | Stop reason: HOSPADM

## 2022-10-27 RX ORDER — ENOXAPARIN SODIUM 100 MG/ML
40 INJECTION SUBCUTANEOUS DAILY
Status: DISCONTINUED | OUTPATIENT
Start: 2022-10-27 | End: 2022-10-28 | Stop reason: HOSPADM

## 2022-10-27 RX ORDER — ASPIRIN 81 MG/1
81 TABLET, CHEWABLE ORAL DAILY
Status: DISCONTINUED | OUTPATIENT
Start: 2022-10-27 | End: 2022-10-28 | Stop reason: HOSPADM

## 2022-10-27 RX ORDER — FERROUS SULFATE TAB EC 324 MG (65 MG FE EQUIVALENT) 324 (65 FE) MG
324 TABLET DELAYED RESPONSE ORAL
Status: DISCONTINUED | OUTPATIENT
Start: 2022-10-27 | End: 2022-10-28 | Stop reason: HOSPADM

## 2022-10-27 RX ORDER — ONDANSETRON 2 MG/ML
4 INJECTION INTRAMUSCULAR; INTRAVENOUS EVERY 6 HOURS PRN
Status: DISCONTINUED | OUTPATIENT
Start: 2022-10-27 | End: 2022-10-28 | Stop reason: HOSPADM

## 2022-10-27 RX ORDER — GABAPENTIN 400 MG/1
400 CAPSULE ORAL EVERY 8 HOURS SCHEDULED
Status: DISCONTINUED | OUTPATIENT
Start: 2022-10-27 | End: 2022-10-28 | Stop reason: HOSPADM

## 2022-10-27 RX ORDER — ASPIRIN 81 MG/1
81 TABLET, CHEWABLE ORAL DAILY
COMMUNITY

## 2022-10-27 RX ORDER — MIDODRINE HYDROCHLORIDE 5 MG/1
10 TABLET ORAL
COMMUNITY

## 2022-10-27 RX ORDER — SODIUM CHLORIDE 0.9 % (FLUSH) 0.9 %
10 SYRINGE (ML) INJECTION EVERY 12 HOURS SCHEDULED
Status: DISCONTINUED | OUTPATIENT
Start: 2022-10-27 | End: 2022-10-28 | Stop reason: HOSPADM

## 2022-10-27 RX ORDER — SUCRALFATE 1 G/1
1 TABLET ORAL 4 TIMES DAILY
Status: DISCONTINUED | OUTPATIENT
Start: 2022-10-27 | End: 2022-10-28 | Stop reason: HOSPADM

## 2022-10-27 RX ORDER — MIDODRINE HYDROCHLORIDE 5 MG/1
10 TABLET ORAL
Status: DISCONTINUED | OUTPATIENT
Start: 2022-10-27 | End: 2022-10-28 | Stop reason: HOSPADM

## 2022-10-27 RX ORDER — MECLIZINE HYDROCHLORIDE 25 MG/1
25 TABLET ORAL EVERY 8 HOURS SCHEDULED
Status: DISCONTINUED | OUTPATIENT
Start: 2022-10-27 | End: 2022-10-28 | Stop reason: HOSPADM

## 2022-10-27 RX ORDER — PANTOPRAZOLE SODIUM 40 MG/1
40 TABLET, DELAYED RELEASE ORAL DAILY
Status: DISCONTINUED | OUTPATIENT
Start: 2022-10-27 | End: 2022-10-28 | Stop reason: HOSPADM

## 2022-10-27 RX ORDER — NITROGLYCERIN 0.4 MG/1
0.4 TABLET SUBLINGUAL
Status: DISCONTINUED | OUTPATIENT
Start: 2022-10-27 | End: 2022-10-28 | Stop reason: HOSPADM

## 2022-10-27 RX ORDER — ACETAMINOPHEN 325 MG/1
650 TABLET ORAL EVERY 4 HOURS PRN
Status: DISCONTINUED | OUTPATIENT
Start: 2022-10-27 | End: 2022-10-28 | Stop reason: HOSPADM

## 2022-10-27 RX ORDER — LIDOCAINE 50 MG/G
1 PATCH TOPICAL EVERY 24 HOURS
Status: DISCONTINUED | OUTPATIENT
Start: 2022-10-27 | End: 2022-10-28 | Stop reason: HOSPADM

## 2022-10-27 RX ADMIN — PANTOPRAZOLE SODIUM 40 MG: 40 TABLET, DELAYED RELEASE ORAL at 07:40

## 2022-10-27 RX ADMIN — SUCRALFATE 1 G: 1 TABLET ORAL at 07:39

## 2022-10-27 RX ADMIN — ASPIRIN 81 MG CHEWABLE TABLET 81 MG: 81 TABLET CHEWABLE at 09:22

## 2022-10-27 RX ADMIN — SUCRALFATE 1 G: 1 TABLET ORAL at 16:58

## 2022-10-27 RX ADMIN — ENOXAPARIN SODIUM 40 MG: 100 INJECTION SUBCUTANEOUS at 16:57

## 2022-10-27 RX ADMIN — FERROUS SULFATE TAB EC 324 MG (65 MG FE EQUIVALENT) 324 MG: 324 (65 FE) TABLET DELAYED RESPONSE at 07:39

## 2022-10-27 RX ADMIN — MECLIZINE HYDROCHLORIDE 25 MG: 25 TABLET ORAL at 16:58

## 2022-10-27 RX ADMIN — GABAPENTIN 400 MG: 400 CAPSULE ORAL at 14:06

## 2022-10-27 RX ADMIN — MIDODRINE HYDROCHLORIDE 10 MG: 5 TABLET ORAL at 11:04

## 2022-10-27 RX ADMIN — MIDODRINE HYDROCHLORIDE 5 MG: 5 TABLET ORAL at 16:58

## 2022-10-27 RX ADMIN — GABAPENTIN 400 MG: 400 CAPSULE ORAL at 21:06

## 2022-10-27 RX ADMIN — METOPROLOL SUCCINATE 50 MG: 50 TABLET, EXTENDED RELEASE ORAL at 07:41

## 2022-10-27 RX ADMIN — Medication 10 ML: at 07:41

## 2022-10-27 RX ADMIN — LIDOCAINE 1 PATCH: 50 PATCH CUTANEOUS at 21:06

## 2022-10-27 RX ADMIN — GABAPENTIN 400 MG: 400 CAPSULE ORAL at 07:39

## 2022-10-27 RX ADMIN — SUCRALFATE 1 G: 1 TABLET ORAL at 11:04

## 2022-10-27 RX ADMIN — SUCRALFATE 1 G: 1 TABLET ORAL at 21:06

## 2022-10-27 RX ADMIN — MECLIZINE HYDROCHLORIDE 25 MG: 25 TABLET ORAL at 21:06

## 2022-10-28 VITALS
TEMPERATURE: 97.5 F | HEART RATE: 82 BPM | SYSTOLIC BLOOD PRESSURE: 157 MMHG | RESPIRATION RATE: 19 BRPM | HEIGHT: 73 IN | DIASTOLIC BLOOD PRESSURE: 77 MMHG | WEIGHT: 192 LBS | OXYGEN SATURATION: 99 % | BODY MASS INDEX: 25.45 KG/M2

## 2022-10-28 LAB
ANION GAP SERPL CALCULATED.3IONS-SCNC: 9 MMOL/L (ref 5–15)
BASOPHILS # BLD AUTO: 0 10*3/MM3 (ref 0–0.2)
BASOPHILS NFR BLD AUTO: 0.7 % (ref 0–1.5)
BUN SERPL-MCNC: 14 MG/DL (ref 8–23)
BUN/CREAT SERPL: 14.1 (ref 7–25)
CALCIUM SPEC-SCNC: 9.3 MG/DL (ref 8.6–10.5)
CHLORIDE SERPL-SCNC: 102 MMOL/L (ref 98–107)
CO2 SERPL-SCNC: 27 MMOL/L (ref 22–29)
CREAT SERPL-MCNC: 0.99 MG/DL (ref 0.76–1.27)
DEPRECATED RDW RBC AUTO: 49.4 FL (ref 37–54)
EGFRCR SERPLBLD CKD-EPI 2021: 77 ML/MIN/1.73
EOSINOPHIL # BLD AUTO: 0.2 10*3/MM3 (ref 0–0.4)
EOSINOPHIL NFR BLD AUTO: 4.2 % (ref 0.3–6.2)
ERYTHROCYTE [DISTWIDTH] IN BLOOD BY AUTOMATED COUNT: 14 % (ref 12.3–15.4)
GLUCOSE SERPL-MCNC: 119 MG/DL (ref 65–99)
HCT VFR BLD AUTO: 38.7 % (ref 37.5–51)
HGB BLD-MCNC: 13.1 G/DL (ref 13–17.7)
LYMPHOCYTES # BLD AUTO: 1.9 10*3/MM3 (ref 0.7–3.1)
LYMPHOCYTES NFR BLD AUTO: 36.4 % (ref 19.6–45.3)
MCH RBC QN AUTO: 32.9 PG (ref 26.6–33)
MCHC RBC AUTO-ENTMCNC: 33.9 G/DL (ref 31.5–35.7)
MCV RBC AUTO: 97.1 FL (ref 79–97)
MONOCYTES # BLD AUTO: 0.6 10*3/MM3 (ref 0.1–0.9)
MONOCYTES NFR BLD AUTO: 11.2 % (ref 5–12)
NEUTROPHILS NFR BLD AUTO: 2.5 10*3/MM3 (ref 1.7–7)
NEUTROPHILS NFR BLD AUTO: 47.5 % (ref 42.7–76)
NRBC BLD AUTO-RTO: 0.1 /100 WBC (ref 0–0.2)
PLATELET # BLD AUTO: 197 10*3/MM3 (ref 140–450)
PMV BLD AUTO: 8 FL (ref 6–12)
POTASSIUM SERPL-SCNC: 4.5 MMOL/L (ref 3.5–5.2)
RBC # BLD AUTO: 3.98 10*6/MM3 (ref 4.14–5.8)
SODIUM SERPL-SCNC: 138 MMOL/L (ref 136–145)
WBC NRBC COR # BLD: 5.3 10*3/MM3 (ref 3.4–10.8)

## 2022-10-28 PROCEDURE — G0378 HOSPITAL OBSERVATION PER HR: HCPCS

## 2022-10-28 PROCEDURE — 85025 COMPLETE CBC W/AUTO DIFF WBC: CPT | Performed by: INTERNAL MEDICINE

## 2022-10-28 PROCEDURE — 80048 BASIC METABOLIC PNL TOTAL CA: CPT | Performed by: INTERNAL MEDICINE

## 2022-10-28 PROCEDURE — 99214 OFFICE O/P EST MOD 30 MIN: CPT | Performed by: INTERNAL MEDICINE

## 2022-10-28 RX ORDER — MECLIZINE HYDROCHLORIDE 25 MG/1
25 TABLET ORAL 3 TIMES DAILY PRN
Qty: 10 TABLET | Refills: 0 | Status: SHIPPED | OUTPATIENT
Start: 2022-10-28

## 2022-10-28 RX ADMIN — PANTOPRAZOLE SODIUM 40 MG: 40 TABLET, DELAYED RELEASE ORAL at 09:40

## 2022-10-28 RX ADMIN — MECLIZINE HYDROCHLORIDE 25 MG: 25 TABLET ORAL at 14:32

## 2022-10-28 RX ADMIN — SUCRALFATE 1 G: 1 TABLET ORAL at 12:01

## 2022-10-28 RX ADMIN — METOPROLOL SUCCINATE 50 MG: 50 TABLET, EXTENDED RELEASE ORAL at 09:40

## 2022-10-28 RX ADMIN — SUCRALFATE 1 G: 1 TABLET ORAL at 17:07

## 2022-10-28 RX ADMIN — GABAPENTIN 400 MG: 400 CAPSULE ORAL at 05:05

## 2022-10-28 RX ADMIN — FERROUS SULFATE TAB EC 324 MG (65 MG FE EQUIVALENT) 324 MG: 324 (65 FE) TABLET DELAYED RESPONSE at 09:40

## 2022-10-28 RX ADMIN — SUCRALFATE 1 G: 1 TABLET ORAL at 09:40

## 2022-10-28 RX ADMIN — GABAPENTIN 400 MG: 400 CAPSULE ORAL at 14:32

## 2022-10-28 RX ADMIN — MIDODRINE HYDROCHLORIDE 5 MG: 5 TABLET ORAL at 12:01

## 2022-10-28 RX ADMIN — Medication 10 ML: at 09:40

## 2022-10-28 RX ADMIN — ASPIRIN 81 MG CHEWABLE TABLET 81 MG: 81 TABLET CHEWABLE at 09:40

## 2022-10-28 RX ADMIN — MECLIZINE HYDROCHLORIDE 25 MG: 25 TABLET ORAL at 05:05

## 2022-12-01 PROCEDURE — 93296 REM INTERROG EVL PM/IDS: CPT | Performed by: INTERNAL MEDICINE

## 2022-12-01 PROCEDURE — 93295 DEV INTERROG REMOTE 1/2/MLT: CPT | Performed by: INTERNAL MEDICINE

## 2022-12-08 ENCOUNTER — TELEPHONE (OUTPATIENT)
Dept: CARDIOLOGY | Facility: CLINIC | Age: 82
End: 2022-12-08

## 2023-01-05 ENCOUNTER — OFFICE VISIT (OUTPATIENT)
Dept: CARDIOLOGY | Facility: CLINIC | Age: 83
End: 2023-01-05
Payer: MEDICARE

## 2023-01-05 VITALS
DIASTOLIC BLOOD PRESSURE: 74 MMHG | WEIGHT: 188 LBS | HEART RATE: 94 BPM | OXYGEN SATURATION: 97 % | HEIGHT: 73 IN | BODY MASS INDEX: 24.92 KG/M2 | SYSTOLIC BLOOD PRESSURE: 125 MMHG

## 2023-01-05 DIAGNOSIS — I10 ESSENTIAL HYPERTENSION: ICD-10-CM

## 2023-01-05 DIAGNOSIS — E78.2 HYPERLIPIDEMIA, MIXED: Chronic | ICD-10-CM

## 2023-01-05 DIAGNOSIS — Z95.810 PRESENCE OF AUTOMATIC IMPLANTABLE CARDIOVERTER-DEFIBRILLATOR: Chronic | ICD-10-CM

## 2023-01-05 DIAGNOSIS — I25.5 ISCHEMIC CARDIOMYOPATHY: Chronic | ICD-10-CM

## 2023-01-05 DIAGNOSIS — I25.810 CORONARY ARTERY DISEASE INVOLVING CORONARY BYPASS GRAFT OF NATIVE HEART WITHOUT ANGINA PECTORIS: Primary | Chronic | ICD-10-CM

## 2023-01-05 PROCEDURE — 99214 OFFICE O/P EST MOD 30 MIN: CPT | Performed by: INTERNAL MEDICINE

## 2023-01-05 NOTE — PROGRESS NOTES
Cardiology Office Visit      Encounter Date:  01/05/2023    Patient ID:   Deion Nicholas is a 82 y.o. male.    Reason For Followup:  Ischemic cardiomyopathy  Coronary artery disease  Hypertension  Hypertensive cardiovascular disease  Amiodarone therapy  Antiplatelet therapy  Hyperlipidemia    Brief Clinical History:  Dear Makenna Jimenez MD    I had the pleasure of seeing Deion Nicholas today. As you are well aware, this is a 82 y.o. male with an established history of ischemic heart disease.  He has undergone coronary arterial bypass grafting in the past.  He is additional history that includes ischemic cardiomyopathy with most recent ejection fraction of 30 to 35%, ICD implantation, hypertension, hypertensive cardiovascular disease, amiodarone therapy, antiplatelet therapy, and hyperlipidemia.  He presents today for follow-up on the above conditions.     Interval History:  He denies any chest pain pressure heaviness or tightness.  He denies any shortness of breath out of character.  He denies any PND orthopnea.  He denies any syncope or near syncope.  He continues to report unsteady gait.    He reports that he was hospitalized in October.  He had a syncopal episode while he was on the commode.  He went to the hospital and had his device interrogated and no arrhythmias were identified.  This was likely representative of a vagal event.    He reports that he is lost 20 pounds unintentionally over the past few months.  He is working with his primary on this.    His most recent echocardiogram was performed in October 2021.  Significant left ventricular systolic dysfunction with an ejection fraction of 30 to 35% was noted.  Aneurysmal dilatation of the ascending aorta was present.  The aorta measured 4.6 cm.    Assessment & Plan     Impressions:  Ischemic cardiomyopathy most recent ejection fraction 30 to 35%  Coronary artery disease  Hypotension with an orthostatic component  Hypertensive cardiovascular  disease  Amiodarone therapy  Antiplatelet therapy  Hyperlipidemia  AICD in situ  History of VT storm status post radiofrequency ablation  Gynecomastia secondary to spironolactone  Ascending aortic aneurysm most recent measurement 4.5 cm August 2022.     Recommendations:  Continuation of his current cardiovascular regimen at the present time.     This includes beta-blockers, midodrine, antiplatelet therapy, and anticoagulant therapy.  Follow-up in 6 months time  Follow-up with EP as scheduled.    Diagnoses and all orders for this visit:    1. Coronary artery disease involving coronary bypass graft of native heart without angina pectoris (Primary)    2. Essential hypertension    3. Hyperlipidemia, mixed    4. Ischemic cardiomyopathy    5. Presence of automatic implantable cardioverter-defibrillator          Objective:    Vitals:  Vitals:    01/05/23 1338   BP: 125/74   Pulse: 94   SpO2: 97%   Weight: 85.3 kg (188 lb)   Height: 185.4 cm (73\")     Body mass index is 24.8 kg/m².      Physical Exam:    General: Alert, cooperative, no distress, appears stated age.  Ambulates with 2 canes.  Head:  Normocephalic, atraumatic, mucous membranes moist  Eyes:  Conjunctiva/corneas clear, EOM's intact     Neck:  Supple,  no bruit    Lungs: Clear to auscultation bilaterally, no wheezes rhonchi rales are noted  Chest wall: No tenderness  Heart::  Regular rate and rhythm, S1 and S2 normal, 1/6 holosystolic murmur.  No rub or gallop  Abdomen: Soft, non-tender, nondistended bowel sounds active  Extremities: No cyanosis, clubbing, or edema  Pulses: Diminished pedal pulses  Skin:  No rashes or lesions  Neuro/psych: A&O x3. CN II through XII are grossly intact with appropriate affect      Allergies:  Allergies   Allergen Reactions   • Lovastatin Myalgia   • Pravastatin Myalgia and Unknown (See Comments)     unknown   • Simvastatin Unknown (See Comments) and Myalgia     unknown   • Spironolactone Other (See Comments)     Gynecomastia         Medication Review:     Current Outpatient Medications:   •  aspirin 81 MG chewable tablet, Chew 81 mg Daily., Disp: , Rfl:   •  Cholecalciferol 10 MCG (400 UNIT) tablet, Take 2 tablets daily, Disp: , Rfl:   •  cyanocobalamin 1000 MCG/ML injection, Inject 1,000 mcg into the appropriate muscle as directed by prescriber Every 14 (Fourteen) Days., Disp: , Rfl:   •  ezetimibe (ZETIA) 10 MG tablet, Take 10 mg by mouth Daily., Disp: , Rfl:   •  Ferrous Sulfate (IRON PO), Take  by mouth., Disp: , Rfl:   •  gabapentin (NEURONTIN) 400 MG capsule, Take 400 mg by mouth 4 (Four) Times a Day., Disp: , Rfl:   •  lidocaine (LIDODERM) 5 %, Place 1 patch on the skin as directed by provider Daily. Remove & Discard patch within 12 hours or as directed by MD, Disp: , Rfl:   •  meclizine (ANTIVERT) 25 MG tablet, Take 1 tablet by mouth 3 (Three) Times a Day As Needed for Dizziness for up to 10 doses., Disp: 10 tablet, Rfl: 0  •  methocarbamol (ROBAXIN) 500 MG tablet, Take 500 mg by mouth 3 (Three) Times a Day As Needed for Muscle Spasms., Disp: , Rfl:   •  metoprolol succinate XL (TOPROL-XL) 50 MG 24 hr tablet, Take 50 mg by mouth Daily., Disp: , Rfl:   •  midodrine (PROAMATINE) 5 MG tablet, Take 2 tablets by mouth 3 (Three) Times a Day Before Meals., Disp: , Rfl:   •  Omega-3 Fatty Acids (fish oil) 1000 MG capsule capsule, Take 2,000 mg by mouth 2 (Two) Times a Day With Meals., Disp: , Rfl:   •  pantoprazole (PROTONIX) 40 MG EC tablet, Take 40 mg by mouth Daily., Disp: , Rfl:   •  sucralfate (CARAFATE) 1 g tablet, Take 1 g by mouth 4 (Four) Times a Day., Disp: , Rfl:   •  nitroglycerin (NITROSTAT) 0.4 MG SL tablet, Place 0.4 mg under the tongue Every 5 (Five) Minutes As Needed., Disp: , Rfl:     Family History:  Family History   Problem Relation Age of Onset   • Pancreatic cancer Mother    • Heart disease Father        Past Medical History:  Past Medical History:   Diagnosis Date   • Aneurysm (HCC)    • Cardiomyopathy (HCC)     ICD  implantation   • Cellulitis of left lower extremity     recurrent   • CHD (coronary heart disease)     S/P CABG & PCI   • Dyslipidemia    • History of ventricular tachycardia    • Hypertension    • Myocardial infarction (HCC)     Inferior MI   • Pinched nerve     L4-L5   • Prostate cancer (HCC)        Past Surgical History:  Past Surgical History:   Procedure Laterality Date   • ANGIOPLASTY  1996 Stent: Palmaz- Huy stent/LAD 1996-RCA: -Tetra stent Guidant to proximal RCA, mid to distal artery 2 sequential Guidant Tetra stents   • APPENDECTOMY     • CARDIAC ABLATION  April and May 2019   • CARDIAC CATHETERIZATION  2017    1996 x2, Nov. , 2010-cath 2015-no stents    • CARDIAC DEFIBRILLATOR PLACEMENT     • COLONOSCOPY W/ POLYPECTOMY      Mult colonoscopy's for polyp resection    • CORONARY ARTERY BYPASS GRAFT  05/2010    x5 vessel: LMA to diagonal, LAD, intermedius, obtuse marginal, RCA   • CORONARY STENT PLACEMENT     • ENDOSCOPY N/A 2019    Procedure: ESOPHAGOGASTRODUODENOSCOPY with dilitation Bougie 50, 54;  Surgeon: Roddy Coronel MD;  Location: [facility] ENDOSCOPY;  Service: Gastroenterology   • INSERT / REPLACE / REMOVE PACEMAKER     • KNEE OPEN LATERAL RELEASE Left     reduction   • OTHER SURGICAL HISTORY  2018    ICD implantation   • PROSTATE SURGERY      Robiotic surgery- Cyber Knife:       Social History:  Social History     Socioeconomic History   • Marital status:    Tobacco Use   • Smoking status: Former     Packs/day: 1.00     Years: 17.00     Pack years: 17.00     Types: Cigarettes     Quit date: 2002     Years since quittin.5   • Smokeless tobacco: Never   Vaping Use   • Vaping Use: Never used   Substance and Sexual Activity   • 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 The pertinent cardiovascular findings are reported above with all other cardiovascular points within those systems being negative.    Diagnostic Study Review:     Current Electrocardiogram:  Procedures no new EKG. EKG dated 26 October 2022 demonstrates sinus rhythm with a ventricular rate of 74 bpm.    Laboratory Data:  Lab Results   Component Value Date    GLUCOSE 119 (H) 10/28/2022    BUN 14 10/28/2022    CREATININE 0.99 10/28/2022    EGFRIFNONA 100 12/01/2021    BCR 14.1 10/28/2022    K 4.5 10/28/2022    CO2 27.0 10/28/2022    CALCIUM 9.3 10/28/2022    ALBUMIN 4.16 10/26/2022    LABIL2 1.1 06/04/2019    AST 20 10/26/2022    ALT 17 10/26/2022     Lab Results   Component Value Date    GLUCOSE 119 (H) 10/28/2022    CALCIUM 9.3 10/28/2022     10/28/2022    K 4.5 10/28/2022    CO2 27.0 10/28/2022     10/28/2022    BUN 14 10/28/2022    CREATININE 0.99 10/28/2022    EGFRIFNONA 100 12/01/2021    BCR 14.1 10/28/2022    ANIONGAP 9.0 10/28/2022     Lab Results   Component Value Date    WBC 5.30 10/28/2022    HGB 13.1 10/28/2022    HCT 38.7 10/28/2022    MCV 97.1 (H) 10/28/2022     10/28/2022     Lab Results   Component Value Date    CHOL 182 10/27/2022    TRIG 104 10/27/2022    HDL 48 10/27/2022     (H) 10/27/2022     Lab Results   Component Value Date    HGBA1C 6.1 (H) 10/26/2022     Lab Results   Component Value Date    INR 1 10/26/2022    INR 0.99 05/30/2021    INR 1.02 06/24/2019    PROTIME 13.7 10/26/2022    PROTIME 10.9 05/30/2021    PROTIME 10.5 06/24/2019       Most Recent Echo:  Results for orders placed during the hospital encounter of 10/22/21    Adult Transthoracic Echo Complete W/ Cont if Necessary Per Protocol    Interpretation Summary  · Estimated left ventricular EF was in agreement with the calculated  left ventricular EF. Left ventricular ejection fraction appears to be 31 - 35%. Left ventricular systolic function is severely decreased.  · The left ventricular cavity is severely dilated.  · The right ventricular cavity is mildly dilated.  · Estimated right ventricular systolic pressure from tricuspid regurgitation is mildly elevated (35-45 mmHg).  · Aneurysmal dilation of the ascending aorta is present.  · Left ventricular diastolic function is consistent with (grade Ia w/high LAP) impaired relaxation.       Most Recent Stress Test:  Results for orders placed during the hospital encounter of 08/26/19    Stress Test With Myocardial Perfusion One Day    Interpretation Summary  · Myocardial perfusion imaging indicates a large-sized infarct located in the inferior wall and apex with no significant ischemia noted.  · Left ventricular ejection fraction is severely reduced (Calculated EF = 30%).  · Impressions are consistent with a low risk study.  · There is no prior study available for comparison.    Stress portion of this exam was supervised by: Basia Cruz MD       Most Recent Cardiac Catheterization:   No results found for this or any previous visit.       NOTE: The following portions of the patient's note were reviewed, confirmed and/or updated this visit as appropriate: History of present illness/Interval history, physical examination, assessment & plan, allergies, current medications, past family history, past medical history, past social history, past surgical history and problem list.

## 2023-01-12 ENCOUNTER — HOSPITAL ENCOUNTER (OUTPATIENT)
Facility: HOSPITAL | Age: 83
Discharge: HOME | End: 2023-01-12
Attending: EMERGENCY MEDICINE | Admitting: EMERGENCY MEDICINE
Payer: MEDICARE

## 2023-01-12 ENCOUNTER — APPOINTMENT (OUTPATIENT)
Dept: GENERAL RADIOLOGY | Facility: HOSPITAL | Age: 83
End: 2023-01-12
Payer: MEDICARE

## 2023-01-12 VITALS
RESPIRATION RATE: 18 BRPM | HEIGHT: 73 IN | WEIGHT: 188 LBS | OXYGEN SATURATION: 96 % | HEART RATE: 93 BPM | DIASTOLIC BLOOD PRESSURE: 72 MMHG | SYSTOLIC BLOOD PRESSURE: 140 MMHG | BODY MASS INDEX: 24.92 KG/M2 | TEMPERATURE: 98.6 F

## 2023-01-12 DIAGNOSIS — J06.9 VIRAL URI WITH COUGH: Primary | ICD-10-CM

## 2023-01-12 LAB
FLUAV SUBTYP SPEC NAA+PROBE: NOT DETECTED
FLUBV RNA ISLT QL NAA+PROBE: NOT DETECTED
SARS-COV-2 RNA RESP QL NAA+PROBE: NOT DETECTED
STREP A PCR: NOT DETECTED

## 2023-01-12 PROCEDURE — 87636 SARSCOV2 & INF A&B AMP PRB: CPT | Performed by: EMERGENCY MEDICINE

## 2023-01-12 PROCEDURE — 87651 STREP A DNA AMP PROBE: CPT | Performed by: EMERGENCY MEDICINE

## 2023-01-12 PROCEDURE — G0463 HOSPITAL OUTPT CLINIC VISIT: HCPCS | Performed by: NURSE PRACTITIONER

## 2023-01-12 PROCEDURE — 99213 OFFICE O/P EST LOW 20 MIN: CPT | Performed by: NURSE PRACTITIONER

## 2023-01-12 PROCEDURE — 71045 X-RAY EXAM CHEST 1 VIEW: CPT

## 2023-01-12 RX ORDER — BENZONATATE 200 MG/1
200 CAPSULE ORAL 3 TIMES DAILY PRN
Qty: 15 CAPSULE | Refills: 0 | Status: SHIPPED | OUTPATIENT
Start: 2023-01-12

## 2023-01-12 RX ORDER — METHYLPREDNISOLONE 4 MG/1
TABLET ORAL
Qty: 21 TABLET | Refills: 0 | Status: SHIPPED | OUTPATIENT
Start: 2023-01-12 | End: 2023-01-27

## 2023-01-12 NOTE — FSED PROVIDER NOTE
EMERGENCY DEPARTMENT ENCOUNTER      Room Number: 09/09    History is provided by the patient, no translation services needed    HPI:      Narrative: Pt is a 82 y.o. male who presents complaining of 3 to 4 days of cough, sore throat, congestion, cough, fever of 100 oral.  Patient states that he has not been taking any medication for his symptoms.  He cannot identify any modifying factors to make his symptoms better or worse.  He denies chest pain, shortness of breath, weakness, dizziness.  He is able to perform his activities of daily living.  He is concerned that he may have COVID or influenza.  He is here for evaluation.      PMD: Makenna Motta MD    REVIEW OF SYSTEMS  Review of Systems   Constitutional: Positive for fever. Negative for activity change, appetite change, chills, diaphoresis, fatigue and unexpected weight change.   HENT: Positive for congestion, postnasal drip, rhinorrhea and sore throat. Negative for facial swelling, nosebleeds, sinus pressure, sinus pain and sneezing.    Eyes: Negative for photophobia and visual disturbance.   Respiratory: Positive for cough. Negative for chest tightness and shortness of breath.    Cardiovascular: Negative for chest pain, palpitations and leg swelling.   Gastrointestinal: Negative for abdominal pain.   Genitourinary: Negative for dysuria, flank pain and frequency.   Musculoskeletal: Negative for myalgias.   Skin: Negative for color change, pallor, rash and wound.   Allergic/Immunologic: Negative for immunocompromised state.   Neurological: Negative for dizziness, facial asymmetry and headaches.   Hematological: Negative for adenopathy. Does not bruise/bleed easily.         PAST MEDICAL HISTORY  Active Ambulatory Problems     Diagnosis Date Noted   • Normocytic anemia 06/24/2019   • Coronary artery disease 10/02/2013   • Ischemic cardiomyopathy 11/09/2016   • Paroxysmal ventricular tachycardia 05/03/2019   • Presence of automatic implantable  cardioverter-defibrillator 01/31/2018   • Essential hypertension 08/27/2019   • Hyperlipidemia, mixed 08/27/2019   • Peripheral neuropathy 05/11/2021   • Cellulitis of left lower extremity without foot 05/12/2021   • GERD without esophagitis 05/12/2021   • B12 deficiency 05/12/2021   • Orthostatic hypotension 05/12/2021   • Tinea pedis of left foot 05/13/2021   • Lower extremity pain, left 05/29/2021   • Cellulitis of left leg 07/13/2021   • Baker's cyst of knee, left 07/13/2021   • Gynecomastia, male 09/17/2021   • Hyperthyroidism 11/29/2021   • Ascending aortic aneurysm 12/11/2021   • Thyroid nodule 03/03/2022   • Dizziness 10/26/2022     Resolved Ambulatory Problems     Diagnosis Date Noted   • Shortness of breath 06/23/2019   • Esophageal dysphagia 06/22/2019   • Shortness of breath 06/24/2019   • Chest pain 08/26/2019   • Left leg cellulitis 06/16/2021     Past Medical History:   Diagnosis Date   • Aneurysm (HCC)    • Cardiomyopathy (HCC)    • Cellulitis of left lower extremity 2010   • CHD (coronary heart disease)    • Dyslipidemia    • History of ventricular tachycardia    • Hypertension    • Myocardial infarction (HCC)    • Pinched nerve    • Prostate cancer (HCC)        PAST SURGICAL HISTORY  Past Surgical History:   Procedure Laterality Date   • ANGIOPLASTY  2000 04/1996 Stent: Palmaz- Huy stent/LAD 07/1996-RCA: 2000-Tetra stent Guidant to proximal RCA, mid to distal artery 2 sequential Guidant Tetra stents   • APPENDECTOMY     • CARDIAC ABLATION  April and May 2019   • CARDIAC CATHETERIZATION  07/2017    1996 x2, Nov. 2000, 05/2010-cath 08/2015-no stents 2017   • CARDIAC DEFIBRILLATOR PLACEMENT     • COLONOSCOPY W/ POLYPECTOMY      Mult colonoscopy's for polyp resection    • CORONARY ARTERY BYPASS GRAFT  05/2010    x5 vessel: LMA to diagonal, LAD, intermedius, obtuse marginal, RCA   • CORONARY STENT PLACEMENT     • ENDOSCOPY N/A 6/24/2019    Procedure: ESOPHAGOGASTRODUODENOSCOPY with dilitation  Emerald 50, 54;  Surgeon: Roddy Coronel MD;  Location: [facility];  Service: Gastroenterology   • INSERT / REPLACE / REMOVE PACEMAKER     • KNEE OPEN LATERAL RELEASE Left     reduction   • OTHER SURGICAL HISTORY  2018    ICD implantation   • PROSTATE SURGERY      Robiotic surgery- Cyber Knife:       FAMILY HISTORY  Family History   Problem Relation Age of Onset   • Pancreatic cancer Mother    • Heart disease Father        SOCIAL HISTORY  Social History     Socioeconomic History   • Marital status:    Tobacco Use   • Smoking status: Former     Packs/day: 1.00     Years: 17.00     Pack years: 17.00     Types: Cigarettes     Quit date: 2002     Years since quittin.5   • Smokeless tobacco: Never   Vaping Use   • Vaping Use: Never used   Substance and Sexual Activity   • 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    Current Outpatient Medications:   •  aspirin 81 MG chewable tablet, Chew 81 mg Daily., Disp: , Rfl:   •  benzonatate (TESSALON) 200 MG capsule, Take 1 capsule by mouth 3 (Three) Times a Day As Needed for Cough., Disp: 15 capsule, Rfl: 0  •  Cholecalciferol 10 MCG (400 UNIT) tablet, Take 2 tablets daily, Disp: , Rfl:   •  cyanocobalamin 1000 MCG/ML injection, Inject 1,000 mcg into the appropriate muscle as directed by prescriber Every 14 (Fourteen) Days., Disp: , Rfl:   •  ezetimibe (ZETIA) 10 MG tablet, Take 10 mg by mouth Daily., Disp: , Rfl:   •  Ferrous Sulfate (IRON PO), Take  by mouth., Disp: , Rfl:   •  gabapentin (NEURONTIN) 400 MG capsule, Take 400 mg by mouth 4 (Four) Times a Day., Disp: , Rfl:   •  lidocaine (LIDODERM) 5 %, Place 1 patch on the skin as directed by provider Daily. Remove & Discard patch within 12 hours or as directed by MD, Disp: , Rfl:   •   meclizine (ANTIVERT) 25 MG tablet, Take 1 tablet by mouth 3 (Three) Times a Day As Needed for Dizziness for up to 10 doses., Disp: 10 tablet, Rfl: 0  •  methocarbamol (ROBAXIN) 500 MG tablet, Take 500 mg by mouth 3 (Three) Times a Day As Needed for Muscle Spasms., Disp: , Rfl:   •  methylPREDNISolone (MEDROL) 4 MG dose pack, Take as directed on package instructions., Disp: 21 tablet, Rfl: 0  •  metoprolol succinate XL (TOPROL-XL) 50 MG 24 hr tablet, Take 50 mg by mouth Daily., Disp: , Rfl:   •  midodrine (PROAMATINE) 5 MG tablet, Take 2 tablets by mouth 3 (Three) Times a Day Before Meals., Disp: , Rfl:   •  nitroglycerin (NITROSTAT) 0.4 MG SL tablet, Place 0.4 mg under the tongue Every 5 (Five) Minutes As Needed., Disp: , Rfl:   •  Omega-3 Fatty Acids (fish oil) 1000 MG capsule capsule, Take 2,000 mg by mouth 2 (Two) Times a Day With Meals., Disp: , Rfl:   •  pantoprazole (PROTONIX) 40 MG EC tablet, Take 40 mg by mouth Daily., Disp: , Rfl:   •  sucralfate (CARAFATE) 1 g tablet, Take 1 g by mouth 4 (Four) Times a Day., Disp: , Rfl:     PHYSICAL EXAM  ED Triage Vitals [01/12/23 1413]   Temp Heart Rate Resp BP SpO2   98.6 °F (37 °C) 93 18 140/72 96 %      Temp src Heart Rate Source Patient Position BP Location FiO2 (%)   Temporal Monitor Sitting Left arm --       Physical Exam  Constitutional:       General: He is not in acute distress.     Appearance: Normal appearance. He is well-developed and normal weight. He is not ill-appearing, toxic-appearing or diaphoretic.   HENT:      Head: Normocephalic and atraumatic.      Right Ear: Tympanic membrane, ear canal and external ear normal. No drainage, swelling or tenderness. No middle ear effusion. There is no impacted cerumen. Tympanic membrane is not erythematous.      Left Ear: Tympanic membrane, ear canal and external ear normal. No drainage, swelling or tenderness.  No middle ear effusion. There is no impacted cerumen. Tympanic membrane is not erythematous.      Nose:  Congestion and rhinorrhea present.      Mouth/Throat:      Mouth: Mucous membranes are moist.      Pharynx: Uvula midline. No pharyngeal swelling, oropharyngeal exudate, posterior oropharyngeal erythema or uvula swelling.   Eyes:      Extraocular Movements: Extraocular movements intact.      Conjunctiva/sclera: Conjunctivae normal.   Cardiovascular:      Rate and Rhythm: Normal rate and regular rhythm.      Pulses: Normal pulses.      Heart sounds: Normal heart sounds.   Pulmonary:      Effort: Pulmonary effort is normal. No respiratory distress.      Breath sounds: Normal breath sounds. No stridor. No wheezing, rhonchi or rales.   Abdominal:      Palpations: Abdomen is soft.   Musculoskeletal:         General: Normal range of motion.      Cervical back: Normal range of motion and neck supple.   Lymphadenopathy:      Cervical: No cervical adenopathy.   Skin:     General: Skin is warm and dry.      Capillary Refill: Capillary refill takes less than 2 seconds.      Coloration: Skin is not jaundiced or pale.      Findings: No bruising or erythema.   Neurological:      General: No focal deficit present.      Mental Status: He is alert and oriented to person, place, and time.   Psychiatric:         Mood and Affect: Mood normal.         Behavior: Behavior normal.           LAB RESULTS  Lab Results (last 24 hours)     Procedure Component Value Units Date/Time    Rapid Strep A Screen - Swab, Throat [[id number]]  (Normal) Collected: 01/12/23 1413    Specimen: Swab from Throat Updated: 01/12/23 1433     STREP A PCR Not Detected    COVID-19 and FLU A/B PCR - Swab, Nasopharynx [[id number]]  (Normal) Collected: 01/12/23 1413    Specimen: Swab from Nasopharynx Updated: 01/12/23 1439     COVID19 Not Detected     Influenza A PCR Not Detected     Influenza B PCR Not Detected    Narrative:      Fact sheet for providers: https://www.fda.gov/media/111553/download    Fact sheet for patients:  https://www.fda.gov/media/108218/download    Test performed by PCR.            I ordered the above labs and reviewed the results    RADIOLOGY  XR Chest 1 View    Result Date: 1/12/2023  XR CHEST 1 VW Date of Exam: 1/12/2023 3:02 PM EST Indication: cough, fever (100 at home). Comparison: AP portable chest 10/26/2022 Findings: Heart size is mildly enlarged but stable with median sternotomy. Left chest wall pacemaker and single lead appear unchanged in position. No pleural effusion or pneumothorax or acute osseous abnormalities are identified. Advanced degenerative changes of the right acromial clavicular joint.     Impression: No acute chest finding. No significant change from 10/26/2022. Electronically Signed: Grace Dyson  1/12/2023 3:28 PM EST  Workstation ID: [id number]      I ordered the above radiologic testing and reviewed the results    PROCEDURES  Procedures      PROGRESS AND CONSULTS  ED Course as of 01/12/23 1600   Thu Jan 12, 2023   1444 COVID is negative, flu is negative, strep is negative, [VT]   1530 IMPRESSION:  Impression:  No acute chest finding. No significant change from 10/26/2022.     Electronically Signed: Grace Dyson  [VT]   1558 Discussed negative test results and x-ray with patient.  He is a non-smoker.  There is no evidence of COPD or asthma.  He has no respiratory distress.  We will begin symptom management for patient. [VT]      ED Course User Index  [VT] Geno Alberto, NIMCO           MEDICAL DECISION MAKING    MDM     You have been diagnosed with a non-COVID-19 viral illness, supportive care recommended.  Over-the-counter medications for symptomatic relief may include: Coricidin HBP products.   Over-the-counter supplements including vitamin C, zinc and magnesium may also be helpful.  Rotate Tylenol and/or ibuprofen every 3-4 hours as needed for fever/pain.  Return to ER for any concerns.  Please follow-up with primary care physician in 3 to 4 days, if your symptoms continue, you  may require additional treatment at that time.  DIAGNOSIS  Final diagnoses:   Viral URI with cough       Latest Documented Vital Signs:  As of 16:00 EST  BP- 140/72 HR- 93 Temp- 98.6 °F (37 °C) (Temporal) O2 sat- 96%    DISPOSITION    Discussed pertinent findings with the patient/family.  Patient/Family voiced understanding of need to follow-up for recheck and further testing as needed.  Return to the Emergency Department warnings were given.         Medication List      New Prescriptions    benzonatate 200 MG capsule  Commonly known as: TESSALON  Take 1 capsule by mouth 3 (Three) Times a Day As Needed for Cough.     methylPREDNISolone 4 MG dose pack  Commonly known as: MEDROL  Take as directed on package instructions.           Where to Get Your Medications      These medications were sent to [id number] [place] - [address] AT [place] [address] [address] - [phone]  - [phone] FX  [address] [facility] [place] [address]    Phone: [phone]   · benzonatate 200 MG capsule  · methylPREDNISolone 4 MG dose pack              Follow-up Information     Makenna Motta MD. Call in 3 days.    Specialty: Internal Medicine  Why: As needed, If symptoms worsen  Contact information:  [address] [facility] [place] [address] [phone]                           Dictated utilizing Dragon dictation

## 2023-01-12 NOTE — DISCHARGE INSTRUCTIONS
You have been diagnosed with a non-COVID-19 viral illness, supportive care recommended.  Over-the-counter medications for symptomatic relief may include: Coricidin HBP products.   Over-the-counter supplements including vitamin C, zinc and magnesium may also be helpful.  Rotate Tylenol and/or ibuprofen every 3-4 hours as needed for fever/pain.  Return to ER for any concerns.  Please follow-up with primary care physician in 3 to 4 days, if your symptoms continue, you may require additional treatment at that time.

## 2023-01-27 ENCOUNTER — OFFICE VISIT (OUTPATIENT)
Dept: CARDIOLOGY | Facility: CLINIC | Age: 83
End: 2023-01-27
Payer: MEDICARE

## 2023-01-27 VITALS
SYSTOLIC BLOOD PRESSURE: 142 MMHG | BODY MASS INDEX: 24.92 KG/M2 | HEIGHT: 73 IN | HEART RATE: 87 BPM | OXYGEN SATURATION: 98 % | WEIGHT: 188 LBS | DIASTOLIC BLOOD PRESSURE: 72 MMHG

## 2023-01-27 DIAGNOSIS — I47.20 VT (VENTRICULAR TACHYCARDIA): ICD-10-CM

## 2023-01-27 DIAGNOSIS — I25.810 CORONARY ARTERY DISEASE INVOLVING CORONARY BYPASS GRAFT OF NATIVE HEART WITHOUT ANGINA PECTORIS: ICD-10-CM

## 2023-01-27 DIAGNOSIS — I10 ESSENTIAL HYPERTENSION: ICD-10-CM

## 2023-01-27 DIAGNOSIS — Z95.810 PRESENCE OF AUTOMATIC IMPLANTABLE CARDIOVERTER-DEFIBRILLATOR: ICD-10-CM

## 2023-01-27 DIAGNOSIS — I25.5 ISCHEMIC CARDIOMYOPATHY: Primary | ICD-10-CM

## 2023-01-27 DIAGNOSIS — E78.2 HYPERLIPIDEMIA, MIXED: ICD-10-CM

## 2023-01-27 PROCEDURE — 93000 ELECTROCARDIOGRAM COMPLETE: CPT | Performed by: INTERNAL MEDICINE

## 2023-01-27 PROCEDURE — 99214 OFFICE O/P EST MOD 30 MIN: CPT | Performed by: INTERNAL MEDICINE

## 2023-01-27 NOTE — PROGRESS NOTES
CC--Recurrent VT, ischemic cardiomyopathy      Sub  82-year-old pleasant patient with VT and ICD and cardiomyopathy comes in for follow-up.  Patient had prior atrial fibrillation from hyperthyroidism which has been treated with resolution of arrhythmia.  Patient had prior COVID infection in September 2022.  Patient had prior history of coronary disease with prior bypass surgery with ischemic cardiomyopathy.        My previous history is attached below which is for reference only and has been reviewed        Sub--Pt here for follow up and he had prior history of incessant VT needing and ablation several months ago .  patient started having recurrent VT needing a redo VT ablation  and post ablation a week later developed dysphagia and needed endoscopy the patient underwent esophageal stricture dilatation with improvement of symptoms and resolution of dysphagia .Patient is on amiodarone because of extensive scarring and multiple episodes of VT and since ablation without recurrent ICD therapy--patient has history of chronic ischemic heart disease previous myocardial infarction, s/p previous CABG,  LV systolic dysfunction with   Last  LV ejection fraction of 25 30% which came up to 30- 35% by latest echocardiogram.   He is  status post ICD implant.  denies any chest pain or shortness of breath or syncope--patient had prior ACE inhibitor induced cough and currently is on Aldactone .Patient denies any new symptoms of VT or syncope and is compliant with current medical regimen  He also developed some right shoulder pain [facility] and uses Biofreeze for relief of symptoms   He denies any palpitations or syncope or any chest pain or dyspnea.  Patient could not tolerate additional beta-blockers on top of amiodarone in the past  Patient has noticed photosensitivity of his skin in the past and amiodarone dose reduced to 100 mg a day and his symptoms have reduced and comes in for follow-up   Patient has  noticed some burning sensation in lower extremities and was seen at [facility]] ER on last clinic prescription for gabapentin and he also complains of some calf pain when he is ambulating with some blocks gets better after increased activity and it is not nocturnal in nature--arterial duplex scan without any significant abnormalities  He also has history of orthostatic hypotension started on midodrine and complains of intermittent headache  Patient is doing clinically well from cardiac standpoint of view  Complains of recurrent redness in the left lower extremity with tenderness and warmth around the ankle joint and is being treated with antibiotics and has been to [facility] twice--is being evaluated by vascular surgery and infectious disease specialist and he had his symptoms since 2010 with increasing frequency recently.    Since last seen patient had ICD shock therapy for conducted atrial fibrillation and was brought in back for evaluation  Atrial fibrillation was not seen with prior interrogation of his device and ICD was reprogrammed on recommendation to avoid unnecessary therapy for conducted atrial fibrillation  Patient complains of diffuse subcutaneous bleeding with dual antiplatelet agents and also complains of weight loss and is being [facility] by [facility]  Patient was noted to have markedly abnormal thyroid function test consistent with hyperthyroidism and immediately amiodarone was stopped and started on beta-blockers and off Eliquis         Past Medical History:     Reviewed history from 03/13/2018 and no changes required:        Coronary Heart Disease:S/P CABG and PCI         Hypertension        Myocardial Infarction: Inferior MI         Hx: Cellulitis of left lower leg        Dyslipidemia         Pinched nerve L4-L5         Prostate cancer         Cardiomyopathy : ICD implantation         Physical Exam    General:      well developed, well nourished, in no acute distress.    Head:       normocephalic and atraumatic.    Eyes:      PERRL/EOM intact, conjunctivae and sclerae clear without nystagmus.    Neck:      no  thyromegaly, trachea central with normal respiratory effort  Lungs:      clear bilaterally to auscultation.    Heart:       regular rate and rhythm, S1, S2 without murmurs, rubs, or gallops  Skin:      intact without lesions or rashes.    Psych:      alert and cooperative; normal mood and affect; normal attention span and concentration.           assessment plan    Prior history of VT storm leading to VT ablations in the past with resolution  Ischemic cardiomyopathy stable  Hyperthyroidism treated by [facility] with resolution  Only artery disease stable without angina  History of atrial fibrillation with hyperthyroidism resolved after resolution of endocrine problem  Prior history of dysphagia needing esophageal stricture dilatation  Hypertension controlled  History of orthostatic hypotension on midodrine which precludes usage of further therapy with Entresto or ACE inhibitor's  ICD home monitoring reviewed  Follow-up in 6 months        ECG 12 Lead    Date/Time: 1/27/2023 12:43 PM  Performed by: Constantino Arvizu MD  Authorized by: Constantino Arvizu MD   Comparison: compared with previous ECG   Similar to previous ECG  Rhythm: sinus rhythm  Rate: normal  Conduction: non-specific intraventricular conduction delay  Other findings: left ventricular hypertrophy          Electronically signed by Constantino Arvizu MD, 01/27/23, 12:43 PM EST.

## 2023-02-24 ENCOUNTER — APPOINTMENT (OUTPATIENT)
Dept: GENERAL RADIOLOGY | Facility: HOSPITAL | Age: 83
End: 2023-02-24
Payer: MEDICARE

## 2023-02-24 ENCOUNTER — HOSPITAL ENCOUNTER (EMERGENCY)
Facility: HOSPITAL | Age: 83
Discharge: HOME | End: 2023-02-24
Attending: EMERGENCY MEDICINE | Admitting: EMERGENCY MEDICINE
Payer: MEDICARE

## 2023-02-24 ENCOUNTER — APPOINTMENT (OUTPATIENT)
Dept: ULTRASOUND IMAGING | Facility: HOSPITAL | Age: 83
End: 2023-02-24
Payer: MEDICARE

## 2023-02-24 VITALS
OXYGEN SATURATION: 96 % | SYSTOLIC BLOOD PRESSURE: 122 MMHG | TEMPERATURE: 98.6 F | DIASTOLIC BLOOD PRESSURE: 65 MMHG | HEIGHT: 73 IN | BODY MASS INDEX: 24.92 KG/M2 | RESPIRATION RATE: 18 BRPM | WEIGHT: 188 LBS | HEART RATE: 74 BPM

## 2023-02-24 DIAGNOSIS — J18.9 PNEUMONIA OF RIGHT LOWER LOBE DUE TO INFECTIOUS ORGANISM: ICD-10-CM

## 2023-02-24 DIAGNOSIS — R07.89 RIGHT-SIDED CHEST WALL PAIN: ICD-10-CM

## 2023-02-24 DIAGNOSIS — M79.601 MUSCULOSKELETAL PAIN OF RIGHT UPPER EXTREMITY: Primary | ICD-10-CM

## 2023-02-24 LAB
ALBUMIN SERPL-MCNC: 4 G/DL (ref 3.5–5.2)
ALBUMIN/GLOB SERPL: 1.4 G/DL
ALP SERPL-CCNC: 94 U/L (ref 39–117)
ALT SERPL W P-5'-P-CCNC: 15 U/L (ref 1–41)
ANION GAP SERPL CALCULATED.3IONS-SCNC: 9 MMOL/L (ref 5–15)
AST SERPL-CCNC: 18 U/L (ref 1–40)
BASOPHILS # BLD AUTO: 0.03 10*3/MM3 (ref 0–0.2)
BASOPHILS NFR BLD AUTO: 0.5 % (ref 0–1.5)
BILIRUB SERPL-MCNC: 0.4 MG/DL (ref 0–1.2)
BUN SERPL-MCNC: 17 MG/DL (ref 8–23)
BUN/CREAT SERPL: 18.7 (ref 7–25)
CALCIUM SPEC-SCNC: 9.6 MG/DL (ref 8.6–10.5)
CHLORIDE SERPL-SCNC: 103 MMOL/L (ref 98–107)
CO2 SERPL-SCNC: 24 MMOL/L (ref 22–29)
CREAT SERPL-MCNC: 0.91 MG/DL (ref 0.76–1.27)
DEPRECATED RDW RBC AUTO: 46.9 FL (ref 37–54)
EGFRCR SERPLBLD CKD-EPI 2021: 85.2 ML/MIN/1.73
EOSINOPHIL # BLD AUTO: 0.23 10*3/MM3 (ref 0–0.4)
EOSINOPHIL NFR BLD AUTO: 3.7 % (ref 0.3–6.2)
ERYTHROCYTE [DISTWIDTH] IN BLOOD BY AUTOMATED COUNT: 13.1 % (ref 12.3–15.4)
GLOBULIN UR ELPH-MCNC: 2.9 GM/DL
GLUCOSE SERPL-MCNC: 106 MG/DL (ref 65–99)
HCT VFR BLD AUTO: 38.3 % (ref 37.5–51)
HGB BLD-MCNC: 12.7 G/DL (ref 13–17.7)
IMM GRANULOCYTES # BLD AUTO: 0.01 10*3/MM3 (ref 0–0.05)
IMM GRANULOCYTES NFR BLD AUTO: 0.2 % (ref 0–0.5)
LYMPHOCYTES # BLD AUTO: 1.65 10*3/MM3 (ref 0.7–3.1)
LYMPHOCYTES NFR BLD AUTO: 26.2 % (ref 19.6–45.3)
MCH RBC QN AUTO: 31.5 PG (ref 26.6–33)
MCHC RBC AUTO-ENTMCNC: 33.2 G/DL (ref 31.5–35.7)
MCV RBC AUTO: 95 FL (ref 79–97)
MONOCYTES # BLD AUTO: 0.63 10*3/MM3 (ref 0.1–0.9)
MONOCYTES NFR BLD AUTO: 10 % (ref 5–12)
NEUTROPHILS NFR BLD AUTO: 3.75 10*3/MM3 (ref 1.7–7)
NEUTROPHILS NFR BLD AUTO: 59.4 % (ref 42.7–76)
PLATELET # BLD AUTO: 197 10*3/MM3 (ref 140–450)
PMV BLD AUTO: 9.6 FL (ref 6–12)
POTASSIUM SERPL-SCNC: 4.3 MMOL/L (ref 3.5–5.2)
PROT SERPL-MCNC: 6.9 G/DL (ref 6–8.5)
RBC # BLD AUTO: 4.03 10*6/MM3 (ref 4.14–5.8)
SODIUM SERPL-SCNC: 136 MMOL/L (ref 136–145)
TROPONIN T SERPL HS-MCNC: 19 NG/L
WBC NRBC COR # BLD: 6.3 10*3/MM3 (ref 3.4–10.8)

## 2023-02-24 PROCEDURE — 96365 THER/PROPH/DIAG IV INF INIT: CPT

## 2023-02-24 PROCEDURE — 85025 COMPLETE CBC W/AUTO DIFF WBC: CPT | Performed by: EMERGENCY MEDICINE

## 2023-02-24 PROCEDURE — 99283 EMERGENCY DEPT VISIT LOW MDM: CPT

## 2023-02-24 PROCEDURE — 84484 ASSAY OF TROPONIN QUANT: CPT | Performed by: EMERGENCY MEDICINE

## 2023-02-24 PROCEDURE — 99284 EMERGENCY DEPT VISIT MOD MDM: CPT | Performed by: EMERGENCY MEDICINE

## 2023-02-24 PROCEDURE — 71045 X-RAY EXAM CHEST 1 VIEW: CPT

## 2023-02-24 PROCEDURE — 93971 EXTREMITY STUDY: CPT

## 2023-02-24 PROCEDURE — 25010000002 CEFTRIAXONE PER 250 MG: Performed by: EMERGENCY MEDICINE

## 2023-02-24 PROCEDURE — 80053 COMPREHEN METABOLIC PANEL: CPT | Performed by: EMERGENCY MEDICINE

## 2023-02-24 PROCEDURE — 93005 ELECTROCARDIOGRAM TRACING: CPT | Performed by: EMERGENCY MEDICINE

## 2023-02-24 RX ORDER — DOXYCYCLINE HYCLATE 100 MG/1
100 TABLET, DELAYED RELEASE ORAL 2 TIMES DAILY
Qty: 14 TABLET | Refills: 0 | Status: SHIPPED | OUTPATIENT
Start: 2023-02-24 | End: 2023-03-03

## 2023-02-24 RX ORDER — ASPIRIN 325 MG
325 TABLET ORAL ONCE
Status: DISCONTINUED | OUTPATIENT
Start: 2023-02-24 | End: 2023-02-24 | Stop reason: HOSPADM

## 2023-02-24 RX ORDER — SODIUM CHLORIDE 0.9 % (FLUSH) 0.9 %
10 SYRINGE (ML) INJECTION AS NEEDED
Status: DISCONTINUED | OUTPATIENT
Start: 2023-02-24 | End: 2023-02-24 | Stop reason: HOSPADM

## 2023-02-24 RX ADMIN — CEFTRIAXONE 1 G: 1 INJECTION, POWDER, FOR SOLUTION INTRAMUSCULAR; INTRAVENOUS at 18:31

## 2023-02-27 LAB — QT INTERVAL: 371 MS

## 2023-03-02 PROCEDURE — 93295 DEV INTERROG REMOTE 1/2/MLT: CPT | Performed by: INTERNAL MEDICINE

## 2023-03-02 PROCEDURE — 93296 REM INTERROG EVL PM/IDS: CPT | Performed by: INTERNAL MEDICINE

## 2023-03-18 ENCOUNTER — APPOINTMENT (OUTPATIENT)
Dept: GENERAL RADIOLOGY | Facility: HOSPITAL | Age: 83
End: 2023-03-18
Payer: MEDICARE

## 2023-03-18 ENCOUNTER — APPOINTMENT (OUTPATIENT)
Dept: ULTRASOUND IMAGING | Facility: HOSPITAL | Age: 83
End: 2023-03-18
Payer: MEDICARE

## 2023-03-18 ENCOUNTER — HOSPITAL ENCOUNTER (EMERGENCY)
Facility: HOSPITAL | Age: 83
Discharge: HOME | End: 2023-03-18
Attending: EMERGENCY MEDICINE | Admitting: EMERGENCY MEDICINE
Payer: MEDICARE

## 2023-03-18 VITALS
OXYGEN SATURATION: 94 % | RESPIRATION RATE: 16 BRPM | SYSTOLIC BLOOD PRESSURE: 132 MMHG | HEART RATE: 69 BPM | DIASTOLIC BLOOD PRESSURE: 70 MMHG | TEMPERATURE: 97.9 F

## 2023-03-18 DIAGNOSIS — L03.116 CELLULITIS OF LEFT LOWER EXTREMITY WITHOUT FOOT: Primary | ICD-10-CM

## 2023-03-18 LAB
ALBUMIN SERPL-MCNC: 3.8 G/DL (ref 3.5–5.2)
ALBUMIN/GLOB SERPL: 1.3 G/DL
ALP SERPL-CCNC: 82 U/L (ref 39–117)
ALT SERPL W P-5'-P-CCNC: 13 U/L (ref 1–41)
ANION GAP SERPL CALCULATED.3IONS-SCNC: 9.3 MMOL/L (ref 5–15)
AST SERPL-CCNC: 21 U/L (ref 1–40)
BASOPHILS # BLD AUTO: 0.04 10*3/MM3 (ref 0–0.2)
BASOPHILS NFR BLD AUTO: 0.6 % (ref 0–1.5)
BILIRUB SERPL-MCNC: 0.3 MG/DL (ref 0–1.2)
BUN SERPL-MCNC: 16 MG/DL (ref 8–23)
BUN/CREAT SERPL: 18.4 (ref 7–25)
CALCIUM SPEC-SCNC: 9.3 MG/DL (ref 8.6–10.5)
CHLORIDE SERPL-SCNC: 104 MMOL/L (ref 98–107)
CO2 SERPL-SCNC: 22.7 MMOL/L (ref 22–29)
CREAT SERPL-MCNC: 0.87 MG/DL (ref 0.76–1.27)
D-LACTATE SERPL-SCNC: 1.4 MMOL/L (ref 0.5–2)
DEPRECATED RDW RBC AUTO: 47.7 FL (ref 37–54)
EGFRCR SERPLBLD CKD-EPI 2021: 87.2 ML/MIN/1.73
EOSINOPHIL # BLD AUTO: 0.27 10*3/MM3 (ref 0–0.4)
EOSINOPHIL NFR BLD AUTO: 4 % (ref 0.3–6.2)
ERYTHROCYTE [DISTWIDTH] IN BLOOD BY AUTOMATED COUNT: 13.3 % (ref 12.3–15.4)
GEN 5 2HR TROPONIN T REFLEX: 18 NG/L
GLOBULIN UR ELPH-MCNC: 2.9 GM/DL
GLUCOSE SERPL-MCNC: 102 MG/DL (ref 65–99)
HCT VFR BLD AUTO: 39.5 % (ref 37.5–51)
HGB BLD-MCNC: 13 G/DL (ref 13–17.7)
IMM GRANULOCYTES # BLD AUTO: 0.01 10*3/MM3 (ref 0–0.05)
IMM GRANULOCYTES NFR BLD AUTO: 0.1 % (ref 0–0.5)
LYMPHOCYTES # BLD AUTO: 1.77 10*3/MM3 (ref 0.7–3.1)
LYMPHOCYTES NFR BLD AUTO: 26.3 % (ref 19.6–45.3)
MCH RBC QN AUTO: 31.3 PG (ref 26.6–33)
MCHC RBC AUTO-ENTMCNC: 32.9 G/DL (ref 31.5–35.7)
MCV RBC AUTO: 95.2 FL (ref 79–97)
MONOCYTES # BLD AUTO: 0.72 10*3/MM3 (ref 0.1–0.9)
MONOCYTES NFR BLD AUTO: 10.7 % (ref 5–12)
NEUTROPHILS NFR BLD AUTO: 3.91 10*3/MM3 (ref 1.7–7)
NEUTROPHILS NFR BLD AUTO: 58.3 % (ref 42.7–76)
NT-PROBNP SERPL-MCNC: 684.5 PG/ML (ref 0–1800)
PLATELET # BLD AUTO: 188 10*3/MM3 (ref 140–450)
PMV BLD AUTO: 9.7 FL (ref 6–12)
POTASSIUM SERPL-SCNC: 4.1 MMOL/L (ref 3.5–5.2)
PROCALCITONIN SERPL-MCNC: 0.05 NG/ML (ref 0–0.25)
PROT SERPL-MCNC: 6.7 G/DL (ref 6–8.5)
RBC # BLD AUTO: 4.15 10*6/MM3 (ref 4.14–5.8)
SODIUM SERPL-SCNC: 136 MMOL/L (ref 136–145)
TROPONIN T DELTA: -1 NG/L
TROPONIN T SERPL HS-MCNC: 19 NG/L
WBC NRBC COR # BLD: 6.72 10*3/MM3 (ref 3.4–10.8)

## 2023-03-18 PROCEDURE — 99283 EMERGENCY DEPT VISIT LOW MDM: CPT | Performed by: NURSE PRACTITIONER

## 2023-03-18 PROCEDURE — 83880 ASSAY OF NATRIURETIC PEPTIDE: CPT | Performed by: NURSE PRACTITIONER

## 2023-03-18 PROCEDURE — 36415 COLL VENOUS BLD VENIPUNCTURE: CPT

## 2023-03-18 PROCEDURE — 71045 X-RAY EXAM CHEST 1 VIEW: CPT

## 2023-03-18 PROCEDURE — 80053 COMPREHEN METABOLIC PANEL: CPT | Performed by: NURSE PRACTITIONER

## 2023-03-18 PROCEDURE — 84484 ASSAY OF TROPONIN QUANT: CPT | Performed by: NURSE PRACTITIONER

## 2023-03-18 PROCEDURE — 84145 PROCALCITONIN (PCT): CPT | Performed by: NURSE PRACTITIONER

## 2023-03-18 PROCEDURE — 93010 ELECTROCARDIOGRAM REPORT: CPT | Performed by: EMERGENCY MEDICINE

## 2023-03-18 PROCEDURE — 83605 ASSAY OF LACTIC ACID: CPT | Performed by: NURSE PRACTITIONER

## 2023-03-18 PROCEDURE — 96365 THER/PROPH/DIAG IV INF INIT: CPT

## 2023-03-18 PROCEDURE — 93005 ELECTROCARDIOGRAM TRACING: CPT

## 2023-03-18 PROCEDURE — 99283 EMERGENCY DEPT VISIT LOW MDM: CPT

## 2023-03-18 PROCEDURE — 93971 EXTREMITY STUDY: CPT

## 2023-03-18 PROCEDURE — 85025 COMPLETE CBC W/AUTO DIFF WBC: CPT | Performed by: NURSE PRACTITIONER

## 2023-03-18 RX ORDER — CLINDAMYCIN PHOSPHATE 600 MG/50ML
600 INJECTION, SOLUTION INTRAVENOUS ONCE
Status: COMPLETED | OUTPATIENT
Start: 2023-03-18 | End: 2023-03-18

## 2023-03-18 RX ORDER — CLINDAMYCIN HYDROCHLORIDE 300 MG/1
300 CAPSULE ORAL 3 TIMES DAILY
Qty: 30 CAPSULE | Refills: 0 | Status: SHIPPED | OUTPATIENT
Start: 2023-03-18 | End: 2023-03-28

## 2023-03-18 RX ORDER — SODIUM CHLORIDE 0.9 % (FLUSH) 0.9 %
10 SYRINGE (ML) INJECTION AS NEEDED
Status: DISCONTINUED | OUTPATIENT
Start: 2023-03-18 | End: 2023-03-18 | Stop reason: HOSPADM

## 2023-03-18 RX ADMIN — CLINDAMYCIN PHOSPHATE 600 MG: 600 INJECTION, SOLUTION INTRAVENOUS at 18:31

## 2023-03-18 NOTE — ED TRIAGE NOTES
Pt reports left leg redness and edema that started a week ago. Pt reports sharp chest pain that started Saturday and right arm pain.

## 2023-03-18 NOTE — DISCHARGE INSTRUCTIONS
If no improvement or WORSENING in 48 hours, go to [facility] as you will likely require admission    Elevate leg    Avoid compression stockings right now until this is improved    Return Precautions    Although you are being discharged from the ED today, I encourage you to return for worsening symptoms.  Things can, and do, change such that treatment at home with medication may not be adequate.      Specifically, return for any of the following:    Chest pain, shortness of breath, pain or nausea and vomiting not controlled by medications provided.    Please make a follow up with your Primary Care Provider for a blood pressure recheck.

## 2023-03-18 NOTE — FSED PROVIDER NOTE
"EMERGENCY DEPARTMENT ENCOUNTER    Room Number:  03/03  Date seen:  3/19/2023  Time seen: 14:52 EDT  PCP: Makenna Motta MD  Historian: Patient    HPI:  Chief complaint: \"Cellulitis left lower extremity \"  A complete HPI/ROS/PMH/PSH/SH/FH are unobtainable due to: Not applicable  Context:Deion Nicholas is a 82 y.o. male with past medical history significant for coronary artery disease, ischemic cardiomyopathy, AICD in place, hypertension, hyperlipidemia, peripheral neuropathy and lower extremity cellulitis who presents to the ED with c/o 2 days of pain that is described as burning and \"fever in my leg\" with erythema and edema to the left lower extremity below the knee.  He reports this is similar to his history of left lower extremity cellulitis for which she was hospitalized in July 2021.  He states that when he has swelling he wears compression stockings and that when he went to remove the compression stocking the other day he nicked a little tiny area with his fingernail on his leg and he thinks that is what started this.  This morning, he complained of some intermittent chest tightness that is described as soreness that comes and goes and that he has had shortness of breath because \"no exercise like I should anymore\".  He denies any sweating or fever.      The patient was placed in a mask in triage, hand hygiene was performed before and after my interaction with the patient.  I wore a mask, safety glasses and gloves during my entire interaction with the patient.    Social determinants of health which may impact assessment: n/a    Review of prior external notes (non-ED): I reviewed patient's discharge summary dated July 15, 2021 where he was admitted for cellulitis to the left lower extremity, and Baker's cyst.  Patient has a history of chronic left lower extremity intermittent swelling and does wear compression socks at times.    Review of prior external test results outside of this encounter: " n/a    ALLERGIES  Lovastatin, Pravastatin, Simvastatin, and Spironolactone    PAST MEDICAL HISTORY  Active Ambulatory Problems     Diagnosis Date Noted   • Normocytic anemia 06/24/2019   • Coronary artery disease 10/02/2013   • Ischemic cardiomyopathy 11/09/2016   • Paroxysmal ventricular tachycardia 05/03/2019   • Presence of automatic implantable cardioverter-defibrillator 01/31/2018   • Essential hypertension 08/27/2019   • Hyperlipidemia, mixed 08/27/2019   • Peripheral neuropathy 05/11/2021   • Cellulitis of left lower extremity without foot 05/12/2021   • GERD without esophagitis 05/12/2021   • B12 deficiency 05/12/2021   • Orthostatic hypotension 05/12/2021   • Tinea pedis of left foot 05/13/2021   • Lower extremity pain, left 05/29/2021   • Cellulitis of left leg 07/13/2021   • Baker's cyst of knee, left 07/13/2021   • Gynecomastia, male 09/17/2021   • Hyperthyroidism 11/29/2021   • Ascending aortic aneurysm 12/11/2021   • Thyroid nodule 03/03/2022   • Dizziness 10/26/2022     Resolved Ambulatory Problems     Diagnosis Date Noted   • Shortness of breath 06/23/2019   • Esophageal dysphagia 06/22/2019   • Shortness of breath 06/24/2019   • Chest pain 08/26/2019   • Left leg cellulitis 06/16/2021     Past Medical History:   Diagnosis Date   • Aneurysm (HCC)    • Cardiomyopathy (HCC)    • Cellulitis of left lower extremity 2010   • CHD (coronary heart disease)    • Dyslipidemia    • History of ventricular tachycardia    • Hypertension    • Myocardial infarction (HCC)    • Pinched nerve    • Prostate cancer (HCC)        PAST SURGICAL HISTORY  Past Surgical History:   Procedure Laterality Date   • ANGIOPLASTY  2000 04/1996 Stent: Palmaz- Huy stent/LAD 07/1996-RCA: 2000-Tetra stent Guidant to proximal RCA, mid to distal artery 2 sequential Guidant Tetra stents   • APPENDECTOMY     • CARDIAC ABLATION  April and May 2019   • CARDIAC CATHETERIZATION  07/2017    1996 x2, Nov. 2000, 05/2010-cath 08/2015-no stents     • CARDIAC DEFIBRILLATOR PLACEMENT     • COLONOSCOPY W/ POLYPECTOMY      Mult colonoscopy's for polyp resection    • CORONARY ARTERY BYPASS GRAFT  05/2010    x5 vessel: LMA to diagonal, LAD, intermedius, obtuse marginal, RCA   • CORONARY STENT PLACEMENT     • ENDOSCOPY N/A 2019    Procedure: ESOPHAGOGASTRODUODENOSCOPY with dilitation Bougie 50, 54;  Surgeon: Roddy Coronel MD;  Location: [facility] ENDOSCOPY;  Service: Gastroenterology   • INSERT / REPLACE / REMOVE PACEMAKER     • KNEE OPEN LATERAL RELEASE Left     reduction   • OTHER SURGICAL HISTORY  2018    ICD implantation   • PROSTATE SURGERY      Robiotic surgery- Cyber Knife:       FAMILY HISTORY  Family History   Problem Relation Age of Onset   • Pancreatic cancer Mother    • Heart disease Father        SOCIAL HISTORY  Social History     Socioeconomic History   • Marital status:    Tobacco Use   • Smoking status: Former     Packs/day: 1.00     Years: 17.00     Pack years: 17.00     Types: Cigarettes     Quit date: 2002     Years since quittin.7   • Smokeless tobacco: Never   Vaping Use   • Vaping Use: Never used   Substance and Sexual Activity   • 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     PHYSICAL EXAM  Vitals:    23 1713   BP:    Pulse: 69   Resp:    Temp:    SpO2: 94%       I have reviewed the triage vital signs and nursing notes.  Physical Exam    GENERAL: not distressed  HENT: nares patent  EYES: no scleral icterus  NECK: no ROM limitations  CV: regular rhythm, regular rate, no murmur  RESPIRATORY: normal effort, CTAB  ABDOMEN: soft  : deferred  MUSCULOSKELETAL: LLE below the knee with moderate edema and mild erythema.  There is no loss sensation.  DP/PT 2+  NEURO: alert, moves all extremities, follows commands  SKIN: warm,  dry    LAB RESULTS  Recent Results (from the past 24 hour(s))   ECG 12 Lead Chest Pain    Collection Time: 03/18/23  2:56 PM   Result Value Ref Range    QT Interval 391 ms   Comprehensive Metabolic Panel    Collection Time: 03/18/23  3:09 PM    Specimen: Blood   Result Value Ref Range    Glucose 102 (H) 65 - 99 mg/dL    BUN 16 8 - 23 mg/dL    Creatinine 0.87 0.76 - 1.27 mg/dL    Sodium 136 136 - 145 mmol/L    Potassium 4.1 3.5 - 5.2 mmol/L    Chloride 104 98 - 107 mmol/L    CO2 22.7 22.0 - 29.0 mmol/L    Calcium 9.3 8.6 - 10.5 mg/dL    Total Protein 6.7 6.0 - 8.5 g/dL    Albumin 3.8 3.5 - 5.2 g/dL    ALT (SGPT) 13 1 - 41 U/L    AST (SGOT) 21 1 - 40 U/L    Alkaline Phosphatase 82 39 - 117 U/L    Total Bilirubin 0.3 0.0 - 1.2 mg/dL    Globulin 2.9 gm/dL    A/G Ratio 1.3 g/dL    BUN/Creatinine Ratio 18.4 7.0 - 25.0    Anion Gap 9.3 5.0 - 15.0 mmol/L    eGFR 87.2 >60.0 mL/min/1.73   BNP    Collection Time: 03/18/23  3:09 PM    Specimen: Blood   Result Value Ref Range    proBNP 684.5 0.0 - 1,800.0 pg/mL   Procalcitonin    Collection Time: 03/18/23  3:09 PM    Specimen: Blood   Result Value Ref Range    Procalcitonin 0.05 0.00 - 0.25 ng/mL   Lactic Acid, Plasma    Collection Time: 03/18/23  3:09 PM    Specimen: Blood   Result Value Ref Range    Lactate 1.4 0.5 - 2.0 mmol/L   CBC Auto Differential    Collection Time: 03/18/23  3:09 PM    Specimen: Blood   Result Value Ref Range    WBC 6.72 3.40 - 10.80 10*3/mm3    RBC 4.15 4.14 - 5.80 10*6/mm3    Hemoglobin 13.0 13.0 - 17.7 g/dL    Hematocrit 39.5 37.5 - 51.0 %    MCV 95.2 79.0 - 97.0 fL    MCH 31.3 26.6 - 33.0 pg    MCHC 32.9 31.5 - 35.7 g/dL    RDW 13.3 12.3 - 15.4 %    RDW-SD 47.7 37.0 - 54.0 fl    MPV 9.7 6.0 - 12.0 fL    Platelets 188 140 - 450 10*3/mm3    Neutrophil % 58.3 42.7 - 76.0 %    Lymphocyte % 26.3 19.6 - 45.3 %    Monocyte % 10.7 5.0 - 12.0 %    Eosinophil % 4.0 0.3 - 6.2 %    Basophil % 0.6 0.0 - 1.5 %    Immature Grans % 0.1 0.0 - 0.5 %    Neutrophils,  Absolute 3.91 1.70 - 7.00 10*3/mm3    Lymphocytes, Absolute 1.77 0.70 - 3.10 10*3/mm3    Monocytes, Absolute 0.72 0.10 - 0.90 10*3/mm3    Eosinophils, Absolute 0.27 0.00 - 0.40 10*3/mm3    Basophils, Absolute 0.04 0.00 - 0.20 10*3/mm3    Immature Grans, Absolute 0.01 0.00 - 0.05 10*3/mm3   High Sensitivity Troponin T    Collection Time: 03/18/23  4:20 PM    Specimen: Blood   Result Value Ref Range    HS Troponin T 19 (H) <15 ng/L   High Sensitivity Troponin T 2Hr    Collection Time: 03/18/23  5:11 PM    Specimen: Blood   Result Value Ref Range    HS Troponin T 18 (H) <15 ng/L    Troponin T Delta -1 >=-4 - <+4 ng/L       Ordered the above labs and independently interpreted results.  My findings will be discussed in the ED course or medical decision making section below    RADIOLOGY RESULTS  XR Chest 1 View    Result Date: 3/18/2023  XR CHEST 1 VW Date of Exam: 3/18/2023 4:43 PM EDT Indication: eval pneumonia resolution. Comparison: 2/24/2023 Findings: Left-sided AICD noted. Stable mild symmetrically. Status post sternotomy and CABG. The right midlung airspace disease appears resolved. Minimal right basilar atelectasis. The left lung is clear. No pneumothorax or pleural effusion. Osseous structures appear intact.     Impression: 1. Right midlung airspace disease resolved. 2. Minimal right basilar atelectasis. 3. Stable chronic findings above. Electronically Signed: Deep Tinsley  3/18/2023 5:20 PM EDT  Workstation ID: [id number]    US Venous Doppler Lower Extremity Left (duplex)    Result Date: 3/18/2023  US VENOUS DOPPLER LOWER EXTREMITY LEFT (DUPLEX) Date of Exam: 3/18/2023 4:05 PM EDT Indication: pain, swelling erythema, more likely cellulitis,. Comparison: None Technique:  Routine gray scale, color flow and spectral Doppler analysis of the left lower extremity. A complete venous study was performed with image documentation obtained per protocol. Findings: There is normal flow and compressibility within the left common  femoral vein, superficial femoral vein, deep femoral vein, popliteal vein and Limited visualized portions of the peroneal and posterior tibial veins.     Impression: Negative for left lower extremity DVT. Electronically Signed: Deep Mouser  3/18/2023 4:42 PM EDT  Workstation ID: [id number]       Ordered the above noted radiological studies.  Independently interpreted by me .  My findings will be discussed in the medical decision section below.     PROGRESS, DATA ANALYSIS, CONSULTS AND MEDICAL DECISION MAKING    Please note that this section constitutes my independent interpretation of clinical data including lab results, radiology, EKG's.  This constitutes my independent professional opinion regarding differential diagnosis and management of this patient.  It may include any factors such as history from outside sources, review of external records, social determinants of health, management of medications, response to those treatments, and discussions with other providers.      ED Course as of 03/19/23 0916   Sat Mar 18, 2023   1505 I discussed the plan with patient and his son to obtain labs, sepsis markers, and ultrasound to rule out DVT.  As far as his chest pain we will need to check cardiac markers and EKG. [EW]   1531 EKG          EKG time: 1456  Rhythm/Rate: 80, sinus rhythm  P waves and SC: normal SC  QRS, axis: normal QRS, IVCD  ST and T waves: no acute St/T wave abnormalities.     Interpreted Contemporaneously by me, independently viewed  Compared with prior dated 2/24/2023.  Reading similar and without ectopy today.    [EW]   1554 WBC: 6.72 [EW]   1554 Lactate: 1.4 [EW]   1639 Per Vascular tech the study is negative for DVT.  Official read to follow.   [EW]   1640 HS Troponin T(!): 19  Similar 3 weeks ago; will check reflex troponin at 1700 [EW]   1650 I have updated the patient on labs/studies so far.  WBC normal, lactic acid negative. He has no current complaints of chest pain.   [EW]   1725 I viewed chest  "x-ray in PACS.  My dependent interpretation are no focal infiltrates.  AICD in place. [EW]   1825 Pt's labs have finally resulted.  He reports to me a history of being unable to take oral antibiotics, \"They don't work for me and I always have to have IV\".  Despite this he doesn't want to go to hospital.  I discussed that his labs or exam does not meet admission criteria.  Shared decision making performed and I will plan on giving dose of IV clinda here then d/c on clinda.  If he has failure of this with no improvement OR worsening in 48 hours he will call PCP or present to [facility]. [EW]   1840 MDM/dispo: Patient's high-sensitivity troponin was 19 then 18 with a delta of -1.  He has known coronary artery disease.  He is not having any acute chest pain and his EKG is not abnormal.  With regard to the cellulitis to the left lower extremity he does have history of this that required hospitalization and IV antibiotics however on today's exam there is some edema and erythema.  His lactic acid is normal, no leukocytosis and ultrasound negative for DVT.  I did give him IV dose of clindamycin and will start him on clindamycin to start in the morning.  I have instructed him that if he gets worse and not better in the next 48 hours that he is to present to [facility].  I have also sent blood cultures and a Pro-Jorge to [facility] and if abnormal we will call him.  He is in agreement with this plan [EW]      ED Course User Index  [EW] Lorena Zarate APRN       Orders placed during this visit:  Orders Placed This Encounter   Procedures   • US Venous Doppler Lower Extremity Left (duplex)   • XR Chest 1 View   • Comprehensive Metabolic Panel   • BNP   • Lactic Acid, Plasma   • CBC Auto Differential   • High Sensitivity Troponin T   • High Sensitivity Troponin T 2Hr   • Pulse Oximetry Continuous   • ECG 12 Lead Chest Pain   • CBC & Differential   • ED Acknowledgement Form Needed;            DDX: DVT, recurring " cellulitis LLE  MDMsee ED course      DIAGNOSIS  Final diagnoses:   Cellulitis of left lower extremity without foot       FOLLOW-UP  Makenna Motta MD  [address] [facility] [place] [facility]  [phone]    Schedule an appointment as soon as possible for a visit   to be seen on MOnday        Latest Documented Vital Signs:  As of 09:16 EDT  BP- 132/70 HR- 69 Temp- 97.9 °F (36.6 °C) (Temporal) O2 sat- 94%    Please note that portions of this were completed with a voice recognition program.     Note Disclaimer: At [facility], we believe that sharing information builds trust and better relationships. You are receiving this note because you are receiving care at [facility] or recently visited. It is possible you will see health information before a provider has talked with you about it. This kind of information can be easy to misunderstand. To help you fully understand what it means for your health, we urge you to discuss this note with your provider.

## 2023-03-19 LAB — QT INTERVAL: 391 MS

## 2023-04-05 ENCOUNTER — HOSPITAL ENCOUNTER (EMERGENCY)
Facility: HOSPITAL | Age: 83
Discharge: HOME | End: 2023-04-05
Attending: EMERGENCY MEDICINE | Admitting: EMERGENCY MEDICINE
Payer: MEDICARE

## 2023-04-05 ENCOUNTER — APPOINTMENT (OUTPATIENT)
Dept: ULTRASOUND IMAGING | Facility: HOSPITAL | Age: 83
End: 2023-04-05
Payer: MEDICARE

## 2023-04-05 VITALS
SYSTOLIC BLOOD PRESSURE: 116 MMHG | HEIGHT: 72 IN | WEIGHT: 187 LBS | OXYGEN SATURATION: 98 % | TEMPERATURE: 98 F | BODY MASS INDEX: 25.33 KG/M2 | DIASTOLIC BLOOD PRESSURE: 68 MMHG | RESPIRATION RATE: 16 BRPM | HEART RATE: 82 BPM

## 2023-04-05 DIAGNOSIS — L03.116 CELLULITIS OF LEFT LOWER EXTREMITY: Primary | ICD-10-CM

## 2023-04-05 LAB
ALBUMIN SERPL-MCNC: 4.3 G/DL (ref 3.5–5.2)
ALBUMIN/GLOB SERPL: 1.5 G/DL
ALP SERPL-CCNC: 97 U/L (ref 39–117)
ALT SERPL W P-5'-P-CCNC: 17 U/L (ref 1–41)
ANION GAP SERPL CALCULATED.3IONS-SCNC: 6.6 MMOL/L (ref 5–15)
AST SERPL-CCNC: 21 U/L (ref 1–40)
BASOPHILS # BLD AUTO: 0.03 10*3/MM3 (ref 0–0.2)
BASOPHILS NFR BLD AUTO: 0.6 % (ref 0–1.5)
BILIRUB SERPL-MCNC: 0.3 MG/DL (ref 0–1.2)
BUN SERPL-MCNC: 14 MG/DL (ref 8–23)
BUN/CREAT SERPL: 15.6 (ref 7–25)
CALCIUM SPEC-SCNC: 9.6 MG/DL (ref 8.6–10.5)
CHLORIDE SERPL-SCNC: 102 MMOL/L (ref 98–107)
CO2 SERPL-SCNC: 27.4 MMOL/L (ref 22–29)
CREAT SERPL-MCNC: 0.9 MG/DL (ref 0.76–1.27)
D-LACTATE SERPL-SCNC: 0.9 MMOL/L (ref 0.5–2)
DEPRECATED RDW RBC AUTO: 47 FL (ref 37–54)
EGFRCR SERPLBLD CKD-EPI 2021: 86.3 ML/MIN/1.73
EOSINOPHIL # BLD AUTO: 0.26 10*3/MM3 (ref 0–0.4)
EOSINOPHIL NFR BLD AUTO: 5 % (ref 0.3–6.2)
ERYTHROCYTE [DISTWIDTH] IN BLOOD BY AUTOMATED COUNT: 13.3 % (ref 12.3–15.4)
GLOBULIN UR ELPH-MCNC: 2.9 GM/DL
GLUCOSE SERPL-MCNC: 104 MG/DL (ref 65–99)
HCT VFR BLD AUTO: 39.2 % (ref 37.5–51)
HGB BLD-MCNC: 13 G/DL (ref 13–17.7)
IMM GRANULOCYTES # BLD AUTO: 0.01 10*3/MM3 (ref 0–0.05)
IMM GRANULOCYTES NFR BLD AUTO: 0.2 % (ref 0–0.5)
LYMPHOCYTES # BLD AUTO: 1.76 10*3/MM3 (ref 0.7–3.1)
LYMPHOCYTES NFR BLD AUTO: 33.5 % (ref 19.6–45.3)
MCH RBC QN AUTO: 31.4 PG (ref 26.6–33)
MCHC RBC AUTO-ENTMCNC: 33.2 G/DL (ref 31.5–35.7)
MCV RBC AUTO: 94.7 FL (ref 79–97)
MONOCYTES # BLD AUTO: 0.56 10*3/MM3 (ref 0.1–0.9)
MONOCYTES NFR BLD AUTO: 10.7 % (ref 5–12)
NEUTROPHILS NFR BLD AUTO: 2.63 10*3/MM3 (ref 1.7–7)
NEUTROPHILS NFR BLD AUTO: 50 % (ref 42.7–76)
NT-PROBNP SERPL-MCNC: 429.4 PG/ML (ref 0–1800)
PLATELET # BLD AUTO: 195 10*3/MM3 (ref 140–450)
PMV BLD AUTO: 9.5 FL (ref 6–12)
POTASSIUM SERPL-SCNC: 4.6 MMOL/L (ref 3.5–5.2)
PROT SERPL-MCNC: 7.2 G/DL (ref 6–8.5)
RBC # BLD AUTO: 4.14 10*6/MM3 (ref 4.14–5.8)
SODIUM SERPL-SCNC: 136 MMOL/L (ref 136–145)
WBC NRBC COR # BLD: 5.25 10*3/MM3 (ref 3.4–10.8)

## 2023-04-05 PROCEDURE — 99283 EMERGENCY DEPT VISIT LOW MDM: CPT

## 2023-04-05 PROCEDURE — 87040 BLOOD CULTURE FOR BACTERIA: CPT

## 2023-04-05 PROCEDURE — 85025 COMPLETE CBC W/AUTO DIFF WBC: CPT

## 2023-04-05 PROCEDURE — 80053 COMPREHEN METABOLIC PANEL: CPT

## 2023-04-05 PROCEDURE — 87040 BLOOD CULTURE FOR BACTERIA: CPT | Performed by: EMERGENCY MEDICINE

## 2023-04-05 PROCEDURE — 96365 THER/PROPH/DIAG IV INF INIT: CPT

## 2023-04-05 PROCEDURE — 93971 EXTREMITY STUDY: CPT

## 2023-04-05 PROCEDURE — 83880 ASSAY OF NATRIURETIC PEPTIDE: CPT

## 2023-04-05 PROCEDURE — 83605 ASSAY OF LACTIC ACID: CPT

## 2023-04-05 RX ORDER — CLINDAMYCIN PHOSPHATE 600 MG/50ML
600 INJECTION, SOLUTION INTRAVENOUS ONCE
Status: COMPLETED | OUTPATIENT
Start: 2023-04-05 | End: 2023-04-05

## 2023-04-05 RX ORDER — SODIUM CHLORIDE 0.9 % (FLUSH) 0.9 %
10 SYRINGE (ML) INJECTION AS NEEDED
Status: DISCONTINUED | OUTPATIENT
Start: 2023-04-05 | End: 2023-04-05 | Stop reason: HOSPADM

## 2023-04-05 RX ORDER — CLINDAMYCIN HYDROCHLORIDE 300 MG/1
300 CAPSULE ORAL 3 TIMES DAILY
Qty: 15 CAPSULE | Refills: 0 | Status: SHIPPED | OUTPATIENT
Start: 2023-04-05 | End: 2023-04-17

## 2023-04-05 RX ADMIN — CLINDAMYCIN PHOSPHATE 600 MG: 600 INJECTION, SOLUTION INTRAVENOUS at 15:00

## 2023-04-05 NOTE — FSED PROVIDER NOTE
Subjective   History of Present Illness  82-year-old male reports a recurrent history of cellulitis to the left lower extremity.  Reports being seen here in the emergency department roughly 3 weeks ago and receiving medication for cellulitis to the left lower leg.  He reports that he recovered but then within the last week has had increasing redness and swelling from the left ankle to the left knee.  He reports that this part of his body is warm to the touch.  He is denying fever or chills.  Denies chest pain shortness of breath, nausea vomiting diarrhea.    History provided by:  Patient      Review of Systems   All other systems reviewed and are negative.      Past Medical History:   Diagnosis Date   • Aneurysm    • Cardiomyopathy     ICD implantation   • Cellulitis of left lower extremity 2010    recurrent   • CHD (coronary heart disease)     S/P CABG & PCI   • Dyslipidemia    • History of ventricular tachycardia    • Hypertension    • Myocardial infarction     Inferior MI   • Pinched nerve     L4-L5   • Prostate cancer        Allergies   Allergen Reactions   • Lovastatin Myalgia   • Pravastatin Myalgia and Unknown (See Comments)     unknown   • Simvastatin Unknown (See Comments) and Myalgia     unknown   • Spironolactone Other (See Comments)     Gynecomastia        Past Surgical History:   Procedure Laterality Date   • ANGIOPLASTY  2000 04/1996 Stent: Palmaz- Huy stent/LAD 07/1996-RCA: 2000-Tetra stent Guidant to proximal RCA, mid to distal artery 2 sequential Guidant Tetra stents   • APPENDECTOMY     • CARDIAC ABLATION  April and May 2019   • CARDIAC CATHETERIZATION  07/2017    1996 x2, Nov. 2000, 05/2010-cath 08/2015-no stents 2017   • CARDIAC DEFIBRILLATOR PLACEMENT     • COLONOSCOPY W/ POLYPECTOMY      Mult colonoscopy's for polyp resection    • CORONARY ARTERY BYPASS GRAFT  05/2010    x5 vessel: LMA to diagonal, LAD, intermedius, obtuse marginal, RCA   • CORONARY STENT PLACEMENT     • ENDOSCOPY N/A  2019    Procedure: ESOPHAGOGASTRODUODENOSCOPY with dilitation Bougie 50, 54;  Surgeon: Roddy Coronel MD;  [facility] ENDOSCOPY;  Service: Gastroenterology   • INSERT / REPLACE / REMOVE PACEMAKER     • KNEE OPEN LATERAL RELEASE Left     reduction   • OTHER SURGICAL HISTORY  2018    ICD implantation   • PROSTATE SURGERY      Robiotic surgery- Cyber Knife:       Family History   Problem Relation Age of Onset   • Pancreatic cancer Mother    • Heart disease Father        Social History     Socioeconomic History   • Marital status:    Tobacco Use   • Smoking status: Former     Packs/day: 1.00     Years: 17.00     Pack years: 17.00     Types: Cigarettes     Quit date: 2002     Years since quittin.7   • Smokeless tobacco: Never   Vaping Use   • Vaping Use: Never used   Substance and Sexual Activity   • 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   Constitutional:       Appearance: Normal appearance.   HENT:      Head: Normocephalic and atraumatic.      Nose: Nose normal.      Mouth/Throat:      Mouth: Mucous membranes are moist.      Pharynx: Oropharynx is clear.   Eyes:      Conjunctiva/sclera: Conjunctivae normal.   Cardiovascular:      Rate and Rhythm: Normal rate.      Pulses: Normal pulses.   Pulmonary:      Effort: Pulmonary effort is normal.   Abdominal:      General: Abdomen is flat.      Palpations: Abdomen is soft.   Musculoskeletal:         General: Swelling (Left lower extremity, 2+ pitting edema) present.      Cervical back: Normal range of motion.      Comments: Left lower extremity is slightly erythematous with the redness and erythema extending from just below the knee down to the dorsal aspect of the left foot.  There is mild amount of warmth appreciated, there is no discrete abscess formation.  Redness and erythema does appear to extend circumferentially  around the whole lower leg.   Skin:     General: Skin is warm and dry.      Findings: Erythema (Left lower extremity) present. No lesion or rash.   Neurological:      General: No focal deficit present.      Mental Status: He is alert.         Procedures           ED Course  ED Course as of 04/05/23 1607   Wed Apr 05, 2023   1316 Vital signs are normal as interpreted by me. [WF]   1416 Ultrasound at bedside.  Patient CBC is completely normal.  Lactic acid 0.9 CMP is unremarkable.  , which is lower than what it was 2 weeks ago [WF]   1433 Patient venous ultrasound per ultrasound tech is negative for DVT.  Reassessment of patient he is resting.  He is insistent on needing IV antibiotics and then asking for discharge home on IV antibiotic.  He is greatly concerned about worsening cellulitis to his leg and reports needing IV antibiotics in the past to get on top of this infection.  Will provide IV antibiotic here in ED and discharge patient home on oral antibiotic.  He is verbalizing to me intent to follow-up with his cellulitis doctor/infectious doctor [WF]   1434 Official report of venous Doppler left lower extremity indicates no evidence of venous thrombosis. [WF]   1505 I have discussed with the patient the risks and benefits of receiving IV medication for his cellulitis and how this could lead potentially to a form of diarrhea, specifically C. difficile.  He verbalizes understanding and agrees to [facility] to discuss potential prophylactic antibiotic treatment for cellulitic flares to the left lower extremity [WF]      ED Course User Index  [WF] Timi Carmona Jr., APRN                                           Medical Decision Making  Differential diagnosis: DVT of the left lower extremity, cellulitis, venous stasis disease    MDM: We will check lab work and ultrasound of the lower extremity    Amount and/or Complexity of Data Reviewed  Labs: ordered.      Risk  Prescription drug  management.          Final diagnoses:   Cellulitis of left lower extremity       ED Disposition  ED Disposition     ED Disposition   Discharge    Condition   Stable    Comment   --             Makenna Motta MD  [address] [facility] [place] [address] [phone]    In 2 days  If symptoms worsen         Medication List      New Prescriptions    clindamycin 300 MG capsule  Commonly known as: CLEOCIN  Take 1 capsule by mouth 3 (Three) Times a Day.           Where to Get Your Medications      These medications were sent to [id number] - [place], [place] - [address] AT [place] [address] [address] - [phone] [facility] [phone] [address]  [address] [place] [address]    Phone: [phone]   · clindamycin 300 MG capsule

## 2023-04-05 NOTE — DISCHARGE INSTRUCTIONS
You received IV antibiotics here in the ED.  You have been prescribed oral antibiotics.  Take as directed    Recommend talking to family provider about prophylactic treatment for cellulitic flareups to the left lower extremity.    There is the potential that repeated exposure to IV antibiotics can lead to a form of diarrhea known as C. Difficile.  If you develop yellow or orange diarrhea that has offensive odor seek medical care.    If the redness or swelling to your left lower extremity does not improve over the next 2 to 3 days have your leg reevaluated by medical professional.

## 2023-04-10 LAB
BACTERIA SPEC AEROBE CULT: NORMAL
BACTERIA SPEC AEROBE CULT: NORMAL

## 2023-04-13 ENCOUNTER — TELEPHONE (OUTPATIENT)
Dept: CARDIOLOGY | Facility: CLINIC | Age: 83
End: 2023-04-13

## 2023-04-13 NOTE — TELEPHONE ENCOUNTER
Caller: Deion Nicholas call back number: [phone]    What is your medical concern? PT REPORTS HE IS HAVING CELLULITIS IN HIS LEGS AND HAS BEEN TO THE ED IN [place] TWICE ALREADY AND RECEIVED ANTIBIOTICS WITH NO RELIEF AND PT  WAS TOLD TO GO TO [place] TO BE CHECKED IN - PT WANTED TO REACH OUT TO OFFICE TO SEE IF THERE WAS ANYTHING THEY COULD DO OR SUGGEST WHEN IT COMES TO BEING ADMITTED TO HOSPITAL - PT REPORTS HE WENT THROUGH SOMETHING SIMILAR LAST YEAR

## 2023-04-16 ENCOUNTER — APPOINTMENT (OUTPATIENT)
Dept: GENERAL RADIOLOGY | Facility: HOSPITAL | Age: 83
End: 2023-04-16
Payer: OTHER GOVERNMENT

## 2023-04-16 ENCOUNTER — HOSPITAL ENCOUNTER (OUTPATIENT)
Facility: HOSPITAL | Age: 83
Setting detail: OBSERVATION
Discharge: HOME | End: 2023-04-18
Attending: EMERGENCY MEDICINE | Admitting: STUDENT IN AN ORGANIZED HEALTH CARE EDUCATION/TRAINING PROGRAM
Payer: OTHER GOVERNMENT

## 2023-04-16 DIAGNOSIS — R07.9 CHEST PAIN, UNSPECIFIED TYPE: Primary | ICD-10-CM

## 2023-04-16 DIAGNOSIS — L03.116 CELLULITIS OF LEFT LOWER LEG: ICD-10-CM

## 2023-04-16 PROBLEM — R60.0 EDEMA OF LEFT LOWER EXTREMITY: Status: ACTIVE | Noted: 2023-04-16

## 2023-04-16 LAB
ALBUMIN SERPL-MCNC: 4.2 G/DL (ref 3.5–5.2)
ALBUMIN/GLOB SERPL: 1.6 G/DL
ALP SERPL-CCNC: 110 U/L (ref 39–117)
ALT SERPL W P-5'-P-CCNC: 20 U/L (ref 1–41)
ANION GAP SERPL CALCULATED.3IONS-SCNC: 10 MMOL/L (ref 5–15)
APTT PPP: 28.2 SECONDS (ref 24–31)
AST SERPL-CCNC: 23 U/L (ref 1–40)
BASOPHILS # BLD AUTO: 0.1 10*3/MM3 (ref 0–0.2)
BASOPHILS NFR BLD AUTO: 0.8 % (ref 0–1.5)
BILIRUB SERPL-MCNC: 0.2 MG/DL (ref 0–1.2)
BUN SERPL-MCNC: 17 MG/DL (ref 8–23)
BUN/CREAT SERPL: 15.5 (ref 7–25)
CALCIUM SPEC-SCNC: 9.3 MG/DL (ref 8.6–10.5)
CHLORIDE SERPL-SCNC: 103 MMOL/L (ref 98–107)
CO2 SERPL-SCNC: 27 MMOL/L (ref 22–29)
CREAT SERPL-MCNC: 1.1 MG/DL (ref 0.76–1.27)
CRP SERPL-MCNC: <0.3 MG/DL (ref 0–0.5)
D DIMER PPP FEU-MCNC: 0.78 MG/L (FEU) (ref 0–0.8)
DEPRECATED RDW RBC AUTO: 50.3 FL (ref 37–54)
EGFRCR SERPLBLD CKD-EPI 2021: 67.9 ML/MIN/1.73
EOSINOPHIL # BLD AUTO: 0.3 10*3/MM3 (ref 0–0.4)
EOSINOPHIL NFR BLD AUTO: 4 % (ref 0.3–6.2)
ERYTHROCYTE [DISTWIDTH] IN BLOOD BY AUTOMATED COUNT: 14.5 % (ref 12.3–15.4)
ERYTHROCYTE [SEDIMENTATION RATE] IN BLOOD: 30 MM/HR (ref 0–20)
GEN 5 2HR TROPONIN T REFLEX: 15 NG/L
GLOBULIN UR ELPH-MCNC: 2.7 GM/DL
GLUCOSE SERPL-MCNC: 106 MG/DL (ref 65–99)
HCT VFR BLD AUTO: 37.8 % (ref 37.5–51)
HGB BLD-MCNC: 12.8 G/DL (ref 13–17.7)
HOLD SPECIMEN: NORMAL
INR PPP: 1 (ref 0.93–1.1)
LYMPHOCYTES # BLD AUTO: 1.9 10*3/MM3 (ref 0.7–3.1)
LYMPHOCYTES NFR BLD AUTO: 27.2 % (ref 19.6–45.3)
MCH RBC QN AUTO: 32.2 PG (ref 26.6–33)
MCHC RBC AUTO-ENTMCNC: 33.8 G/DL (ref 31.5–35.7)
MCV RBC AUTO: 95.4 FL (ref 79–97)
MONOCYTES # BLD AUTO: 0.7 10*3/MM3 (ref 0.1–0.9)
MONOCYTES NFR BLD AUTO: 9.6 % (ref 5–12)
NEUTROPHILS NFR BLD AUTO: 4 10*3/MM3 (ref 1.7–7)
NEUTROPHILS NFR BLD AUTO: 58.4 % (ref 42.7–76)
NRBC BLD AUTO-RTO: 0 /100 WBC (ref 0–0.2)
PLATELET # BLD AUTO: 214 10*3/MM3 (ref 140–450)
PMV BLD AUTO: 8 FL (ref 6–12)
POTASSIUM SERPL-SCNC: 4.3 MMOL/L (ref 3.5–5.2)
PROT SERPL-MCNC: 6.9 G/DL (ref 6–8.5)
PROTHROMBIN TIME: 10.3 SECONDS (ref 9.6–11.7)
RBC # BLD AUTO: 3.96 10*6/MM3 (ref 4.14–5.8)
SODIUM SERPL-SCNC: 140 MMOL/L (ref 136–145)
TROPONIN T DELTA: -3 NG/L
TROPONIN T SERPL HS-MCNC: 18 NG/L
WBC NRBC COR # BLD: 6.8 10*3/MM3 (ref 3.4–10.8)

## 2023-04-16 PROCEDURE — 85652 RBC SED RATE AUTOMATED: CPT | Performed by: NURSE PRACTITIONER

## 2023-04-16 PROCEDURE — 86140 C-REACTIVE PROTEIN: CPT | Performed by: NURSE PRACTITIONER

## 2023-04-16 PROCEDURE — 25010000002 ENOXAPARIN PER 10 MG: Performed by: NURSE PRACTITIONER

## 2023-04-16 PROCEDURE — 96365 THER/PROPH/DIAG IV INF INIT: CPT

## 2023-04-16 PROCEDURE — 85730 THROMBOPLASTIN TIME PARTIAL: CPT | Performed by: NURSE PRACTITIONER

## 2023-04-16 PROCEDURE — G0378 HOSPITAL OBSERVATION PER HR: HCPCS

## 2023-04-16 PROCEDURE — 71045 X-RAY EXAM CHEST 1 VIEW: CPT

## 2023-04-16 PROCEDURE — 85610 PROTHROMBIN TIME: CPT | Performed by: NURSE PRACTITIONER

## 2023-04-16 PROCEDURE — 87040 BLOOD CULTURE FOR BACTERIA: CPT | Performed by: PHYSICIAN ASSISTANT

## 2023-04-16 PROCEDURE — 99285 EMERGENCY DEPT VISIT HI MDM: CPT

## 2023-04-16 PROCEDURE — 96372 THER/PROPH/DIAG INJ SC/IM: CPT

## 2023-04-16 PROCEDURE — 93005 ELECTROCARDIOGRAM TRACING: CPT | Performed by: NURSE PRACTITIONER

## 2023-04-16 PROCEDURE — 85379 FIBRIN DEGRADATION QUANT: CPT | Performed by: NURSE PRACTITIONER

## 2023-04-16 PROCEDURE — 85025 COMPLETE CBC W/AUTO DIFF WBC: CPT | Performed by: NURSE PRACTITIONER

## 2023-04-16 PROCEDURE — 36415 COLL VENOUS BLD VENIPUNCTURE: CPT

## 2023-04-16 PROCEDURE — 80053 COMPREHEN METABOLIC PANEL: CPT | Performed by: NURSE PRACTITIONER

## 2023-04-16 PROCEDURE — 25010000002 CEFTRIAXONE PER 250 MG: Performed by: PHYSICIAN ASSISTANT

## 2023-04-16 PROCEDURE — 84484 ASSAY OF TROPONIN QUANT: CPT | Performed by: NURSE PRACTITIONER

## 2023-04-16 RX ORDER — SODIUM CHLORIDE 0.9 % (FLUSH) 0.9 %
10 SYRINGE (ML) INJECTION AS NEEDED
Status: DISCONTINUED | OUTPATIENT
Start: 2023-04-16 | End: 2023-04-18 | Stop reason: HOSPADM

## 2023-04-16 RX ORDER — ENOXAPARIN SODIUM 100 MG/ML
40 INJECTION SUBCUTANEOUS
Status: DISCONTINUED | OUTPATIENT
Start: 2023-04-16 | End: 2023-04-18 | Stop reason: HOSPADM

## 2023-04-16 RX ADMIN — ENOXAPARIN SODIUM 40 MG: 100 INJECTION SUBCUTANEOUS at 23:55

## 2023-04-16 RX ADMIN — CEFTRIAXONE 2 G: 2 INJECTION, POWDER, FOR SOLUTION INTRAMUSCULAR; INTRAVENOUS at 22:11

## 2023-04-16 NOTE — Clinical Note
Level of Care: Med/Surg [1]   Diagnosis: Chest pain, unspecified type [[id number]]   Admitting Physician: BLAKE PATRICK [[id number]]   Attending Physician: BLAKE PATRICK [[id number]]   Bed Request Comments: cardiac monitor   Certification: I Certify That Inpatient Hospital Services Are Medically Necessary For Greater Than 2 Midnights

## 2023-04-16 NOTE — ED PROVIDER NOTES
Subjective      Provider in Triage Note  Patient is an 82-year-old white male with history of CAD, hypertension, cardiomyopathy with AICD.  He presents today from home with complaints of chest pain and left arm pain.  He also complains of increased pain and swelling with redness of warmth in the left leg.  He states he is being treated for cellulitis in the left leg for the last several weeks but symptoms are getting worse.  Denies any fever chills nausea vomiting.      History of Present Illness  Pit note reviewed, agree with above.    Patient is an 82-year-old  male history of CAD, hypertension, cardiomyopathy with AICD complains of chest pain and left arm pain that started earlier today.  He reports intermittent pain for the last couple days but the pain became worse today patient states the pain has subsided since arriving in the ER.  Described as a aching pressure that he rated a 6/10.  Patient also complains of left lower redness and pain for the last few weeks.  He states that he was seen at [facility] ER twice, was treated for cellulitis with clindamycin but states that his symptoms did not improve.  He does report that there was an ultrasound completed by that facility that was negative for DVT.  Patient states that he had to be admitted for cellulitis of the left lower extremity last year.  No abdominal pain, nausea vomiting or diarrhea.    History provided by:  Patient      Review of Systems   Constitutional: Negative for chills and fever.   HENT: Negative for sore throat and trouble swallowing.    Eyes: Negative.    Respiratory: Positive for chest tightness and shortness of breath. Negative for wheezing.    Cardiovascular: Positive for leg swelling. Negative for chest pain.   Gastrointestinal: Negative for abdominal pain, diarrhea, nausea and vomiting.   Endocrine: Negative.    Genitourinary: Negative for dysuria.   Musculoskeletal: Negative for back pain.   Skin: Positive for  color change. Negative for rash.   Allergic/Immunologic: Negative.    Neurological: Negative for headaches.   Psychiatric/Behavioral: Negative for behavioral problems.   All other systems reviewed and are negative.      Past Medical History:   Diagnosis Date   • Aneurysm    • Cardiomyopathy     ICD implantation   • Cellulitis of left lower extremity 2010    recurrent   • CHD (coronary heart disease)     S/P CABG & PCI   • Dyslipidemia    • History of ventricular tachycardia    • Hypertension    • Myocardial infarction     Inferior MI   • Pinched nerve     L4-L5   • Prostate cancer        Allergies   Allergen Reactions   • Lovastatin Myalgia   • Pravastatin Myalgia and Unknown (See Comments)     unknown   • Simvastatin Unknown (See Comments) and Myalgia     unknown   • Spironolactone Other (See Comments)     Gynecomastia        Past Surgical History:   Procedure Laterality Date   • ANGIOPLASTY  2000 04/1996 Stent: Palmaz- Huy stent/LAD 07/1996-RCA: 2000-Tetra stent Guidant to proximal RCA, mid to distal artery 2 sequential Guidant Tetra stents   • APPENDECTOMY     • CARDIAC ABLATION  April and May 2019   • CARDIAC CATHETERIZATION  07/2017    1996 x2, Nov. 2000, 05/2010-cath 08/2015-no stents 2017   • CARDIAC DEFIBRILLATOR PLACEMENT     • COLONOSCOPY W/ POLYPECTOMY      Mult colonoscopy's for polyp resection    • CORONARY ARTERY BYPASS GRAFT  05/2010    x5 vessel: LMA to diagonal, LAD, intermedius, obtuse marginal, RCA   • CORONARY STENT PLACEMENT     • ENDOSCOPY N/A 6/24/2019    Procedure: ESOPHAGOGASTRODUODENOSCOPY with dilitation Bougie 50, 54;  Surgeon: Roddy Coronel MD;  Location: [facility] ENDOSCOPY;  Service: Gastroenterology   • INSERT / REPLACE / REMOVE PACEMAKER     • KNEE OPEN LATERAL RELEASE Left     reduction   • OTHER SURGICAL HISTORY  01/2018    ICD implantation   • PROSTATE SURGERY  2015    Robiotic surgery- Cyber Knife:       Family History   Problem Relation Age of Onset   • Pancreatic cancer  Mother    • Heart disease Father        Social History     Socioeconomic History   • Marital status:    Tobacco Use   • Smoking status: Former     Packs/day: 1.00     Years: 17.00     Pack years: 17.00     Types: Cigarettes     Quit date: 2002     Years since quittin.7   • Smokeless tobacco: Never   Vaping Use   • Vaping Use: Never used   Substance and Sexual Activity   • 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   Constitutional:       General: He is not in acute distress.     Appearance: Normal appearance. He is normal weight. He is not diaphoretic.   HENT:      Head: Normocephalic and atraumatic.      Nose: Nose normal. No congestion.      Mouth/Throat:      Mouth: Mucous membranes are moist.   Eyes:      Extraocular Movements: Extraocular movements intact.      Pupils: Pupils are equal, round, and reactive to light.   Cardiovascular:      Rate and Rhythm: Normal rate and regular rhythm.      Pulses: Normal pulses.      Heart sounds: Normal heart sounds. No murmur heard.  Pulmonary:      Effort: Pulmonary effort is normal.      Breath sounds: Normal breath sounds.   Abdominal:      General: Abdomen is flat.      Palpations: Abdomen is soft.      Tenderness: There is no abdominal tenderness. There is no right CVA tenderness or left CVA tenderness.   Musculoskeletal:         General: No tenderness. Normal range of motion.      Cervical back: Normal range of motion.      Left lower leg: Edema present.   Skin:     General: Skin is warm.      Capillary Refill: Capillary refill takes less than 2 seconds.      Findings: Erythema present.   Neurological:      General: No focal deficit present.      Mental Status: He is alert and oriented to person, place, and time.      Cranial Nerves: No cranial nerve deficit.      Motor: No weakness.   Psychiatric:         Mood and  "Affect: Mood normal.         Behavior: Behavior normal.         ECG 12 Lead      Date/Time: 4/17/2023 1:43 AM  Performed by: Reba Dwyer PA  Authorized by: Preet Richardson DO   Interpreted by physician  Comparison: compared with previous ECG   Similar to previous ECG  Rhythm: sinus rhythm  Ectopy: PVCs  Rate: normal  BPM: 85  QRS axis: normal  Conduction: conduction normal  Conduction: left bundle branch block  Clinical impression: non-specific ECG                 ED Course  ED Course as of 04/17/23 0149   Sun Apr 16, 2023   3833 I spoke with JACKIE Aguirre who agreed to accept patient for admission for the Hospitalist group  [MC]      ED Course User Index  [MC] Reba Dwyer PA    /65   Pulse 70   Temp 98.5 °F (36.9 °C) (Oral)   Resp 18   Ht 182.9 cm (72\")   Wt 96 kg (211 lb 10.3 oz)   SpO2 97%   BMI 28.70 kg/m²   Labs Reviewed   COMPREHENSIVE METABOLIC PANEL - Abnormal; Notable for the following components:       Result Value    Glucose 106 (*)     All other components within normal limits    Narrative:     GFR Normal >60  Chronic Kidney Disease <60  Kidney Failure <15    The GFR formula is only valid for adults with stable renal function between ages 18 and 70.   TROPONIN - Abnormal; Notable for the following components:    HS Troponin T 18 (*)     All other components within normal limits    Narrative:     High Sensitive Troponin T Reference Range:  <10.0 ng/L- Negative Female for AMI  <15.0 ng/L- Negative Male for AMI  >=10 - Abnormal Female indicating possible myocardial injury.  >=15 - Abnormal Male indicating possible myocardial injury.   Clinicians would have to utilize clinical acumen, EKG, Troponin, and serial changes to determine if it is an Acute Myocardial Infarction or myocardial injury due to an underlying chronic condition.        SEDIMENTATION RATE - Abnormal; Notable for the following components:    Sed Rate 30 (*)     All other components within normal limits   CBC WITH AUTO " "DIFFERENTIAL - Abnormal; Notable for the following components:    RBC 3.96 (*)     Hemoglobin 12.8 (*)     All other components within normal limits   HIGH SENSITIVITIY TROPONIN T 2HR - Abnormal; Notable for the following components:    HS Troponin T 15 (*)     All other components within normal limits    Narrative:     High Sensitive Troponin T Reference Range:  <10.0 ng/L- Negative Female for AMI  <15.0 ng/L- Negative Male for AMI  >=10 - Abnormal Female indicating possible myocardial injury.  >=15 - Abnormal Male indicating possible myocardial injury.   Clinicians would have to utilize clinical acumen, EKG, Troponin, and serial changes to determine if it is an Acute Myocardial Infarction or myocardial injury due to an underlying chronic condition.        PROTIME-INR - Normal   APTT - Normal   C-REACTIVE PROTEIN - Normal   D-DIMER, QUANTITATIVE - Normal    Narrative:     According to the assay 's published package insert, a normal (<0.50 mg/L (FEU)) D-dimer result in conjunction with a non-high clinical probability assessment, excludes deep vein thrombosis (DVT) and pulmonary embolism (PE) with high sensitivity.    D-dimer values increase with age and this can make VTE exclusion of an older population difficult. To address this, the [employer], based on best available evidence and recent guidelines, recommends that clinicians use age-adjusted D-dimer thresholds in patients greater than 50 years of age with: a) a low probability of PE who do not meet all Pulmonary Embolism Rule Out Criteria, or b) in those with intermediate probability of PE.   The formula for an age-adjusted D-dimer cut-off is \"age/100\".  For example, a 60 year old patient would have an age-adjusted cut-off of 0.60 mg/L (FEU) and an 80 year old 0.80 mg/L (FEU).   BLOOD CULTURE   BLOOD CULTURE   CBC AND DIFFERENTIAL    Narrative:     The following orders were created for panel order CBC & Differential.  Procedure     "                           Abnormality         Status                     ---------                               -----------         ------                     CBC Auto Differential[[id number]]        Abnormal            Final result                 Please view results for these tests on the individual orders.   EXTRA TUBES    Narrative:     The following orders were created for panel order Extra Tubes.  Procedure                               Abnormality         Status                     ---------                               -----------         ------                     Gold Top - SST[[id number]]                                   Final result                 Please view results for these tests on the individual orders.   GOLD TOP - SST     Medications   sodium chloride 0.9 % flush 10 mL (has no administration in time range)   Enoxaparin Sodium (LOVENOX) syringe 40 mg (40 mg Subcutaneous Given 4/16/23 3294)   cefTRIAXone (ROCEPHIN) 2 g in sodium chloride 0.9 % 100 mL IVPB (has no administration in time range)   cefTRIAXone (ROCEPHIN) 2 g in sodium chloride 0.9 % 100 mL IVPB (0 g Intravenous Stopped 4/16/23 2711)     XR Chest 1 View    Result Date: 4/16/2023  Impression: Chronic changes. No active disease. Electronically Signed: Fei Bradshaw  4/16/2023 7:06 PM EDT  Workstation ID: [id number]                      HEART Score: 3                      Medical Decision Making  Differential Dx (Includes but not limited to): Cellulitis, DVT, phlebitis, NSTEMI, pneumonia, dysrhythmia  Medical Records Reviewed: Patient was seen at [facility] ER on 3/18/2023 and 4/5/2023, treated for cellulitis at that time.  Patient did have venous Doppler of the left lower extremity on 3/18/2023 showing no left lower extremity DVT.  Labs: On my interpretation CBC no leukocytosis.  Sed rate 30, CRP normal.  Troponin 18 followed by 15.  Imaging: On my interpretation chest x-ray shows no obvious pneumonia or pleural  effusion.  Telemetry: EKG interpretation: Reviewed by myself interpreted by ER attending, sinus rhythm rate of 85 with no acute ST changes.  Left bundle branch block unchanged from previous.  Testing considered but not ordered: Venous Doppler left lower extremity, not available at this time.  Patient did have this study done on 3/18/2023 showing no left lower extremity DVT.  Nature of Complaint: Acute  Admission vs Discharge: Admissio  Discussion: While in the ED IV was placed and labs were obtained appropriate PPE was worn during exam and throughout all encounters with the patient.  Patient initially seen by pit provider and orders were placed.  On my evaluation patient was placed in ER room patient mainly complaining of left lower extremity pain and swelling.  Patient states he had been seen at [facility] ER twice in the last month and treated with clindamycin for cellulitis, this did not resolve.  CBC unremarkable.  Mildly elevated sed rate.  Patient's left lower extremity is erythematous just distal to the left knee extending to the dorsum of the left foot Homans' sign negative.  Pulses appropriate.  Doppler unable to be obtained at this time, however patient had the study done about a month ago and this was negative.  Chest x-ray unremarkable.  Patient reports he has no chest pain on my evaluation.  Recommended admission for further evaluation and treatment for left lower extremity cellulitis, as he had failed outpatient therapy twice, as well as further observation and chest pain rule out given complaints of chest pain.  Patient agreeable.  I spoke with JAKCIE Aguirre who agreed accept patient for Dr. Richardson.    Cellulitis of left lower leg: acute illness or injury  Chest pain, unspecified type: acute illness or injury  Amount and/or Complexity of Data Reviewed  External Data Reviewed: notes.  Labs: ordered. Decision-making details documented in ED Course.  Radiology: ordered. Decision-making  details documented in ED Course.  ECG/medicine tests: ordered. Decision-making details documented in ED Course.      Risk  Prescription drug management.    Risk Details: Patient has failed outpatient therapy twice, has history of persistent left lower extremity cellulitis        Final diagnoses:   Chest pain, unspecified type   Cellulitis of left lower leg       ED Disposition  ED Disposition     ED Disposition   Decision to Admit    Condition   --    Comment   Level of Care: Telemetry [5]   Admitting Physician: BLAKE PATRICK [[id number]]   Attending Physician: BLAKE PATRICK [[id number]]               No follow-up provider specified.       Medication List      No changes were made to your prescriptions during this visit.          Reba Dwyer PA  04/17/23 0149

## 2023-04-17 PROBLEM — R79.89 ELEVATED TROPONIN: Status: ACTIVE | Noted: 2023-04-17

## 2023-04-17 PROBLEM — R77.8 ELEVATED TROPONIN: Status: ACTIVE | Noted: 2023-04-17

## 2023-04-17 PROCEDURE — G0378 HOSPITAL OBSERVATION PER HR: HCPCS

## 2023-04-17 PROCEDURE — 25010000002 ENOXAPARIN PER 10 MG: Performed by: NURSE PRACTITIONER

## 2023-04-17 PROCEDURE — 99254 IP/OBS CNSLTJ NEW/EST MOD 60: CPT | Performed by: INTERNAL MEDICINE

## 2023-04-17 PROCEDURE — 25010000002 CEFTRIAXONE PER 250 MG: Performed by: NURSE PRACTITIONER

## 2023-04-17 PROCEDURE — 96372 THER/PROPH/DIAG INJ SC/IM: CPT

## 2023-04-17 RX ORDER — BENZONATATE 100 MG/1
200 CAPSULE ORAL 3 TIMES DAILY PRN
Status: DISCONTINUED | OUTPATIENT
Start: 2023-04-17 | End: 2023-04-18 | Stop reason: HOSPADM

## 2023-04-17 RX ORDER — NITROGLYCERIN 0.4 MG/1
0.4 TABLET SUBLINGUAL
Status: DISCONTINUED | OUTPATIENT
Start: 2023-04-17 | End: 2023-04-18 | Stop reason: HOSPADM

## 2023-04-17 RX ORDER — METHOCARBAMOL 500 MG/1
500 TABLET, FILM COATED ORAL 3 TIMES DAILY PRN
Status: DISCONTINUED | OUTPATIENT
Start: 2023-04-17 | End: 2023-04-18 | Stop reason: HOSPADM

## 2023-04-17 RX ORDER — MELATONIN
500 DAILY
Status: DISCONTINUED | OUTPATIENT
Start: 2023-04-17 | End: 2023-04-18 | Stop reason: HOSPADM

## 2023-04-17 RX ORDER — ASPIRIN 81 MG/1
81 TABLET, CHEWABLE ORAL DAILY
Status: DISCONTINUED | OUTPATIENT
Start: 2023-04-17 | End: 2023-04-18 | Stop reason: HOSPADM

## 2023-04-17 RX ORDER — MECLIZINE HYDROCHLORIDE 25 MG/1
25 TABLET ORAL 3 TIMES DAILY PRN
Status: DISCONTINUED | OUTPATIENT
Start: 2023-04-17 | End: 2023-04-18 | Stop reason: HOSPADM

## 2023-04-17 RX ORDER — DOCUSATE SODIUM 100 MG/1
100 CAPSULE, LIQUID FILLED ORAL EVERY EVENING
Status: DISCONTINUED | OUTPATIENT
Start: 2023-04-17 | End: 2023-04-18 | Stop reason: HOSPADM

## 2023-04-17 RX ORDER — METOPROLOL SUCCINATE 50 MG/1
50 TABLET, EXTENDED RELEASE ORAL DAILY
Status: DISCONTINUED | OUTPATIENT
Start: 2023-04-17 | End: 2023-04-18 | Stop reason: HOSPADM

## 2023-04-17 RX ORDER — DOCUSATE SODIUM 100 MG/1
100 CAPSULE, LIQUID FILLED ORAL EVERY EVENING
COMMUNITY
End: 2023-04-18 | Stop reason: HOSPADM

## 2023-04-17 RX ORDER — GABAPENTIN 400 MG/1
400 CAPSULE ORAL EVERY 8 HOURS
Status: DISCONTINUED | OUTPATIENT
Start: 2023-04-17 | End: 2023-04-18 | Stop reason: HOSPADM

## 2023-04-17 RX ORDER — PANTOPRAZOLE SODIUM 40 MG/1
40 TABLET, DELAYED RELEASE ORAL DAILY
Status: DISCONTINUED | OUTPATIENT
Start: 2023-04-17 | End: 2023-04-18 | Stop reason: HOSPADM

## 2023-04-17 RX ORDER — GABAPENTIN 400 MG/1
400 CAPSULE ORAL EVERY 8 HOURS SCHEDULED
Status: DISCONTINUED | OUTPATIENT
Start: 2023-04-17 | End: 2023-04-17

## 2023-04-17 RX ORDER — SUCRALFATE 1 G/1
1 TABLET ORAL 4 TIMES DAILY
Status: DISCONTINUED | OUTPATIENT
Start: 2023-04-17 | End: 2023-04-18 | Stop reason: HOSPADM

## 2023-04-17 RX ADMIN — ENOXAPARIN SODIUM 40 MG: 100 INJECTION SUBCUTANEOUS at 17:46

## 2023-04-17 RX ADMIN — SUCRALFATE 1 G: 1 TABLET ORAL at 17:46

## 2023-04-17 RX ADMIN — Medication 10 ML: at 21:13

## 2023-04-17 RX ADMIN — PANTOPRAZOLE SODIUM 40 MG: 40 TABLET, DELAYED RELEASE ORAL at 10:03

## 2023-04-17 RX ADMIN — CEFTRIAXONE 2 G: 2 INJECTION, POWDER, FOR SOLUTION INTRAMUSCULAR; INTRAVENOUS at 21:13

## 2023-04-17 RX ADMIN — METOPROLOL SUCCINATE 50 MG: 50 TABLET, EXTENDED RELEASE ORAL at 10:03

## 2023-04-17 RX ADMIN — Medication 500 UNITS: at 10:03

## 2023-04-17 RX ADMIN — SUCRALFATE 1 G: 1 TABLET ORAL at 21:12

## 2023-04-17 RX ADMIN — GABAPENTIN 400 MG: 400 CAPSULE ORAL at 10:03

## 2023-04-17 RX ADMIN — ASPIRIN 81 MG CHEWABLE TABLET 81 MG: 81 TABLET CHEWABLE at 10:03

## 2023-04-17 RX ADMIN — GABAPENTIN 400 MG: 400 CAPSULE ORAL at 17:46

## 2023-04-17 RX ADMIN — DOCUSATE SODIUM 100 MG: 100 CAPSULE, LIQUID FILLED ORAL at 17:46

## 2023-04-17 NOTE — CASE MANAGEMENT/SOCIAL WORK
Discharge Planning Assessment   Daniel     Patient Name: Deion Nicholas  MRN: [id number]  Today's Date: 4/17/2023    Admit Date: 4/16/2023    Plan: DC PLAN: From routine home alone. PT eval pending.        Discharge Needs Assessment     Row Name 04/17/23 1519       Living Environment    People in Home alone    Current Living Arrangements home    Primary Care Provided by self    Provides Primary Care For no one    Family Caregiver if Needed none    Able to Return to Prior Arrangements yes       Resource/Environmental Concerns    Resource/Environmental Concerns none       Food Insecurity    Within the past 12 months, you worried that your food would run out before you got the money to buy more. Never true    Within the past 12 months, the food you bought just didn't last and you didn't have money to get more. Never true       Transition Planning    Patient/Family Anticipates Transition to home    Patient/Family Anticipated Services at Transition     Transportation Anticipated family or friend will provide       Discharge Needs Assessment    Equipment Currently Used at Home cane, straight;walker, standard               Discharge Plan     Row Name 04/17/23 1521       Plan    Plan DC PLAN: From routine home alone. PT eval pending.    Patient/Family in Agreement with Plan yes    Plan Comments Met with patient at bedside, from routine home alone. Uses cane and walker. PCP and Pharmacy verified. Able to afford medications, denies any transportation issues. Denies any concerns regarding home. States [facility], do not see [facility] listed as insurance.  PT eval Pending.              Continued Care and Services - Admitted Since 4/16/2023    Coordination has not been started for this encounter.          Demographic Summary     Row Name 04/17/23 1516       General Information    Admission Type inpatient    Arrived From emergency department    Reason for Consult discharge planning    Preferred Language English        Contact Information    Permission Granted to Share Info With     Contact Information Obtained for                Functional Status     Row Name 04/17/23 1519       Functional Status    Usual Activity Tolerance moderate    Current Activity Tolerance moderate       Assessment of Health Literacy    How often do you have someone help you read hospital materials? Sometimes    How often do you have problems learning about your medical condition because of difficulty understanding written information? Sometimes    How often do you have a problem understanding what is told to you about your medical condition? Sometimes    How confident are you filling out medical forms by yourself? Somewhat    Health Literacy Moderate       Functional Status, IADL    Medications independent    Meal Preparation independent    Housekeeping independent    Laundry independent    Shopping independent       Mental Status    General Appearance WDL WDL              Met with patient in room    Maintained distance greater than six feet and spent less than 15 minutes in the room.      Nicole Darden RN     Office phone: [phone]  Cell phone: [phone]

## 2023-04-17 NOTE — CONSULTS
Cardiology Consult Note      REQUESTING PHYSICIAN    Erum Diez MD    PATIENT IDENTIFICATION  Name: Deion Nicholas  Age: 82 y.o.  Sex: male  :  1940  MRN: [id number]             REASON FOR CONSULTATION:  82-year-old male with known history of ischemic heart disease.  5 vessel CABG May 2010.  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  History of ischemic cardiomyopathy, hypotension with orthostatic component, hypertensive cardiovascular disease, dyslipidemia, AICD in situ, ascending aortic aneurysm, peripheral vascular disease, chronic diastolic heart failure.    TTE 2022: EF 25-30%, grade 1 DD, moderate left atrial enlargement, mild MR.    Pharmacologic nuclear stress testing performed 2019: Large sized infarct located inferior wall and apex with no evidence of ischemia, EF 30%, low risk study.      CC:  Chest pain  Lower extremity edema    HISTORY OF PRESENT ILLNESS:   Patient presented to the emergency department at [facility] with complaint of chest pain and lower extremity edema.  Patient reports he has had midsternal chest aching sensation as well as ache in his left upper arm off and on for several months.  The discomfort is not severe, but persistent when it occurs.  He denies any associated symptoms.  He has had some mild edema in his left lower extremity.  He has had intermittent episodes of cellulitis in his left lower leg for several years.  He has had several recent hospitalizations for cellulitis over the past year.  At his last hospitalization, he was evaluated by infectious disease, orthopedic surgeon, vascular surgeon.  He has had no follow-up.  Upon my evaluation, patient sitting up in bed and reports that he feels well.  He continues to have some mild nonpitting edema to his left lower extremity with mild circumferential erythema.  No bullae or wounds noted to that extremity.  He has no chest discomfort or shortness of  "breath at present.      REVIEW OF SYSTEMS:  Pertinent items are noted in HPI, all other systems reviewed and negative    OBJECTIVE   High-sensitivity troponin 18, 16    Chest x-ray with no acute findings    ASSESSMENT  Chest pain  Lower extremity edema  Ischemic cardiomyopathy most recent ejection fraction 25-30%  Coronary artery disease  Hypotension with an orthostatic component  Hypertensive cardiovascular disease  Amiodarone therapy  Antiplatelet therapy  Hyperlipidemia  AICD in situ  History of VT storm status post radiofrequency ablation  Gynecomastia secondary to spironolactone  Ascending aortic aneurysm most recent measurement 4.5 cm August 2022.    PLAN  The patient has had ongoing chest discomfort for several months that is exacerbated by activity, improved with rest.  No ischemic work-up since stress testing performed in 2019 which demonstrated prior infarct and no ischemia.  Will discuss with cardiologist regarding any further ischemic work-up-heart catheterization.  Patient receiving antibiotics IV  Blood pressure quite stable, rhythm sinus.  Continue aspirin, Zetia, Toprol, midodrine  Risk benefits and options discussed.  Patient would like to proceed with nuclear stress test.    Vital Signs  Visit Vitals  /70   Pulse 77   Temp 98.5 °F (36.9 °C) (Oral)   Resp 18   Ht 182.9 cm (72\")   Wt 96 kg (211 lb 10.3 oz)   SpO2 97%   BMI 28.70 kg/m²     Oxygen Therapy  SpO2: 97 %  Device (Oxygen Therapy): nasal cannula  Flow (L/min): 2  Flowsheet Rows      Flowsheet Row First Filed Value   Admission Height 182.9 cm (72\") Documented at 04/16/2023 1823   Admission Weight 96 kg (211 lb 10.3 oz) Documented at 04/16/2023 1823          Intake & Output (last 3 days)       None          Lines, Drains & Airways       Active LDAs       Name Placement date Placement time Site Days    Peripheral IV 04/16/23 1926 Left Antecubital 04/16/23 1926  Antecubital  less than 1    Peripheral IV 04/16/23 2140 Anterior;Right;Upper " Arm 23  2140  Arm  less than 1                    MEDICAL HISTORY    Past Medical History:   Diagnosis Date    Aneurysm     Cardiomyopathy     ICD implantation    Cellulitis of left lower extremity     recurrent    CHD (coronary heart disease)     S/P CABG & PCI    Dyslipidemia     History of ventricular tachycardia     Hypertension     Myocardial infarction     Inferior MI    Pinched nerve     L4-L5    Prostate cancer         SURGICAL HISTORY    Past Surgical History:   Procedure Laterality Date    ANGIOPLASTY  1996 Stent: Palmaz- Huy stent/LAD 1996-RCA: -Tetra stent Guidant to proximal RCA, mid to distal artery 2 sequential Guidant Tetra stents    APPENDECTOMY      CARDIAC ABLATION  April and May 2019    CARDIAC CATHETERIZATION  2017    1996 x2, Nov. , 2010-cath 2015-no stents 2017    CARDIAC DEFIBRILLATOR PLACEMENT      COLONOSCOPY W/ POLYPECTOMY      Mult colonoscopy's for polyp resection     CORONARY ARTERY BYPASS GRAFT  05/2010    x5 vessel: LMA to diagonal, LAD, intermedius, obtuse marginal, RCA    CORONARY STENT PLACEMENT      ENDOSCOPY N/A 2019    Procedure: ESOPHAGOGASTRODUODENOSCOPY with dilitation Bougie 50, 54;  Surgeon: Roddy Coronel MD;  Location: [facility] ENDOSCOPY;  Service: Gastroenterology    INSERT / REPLACE / REMOVE PACEMAKER      KNEE OPEN LATERAL RELEASE Left     reduction    OTHER SURGICAL HISTORY  2018    ICD implantation    PROSTATE SURGERY  2015    Robiotic surgery- Cyber Knife:        FAMILY HISTORY    Family History   Problem Relation Age of Onset    Pancreatic cancer Mother     Heart disease Father        SOCIAL HISTORY    Social History     Tobacco Use    Smoking status: Former     Packs/day: 1.00     Years: 17.00     Pack years: 17.00     Types: Cigarettes     Quit date: 2002     Years since quittin.7    Smokeless tobacco: Never   Substance Use Topics    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  Intake/Output this shift:  No intake/output data recorded.    PHYSICAL EXAM:    General: Alert, cooperative, no distress, appears stated age  Head:  Normocephalic, atraumatic, mucous membranes moist  Eyes:  Conjunctivae/corneas clear, EOM's intact     Neck:  Supple,  no bruit  Lungs: Clear to auscultation bilaterally, no wheezes, rhonchi or rales are noted  Chest wall: No tenderness  Heart::  Regular rate and rhythm, S1 and S2 normal, no murmur, rub or gallop  Abdomen: Soft, nontender, nondistended, bowel sounds active  Extremities: Left lower extremity with trace pitting edema just below knee distally with mild circumferential erythema  Pulses: 2+ and symmetric all extremities  Skin:  No rashes or lesions  Neuro/psych: A&O x3. CN II through XII are grossly intact with appropriate affect      Scheduled Meds:      aspirin, 81 mg, Oral, Daily  cefTRIAXone, 2 g, Intravenous, Q24H  cholecalciferol, 500 Units, Oral, Daily  docusate sodium, 100 mg, Oral, Q PM  enoxaparin, 40 mg, Subcutaneous, Q24H  gabapentin, 400 mg, Oral, Q8H  metoprolol succinate XL, 50 mg, Oral, Daily  pantoprazole, 40 mg, Oral, Daily  sucralfate, 1 g, Oral, 4x Daily        Continuous Infusions:         PRN Meds:      benzonatate    meclizine    methocarbamol    nitroglycerin    [COMPLETED] Insert Peripheral IV **AND** sodium chloride        Results Review:     I reviewed the patient's new clinical results.    CBC    Results from last 7 days   Lab Units 04/16/23 1927   WBC 10*3/mm3 " 6.80   HEMOGLOBIN g/dL 12.8*   PLATELETS 10*3/mm3 214     Cr Clearance Estimated Creatinine Clearance: 64.4 mL/min (by C-G formula based on SCr of 1.1 mg/dL).  Coag   Results from last 7 days   Lab Units 04/16/23 1927   INR  1.00   APTT seconds 28.2     HbA1C   Lab Results   Component Value Date    HGBA1C 6.1 (H) 10/26/2022     Blood Glucose No results found for: POCGLU  Infection     CMP   Results from last 7 days   Lab Units 04/16/23 1927   SODIUM mmol/L 140   POTASSIUM mmol/L 4.3   CHLORIDE mmol/L 103   CO2 mmol/L 27.0   BUN mg/dL 17   CREATININE mg/dL 1.10   GLUCOSE mg/dL 106*   ALBUMIN g/dL 4.2   BILIRUBIN mg/dL 0.2   ALK PHOS U/L 110   AST (SGOT) U/L 23   ALT (SGPT) U/L 20     ABG      UA      LEIGH ANN  No results found for: POCMETH, POCAMPHET, POCBARBITUR, POCBENZO, POCCOCAINE, POCOPIATES, POCOXYCODO, POCPHENCYC, POCPROPOXY, POCTHC, POCTRICYC  Lysis Labs   Results from last 7 days   Lab Units 04/16/23 1927   INR  1.00   APTT seconds 28.2   HEMOGLOBIN g/dL 12.8*   PLATELETS 10*3/mm3 214   CREATININE mg/dL 1.10     Radiology(recent) XR Chest 1 View    Result Date: 4/16/2023  Impression: Chronic changes. No active disease. Electronically Signed: Fei Bradshaw  4/16/2023 7:06 PM EDT  Workstation ID: [id number]        Results from last 7 days   Lab Units 04/16/23 2125   HSTROP T ng/L 15*       Xrays, labs reviewed personally by physician.    ECG/EMG Results (most recent)       Procedure Component Value Units Date/Time    ECG 12 Lead Chest Pain [[id number]] Collected: 04/16/23 1830     Updated: 04/16/23 1831     QT Interval 360 ms     Narrative:      HEART RATE= 85  bpm  RR Interval= 704  ms  GA Interval= 165  ms  P Horizontal Axis= 16  deg  P Front Axis= 66  deg  QRSD Interval= 131  ms  QT Interval= 360  ms  QRS Axis= 25  deg  T Wave Axis= -74  deg  - ABNORMAL ECG -  Sinus rhythm  Multiform ventricular premature complexes  Left bundle branch block  When compared with ECG of 18-Mar-2023 14:56:33,  No significant  "change  Electronically Signed By:   Date and Time of Study: 2023-04-16 18:30:18    ECG 12 Lead [[id number]] Resulted: 04/17/23 0149     Updated: 04/17/23 0149              Medication Review:   I have reviewed the patient's current medication list  Scheduled Meds:aspirin, 81 mg, Oral, Daily  cefTRIAXone, 2 g, Intravenous, Q24H  cholecalciferol, 500 Units, Oral, Daily  docusate sodium, 100 mg, Oral, Q PM  enoxaparin, 40 mg, Subcutaneous, Q24H  gabapentin, 400 mg, Oral, Q8H  metoprolol succinate XL, 50 mg, Oral, Daily  pantoprazole, 40 mg, Oral, Daily  sucralfate, 1 g, Oral, 4x Daily      Continuous Infusions:   PRN Meds:.  benzonatate    meclizine    methocarbamol    nitroglycerin    [COMPLETED] Insert Peripheral IV **AND** sodium chloride    Imaging:  Imaging Results (Last 72 Hours)       Procedure Component Value Units Date/Time    XR Chest 1 View [[id number]] Collected: 04/16/23 1905     Updated: 04/16/23 1908    Narrative:      XR CHEST 1 VW    Date of Exam: 4/16/2023 7:00 PM EDT    Indication: cp/left arm pain.    Comparison: March 18, 2023    Findings:  There is a left-sided subclavian AICD device with the lead over the right ventricle. The heart is enlarged with changes from midline sternotomy. The pulmonary vasculature is normal. There are no focal infiltrates.      Impression:      Impression:  Chronic changes. No active disease.    Electronically Signed: Fei Bradshaw    4/16/2023 7:06 PM EDT    Workstation ID: [id number]              NIMCO Khoury  04/17/23  11:12 EDT       EMR Dragon/Transcription:   \"Dictated utilizing Dragon dictation\".                 Electronically signed by NIMCO Khoury, 04/17/23, 7:30 AM EDT.    Cardiology attending:  Seen and examined.  Chart and labs reviewed.  History and exam findings are verified with above changes noted.  Assessment and plan notated by APC after being formulated by attending consultant.  Note that greater than 50% of the time spent in care of the " patient was provided by attending consultant.  Patient reports his chest pain has improved.  Patient reports that his chest discomfort began after swelling in his lower extremity.  He notes some redness as well.  He reports this is similar to when he had cellulitis earlier this year.  We discussed options.  We will proceed with stress testing.

## 2023-04-17 NOTE — PROGRESS NOTES
[facility] Medicine Services Daily Progress Note    Patient Name: Deion Nicholas  : 1940  MRN: [id number]  Primary Care Physician:  Makenna Motta MD  Date of admission: 2023      Subjective      Chief Complaint: Chest pain      Patient Reports left-sided chest pain 5 out of 10 down from 8 out of 10 along with left upper arm pain, intermittent no aggravating or relieving factors, left lower extremity swelling with redness, otherwise hemodynamically stable.    Review of Systems   Cardiovascular: Positive for chest pain and leg swelling.   All other systems reviewed and are negative.        Objective      Vitals:   Temp:  [98.5 °F (36.9 °C)] 98.5 °F (36.9 °C)  Heart Rate:  [70-82] 79  Resp:  [16-18] 18  BP: (117-145)/(65-98) 117/98  Flow (L/min):  [2] 2    Physical Exam  Vitals and nursing note reviewed.   Constitutional:       Appearance: Normal appearance.   HENT:      Head: Normocephalic and atraumatic.      Nose: Nose normal.   Eyes:      Extraocular Movements: Extraocular movements intact.      Pupils: Pupils are equal, round, and reactive to light.   Cardiovascular:      Rate and Rhythm: Normal rate and regular rhythm.      Pulses: Normal pulses.      Heart sounds: Normal heart sounds.   Pulmonary:      Effort: Pulmonary effort is normal.      Breath sounds: Normal breath sounds.   Abdominal:      General: Abdomen is flat.      Palpations: Abdomen is soft.   Musculoskeletal:      Left lower leg: Edema present.   Neurological:      General: No focal deficit present.      Mental Status: He is alert and oriented to person, place, and time.   Psychiatric:         Mood and Affect: Mood normal.         Behavior: Behavior normal.            Result Review    Result Review:  I have personally reviewed the results from the time of this admission to 2023 15:38 EDT and agree with these findings:  [x]  Laboratory  [x]  Microbiology  [x]  Radiology  []  EKG/Telemetry   []   Cardiology/Vascular   []  Pathology  []  Old records  []  Other:  Most notable findings include:      Lab Results   Component Value Date    GLUCOSE 106 (H) 04/16/2023    CALCIUM 9.3 04/16/2023     04/16/2023    K 4.3 04/16/2023    CO2 27.0 04/16/2023     04/16/2023    BUN 17 04/16/2023    CREATININE 1.10 04/16/2023    EGFR 67.9 04/16/2023    BCR 15.5 04/16/2023    ANIONGAP 10.0 04/16/2023     Lab Results   Component Value Date    WBC 6.80 04/16/2023    HGB 12.8 (L) 04/16/2023    HCT 37.8 04/16/2023    MCV 95.4 04/16/2023     04/16/2023   XR Chest 1 View    Result Date: 4/16/2023  Impression: Chronic changes. No active disease. Electronically Signed: Fei Bradshaw  4/16/2023 7:06 PM EDT  Workstation ID: [id number]          Assessment & Plan      Brief Patient Summary:  Deion Nicholas is a 82 y.o. male  With a PMH coronary artery disease post CABG, left ventricular systolic dysfunction, cardiomyopathy, AICD in place, SVT status post ablation, hyperlipidemia, hypertension, chronic left lower extremity pain and swelling, who presented to [facility] on [facility]/16/2023 complaining of both midsternal chest pain and left arm and shoulder pain which occurred today.  He denies any shortness of air, nausea or dizziness.  Pain is worse with exertion and better with rest.  He did report he did some push-ups using a low counter today.  He also complains of left lower extremity circumferential edema and redness.  He denies any fever chills or diaphoresis.  He reports his left lower extremity has had chronic problems with cellulitis since an injury in [place].  He is awake and alert and a good historian but often needs redirection to answer question.  Review of records shows he was seen at [facility] emergency department 4/5/23 and 3/18/2023 with similar complaints, venous Doppler done both 4/5/2023 and 3/18/2023 showed no L evidence of thrombus.  He was diagnosed with cellulitis and given p.o.  clindamycin twice.  Blood cultures done 4/5/2023 were negative.  Review of records shows he was admitted here 4 times in 2021 for possible left lower extremity swelling and possible cellulitis.  Last admission was in July 2021.  Vascular surgery was consulted, orthopedic surgery was consulted and infectious disease was consulted at that time.  Orthopedic surgery considered and possibly have had a Baker's cyst but no signs of infection.  Infectious disease reported his overall picture is not indicative of cellulitis but chronic swelling from a remote injury.  Vascular surgery suggested his left lower extremity issues would require compression stockings he may have early signs of lymphedema and may benefit from visits to the lymphedema clinic for wraps and massage.  Patient reports he did not follow-up with orthopedic surgery, vascular surgery or lymphedema clinic.  Today's labs show a troponin of 18 and 15 T delta 3, glucose 106 CRP less than 0.30 WBC 6.8 hemoglobin 12.8 sed rate 30 D-dimer 0.78 chest x-ray per radiology chronic changes no active disease.  EKG shows sinus rhythm heart rate 85 left bundle branch block, some multiformed PVCs..     Blood cultures were again taken in the emergency department and he was started on 2 g IV ceftriaxone in the ED for possible cellulitis.  Given extensive past medical history cardiology has been consulted for further evaluation of chest pain.        aspirin, 81 mg, Oral, Daily  cefTRIAXone, 2 g, Intravenous, Q24H  cholecalciferol, 500 Units, Oral, Daily  docusate sodium, 100 mg, Oral, Q PM  enoxaparin, 40 mg, Subcutaneous, Q24H  gabapentin, 400 mg, Oral, Q8H  metoprolol succinate XL, 50 mg, Oral, Daily  pantoprazole, 40 mg, Oral, Daily  sucralfate, 1 g, Oral, 4x Daily             Active Hospital Problems:  Active Hospital Problems    Diagnosis    • **Chest pain, unspecified type    • Elevated troponin    • Edema of left lower extremity    • Ascending aortic aneurysm    •  Baker's cyst of knee, left    • GERD without esophagitis    • Peripheral neuropathy    • Hyperlipidemia, mixed    • Essential hypertension    • Normocytic anemia    • Presence of automatic implantable cardioverter-defibrillator    • Ischemic cardiomyopathy    • Coronary artery disease      Plan:   Chest pain, high-sensitivity troponin 18 then 15, no acute ST elevation or depression, may be secondary to musculoskeletal issues given push-ups today however cannot rule out ACS given extensive cardiac history, continuous cardiac monitoring, cardiology consulted for further evaluation     Ischemic cardiomyopathy, last echo showed an EF of 31%, AICD in place, stable on room air no shortness of air.       Edema of left lower extremity with erythema, reports of recurrent cellulitis with frequent evaluations failed outpatient treatment of clindamycin x2, continue current 2 g ceftriaxone with blood cultures pending, D-dimer not elevated recent DVT Doppler 4/5/2023 was negative, history of left saphenous vein graft for CABG has been having swelling since then on and off.     Ascending aortic aneurysm, 3.3 per abdominal duplex 7/15/2021     Baker's cyst of left knee, was to follow-up with orthopedics     GERD without esophagitis, was on home PPI and Carafate     Peripheral neuropathy, was on home gabapentin     Hyperlipidemia, was on home statin      Essential hypertension, home meds                  DVT prophylaxis:  Medical DVT prophylaxis orders are present.    CODE STATUS:    Code Status (Patient has no pulse and is not breathing): CPR (Attempt to Resuscitate)  Medical Interventions (Patient has pulse or is breathing): Full Support      Disposition:  I expect patient to be discharged 2 to 3 days.    This patient has been examined wearing appropriate Personal Protective Equipment and discussed with hospital infection control department. 04/17/23      Electronically signed by Erum Diez MD, 04/17/23, 15:38 EDT.  [facility]

## 2023-04-17 NOTE — H&P
[facility] Medicine Services      Patient Name: Deion Nicholas  : 1940  MRN: [id number]  Primary Care Physician:  Makenna Motta MD  Date of admission: 2023      Subjective      Chief Complaint: chest pain and LLE edema     History of Present Illness: Deion Nicholas is a 82 y.o. male  With a PMH coronary artery disease post CABG, left ventricular systolic dysfunction, cardiomyopathy, AICD in place, SVT status post ablation, hyperlipidemia, hypertension, chronic left lower extremity pain and swelling, who presented to [facility] on 2023 complaining of both midsternal chest pain and left arm and shoulder pain which occurred today.  He denies any shortness of air, nausea or dizziness.  Pain is worse with exertion and better with rest.  He did report he did some push-ups using a low counter today.  He also complains of left lower extremity circumferential edema and redness.  He denies any fever chills or diaphoresis.  He reports his left lower extremity has had chronic problems with cellulitis since an injury in [place].  He is awake and alert and a good historian but often needs redirection to answer question.  Review of records shows he was seen at [facility] emergency department 23 and 3/18/2023 with similar complaints, venous Doppler done both 2023 and 3/18/2023 showed no L evidence of thrombus.  He was diagnosed with cellulitis and given p.o. clindamycin twice.  Blood cultures done 2023 were negative.  Review of records shows he was admitted here 4 times in  for possible left lower extremity swelling and possible cellulitis.  Last admission was in 2021.  Vascular surgery was consulted, orthopedic surgery was consulted and infectious disease was consulted at that time.  Orthopedic surgery considered and possibly have had a Baker's cyst but no signs of infection.  Infectious disease reported his overall picture is not  indicative of cellulitis but chronic swelling from a remote injury.  Vascular surgery suggested his left lower extremity issues would require compression stockings he may have early signs of lymphedema and may benefit from visits to the lymphedema clinic for wraps and massage.  Patient reports he did not follow-up with orthopedic surgery, vascular surgery or lymphedema clinic.  Today's labs show a troponin of 18 and 15 T delta 3, glucose 106 CRP less than 0.30 WBC 6.8 hemoglobin 12.8 sed rate 30 D-dimer 0.78 chest x-ray per radiology chronic changes no active disease.  EKG shows sinus rhythm heart rate 85 left bundle branch block, some multiformed PVCs..    Blood cultures were again taken in the emergency department and he was started on 2 g IV ceftriaxone in the ED for possible cellulitis.  Given extensive past medical history cardiology has been consulted for further evaluation of chest pain.  Review of Systems   Constitutional: Negative.   HENT: Negative.    Eyes: Negative.    Cardiovascular: Positive for chest pain.   Respiratory: Negative.    Endocrine: Negative.    Hematologic/Lymphatic: Negative.    Skin: Negative.    Musculoskeletal: Positive for arthritis and joint pain.   Gastrointestinal: Negative.    Genitourinary: Negative.    Neurological: Negative.    Psychiatric/Behavioral: Negative.    Allergic/Immunologic: Negative.        Personal History     Past Medical History:   Diagnosis Date   • Aneurysm    • Cardiomyopathy     ICD implantation   • Cellulitis of left lower extremity 2010    recurrent   • CHD (coronary heart disease)     S/P CABG & PCI   • Dyslipidemia    • History of ventricular tachycardia    • Hypertension    • Myocardial infarction     Inferior MI   • Pinched nerve     L4-L5   • Prostate cancer        Past Surgical History:   Procedure Laterality Date   • ANGIOPLASTY  2000 04/1996 Stent: Palmaz- Huy stent/LAD 07/1996-RCA: 2000-Tetra stent Guidant to proximal RCA, mid to distal  artery 2 sequential Guidant Tetra stents   • APPENDECTOMY     • CARDIAC ABLATION  April and May 2019   • CARDIAC CATHETERIZATION  07/2017    1996 x2, Nov. 2000, 05/2010-cath 08/2015-no stents 2017   • CARDIAC DEFIBRILLATOR PLACEMENT     • COLONOSCOPY W/ POLYPECTOMY      Mult colonoscopy's for polyp resection    • CORONARY ARTERY BYPASS GRAFT  05/2010    x5 vessel: LMA to diagonal, LAD, intermedius, obtuse marginal, RCA   • CORONARY STENT PLACEMENT     • ENDOSCOPY N/A 6/24/2019    Procedure: ESOPHAGOGASTRODUODENOSCOPY with dilitation Bougie 50, 54;  Surgeon: Roddy Coronel MD;  Location: [facility] ENDOSCOPY;  Service: Gastroenterology   • INSERT / REPLACE / REMOVE PACEMAKER     • KNEE OPEN LATERAL RELEASE Left     reduction   • OTHER SURGICAL HISTORY  01/2018    ICD implantation   • PROSTATE SURGERY  2015    Robiotic surgery- Cyber Knife:       Family History: family history includes Heart disease in his father; Pancreatic cancer in his mother. Otherwise pertinent FHx was reviewed and not pertinent to current issue.    Social History:  reports that he quit smoking about 20 years ago. His smoking use included cigarettes. He has a 17.00 pack-year smoking history. He has never used smokeless tobacco. He reports that he does not currently use alcohol. He reports that he does not use drugs.    Home Medications:  Prior to Admission Medications     Prescriptions Last Dose Informant Patient Reported? Taking?    aspirin 81 MG chewable tablet   Yes No    Chew 81 mg Daily.    benzonatate (TESSALON) 200 MG capsule   No No    Take 1 capsule by mouth 3 (Three) Times a Day As Needed for Cough.    Cholecalciferol 10 MCG (400 UNIT) tablet  Self Yes No    Take 2 tablets daily    clindamycin (CLEOCIN) 300 MG capsule   No No    Take 1 capsule by mouth 3 (Three) Times a Day.    cyanocobalamin 1000 MCG/ML injection  Self Yes No    Inject 1,000 mcg into the appropriate muscle as directed by prescriber Every 14 (Fourteen) Days.    ezetimibe  (ZETIA) 10 MG tablet  Self Yes No    Take 10 mg by mouth Daily.    Ferrous Sulfate (IRON PO)  Self Yes No    Take  by mouth.    gabapentin (NEURONTIN) 400 MG capsule  Self Yes No    Take 400 mg by mouth 4 (Four) Times a Day.    lidocaine (LIDODERM) 5 %  Self Yes No    Place 1 patch on the skin as directed by provider Daily. Remove & Discard patch within 12 hours or as directed by MD    meclizine (ANTIVERT) 25 MG tablet   No No    Take 1 tablet by mouth 3 (Three) Times a Day As Needed for Dizziness for up to 10 doses.    methocarbamol (ROBAXIN) 500 MG tablet  Self Yes No    Take 500 mg by mouth 3 (Three) Times a Day As Needed for Muscle Spasms.    metoprolol succinate XL (TOPROL-XL) 50 MG 24 hr tablet   Yes No    Take 50 mg by mouth Daily.    midodrine (PROAMATINE) 5 MG tablet  Spouse/Significant Other Yes No    Take 2 tablets by mouth 3 (Three) Times a Day Before Meals.    nitroglycerin (NITROSTAT) 0.4 MG SL tablet  Self Yes No    Place 0.4 mg under the tongue Every 5 (Five) Minutes As Needed.    Omega-3 Fatty Acids (fish oil) 1000 MG capsule capsule  Self Yes No    Take 2,000 mg by mouth 2 (Two) Times a Day With Meals.    pantoprazole (PROTONIX) 40 MG EC tablet  Self Yes No    Take 40 mg by mouth Daily.    sucralfate (CARAFATE) 1 g tablet  Self Yes No    Take 1 g by mouth 4 (Four) Times a Day.            Allergies:  Allergies   Allergen Reactions   • Lovastatin Myalgia   • Pravastatin Myalgia and Unknown (See Comments)     unknown   • Simvastatin Unknown (See Comments) and Myalgia     unknown   • Spironolactone Other (See Comments)     Gynecomastia        Objective      Vitals:   Temp:  [98.5 °F (36.9 °C)] 98.5 °F (36.9 °C)  Heart Rate:  [70-82] 70  Resp:  [16-18] 18  BP: (135-145)/(65-75) 145/65    Physical Exam  Vitals reviewed.   Constitutional:       Appearance: Normal appearance.   HENT:      Head: Normocephalic and atraumatic.      Right Ear: External ear normal.      Left Ear: External ear normal.      Nose:  "Nose normal.      Mouth/Throat:      Mouth: Mucous membranes are moist.   Eyes:      Extraocular Movements: Extraocular movements intact.   Cardiovascular:      Rate and Rhythm: Normal rate and regular rhythm.      Pulses: Normal pulses.      Heart sounds: Normal heart sounds.   Pulmonary:      Effort: Pulmonary effort is normal.      Breath sounds: Normal breath sounds.   Abdominal:      Palpations: Abdomen is soft.   Genitourinary:     Comments: deferred  Musculoskeletal:         General: Swelling present. Normal range of motion.      Cervical back: Normal range of motion and neck supple.      Left lower leg: Edema present.   Skin:     General: Skin is warm and dry.      Findings: Erythema present.      Comments: LLE edema and erythema from below knee to toes    Neurological:      General: No focal deficit present.      Mental Status: He is alert and oriented to person, place, and time.   Psychiatric:         Mood and Affect: Mood normal.         Behavior: Behavior normal.         Thought Content: Thought content normal.         Judgment: Judgment normal.         Result Review    Result Review:  I have personally reviewed the results from the time of this admission to 4/17/2023 01:55 EDT and agree with these findings:  [x]  Laboratory  []  Microbiology  [x]  Radiology  [x]  EKG/Telemetry   [x]  Cardiology/Vascular   []  Pathology  [x]  Old records  []  Other:  Most notable findings include: Troponin 18 and 15 troponin T delta 3 glucose 106, CRP less than 0.30 WBC 6.8 hemoglobin 12.8, sed rate 30, D-dimer 0.78, chest x-ray per radiology \"chronic changes no active disease\",    Assessment & Plan        Active Hospital Problems:  Active Hospital Problems    Diagnosis    • **Chest pain, unspecified type    • Elevated troponin    • Edema of left lower extremity    • Ascending aortic aneurysm    • Baker's cyst of knee, left    • GERD without esophagitis    • Peripheral neuropathy    • Hyperlipidemia, mixed    • Essential " hypertension    • Normocytic anemia    • Presence of automatic implantable cardioverter-defibrillator    • Ischemic cardiomyopathy    • Coronary artery disease      Plan:     Chest pain, high-sensitivity troponin 18 then 15, no acute ST elevation or depression, may be secondary to musculoskeletal issues given push-ups today however cannot rule out ACS given extensive cardiac history, continuous cardiac monitoring, cardiology consulted for further evaluation    Elevated troponin 18 and 15 see plan above    Edema of left lower extremity with erythema, reports of recurrent cellulitis with frequent evaluations failed outpatient treatment of clindamycin x2, continue current 2 g ceftriaxone with blood cultures pending, D-dimer not elevated recent DVT Doppler 4/5/2023 was negative    Ascending aortic aneurysm, 3.3 per abdominal duplex 7/15/2021    Baker's cyst of left knee, was to follow-up with orthopedics    GERD without esophagitis, was on home PPI reorder pending verification    Peripheral neuropathy, was on home gabapentin reorder pending verification    Hyperlipidemia, was on home statin reorder pending verification    Essential hypertension, home meds unverified at this time reorder pending verification monitor BP Will add as needed antihypertensives if needed    Ischemic cardiomyopathy, last echo showed an EF of 31%, AICD in place, stable on room air no shortness of air        DVT prophylaxis:  Medical DVT prophylaxis orders are present.    CODE STATUS:    Code Status (Patient has no pulse and is not breathing): CPR (Attempt to Resuscitate)  Medical Interventions (Patient has pulse or is breathing): Full Support    Admission Status:  I believe this patient meets inpatient  status.    I discussed the patient's findings and my recommendations with patient.    This patient has been examined wearing appropriate Personal Protective Equipment . 04/17/23      Signature: Electronically signed by NIMCO Wright,  04/17/23, 1:57 AM EDT.

## 2023-04-18 ENCOUNTER — APPOINTMENT (OUTPATIENT)
Dept: NUCLEAR MEDICINE | Facility: HOSPITAL | Age: 83
End: 2023-04-18
Payer: OTHER GOVERNMENT

## 2023-04-18 ENCOUNTER — READMISSION MANAGEMENT (OUTPATIENT)
Dept: CALL CENTER | Facility: HOSPITAL | Age: 83
End: 2023-04-18
Payer: OTHER GOVERNMENT

## 2023-04-18 VITALS
HEIGHT: 72 IN | BODY MASS INDEX: 28.67 KG/M2 | TEMPERATURE: 97.5 F | OXYGEN SATURATION: 96 % | RESPIRATION RATE: 15 BRPM | SYSTOLIC BLOOD PRESSURE: 153 MMHG | DIASTOLIC BLOOD PRESSURE: 88 MMHG | WEIGHT: 211.64 LBS | HEART RATE: 83 BPM

## 2023-04-18 LAB — QT INTERVAL: 360 MS

## 2023-04-18 PROCEDURE — 25010000002 REGADENOSON 0.4 MG/5ML SOLUTION: Performed by: HOSPITALIST

## 2023-04-18 PROCEDURE — A9502 TC99M TETROFOSMIN: HCPCS | Performed by: HOSPITALIST

## 2023-04-18 PROCEDURE — G0378 HOSPITAL OBSERVATION PER HR: HCPCS

## 2023-04-18 PROCEDURE — 97161 PT EVAL LOW COMPLEX 20 MIN: CPT

## 2023-04-18 PROCEDURE — 93018 CV STRESS TEST I&R ONLY: CPT | Performed by: INTERNAL MEDICINE

## 2023-04-18 PROCEDURE — 0 TECHNETIUM TETROFOSMIN KIT: Performed by: HOSPITALIST

## 2023-04-18 PROCEDURE — 78452 HT MUSCLE IMAGE SPECT MULT: CPT

## 2023-04-18 PROCEDURE — 99232 SBSQ HOSP IP/OBS MODERATE 35: CPT | Performed by: NURSE PRACTITIONER

## 2023-04-18 PROCEDURE — 93017 CV STRESS TEST TRACING ONLY: CPT

## 2023-04-18 PROCEDURE — 78452 HT MUSCLE IMAGE SPECT MULT: CPT | Performed by: INTERNAL MEDICINE

## 2023-04-18 RX ORDER — REGADENOSON 0.08 MG/ML
0.4 INJECTION, SOLUTION INTRAVENOUS
Status: COMPLETED | OUTPATIENT
Start: 2023-04-18 | End: 2023-04-18

## 2023-04-18 RX ORDER — DOXYCYCLINE 100 MG/1
100 TABLET ORAL EVERY 12 HOURS SCHEDULED
Qty: 20 TABLET | Refills: 0 | Status: SHIPPED | OUTPATIENT
Start: 2023-04-18 | End: 2023-04-28

## 2023-04-18 RX ORDER — DOXYCYCLINE 100 MG/1
100 TABLET ORAL EVERY 12 HOURS SCHEDULED
Status: DISCONTINUED | OUTPATIENT
Start: 2023-04-18 | End: 2023-04-18 | Stop reason: HOSPADM

## 2023-04-18 RX ADMIN — Medication 10 ML: at 09:57

## 2023-04-18 RX ADMIN — SUCRALFATE 1 G: 1 TABLET ORAL at 09:57

## 2023-04-18 RX ADMIN — Medication 500 UNITS: at 09:57

## 2023-04-18 RX ADMIN — GABAPENTIN 400 MG: 400 CAPSULE ORAL at 03:09

## 2023-04-18 RX ADMIN — ASPIRIN 81 MG CHEWABLE TABLET 81 MG: 81 TABLET CHEWABLE at 09:57

## 2023-04-18 RX ADMIN — PANTOPRAZOLE SODIUM 40 MG: 40 TABLET, DELAYED RELEASE ORAL at 09:57

## 2023-04-18 RX ADMIN — TETROFOSMIN 1 DOSE: 1.38 INJECTION, POWDER, LYOPHILIZED, FOR SOLUTION INTRAVENOUS at 13:06

## 2023-04-18 RX ADMIN — GABAPENTIN 400 MG: 400 CAPSULE ORAL at 09:57

## 2023-04-18 RX ADMIN — TETROFOSMIN 1 DOSE: 1.38 INJECTION, POWDER, LYOPHILIZED, FOR SOLUTION INTRAVENOUS at 13:58

## 2023-04-18 RX ADMIN — REGADENOSON 0.4 MG: 0.08 INJECTION, SOLUTION INTRAVENOUS at 13:58

## 2023-04-18 NOTE — PROGRESS NOTES
"          Cardiology Progress  Note      Patient Care Team:  Makenna Motta MD as PCP - General  Makenna Motta MD as PCP - Family Medicine    PATIENT IDENTIFICATION  Name: Deion Nicholas  Age: 82 y.o.  Sex: male  :  1940  MRN: [id number]             REASON FOR FOLLOW-UP:  Acute on chronic heart failure with reduced ejection fraction        INTERVAL HISTORY  Patient seen and examined, chart and labs reviewed.  Patient reports feeling much better today.  Shortness of breath improved as well as lower extremity edema.  Denies chest discomfort, nausea.      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      RECOMMENDATIONS  The patient has had ongoing chest discomfort for several months that is exacerbated by activity, improved with rest.  No ischemic work-up since stress testing performed in 2019 which demonstrated prior infarct and no ischemia.    Will proceed with nuclear stress testing today.  Hemodynamics are stable, rhythm sinus  Continue aspirin, Zetia, Toprol  Midodrine on hold due to elevated blood pressures  Further recommendations following results of testing            Vital Signs  Visit Vitals  /88 (BP Location: Right arm, Patient Position: Lying)   Pulse 83   Temp 97.5 °F (36.4 °C) (Oral)   Resp 15   Ht 182.9 cm (72\")   Wt 96 kg (211 lb 10.3 oz)   SpO2 96%   BMI 28.70 kg/m² " "    Oxygen Therapy  SpO2: 96 %  Pulse Oximetry Type: Intermittent  Device (Oxygen Therapy): room air  Flow (L/min): 2  Flowsheet Rows    Flowsheet Row First Filed Value   Admission Height 182.9 cm (72\") Documented at 04/16/2023 1823   Admission Weight 96 kg (211 lb 10.3 oz) Documented at 04/16/2023 1823        Intake & Output (last 3 days)       04/15 0701 04/16 0700 04/16 0701  04/17 0700 04/17 0701  04/18 0700 04/18 0701  04/19 0700    P.O.   240 0    Total Intake(mL/kg)   240 (2.5) 0 (0)    Urine (mL/kg/hr)   850 (0.4) 550 (1.1)    Stool    0    Total Output   850 550    Net   -610 -550            Stool Unmeasured Occurrence    1 x        Lines, Drains & Airways     Active LDAs     Name Placement date Placement time Site Days    Peripheral IV 04/16/23 2140 Anterior;Right;Upper Arm 04/16/23 2140  Arm  1                       /88 (BP Location: Right arm, Patient Position: Lying)   Pulse 83   Temp 97.5 °F (36.4 °C) (Oral)   Resp 15   Ht 182.9 cm (72\")   Wt 96 kg (211 lb 10.3 oz)   SpO2 96%   BMI 28.70 kg/m²   Intake/Output last 3 shifts:  I/O last 3 completed shifts:  In: 240 [P.O.:240]  Out: 850 [Urine:850]  Intake/Output this shift:  I/O this shift:  In: 0   Out: 550 [Urine:550]    PHYSICAL EXAM:    General: Well-developed, chronically ill-appearing 82-year-old male who is alert, cooperative, no distress, appears stated age  Head:  Normocephalic, atraumatic, mucous membranes moist  Eyes:  Conjunctivae/corneas clear, EOM's intact     Neck:  Supple,  no JVD or bruit  Lungs:   Clear to auscultation bilaterally, no wheezes, rhonchi or rales are noted  Chest wall: No tenderness  Heart::  Regular rate and rhythm, S1 and S2 normal, no murmur, rub or gallop  Abdomen: Soft, nontender, nondistended, bowel sounds active  Extremities:Left lower extremity with trace pitting edema just below knee distally with mild circumferential erythema.  Pulses: 2+ and symmetric all extremities  Skin:  No rashes or " lesions  Neuro/psych: A&O x3. CN II through XII are grossly intact with appropriate affect        Scheduled Meds:      aspirin, 81 mg, Oral, Daily  cefTRIAXone, 2 g, Intravenous, Q24H  cholecalciferol, 500 Units, Oral, Daily  docusate sodium, 100 mg, Oral, Q PM  enoxaparin, 40 mg, Subcutaneous, Q24H  gabapentin, 400 mg, Oral, Q8H  metoprolol succinate XL, 50 mg, Oral, Daily  pantoprazole, 40 mg, Oral, Daily  sucralfate, 1 g, Oral, 4x Daily        Continuous Infusions:         PRN Meds:    •  benzonatate  •  meclizine  •  methocarbamol  •  nitroglycerin  •  [COMPLETED] Insert Peripheral IV **AND** sodium chloride        Results Review:     I reviewed the patient's new clinical results.    CBC    Results from last 7 days   Lab Units 04/16/23 1927   WBC 10*3/mm3 6.80   HEMOGLOBIN g/dL 12.8*   PLATELETS 10*3/mm3 214     Cr Clearance Estimated Creatinine Clearance: 64.4 mL/min (by C-G formula based on SCr of 1.1 mg/dL).  Coag   Results from last 7 days   Lab Units 04/16/23 1927   INR  1.00   APTT seconds 28.2     HbA1C   Lab Results   Component Value Date    HGBA1C 6.1 (H) 10/26/2022     Blood Glucose No results found for: POCGLU  Infection   Results from last 7 days   Lab Units 04/16/23 2157 04/16/23 2143   BLOODCX  No growth at 24 hours No growth at 24 hours     CMP   Results from last 7 days   Lab Units 04/16/23 1927   SODIUM mmol/L 140   POTASSIUM mmol/L 4.3   CHLORIDE mmol/L 103   CO2 mmol/L 27.0   BUN mg/dL 17   CREATININE mg/dL 1.10   GLUCOSE mg/dL 106*   ALBUMIN g/dL 4.2   BILIRUBIN mg/dL 0.2   ALK PHOS U/L 110   AST (SGOT) U/L 23   ALT (SGPT) U/L 20     ABG      UA      LEIGH ANN  No results found for: POCMETH, POCAMPHET, POCBARBITUR, POCBENZO, POCCOCAINE, POCOPIATES, POCOXYCODO, POCPHENCYC, POCPROPOXY, POCTHC, POCTRICYC  Lysis Labs   Results from last 7 days   Lab Units 04/16/23 1927   INR  1.00   APTT seconds 28.2   HEMOGLOBIN g/dL 12.8*   PLATELETS 10*3/mm3 214   CREATININE mg/dL 1.10     Radiology(recent) XR  Chest 1 View    Result Date: 4/16/2023  Impression: Chronic changes. No active disease. Electronically Signed: Fei Bradshaw  4/16/2023 7:06 PM EDT  Workstation ID: [id number]        Results from last 7 days   Lab Units 04/16/23 2125   HSTROP T ng/L 15*       Xrays, labs reviewed personally by physician.    ECG/EMG Results (most recent)     Procedure Component Value Units Date/Time    ECG 12 Lead [[id number]] Resulted: 04/17/23 0149     Updated: 04/17/23 0149    SCANNED - TELEMETRY   [[id number]] Resulted: 04/16/23     Updated: 04/18/23 0018    SCANNED - TELEMETRY   [[id number]] Resulted: 04/16/23     Updated: 04/18/23 0643    ECG 12 Lead Chest Pain [[id number]] Collected: 04/16/23 1830     Updated: 04/18/23 0738     QT Interval 360 ms     Narrative:      HEART RATE= 85  bpm  RR Interval= 704  ms  DC Interval= 165  ms  P Horizontal Axis= 16  deg  P Front Axis= 66  deg  QRSD Interval= 131  ms  QT Interval= 360  ms  QRS Axis= 25  deg  T Wave Axis= -74  deg  - ABNORMAL ECG -  Sinus rhythm  Multiform ventricular premature complexes  Left bundle branch block  When compared with ECG of 18-Mar-2023 14:56:33,  No significant change  Electronically Signed By: Han Faustin ([facility]) 18-Apr-2023 07:38:13  Date and Time of Study: 2023-04-16 18:30:18    SCANNED - TELEMETRY   [[id number]] Resulted: 04/16/23     Updated: 04/18/23 1101            Medication Review:   I have reviewed the patient's current medication list  Scheduled Meds:aspirin, 81 mg, Oral, Daily  cefTRIAXone, 2 g, Intravenous, Q24H  cholecalciferol, 500 Units, Oral, Daily  docusate sodium, 100 mg, Oral, Q PM  enoxaparin, 40 mg, Subcutaneous, Q24H  gabapentin, 400 mg, Oral, Q8H  metoprolol succinate XL, 50 mg, Oral, Daily  pantoprazole, 40 mg, Oral, Daily  sucralfate, 1 g, Oral, 4x Daily      Continuous Infusions:   PRN Meds:.•  benzonatate  •  meclizine  •  methocarbamol  •  nitroglycerin  •  [COMPLETED] Insert Peripheral IV **AND** sodium chloride    Imaging:  Imaging  "Results (Last 72 Hours)     Procedure Component Value Units Date/Time    XR Chest 1 View [[id number]] Collected: 04/16/23 1905     Updated: 04/16/23 1908    Narrative:      XR CHEST 1 VW    Date of Exam: 4/16/2023 7:00 PM EDT    Indication: cp/left arm pain.    Comparison: March 18, 2023    Findings:  There is a left-sided subclavian AICD device with the lead over the right ventricle. The heart is enlarged with changes from midline sternotomy. The pulmonary vasculature is normal. There are no focal infiltrates.      Impression:      Impression:  Chronic changes. No active disease.    Electronically Signed: Fei Bradshaw    4/16/2023 7:06 PM EDT    Workstation ID: [id number]            NIMCO Khoury  04/18/23  12:20 EDT       EMR Dragon/Transcription:   \"Dictated utilizing Dragon dictation\".       Electronically signed by NIMCO Khoury, 04/18/23, 12:20 PM EDT.    "

## 2023-04-18 NOTE — PLAN OF CARE
Goal Outcome Evaluation:  Plan of Care Reviewed With: patient           Outcome Evaluation: pt has had no comaplints today. pt been in bed resting most of day. pt had stress today- normal. will continue to monitor

## 2023-04-18 NOTE — THERAPY EVALUATION
Patient Name: Deion Nicholas  : 1940    MRN: [id number]                              Today's Date: 2023       Admit Date: 2023    Visit Dx:     ICD-10-CM ICD-9-CM   1. Chest pain, unspecified type  R07.9 786.50   2. Cellulitis of left lower leg  L03.116 682.6     Patient Active Problem List   Diagnosis   • Normocytic anemia   • Coronary artery disease   • Ischemic cardiomyopathy   • Paroxysmal ventricular tachycardia   • Presence of automatic implantable cardioverter-defibrillator   • Essential hypertension   • Hyperlipidemia, mixed   • Peripheral neuropathy   • Cellulitis of left lower extremity without foot   • GERD without esophagitis   • B12 deficiency   • Orthostatic hypotension   • Tinea pedis of left foot   • Lower extremity pain, left   • Cellulitis of left leg   • Baker's cyst of knee, left   • Gynecomastia, male   • Ascending aortic aneurysm   • Thyroid nodule   • Dizziness   • Chest pain, unspecified type   • Edema of left lower extremity   • Elevated troponin     Past Medical History:   Diagnosis Date   • Aneurysm    • Cardiomyopathy     ICD implantation   • Cellulitis of left lower extremity     recurrent   • CHD (coronary heart disease)     S/P CABG & PCI   • Dyslipidemia    • History of ventricular tachycardia    • Hypertension    • Myocardial infarction     Inferior MI   • Pinched nerve     L4-L5   • Prostate cancer      Past Surgical History:   Procedure Laterality Date   • ANGIOPLASTY  1996 Stent: Palmaz- Huy stent/LAD 1996-RCA: -Tetra stent Guidant to proximal RCA, mid to distal artery 2 sequential Guidant Tetra stents   • APPENDECTOMY     • CARDIAC ABLATION  April and May 2019   • CARDIAC CATHETERIZATION  2017    1996 x2, Nov. , 2010-cath 2015-no stents    • CARDIAC DEFIBRILLATOR PLACEMENT     • COLONOSCOPY W/ POLYPECTOMY      Mult colonoscopy's for polyp resection    • CORONARY ARTERY BYPASS GRAFT  05/2010    x5 vessel: LMA to diagonal,  LAD, intermedius, obtuse marginal, RCA   • CORONARY STENT PLACEMENT     • ENDOSCOPY N/A 6/24/2019    Procedure: ESOPHAGOGASTRODUODENOSCOPY with dilitation Bougie 50, 54;  Surgeon: Roddy Coronel MD;  Location: [facility];  Service: Gastroenterology   • INSERT / REPLACE / REMOVE PACEMAKER     • KNEE OPEN LATERAL RELEASE Left     reduction   • OTHER SURGICAL HISTORY  01/2018    ICD implantation   • PROSTATE SURGERY  2015    Robiotic surgery- Cyber Knife:      General Information     Row Name 04/18/23 1113          Physical Therapy Time and Intention    Document Type evaluation  -RM     Mode of Treatment physical therapy  -RM     Row Name 04/18/23 1151 04/18/23 1113       General Information    Patient Profile Reviewed -- yes  -RM    Prior Level of Function --  Pt reports was Mod I with use of rollator in home and 2 canes in community. Pt denies any falls in past year. Pt was completing household tasks, community errands, and was driving.  -RM independent:  -RM    Existing Precautions/Restrictions -- no known precautions/restrictions  -RM    Barriers to Rehab -- none identified  -    Row Name 04/18/23 1113          Living Environment    People in Home alone  Pt reports strong support from children/grandchildren who live closeby with son (lives 5 min away) checking on pt daily  -RM     Row Name 04/18/23 1113          Home Main Entrance    Number of Stairs, Main Entrance none  -RM     Row Name 04/18/23 1113          Stairs Within Home, Primary    Number of Stairs, Within Home, Primary none  -RM     Row Name 04/18/23 1113          Cognition    Orientation Status (Cognition) oriented x 4  No cognitive deficits observed during PT eval  -RM     Row Name 04/18/23 1113          Safety Issues, Functional Mobility    Safety Issues Affecting Function (Mobility) impulsivity  -RM     Comment, Safety Issues/Impairments (Mobility) Pt presents with impulsivity during gait training specifically with turning, however reports  awareness to deficit with need to correct  -RM           User Key  (r) = Recorded By, (t) = Taken By, (c) = Cosigned By    Initials Name Provider Type    RM Marilyn Modi, NAFISA Physical Therapist               Mobility     Row Name 04/18/23 1119          Bed Mobility    Bed Mobility bed mobility (all) activities;supine-sit;sit-supine  -RM     All Activities, [place] (Bed Mobility) modified independence  -RM     Supine-Sit [place] (Bed Mobility) modified independence  -RM     Sit-Supine [place] (Bed Mobility) modified independence  -RM     Row Name 04/18/23 1119          Bed-Chair Transfer    Bed-Chair [place] (Transfers) standby assist  -RM     Assistive Device (Bed-Chair Transfers) walker, front-wheeled  -RM     Row Name 04/18/23 1119          Sit-Stand Transfer    Sit-Stand [place] (Transfers) standby assist  -RM     Assistive Device (Sit-Stand Transfers) walker, front-wheeled  -RM     Row Name 04/18/23 1119          Gait/Stairs (Locomotion)    [place] Level (Gait) standby assist  -RM     Assistive Device (Gait) walker, front-wheeled  -RM     Distance in Feet (Gait) 50 ft  -RM     Deviations/Abnormal Patterns (Gait) base of support, wide;other (see comments)  Decreased midstance of LLE d/t pain; decreaesd DF of R foot pt reports has AFO at home for; impulsivity observed specifically with turning pt reports awareness of knowing need to slow down to decrease fall risk  -RM     Comment, (Gait/Stairs) No need to address stairs d/t no [place]IL[place] or inside home  -RM           User Key  (r) = Recorded By, (t) = Taken By, (c) = Cosigned By    Initials Name Provider Type     Marilyn Modi, PT Physical Therapist               Obj/Interventions     Row Name 04/18/23 1133          Range of Motion Comprehensive    General Range of Motion lower extremity range of motion deficits identified  -RM     Comment, General Range of Motion Decreased ankle DF AROM of L ankle; no AROM ankle DF of R ankle  -RM      Row Name 04/18/23 1133          Strength Comprehensive (MMT)    General Manual Muscle Testing (MMT) Assessment lower extremity strength deficits identified  -RM     Comment, General Manual Muscle Testing (MMT) Assessment MMT of B hip/knee musculature grossly 4/5, L ankle DF 3-/5, and R ankle DF 0/5  -RM           User Key  (r) = Recorded By, (t) = Taken By, (c) = Cosigned By    Initials Name Provider Type     Marilyn Modi, PT Physical Therapist               Goals/Plan     Row Name 04/18/23 1203          Transfer Goal 1 (PT)    Activity/Assistive Device (Transfer Goal 1, PT) transfers, all  -RM     [place] Level/Cues Needed (Transfer Goal 1, PT) independent  -RM     Time Frame (Transfer Goal 1, PT) 2 weeks;long term goal (LTG)  -RM     Row Name 04/18/23 1203          Gait Training Goal 1 (PT)    Activity/Assistive Device (Gait Training Goal 1, PT) gait (walking locomotion);decrease fall risk;walker, rolling  -RM     [place] Level (Gait Training Goal 1, PT) independent  -RM     Distance (Gait Training Goal 1, PT) 200 ft  -RM     Time Frame (Gait Training Goal 1, PT) long term goal (LTG);2 weeks  -RM     Row Name 04/18/23 1203          Therapy Assessment/Plan (PT)    Planned Therapy Interventions (PT) gait training;transfer training;strengthening;patient/family education  -RM           User Key  (r) = Recorded By, (t) = Taken By, (c) = Cosigned By    Initials Name Provider Type    RM Marilyn Modi, PT Physical Therapist               Clinical Impression     Row Name 04/18/23 1130          Pain    Pretreatment Pain Rating 9/10  -RM     Posttreatment Pain Rating 9/10  -RM     Pre/Posttreatment Pain Comment LLE  -RM     Pain Intervention(s) Repositioned  -RM     Additional Documentation Pain Scale: Numbers Pre/Post-Treatment (Group)  -RM     Row Name 04/18/23 1154 04/18/23 1130       Plan of Care Review    Plan of Care Reviewed With -- patient  -RM    Outcome Evaluation 81 yo admit with chest pain, elevated  troponin and suspect musculoskeletal etiology. Also with recurrent cellulitis. Lives alone, typically independent with use of rollator and canes when outside. Son assists PRN. At baseline, has foot drop on R and owns AFO though does not utilize. This date, pt ambulating 50' with rw SBA with no s/s of distress. May benefit from  services for home safety and falls risk assessment. Demonstrates impulsive behaviors though denies falls in the past year and suspect will be safe for home at d/c.  -RM --    Row Name 04/18/23 1154          Therapy Assessment/Plan (PT)    Criteria for Skilled Interventions Met (PT) yes;meets criteria  -RM     Therapy Frequency (PT) 3 times/wk  -RM     Predicted Duration of Therapy Intervention (PT) Until d/c  -RM     Row Name 04/18/23 1130          Vital Signs    O2 Delivery Intra Treatment room air  -RM     O2 Delivery Post Treatment room air  -RM     Row Name 04/18/23 1154          Positioning and Restraints    Pre-Treatment Position in bed  -RM     Post Treatment Position bed  -RM     In Bed notified nsg;exit alarm on;encouraged to call for assist;call light within reach  -RM           User Key  (r) = Recorded By, (t) = Taken By, (c) = Cosigned By    Initials Name Provider Type    RM Marilyn Modi, PT Physical Therapist               Outcome Measures     Row Name 04/18/23 1125 04/18/23 1001       How much help from another person do you currently need...    Turning from your back to your side while in flat bed without using bedrails? 4  -RM 3  -PG    Moving from lying on back to sitting on the side of a flat bed without bedrails? 4  -RM 3  -PG    Moving to and from a bed to a chair (including a wheelchair)? 4  -RM 3  -PG    Standing up from a chair using your arms (e.g., wheelchair, bedside chair)? 4  -RM 3  -PG    Climbing 3-5 steps with a railing? 4  Steps not addressed in PT POC, however anticipate no assistance needed based on functional performance  -RM 3  -PG    To walk in hospital  room? 4  -RM 3  -PG    AM-PAC 6 Clicks Score (PT) 24  -RM 18  -PG    Highest level of mobility 8 --> Walked 250 feet or more  -RM 6 --> Walked 10 steps or more  -PG          User Key  (r) = Recorded By, (t) = Taken By, (c) = Cosigned By    Initials Name Provider Type    PG Latosha Keith, RN Registered Nurse     Marilyn Modi, NAFISA Physical Therapist                             Physical Therapy Education     Title: PT OT SLP Therapies (In Progress)     Topic: Physical Therapy (In Progress)     Point: Mobility training (Done)     Learning Progress Summary           Patient Acceptance, E,TB, VU by  at 4/18/2023 1210                   Point: Home exercise program (Not Started)     Learner Progress:  Not documented in this visit.          Point: Body mechanics (Not Started)     Learner Progress:  Not documented in this visit.          Point: Precautions (Not Started)     Learner Progress:  Not documented in this visit.                      User Key     Initials Effective Dates Name Provider Type Discipline     03/27/23 -  Marilyn Modi, NAFISA Physical Therapist PT              PT Recommendation and Plan  Planned Therapy Interventions (PT): gait training, transfer training, strengthening, patient/family education  Plan of Care Reviewed With: patient  Outcome Evaluation: 81 yo admit with chest pain, elevated troponin and suspect musculoskeletal etiology. Also with recurrent cellulitis. Lives alone, typically independent with use of rollator and canes when outside. Son assists PRN. At baseline, has foot drop on R and owns AFO though does not utilize. This date, pt ambulating 50' with rw SBA with no s/s of distress. May benefit from  services for home safety and falls risk assessment. Demonstrates impulsive behaviors though denies falls in the past year and suspect will be safe for home at d/c.     Time Calculation:    PT Charges     Row Name 04/18/23 1212             Time Calculation    Start Time 1030  -RM      Stop  Time 1100  -RM      Time Calculation (min) 30 min  -RM      PT Received On 04/18/23  -RM      PT - Next Appointment 04/20/23  -RM      PT Goal Re-Cert Due Date 05/02/23  -RM         Time Calculation- PT    Total Timed Code Minutes- PT 0 minute(s)  -RM            User Key  (r) = Recorded By, (t) = Taken By, (c) = Cosigned By    Initials Name Provider Type    Marilyn Rudolph, PT Physical Therapist              Therapy Charges for Today     Code Description Service Date Service Provider Modifiers Qty    [id number] HC PT EVAL LOW COMPLEXITY 4 4/18/2023 Marilyn Modi, PT GP 1          PT G-Codes  AM-PAC 6 Clicks Score (PT): 24  PT Discharge Summary  Anticipated Discharge Disposition (PT): home with home health    Marilyn Modi PT  4/18/2023

## 2023-04-18 NOTE — DISCHARGE SUMMARY
[facility] Medicine Services  DISCHARGE SUMMARY    Patient Name: Deion Nicholas  : 1940  MRN: [id number]    Date of Admission: 2023  Discharge Diagnosis: Chest pain and cellulitis  Date of Discharge:  2023  Primary Care Physician: Makenna Motta MD      Presenting Problem:   Cellulitis of left lower leg [L03.116]  Chest pain, unspecified type [R07.9]    Active and Resolved Hospital Problems:  Active Hospital Problems    Diagnosis POA   • **Chest pain, unspecified type [R07.9] Yes   • Elevated troponin [R77.8] Yes   • Edema of left lower extremity [R60.0] Yes   • Ascending aortic aneurysm [I71.21] Yes   • Baker's cyst of knee, left [M71.22] Yes   • GERD without esophagitis [K21.9] Yes   • Peripheral neuropathy [G62.9] Yes   • Hyperlipidemia, mixed [E78.2] Yes   • Essential hypertension [I10] Yes   • Normocytic anemia [D64.9] Yes   • Presence of automatic implantable cardioverter-defibrillator [Z95.810] Yes   • Ischemic cardiomyopathy [I25.5] Yes   • Coronary artery disease [I25.10] Yes      Resolved Hospital Problems   No resolved problems to display.         Hospital Course     Hospital Course:  Deion Nicholas is a 82 y.o. male   With a PMH coronary artery disease post CABG, left ventricular systolic dysfunction, cardiomyopathy, AICD in place, SVT status post ablation, hyperlipidemia, hypertension, chronic left lower extremity pain and swelling, who presented to [facility] on 2023 complaining of both midsternal chest pain and left arm and shoulder pain which occurred today.  He denies any shortness of air, nausea or dizziness.  Pain is worse with exertion and better with rest.  He did report he did some push-ups using a low counter today.  He also complains of left lower extremity circumferential edema and redness.  He denies any fever chills or diaphoresis.  He reports his left lower extremity has had chronic problems with cellulitis since an  injury in [place].  He is awake and alert and a good historian but often needs redirection to answer question.  Review of records shows he was seen at [facility] emergency department 4/5/23 and 3/18/2023 with similar complaints, venous Doppler done both 4/5/2023 and 3/18/2023 showed no L evidence of thrombus.  He was diagnosed with cellulitis and given p.o. clindamycin twice.  Blood cultures done 4/5/2023 were negative.  Review of records shows he was admitted here 4 times in 2021 for possible left lower extremity swelling and possible cellulitis.  Last admission was in July 2021.  Vascular surgery was consulted, orthopedic surgery was consulted and infectious disease was consulted at that time.  Orthopedic surgery considered and possibly have had a Baker's cyst but no signs of infection.  Infectious disease reported his overall picture is not indicative of cellulitis but chronic swelling from a remote injury.  Vascular surgery suggested his left lower extremity issues would require compression stockings he may have early signs of lymphedema and may benefit from visits to the lymphedema clinic for wraps and massage.  Patient reports he did not follow-up with orthopedic surgery, vascular surgery or lymphedema clinic.  Today's labs show a troponin of 18 and 15 T delta 3, glucose 106 CRP less than 0.30 WBC 6.8 hemoglobin 12.8 sed rate 30 D-dimer 0.78 chest x-ray per radiology chronic changes no active disease.  EKG shows sinus rhythm heart rate 85 left bundle branch block, some multiformed PVCs..     Blood cultures were again taken in the emergency department and he was started on 2 g IV ceftriaxone in the ED for possible cellulitis.  Given extensive past medical history cardiology has been consulted for further evaluation of chest pain        DISCHARGE Follow Up Recommendations for labs and diagnostics: Follow-up with PCP and cardiology.      Reasons For Change In Medications and Indications for New  Medications:      Day of Discharge     Vital Signs:  Temp:  [97.3 °F (36.3 °C)-97.5 °F (36.4 °C)] 97.5 °F (36.4 °C)  Heart Rate:  [71-83] 83  Resp:  [15-16] 15  BP: (125-153)/(63-88) 153/88        Physical Exam  Vitals reviewed.   Constitutional:       Appearance: Normal appearance.   HENT:      Head: Normocephalic and atraumatic.      Right Ear: External ear normal.      Left Ear: External ear normal.      Nose: Nose normal.      Mouth/Throat:      Mouth: Mucous membranes are moist.   Eyes:      Extraocular Movements: Extraocular movements intact.   Cardiovascular:      Rate and Rhythm: Normal rate and regular rhythm.      Pulses: Normal pulses.      Heart sounds: Normal heart sounds.   Pulmonary:      Effort: Pulmonary effort is normal.      Breath sounds: Normal breath sounds.   Abdominal:      Palpations: Abdomen is soft.   Genitourinary:     Comments: deferred  Musculoskeletal:         General: Swelling present. Normal range of motion.      Cervical back: Normal range of motion and neck supple.      Left lower leg: Edema present.   Skin:     General: Skin is warm and dry.      Findings: Erythema present.      Comments: LLE edema and erythema from below knee to toes  is improved   Neurological:      General: No focal deficit present.      Mental Status: He is alert and oriented to person, place, and time.   Psychiatric:         Mood and Affect: Mood normal.         Behavior: Behavior normal.         Thought Content: Thought content normal.         Judgment: Judgment normal.     Pertinent  and/or Most Recent Results     LAB RESULTS:      Lab 04/16/23 1927   WBC 6.80   HEMOGLOBIN 12.8*   HEMATOCRIT 37.8   PLATELETS 214   NEUTROS ABS 4.00   LYMPHS ABS 1.90   MONOS ABS 0.70   EOS ABS 0.30   MCV 95.4   SED RATE 30*   CRP <0.30   PROTIME 10.3   APTT 28.2         Lab 04/16/23 1927   SODIUM 140   POTASSIUM 4.3   CHLORIDE 103   CO2 27.0   ANION GAP 10.0   BUN 17   CREATININE 1.10   EGFR 67.9   GLUCOSE 106*   CALCIUM  9.3         Lab 04/16/23 1927   TOTAL PROTEIN 6.9   ALBUMIN 4.2   GLOBULIN 2.7   ALT (SGPT) 20   AST (SGOT) 23   BILIRUBIN 0.2   ALK PHOS 110         Lab 04/16/23 2125 04/16/23 1927   HSTROP T 15* 18*   PROTIME  --  10.3   INR  --  1.00                 Brief Urine Lab Results     None        Microbiology Results (last 10 days)     Procedure Component Value - Date/Time    Blood Culture - Blood, Hand, Right [[id number]]  (Normal) Collected: 04/16/23 2157    Lab Status: Preliminary result Specimen: Blood from Hand, Right Updated: 04/17/23 2215     Blood Culture No growth at 24 hours    Narrative:      Less than seven (7) mL's of blood was collected.  Insufficient quantity may yield false negative results.    Blood Culture - Blood, Arm, Right [[id number]]  (Normal) Collected: 04/16/23 2143    Lab Status: Preliminary result Specimen: Blood from Arm, Right Updated: 04/17/23 2215     Blood Culture No growth at 24 hours    Narrative:      Less than seven (7) mL's of blood was collected.  Insufficient quantity may yield false negative results.          XR Chest 1 View    Result Date: 4/16/2023  Impression: Impression: Chronic changes. No active disease. Electronically Signed: Fei Bradshaw  4/16/2023 7:06 PM EDT  Workstation ID: [id number]    US Venous Doppler Lower Extremity Left (duplex)    Result Date: 4/5/2023  Impression: Impression: No evidence of thrombus within left lower extremity venous system. Electronically Signed: Domingo Wilkins  4/5/2023 2:57 PM EDT  Workstation ID: [id number]      Results for orders placed during the hospital encounter of 07/13/21    Ultrasound Abdominal Aorta Limited CAR    Interpretation Summary  · 3.3 cm abdominal aortic aneurysm seen in the mid infrarenal aorta.      Results for orders placed during the hospital encounter of 07/13/21    Ultrasound Abdominal Aorta Limited CAR    Interpretation Summary  · 3.3 cm abdominal aortic aneurysm seen in the mid infrarenal aorta.      Results for orders  placed during the hospital encounter of 10/22/21    Adult Transthoracic Echo Complete W/ Cont if Necessary Per Protocol    Interpretation Summary  · Estimated left ventricular EF was in agreement with the calculated left ventricular EF. Left ventricular ejection fraction appears to be 31 - 35%. Left ventricular systolic function is severely decreased.  · The left ventricular cavity is severely dilated.  · The right ventricular cavity is mildly dilated.  · Estimated right ventricular systolic pressure from tricuspid regurgitation is mildly elevated (35-45 mmHg).  · Aneurysmal dilation of the ascending aorta is present.  · Left ventricular diastolic function is consistent with (grade Ia w/high LAP) impaired relaxation.      Labs Pending at Discharge:  Pending Labs     Order Current Status    Blood Culture - Blood, Arm, Right Preliminary result    Blood Culture - Blood, Hand, Right Preliminary result          Procedures Performed           Consults:   Consults     Date and Time Order Name Status Description    4/18/2023 11:43 AM Inpatient Infectious Diseases Consult Completed     4/16/2023 11:00 PM Inpatient Cardiology Consult Completed     4/16/2023 10:41 PM Hospitalist (on-call MD unless specified)              Discharge Details        Discharge Medications      New Medications      Instructions Start Date   doxycycline 100 MG tablet  Commonly known as: ADOXA   100 mg, Oral, Every 12 Hours Scheduled         Continue These Medications      Instructions Start Date   aspirin 81 MG chewable tablet   81 mg, Oral, Daily      benzonatate 200 MG capsule  Commonly known as: TESSALON   200 mg, Oral, 3 Times Daily PRN      cholecalciferol 10 MCG (400 UNIT) tablet  Commonly known as: VITAMIN D3   Take 2 tablets daily       cyanocobalamin 1000 MCG/ML injection   1,000 mcg, Intramuscular, Every 14 Days      ezetimibe 10 MG tablet  Commonly known as: ZETIA   10 mg, Oral, Every Evening      fish oil 1000 MG capsule capsule   2,000  mg, Oral, 2 Times Daily With Meals      gabapentin 400 MG capsule  Commonly known as: NEURONTIN   400 mg, Oral, 4 Times Daily      lidocaine 5 %  Commonly known as: LIDODERM   1 patch, Transdermal, Daily PRN, Remove & Discard patch within 12 hours or as directed by MD       meclizine 25 MG tablet  Commonly known as: ANTIVERT   25 mg, Oral, 3 Times Daily PRN      methocarbamol 500 MG tablet  Commonly known as: ROBAXIN   500 mg, Oral, 3 Times Daily PRN      metoprolol succinate XL 50 MG 24 hr tablet  Commonly known as: TOPROL-XL   50 mg, Oral, Daily      midodrine 5 MG tablet  Commonly known as: PROAMATINE   5 mg, Oral, 3 Times Daily Before Meals      nitroglycerin 0.4 MG SL tablet  Commonly known as: NITROSTAT   0.4 mg, Sublingual, Every 5 Minutes PRN      pantoprazole 40 MG EC tablet  Commonly known as: PROTONIX   40 mg, Oral, Daily         Stop These Medications    docusate sodium 100 MG capsule  Commonly known as: COLACE     sucralfate 1 g tablet  Commonly known as: CARAFATE            Allergies   Allergen Reactions   • Lovastatin Myalgia   • Pravastatin Myalgia and Unknown (See Comments)     unknown   • Simvastatin Unknown (See Comments) and Myalgia     unknown   • Spironolactone Other (See Comments)     Gynecomastia          Discharge Disposition:   Home or Self Care    Diet:  Hospital:  Diet Order   Procedures   • Diet: Cardiac Diets; Healthy Heart (2-3 Na+); Texture: Regular Texture (IDDSI 7); Fluid Consistency: Thin (IDDSI 0)         Discharge Activity:   Activity Instructions     Activity as Tolerated              CODE STATUS:  Code Status and Medical Interventions:   Ordered at: 04/16/23 2259     Code Status (Patient has no pulse and is not breathing):    CPR (Attempt to Resuscitate)     Medical Interventions (Patient has pulse or is breathing):    Full Support         Future Appointments   Date Time Provider Department Center   4/18/2023  4:20 PM [facility] RO[facility]   7/18/2023  1:15 PM  Marcin Childers DO [facility]   7/27/2023  2:00 PM Constantino Arvizu MD [facility]       Additional Instructions for the Follow-ups that You Need to Schedule     Discharge Follow-up with PCP   As directed       Currently Documented PCP:    Makenna Motta MD    PCP Phone Number:    [phone]     Follow Up Details: 1 week               Time spent on Discharge including face to face service: 32 minutes    This patient has been examined wearing appropriate Personal Protective Equipment and discussed with hospital infection control department. 04/18/23      Signature:   Electronically signed by Erum Diez MD, 04/18/23, 4:21 PM EDT.

## 2023-04-18 NOTE — CASE MANAGEMENT/SOCIAL WORK
Continued Stay Note  [facility]     Patient Name: Deion Nicholas  MRN: [id number]  Today's Date: 4/18/2023    Admit Date: 4/16/2023    Plan: Declined HH. [facility] at discharge. [facility] refusal.   Discharge Plan     Row Name 04/18/23 1349       Plan    Plan Declined HH. Home at discharge. [facility] refusal.    Plan Comments Patient will discharge home alone. Patient declines HH services. [facility] not [facility], filled out [facility] refusal [facility]. CM informed financial counselors to bill [facility] only. [facility] to [facility].              Met with patient in room.    Maintained distance greater than six feet and spent less than 15 minutes in the room.      Earnestine Shukla RN, BSN  Obs/3C/Float   [address] [address]  Phone: [phone]  Fax: [phone]

## 2023-04-18 NOTE — PLAN OF CARE
Goal Outcome Evaluation:  Plan of Care Reviewed With: patient           Outcome Evaluation: 81 yo admit with chest pain, elevated troponin and suspect musculoskeletal etiology. Also with recurrent cellulitis. Lives alone, typically independent with use of rollator and canes when outside. Son assists PRN. At baseline, has foot drop on R and owns AFO though does not utilize. This date, pt ambulating 50' with rw SBA with no s/s of distress. May benefit from  services for home safety and falls risk assessment. Demonstrates impulsive behaviors though denies falls in the past year and suspect will be safe for home at d/c.

## 2023-04-18 NOTE — CONSULTS
Infectious Diseases Consult Note    Referring Provider: Erum Diez MD    Reason for Consultation: Left leg cellulitis    Patient Care Team:  Makenna Motta MD as PCP - General  Makenna Motta MD as PCP - Family Medicine    Chief complaint left leg pain and swelling    Subjective     The patient has been afebrile for the last 24 hours.  The patient is on room air, hemodynamically stable, and is tolerating antimicrobial therapy.    History of present illness:      This is a 82-year-old male who presents to the hospital on 4/16/2023 with complaints of chest pain and left leg pain swelling and redness.  Patient claims that he has been to the ER on 3/18/2023 and 4/5/2023 and was given oral clindamycin twice but symptoms keep reoccurring.  Cardiology is working up for chest pain and he is about to go down for a stress test.  Patient denies fever, chills, diaphoresis, shortness of breath, cough, GI symptoms, or any urinary symptoms.    Review of Systems   Review of Systems   Constitutional: Negative.    HENT: Negative.    Eyes: Negative.    Respiratory: Negative.    Cardiovascular: Positive for chest pain and leg swelling.   Gastrointestinal: Negative.    Endocrine: Negative.    Genitourinary: Negative.    Musculoskeletal: Negative.    Skin: Positive for color change.   Neurological: Negative.    Psychiatric/Behavioral: Negative.    All other systems reviewed and are negative.      Medications  Medications Prior to Admission   Medication Sig Dispense Refill Last Dose   • aspirin 81 MG chewable tablet Chew 1 tablet Daily.      • benzonatate (TESSALON) 200 MG capsule Take 1 capsule by mouth 3 (Three) Times a Day As Needed for Cough. 15 capsule 0    • Cholecalciferol 10 MCG (400 UNIT) tablet Take 2 tablets daily      • cyanocobalamin 1000 MCG/ML injection Inject 1 mL into the appropriate muscle as directed by prescriber Every 14 (Fourteen) Days.      • docusate sodium (COLACE) 100 MG capsule Take 1  capsule by mouth Every Evening.      • ezetimibe (ZETIA) 10 MG tablet Take 1 tablet by mouth Every Evening.      • gabapentin (NEURONTIN) 400 MG capsule Take 1 capsule by mouth 4 (Four) Times a Day.      • lidocaine (LIDODERM) 5 % Place 1 patch on the skin as directed by provider Daily As Needed. Remove & Discard patch within 12 hours or as directed by MD      • meclizine (ANTIVERT) 25 MG tablet Take 1 tablet by mouth 3 (Three) Times a Day As Needed for Dizziness for up to 10 doses. 10 tablet 0    • methocarbamol (ROBAXIN) 500 MG tablet Take 1 tablet by mouth 3 (Three) Times a Day As Needed for Muscle Spasms.      • metoprolol succinate XL (TOPROL-XL) 50 MG 24 hr tablet Take 1 tablet by mouth Daily.      • midodrine (PROAMATINE) 5 MG tablet Take 1 tablet by mouth 3 (Three) Times a Day Before Meals.      • nitroglycerin (NITROSTAT) 0.4 MG SL tablet Place 1 tablet under the tongue Every 5 (Five) Minutes As Needed.      • Omega-3 Fatty Acids (fish oil) 1000 MG capsule capsule Take 2 capsules by mouth 2 (Two) Times a Day With Meals.      • pantoprazole (PROTONIX) 40 MG EC tablet Take 1 tablet by mouth Daily.      • sucralfate (CARAFATE) 1 g tablet Take 1 tablet by mouth 4 (Four) Times a Day.          History  Past Medical History:   Diagnosis Date   • Aneurysm    • Cardiomyopathy     ICD implantation   • Cellulitis of left lower extremity 2010    recurrent   • CHD (coronary heart disease)     S/P CABG & PCI   • Dyslipidemia    • History of ventricular tachycardia    • Hypertension    • Myocardial infarction     Inferior MI   • Pinched nerve     L4-L5   • Prostate cancer      Past Surgical History:   Procedure Laterality Date   • ANGIOPLASTY  2000 04/1996 Stent: Palmaz- Huy stent/LAD 07/1996-RCA: 2000-Tetra stent Guidant to proximal RCA, mid to distal artery 2 sequential Guidant Tetra stents   • APPENDECTOMY     • CARDIAC ABLATION  April and May 2019   • CARDIAC CATHETERIZATION  07/2017    1996 x2, Nov. 2000,  05/2010-cath 08/2015-no stents 2017   • CARDIAC DEFIBRILLATOR PLACEMENT     • COLONOSCOPY W/ POLYPECTOMY      Mult colonoscopy's for polyp resection    • CORONARY ARTERY BYPASS GRAFT  05/2010    x5 vessel: LMA to diagonal, LAD, intermedius, obtuse marginal, RCA   • CORONARY STENT PLACEMENT     • ENDOSCOPY N/A 6/24/2019    Procedure: ESOPHAGOGASTRODUODENOSCOPY with dilitation Bougie 50, 54;  Surgeon: Roddy Coronel MD;  Location: [facility] ENDOSCOPY;  Service: Gastroenterology   • INSERT / REPLACE / REMOVE PACEMAKER     • KNEE OPEN LATERAL RELEASE Left     reduction   • OTHER SURGICAL HISTORY  01/2018    ICD implantation   • PROSTATE SURGERY  2015    Robiotic surgery- Cyber Knife:       Family History  Family History   Problem Relation Age of Onset   • Pancreatic cancer Mother    • Heart disease Father        Social History   reports that he quit smoking about 20 years ago. His smoking use included cigarettes. He has a 17.00 pack-year smoking history. He has never used smokeless tobacco. He reports that he does not currently use alcohol. He reports that he does not use drugs.    Allergies  Lovastatin, Pravastatin, Simvastatin, and Spironolactone    Objective     Vital Signs   Vital Signs (last 24 hours)       04/17 0700  04/18 0659 04/18 0700  04/18 1510   Most Recent      Temp (°F) 97.3 -  97.5      97.5     97.5 (36.4) 04/18 1132    Heart Rate 71 -  82      83     83 04/18 1132    Resp 15 -  16      15     15 04/18 1132    /98 -  148/77      153/88     153/88 04/18 1132    SpO2 (%) 95 -  98      96     96 04/18 1132          Physical Exam:  Physical Exam  Vitals and nursing note reviewed.   Constitutional:       General: He is not in acute distress.     Appearance: Normal appearance. He is well-developed and normal weight. He is not diaphoretic.   HENT:      Head: Normocephalic and atraumatic.   Eyes:      Conjunctiva/sclera: Conjunctivae normal.      Pupils: Pupils are equal, round, and reactive to light.    Cardiovascular:      Rate and Rhythm: Normal rate and regular rhythm.      Heart sounds: Normal heart sounds, S1 normal and S2 normal.   Pulmonary:      Effort: Pulmonary effort is normal. No respiratory distress.      Breath sounds: Normal breath sounds. No stridor. No wheezing or rales.   Abdominal:      General: Bowel sounds are normal. There is no distension.      Palpations: Abdomen is soft. There is no mass.      Tenderness: There is no abdominal tenderness. There is no guarding.   Musculoskeletal:         General: No deformity. Normal range of motion.      Cervical back: Neck supple.   Skin:     General: Skin is warm and dry.      Coloration: Skin is not pale.      Findings: No erythema or rash.      Comments: Patient has very minimal erythema and swelling to the left left lower leg with no significant warmth   Neurological:      Mental Status: He is alert and oriented to person, place, and time.      Cranial Nerves: No cranial nerve deficit.   Psychiatric:         Mood and Affect: Mood normal.         Microbiology  Microbiology Results (last 10 days)     Procedure Component Value - Date/Time    Blood Culture - Blood, Hand, Right [[id number]]  (Normal) Collected: 04/16/23 2157    Lab Status: Preliminary result Specimen: Blood from Hand, Right Updated: 04/17/23 2215     Blood Culture No growth at 24 hours    Narrative:      Less than seven (7) mL's of blood was collected.  Insufficient quantity may yield false negative results.    Blood Culture - Blood, Arm, Right [[id number]]  (Normal) Collected: 04/16/23 2143    Lab Status: Preliminary result Specimen: Blood from Arm, Right Updated: 04/17/23 2215     Blood Culture No growth at 24 hours    Narrative:      Less than seven (7) mL's of blood was collected.  Insufficient quantity may yield false negative results.          Laboratory  Results from last 7 days   Lab Units 04/16/23 1927   WBC 10*3/mm3 6.80   HEMOGLOBIN g/dL 12.8*   HEMATOCRIT % 37.8   PLATELETS  10*3/mm3 214     Results from last 7 days   Lab Units 04/16/23  1927   SODIUM mmol/L 140   POTASSIUM mmol/L 4.3   CHLORIDE mmol/L 103   CO2 mmol/L 27.0   BUN mg/dL 17   CREATININE mg/dL 1.10   GLUCOSE mg/dL 106*   CALCIUM mg/dL 9.3     Results from last 7 days   Lab Units 04/16/23  1927   SODIUM mmol/L 140   POTASSIUM mmol/L 4.3   CHLORIDE mmol/L 103   CO2 mmol/L 27.0   BUN mg/dL 17   CREATININE mg/dL 1.10   GLUCOSE mg/dL 106*   CALCIUM mg/dL 9.3         Results from last 7 days   Lab Units 04/16/23  1927   SED RATE mm/hr 30*           Radiology  Imaging Results (Last 72 Hours)     Procedure Component Value Units Date/Time    XR Chest 1 View [[id number]] Collected: 04/16/23 1905     Updated: 04/16/23 1908    Narrative:      XR CHEST 1 VW    Date of Exam: 4/16/2023 7:00 PM EDT    Indication: cp/left arm pain.    Comparison: March 18, 2023    Findings:  There is a left-sided subclavian AICD device with the lead over the right ventricle. The heart is enlarged with changes from midline sternotomy. The pulmonary vasculature is normal. There are no focal infiltrates.      Impression:      Impression:  Chronic changes. No active disease.    Electronically Signed: Fei Bradshaw    4/16/2023 7:06 PM EDT    Workstation ID: [id number]          Cardiology      Results Review:  I have reviewed all clinical data, test, lab, and imaging results.       Schedule Meds  aspirin, 81 mg, Oral, Daily  cefTRIAXone, 2 g, Intravenous, Q24H  cholecalciferol, 500 Units, Oral, Daily  docusate sodium, 100 mg, Oral, Q PM  enoxaparin, 40 mg, Subcutaneous, Q24H  gabapentin, 400 mg, Oral, Q8H  metoprolol succinate XL, 50 mg, Oral, Daily  pantoprazole, 40 mg, Oral, Daily  sucralfate, 1 g, Oral, 4x Daily        Infusion Meds       PRN Meds  •  benzonatate  •  meclizine  •  methocarbamol  •  nitroglycerin  •  [COMPLETED] Insert Peripheral IV **AND** sodium chloride      Assessment & Plan       Assessment    Left leg cellulitis with very minimal erythema  and swelling and no warmth.  Patient states that he has been to the ER in March and early April of this year and given p.o. clindamycin twice.  He states that the cellulitis always returns after oral antibiotics.  -Doppler from 4/5/2023 showed no evidence of DVT    Our service last saw the patient in July 2021.  Has chronic left lower leg swelling from a remote injury.    CAD with history of CABG x5    Pacemaker placement    Prostate cancer    Plan    Discontinue IV Rocephin  Start p.o. doxycycline 100 mg twice daily for 10 days  Continue wear compression stockings once infection has resolved keep leg elevated is much as possible  Continue supportive care  A.m. labs  Case discussed with RN at bedside    Tamiko Victor, [facility]  04/18/23  15:10 EDT    Note is dictated utilizing voice recognition software/Dragon

## 2023-04-19 NOTE — OUTREACH NOTE
Prep Survey    Flowsheet Row Responses   [facility] patient discharged from? [facility]   Is LACE score < 7 ? No   Eligibility Readm Mgmt   Discharge diagnosis chest pain and LLE edema   Does the patient have one of the following disease processes/diagnoses(primary or secondary)? Other   Does the patient have Home health ordered? No   Is there a DME ordered? No   Prep survey completed? Yes          [facility] - Registered Nurse

## 2023-04-19 NOTE — CASE MANAGEMENT/SOCIAL WORK
Continued Stay Note  [facility]     Patient Name: Deion Nicholas  MRN: [id number]  Today's Date: 4/19/2023    Admit Date: 4/16/2023    Plan: Declined HH. [facility] at discharge. [facility] refusal.   Discharge Plan     Row Name 04/19/23 0801       Plan    Plan Comments Patient informed floor RN he wanted to appeal discharge. CM M. Naegele gave patient a copy of IMM and highlighted number to call, CM informed patient he would have to call 4/18 before the end of the night. CM recieved message that patient has now changed his mind and does not want to appeal.    Final Discharge Disposition Code 01 - home or self-care    Final Note Home              Phone communication or documentation only - no physical contact with patient or family.    Earnestine Shukla RN, BSN  [facility]   [address] [address]  Phone: [phone]  Fax: [phone]    =================================================================    Case Management Discharge Note      Final Note: Home       Transportation Services  Private: Car    Final Discharge Disposition Code: 01 - home or self-care

## 2023-04-21 LAB
BACTERIA SPEC AEROBE CULT: NORMAL
BACTERIA SPEC AEROBE CULT: NORMAL
BH CV REST NUCLEAR ISOTOPE DOSE: 11 MCI
BH CV STRESS BP STAGE 1: NORMAL
BH CV STRESS BP STAGE 2: NORMAL
BH CV STRESS BP STAGE 3: NORMAL
BH CV STRESS COMMENTS STAGE 1: NORMAL
BH CV STRESS COMMENTS STAGE 2: NORMAL
BH CV STRESS DOSE REGADENOSON STAGE 1: 0.4
BH CV STRESS DURATION MIN STAGE 1: 0
BH CV STRESS DURATION MIN STAGE 2: 4
BH CV STRESS DURATION SEC STAGE 1: 10
BH CV STRESS DURATION SEC STAGE 2: 0
BH CV STRESS HR STAGE 1: 106
BH CV STRESS HR STAGE 2: 105
BH CV STRESS HR STAGE 3: 99
BH CV STRESS NUCLEAR ISOTOPE DOSE: 30.2 MCI
BH CV STRESS PROTOCOL 1: NORMAL
BH CV STRESS RECOVERY BP: NORMAL MMHG
BH CV STRESS RECOVERY HR: 93 BPM
BH CV STRESS STAGE 1: 1
BH CV STRESS STAGE 2: 2
BH CV STRESS STAGE 3: 3
LV EF NUC BP: 40 %
MAXIMAL PREDICTED HEART RATE: 140 BPM
PERCENT MAX PREDICTED HR: 75.71 %
STRESS BASELINE BP: NORMAL MMHG
STRESS BASELINE HR: 86 BPM
STRESS PERCENT HR: 89 %
STRESS POST PEAK BP: NORMAL MMHG
STRESS POST PEAK HR: 106 BPM
STRESS TARGET HR: 119 BPM

## 2023-04-28 ENCOUNTER — READMISSION MANAGEMENT (OUTPATIENT)
Dept: CALL CENTER | Facility: HOSPITAL | Age: 83
End: 2023-04-28
Payer: OTHER GOVERNMENT

## 2023-04-28 NOTE — OUTREACH NOTE
Medical Week 2 Survey    Flowsheet Row Responses   [facility] patient discharged from? [facility]   Does the patient have one of the following disease processes/diagnoses(primary or secondary)? Other   Week 2 attempt successful? Yes   Call start time 1024   Discharge diagnosis chest pain and LLE edema   Call end time 1034   Meds reviewed with patient/caregiver? Yes   Is the patient having any side effects they believe may be caused by any medication additions or changes? No   Does the patient have all medications ordered at discharge? Yes   Is the patient taking all medications as directed (includes completed medication regime)? Yes   Does the patient have a primary care provider?  Yes   Does the patient have an appointment with their PCP within 7 days of discharge? No   Comments regarding PCP Patient has been in contact with PCP, he feels cellulitis is not improving. He was instructed not to come to office by PCP but return to ED   Nursing Interventions Educated patient on importance of making appointment, Advised patient to make appointment   Has the patient kept scheduled appointments due by today? N/A   Has home health visited the patient within 72 hours of discharge? N/A   Psychosocial issues? No   Did the patient receive a copy of their discharge instructions? Yes   Nursing interventions Reviewed instructions with patient   What is the patient's perception of their health status since discharge? Same  [Patient does not feel cellulitis has improved]   Is the patient/caregiver able to teach back signs and symptoms related to disease process for when to call PCP? Yes   Is the patient/caregiver able to teach back signs and symptoms related to disease process for when to call 911? Yes   Is the patient/caregiver able to teach back the hierarchy of who to call/visit for symptoms/problems? PCP, Specialist, Home health nurse, Urgent Care, ED, 911 Yes   Week 2 Call Completed? Yes   Is the patient interested in  additional calls from an ambulatory ?  NOTE:  applies to high risk patients requiring additional follow-up. No          [facility] - Registered Nurse

## 2023-05-01 ENCOUNTER — TELEPHONE (OUTPATIENT)
Dept: CARDIOLOGY | Facility: CLINIC | Age: 83
End: 2023-05-01

## 2023-05-01 NOTE — TELEPHONE ENCOUNTER
I CALLED THE PATIENT BACK- HE IS NOT SURE WHY THE DOCTOR WANTED HIM TO START THE PLAVIX- ADVISED HE CALL THEM TO ASK IF SHE INTENDED FOR HIM TO BE ON BOTH OR JUST PLAVIX. HE VOICED UNDERSTANDING

## 2023-05-01 NOTE — TELEPHONE ENCOUNTER
Caller: ANA ADEN    Relationship: SELF    Best call back number:[phone]    What medications are you currently taking:   Current Outpatient Medications on File Prior to Visit   Medication Sig Dispense Refill   • aspirin 81 MG chewable tablet Chew 1 tablet Daily.     • benzonatate (TESSALON) 200 MG capsule Take 1 capsule by mouth 3 (Three) Times a Day As Needed for Cough. 15 capsule 0   • Cholecalciferol 10 MCG (400 UNIT) tablet Take 2 tablets daily     • cyanocobalamin 1000 MCG/ML injection Inject 1 mL into the appropriate muscle as directed by prescriber Every 14 (Fourteen) Days.     • ezetimibe (ZETIA) 10 MG tablet Take 1 tablet by mouth Every Evening.     • gabapentin (NEURONTIN) 400 MG capsule Take 1 capsule by mouth 4 (Four) Times a Day.     • lidocaine (LIDODERM) 5 % Place 1 patch on the skin as directed by provider Daily As Needed. Remove & Discard patch within 12 hours or as directed by MD     • meclizine (ANTIVERT) 25 MG tablet Take 1 tablet by mouth 3 (Three) Times a Day As Needed for Dizziness for up to 10 doses. 10 tablet 0   • methocarbamol (ROBAXIN) 500 MG tablet Take 1 tablet by mouth 3 (Three) Times a Day As Needed for Muscle Spasms.     • metoprolol succinate XL (TOPROL-XL) 50 MG 24 hr tablet Take 1 tablet by mouth Daily.     • midodrine (PROAMATINE) 5 MG tablet Take 1 tablet by mouth 3 (Three) Times a Day Before Meals.     • nitroglycerin (NITROSTAT) 0.4 MG SL tablet Place 1 tablet under the tongue Every 5 (Five) Minutes As Needed.     • Omega-3 Fatty Acids (fish oil) 1000 MG capsule capsule Take 2 capsules by mouth 2 (Two) Times a Day With Meals.     • pantoprazole (PROTONIX) 40 MG EC tablet Take 1 tablet by mouth Daily.       No current facility-administered medications on file prior to visit.          PATIENT HAS NOT STARTED TAKEN CLOPIDOGREL YET.    [facility] - [facility]ESE [facility]ICATION CLOPIDOGREL.    How long have you had these concerns: TODAY.      PATIENT HAS A CONCERN ABOUT  CLOPIDOGREL. SHOULD HE CONTINUE TO TAKE ASPIRIN .81 MG WHILE TAKEN CLOPIDOGREL?    PLEASE CALL AND ADVICE.             No

## 2023-05-09 ENCOUNTER — HOSPITAL ENCOUNTER (EMERGENCY)
Facility: HOSPITAL | Age: 83
Discharge: HOME | End: 2023-05-09
Attending: EMERGENCY MEDICINE | Admitting: EMERGENCY MEDICINE
Payer: OTHER GOVERNMENT

## 2023-05-09 VITALS
WEIGHT: 211.64 LBS | OXYGEN SATURATION: 96 % | HEART RATE: 74 BPM | HEIGHT: 73 IN | SYSTOLIC BLOOD PRESSURE: 150 MMHG | BODY MASS INDEX: 28.05 KG/M2 | TEMPERATURE: 98 F | DIASTOLIC BLOOD PRESSURE: 78 MMHG | RESPIRATION RATE: 20 BRPM

## 2023-05-09 DIAGNOSIS — L03.116 CELLULITIS OF LEFT LEG: ICD-10-CM

## 2023-05-09 DIAGNOSIS — M79.602 LEFT ARM PAIN: Primary | ICD-10-CM

## 2023-05-09 LAB
ANION GAP SERPL CALCULATED.3IONS-SCNC: 10 MMOL/L (ref 5–15)
APTT PPP: 27.9 SECONDS (ref 61–76.5)
BASOPHILS # BLD AUTO: 0.1 10*3/MM3 (ref 0–0.2)
BASOPHILS NFR BLD AUTO: 0.9 % (ref 0–1.5)
BUN SERPL-MCNC: 18 MG/DL (ref 8–23)
BUN/CREAT SERPL: 18.4 (ref 7–25)
CALCIUM SPEC-SCNC: 9.7 MG/DL (ref 8.6–10.5)
CHLORIDE SERPL-SCNC: 102 MMOL/L (ref 98–107)
CO2 SERPL-SCNC: 25 MMOL/L (ref 22–29)
CREAT SERPL-MCNC: 0.98 MG/DL (ref 0.76–1.27)
CRP SERPL-MCNC: <0.3 MG/DL (ref 0–0.5)
DEPRECATED RDW RBC AUTO: 47.7 FL (ref 37–54)
EGFRCR SERPLBLD CKD-EPI 2021: 78 ML/MIN/1.73
EOSINOPHIL # BLD AUTO: 0.2 10*3/MM3 (ref 0–0.4)
EOSINOPHIL NFR BLD AUTO: 2.9 % (ref 0.3–6.2)
ERYTHROCYTE [DISTWIDTH] IN BLOOD BY AUTOMATED COUNT: 13.6 % (ref 12.3–15.4)
ERYTHROCYTE [SEDIMENTATION RATE] IN BLOOD: 35 MM/HR (ref 0–20)
GLUCOSE SERPL-MCNC: 112 MG/DL (ref 65–99)
HCT VFR BLD AUTO: 39.1 % (ref 37.5–51)
HGB BLD-MCNC: 13.3 G/DL (ref 13–17.7)
HOLD SPECIMEN: NORMAL
INR PPP: 0.97 (ref 0.93–1.1)
LYMPHOCYTES # BLD AUTO: 1.5 10*3/MM3 (ref 0.7–3.1)
LYMPHOCYTES NFR BLD AUTO: 22.8 % (ref 19.6–45.3)
MCH RBC QN AUTO: 32.6 PG (ref 26.6–33)
MCHC RBC AUTO-ENTMCNC: 34 G/DL (ref 31.5–35.7)
MCV RBC AUTO: 95.9 FL (ref 79–97)
MONOCYTES # BLD AUTO: 0.6 10*3/MM3 (ref 0.1–0.9)
MONOCYTES NFR BLD AUTO: 8.7 % (ref 5–12)
NEUTROPHILS NFR BLD AUTO: 4.3 10*3/MM3 (ref 1.7–7)
NEUTROPHILS NFR BLD AUTO: 64.7 % (ref 42.7–76)
NRBC BLD AUTO-RTO: 0.2 /100 WBC (ref 0–0.2)
PLATELET # BLD AUTO: 213 10*3/MM3 (ref 140–450)
PMV BLD AUTO: 8.2 FL (ref 6–12)
POTASSIUM SERPL-SCNC: 4.5 MMOL/L (ref 3.5–5.2)
PROCALCITONIN SERPL-MCNC: 0.04 NG/ML (ref 0–0.25)
PROTHROMBIN TIME: 10.4 SECONDS (ref 9.6–11.7)
RBC # BLD AUTO: 4.08 10*6/MM3 (ref 4.14–5.8)
SODIUM SERPL-SCNC: 137 MMOL/L (ref 136–145)
TROPONIN T SERPL HS-MCNC: 16 NG/L
TROPONIN T SERPL HS-MCNC: 19 NG/L
WBC NRBC COR # BLD: 6.6 10*3/MM3 (ref 3.4–10.8)

## 2023-05-09 PROCEDURE — 96365 THER/PROPH/DIAG IV INF INIT: CPT

## 2023-05-09 PROCEDURE — 85652 RBC SED RATE AUTOMATED: CPT | Performed by: NURSE PRACTITIONER

## 2023-05-09 PROCEDURE — 87040 BLOOD CULTURE FOR BACTERIA: CPT | Performed by: NURSE PRACTITIONER

## 2023-05-09 PROCEDURE — 85730 THROMBOPLASTIN TIME PARTIAL: CPT | Performed by: NURSE PRACTITIONER

## 2023-05-09 PROCEDURE — 84484 ASSAY OF TROPONIN QUANT: CPT | Performed by: EMERGENCY MEDICINE

## 2023-05-09 PROCEDURE — 84484 ASSAY OF TROPONIN QUANT: CPT | Performed by: NURSE PRACTITIONER

## 2023-05-09 PROCEDURE — 36415 COLL VENOUS BLD VENIPUNCTURE: CPT

## 2023-05-09 PROCEDURE — 85610 PROTHROMBIN TIME: CPT | Performed by: NURSE PRACTITIONER

## 2023-05-09 PROCEDURE — 86140 C-REACTIVE PROTEIN: CPT | Performed by: NURSE PRACTITIONER

## 2023-05-09 PROCEDURE — 80048 BASIC METABOLIC PNL TOTAL CA: CPT | Performed by: NURSE PRACTITIONER

## 2023-05-09 PROCEDURE — 85025 COMPLETE CBC W/AUTO DIFF WBC: CPT | Performed by: NURSE PRACTITIONER

## 2023-05-09 PROCEDURE — 84145 PROCALCITONIN (PCT): CPT | Performed by: NURSE PRACTITIONER

## 2023-05-09 PROCEDURE — 99284 EMERGENCY DEPT VISIT MOD MDM: CPT

## 2023-05-09 PROCEDURE — 25010000002 CEFTRIAXONE PER 250 MG: Performed by: EMERGENCY MEDICINE

## 2023-05-09 PROCEDURE — 93005 ELECTROCARDIOGRAM TRACING: CPT | Performed by: NURSE PRACTITIONER

## 2023-05-09 RX ORDER — ASPIRIN 81 MG/1
324 TABLET, CHEWABLE ORAL ONCE
Status: COMPLETED | OUTPATIENT
Start: 2023-05-09 | End: 2023-05-09

## 2023-05-09 RX ORDER — CLINDAMYCIN HYDROCHLORIDE 300 MG/1
300 CAPSULE ORAL 3 TIMES DAILY
Qty: 30 CAPSULE | Refills: 0 | Status: SHIPPED | OUTPATIENT
Start: 2023-05-09

## 2023-05-09 RX ORDER — SODIUM CHLORIDE 0.9 % (FLUSH) 0.9 %
10 SYRINGE (ML) INJECTION AS NEEDED
Status: DISCONTINUED | OUTPATIENT
Start: 2023-05-09 | End: 2023-05-10 | Stop reason: HOSPADM

## 2023-05-09 RX ADMIN — CEFTRIAXONE 1 G: 1 INJECTION, POWDER, FOR SOLUTION INTRAMUSCULAR; INTRAVENOUS at 21:03

## 2023-05-09 RX ADMIN — ASPIRIN 81 MG CHEWABLE TABLET 324 MG: 81 TABLET CHEWABLE at 17:26

## 2023-05-09 NOTE — ED PROVIDER NOTES
Subjective    Provider in Triage Note  C/o left arm achiness, denies chest pain  Left leg cellulitis and swelling     Seen here 4/16 for same    I interviewed the patient for HPI and agree with the nurse practitioner providing triage note as noted above.  History of Present Illness    Review of Systems    Past Medical History:   Diagnosis Date   • Aneurysm    • Cardiomyopathy     ICD implantation   • Cellulitis of left lower extremity 2010    recurrent   • CHD (coronary heart disease)     S/P CABG & PCI   • Dyslipidemia    • History of ventricular tachycardia    • Hypertension    • Myocardial infarction     Inferior MI   • Pinched nerve     L4-L5   • Prostate cancer        Allergies   Allergen Reactions   • Lovastatin Myalgia   • Pravastatin Myalgia and Unknown (See Comments)     unknown   • Simvastatin Unknown (See Comments) and Myalgia     unknown   • Spironolactone Other (See Comments)     Gynecomastia        Past Surgical History:   Procedure Laterality Date   • ANGIOPLASTY  2000 04/1996 Stent: Palmaz- Huy stent/LAD 07/1996-RCA: 2000-Tetra stent Guidant to proximal RCA, mid to distal artery 2 sequential Guidant Tetra stents   • APPENDECTOMY     • CARDIAC ABLATION  April and May 2019   • CARDIAC CATHETERIZATION  07/2017    1996 x2, Nov. 2000, 05/2010-cath 08/2015-no stents 2017   • CARDIAC DEFIBRILLATOR PLACEMENT     • COLONOSCOPY W/ POLYPECTOMY      Mult colonoscopy's for polyp resection    • CORONARY ARTERY BYPASS GRAFT  05/2010    x5 vessel: LMA to diagonal, LAD, intermedius, obtuse marginal, RCA   • CORONARY STENT PLACEMENT     • ENDOSCOPY N/A 6/24/2019    Procedure: ESOPHAGOGASTRODUODENOSCOPY with dilitation Bougie 50, 54;  Surgeon: Roddy Coronel MD;  Location: [facility] ENDOSCOPY;  Service: Gastroenterology   • INSERT / REPLACE / REMOVE PACEMAKER     • KNEE OPEN LATERAL RELEASE Left     reduction   • OTHER SURGICAL HISTORY  01/2018    ICD implantation   • PROSTATE SURGERY  2015    Robiotic surgery-  Cyber Knife:       Family History   Problem Relation Age of Onset   • Pancreatic cancer Mother    • Heart disease Father        Social History     Socioeconomic History   • Marital status:    Tobacco Use   • Smoking status: Former     Packs/day: 1.00     Years: 17.00     Pack years: 17.00     Types: Cigarettes     Quit date: 2002     Years since quittin.8   • Smokeless tobacco: Never   Vaping Use   • Vaping Use: Never used   Substance and Sexual Activity   • 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  Lungs are clear.  Heart has regular rhythm without murmur gallop.  Chest is nontender.  Ab soft nontender.  Extremities Amcill swelling and erythema throughout the patient's left lower anterior leg.  He has 2+ pulses and is neurovascular intact.  Procedures     My EKG interpretation shows normal sinus rhythm at a rate of 84 with no acute ST change      ED Course      Results for orders placed or performed during the hospital encounter of 23   Basic Metabolic Panel    Specimen: Blood   Result Value Ref Range    Glucose 112 (H) 65 - 99 mg/dL    BUN 18 8 - 23 mg/dL    Creatinine 0.98 0.76 - 1.27 mg/dL    Sodium 137 136 - 145 mmol/L    Potassium 4.5 3.5 - 5.2 mmol/L    Chloride 102 98 - 107 mmol/L    CO2 25.0 22.0 - 29.0 mmol/L    Calcium 9.7 8.6 - 10.5 mg/dL    BUN/Creatinine Ratio 18.4 7.0 - 25.0    Anion Gap 10.0 5.0 - 15.0 mmol/L    eGFR 78.0 >60.0 mL/min/1.73   Protime-INR    Specimen: Blood   Result Value Ref Range    Protime 10.4 9.6 - 11.7 Seconds    INR 0.97 0.93 - 1.10   aPTT    Specimen: Blood   Result Value Ref Range    PTT 27.9 (L) 61.0 - 76.5 seconds   Single High Sensitivity Troponin T    Specimen: Blood   Result Value Ref Range    HS Troponin T 19 (H) <15 ng/L   Procalcitonin    Specimen: Blood   Result Value Ref Range    Procalcitonin 0.04 0.00 -  0.25 ng/mL   Sedimentation Rate    Specimen: Blood   Result Value Ref Range    Sed Rate 35 (H) 0 - 20 mm/hr   C-reactive Protein    Specimen: Blood   Result Value Ref Range    C-Reactive Protein <0.30 0.00 - 0.50 mg/dL   CBC Auto Differential    Specimen: Blood   Result Value Ref Range    WBC 6.60 3.40 - 10.80 10*3/mm3    RBC 4.08 (L) 4.14 - 5.80 10*6/mm3    Hemoglobin 13.3 13.0 - 17.7 g/dL    Hematocrit 39.1 37.5 - 51.0 %    MCV 95.9 79.0 - 97.0 fL    MCH 32.6 26.6 - 33.0 pg    MCHC 34.0 31.5 - 35.7 g/dL    RDW 13.6 12.3 - 15.4 %    RDW-SD 47.7 37.0 - 54.0 fl    MPV 8.2 6.0 - 12.0 fL    Platelets 213 140 - 450 10*3/mm3    Neutrophil % 64.7 42.7 - 76.0 %    Lymphocyte % 22.8 19.6 - 45.3 %    Monocyte % 8.7 5.0 - 12.0 %    Eosinophil % 2.9 0.3 - 6.2 %    Basophil % 0.9 0.0 - 1.5 %    Neutrophils, Absolute 4.30 1.70 - 7.00 10*3/mm3    Lymphocytes, Absolute 1.50 0.70 - 3.10 10*3/mm3    Monocytes, Absolute 0.60 0.10 - 0.90 10*3/mm3    Eosinophils, Absolute 0.20 0.00 - 0.40 10*3/mm3    Basophils, Absolute 0.10 0.00 - 0.20 10*3/mm3    nRBC 0.2 0.0 - 0.2 /100 WBC   Single High Sensitivity Troponin T    Specimen: Blood   Result Value Ref Range    HS Troponin T 16 (H) <15 ng/L   ECG 12 Lead Chest Pain   Result Value Ref Range    QT Interval 377 ms   Gold Top - SST   Result Value Ref Range    Extra Tube Hold for add-ons.      No radiology results for the last day                                         Medical Decision Making  Patient has no evidence acute coronary syndrome based on EKG and troponin.  Metabolic panel is at baseline without renal insufficiency or electrolyte abnormality.  Patient has no evidence of acute infectious process other than the cellulitis to his left lower leg.  Patient has had this chronically and intermittently in the past.  Patient will be discharged.  Will be given Rocephin IV followed by clindamycin outpatient.  We will follow his MD for recheck.    Amount and/or Complexity of Data  Reviewed  Labs: ordered. Decision-making details documented in ED Course.  ECG/medicine tests: ordered and independent interpretation performed.      Risk  OTC drugs.  Prescription drug management.          Final diagnoses:   Left arm pain   Cellulitis of left leg       ED Disposition  ED Disposition     ED Disposition   Discharge    Condition   Stable    Comment   --             No follow-up provider specified.       Medication List      New Prescriptions    clindamycin 300 MG capsule  Commonly known as: CLEOCIN  Take 1 capsule by mouth 3 (Three) Times a Day.           Where to Get Your Medications      Information about where to get these medications is not yet available    Ask your nurse or doctor about these medications  · clindamycin 300 MG capsule          Fei Becerril MD  05/09/23 3798

## 2023-05-10 LAB — QT INTERVAL: 377 MS

## 2023-05-14 LAB
BACTERIA SPEC AEROBE CULT: NORMAL
BACTERIA SPEC AEROBE CULT: NORMAL

## 2023-05-22 ENCOUNTER — HOSPITAL ENCOUNTER (OUTPATIENT)
Facility: HOSPITAL | Age: 83
Setting detail: OBSERVATION
Discharge: HOME | End: 2023-05-26
Attending: EMERGENCY MEDICINE | Admitting: STUDENT IN AN ORGANIZED HEALTH CARE EDUCATION/TRAINING PROGRAM
Payer: OTHER GOVERNMENT

## 2023-05-22 DIAGNOSIS — M79.605 LEFT LEG PAIN: ICD-10-CM

## 2023-05-22 DIAGNOSIS — L03.116 LEFT LEG CELLULITIS: Primary | ICD-10-CM

## 2023-05-22 LAB
ALBUMIN SERPL-MCNC: 3.8 G/DL (ref 3.5–5.2)
ALBUMIN/GLOB SERPL: 1.5 G/DL
ALP SERPL-CCNC: 95 U/L (ref 39–117)
ALT SERPL W P-5'-P-CCNC: 21 U/L (ref 1–41)
ANION GAP SERPL CALCULATED.3IONS-SCNC: 12 MMOL/L (ref 5–15)
AST SERPL-CCNC: 26 U/L (ref 1–40)
BASOPHILS # BLD AUTO: 0 10*3/MM3 (ref 0–0.2)
BASOPHILS NFR BLD AUTO: 0.7 % (ref 0–1.5)
BILIRUB SERPL-MCNC: 0.2 MG/DL (ref 0–1.2)
BUN SERPL-MCNC: 13 MG/DL (ref 8–23)
BUN/CREAT SERPL: 14.3 (ref 7–25)
CALCIUM SPEC-SCNC: 8.8 MG/DL (ref 8.6–10.5)
CHLORIDE SERPL-SCNC: 105 MMOL/L (ref 98–107)
CO2 SERPL-SCNC: 21 MMOL/L (ref 22–29)
CREAT SERPL-MCNC: 0.91 MG/DL (ref 0.76–1.27)
CRP SERPL-MCNC: <0.3 MG/DL (ref 0–0.5)
D-LACTATE SERPL-SCNC: 1.3 MMOL/L (ref 0.5–2)
DEPRECATED RDW RBC AUTO: 49 FL (ref 37–54)
EGFRCR SERPLBLD CKD-EPI 2021: 85.2 ML/MIN/1.73
EOSINOPHIL # BLD AUTO: 0.2 10*3/MM3 (ref 0–0.4)
EOSINOPHIL NFR BLD AUTO: 3.5 % (ref 0.3–6.2)
ERYTHROCYTE [DISTWIDTH] IN BLOOD BY AUTOMATED COUNT: 13.9 % (ref 12.3–15.4)
ERYTHROCYTE [SEDIMENTATION RATE] IN BLOOD: 22 MM/HR (ref 0–20)
GLOBULIN UR ELPH-MCNC: 2.6 GM/DL
GLUCOSE SERPL-MCNC: 145 MG/DL (ref 65–99)
HCT VFR BLD AUTO: 35.7 % (ref 37.5–51)
HGB BLD-MCNC: 12 G/DL (ref 13–17.7)
LYMPHOCYTES # BLD AUTO: 1.7 10*3/MM3 (ref 0.7–3.1)
LYMPHOCYTES NFR BLD AUTO: 26.3 % (ref 19.6–45.3)
MCH RBC QN AUTO: 32.3 PG (ref 26.6–33)
MCHC RBC AUTO-ENTMCNC: 33.7 G/DL (ref 31.5–35.7)
MCV RBC AUTO: 95.9 FL (ref 79–97)
MONOCYTES # BLD AUTO: 0.6 10*3/MM3 (ref 0.1–0.9)
MONOCYTES NFR BLD AUTO: 9 % (ref 5–12)
NEUTROPHILS NFR BLD AUTO: 3.9 10*3/MM3 (ref 1.7–7)
NEUTROPHILS NFR BLD AUTO: 60.5 % (ref 42.7–76)
NRBC BLD AUTO-RTO: 0 /100 WBC (ref 0–0.2)
NT-PROBNP SERPL-MCNC: 409.2 PG/ML (ref 0–1800)
PLATELET # BLD AUTO: 215 10*3/MM3 (ref 140–450)
PMV BLD AUTO: 8 FL (ref 6–12)
POTASSIUM SERPL-SCNC: 4.3 MMOL/L (ref 3.5–5.2)
PROT SERPL-MCNC: 6.4 G/DL (ref 6–8.5)
QT INTERVAL: 409 MS
RBC # BLD AUTO: 3.73 10*6/MM3 (ref 4.14–5.8)
SODIUM SERPL-SCNC: 138 MMOL/L (ref 136–145)
WBC NRBC COR # BLD: 6.5 10*3/MM3 (ref 3.4–10.8)

## 2023-05-22 PROCEDURE — 87040 BLOOD CULTURE FOR BACTERIA: CPT | Performed by: PHYSICIAN ASSISTANT

## 2023-05-22 PROCEDURE — 99284 EMERGENCY DEPT VISIT MOD MDM: CPT

## 2023-05-22 PROCEDURE — G0378 HOSPITAL OBSERVATION PER HR: HCPCS

## 2023-05-22 PROCEDURE — 96372 THER/PROPH/DIAG INJ SC/IM: CPT

## 2023-05-22 PROCEDURE — 80053 COMPREHEN METABOLIC PANEL: CPT | Performed by: PHYSICIAN ASSISTANT

## 2023-05-22 PROCEDURE — 87641 MR-STAPH DNA AMP PROBE: CPT | Performed by: STUDENT IN AN ORGANIZED HEALTH CARE EDUCATION/TRAINING PROGRAM

## 2023-05-22 PROCEDURE — 96367 TX/PROPH/DG ADDL SEQ IV INF: CPT

## 2023-05-22 PROCEDURE — 25010000002 CEFTRIAXONE PER 250 MG: Performed by: NURSE PRACTITIONER

## 2023-05-22 PROCEDURE — 93005 ELECTROCARDIOGRAM TRACING: CPT | Performed by: STUDENT IN AN ORGANIZED HEALTH CARE EDUCATION/TRAINING PROGRAM

## 2023-05-22 PROCEDURE — 25010000002 CEFEPIME PER 500 MG: Performed by: STUDENT IN AN ORGANIZED HEALTH CARE EDUCATION/TRAINING PROGRAM

## 2023-05-22 PROCEDURE — 25010000002 HEPARIN (PORCINE) PER 1000 UNITS: Performed by: STUDENT IN AN ORGANIZED HEALTH CARE EDUCATION/TRAINING PROGRAM

## 2023-05-22 PROCEDURE — 83605 ASSAY OF LACTIC ACID: CPT | Performed by: PHYSICIAN ASSISTANT

## 2023-05-22 PROCEDURE — 86140 C-REACTIVE PROTEIN: CPT | Performed by: PHYSICIAN ASSISTANT

## 2023-05-22 PROCEDURE — 85652 RBC SED RATE AUTOMATED: CPT | Performed by: PHYSICIAN ASSISTANT

## 2023-05-22 PROCEDURE — 83880 ASSAY OF NATRIURETIC PEPTIDE: CPT | Performed by: STUDENT IN AN ORGANIZED HEALTH CARE EDUCATION/TRAINING PROGRAM

## 2023-05-22 PROCEDURE — 85025 COMPLETE CBC W/AUTO DIFF WBC: CPT | Performed by: PHYSICIAN ASSISTANT

## 2023-05-22 PROCEDURE — 25010000002 VANCOMYCIN HCL IN NACL 1.75-0.9 GM/500ML-% SOLUTION: Performed by: STUDENT IN AN ORGANIZED HEALTH CARE EDUCATION/TRAINING PROGRAM

## 2023-05-22 PROCEDURE — 96365 THER/PROPH/DIAG IV INF INIT: CPT

## 2023-05-22 RX ORDER — MIDODRINE HYDROCHLORIDE 5 MG/1
5 TABLET ORAL 3 TIMES DAILY PRN
Status: DISCONTINUED | OUTPATIENT
Start: 2023-05-22 | End: 2023-05-26 | Stop reason: HOSPADM

## 2023-05-22 RX ORDER — GABAPENTIN 400 MG/1
400 CAPSULE ORAL EVERY 8 HOURS SCHEDULED
Status: DISCONTINUED | OUTPATIENT
Start: 2023-05-22 | End: 2023-05-26 | Stop reason: HOSPADM

## 2023-05-22 RX ORDER — NITROGLYCERIN 0.4 MG/1
0.4 TABLET SUBLINGUAL
Status: DISCONTINUED | OUTPATIENT
Start: 2023-05-22 | End: 2023-05-26 | Stop reason: HOSPADM

## 2023-05-22 RX ORDER — VANCOMYCIN 1.75 GRAM/500 ML IN 0.9 % SODIUM CHLORIDE INTRAVENOUS
1750 ONCE
Status: COMPLETED | OUTPATIENT
Start: 2023-05-22 | End: 2023-05-22

## 2023-05-22 RX ORDER — MECLIZINE HYDROCHLORIDE 25 MG/1
25 TABLET ORAL 3 TIMES DAILY PRN
Status: DISCONTINUED | OUTPATIENT
Start: 2023-05-22 | End: 2023-05-26 | Stop reason: HOSPADM

## 2023-05-22 RX ORDER — SODIUM CHLORIDE 0.9 % (FLUSH) 0.9 %
10 SYRINGE (ML) INJECTION AS NEEDED
Status: DISCONTINUED | OUTPATIENT
Start: 2023-05-22 | End: 2023-05-26 | Stop reason: HOSPADM

## 2023-05-22 RX ORDER — LIDOCAINE 50 MG/G
1 PATCH TOPICAL DAILY PRN
Status: DISCONTINUED | OUTPATIENT
Start: 2023-05-22 | End: 2023-05-26 | Stop reason: HOSPADM

## 2023-05-22 RX ORDER — SODIUM CHLORIDE 9 MG/ML
40 INJECTION, SOLUTION INTRAVENOUS AS NEEDED
Status: DISCONTINUED | OUTPATIENT
Start: 2023-05-22 | End: 2023-05-26 | Stop reason: HOSPADM

## 2023-05-22 RX ORDER — PANTOPRAZOLE SODIUM 40 MG/1
40 TABLET, DELAYED RELEASE ORAL
Status: DISCONTINUED | OUTPATIENT
Start: 2023-05-23 | End: 2023-05-26 | Stop reason: HOSPADM

## 2023-05-22 RX ORDER — METOPROLOL SUCCINATE 50 MG/1
50 TABLET, EXTENDED RELEASE ORAL DAILY
Status: DISCONTINUED | OUTPATIENT
Start: 2023-05-23 | End: 2023-05-26 | Stop reason: HOSPADM

## 2023-05-22 RX ORDER — BISACODYL 10 MG
10 SUPPOSITORY, RECTAL RECTAL DAILY PRN
Status: DISCONTINUED | OUTPATIENT
Start: 2023-05-22 | End: 2023-05-26 | Stop reason: HOSPADM

## 2023-05-22 RX ORDER — ONDANSETRON 4 MG/1
4 TABLET, FILM COATED ORAL EVERY 6 HOURS PRN
Status: DISCONTINUED | OUTPATIENT
Start: 2023-05-22 | End: 2023-05-26 | Stop reason: HOSPADM

## 2023-05-22 RX ORDER — METHOCARBAMOL 500 MG/1
500 TABLET, FILM COATED ORAL 3 TIMES DAILY PRN
Status: DISCONTINUED | OUTPATIENT
Start: 2023-05-22 | End: 2023-05-26 | Stop reason: HOSPADM

## 2023-05-22 RX ORDER — POLYETHYLENE GLYCOL 3350 17 G/17G
17 POWDER, FOR SOLUTION ORAL DAILY PRN
Status: DISCONTINUED | OUTPATIENT
Start: 2023-05-22 | End: 2023-05-26 | Stop reason: HOSPADM

## 2023-05-22 RX ORDER — SODIUM CHLORIDE 0.9 % (FLUSH) 0.9 %
10 SYRINGE (ML) INJECTION EVERY 12 HOURS SCHEDULED
Status: DISCONTINUED | OUTPATIENT
Start: 2023-05-22 | End: 2023-05-26 | Stop reason: HOSPADM

## 2023-05-22 RX ORDER — BISACODYL 5 MG/1
5 TABLET, DELAYED RELEASE ORAL DAILY PRN
Status: DISCONTINUED | OUTPATIENT
Start: 2023-05-22 | End: 2023-05-26 | Stop reason: HOSPADM

## 2023-05-22 RX ORDER — HEPARIN SODIUM 5000 [USP'U]/ML
5000 INJECTION, SOLUTION INTRAVENOUS; SUBCUTANEOUS EVERY 8 HOURS SCHEDULED
Status: DISCONTINUED | OUTPATIENT
Start: 2023-05-22 | End: 2023-05-26 | Stop reason: HOSPADM

## 2023-05-22 RX ORDER — AMOXICILLIN 250 MG
2 CAPSULE ORAL 2 TIMES DAILY
Status: DISCONTINUED | OUTPATIENT
Start: 2023-05-22 | End: 2023-05-26 | Stop reason: HOSPADM

## 2023-05-22 RX ORDER — ONDANSETRON 2 MG/ML
4 INJECTION INTRAMUSCULAR; INTRAVENOUS EVERY 6 HOURS PRN
Status: DISCONTINUED | OUTPATIENT
Start: 2023-05-22 | End: 2023-05-26 | Stop reason: HOSPADM

## 2023-05-22 RX ORDER — ASPIRIN 81 MG/1
81 TABLET, CHEWABLE ORAL DAILY
Status: DISCONTINUED | OUTPATIENT
Start: 2023-05-23 | End: 2023-05-26 | Stop reason: HOSPADM

## 2023-05-22 RX ADMIN — HEPARIN SODIUM 5000 UNITS: 5000 INJECTION INTRAVENOUS; SUBCUTANEOUS at 22:26

## 2023-05-22 RX ADMIN — GABAPENTIN 400 MG: 400 CAPSULE ORAL at 23:54

## 2023-05-22 RX ADMIN — CEFTRIAXONE 2 G: 2 INJECTION, POWDER, FOR SOLUTION INTRAMUSCULAR; INTRAVENOUS at 21:22

## 2023-05-22 RX ADMIN — Medication 1750 MG: at 22:25

## 2023-05-22 RX ADMIN — Medication 10 ML: at 22:24

## 2023-05-22 RX ADMIN — CEFEPIME 1 G: 1 INJECTION, POWDER, FOR SOLUTION INTRAMUSCULAR; INTRAVENOUS at 22:24

## 2023-05-22 NOTE — ED NOTES
Patient reports he has cellulitis in his left lower leg. Patient reports he was here two weeks ago and was sent home with antibiotics but 'they didn't change anything'. Patient reports he has had problems with cellulitis in the past and had to be in the hospital for about a month and was discharged home with home health and antibiotics. Patient reports pain, redness, and swelling in left lower extremity.

## 2023-05-22 NOTE — ED PROVIDER NOTES
Subjective    Provider in Triage Note  Patient is a 82-year-old male presents to the ED with complaints of worsening left lower extremity cellulitis since he was seen on the ED on 5/9/2023.  Patient was sent home with clindamycin with no improvement.  Patient reports history of cellulitis in this leg in the past.  He denies any significant chest pain or shortness of breath.  No reports of fever but does report warmth in his left leg.  No significant injury.      History of Present Illness  History also provided by son  Interviewed the patient.  Agree with the above.    Review of Systems   Constitutional: Negative for chills and fever.   Respiratory: Negative for shortness of breath.    Cardiovascular: Positive for leg swelling. Negative for chest pain and palpitations.   Gastrointestinal: Negative for abdominal pain.   Musculoskeletal: Negative for back pain and neck pain.   Skin: Negative for color change and rash.       Past Medical History:   Diagnosis Date   • Aneurysm    • Cardiomyopathy     ICD implantation   • Cellulitis of left lower extremity 2010    recurrent   • CHD (coronary heart disease)     S/P CABG & PCI   • Dyslipidemia    • History of ventricular tachycardia    • Hypertension    • Myocardial infarction     Inferior MI   • Pinched nerve     L4-L5   • Prostate cancer        Allergies   Allergen Reactions   • Lovastatin Myalgia   • Pravastatin Myalgia and Unknown (See Comments)     unknown   • Simvastatin Unknown (See Comments) and Myalgia     unknown   • Spironolactone Other (See Comments)     Gynecomastia        Past Surgical History:   Procedure Laterality Date   • ANGIOPLASTY  2000 04/1996 Stent: Palmaz- Huy stent/LAD 07/1996-RCA: 2000-Tetra stent Guidant to proximal RCA, mid to distal artery 2 sequential Guidant Tetra stents   • APPENDECTOMY     • CARDIAC ABLATION  April and May 2019   • CARDIAC CATHETERIZATION  07/2017    1996 x2, Nov. 2000, 05/2010-cath 08/2015-no stents 2017   • CARDIAC  DEFIBRILLATOR PLACEMENT     • COLONOSCOPY W/ POLYPECTOMY      Mult colonoscopy's for polyp resection    • CORONARY ARTERY BYPASS GRAFT  05/2010    x5 vessel: LMA to diagonal, LAD, intermedius, obtuse marginal, RCA   • CORONARY STENT PLACEMENT     • ENDOSCOPY N/A 2019    Procedure: ESOPHAGOGASTRODUODENOSCOPY with dilitation Bougie 50, 54;  Surgeon: Roddy Coronel MD;  Location: [facility] ENDOSCOPY;  Service: Gastroenterology   • INSERT / REPLACE / REMOVE PACEMAKER     • KNEE OPEN LATERAL RELEASE Left     reduction   • OTHER SURGICAL HISTORY  2018    ICD implantation   • PROSTATE SURGERY      Robiotic surgery- Cyber Knife:       Family History   Problem Relation Age of Onset   • Pancreatic cancer Mother    • Heart disease Father        Social History     Socioeconomic History   • Marital status:    Tobacco Use   • Smoking status: Former     Packs/day: 1.00     Years: 17.00     Pack years: 17.00     Types: Cigarettes     Quit date: 2002     Years since quittin.8   • Smokeless tobacco: Never   Vaping Use   • Vaping Use: Never used   Substance and Sexual Activity   • 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   HENT:      Head: Normocephalic and atraumatic.   Eyes:      Extraocular Movements: Extraocular movements intact.      Conjunctiva/sclera: Conjunctivae normal.      Pupils: Pupils are equal, round, and reactive to light.   Cardiovascular:      Rate and Rhythm: Normal rate and regular rhythm.   Abdominal:      General: Bowel sounds are normal.      Palpations: Abdomen is soft.   Musculoskeletal:         General: Normal range of motion.      Cervical back: Normal range of motion and neck supple.      Right lower leg: No edema.      Left lower leg: Edema present.      Comments: Left lower extremity erythema consistent with  "cellulitis noted to the lower tib-fib, circumferential.  DP pulse 2+   Skin:     General: Skin is warm and dry.      Capillary Refill: Capillary refill takes less than 2 seconds.   Neurological:      Mental Status: He is alert.         Procedures           ED Course      /87 (BP Location: Right arm, Patient Position: Sitting)   Pulse 89   Temp 98.5 °F (36.9 °C) (Oral)   Resp 16   Ht 182.9 cm (72\")   Wt 97.2 kg (214 lb 4.6 oz)   SpO2 96%   BMI 29.06 kg/m²   Labs Reviewed   COMPREHENSIVE METABOLIC PANEL - Abnormal; Notable for the following components:       Result Value    Glucose 145 (*)     CO2 21.0 (*)     All other components within normal limits    Narrative:     GFR Normal >60  Chronic Kidney Disease <60  Kidney Failure <15    The GFR formula is only valid for adults with stable renal function between ages 18 and 70.   SEDIMENTATION RATE - Abnormal; Notable for the following components:    Sed Rate 22 (*)     All other components within normal limits   CBC WITH AUTO DIFFERENTIAL - Abnormal; Notable for the following components:    RBC 3.73 (*)     Hemoglobin 12.0 (*)     Hematocrit 35.7 (*)     All other components within normal limits   C-REACTIVE PROTEIN - Normal   LACTIC ACID, PLASMA - Normal   BLOOD CULTURE   BLOOD CULTURE   CBC AND DIFFERENTIAL    Narrative:     The following orders were created for panel order CBC & Differential.  Procedure                               Abnormality         Status                     ---------                               -----------         ------                     CBC Auto Differential[[id number]]        Abnormal            Final result                 Please view results for these tests on the individual orders.     Medications   sodium chloride 0.9 % flush 10 mL (has no administration in time range)   cefTRIAXone (ROCEPHIN) 2 g in sodium chloride 0.9 % 100 mL IVPB (has no administration in time range)     No radiology results for the last day          "                              [facility]  Discharge summary 4/18/2023 for left lower extremity cellulitis, chest pain.  Patient is an 82-year-old male presents the ED with complaint of left leg swelling, pain, redness.  He has a history of recurrent cellulitis in this leg, he was recently seen here in the ED diagnosed cellulitis, has been on clindamycin at home without significant improvement.  He actually reports the redness and pain is worsening.  Differential diagnoses considered: DVT, cellulitis, abscess.  Inclusive of diagnoses considered.  Patient does have findings concerning for cellulitis on exam.  There is no evidence for abscess.  Unfortunate vascular services are not available to provide vascular study.  Patient initiated on Rocephin, and will be admitted to hospital service for further evaluation.  I did discuss the case with Dr. Patrick, he agrees to the current admission and treatment plan.  I discussed with the patient their test results, work-up here in the emergency department, and need for admission and further evaluation. Patient is agreeable to the plan of care. At time of disposition patients VS are reviewed, and patient without acute distress.  Opportunity was provided for questions at the bedside, all questions and concerns were addressed.  Final diagnoses:   Left leg cellulitis   Left leg pain       ED Disposition  ED Disposition     ED Disposition   Decision to Admit    Condition   --    Comment   Level of Care: Med/Surg [1]   Admitting Physician: BLAKE PATRICK [[id number]]   Attending Physician: BLAKE PATRICK [[id number]]               No follow-up provider specified.       Medication List      No changes were made to your prescriptions during this visit.          Bethanie Moreno, APRN  05/22/23 2100

## 2023-05-22 NOTE — Clinical Note
Level of Care: Med/Surg [1]   Admitting Physician: BLAKE PATRICK [[id number]]   Attending Physician: BLAKE PATRICK [[id number]]

## 2023-05-23 ENCOUNTER — APPOINTMENT (OUTPATIENT)
Dept: CARDIOLOGY | Facility: HOSPITAL | Age: 83
End: 2023-05-23
Payer: OTHER GOVERNMENT

## 2023-05-23 LAB
ANION GAP SERPL CALCULATED.3IONS-SCNC: 8 MMOL/L (ref 5–15)
BH CV LOWER VASCULAR LEFT COMMON FEMORAL AUGMENT: NORMAL
BH CV LOWER VASCULAR LEFT COMMON FEMORAL COMPETENT: NORMAL
BH CV LOWER VASCULAR LEFT COMMON FEMORAL COMPRESS: NORMAL
BH CV LOWER VASCULAR LEFT COMMON FEMORAL PHASIC: NORMAL
BH CV LOWER VASCULAR LEFT COMMON FEMORAL SPONT: NORMAL
BH CV LOWER VASCULAR LEFT DISTAL FEMORAL COMPRESS: NORMAL
BH CV LOWER VASCULAR LEFT GASTRONEMIUS COMPRESS: NORMAL
BH CV LOWER VASCULAR LEFT GREATER SAPH AK COMPRESS: NORMAL
BH CV LOWER VASCULAR LEFT GREATER SAPH BK COMPRESS: NORMAL
BH CV LOWER VASCULAR LEFT LESSER SAPH COMPRESS: NORMAL
BH CV LOWER VASCULAR LEFT MID FEMORAL AUGMENT: NORMAL
BH CV LOWER VASCULAR LEFT MID FEMORAL COMPETENT: NORMAL
BH CV LOWER VASCULAR LEFT MID FEMORAL COMPRESS: NORMAL
BH CV LOWER VASCULAR LEFT MID FEMORAL PHASIC: NORMAL
BH CV LOWER VASCULAR LEFT MID FEMORAL SPONT: NORMAL
BH CV LOWER VASCULAR LEFT PERONEAL COMPRESS: NORMAL
BH CV LOWER VASCULAR LEFT POPLITEAL AUGMENT: NORMAL
BH CV LOWER VASCULAR LEFT POPLITEAL COMPETENT: NORMAL
BH CV LOWER VASCULAR LEFT POPLITEAL COMPRESS: NORMAL
BH CV LOWER VASCULAR LEFT POPLITEAL PHASIC: NORMAL
BH CV LOWER VASCULAR LEFT POPLITEAL SPONT: NORMAL
BH CV LOWER VASCULAR LEFT POSTERIOR TIBIAL COMPRESS: NORMAL
BH CV LOWER VASCULAR LEFT PROXIMAL FEMORAL COMPRESS: NORMAL
BH CV LOWER VASCULAR LEFT SAPHENOFEMORAL JUNCTION COMPRESS: NORMAL
BH CV LOWER VASCULAR RIGHT COMMON FEMORAL AUGMENT: NORMAL
BH CV LOWER VASCULAR RIGHT COMMON FEMORAL COMPETENT: NORMAL
BH CV LOWER VASCULAR RIGHT COMMON FEMORAL COMPRESS: NORMAL
BH CV LOWER VASCULAR RIGHT COMMON FEMORAL PHASIC: NORMAL
BH CV LOWER VASCULAR RIGHT COMMON FEMORAL SPONT: NORMAL
BUN SERPL-MCNC: 13 MG/DL (ref 8–23)
BUN/CREAT SERPL: 15.1 (ref 7–25)
CALCIUM SPEC-SCNC: 9.1 MG/DL (ref 8.6–10.5)
CHLORIDE SERPL-SCNC: 106 MMOL/L (ref 98–107)
CO2 SERPL-SCNC: 27 MMOL/L (ref 22–29)
CREAT SERPL-MCNC: 0.86 MG/DL (ref 0.76–1.27)
DEPRECATED RDW RBC AUTO: 49 FL (ref 37–54)
EGFRCR SERPLBLD CKD-EPI 2021: 87.5 ML/MIN/1.73
ERYTHROCYTE [DISTWIDTH] IN BLOOD BY AUTOMATED COUNT: 13.8 % (ref 12.3–15.4)
GLUCOSE SERPL-MCNC: 114 MG/DL (ref 65–99)
HCT VFR BLD AUTO: 35.9 % (ref 37.5–51)
HGB BLD-MCNC: 12 G/DL (ref 13–17.7)
MAXIMAL PREDICTED HEART RATE: 140 BPM
MCH RBC QN AUTO: 32.2 PG (ref 26.6–33)
MCHC RBC AUTO-ENTMCNC: 33.3 G/DL (ref 31.5–35.7)
MCV RBC AUTO: 96.5 FL (ref 79–97)
MRSA DNA SPEC QL NAA+PROBE: NORMAL
PLATELET # BLD AUTO: 185 10*3/MM3 (ref 140–450)
PMV BLD AUTO: 8.1 FL (ref 6–12)
POTASSIUM SERPL-SCNC: 5 MMOL/L (ref 3.5–5.2)
RBC # BLD AUTO: 3.72 10*6/MM3 (ref 4.14–5.8)
SODIUM SERPL-SCNC: 141 MMOL/L (ref 136–145)
STRESS TARGET HR: 119 BPM
WBC NRBC COR # BLD: 5.2 10*3/MM3 (ref 3.4–10.8)

## 2023-05-23 PROCEDURE — 97161 PT EVAL LOW COMPLEX 20 MIN: CPT

## 2023-05-23 PROCEDURE — G0378 HOSPITAL OBSERVATION PER HR: HCPCS

## 2023-05-23 PROCEDURE — 93306 TTE W/DOPPLER COMPLETE: CPT

## 2023-05-23 PROCEDURE — 25010000002 CEFTRIAXONE PER 250 MG: Performed by: INTERNAL MEDICINE

## 2023-05-23 PROCEDURE — 96366 THER/PROPH/DIAG IV INF ADDON: CPT

## 2023-05-23 PROCEDURE — 96372 THER/PROPH/DIAG INJ SC/IM: CPT

## 2023-05-23 PROCEDURE — 85027 COMPLETE CBC AUTOMATED: CPT | Performed by: STUDENT IN AN ORGANIZED HEALTH CARE EDUCATION/TRAINING PROGRAM

## 2023-05-23 PROCEDURE — 93306 TTE W/DOPPLER COMPLETE: CPT | Performed by: INTERNAL MEDICINE

## 2023-05-23 PROCEDURE — 93971 EXTREMITY STUDY: CPT

## 2023-05-23 PROCEDURE — 36415 COLL VENOUS BLD VENIPUNCTURE: CPT | Performed by: STUDENT IN AN ORGANIZED HEALTH CARE EDUCATION/TRAINING PROGRAM

## 2023-05-23 PROCEDURE — 25010000002 VANCOMYCIN 1 G RECONSTITUTED SOLUTION 1 EACH VIAL: Performed by: STUDENT IN AN ORGANIZED HEALTH CARE EDUCATION/TRAINING PROGRAM

## 2023-05-23 PROCEDURE — 97165 OT EVAL LOW COMPLEX 30 MIN: CPT

## 2023-05-23 PROCEDURE — 25010000002 CEFEPIME PER 500 MG: Performed by: STUDENT IN AN ORGANIZED HEALTH CARE EDUCATION/TRAINING PROGRAM

## 2023-05-23 PROCEDURE — 80048 BASIC METABOLIC PNL TOTAL CA: CPT | Performed by: STUDENT IN AN ORGANIZED HEALTH CARE EDUCATION/TRAINING PROGRAM

## 2023-05-23 PROCEDURE — 25010000002 SULFUR HEXAFLUORIDE MICROSPH 60.7-25 MG RECONSTITUTED SUSPENSION: Performed by: INTERNAL MEDICINE

## 2023-05-23 PROCEDURE — 25010000002 HEPARIN (PORCINE) PER 1000 UNITS: Performed by: STUDENT IN AN ORGANIZED HEALTH CARE EDUCATION/TRAINING PROGRAM

## 2023-05-23 RX ADMIN — CEFTRIAXONE 2 G: 2 INJECTION, POWDER, FOR SOLUTION INTRAMUSCULAR; INTRAVENOUS at 21:56

## 2023-05-23 RX ADMIN — CEFEPIME 1 G: 1 INJECTION, POWDER, FOR SOLUTION INTRAMUSCULAR; INTRAVENOUS at 06:07

## 2023-05-23 RX ADMIN — PANTOPRAZOLE SODIUM 40 MG: 40 TABLET, DELAYED RELEASE ORAL at 08:33

## 2023-05-23 RX ADMIN — METOPROLOL SUCCINATE 50 MG: 50 TABLET, EXTENDED RELEASE ORAL at 08:33

## 2023-05-23 RX ADMIN — GABAPENTIN 400 MG: 400 CAPSULE ORAL at 21:56

## 2023-05-23 RX ADMIN — Medication 10 ML: at 08:33

## 2023-05-23 RX ADMIN — Medication 10 ML: at 21:50

## 2023-05-23 RX ADMIN — SENNOSIDES AND DOCUSATE SODIUM 2 TABLET: 50; 8.6 TABLET ORAL at 21:56

## 2023-05-23 RX ADMIN — GABAPENTIN 400 MG: 400 CAPSULE ORAL at 14:12

## 2023-05-23 RX ADMIN — SULFUR HEXAFLUORIDE 2 ML: KIT at 16:03

## 2023-05-23 RX ADMIN — HEPARIN SODIUM 5000 UNITS: 5000 INJECTION INTRAVENOUS; SUBCUTANEOUS at 21:56

## 2023-05-23 RX ADMIN — HEPARIN SODIUM 5000 UNITS: 5000 INJECTION INTRAVENOUS; SUBCUTANEOUS at 06:07

## 2023-05-23 RX ADMIN — ASPIRIN 81 MG CHEWABLE TABLET 81 MG: 81 TABLET CHEWABLE at 08:33

## 2023-05-23 RX ADMIN — GABAPENTIN 400 MG: 400 CAPSULE ORAL at 06:07

## 2023-05-23 RX ADMIN — VANCOMYCIN HYDROCHLORIDE 1000 MG: 1 INJECTION, POWDER, LYOPHILIZED, FOR SOLUTION INTRAVENOUS at 09:30

## 2023-05-23 RX ADMIN — HEPARIN SODIUM 5000 UNITS: 5000 INJECTION INTRAVENOUS; SUBCUTANEOUS at 14:12

## 2023-05-23 RX ADMIN — SENNOSIDES AND DOCUSATE SODIUM 2 TABLET: 50; 8.6 TABLET ORAL at 08:33

## 2023-05-23 NOTE — PLAN OF CARE
Goal Outcome Evaluation:  Plan of Care Reviewed With: patient           Outcome Evaluation: 82 y.o. male with PMHx of HTN, HLD, prostate cancer, CAD s/p CABG who presented to [facility] on 5/22/2023 complaining of LLE pain.  States he has had worsening LLE swelling with erythema and cellulitis ongoing for the past 3 weeks. Pt failed outpatient clindamycin which was prescribed 5/9/23. Patient lives alone and uses 2 canes or rw for mobility and is independent with ADLs. He is an active . Today, patient appears at [facility]. He is independent with ADLs, bed mobility, and all functional mobility. He has no need for ongoing skilled OT. OT recommends return home at [place].

## 2023-05-23 NOTE — THERAPY EVALUATION
Patient Name: Deion Nicholas  : 1940    MRN: [id number]                              Today's Date: 2023       Admit Date: 2023    Visit Dx:     ICD-10-CM ICD-9-CM   1. Left leg cellulitis  L03.116 682.6   2. Left leg pain  M79.605 729.5     Patient Active Problem List   Diagnosis   • Normocytic anemia   • Coronary artery disease   • Ischemic cardiomyopathy   • Paroxysmal ventricular tachycardia   • Presence of automatic implantable cardioverter-defibrillator   • Essential hypertension   • Hyperlipidemia, mixed   • Peripheral neuropathy   • Cellulitis of left lower extremity without foot   • GERD without esophagitis   • B12 deficiency   • Orthostatic hypotension   • Tinea pedis of left foot   • Lower extremity pain, left   • Cellulitis of left leg   • Baker's cyst of knee, left   • Gynecomastia, male   • Ascending aortic aneurysm   • Thyroid nodule   • Dizziness   • Chest pain, unspecified type   • Edema of left lower extremity   • Elevated troponin   • Left leg cellulitis     Past Medical History:   Diagnosis Date   • Aneurysm    • Cardiomyopathy     ICD implantation   • Cellulitis of left lower extremity     recurrent   • CHD (coronary heart disease)     S/P CABG & PCI   • Dyslipidemia    • History of ventricular tachycardia    • Hypertension    • Myocardial infarction     Inferior MI   • Pinched nerve     L4-L5   • Prostate cancer      Past Surgical History:   Procedure Laterality Date   • ANGIOPLASTY  1996 Stent: Palmaz- Huy stent/LAD 1996-RCA: -Tetra stent Guidant to proximal RCA, mid to distal artery 2 sequential Guidant Tetra stents   • APPENDECTOMY     • CARDIAC ABLATION  April and May 2019   • CARDIAC CATHETERIZATION  2017    1996 x2, Nov. , 2010-cath 2015-no stents 2017   • CARDIAC DEFIBRILLATOR PLACEMENT     • COLONOSCOPY W/ POLYPECTOMY      Mult colonoscopy's for polyp resection    • CORONARY ARTERY BYPASS GRAFT  05/2010    x5 vessel: LMA to  diagonal, LAD, intermedius, obtuse marginal, RCA   • CORONARY STENT PLACEMENT     • ENDOSCOPY N/A 6/24/2019    Procedure: ESOPHAGOGASTRODUODENOSCOPY with dilitation Bougie 50, 54;  Surgeon: Roddy Coronel MD;  Location: [facility] ENDOSCOPY;  Service: Gastroenterology   • INSERT / REPLACE / REMOVE PACEMAKER     • KNEE OPEN LATERAL RELEASE Left     reduction   • OTHER SURGICAL HISTORY  01/2018    ICD implantation   • PROSTATE SURGERY  2015    Robiotic surgery- Cyber Knife:      General Information     Row Name 05/23/23 1547          OT Time and Intention    Document Type evaluation  -LS     Mode of Treatment occupational therapy  -LS     Row Name 05/23/23 1547          General Information    Patient Profile Reviewed yes  -LS     Prior Level of Function independent:;ADL's;all household mobility  Uses 2 canes for functional mobility  -LS     Existing Precautions/Restrictions --  BUE BLE neuropathy  -LS     Barriers to Rehab previous functional deficit;medically complex;impaired sensation  -LS     Row Name 05/23/23 1547          Living Environment    People in Home alone  -LS     Row Name 05/23/23 1547          Home Main Entrance    Number of Stairs, Main Entrance none  -LS     Row Name 05/23/23 1547          Stairs Within Home, Primary    Number of Stairs, Within Home, Primary none  -LS     Row Name 05/23/23 1547          Cognition    Orientation Status (Cognition) oriented x 4  -LS     Row Name 05/23/23 1547          Safety Issues, Functional Mobility    Impairments Affecting Function (Mobility) sensation/sensory awareness;range of motion (ROM);endurance/activity tolerance;motor control;strength  -LS           User Key  (r) = Recorded By, (t) = Taken By, (c) = Cosigned By    Initials Name Provider Type    LS Lloyd Rivera OT Occupational Therapist                 Mobility/ADL's     Row Name 05/23/23 1548          Bed Mobility    Bed Mobility bed mobility (all) activities  -LS     All Activities, [place] (Bed  Mobility) independent  -LS     Row Name 05/23/23 1548          Transfers    Transfers sit-stand transfer  -LS     Row Name 05/23/23 1548          Sit-Stand Transfer    Sit-Stand [place] (Transfers) modified independence  -LS     Assistive Device (Sit-Stand Transfers) cane, straight  -LS     Row Name 05/23/23 1548          Activities of Daily Living    BADL Assessment/Intervention other (see comments)  Pt remains independent with ADLs  -LS           User Key  (r) = Recorded By, (t) = Taken By, (c) = Cosigned By    Initials Name Provider Type    Lloyd Brian OT Occupational Therapist               Obj/Interventions     Row Name 05/23/23 1549          Sensory Assessment (Somatosensory)    Sensory Assessment BUE BLE neuropathy  -LS     Row Name 05/23/23 1549          Range of Motion Comprehensive    General Range of Motion bilateral upper extremity ROM WNL  -LS     Row Name 05/23/23 1549          Strength Comprehensive (MMT)    Comment, General Manual Muscle Testing (MMT) Assessment BUEs grossly 5/5  -LS     Row Name 05/23/23 1549          Balance    Balance Assessment sitting static balance;sitting dynamic balance;standing static balance;standing dynamic balance  -LS     Static Sitting Balance independent  -LS     Dynamic Sitting Balance independent  -LS     Position, Sitting Balance sitting edge of bed  -LS     Static Standing Balance modified independence  -LS     Dynamic Standing Balance modified independence  -LS     Position/Device Used, Standing Balance cane, straight  -LS           User Key  (r) = Recorded By, (t) = Taken By, (c) = Cosigned By    Initials Name Provider Type    Lloyd Brian OT Occupational Therapist               Goals/Plan    No documentation.                Clinical Impression     Row Name 05/23/23 1550          Pain Assessment    Pretreatment Pain Rating 9/10  -LS     Posttreatment Pain Rating 9/10  -LS     Pain Location - Side/Orientation Left  -LS     Pain Location lower  -LS      Pain Location - extremity  -LS     Row Name 05/23/23 1550          Plan of Care Review    Plan of Care Reviewed With patient  -LS     Outcome Evaluation 82 y.o. male with PMHx of HTN, HLD, prostate cancer, CAD s/p CABG who presented to [facility] on 5/22/2023 complaining of LLE pain.  States he has had worsening LLE swelling with erythema and cellulitis ongoing for the past 3 weeks. Pt failed outpatient clindamycin which was prescribed 5/9/23. Patient lives alone and uses 2 canes or rw for mobility and is independent with ADLs. He is an active . Today, patient appears at [facility]. He is independent with ADLs, bed mobility, and all functional mobility. He has no need for ongoing skilled OT. OT recommends return home at [place].  -LS     Row Name 05/23/23 0430          Therapy Assessment/Plan (OT)    Criteria for Skilled Therapeutic Interventions Met (OT) no;no problems identified which require skilled intervention  -LS     Therapy Frequency (OT) evaluation only  -LS     Predicted Duration of Therapy Intervention (OT) evaluation only  -LS     Row Name 05/23/23 2410          Therapy Plan Review/Discharge Plan (OT)    Anticipated Discharge Disposition (OT) home  -LS     Row Name 05/23/23 6700          Positioning and Restraints    Pre-Treatment Position in bed  -LS     Post Treatment Position bed  -LS     In Bed fowlers;with PT  -LS           User Key  (r) = Recorded By, (t) = Taken By, (c) = Cosigned By    Initials Name Provider Type    Lloyd Brian OT Occupational Therapist               Outcome Measures     Row Name 05/23/23 2413          How much help from another is currently needed...    Putting on and taking off regular lower body clothing? 4  -LS     Bathing (including washing, rinsing, and drying) 4  -LS     Toileting (which includes using toilet bed pan or urinal) 4  -LS     Putting on and taking off regular upper body clothing 4  -LS     Taking care of personal grooming (such as brushing teeth)  4  -LS     Eating meals 4  -LS     AM-PAC 6 Clicks Score (OT) 24  -LS     Row Name 05/23/23 1414          How much help from another person do you currently need...    Turning from your back to your side while in flat bed without using bedrails? 4  -CR     Moving from lying on back to sitting on the side of a flat bed without bedrails? 4  -CR     Moving to and from a bed to a chair (including a wheelchair)? 4  -CR     Standing up from a chair using your arms (e.g., wheelchair, bedside chair)? 4  -CR     Climbing 3-5 steps with a railing? 4  -CR     To walk in hospital room? 4  -CR     AM-PAC 6 Clicks Score (PT) 24  -CR     Highest level of mobility 8 --> Walked 250 feet or more  -CR     Row Name 05/23/23 1553 05/23/23 1414       Functional Assessment    Outcome Measure Options AM-PAC 6 Clicks Daily Activity (OT)  -LS AM-PAC 6 Clicks Basic Mobility (PT)  -CR          User Key  (r) = Recorded By, (t) = Taken By, (c) = Cosigned By    Initials Name Provider Type    CR Reyes, Carmela, PT Physical Therapist    Lloyd Brian OT Occupational Therapist                Occupational Therapy Education     Title: PT OT SLP Therapies (In Progress)     Topic: Occupational Therapy (In Progress)     Point: ADL training (Done)     Description:   Instruct learner(s) on proper safety adaptation and remediation techniques during self care or transfers.   Instruct in proper use of assistive devices.              Learning Progress Summary           Patient Acceptance, E,TB, VU by DAISY at 5/23/2023 7315                   Point: Precautions (Not Started)     Description:   Instruct learner(s) on prescribed precautions during self-care and functional transfers.              Learner Progress:  Not documented in this visit.          Point: Body mechanics (Not Started)     Description:   Instruct learner(s) on proper positioning and spine alignment during self-care, functional mobility activities and/or exercises.              Learner  Progress:  Not documented in this visit.                      User Key     Initials Effective Dates Name Provider Type Discipline     09/22/22 -  Lloyd Rivera OT Occupational Therapist OT              OT Recommendation and Plan  Therapy Frequency (OT): evaluation only  Plan of Care Review  Plan of Care Reviewed With: patient  Outcome Evaluation: 82 y.o. male with PMHx of HTN, HLD, prostate cancer, CAD s/p CABG who presented to [facility] on 5/22/2023 complaining of LLE pain.  States he has had worsening LLE swelling with erythema and cellulitis ongoing for the past 3 weeks. Pt failed outpatient clindamycin which was prescribed 5/9/23. Patient lives alone and uses 2 canes or rw for mobility and is independent with ADLs. He is an active . Today, patient appears at [facility]. He is independent with ADLs, bed mobility, and all functional mobility. He has no need for ongoing skilled OT. OT recommends return home at [place].     Time Calculation:    Time Calculation- OT     Row Name 05/23/23 1554             Time Calculation- OT    OT Start Time 1252  -LS      OT Stop Time 1312  -LS      OT Time Calculation (min) 20 min  -LS      OT Received On 05/23/23  -LS         Untimed Charges    OT Eval/Re-eval Minutes 20  -LS         Total Minutes    Untimed Charges Total Minutes 20  -LS       Total Minutes 20  -LS            User Key  (r) = Recorded By, (t) = Taken By, (c) = Cosigned By    Initials Name Provider Type    LS Lloyd Rivera OT Occupational Therapist              Therapy Charges for Today     Code Description Service Date Service Provider Modifiers Qty    08506903320 HC OT EVAL LOW COMPLEXITY 4 5/23/2023 Lloyd Rivera OT GO 1               Lloyd Rivera OT  5/23/2023

## 2023-05-23 NOTE — PLAN OF CARE
Goal Outcome Evaluation:  Plan of Care Reviewed With: patient           Outcome Evaluation: 83 y/o male came to hospital on 5/22 due to worsening LLE celullitis despite antibiotics. Patient has been in hospital for similar concern initially 4 wks ago, returned to hospital again 2 wks ago prior to this return. 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 Patient lives alone and uses 2 canes or rw for mobility ; still able to drive. At this eval patient remains independent with all bed mobility, modified independent for transfers and gait. Gait with marked deviation however is chronic and his baseline. Patient is safe to return home, no further skilled Physical therapy needs indicated.

## 2023-05-23 NOTE — PLAN OF CARE
Problem: Adult Inpatient Plan of Care  Goal: Plan of Care Review  Outcome: Ongoing, Progressing  Goal: Patient-Specific Goal (Individualized)  Outcome: Ongoing, Progressing  Goal: Absence of Hospital-Acquired Illness or Injury  Outcome: Ongoing, Progressing  Intervention: Identify and Manage Fall Risk  Recent Flowsheet Documentation  Taken 5/23/2023 0000 by Loren Saldaña RN  Safety Promotion/Fall Prevention: safety round/check completed  Intervention: Prevent and Manage VTE (Venous Thromboembolism) Risk  Recent Flowsheet Documentation  Taken 5/23/2023 0000 by Loren Saldaña RN  VTE Prevention/Management: (Heparin sub q) other (see comments)  Goal: Optimal Comfort and Wellbeing  Outcome: Ongoing, Progressing  Intervention: Monitor Pain and Promote Comfort  Recent Flowsheet Documentation  Taken 5/23/2023 0000 by Loren Saldaña RN  Pain Management Interventions: see MAR  Intervention: Provide Person-Centered Care  Recent Flowsheet Documentation  Taken 5/23/2023 0000 by Loren Saldaña RN  Trust Relationship/Rapport:   care explained   questions encouraged   questions answered  Goal: Readiness for Transition of Care  Outcome: Ongoing, Progressing  Intervention: Mutually Develop Transition Plan  Recent Flowsheet Documentation  Taken 5/22/2023 2321 by Loren Saldaña RN  Equipment Currently Used at Home:   walker, rolling   cane, straight  Taken 5/22/2023 2320 by Loren Saldaña RN  Transportation Anticipated: family or friend will provide  Patient/Family Anticipated Services at Transition:   Patient/Family Anticipates Transition to: home  Taken 5/22/2023 2237 by Loren Saldaña RN  Equipment Currently Used at Home:   walker, rolling   cane, straight     Problem: Fall Injury Risk  Goal: Absence of Fall and Fall-Related Injury  Outcome: Ongoing, Progressing  Intervention: Identify and Manage Contributors  Recent Flowsheet Documentation  Taken 5/23/2023 0000 by Piper  Loren, RN  Medication Review/Management: medications reviewed  Intervention: Promote Injury-Free Environment  Recent Flowsheet Documentation  Taken 5/23/2023 0000 by Loren Saldaña RN  Safety Promotion/Fall Prevention: safety round/check completed   Goal Outcome Evaluation:         New admit to ED holds.  Patient admitted with cellulitis of the left leg.  Admission completed and medications reviewed

## 2023-05-23 NOTE — PROGRESS NOTES
[facility] Medicine Services Daily Progress Note    Patient Name: Deion Nicholas  : 1940  MRN: [id number]  Primary Care Physician:  Makenna Motta MD  Date of admission: 2023      Subjective      Chief Complaint: Nonresolving cellulitis left lower extremity      Patient Reports pain and swelling in the left lower extremity.  Otherwise doing okay.  States he failed outpatient clindamycin therapy.  States he been dealing with this leg for several months.  Last ultrasound was checked 2 months ago in April.    ROS negative except as above      Objective      Vitals:   Temp:  [97.4 °F (36.3 °C)-98.5 °F (36.9 °C)] 97.4 °F (36.3 °C)  Heart Rate:  [74-89] 74  Resp:  [12-16] 16  BP: (117-162)/(81-94) 162/81  Flow (L/min):  [2] 2    Physical Exam  Vitals reviewed.   Constitutional:       Comments: Appears younger than stated age of 82   HENT:      Head: Normocephalic.   Cardiovascular:      Rate and Rhythm: Normal rate.   Pulmonary:      Effort: Pulmonary effort is normal.   Abdominal:      General: Abdomen is flat.      Palpations: Abdomen is soft.   Musculoskeletal:         General: Normal range of motion.      Cervical back: Normal range of motion.      Left lower leg: Edema present.   Skin:     General: Skin is warm.   Neurological:      General: No focal deficit present.      Mental Status: He is alert and oriented to person, place, and time.   Psychiatric:         Mood and Affect: Mood normal.         Behavior: Behavior normal.             Result Review    Result Review:  I have personally reviewed the results from the time of this admission to 2023 14:05 EDT and agree with these findings:  [x]  Laboratory  []  Microbiology  []  Radiology  []  EKG/Telemetry   []  Cardiology/Vascular   []  Pathology  []  Old records  []  Other:  Most notable findings include: Hemoglobin 12 white count is 5.2          Assessment & Plan      Brief Patient Summary:  Deion Nicholas is a 82  y.o. male who presents with nonresolving cellulitis of left lower extremity.  Failed outpatient clindamycin.      aspirin, 81 mg, Oral, Daily  cefTRIAXone, 2 g, Intravenous, Nightly  gabapentin, 400 mg, Oral, Q8H  heparin (porcine), 5,000 Units, Subcutaneous, Q8H  metoprolol succinate XL, 50 mg, Oral, Daily  pantoprazole, 40 mg, Oral, QAM AC  senna-docusate sodium, 2 tablet, Oral, BID  sodium chloride, 10 mL, Intravenous, Q12H             Active Hospital Problems:  Active Hospital Problems    Diagnosis    • Left leg cellulitis      Plan:   Left lower extremity cellulitis failed outpatient clindamycin therapy  We will start Rocephin 2 g  Monitor for improvement  Check lower extremity Doppler to rule out DVT  May need echocardiogram as well    #Chronic HFrEF    #Cardiomyopathy    - patient does have b/l LE pitting edema    - possible slight exacerbation or fluid overload    - Echo on 10/22/21 shows EF 31-35% with LV and RV dilation and ascending aorta dilation    - check proBNP    - will consider diuretics depending on workup    - intake and output    - weight daily    - resume home aspirin    - s/p ICD     #Hyperglycemia    - glucose 145, no hx of DM    - monitor     #Anemia    - hgb 12.0 on admission    - no overt bleeding, follow labs     #HTN    - resume home Toprol    - resume home midodrine PRN     #HLD    -zetia not on formulary     #GERD    - PPI        DVT prophylaxis: Heparin     CODE STATUS:    Admission Status:  I believe this patient meets observation status.     I discussed the patient's findings and my recommendations with patient.     This patient has been examined wearing appropriate Personal Protective Equipment and discussed with Patient and family.        DVT prophylaxis:  Medical DVT prophylaxis orders are present.    CODE STATUS:    Code Status (Patient has no pulse and is not breathing): CPR (Attempt to Resuscitate)  Medical Interventions (Patient has pulse or is breathing): Full  Support      .    This patient has been examined wearing appropriate Personal Protective Equipment and discussed with patient. 05/23/23      Electronically signed by Ryne Stephenson MD, 05/23/23, 14:05 EDT.  [facility] Team

## 2023-05-23 NOTE — CASE MANAGEMENT/SOCIAL WORK
Discharge Planning Assessment  [facility]     Patient Name: Deion Nicholas  MRN: [id number]  Today's Date: 5/23/2023    Admit Date: 5/22/2023    Plan: D/C plan: Anticipate home   Discharge Needs Assessment     Row Name 05/23/23 1536       Living Environment    People in Home alone    Current Living Arrangements home    Potentially Unsafe Housing Conditions none    Primary Care Provided by self    Provides Primary Care For no one    Family Caregiver if Needed child(tai), adult    Family Caregiver Names Christian Landrum    Quality of Family Relationships unable to assess    Able to Return to Prior Arrangements yes       Resource/Environmental Concerns    Resource/Environmental Concerns none    Transportation Concerns none       Transition Planning    Patient/Family Anticipates Transition to home    Patient/Family Anticipated Services at Transition none    Transportation Anticipated family or friend will provide       Discharge Needs Assessment    Readmission Within the Last 30 Days no previous admission in last 30 days    Equipment Currently Used at Home walker, rolling;cane, straight    Concerns to be Addressed discharge planning    Anticipated Changes Related to Illness none    Equipment Needed After Discharge none    Provided Post Acute Provider List? N/A    N/A Provider List Comment anticipate home               Discharge Plan     Row Name 05/23/23 8205       Plan    Plan D/C plan: Anticipate home    Patient/Family in Agreement with Plan yes    Plan Comments [facility]. Bed would be available if pt wishes to transfer and MD is agreeable. CM met with patient at bedside. [facility]. Pt lives at home with alone. Pt is IADL, including driving. Christian Landrum to transport at discharge. Uses a cane and walker as needed at home. PCP and pharmacy confirmed. Pt states he does occasionally have to pay for his  meds, and is not always able to afford them if they are not [facility]. No current use of HHC. Pt states he had to do home IV abx previously, and he would be able to do so if he were to need them again. [facility] .                    Demographic Summary     Row Name 05/23/23 1530       General Information    Admission Type observation    Arrived From emergency department    Required Notices Provided Observation Status Notice    Referral Source admission list    Reason for Consult discharge planning    Preferred Language English               Functional Status     Row Name 05/23/23 1537       Employment/    Employment Status , previous service    Current or Previous Occupation     Row Name 05/23/23 1537       Functional Status    Usual Activity Tolerance good    Current Activity Tolerance moderate       Functional Status, IADL    Medications assistive equipment    Meal Preparation assistive equipment    Housekeeping assistive equipment    Laundry assistive equipment    Shopping assistive equipment                     Met with patient in room     Maintained distance greater than six feet and spent less than 15 minutes in the room.          Amilcar Paniagua RN

## 2023-05-23 NOTE — H&P
[facility] Medicine Services      Patient Name: Deion Nicholas  : 1940  MRN: [id number]  Primary Care Physician:  Makenna Motta MD  Date of admission: 2023      Subjective      Chief Complaint: LLE pain    History of Present Illness: Deion Nicholas is a 82 y.o. male with PMHx of HTN, HLD, prostate cancer, CAD s/p CABG who presented to [facility] on 2023 complaining of LLE pain.  States he has had worsening LLE swelling with erythema and cellulitis ongoing for the past 3 weeks.  States he failed outpatent course of Clindmycin prescribed on 23.  Denies trauma, ulcer, fever, chills, nausea, vomiting, diarrhea.  Admits to b/l LE pitting edema, and difficulty with ambulation.  Presented to [facility] for Evaluation.    In the ED, vitals 98.5, HR 89, RR 16, /87, 96 RA.  Labs notable for glucose 145, CRP <0.3, lactate 1.3, hgb 12.0, ESR 22.  He is to be admitted for LLE cellulitis with failure of otpt abx.      ROS   12 point ROS reviewed and negative except as mentioned above      Personal History     Past Medical History:   Diagnosis Date   • Aneurysm    • Cardiomyopathy     ICD implantation   • Cellulitis of left lower extremity     recurrent   • CHD (coronary heart disease)     S/P CABG & PCI   • Dyslipidemia    • History of ventricular tachycardia    • Hypertension    • Myocardial infarction     Inferior MI   • Pinched nerve     L4-L5   • Prostate cancer        Past Surgical History:   Procedure Laterality Date   • ANGIOPLASTY  1996 Stent: Palmaz- Huy stent/LAD 1996-RCA: -Tetra stent Guidant to proximal RCA, mid to distal artery 2 sequential Guidant Tetra stents   • APPENDECTOMY     • CARDIAC ABLATION  April and May 2019   • CARDIAC CATHETERIZATION  2017    1996 x2, Nov. , 2010-cath 2015-no stents 2017   • CARDIAC DEFIBRILLATOR PLACEMENT     • COLONOSCOPY W/ POLYPECTOMY      Mult colonoscopy's for polyp resection    •  CORONARY ARTERY BYPASS GRAFT  05/2010    x5 vessel: LMA to diagonal, LAD, intermedius, obtuse marginal, RCA   • CORONARY STENT PLACEMENT     • ENDOSCOPY N/A 6/24/2019    Procedure: ESOPHAGOGASTRODUODENOSCOPY with dilitation Bougie 50, 54;  Surgeon: Roddy Coronel MD;  [facility];  Service: Gastroenterology   • INSERT / REPLACE / REMOVE PACEMAKER     • KNEE OPEN LATERAL RELEASE Left     reduction   • OTHER SURGICAL HISTORY  01/2018    ICD implantation   • PROSTATE SURGERY  2015    Robiotic surgery- Cyber Knife:       Family History: family history includes Heart disease in his father; Pancreatic cancer in his mother. Otherwise pertinent FHx was reviewed and not pertinent to current issue.    Social History:  reports that he quit smoking about 20 years ago. His smoking use included cigarettes. He has a 17.00 pack-year smoking history. He has never used smokeless tobacco. He reports that he does not currently use alcohol. He reports that he does not use drugs.    Home Medications:  Prior to Admission Medications     Prescriptions Last Dose Informant Patient Reported? Taking?    aspirin 81 MG chewable tablet   Yes No    Chew 1 tablet Daily.    benzonatate (TESSALON) 200 MG capsule   No No    Take 1 capsule by mouth 3 (Three) Times a Day As Needed for Cough.    Cholecalciferol 10 MCG (400 UNIT) tablet  Self Yes No    Take 2 tablets daily    clindamycin (CLEOCIN) 300 MG capsule   No No    Take 1 capsule by mouth 3 (Three) Times a Day.    cyanocobalamin 1000 MCG/ML injection  Self Yes No    Inject 1 mL into the appropriate muscle as directed by prescriber Every 14 (Fourteen) Days.    ezetimibe (ZETIA) 10 MG tablet  Self Yes No    Take 1 tablet by mouth Every Evening.    gabapentin (NEURONTIN) 400 MG capsule  Self Yes No    Take 1 capsule by mouth 4 (Four) Times a Day.    lidocaine (LIDODERM) 5 %  Self Yes No    Place 1 patch on the skin as directed by provider Daily As Needed. Remove & Discard patch  within 12 hours or as directed by MD    meclizine (ANTIVERT) 25 MG tablet   No No    Take 1 tablet by mouth 3 (Three) Times a Day As Needed for Dizziness for up to 10 doses.    methocarbamol (ROBAXIN) 500 MG tablet  Self Yes No    Take 1 tablet by mouth 3 (Three) Times a Day As Needed for Muscle Spasms.    metoprolol succinate XL (TOPROL-XL) 50 MG 24 hr tablet   Yes No    Take 1 tablet by mouth Daily.    midodrine (PROAMATINE) 5 MG tablet  Spouse/Significant Other Yes No    Take 1 tablet by mouth 3 (Three) Times a Day Before Meals.    nitroglycerin (NITROSTAT) 0.4 MG SL tablet  Self Yes No    Place 1 tablet under the tongue Every 5 (Five) Minutes As Needed.    Omega-3 Fatty Acids (fish oil) 1000 MG capsule capsule  Self Yes No    Take 2 capsules by mouth 2 (Two) Times a Day With Meals.    pantoprazole (PROTONIX) 40 MG EC tablet  Self Yes No    Take 1 tablet by mouth Daily.            Allergies:  Allergies   Allergen Reactions   • Lovastatin Myalgia   • Pravastatin Myalgia and Unknown (See Comments)     unknown   • Simvastatin Unknown (See Comments) and Myalgia     unknown   • Spironolactone Other (See Comments)     Gynecomastia        Objective      Vitals:   Temp:  [98.5 °F (36.9 °C)] 98.5 °F (36.9 °C)  Heart Rate:  [89] 89  Resp:  [16] 16  BP: (131)/(87) 131/87    Physical Exam  Constitutional:       General: He is not in acute distress.     Appearance: He is not toxic-appearing.   HENT:      Head: Normocephalic and atraumatic.      Nose: Nose normal. No congestion.      Mouth/Throat:      Pharynx: Oropharynx is clear. No oropharyngeal exudate.   Eyes:      General: No scleral icterus.  Cardiovascular:      Rate and Rhythm: Normal rate and regular rhythm.      Heart sounds: No murmur heard.    No friction rub. No gallop.   Pulmonary:      Effort: No respiratory distress.      Breath sounds: No wheezing or rales.   Abdominal:      General: There is no distension.      Tenderness: There is no abdominal tenderness.  There is no guarding.   Musculoskeletal:         General: Tenderness present.      Cervical back: Normal range of motion. No rigidity.      Right lower leg: Edema present.      Left lower leg: Edema present.   Skin:     Coloration: Skin is not jaundiced.      Findings: Erythema present. No bruising or lesion.   Neurological:      General: No focal deficit present.      Mental Status: He is alert and oriented to person, place, and time.      Motor: Weakness present.          Result Review    Result Review:  I have personally reviewed the results from the time of this admission to 5/22/2023 21:18 EDT and agree with these findings:  [x]  Laboratory  []  Microbiology  []  Radiology  []  EKG/Telemetry   []  Cardiology/Vascular   []  Pathology  []  Old records  []  Other:        Assessment & Plan        Active Hospital Problems:  There are no active hospital problems to display for this patient.    Plan:     #LLE cellulitis    - Failed outpatient Clindamycin prescribed on 5/9/23    - patient states signs and symptoms similar to previous cellulitis outbreak    - Due to failure of otpt abx and comorbidities will cover with broad spectrum abx    - ESR 35 > 22    - CRP <0.3    - MRSA swab    - Vancomycin, pharm to dose    - Cefepime, pharm to dose    - PT/OT    - resume home Robaxin PRN    -resume home gabapentin      #Chronic HFrEF  #Cardiomyopathy    - patient does have b/l LE pitting edema    - possible slight exacerbation or fluid overload    - Echo on 10/22/21 shows EF 31-35% with LV and RV dilation and ascending aorta dilation    - check proBNP    - will consider diuretics depending on workup    - intake and output    - weight daily    - resume home aspirin    - s/p ICD    #Hyperglycemia    - glucose 145, no hx of DM    - monitor    #Anemia    - hgb 12.0 on admission    - no overt bleeding, follow labs    #HTN    - resume home Toprol    - resume home midodrine PRN    #HLD    -zetia not on formulary    #GERD    -  PPI      DVT prophylaxis: Heparin    CODE STATUS:       Admission Status:  I believe this patient meets observation status.    I discussed the patient's findings and my recommendations with patient.    This patient has been examined wearing appropriate Personal Protective Equipment and discussed with Patient and family. 05/22/23      Signature: Electronically signed by Preet Richardson DO, 05/22/23, 9:32 PM EDT.

## 2023-05-23 NOTE — THERAPY EVALUATION
Patient Name: Deion Nicholas  : 1940    MRN: [id number]                              Today's Date: 2023       Admit Date: 2023    Visit Dx:     ICD-10-CM ICD-9-CM   1. Left leg cellulitis  L03.116 682.6   2. Left leg pain  M79.605 729.5     Patient Active Problem List   Diagnosis   • Normocytic anemia   • Coronary artery disease   • Ischemic cardiomyopathy   • Paroxysmal ventricular tachycardia   • Presence of automatic implantable cardioverter-defibrillator   • Essential hypertension   • Hyperlipidemia, mixed   • Peripheral neuropathy   • Cellulitis of left lower extremity without foot   • GERD without esophagitis   • B12 deficiency   • Orthostatic hypotension   • Tinea pedis of left foot   • Lower extremity pain, left   • Cellulitis of left leg   • Baker's cyst of knee, left   • Gynecomastia, male   • Ascending aortic aneurysm   • Thyroid nodule   • Dizziness   • Chest pain, unspecified type   • Edema of left lower extremity   • Elevated troponin   • Left leg cellulitis     Past Medical History:   Diagnosis Date   • Aneurysm    • Cardiomyopathy     ICD implantation   • Cellulitis of left lower extremity     recurrent   • CHD (coronary heart disease)     S/P CABG & PCI   • Dyslipidemia    • History of ventricular tachycardia    • Hypertension    • Myocardial infarction     Inferior MI   • Pinched nerve     L4-L5   • Prostate cancer      Past Surgical History:   Procedure Laterality Date   • ANGIOPLASTY  1996 Stent: Palmaz- Huy stent/LAD 1996-RCA: -Tetra stent Guidant to proximal RCA, mid to distal artery 2 sequential Guidant Tetra stents   • APPENDECTOMY     • CARDIAC ABLATION  April and May 2019   • CARDIAC CATHETERIZATION  2017    1996 x2, Nov. , 2010-cath 2015-no stents 2017   • CARDIAC DEFIBRILLATOR PLACEMENT     • COLONOSCOPY W/ POLYPECTOMY      Mult colonoscopy's for polyp resection    • CORONARY ARTERY BYPASS GRAFT  05/2010    x5 vessel: LMA to  diagonal, LAD, intermedius, obtuse marginal, RCA   • CORONARY STENT PLACEMENT     • ENDOSCOPY N/A 6/24/2019    Procedure: ESOPHAGOGASTRODUODENOSCOPY with dilitation Bougie 50, 54;  Surgeon: Roddy Coronel MD;  Location: [facility] ENDOSCOPY;  Service: Gastroenterology   • INSERT / REPLACE / REMOVE PACEMAKER     • KNEE OPEN LATERAL RELEASE Left     reduction   • OTHER SURGICAL HISTORY  01/2018    ICD implantation   • PROSTATE SURGERY  2015    Robiotic surgery- Cyber Knife:      General Information     Row Name 05/23/23 [facility]7          Physical Therapy Time and Intention    Document Type evaluation  -CR     Mode of Treatment physical therapy  -CR     Row Name 05/23/23 1357          General Information    Patient Profile Reviewed yes  -CR     Prior Level of Function independent:;all household mobility;ADL's  using 2 canes or rw  -CR     Existing Precautions/Restrictions --  neuropathy hands/feet  -CR     Barriers to Rehab impaired sensation;medically complex;previous functional deficit  -CR     Row Name 05/23/23 1352          Living Environment    People in Home alone  -CR     Row Name 05/23/23 1357          Home Main Entrance    Number of Stairs, Main Entrance none  -CR     Row Name 05/23/23 1357          Stairs Within Home, Primary    Number of Stairs, Within Home, Primary none  -CR     Row Name 05/23/23 1352          Cognition    Orientation Status (Cognition) oriented x 4  -CR     Row Name 05/23/23 1356          Safety Issues, Functional Mobility    Impairments Affecting Function (Mobility) sensation/sensory awareness;range of motion (ROM);endurance/activity tolerance;motor control;strength  -CR           User Key  (r) = Recorded By, (t) = Taken By, (c) = Cosigned By    Initials Name Provider Type    CR Reyes, Carmela, PT Physical Therapist               Mobility     Row Name 05/23/23 2011          Bed Mobility    Bed Mobility bed mobility (all) activities  -CR     All Activities, [place] (Bed Mobility)  independent  -CR     Row Name 05/23/23 1359          Sit-Stand Transfer    Sit-Stand [place] (Transfers) modified independence  -CR     Assistive Device (Sit-Stand Transfers) cane, straight  -CR     Row Name 05/23/23 1359          Gait/Stairs (Locomotion)    [place] Level (Gait) modified independence  -CR     Assistive Device (Gait) cane, straight  -CR     Distance in Feet (Gait) 20  -CR     Deviations/Abnormal Patterns (Gait) gait speed decreased  -CR     Bilateral Gait Deviations forward flexed posture  -CR     Right Sided Gait Deviations heel strike decreased  -CR           User Key  (r) = Recorded By, (t) = Taken By, (c) = Cosigned By    Initials Name Provider Type    CR Reyes, Carmela, PT Physical Therapist               Obj/Interventions     Row Name 05/23/23 1401          Range of Motion Comprehensive    Comment, General Range of Motion AROM BLE wfl except decreased knee flexion  -CR     Row Name 05/23/23 1401          Strength Comprehensive (MMT)    Comment, General Manual Muscle Testing (MMT) Assessment BLE grossly 3/5  -CR     Row Name 05/23/23 1401          Balance    Balance Assessment sitting static balance;sitting dynamic balance;sit to stand dynamic balance;standing static balance;standing dynamic balance  -CR     Static Sitting Balance independent  -CR     Dynamic Sitting Balance independent  -CR     Position, Sitting Balance sitting edge of bed  -CR     Sit to Stand Dynamic Balance modified independence  -CR     Static Standing Balance modified independence  -CR     Dynamic Standing Balance modified independence  -CR     Position/Device Used, Standing Balance cane, straight  -CR     Balance Interventions occupation based/functional task  -CR     Row Name 05/23/23 1401          Sensory Assessment (Somatosensory)    Sensory Assessment (Somatosensory) --  neuropathy both hands/feet  -CR           User Key  (r) = Recorded By, (t) = Taken By, (c) = Cosigned By    Initials Name Provider Type     CR Reyes, Carmela, NAFISA Physical Therapist               Goals/Plan    No documentation.                Clinical Impression     Row Name 05/23/23 1404          Pain    Additional Documentation Pain Scale: FACES Pre/Post-Treatment (Group)  -CR     Row Name 05/23/23 1403          Pain Scale: FACES Pre/Post-Treatment    Pain: FACES Scale, Pretreatment 2-->hurts little bit  -CR     Posttreatment Pain Rating 2-->hurts little bit  -CR     Pain Location generalized  -CR     Row Name 05/23/23 140          Plan of Care Review    Plan of Care Reviewed With patient  -CR     Outcome Evaluation 83 y/o male came to hospital on 5/22 due to worsening LLE celullitis despite antibiotics. Patient has been in hospital for similar concern initially 4 wks ago, returned to hospital again 2 wks ago prior to this return. 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 Patient lives alone and uses 2 canes or rw for mobility ; still able to drive. At this eval patient remains independent with all bed mobility, modified independent for transfers and gait. Gait with marked deviation however is chronic and his baseline. Patient is safe to return home, no further skilled Physical therapy needs indicated.  -CR     Row Name 05/23/23 1400          Therapy Assessment/Plan (PT)    Patient/Family Therapy Goals Statement (PT) home  -CR     Criteria for Skilled Interventions Met (PT) no;does not meet criteria for skilled intervention  -CR     Therapy Frequency (PT) evaluation only  -CR     Predicted Duration of Therapy Intervention (PT) dc  -CR           User Key  (r) = Recorded By, (t) = Taken By, (c) = Cosigned By    Initials Name Provider Type    CR Reyes, Carmela, NAFISA Physical Therapist               Outcome Measures     Row Name 05/23/23 7442          How much help from another person do you currently need...    Turning from your back to your side while in flat bed without using  bedrails? 4  -CR     Moving from lying on back to sitting on the side of a flat bed without bedrails? 4  -CR     Moving to and from a bed to a chair (including a wheelchair)? 4  -CR     Standing up from a chair using your arms (e.g., wheelchair, bedside chair)? 4  -CR     Climbing 3-5 steps with a railing? 4  -CR     To walk in hospital room? 4  -CR     AM-PAC 6 Clicks Score (PT) 24  -CR     Highest level of mobility 8 --> Walked 250 feet or more  -CR     Row Name 05/23/23 1414          Functional Assessment    Outcome Measure Options AM-PAC 6 Clicks Basic Mobility (PT)  -CR           User Key  (r) = Recorded By, (t) = Taken By, (c) = Cosigned By    Initials Name Provider Type    CR Reyes, Carmela, PT Physical Therapist                             Physical Therapy Education     Title: PT OT SLP Therapies (In Progress)     Topic: Physical Therapy (Resolved)     Point: Mobility training (Resolved)     Learning Progress Summary           Patient Acceptance, E, VU by CR at 5/23/2023 1414                   Point: Home exercise program (Resolved)     Learning Progress Summary           Patient Acceptance, E, VU by CR at 5/23/2023 1414                   Point: Body mechanics (Resolved)     Learning Progress Summary           Patient Acceptance, E, VU by CR at 5/23/2023 1414                   Point: Precautions (Resolved)     Learning Progress Summary           Patient Acceptance, E, VU by CR at 5/23/2023 1414                               User Key     Initials Effective Dates Name Provider Type Discipline    CR 06/16/21 -  Reyes, Carmela, PT Physical Therapist PT              PT Recommendation and Plan     Plan of Care Reviewed With: patient  Outcome Evaluation: 81 y/o male came to hospital on 5/22 due to worsening LLE celullitis despite antibiotics. Patient has been in hospital for similar concern initially 4 wks ago, returned to hospital again 2 wks ago prior to this return. 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 Patient lives alone and uses 2 canes or rw for mobility ; still able to drive. At this eval patient remains independent with all bed mobility, modified independent for transfers and gait. Gait with marked deviation however is chronic and his baseline. Patient is safe to return home, no further skilled Physical therapy needs indicated.     Time Calculation:    PT Charges     Row Name 05/23/23 1415             Time Calculation    Start Time 1300  -CR      Stop Time 1324  -CR      Time Calculation (min) 24 min  -CR      PT Received On 05/23/23  -CR         Time Calculation- PT    Total Timed Code Minutes- PT 0 minute(s)  -CR            User Key  (r) = Recorded By, (t) = Taken By, (c) = Cosigned By    Initials Name Provider Type    CR Reyes, Carmela, NAFISA Physical Therapist              Therapy Charges for Today     Code Description Service Date Service Provider Modifiers Qty    [id number]  PT EVAL LOW COMPLEXITY 4 5/23/2023 Reyes, Carmela, NAFISA GP 1          PT G-Codes  Outcome Measure Options: AM-PAC 6 Clicks Basic Mobility (PT)  AM-PAC 6 Clicks Score (PT): 24  PT Discharge Summary  Anticipated Discharge Disposition (PT): home    Carmela Reyes, PT  5/23/2023

## 2023-05-24 LAB
ALBUMIN SERPL-MCNC: 3.8 G/DL (ref 3.5–5.2)
ALBUMIN/GLOB SERPL: 1.4 G/DL
ALP SERPL-CCNC: 90 U/L (ref 39–117)
ALT SERPL W P-5'-P-CCNC: 17 U/L (ref 1–41)
ANION GAP SERPL CALCULATED.3IONS-SCNC: 9 MMOL/L (ref 5–15)
ASCENDING AORTA: 4.6 CM
AST SERPL-CCNC: 26 U/L (ref 1–40)
BASOPHILS # BLD AUTO: 0 10*3/MM3 (ref 0–0.2)
BASOPHILS NFR BLD AUTO: 0.9 % (ref 0–1.5)
BH CV ECHO MEAS - ACS: 2.2 CM
BH CV ECHO MEAS - AI P1/2T: 647.6 MSEC
BH CV ECHO MEAS - AO MAX PG: 5.5 MMHG
BH CV ECHO MEAS - AO MEAN PG: 3 MMHG
BH CV ECHO MEAS - AO ROOT DIAM: 4.2 CM
BH CV ECHO MEAS - AO V2 MAX: 117 CM/SEC
BH CV ECHO MEAS - AO V2 VTI: 26 CM
BH CV ECHO MEAS - AVA(I,D): 2.28 CM2
BH CV ECHO MEAS - EDV(CUBED): 357.9 ML
BH CV ECHO MEAS - EDV(MOD-SP2): 179 ML
BH CV ECHO MEAS - EDV(MOD-SP4): 237 ML
BH CV ECHO MEAS - EF(MOD-BP): 31.7 %
BH CV ECHO MEAS - EF(MOD-SP2): 36.3 %
BH CV ECHO MEAS - EF(MOD-SP4): 34.2 %
BH CV ECHO MEAS - ESV(MOD-SP2): 114 ML
BH CV ECHO MEAS - ESV(MOD-SP4): 156 ML
BH CV ECHO MEAS - FS: 11.3 %
BH CV ECHO MEAS - IVS/LVPW: 1.1 CM
BH CV ECHO MEAS - IVSD: 1.1 CM
BH CV ECHO MEAS - LA DIMENSION: 3.1 CM
BH CV ECHO MEAS - LAT PEAK E' VEL: 8.7 CM/SEC
BH CV ECHO MEAS - LV DIASTOLIC VOL/BSA (35-75): 110.6 CM2
BH CV ECHO MEAS - LV MASS(C)D: 350.7 GRAMS
BH CV ECHO MEAS - LV MAX PG: 1.66 MMHG
BH CV ECHO MEAS - LV MEAN PG: 1 MMHG
BH CV ECHO MEAS - LV SYSTOLIC VOL/BSA (12-30): 72.8 CM2
BH CV ECHO MEAS - LV V1 MAX: 64.4 CM/SEC
BH CV ECHO MEAS - LV V1 VTI: 17.1 CM
BH CV ECHO MEAS - LVIDD: 7.1 CM
BH CV ECHO MEAS - LVIDS: 6.3 CM
BH CV ECHO MEAS - LVOT AREA: 3.5 CM2
BH CV ECHO MEAS - LVOT DIAM: 2.1 CM
BH CV ECHO MEAS - LVPWD: 1 CM
BH CV ECHO MEAS - MED PEAK E' VEL: 4.5 CM/SEC
BH CV ECHO MEAS - MR MAX PG: 101.6 MMHG
BH CV ECHO MEAS - MR MAX VEL: 504 CM/SEC
BH CV ECHO MEAS - MV A DUR: 0.12 SEC
BH CV ECHO MEAS - MV A MAX VEL: 80.7 CM/SEC
BH CV ECHO MEAS - MV DEC SLOPE: 345 CM/SEC2
BH CV ECHO MEAS - MV DEC TIME: 0.16 MSEC
BH CV ECHO MEAS - MV E MAX VEL: 52.4 CM/SEC
BH CV ECHO MEAS - MV E/A: 0.65
BH CV ECHO MEAS - MV MAX PG: 3.3 MMHG
BH CV ECHO MEAS - MV MEAN PG: 1 MMHG
BH CV ECHO MEAS - MV P1/2T: 50.7 MSEC
BH CV ECHO MEAS - MV V2 VTI: 18.2 CM
BH CV ECHO MEAS - MVA(P1/2T): 4.3 CM2
BH CV ECHO MEAS - MVA(VTI): 3.3 CM2
BH CV ECHO MEAS - PA V2 MAX: 106 CM/SEC
BH CV ECHO MEAS - PULM A REVS DUR: 0.11 SEC
BH CV ECHO MEAS - PULM A REVS VEL: 21.3 CM/SEC
BH CV ECHO MEAS - PULM DIAS VEL: 25.9 CM/SEC
BH CV ECHO MEAS - PULM S/D: 1.24
BH CV ECHO MEAS - PULM SYS VEL: 32 CM/SEC
BH CV ECHO MEAS - RAP SYSTOLE: 3 MMHG
BH CV ECHO MEAS - RV MAX PG: 1.72 MMHG
BH CV ECHO MEAS - RV V1 MAX: 65.5 CM/SEC
BH CV ECHO MEAS - RV V1 VTI: 13.5 CM
BH CV ECHO MEAS - RVSP: 25 MMHG
BH CV ECHO MEAS - SI(MOD-SP2): 30.3 ML/M2
BH CV ECHO MEAS - SI(MOD-SP4): 37.8 ML/M2
BH CV ECHO MEAS - SV(LVOT): 59.2 ML
BH CV ECHO MEAS - SV(MOD-SP2): 65 ML
BH CV ECHO MEAS - SV(MOD-SP4): 81 ML
BH CV ECHO MEAS - TAPSE (>1.6): 1.44 CM
BH CV ECHO MEAS - TR MAX PG: 22.1 MMHG
BH CV ECHO MEAS - TR MAX VEL: 235 CM/SEC
BH CV ECHO MEASUREMENTS AVERAGE E/E' RATIO: 7.94
BH CV XLRA - RV BASE: 4.2 CM
BH CV XLRA - RV LENGTH: 8.1 CM
BH CV XLRA - RV MID: 2.7 CM
BH CV XLRA - TDI S': 8.5 CM/SEC
BILIRUB SERPL-MCNC: 0.2 MG/DL (ref 0–1.2)
BUN SERPL-MCNC: 11 MG/DL (ref 8–23)
BUN/CREAT SERPL: 13.1 (ref 7–25)
CALCIUM SPEC-SCNC: 9.1 MG/DL (ref 8.6–10.5)
CHLORIDE SERPL-SCNC: 103 MMOL/L (ref 98–107)
CO2 SERPL-SCNC: 26 MMOL/L (ref 22–29)
CREAT SERPL-MCNC: 0.84 MG/DL (ref 0.76–1.27)
CRP SERPL-MCNC: <0.3 MG/DL (ref 0–0.5)
DEPRECATED RDW RBC AUTO: 48.1 FL (ref 37–54)
EGFRCR SERPLBLD CKD-EPI 2021: 88.2 ML/MIN/1.73
EOSINOPHIL # BLD AUTO: 0.2 10*3/MM3 (ref 0–0.4)
EOSINOPHIL NFR BLD AUTO: 5.1 % (ref 0.3–6.2)
ERYTHROCYTE [DISTWIDTH] IN BLOOD BY AUTOMATED COUNT: 13.6 % (ref 12.3–15.4)
ERYTHROCYTE [SEDIMENTATION RATE] IN BLOOD: 24 MM/HR (ref 0–20)
GLOBULIN UR ELPH-MCNC: 2.8 GM/DL
GLUCOSE SERPL-MCNC: 120 MG/DL (ref 65–99)
HCT VFR BLD AUTO: 36.8 % (ref 37.5–51)
HGB BLD-MCNC: 12.2 G/DL (ref 13–17.7)
LEFT ATRIUM VOLUME INDEX: 33.5 ML/M2
LYMPHOCYTES # BLD AUTO: 1.3 10*3/MM3 (ref 0.7–3.1)
LYMPHOCYTES NFR BLD AUTO: 27.5 % (ref 19.6–45.3)
MAGNESIUM SERPL-MCNC: 2.1 MG/DL (ref 1.6–2.4)
MAXIMAL PREDICTED HEART RATE: 140 BPM
MCH RBC QN AUTO: 31.8 PG (ref 26.6–33)
MCHC RBC AUTO-ENTMCNC: 33.1 G/DL (ref 31.5–35.7)
MCV RBC AUTO: 96 FL (ref 79–97)
MONOCYTES # BLD AUTO: 0.5 10*3/MM3 (ref 0.1–0.9)
MONOCYTES NFR BLD AUTO: 10.8 % (ref 5–12)
NEUTROPHILS NFR BLD AUTO: 2.6 10*3/MM3 (ref 1.7–7)
NEUTROPHILS NFR BLD AUTO: 55.7 % (ref 42.7–76)
NRBC BLD AUTO-RTO: 0.1 /100 WBC (ref 0–0.2)
PHOSPHATE SERPL-MCNC: 3.1 MG/DL (ref 2.5–4.5)
PLATELET # BLD AUTO: 195 10*3/MM3 (ref 140–450)
PMV BLD AUTO: 8.1 FL (ref 6–12)
POTASSIUM SERPL-SCNC: 4.2 MMOL/L (ref 3.5–5.2)
PROT SERPL-MCNC: 6.6 G/DL (ref 6–8.5)
RBC # BLD AUTO: 3.83 10*6/MM3 (ref 4.14–5.8)
SINUS: 3.9 CM
SODIUM SERPL-SCNC: 138 MMOL/L (ref 136–145)
STJ: 3.6 CM
STRESS TARGET HR: 119 BPM
WBC NRBC COR # BLD: 4.6 10*3/MM3 (ref 3.4–10.8)

## 2023-05-24 PROCEDURE — 96372 THER/PROPH/DIAG INJ SC/IM: CPT

## 2023-05-24 PROCEDURE — 86140 C-REACTIVE PROTEIN: CPT | Performed by: INTERNAL MEDICINE

## 2023-05-24 PROCEDURE — 25010000002 CEFTRIAXONE PER 250 MG: Performed by: INTERNAL MEDICINE

## 2023-05-24 PROCEDURE — 85652 RBC SED RATE AUTOMATED: CPT | Performed by: INTERNAL MEDICINE

## 2023-05-24 PROCEDURE — 25010000002 HEPARIN (PORCINE) PER 1000 UNITS: Performed by: STUDENT IN AN ORGANIZED HEALTH CARE EDUCATION/TRAINING PROGRAM

## 2023-05-24 PROCEDURE — 83735 ASSAY OF MAGNESIUM: CPT | Performed by: INTERNAL MEDICINE

## 2023-05-24 PROCEDURE — G0378 HOSPITAL OBSERVATION PER HR: HCPCS

## 2023-05-24 PROCEDURE — 80053 COMPREHEN METABOLIC PANEL: CPT | Performed by: INTERNAL MEDICINE

## 2023-05-24 PROCEDURE — 84100 ASSAY OF PHOSPHORUS: CPT | Performed by: INTERNAL MEDICINE

## 2023-05-24 PROCEDURE — 85025 COMPLETE CBC W/AUTO DIFF WBC: CPT | Performed by: INTERNAL MEDICINE

## 2023-05-24 RX ADMIN — Medication 10 ML: at 21:04

## 2023-05-24 RX ADMIN — METOPROLOL SUCCINATE 50 MG: 50 TABLET, EXTENDED RELEASE ORAL at 09:11

## 2023-05-24 RX ADMIN — HEPARIN SODIUM 5000 UNITS: 5000 INJECTION INTRAVENOUS; SUBCUTANEOUS at 14:03

## 2023-05-24 RX ADMIN — Medication 10 ML: at 09:12

## 2023-05-24 RX ADMIN — CEFTRIAXONE 2 G: 2 INJECTION, POWDER, FOR SOLUTION INTRAMUSCULAR; INTRAVENOUS at 21:03

## 2023-05-24 RX ADMIN — SENNOSIDES AND DOCUSATE SODIUM 2 TABLET: 50; 8.6 TABLET ORAL at 09:11

## 2023-05-24 RX ADMIN — SENNOSIDES AND DOCUSATE SODIUM 2 TABLET: 50; 8.6 TABLET ORAL at 21:04

## 2023-05-24 RX ADMIN — HEPARIN SODIUM 5000 UNITS: 5000 INJECTION INTRAVENOUS; SUBCUTANEOUS at 05:55

## 2023-05-24 RX ADMIN — ASPIRIN 81 MG CHEWABLE TABLET 81 MG: 81 TABLET CHEWABLE at 09:11

## 2023-05-24 RX ADMIN — HEPARIN SODIUM 5000 UNITS: 5000 INJECTION INTRAVENOUS; SUBCUTANEOUS at 21:03

## 2023-05-24 RX ADMIN — GABAPENTIN 400 MG: 400 CAPSULE ORAL at 14:03

## 2023-05-24 RX ADMIN — GABAPENTIN 400 MG: 400 CAPSULE ORAL at 21:04

## 2023-05-24 RX ADMIN — GABAPENTIN 400 MG: 400 CAPSULE ORAL at 05:55

## 2023-05-24 RX ADMIN — PANTOPRAZOLE SODIUM 40 MG: 40 TABLET, DELAYED RELEASE ORAL at 09:11

## 2023-05-24 NOTE — PLAN OF CARE
Goal Outcome Evaluation:              Outcome Evaluation: Patient currently resting abed, no distress noted. Patient ambulates with cane.

## 2023-05-24 NOTE — CASE MANAGEMENT/SOCIAL WORK
Continued Stay Note   Daniel     Patient Name: Deion Nicholas  MRN: [id number]  Today's Date: 5/24/2023    Admit Date: 5/22/2023    Plan: D/C plan: Anticipate home. Watch for IV abx. [facility].   Discharge Plan     Row Name 05/24/23 1416       Plan    Plan D/C plan: Anticipate home. Watch for IV abx. [facility].    Patient/Family in Agreement with Plan yes    Plan Comments Barrier to d/c: IV abx            Phone communication or documentation only - no physical contact with patient or family.      Amilcar Paniagua RN

## 2023-05-24 NOTE — PROGRESS NOTES
[facility] Medicine Services Daily Progress Note    Patient Name: Deion Nicholas  : 1940  MRN: [id number]  Primary Care Physician:  Mkaenna Motta MD  Date of admission: 2023      Subjective      Chief Complaint: Nonresolving cellulitis left lower extremity      Patient Reports pain and swelling in the left lower extremity.  It is improving from yesterday.  Lower extremity Doppler negative for DVT.  Will receive another day of IV antibiotics prior to conversion to oral    ROS negative except as above      Objective      Vitals:   Temp:  [97.3 °F (36.3 °C)-97.5 °F (36.4 °C)] 97.5 °F (36.4 °C)  Heart Rate:  [71-92] 74  Resp:  [13-19] 19  BP: (138-149)/(70-86) 138/75    Physical Exam  Vitals reviewed.   Constitutional:       Comments: Appears younger than stated age of 82   HENT:      Head: Normocephalic.   Cardiovascular:      Rate and Rhythm: Normal rate.   Pulmonary:      Effort: Pulmonary effort is normal.   Abdominal:      General: Abdomen is flat.      Palpations: Abdomen is soft.   Musculoskeletal:         General: Normal range of motion.      Cervical back: Normal range of motion.      Left lower leg: Edema present.   Skin:     General: Skin is warm.   Neurological:      General: No focal deficit present.      Mental Status: He is alert and oriented to person, place, and time.   Psychiatric:         Mood and Affect: Mood normal.         Behavior: Behavior normal.             Result Review    Result Review:  I have personally reviewed the results from the time of this admission to 2023 16:36 EDT and agree with these findings:  [x]  Laboratory  []  Microbiology  []  Radiology  []  EKG/Telemetry   []  Cardiology/Vascular   []  Pathology  []  Old records  []  Other:  Most notable findings include: Hemoglobin 12.2          Assessment & Plan      Brief Patient Summary:  Deion Nicholas is a 82 y.o. male who presents with nonresolving cellulitis of left lower extremity.   Failed outpatient clindamycin.      aspirin, 81 mg, Oral, Daily  cefTRIAXone, 2 g, Intravenous, Nightly  gabapentin, 400 mg, Oral, Q8H  heparin (porcine), 5,000 Units, Subcutaneous, Q8H  metoprolol succinate XL, 50 mg, Oral, Daily  pantoprazole, 40 mg, Oral, QAM AC  senna-docusate sodium, 2 tablet, Oral, BID  sodium chloride, 10 mL, Intravenous, Q12H             Active Hospital Problems:  Active Hospital Problems    Diagnosis    • Left leg cellulitis      Plan:   Left lower extremity cellulitis failed outpatient clindamycin therapy  We will start Rocephin 2 g  Monitor for improvement in better on May 24  Checked lower extremity Doppler to rule out DVT came back normal    #Chronic HFrEF    #Cardiomyopathy    - patient does have b/l LE pitting edema    - possible slight exacerbation or fluid overload    - Echo on 10/22/21 shows EF 31-35% with LV and RV dilation and ascending aorta dilation    - check proBNP    - will consider diuretics depending on workup    - intake and output    - weight daily    - resume home aspirin    - s/p ICD     #Hyperglycemia    - glucose 145, no hx of DM    - monitor     #Anemia    - hgb 12.0 on admission    - no overt bleeding, follow labs     #HTN    - resume home Toprol    - resume home midodrine PRN     #HLD    -zetia not on formulary     #GERD    - PPI        DVT prophylaxis: Heparin     CODE STATUS:    Admission Status:  I believe this patient meets observation status.     I discussed the patient's findings and my recommendations with patient.     This patient has been examined wearing appropriate Personal Protective Equipment and discussed with Patient and family.        DVT prophylaxis:  Medical DVT prophylaxis orders are present.    CODE STATUS:    Code Status (Patient has no pulse and is not breathing): CPR (Attempt to Resuscitate)  Medical Interventions (Patient has pulse or is breathing): Full Support      .    This patient has been examined wearing appropriate Personal  Protective Equipment and discussed with patient. 05/24/23      Electronically signed by Ryne Stephenson MD, 05/24/23, 16:36 EDT.  [facility] Hospitalist Team

## 2023-05-24 NOTE — PLAN OF CARE
Problem: Adult Inpatient Plan of Care  Goal: Plan of Care Review  Outcome: Ongoing, Progressing  Flowsheets (Taken 5/24/2023 1647)  Progress: no change  Plan of Care Reviewed With: patient  Outcome Evaluation: Patient rested throughout the shift, no complaints at this time.  Goal: Patient-Specific Goal (Individualized)  Outcome: Ongoing, Progressing  Goal: Absence of Hospital-Acquired Illness or Injury  Outcome: Ongoing, Progressing  Intervention: Identify and Manage Fall Risk  Recent Flowsheet Documentation  Taken 5/24/2023 1633 by Richard Dorantes RN  Safety Promotion/Fall Prevention: safety round/check completed  Taken 5/24/2023 1409 by Richard Dorantes RN  Safety Promotion/Fall Prevention: safety round/check completed  Goal: Optimal Comfort and Wellbeing  Outcome: Ongoing, Progressing  Intervention: Monitor Pain and Promote Comfort  Recent Flowsheet Documentation  Taken 5/24/2023 1409 by Richard Dorantes RN  Pain Management Interventions:   position adjusted   pillow support provided   quiet environment facilitated  Goal: Readiness for Transition of Care  Outcome: Ongoing, Progressing     Problem: Fall Injury Risk  Goal: Absence of Fall and Fall-Related Injury  Outcome: Ongoing, Progressing  Intervention: Identify and Manage Contributors  Recent Flowsheet Documentation  Taken 5/24/2023 1633 by Richard Dorantes RN  Medication Review/Management: medications reviewed  Taken 5/24/2023 1409 by Richard Dorantes RN  Medication Review/Management: medications reviewed  Intervention: Promote Injury-Free Environment  Recent Flowsheet Documentation  Taken 5/24/2023 1633 by Richard Dorantes RN  Safety Promotion/Fall Prevention: safety round/check completed  Taken 5/24/2023 1409 by Richard Dorantes RN  Safety Promotion/Fall Prevention: safety round/check completed   Goal Outcome Evaluation:  Plan of Care Reviewed With: patient        Progress: no change  Outcome Evaluation: Patient rested throughout the shift, no complaints  at this time.

## 2023-05-25 ENCOUNTER — TELEPHONE (OUTPATIENT)
Dept: CARDIOLOGY | Facility: CLINIC | Age: 83
End: 2023-05-25

## 2023-05-25 LAB
ALBUMIN SERPL-MCNC: 3.8 G/DL (ref 3.5–5.2)
ALBUMIN/GLOB SERPL: 1.5 G/DL
ALP SERPL-CCNC: 82 U/L (ref 39–117)
ALT SERPL W P-5'-P-CCNC: 17 U/L (ref 1–41)
ANION GAP SERPL CALCULATED.3IONS-SCNC: 10 MMOL/L (ref 5–15)
AST SERPL-CCNC: 25 U/L (ref 1–40)
BASOPHILS # BLD AUTO: 0 10*3/MM3 (ref 0–0.2)
BASOPHILS NFR BLD AUTO: 0.8 % (ref 0–1.5)
BILIRUB SERPL-MCNC: 0.2 MG/DL (ref 0–1.2)
BUN SERPL-MCNC: 16 MG/DL (ref 8–23)
BUN/CREAT SERPL: 18.8 (ref 7–25)
CALCIUM SPEC-SCNC: 8.9 MG/DL (ref 8.6–10.5)
CHLORIDE SERPL-SCNC: 102 MMOL/L (ref 98–107)
CO2 SERPL-SCNC: 25 MMOL/L (ref 22–29)
CREAT SERPL-MCNC: 0.85 MG/DL (ref 0.76–1.27)
DEPRECATED RDW RBC AUTO: 45.5 FL (ref 37–54)
EGFRCR SERPLBLD CKD-EPI 2021: 87.8 ML/MIN/1.73
EOSINOPHIL # BLD AUTO: 0.2 10*3/MM3 (ref 0–0.4)
EOSINOPHIL NFR BLD AUTO: 4.6 % (ref 0.3–6.2)
ERYTHROCYTE [DISTWIDTH] IN BLOOD BY AUTOMATED COUNT: 13.7 % (ref 12.3–15.4)
GLOBULIN UR ELPH-MCNC: 2.6 GM/DL
GLUCOSE SERPL-MCNC: 117 MG/DL (ref 65–99)
HCT VFR BLD AUTO: 37.2 % (ref 37.5–51)
HGB BLD-MCNC: 12.2 G/DL (ref 13–17.7)
LYMPHOCYTES # BLD AUTO: 1.4 10*3/MM3 (ref 0.7–3.1)
LYMPHOCYTES NFR BLD AUTO: 27.7 % (ref 19.6–45.3)
MAGNESIUM SERPL-MCNC: 2 MG/DL (ref 1.6–2.4)
MCH RBC QN AUTO: 32.1 PG (ref 26.6–33)
MCHC RBC AUTO-ENTMCNC: 32.9 G/DL (ref 31.5–35.7)
MCV RBC AUTO: 97.5 FL (ref 79–97)
MONOCYTES # BLD AUTO: 0.5 10*3/MM3 (ref 0.1–0.9)
MONOCYTES NFR BLD AUTO: 10.5 % (ref 5–12)
NEUTROPHILS NFR BLD AUTO: 2.9 10*3/MM3 (ref 1.7–7)
NEUTROPHILS NFR BLD AUTO: 56.4 % (ref 42.7–76)
NRBC BLD AUTO-RTO: 0.1 /100 WBC (ref 0–0.2)
PHOSPHATE SERPL-MCNC: 3.1 MG/DL (ref 2.5–4.5)
PLATELET # BLD AUTO: 198 10*3/MM3 (ref 140–450)
PMV BLD AUTO: 7.9 FL (ref 6–12)
POTASSIUM SERPL-SCNC: 4.1 MMOL/L (ref 3.5–5.2)
PROT SERPL-MCNC: 6.4 G/DL (ref 6–8.5)
RBC # BLD AUTO: 3.82 10*6/MM3 (ref 4.14–5.8)
SODIUM SERPL-SCNC: 137 MMOL/L (ref 136–145)
WBC NRBC COR # BLD: 5.1 10*3/MM3 (ref 3.4–10.8)

## 2023-05-25 PROCEDURE — 80053 COMPREHEN METABOLIC PANEL: CPT | Performed by: INTERNAL MEDICINE

## 2023-05-25 PROCEDURE — 99254 IP/OBS CNSLTJ NEW/EST MOD 60: CPT | Performed by: INTERNAL MEDICINE

## 2023-05-25 PROCEDURE — 84100 ASSAY OF PHOSPHORUS: CPT | Performed by: INTERNAL MEDICINE

## 2023-05-25 PROCEDURE — G0378 HOSPITAL OBSERVATION PER HR: HCPCS

## 2023-05-25 PROCEDURE — 85025 COMPLETE CBC W/AUTO DIFF WBC: CPT | Performed by: INTERNAL MEDICINE

## 2023-05-25 PROCEDURE — 83735 ASSAY OF MAGNESIUM: CPT | Performed by: INTERNAL MEDICINE

## 2023-05-25 PROCEDURE — 96372 THER/PROPH/DIAG INJ SC/IM: CPT

## 2023-05-25 PROCEDURE — 25010000002 HEPARIN (PORCINE) PER 1000 UNITS: Performed by: STUDENT IN AN ORGANIZED HEALTH CARE EDUCATION/TRAINING PROGRAM

## 2023-05-25 PROCEDURE — 25010000002 CEFTRIAXONE PER 250 MG: Performed by: INTERNAL MEDICINE

## 2023-05-25 RX ADMIN — ASPIRIN 81 MG CHEWABLE TABLET 81 MG: 81 TABLET CHEWABLE at 09:46

## 2023-05-25 RX ADMIN — METOPROLOL SUCCINATE 50 MG: 50 TABLET, EXTENDED RELEASE ORAL at 09:46

## 2023-05-25 RX ADMIN — Medication 10 ML: at 21:02

## 2023-05-25 RX ADMIN — SENNOSIDES AND DOCUSATE SODIUM 2 TABLET: 50; 8.6 TABLET ORAL at 09:47

## 2023-05-25 RX ADMIN — GABAPENTIN 400 MG: 400 CAPSULE ORAL at 05:28

## 2023-05-25 RX ADMIN — CEFTRIAXONE 2 G: 2 INJECTION, POWDER, FOR SOLUTION INTRAMUSCULAR; INTRAVENOUS at 21:01

## 2023-05-25 RX ADMIN — GABAPENTIN 400 MG: 400 CAPSULE ORAL at 21:02

## 2023-05-25 RX ADMIN — PANTOPRAZOLE SODIUM 40 MG: 40 TABLET, DELAYED RELEASE ORAL at 09:46

## 2023-05-25 RX ADMIN — HEPARIN SODIUM 5000 UNITS: 5000 INJECTION INTRAVENOUS; SUBCUTANEOUS at 14:23

## 2023-05-25 RX ADMIN — HEPARIN SODIUM 5000 UNITS: 5000 INJECTION INTRAVENOUS; SUBCUTANEOUS at 21:02

## 2023-05-25 RX ADMIN — GABAPENTIN 400 MG: 400 CAPSULE ORAL at 14:23

## 2023-05-25 RX ADMIN — SENNOSIDES AND DOCUSATE SODIUM 2 TABLET: 50; 8.6 TABLET ORAL at 21:01

## 2023-05-25 RX ADMIN — Medication 10 ML: at 09:46

## 2023-05-25 RX ADMIN — HEPARIN SODIUM 5000 UNITS: 5000 INJECTION INTRAVENOUS; SUBCUTANEOUS at 05:28

## 2023-05-25 NOTE — PLAN OF CARE
Goal Outcome Evaluation:  Plan of Care Reviewed With: patient         Pt resting well through the night. No complaints or distress noted. VSS Will cont to monitor.

## 2023-05-25 NOTE — PLAN OF CARE
Goal Outcome Evaluation:  Patient has had no complaints today. Cardiology consulted.   Problem: Adult Inpatient Plan of Care  Goal: Plan of Care Review  Outcome: Ongoing, Progressing  Goal: Patient-Specific Goal (Individualized)  Outcome: Ongoing, Progressing  Goal: Absence of Hospital-Acquired Illness or Injury  Outcome: Ongoing, Progressing  Intervention: Identify and Manage Fall Risk  Recent Flowsheet Documentation  Taken 5/25/2023 1200 by Melva Zazueta RN  Safety Promotion/Fall Prevention: safety round/check completed  Taken 5/25/2023 1000 by Melva Zazueta RN  Safety Promotion/Fall Prevention: safety round/check completed  Taken 5/25/2023 0947 by Melva Zazueta RN  Safety Promotion/Fall Prevention:   activity supervised   assistive device/personal items within reach   clutter free environment maintained   nonskid shoes/slippers when out of bed   room organization consistent   safety round/check completed  Intervention: Prevent and Manage VTE (Venous Thromboembolism) Risk  Recent Flowsheet Documentation  Taken 5/25/2023 0947 by Melva Zazueta RN  Activity Management: ambulated in room  Intervention: Prevent Infection  Recent Flowsheet Documentation  Taken 5/25/2023 0947 by Melva Zazueta RN  Infection Prevention:   visitors restricted/screened   single patient room provided   environmental surveillance performed   equipment surfaces disinfected   hand hygiene promoted  Goal: Optimal Comfort and Wellbeing  Outcome: Ongoing, Progressing  Goal: Readiness for Transition of Care  Outcome: Ongoing, Progressing     Problem: Fall Injury Risk  Goal: Absence of Fall and Fall-Related Injury  Outcome: Ongoing, Progressing  Intervention: Identify and Manage Contributors  Recent Flowsheet Documentation  Taken 5/25/2023 0947 by Melva Zazueta RN  Medication Review/Management: medications reviewed  Self-Care Promotion: independence encouraged  Intervention: Promote Injury-Free Environment  Recent Flowsheet  Documentation  Taken 5/25/2023 1200 by Melva Zazueta, RN  Safety Promotion/Fall Prevention: safety round/check completed  Taken 5/25/2023 1000 by Melva Zazueta, RN  Safety Promotion/Fall Prevention: safety round/check completed  Taken 5/25/2023 0947 by Melva Zazueta, RN  Safety Promotion/Fall Prevention:   activity supervised   assistive device/personal items within reach   clutter free environment maintained   nonskid shoes/slippers when out of bed   room organization consistent   safety round/check completed

## 2023-05-25 NOTE — CASE MANAGEMENT/SOCIAL WORK
Continued Stay Note   Daniel     Patient Name: Deion Nicholas  MRN: [id number]  Today's Date: 5/25/2023    Admit Date: 5/22/2023    Plan: D/C plan: Anticipate home. Watch for IV abx. [facility].   Discharge Plan     Row Name 05/25/23 1323       Plan    Plan D/C plan: Anticipate home. Watch for IV abx. [facility]used [facility] transfer.    Patient/Family in Agreement with Plan yes    Plan Comments Anticipate d/c today per MD on PO abx.                   Phone communication or documentation only - no physical contact with patient or family.      Amilcar Paniagua RN

## 2023-05-25 NOTE — PROGRESS NOTES
[facility] Medicine Services Daily Progress Note    Patient Name: Deion Nicholas  : 1940  MRN: [id number]  Primary Care Physician:  Makenna Motta MD  Date of admission: 2023      Subjective      Chief Complaint: Nonresolving cellulitis left lower extremity      Patient Reports left lower extremity is improving.  Patient's echocardiogram finally came back.  Shows multiple valve abnormalities including mitral and aortic regurg.  EF seems to be stuck at 30 to 35%.  Patient expected it to be better by now.  Cardiology consult placed per patient request      ROS negative except as above      Objective      Vitals:   Temp:  [97.5 °F (36.4 °C)-98 °F (36.7 °C)] 98 °F (36.7 °C)  Heart Rate:  [74-94] 91  Resp:  [14-19] 14  BP: (127-148)/(68-83) 127/68    Physical Exam  Vitals reviewed.   Constitutional:       Comments: Appears younger than stated age of 82   HENT:      Head: Normocephalic.   Cardiovascular:      Rate and Rhythm: Normal rate.   Pulmonary:      Effort: Pulmonary effort is normal.   Abdominal:      General: Abdomen is flat.      Palpations: Abdomen is soft.   Musculoskeletal:         General: Normal range of motion.      Cervical back: Normal range of motion.      Left lower leg: Edema present.   Skin:     General: Skin is warm.   Neurological:      General: No focal deficit present.      Mental Status: He is alert and oriented to person, place, and time.   Psychiatric:         Mood and Affect: Mood normal.         Behavior: Behavior normal.             Result Review    Result Review:  I have personally reviewed the results from the time of this admission to 2023 15:08 EDT and agree with these findings:  [x]  Laboratory  []  Microbiology  []  Radiology  []  EKG/Telemetry   []  Cardiology/Vascular   []  Pathology  []  Old records  []  Other:  Most notable findings include: Hemoglobin 12.2          Assessment & Plan      Brief Patient Summary:  Deion Nicholas is a  82 y.o. male who presents with nonresolving cellulitis of left lower extremity.  Failed outpatient clindamycin.      aspirin, 81 mg, Oral, Daily  cefTRIAXone, 2 g, Intravenous, Nightly  gabapentin, 400 mg, Oral, Q8H  heparin (porcine), 5,000 Units, Subcutaneous, Q8H  metoprolol succinate XL, 50 mg, Oral, Daily  pantoprazole, 40 mg, Oral, QAM AC  senna-docusate sodium, 2 tablet, Oral, BID  sodium chloride, 10 mL, Intravenous, Q12H             Active Hospital Problems:  Active Hospital Problems    Diagnosis    • Left leg cellulitis      Plan:   Left lower extremity cellulitis failed outpatient clindamycin therapy  We will start Rocephin 2 g  Monitor for improvement in better on May 24  Checked lower extremity Doppler to rule out DVT came back normal    #Chronic HFrEF    #Cardiomyopathy    - patient does have b/l LE pitting edema    - possible slight exacerbation or fluid overload    - Echo on 10/22/21 shows EF 31-35% with LV and RV dilation and ascending aorta dilation  -Aortic and mitral regurgitation noted.  Cardiology consulted.    - check proBNP    - will consider diuretics depending on workup    - intake and output    - weight daily    - resume home aspirin    - s/p ICD     #Hyperglycemia    - glucose 145, no hx of DM    - monitor     #Anemia    - hgb 12.0 on admission    - no overt bleeding, follow labs     #HTN    - resume home Toprol    - resume home midodrine PRN     #HLD    -zetia not on formulary     #GERD    - PPI        DVT prophylaxis: Heparin     CODE STATUS:    Admission Status:  I believe this patient meets observation status.     I discussed the patient's findings and my recommendations with patient.     This patient has been examined wearing appropriate Personal Protective Equipment and discussed with Patient and family.        DVT prophylaxis:  Medical DVT prophylaxis orders are present.    CODE STATUS:    Code Status (Patient has no pulse and is not breathing): CPR (Attempt to  Resuscitate)  Medical Interventions (Patient has pulse or is breathing): Full Support      .    This patient has been examined wearing appropriate Personal Protective Equipment and discussed with patient. 05/25/23      Electronically signed by Ryne Stephenson MD, 05/25/23, 15:08 EDT.  [facility], [place] Hospitalist Team

## 2023-05-26 ENCOUNTER — READMISSION MANAGEMENT (OUTPATIENT)
Dept: CALL CENTER | Facility: HOSPITAL | Age: 83
End: 2023-05-26
Payer: OTHER GOVERNMENT

## 2023-05-26 VITALS
DIASTOLIC BLOOD PRESSURE: 63 MMHG | RESPIRATION RATE: 17 BRPM | OXYGEN SATURATION: 96 % | WEIGHT: 205.47 LBS | SYSTOLIC BLOOD PRESSURE: 121 MMHG | BODY MASS INDEX: 27.23 KG/M2 | TEMPERATURE: 97.8 F | HEART RATE: 81 BPM | HEIGHT: 73 IN

## 2023-05-26 LAB
ALBUMIN SERPL-MCNC: 3.8 G/DL (ref 3.5–5.2)
ALBUMIN/GLOB SERPL: 1.3 G/DL
ALP SERPL-CCNC: 86 U/L (ref 39–117)
ALT SERPL W P-5'-P-CCNC: 25 U/L (ref 1–41)
ANION GAP SERPL CALCULATED.3IONS-SCNC: 8 MMOL/L (ref 5–15)
AST SERPL-CCNC: 32 U/L (ref 1–40)
BASOPHILS # BLD AUTO: 0 10*3/MM3 (ref 0–0.2)
BASOPHILS NFR BLD AUTO: 0.8 % (ref 0–1.5)
BILIRUB SERPL-MCNC: <0.2 MG/DL (ref 0–1.2)
BUN SERPL-MCNC: 18 MG/DL (ref 8–23)
BUN/CREAT SERPL: 21.7 (ref 7–25)
CALCIUM SPEC-SCNC: 9.3 MG/DL (ref 8.6–10.5)
CHLORIDE SERPL-SCNC: 103 MMOL/L (ref 98–107)
CO2 SERPL-SCNC: 27 MMOL/L (ref 22–29)
CREAT SERPL-MCNC: 0.83 MG/DL (ref 0.76–1.27)
DEPRECATED RDW RBC AUTO: 47.7 FL (ref 37–54)
EGFRCR SERPLBLD CKD-EPI 2021: 88.5 ML/MIN/1.73
EOSINOPHIL # BLD AUTO: 0.2 10*3/MM3 (ref 0–0.4)
EOSINOPHIL NFR BLD AUTO: 4.2 % (ref 0.3–6.2)
ERYTHROCYTE [DISTWIDTH] IN BLOOD BY AUTOMATED COUNT: 14 % (ref 12.3–15.4)
GLOBULIN UR ELPH-MCNC: 2.9 GM/DL
GLUCOSE SERPL-MCNC: 111 MG/DL (ref 65–99)
HCT VFR BLD AUTO: 37.7 % (ref 37.5–51)
HGB BLD-MCNC: 12.5 G/DL (ref 13–17.7)
LYMPHOCYTES # BLD AUTO: 1.8 10*3/MM3 (ref 0.7–3.1)
LYMPHOCYTES NFR BLD AUTO: 34.3 % (ref 19.6–45.3)
MAGNESIUM SERPL-MCNC: 2.1 MG/DL (ref 1.6–2.4)
MCH RBC QN AUTO: 32.3 PG (ref 26.6–33)
MCHC RBC AUTO-ENTMCNC: 33.2 G/DL (ref 31.5–35.7)
MCV RBC AUTO: 97.1 FL (ref 79–97)
MONOCYTES # BLD AUTO: 0.7 10*3/MM3 (ref 0.1–0.9)
MONOCYTES NFR BLD AUTO: 13.3 % (ref 5–12)
NEUTROPHILS NFR BLD AUTO: 2.4 10*3/MM3 (ref 1.7–7)
NEUTROPHILS NFR BLD AUTO: 47.4 % (ref 42.7–76)
NRBC BLD AUTO-RTO: 0 /100 WBC (ref 0–0.2)
PHOSPHATE SERPL-MCNC: 3.5 MG/DL (ref 2.5–4.5)
PLATELET # BLD AUTO: 203 10*3/MM3 (ref 140–450)
PMV BLD AUTO: 8.1 FL (ref 6–12)
POTASSIUM SERPL-SCNC: 4.9 MMOL/L (ref 3.5–5.2)
PROT SERPL-MCNC: 6.7 G/DL (ref 6–8.5)
RBC # BLD AUTO: 3.88 10*6/MM3 (ref 4.14–5.8)
SODIUM SERPL-SCNC: 138 MMOL/L (ref 136–145)
WBC NRBC COR # BLD: 5.1 10*3/MM3 (ref 3.4–10.8)

## 2023-05-26 PROCEDURE — G0378 HOSPITAL OBSERVATION PER HR: HCPCS

## 2023-05-26 PROCEDURE — 25010000002 HEPARIN (PORCINE) PER 1000 UNITS: Performed by: STUDENT IN AN ORGANIZED HEALTH CARE EDUCATION/TRAINING PROGRAM

## 2023-05-26 PROCEDURE — 96372 THER/PROPH/DIAG INJ SC/IM: CPT

## 2023-05-26 RX ORDER — AMOXICILLIN AND CLAVULANATE POTASSIUM 875; 125 MG/1; MG/1
1 TABLET, FILM COATED ORAL EVERY 12 HOURS SCHEDULED
Status: DISCONTINUED | OUTPATIENT
Start: 2023-05-26 | End: 2023-05-26 | Stop reason: HOSPADM

## 2023-05-26 RX ORDER — AMOXICILLIN 875 MG/1
875 TABLET, COATED ORAL 2 TIMES DAILY
Qty: 10 TABLET | Refills: 0 | Status: SHIPPED | OUTPATIENT
Start: 2023-05-26 | End: 2023-05-31

## 2023-05-26 RX ADMIN — PANTOPRAZOLE SODIUM 40 MG: 40 TABLET, DELAYED RELEASE ORAL at 08:05

## 2023-05-26 RX ADMIN — GABAPENTIN 400 MG: 400 CAPSULE ORAL at 13:15

## 2023-05-26 RX ADMIN — ASPIRIN 81 MG CHEWABLE TABLET 81 MG: 81 TABLET CHEWABLE at 08:05

## 2023-05-26 RX ADMIN — HEPARIN SODIUM 5000 UNITS: 5000 INJECTION INTRAVENOUS; SUBCUTANEOUS at 13:15

## 2023-05-26 RX ADMIN — GABAPENTIN 400 MG: 400 CAPSULE ORAL at 06:20

## 2023-05-26 RX ADMIN — Medication 10 ML: at 08:10

## 2023-05-26 RX ADMIN — METOPROLOL SUCCINATE 50 MG: 50 TABLET, EXTENDED RELEASE ORAL at 08:05

## 2023-05-26 RX ADMIN — HEPARIN SODIUM 5000 UNITS: 5000 INJECTION INTRAVENOUS; SUBCUTANEOUS at 06:20

## 2023-05-26 RX ADMIN — SENNOSIDES AND DOCUSATE SODIUM 2 TABLET: 50; 8.6 TABLET ORAL at 08:05

## 2023-05-26 RX ADMIN — LIDOCAINE 1 PATCH: 50 PATCH CUTANEOUS at 12:08

## 2023-05-26 NOTE — CONSULTS
CARDIOLOGY CONSULT      Requesting Provider    Ryne Stephenson MD      PATIENT IDENTIFICATION    Name: Deion Nicholas  Age: 82 y.o. Sex: male : 1940  MRN: [id number]    REASON FOR CONSULTATION:  Abnormal echo    CHIEF COMPLAINT:  Leg pain    HISTORY OF PRESENT ILLNESS:   This is an 82-year-old male with an established history of coronary artery disease, prostate cancer, hypertension, hyperlipidemia, chronic lower extremity edema and recurrent cellulitis who presents to the hospital with a complaint of ongoing swelling and erythema over the past 3 weeks.  He was placed on antimicrobials and his edema and erythema has improved quite significantly.    He underwent a 2D echocardiogram during this admission.  His echocardiogram was reviewed and demonstrated a left ventricular ejection fraction of 30 to 35% with moderate AI and moderate MR.  This is in line with his echocardiogram from 2021 that was demonstrative of an EF of 30 to 35%.  He had mild AI and mild MR at that time.  This does not represent a significant change and is not likely responsible for his acute decompensation.    IMPRESSIONS  Chronic systolic congestive heart failure secondary to ischemic cardiomyopathy  Coronary artery disease status post coronary arterial bypass grafting in the past  Ischemic cardiomyopathy with most recent ejection fraction of 30 to 35% consistent with prior echocardiograms dating back to   Valvular heart disease with moderate mitral and moderate aortic insufficiencies which represents a slight decline in valvular competency  Cellulitis-improved  AICD in situ  Hypertension  Hypertensive cardiovascular disease    RECOMMENDATIONS:  Continuation of his current cardiovascular regimen at the present time.     This includes long-acting beta-blocker therapy.  We have been reluctant to institute more aggressive blood pressure medicines including ACE inhibitors/angiotensin receptor blockers secondary to the patient's very  poor ambulatory status and risk of falls  Statins are not indicated due to allergy/adverse reaction  Cardiology status is appropriate for discharge to next destination of care when okay with other services.  Follow-up as scheduled    Medical History    Past Medical History:   Diagnosis Date   • Aneurysm    • Cardiomyopathy     ICD implantation   • Cellulitis of left lower extremity 2010    recurrent   • CHD (coronary heart disease)     S/P CABG & PCI   • Dyslipidemia    • History of ventricular tachycardia    • Hypertension    • Myocardial infarction     Inferior MI   • Pinched nerve     L4-L5   • Prostate cancer         Surgical History    Past Surgical History:   Procedure Laterality Date   • ANGIOPLASTY  2000 04/1996 Stent: Palmaz- Huy stent/LAD 07/1996-RCA: 2000-Tetra stent Guidant to proximal RCA, mid to distal artery 2 sequential Guidant Tetra stents   • APPENDECTOMY     • CARDIAC ABLATION  April and May 2019   • CARDIAC CATHETERIZATION  07/2017    1996 x2, Nov. 2000, 05/2010-cath 08/2015-no stents 2017   • CARDIAC DEFIBRILLATOR PLACEMENT     • COLONOSCOPY W/ POLYPECTOMY      Mult colonoscopy's for polyp resection    • CORONARY ARTERY BYPASS GRAFT  05/2010    x5 vessel: LMA to diagonal, LAD, intermedius, obtuse marginal, RCA   • CORONARY STENT PLACEMENT     • ENDOSCOPY N/A 6/24/2019    Procedure: ESOPHAGOGASTRODUODENOSCOPY with dilitation Bougie 50, 54;  Surgeon: Roddy Coronel MD;  Location: [facility] ENDOSCOPY;  Service: Gastroenterology   • INSERT / REPLACE / REMOVE PACEMAKER     • KNEE OPEN LATERAL RELEASE Left     reduction   • OTHER SURGICAL HISTORY  01/2018    ICD implantation   • PROSTATE SURGERY  2015    Robiotic surgery- Cyber Knife:        Family History    Family History   Problem Relation Age of Onset   • Pancreatic cancer Mother    • Heart disease Father        Social History    Social History     Tobacco Use   • Smoking status: Former     Packs/day: 1.00     Years: 17.00     Pack years:  "17.00     Types: Cigarettes     Quit date: 2002     Years since quittin.8   • Smokeless tobacco: Never   Substance Use Topics   • 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        Allergies    Allergies   Allergen Reactions   • Lovastatin Myalgia   • Pravastatin Myalgia and Unknown (See Comments)     unknown   • Simvastatin Unknown (See Comments) and Myalgia     unknown   • Spironolactone Other (See Comments)     Gynecomastia        REVIEW OF SYSTEMS:  Pertinent items are noted in HPI, all other systems reviewed and negative    OBJECTIVE     VITAL SIGNS:  Visit Vitals  /63 (BP Location: Right arm, Patient Position: Sitting)   Pulse 81   Temp 97.8 °F (36.6 °C) (Oral)   Resp 17   Ht 185.4 cm (73\")   Wt 93.2 kg (205 lb 7.5 oz)   SpO2 96%   BMI 27.11 kg/m²     Oxygen Therapy  SpO2: 96 %  Pulse Oximetry Type: Continuous  Device (Oxygen Therapy): room air  Flow (L/min): 2  Flowsheet Rows    Flowsheet Row First Filed Value   Admission Height 182.9 cm (72\") Documented at 2023   Admission Weight 97.2 kg (214 lb 4.6 oz) Documented at 2023        Intake & Output (last 3 days)        0701   0700  0701   0700  0701   0700  0701   0700    P.O. 4044 250 1693     IV Piggyback 100       Total Intake(mL/kg) 1300 (13.4) 480 (5.1) 1440 (15.5)     Urine (mL/kg/hr) 800 (0.3) 1825 (0.8) 1100 (0.5) 1225 (1.3)    Stool   0 0    Total Output 800 1825 1100 1225    Net +500 -1345 +340 -1225            Urine Unmeasured Occurrence 5 x 2 x      Stool Unmeasured Occurrence 1 x  3 x 1 x        Lines, Drains & Airways     Active LDAs     Name Placement date Placement time Site Days    Peripheral IV 23 Anterior;Right Forearm 23  Forearm  3              /63 (BP Location: Right arm, Patient Position: Sitting)   Pulse 81   Temp 97.8 °F (36.6 °C) (Oral)   Resp 17   Ht 185.4 cm (73\")   Wt 93.2 kg (205 lb 7.5 oz)   SpO2 96%   BMI " 27.11 kg/m²     INTAKE/OUTPUT:    Intake/Output this shift:  I/O this shift:  In: -   Out: 1225 [Urine:1225]  Intake/Output last 3 shifts:  I/O last 3 completed shifts:  In: 1440 [P.O.:1440]  Out: 1700 [Urine:1700]      PHYSICAL EXAM:    General: Alert, cooperative, no distress, appears stated age  Head:  Normocephalic, atraumatic, mucous membranes moist  Eyes:  Conjunctivae/corneas clear, EOM's intact     Neck:  Supple,  no bruit  Lungs: Coarse and diminished bilaterally  Chest wall: No tenderness  Heart::  Regular rate and rhythm, S1 and S2 normal, 1/6 holosystolic murmur.  No rub or gallop  Abdomen: Soft, nontender, nondistended, bowel sounds active  Extremities: No cyanosis, clubbing trace edema lower extremities.  Mild/minimal erythema left lower extremity  Pulses: Diminished pedal pulses  Skin:  No rashes or lesions  Neuro/psych: A&O x3. CN II through XII are grossly intact with appropriate affect    Scheduled Meds:      amoxicillin-clavulanate, 1 tablet, Oral, Q12H  aspirin, 81 mg, Oral, Daily  gabapentin, 400 mg, Oral, Q8H  heparin (porcine), 5,000 Units, Subcutaneous, Q8H  metoprolol succinate XL, 50 mg, Oral, Daily  pantoprazole, 40 mg, Oral, QAM AC  senna-docusate sodium, 2 tablet, Oral, BID  sodium chloride, 10 mL, Intravenous, Q12H        Continuous Infusions:         PRN Meds:    •  senna-docusate sodium **AND** polyethylene glycol **AND** bisacodyl **AND** bisacodyl  •  lidocaine  •  meclizine  •  methocarbamol  •  midodrine  •  nitroglycerin  •  ondansetron **OR** ondansetron  •  [COMPLETED] Insert Peripheral IV **AND** sodium chloride  •  sodium chloride  •  sodium chloride     Data Review:     X-rays, labs reviewed personally by provider.    CBC    Results from last 7 days   Lab Units 05/25/23  2335 05/25/23  0018 05/24/23  0324 05/23/23  0523 05/22/23 2010   WBC 10*3/mm3 5.10 5.10 4.60 5.20 6.50   HEMOGLOBIN g/dL 12.5* 12.2* 12.2* 12.0* 12.0*   PLATELETS 10*3/mm3 203 198 195 185 215     Cr  Clearance Estimated Creatinine Clearance: 93.6 mL/min (by C-G formula based on SCr of 0.83 mg/dL).  Coag     HbA1C   Lab Results   Component Value Date    HGBA1C 6.1 (H) 10/26/2022     Blood Glucose No results found for: POCGLU  Infection   Results from last 7 days   Lab Units 05/22/23 2010   BLOODCX  No growth at 3 days  No growth at 3 days     CMP   Results from last 7 days   Lab Units 05/25/23  2335 05/25/23  0018 05/24/23 0324 05/23/23 0523 05/22/23 2010   SODIUM mmol/L 138 137 138 141 138   POTASSIUM mmol/L 4.9 4.1 4.2 5.0 4.3   CHLORIDE mmol/L 103 102 103 106 105   CO2 mmol/L 27.0 25.0 26.0 27.0 21.0*   BUN mg/dL 18 16 11 13 13   CREATININE mg/dL 0.83 0.85 0.84 0.86 0.91   GLUCOSE mg/dL 111* 117* 120* 114* 145*   ALBUMIN g/dL 3.8 3.8 3.8  --  3.8   BILIRUBIN mg/dL <0.2 0.2 0.2  --  0.2   ALK PHOS U/L 86 82 90  --  95   AST (SGOT) U/L 32 25 26  --  26   ALT (SGPT) U/L 25 17 17  --  21     ABG      UA      LEIGH ANN  No results found for: POCMETH, POCAMPHET, POCBARBITUR, POCBENZO, POCCOCAINE, POCOPIATES, POCOXYCODO, POCPHENCYC, POCPROPOXY, POCTHC, POCTRICYC  Lysis Labs   Results from last 7 days   Lab Units 05/25/23  2335 05/25/23  0018 05/24/23 0324 05/23/23 0523 05/22/23 2010   HEMOGLOBIN g/dL 12.5* 12.2* 12.2* 12.0* 12.0*   PLATELETS 10*3/mm3 203 198 195 185 215   CREATININE mg/dL 0.83 0.85 0.84 0.86 0.91     Radiology(recent) No radiology results for the last day            ECG/EMG Results (most recent)     Procedure Component Value Units Date/Time    ECG 12 Lead QT Measurement [[id number]] Collected: 05/22/23 2342     Updated: 05/22/23 2344     QT Interval 409 ms     Narrative:      HEART RATE= 75  bpm  RR Interval= 796  ms  VT Interval= 175  ms  P Horizontal Axis= -3  deg  P Front Axis= 42  deg  QRSD Interval= 135  ms  QT Interval= 409  ms  QRS Axis= -8  deg  T Wave Axis= -13  deg  - ABNORMAL ECG -  Sinus rhythm  Ventricular premature complex  Nonspecific intraventricular conduction delay  When compared  with ECG of 09-May-2023 16:50:22,  Significant repolarization change  Electronically Signed By:   Date and Time of Study: 2023-05-22 23:42:14    Adult Transthoracic Echo Complete W/ Cont if Necessary Per Protocol [[id number]] Resulted: 05/24/23 0123     Updated: 05/24/23 0126     Target HR (85%) 119 bpm      Max. Pred. HR (100%) 140 bpm      EF(MOD-bp) 31.7 %      LV Sys Vol (BSA corrected) 72.8 cm2      LV Hammond Vol (BSA corrected) 110.6 cm2      LVOT area 3.5 cm2      LVOT diam 2.10 cm      EDV(MOD-sp2) 179.0 ml      EDV(MOD-sp4) 237.0 ml      ESV(MOD-sp2) 114.0 ml      ESV(MOD-sp4) 156.0 ml      SV(MOD-sp2) 65.0 ml      SV(MOD-sp4) 81.0 ml      SI(MOD-sp2) 30.3 ml/m2      SI(MOD-sp4) 37.8 ml/m2      EF(MOD-sp2) 36.3 %      EF(MOD-sp4) 34.2 %      MV E max kale 52.4 cm/sec      MV A max kale 80.7 cm/sec      MV dec time 0.16 msec      MV E/A 0.65     Pulm A Revs Dur 0.11 sec      MV A dur 0.12 sec      LA ESV Index (BP) 33.5 ml/m2      Med Peak E' Kale 4.5 cm/sec      Lat Peak E' Kale 8.7 cm/sec      Avg E/e' ratio 7.94     SV(LVOT) 59.2 ml      RV Base 4.2 cm      RV Mid 2.7 cm      RV Length 8.1 cm      TAPSE (>1.6) 1.44 cm      RV S' 8.5 cm/sec      LA dimension (2D)  3.1 cm      Pulm Sys Kale 32.0 cm/sec      Pulm Hammond Kale 25.9 cm/sec      Pulm S/D 1.24     Pulm A Revs Kale 21.3 cm/sec      LV V1 max 64.4 cm/sec      LV V1 max PG 1.66 mmHg      LV V1 mean PG 1.00 mmHg      LV V1 VTI 17.1 cm      Ao pk kale 117.0 cm/sec      Ao max PG 5.5 mmHg      Ao mean PG 3.0 mmHg      Ao V2 VTI 26.0 cm      KB(I,D) 2.28 cm2      AI P1/2t 647.6 msec      MV max PG 3.3 mmHg      MV mean PG 1.00 mmHg      MV V2 VTI 18.2 cm      MV P1/2t 50.7 msec      MVA(P1/2t) 4.3 cm2      MVA(VTI) 3.3 cm2      MV dec slope 345.0 cm/sec2      MR max kale 504.0 cm/sec      MR max .6 mmHg      TR max kale 235.0 cm/sec      TR max PG 22.1 mmHg      RV V1 max PG 1.72 mmHg      RV V1 max 65.5 cm/sec      RV V1 VTI 13.5 cm      PA V2 max 106.0 cm/sec       Ao root diam 4.2 cm      ACS 2.20 cm      Sinus 3.9 cm      STJ 3.6 cm      LVIDd 7.1 cm      LVIDs 6.3 cm      IVSd 1.10 cm      LVPWd 1.00 cm      FS 11.3 %      IVS/LVPW 1.10 cm      EDV(cubed) 357.9 ml      LV mass(C)d 350.7 grams      RVSP(TR) 25 mmHg      RAP systole 3 mmHg      Ascending aorta 4.6 cm     Narrative:      •  Left ventricular ejection fraction appears to be 31 - 35%.  •  Moderate aortic valve regurgitation is present.  •  Moderate mitral valve regurgitation is present.  •  Estimated right ventricular systolic pressure from tricuspid   regurgitation is normal (<35 mmHg).  •  Mild dilation of the aortic root is present.      SCANNED - TELEMETRY   [[id number]] Resulted: 05/22/23     Updated: 05/24/23 0628    SCANNED - TELEMETRY   [[id number]] Resulted: 05/22/23     Updated: 05/24/23 0638    SCANNED - TELEMETRY   [[id number]] Resulted: 05/22/23     Updated: 05/24/23 0655    SCANNED - TELEMETRY   [[id number]] Resulted: 05/22/23     Updated: 05/24/23 1035    SCANNED - TELEMETRY   [[id number]] Resulted: 05/22/23     Updated: 05/24/23 2259    SCANNED - TELEMETRY   [[id number]] Resulted: 05/22/23     Updated: 05/25/23 0636    SCANNED - TELEMETRY   [[id number]] Resulted: 05/22/23     Updated: 05/25/23 1103    SCANNED - TELEMETRY   [[id number]] Resulted: 05/22/23     Updated: 05/25/23 2142    SCANNED - TELEMETRY   [[id number]] Resulted: 05/22/23     Updated: 05/26/23 0639    SCANNED - TELEMETRY   [[id number]] Resulted: 05/22/23     Updated: 05/26/23 1112          Imaging:  Imaging Results (Last 72 Hours)     ** No results found for the last 72 hours. **            Marcin Childers DO  05/26/23  17:21 EDT

## 2023-05-26 NOTE — PLAN OF CARE
Problem: Adult Inpatient Plan of Care  Goal: Plan of Care Review  Outcome: Met  Goal: Patient-Specific Goal (Individualized)  Outcome: Met  Goal: Absence of Hospital-Acquired Illness or Injury  Outcome: Met  Intervention: Identify and Manage Fall Risk  Recent Flowsheet Documentation  Taken 5/26/2023 1005 by Raven Marin RNA  Safety Promotion/Fall Prevention:   safety round/check completed   assistive device/personal items within reach   clutter free environment maintained   room organization consistent  Taken 5/26/2023 0844 by Raven Marin RNA  Safety Promotion/Fall Prevention:   assistive device/personal items within reach   clutter free environment maintained   safety round/check completed   nonskid shoes/slippers when out of bed  Intervention: Prevent Skin Injury  Recent Flowsheet Documentation  Taken 5/26/2023 0844 by Raven Marin RNA  Body Position: supine  Intervention: Prevent and Manage VTE (Venous Thromboembolism) Risk  Recent Flowsheet Documentation  Taken 5/26/2023 1536 by Raven Marin RNA  Activity Management: ambulated in room  Taken 5/26/2023 1512 by Raven Marin RNA  Activity Management:   ambulated in room   ambulated to bathroom  Taken 5/26/2023 0800 by Raven Marin RNA  Range of Motion: active ROM (range of motion) encouraged  Intervention: Prevent Infection  Recent Flowsheet Documentation  Taken 5/26/2023 1415 by Raven Marin RNA  Infection Prevention: hand hygiene promoted  Taken 5/26/2023 1153 by Raven Marin RNA  Infection Prevention: personal protective equipment utilized  Taken 5/26/2023 0844 by Raven Marin RNA  Infection Prevention: hand hygiene promoted  Goal: Optimal Comfort and Wellbeing  Outcome: Met  Intervention: Monitor Pain and Promote Comfort  Recent Flowsheet Documentation  Taken 5/26/2023 1211 by Raven Marin RNA  Pain Management Interventions: see MAR  Taken 5/26/2023 1153 by Raven Marin  RNA  Pain Management Interventions:   position adjusted   pillow support provided   quiet environment facilitated  Intervention: Provide Person-Centered Care  Recent Flowsheet Documentation  Taken 5/26/2023 0800 by Raven Marin RNA  Trust Relationship/Rapport:   care explained   choices provided   emotional support provided  Goal: Readiness for Transition of Care  Outcome: Met     Problem: Fall Injury Risk  Goal: Absence of Fall and Fall-Related Injury  Outcome: Met  Intervention: Identify and Manage Contributors  Recent Flowsheet Documentation  Taken 5/26/2023 1005 by Raven Marin RNA  Medication Review/Management: medications reviewed  Taken 5/26/2023 0844 by Raven Marin RNA  Medication Review/Management: medications reviewed  Self-Care Promotion: independence encouraged  Intervention: Promote Injury-Free Environment  Recent Flowsheet Documentation  Taken 5/26/2023 1005 by Raven Marin RNA  Safety Promotion/Fall Prevention:   safety round/check completed   assistive device/personal items within reach   clutter free environment maintained   room organization consistent  Taken 5/26/2023 0844 by Raven Marin RNA  Safety Promotion/Fall Prevention:   assistive device/personal items within reach   clutter free environment maintained   safety round/check completed   nonskid shoes/slippers when out of bed   Goal Outcome Evaluation:   Patient received a Lidocaine patch for upper left arm pain and left knee pain. IV was removed and intact. Patient received medication education and information on follow up appointment. Vital signs stable at this time. Patient resting and waiting for his son to pick him up.

## 2023-05-26 NOTE — CASE MANAGEMENT/SOCIAL WORK
Continued Stay Note   Daniel     Patient Name: Deion Nicholas  MRN: [id number]  Today's Date: 5/26/2023    Admit Date: 5/22/2023    Plan: D/C plan: Anticipate home. Watch for IV abx. [facility].   Discharge Plan     Row Name 05/26/23 1350       Plan    Plan D/C plan: Anticipate home. Watch for IV abx. [facility].    Patient/Family in Agreement with Plan yes    Plan Comments Anticipate home on PO abx per MD. Barrier to d/c: cardiology consulted                   Phone communication or documentation only - no physical contact with patient or family.      Amilcar Paniagua RN

## 2023-05-26 NOTE — DISCHARGE SUMMARY
[facility] Medicine Services  DISCHARGE SUMMARY    Patient Name: Deion Nicholas  : 1940  MRN: [id number]    Discharge condition: Improved  Date of Admission: 2023  Discharge Diagnosis: Aortic valve regurg, mitral valve regurg, left lower extremity cellulitis  Date of Discharge: 2023  Primary Care Physician: Makenna Motta MD    Presenting Problem:   Left leg pain [M79.605]  Left leg cellulitis [L03.116]    Active and Resolved Hospital Problems:  Active Hospital Problems    Diagnosis POA   • Left leg cellulitis [L03.116] Yes      Resolved Hospital Problems   No resolved problems to display.       Hospital Course     Hospital Course:  Deion Nicholas is a 82 y.o. male who presented to the hospital with left lower extremity cellulitis that was not responding to outpatient clindamycin therapy.  Patient was placed on IV antibiotics including Rocephin.  Dose was increased to 2 g.  Patient's left lower extremity improved over the next 48 hours.  He was eventually switched over to oral Augmentin.  Lower extremity Doppler ruled out DVT.  He was seen by cardiology for bilateral lower extremity swelling and mitral regurgitation as well as aortic regurgitation.  His EF remained 35% despite treatment.  He already has ICD placed.  Once cleared by cardiology he was discharged home with close cardiac follow-up instructions and p.o. Augmentin.            Reasons For Change In Medications and Indications for New Medications:      Day of Discharge     Vital Signs:  Temp:  [96.4 °F (35.8 °C)-98.3 °F (36.8 °C)] 97.2 °F (36.2 °C)  Heart Rate:  [69-88] 69  Resp:  [10-18] 11  BP: (115-139)/(58-76) 128/69  Flow (L/min):  [2] 2    Physical Exam:  Physical Exam  Vitals reviewed.   HENT:      Head: Normocephalic.   Cardiovascular:      Rate and Rhythm: Normal rate.   Pulmonary:      Effort: Pulmonary effort is normal.   Abdominal:      General: Abdomen is flat.      Palpations: Abdomen  is soft.   Musculoskeletal:         General: Normal range of motion.      Cervical back: Normal range of motion.      Left lower leg: Edema present.      Comments: Left lower extremity swelling and cellulitis   Skin:     General: Skin is warm.   Neurological:      General: No focal deficit present.      Mental Status: He is alert and oriented to person, place, and time.   Psychiatric:         Mood and Affect: Mood normal.         Behavior: Behavior normal.            Pertinent  and/or Most Recent Results     LAB RESULTS:      Lab 05/25/23  2335 05/25/23  0018 05/24/23 0324 05/23/23 0523 05/22/23 2010   WBC 5.10 5.10 4.60 5.20 6.50   HEMOGLOBIN 12.5* 12.2* 12.2* 12.0* 12.0*   HEMATOCRIT 37.7 37.2* 36.8* 35.9* 35.7*   PLATELETS 203 198 195 185 215   NEUTROS ABS 2.40 2.90 2.60  --  3.90   LYMPHS ABS 1.80 1.40 1.30  --  1.70   MONOS ABS 0.70 0.50 0.50  --  0.60   EOS ABS 0.20 0.20 0.20  --  0.20   MCV 97.1* 97.5* 96.0 96.5 95.9   SED RATE  --   --  24*  --  22*   CRP  --   --  <0.30  --  <0.30   LACTATE  --   --   --   --  1.3         Lab 05/25/23 2335 05/25/23 0018 05/24/23 0324 05/23/23 0523 05/22/23 2010   SODIUM 138 137 138 141 138   POTASSIUM 4.9 4.1 4.2 5.0 4.3   CHLORIDE 103 102 103 106 105   CO2 27.0 25.0 26.0 27.0 21.0*   ANION GAP 8.0 10.0 9.0 8.0 12.0   BUN 18 16 11 13 13   CREATININE 0.83 0.85 0.84 0.86 0.91   EGFR 88.5 87.8 88.2 87.5 85.2   GLUCOSE 111* 117* 120* 114* 145*   CALCIUM 9.3 8.9 9.1 9.1 8.8   MAGNESIUM 2.1 2.0 2.1  --   --    PHOSPHORUS 3.5 3.1 3.1  --   --          Lab 05/25/23  2335 05/25/23  0018 05/24/23  0324 05/22/23 2010   TOTAL PROTEIN 6.7 6.4 6.6 6.4   ALBUMIN 3.8 3.8 3.8 3.8   GLOBULIN 2.9 2.6 2.8 2.6   ALT (SGPT) 25 17 17 21   AST (SGOT) 32 25 26 26   BILIRUBIN <0.2 0.2 0.2 0.2   ALK PHOS 86 82 90 95         Lab 05/22/23 2010   PROBNP 409.2                 Brief Urine Lab Results     None        Microbiology Results (last 10 days)     Procedure Component Value - Date/Time     MRSA Screen, PCR (Inpatient) - Swab, Nares [[id number]]  (Normal) Collected: 05/22/23 2149    Lab Status: Final result Specimen: Swab from Nares Updated: 05/23/23 0029     MRSA PCR No MRSA Detected    Narrative:      The negative predictive value of this diagnostic test is high and should only be used to consider de-escalating anti-MRSA therapy. A positive result may indicate colonization with MRSA and must be correlated clinically.    Blood Culture - Blood, Arm, Right [[id number]]  (Normal) Collected: 05/22/23 2010    Lab Status: Preliminary result Specimen: Blood from Arm, Right Updated: 05/25/23 2015     Blood Culture No growth at 3 days    Narrative:      Less than seven (7) mL's of blood was collected.  Insufficient quantity may yield false negative results.    Blood Culture - Blood, Arm, Right [[id number]]  (Normal) Collected: 05/22/23 2010    Lab Status: Preliminary result Specimen: Blood from Arm, Right Updated: 05/25/23 2015     Blood Culture No growth at 3 days    Narrative:      Less than seven (7) mL's of blood was collected.  Insufficient quantity may yield false negative results.               Results for orders placed during the hospital encounter of 05/22/23    Duplex Venous Lower Extremity - Left CAR    Interpretation Summary  •  Normal left lower extremity venous duplex scan.      Results for orders placed during the hospital encounter of 05/22/23    Duplex Venous Lower Extremity - Left CAR    Interpretation Summary  •  Normal left lower extremity venous duplex scan.      Results for orders placed during the hospital encounter of 05/22/23    Adult Transthoracic Echo Complete W/ Cont if Necessary Per Protocol    Interpretation Summary  •  Left ventricular ejection fraction appears to be 31 - 35%.  •  Moderate aortic valve regurgitation is present.  •  Moderate mitral valve regurgitation is present.  •  Estimated right ventricular systolic pressure from tricuspid regurgitation is normal (<35 mmHg).  •   Mild dilation of the aortic root is present.      Labs Pending at Discharge:  Pending Labs     Order Current Status    Blood Culture - Blood, Arm, Right Preliminary result    Blood Culture - Blood, Arm, Right Preliminary result          Procedures Performed           Consults:   Consults     Date and Time Order Name Status Description    5/25/2023 11:56 AM Inpatient Cardiology Consult      5/22/2023  8:48 PM Hospitalist (on-call MD unless specified)              Discharge Details        Discharge Medications      New Medications      Instructions Start Date   amoxicillin 875 MG tablet  Commonly known as: AMOXIL   875 mg, Oral, 2 Times Daily         Continue These Medications      Instructions Start Date   aspirin 81 MG chewable tablet   81 mg, Oral, Daily      benzonatate 200 MG capsule  Commonly known as: TESSALON   200 mg, Oral, 3 Times Daily PRN      cholecalciferol 10 MCG (400 UNIT) tablet  Commonly known as: VITAMIN D3   Take 2 tablets daily       cyanocobalamin 1000 MCG/ML injection   1,000 mcg, Intramuscular, Every 14 Days      ezetimibe 10 MG tablet  Commonly known as: ZETIA   10 mg, Oral, Every Evening      fish oil 1000 MG capsule capsule   2,000 mg, Oral, 2 Times Daily With Meals      gabapentin 400 MG capsule  Commonly known as: NEURONTIN   400 mg, Oral, 4 Times Daily      lidocaine 5 %  Commonly known as: LIDODERM   1 patch, Transdermal, Daily PRN, Remove & Discard patch within 12 hours or as directed by MD       meclizine 25 MG tablet  Commonly known as: ANTIVERT   25 mg, Oral, 3 Times Daily PRN      methocarbamol 500 MG tablet  Commonly known as: ROBAXIN   500 mg, Oral, 3 Times Daily PRN      metoprolol succinate XL 50 MG 24 hr tablet  Commonly known as: TOPROL-XL   50 mg, Oral, Daily      midodrine 5 MG tablet  Commonly known as: PROAMATINE   5 mg, Oral, 3 Times Daily Before Meals      nitroglycerin 0.4 MG SL tablet  Commonly known as: NITROSTAT   0.4 mg, Sublingual, Every 5 Minutes PRN       pantoprazole 40 MG EC tablet  Commonly known as: PROTONIX   40 mg, Oral, Daily         Stop These Medications    clindamycin 300 MG capsule  Commonly known as: CLEOCIN            Allergies   Allergen Reactions   • Lovastatin Myalgia   • Pravastatin Myalgia and Unknown (See Comments)     unknown   • Simvastatin Unknown (See Comments) and Myalgia     unknown   • Spironolactone Other (See Comments)     Gynecomastia          Discharge Disposition:   Home or Self Care    Diet:  Hospital:  Diet Order   Procedures   • Diet: Cardiac Diets, Diabetic Diets; Healthy Heart (2-3 Na+); Consistent Carbohydrate; Texture: Regular Texture (IDDSI 7); Fluid Consistency: Thin (IDDSI 0)         Discharge Activity:         CODE STATUS:  Code Status and Medical Interventions:   Ordered at: 05/22/23 2138     Code Status (Patient has no pulse and is not breathing):    CPR (Attempt to Resuscitate)     Medical Interventions (Patient has pulse or is breathing):    Full Support         Future Appointments   Date Time Provider Department Center   7/18/2023  1:15 PM Marcin Childers DO [facility]   7/27/2023  2:00 PM Constantino Arvizu MD [facility]           Time spent on Discharge including face to face service:  45 minutes    This patient has been examined wearing appropriate Personal Protective Equipment and discussed with Patient. 05/26/23      Signature: Ryne Stephenson MD

## 2023-05-26 NOTE — PLAN OF CARE
Goal Outcome Evaluation:   Patient is alert and oriented. PO ABX started. Patient is to discharge home with home health on Friday 5/26.

## 2023-05-27 LAB
BACTERIA SPEC AEROBE CULT: NORMAL
BACTERIA SPEC AEROBE CULT: NORMAL

## 2023-05-27 NOTE — OUTREACH NOTE
Prep Survey      Flowsheet Row Responses   [facility] discharged from? [facility]   Is LACE score < 7 ? No   Eligibility Readm Mgmt   Discharge diagnosis LLE pain   Does the patient have one of the following disease processes/diagnoses(primary or secondary)? Other   Does the patient have Home health ordered? No   Is there a DME ordered? No   Prep survey completed? Yes            Casie CAMERON - Registered Nurse

## 2023-05-27 NOTE — SIGNIFICANT NOTE
Case Management Discharge Note      Final Note: (P) d/c home    Provided Post Acute Provider List?: N/A  N/A Provider List Comment: anticipate home    Selected Continued Care - Discharged on 5/26/2023 Admission date: 5/22/2023 - Discharge disposition: Home or Self Care                      Final Discharge Disposition Code: (P) 01 - home or self-care

## 2023-05-31 ENCOUNTER — READMISSION MANAGEMENT (OUTPATIENT)
Dept: CALL CENTER | Facility: HOSPITAL | Age: 83
End: 2023-05-31

## 2023-05-31 ENCOUNTER — TELEPHONE (OUTPATIENT)
Dept: CARDIOLOGY | Facility: CLINIC | Age: 83
End: 2023-05-31

## 2023-05-31 NOTE — TELEPHONE ENCOUNTER
Caller: Deion Nicholas    Relationship: Self    Best call back number: [phone]    Who are you requesting to speak with (clinical staff, provider,  specific staff member): CLINICAL    What was the call regarding: PT STATES HE WAS SEEN IN HOSPITAL RECENTLY AND WAS SEEN BY DR MUÑOZ - PT STATES HE HAS LEAKY VALVES AND HE  HAS SOME CONCERNS - PT WANTING TO SPEAK WITH CLINICAL TEAM, SPECIFICALLY DR BOCANEGRA NURSE    Is it okay if the provider responds through MyChart: CALLBACK PLEASE

## 2023-05-31 NOTE — OUTREACH NOTE
Medical Week 1 Survey    Flowsheet Row Responses   [facility] patient discharged from? [facility]   Does the patient have one of the following disease processes/diagnoses(primary or secondary)? Other   Week 1 attempt successful? Yes   Call start time 0812   Call end time 0815   Discharge diagnosis LLE pain   Meds reviewed with patient/caregiver? Yes   Is the patient having any side effects they believe may be caused by any medication additions or changes? No   Does the patient have all medications ordered at discharge? Yes   Is the patient taking all medications as directed (includes completed medication regime)? Yes   Does the patient have a primary care provider?  Yes   Does the patient have an appointment with their PCP within 7 days of discharge? No   Comments regarding PCP Patient states he is waiting for a return call from   What is preventing the patient from scheduling follow up appointments within 7 days of discharge? Waiting on return call   Nursing Interventions Educated patient on importance of making appointment   Has the patient kept scheduled appointments due by today? N/A   Has home health visited the patient within 72 hours of discharge? N/A   Psychosocial issues? No   Did the patient receive a copy of their discharge instructions? Yes   Nursing interventions Reviewed instructions with patient   What is the patient's perception of their health status since discharge? Improving  [Patient states he is improving, but he still has some swelling in his leg. [facility]]   Is the patient/caregiver able to teach back signs and symptoms related to disease process for when to call PCP? Yes   Is the patient/caregiver able to teach back signs and symptoms related to disease process for when to call 911? Yes   Is the patient/caregiver able to teach back the hierarchy of who to call/visit for symptoms/problems? PCP, Specialist, Home health nurse, Urgent  [facility], ED, 911 Yes   If the patient is a current smoker, are they able to teach back resources for cessation? Not a smoker   Week 1 call completed? Yes   Is the patient interested in additional calls from an ambulatory ?  NOTE:  applies to high risk patients requiring additional follow-up. No          Estefania SHAH - Licensed Nurse

## 2023-06-08 ENCOUNTER — READMISSION MANAGEMENT (OUTPATIENT)
Dept: CALL CENTER | Facility: HOSPITAL | Age: 83
End: 2023-06-08
Payer: OTHER GOVERNMENT

## 2023-06-08 NOTE — OUTREACH NOTE
Medical Week 2 Survey      Flowsheet Row Responses   [facility] facility patient discharged from? [facility]   Does the patient have one of the following disease processes/diagnoses(primary or secondary)? Other   Week 2 attempt successful? No   Unsuccessful attempts Attempt 1  [Son has no info]            SHAHRIAR AMADO - Registered Nurse

## 2023-06-13 ENCOUNTER — READMISSION MANAGEMENT (OUTPATIENT)
Dept: CALL CENTER | Facility: HOSPITAL | Age: 83
End: 2023-06-13
Payer: OTHER GOVERNMENT

## 2023-06-13 NOTE — OUTREACH NOTE
Medical Week 2 Survey      Flowsheet Row Responses   [facility] facility patient discharged from? [facility]   Does the patient have one of the following disease processes/diagnoses(primary or secondary)? Other   Week 2 attempt successful? No   Unsuccessful attempts Attempt 2  [attempted all numbers]            JULIANA BRADFORD - Registered Nurse

## 2023-06-14 ENCOUNTER — HOSPITAL ENCOUNTER (INPATIENT)
Facility: HOSPITAL | Age: 83
LOS: 2 days | Discharge: HOME | End: 2023-06-17
Attending: EMERGENCY MEDICINE | Admitting: HOSPITALIST
Payer: MEDICARE

## 2023-06-14 ENCOUNTER — APPOINTMENT (OUTPATIENT)
Dept: GENERAL RADIOLOGY | Facility: HOSPITAL | Age: 83
End: 2023-06-14
Payer: MEDICARE

## 2023-06-14 DIAGNOSIS — L03.116 CELLULITIS OF LEFT LOWER EXTREMITY: Primary | ICD-10-CM

## 2023-06-14 DIAGNOSIS — R79.89 POSITIVE D DIMER: ICD-10-CM

## 2023-06-14 DIAGNOSIS — R07.9 CHEST PAIN, UNSPECIFIED TYPE: ICD-10-CM

## 2023-06-14 LAB
ALBUMIN SERPL-MCNC: 4.3 G/DL (ref 3.5–5.2)
ALBUMIN/GLOB SERPL: 1.3 G/DL
ALP SERPL-CCNC: 102 U/L (ref 39–117)
ALT SERPL W P-5'-P-CCNC: 21 U/L (ref 1–41)
ANION GAP SERPL CALCULATED.3IONS-SCNC: 10 MMOL/L (ref 5–15)
AST SERPL-CCNC: 25 U/L (ref 1–40)
BASOPHILS # BLD AUTO: 0 10*3/MM3 (ref 0–0.2)
BASOPHILS NFR BLD AUTO: 0.6 % (ref 0–1.5)
BILIRUB SERPL-MCNC: <0.2 MG/DL (ref 0–1.2)
BUN SERPL-MCNC: 17 MG/DL (ref 8–23)
BUN/CREAT SERPL: 18.3 (ref 7–25)
CALCIUM SPEC-SCNC: 9.6 MG/DL (ref 8.6–10.5)
CHLORIDE SERPL-SCNC: 103 MMOL/L (ref 98–107)
CO2 SERPL-SCNC: 25 MMOL/L (ref 22–29)
CREAT SERPL-MCNC: 0.93 MG/DL (ref 0.76–1.27)
D DIMER PPP FEU-MCNC: 0.89 MG/L (FEU) (ref 0–0.8)
DEPRECATED RDW RBC AUTO: 46.8 FL (ref 37–54)
EGFRCR SERPLBLD CKD-EPI 2021: 83 ML/MIN/1.73
EOSINOPHIL # BLD AUTO: 0.2 10*3/MM3 (ref 0–0.4)
EOSINOPHIL NFR BLD AUTO: 3.3 % (ref 0.3–6.2)
ERYTHROCYTE [DISTWIDTH] IN BLOOD BY AUTOMATED COUNT: 13.7 % (ref 12.3–15.4)
GEN 5 2HR TROPONIN T REFLEX: 15 NG/L
GLOBULIN UR ELPH-MCNC: 3.2 GM/DL
GLUCOSE SERPL-MCNC: 137 MG/DL (ref 65–99)
HCT VFR BLD AUTO: 38.9 % (ref 37.5–51)
HGB BLD-MCNC: 12.7 G/DL (ref 13–17.7)
HOLD SPECIMEN: NORMAL
LYMPHOCYTES # BLD AUTO: 1.4 10*3/MM3 (ref 0.7–3.1)
LYMPHOCYTES NFR BLD AUTO: 21 % (ref 19.6–45.3)
MCH RBC QN AUTO: 32.2 PG (ref 26.6–33)
MCHC RBC AUTO-ENTMCNC: 32.7 G/DL (ref 31.5–35.7)
MCV RBC AUTO: 98.4 FL (ref 79–97)
MONOCYTES # BLD AUTO: 0.6 10*3/MM3 (ref 0.1–0.9)
MONOCYTES NFR BLD AUTO: 8.3 % (ref 5–12)
NEUTROPHILS NFR BLD AUTO: 4.6 10*3/MM3 (ref 1.7–7)
NEUTROPHILS NFR BLD AUTO: 66.8 % (ref 42.7–76)
NRBC BLD AUTO-RTO: 0.1 /100 WBC (ref 0–0.2)
NT-PROBNP SERPL-MCNC: 499.6 PG/ML (ref 0–1800)
PLATELET # BLD AUTO: 212 10*3/MM3 (ref 140–450)
PMV BLD AUTO: 7.9 FL (ref 6–12)
POTASSIUM SERPL-SCNC: 4.7 MMOL/L (ref 3.5–5.2)
PROT SERPL-MCNC: 7.5 G/DL (ref 6–8.5)
RBC # BLD AUTO: 3.96 10*6/MM3 (ref 4.14–5.8)
SODIUM SERPL-SCNC: 138 MMOL/L (ref 136–145)
TROPONIN T DELTA: -3 NG/L
TROPONIN T SERPL HS-MCNC: 18 NG/L
WBC NRBC COR # BLD: 6.8 10*3/MM3 (ref 3.4–10.8)
WHOLE BLOOD HOLD COAG: NORMAL
WHOLE BLOOD HOLD SPECIMEN: NORMAL

## 2023-06-14 PROCEDURE — 71045 X-RAY EXAM CHEST 1 VIEW: CPT

## 2023-06-14 PROCEDURE — 84484 ASSAY OF TROPONIN QUANT: CPT | Performed by: PHYSICIAN ASSISTANT

## 2023-06-14 PROCEDURE — 80053 COMPREHEN METABOLIC PANEL: CPT | Performed by: PHYSICIAN ASSISTANT

## 2023-06-14 PROCEDURE — 85379 FIBRIN DEGRADATION QUANT: CPT | Performed by: NURSE PRACTITIONER

## 2023-06-14 PROCEDURE — 87040 BLOOD CULTURE FOR BACTERIA: CPT | Performed by: NURSE PRACTITIONER

## 2023-06-14 PROCEDURE — 93005 ELECTROCARDIOGRAM TRACING: CPT | Performed by: PHYSICIAN ASSISTANT

## 2023-06-14 PROCEDURE — 85025 COMPLETE CBC W/AUTO DIFF WBC: CPT | Performed by: PHYSICIAN ASSISTANT

## 2023-06-14 PROCEDURE — 83880 ASSAY OF NATRIURETIC PEPTIDE: CPT | Performed by: PHYSICIAN ASSISTANT

## 2023-06-14 PROCEDURE — 25010000002 CEFTRIAXONE PER 250 MG: Performed by: NURSE PRACTITIONER

## 2023-06-14 RX ORDER — SODIUM CHLORIDE 0.9 % (FLUSH) 0.9 %
10 SYRINGE (ML) INJECTION AS NEEDED
Status: DISCONTINUED | OUTPATIENT
Start: 2023-06-14 | End: 2023-06-17 | Stop reason: HOSPADM

## 2023-06-14 RX ADMIN — CEFTRIAXONE 2 G: 2 INJECTION, POWDER, FOR SOLUTION INTRAMUSCULAR; INTRAVENOUS at 23:36

## 2023-06-14 NOTE — Clinical Note
Level of Care: Telemetry [5]   Admitting Physician: RAISA BAER [[id number]]   Attending Physician: RAISA BAER [[id number]]

## 2023-06-14 NOTE — ED PROVIDER NOTES
Chief Complaint:   Annual Exam      History of Present Illness   Source of history provided by: patient and parent. Magda Bernard is a 15 y.o. old male who has a past medical history of There is no problem list on file for this patient. presents to the [facility] for a sports physical.  Pt reports to be feeling well without any complaints at this time. He denies any CP with exertion, ALMAZAN, dizziness with exertion, history of syncope without trauma, palpitations, weakness in extremities, recent illness, previous cardiac issues, seizure history, or asthma history. Denies any family history of sudden cardiac death. Pt denies any drug, ETOH, or tobacco use. Wears seat belt in the car at all times. Denies any thoughts of suicide or issues at school relating to bullying. ROS    Unless otherwise stated in this report or unable to obtain because of the patient's clinical or mental status as evidenced by the medical record, this patients's positive and negative responses for Review of Systems, constitutional, psych, eyes, ENT, cardiovascular, respiratory, gastrointestinal, neurological, genitourinary, musculoskeletal, integument systems and systems related to the presenting problem are either stated in the preceding or were not pertinent or were negative for the symptoms and/or complaints related to the medical problem. Past Surgical History: History reviewed. No pertinent surgical history. Social History:    Family History: family history is not on file. Allergies: Cefzil [cefprozil]    Physical Exam         VS:  /45   Pulse 85   Temp 98.7 °F (37.1 °C) (Oral)   Resp 18   Ht 5' 8\" (1.727 m)   Wt (!) 221 lb (100.2 kg)   SpO2 98%   BMI 33.60 kg/m²    Oxygen Saturation Interpretation: Normal.    Constitutional:  A&Ox3, NAD, development consistent with age. Head:  NCAT  Eyes:  EOMI, PERRLA. No conjunctival injection noted. Ears:  External ears without lesions.  Ear canals without swelling or Subjective     Provider in Triage Note  Patient is an 82-year-old  male history of cardiomyopathy, CAD, hypertension, recurrent left lower extremity cellulitis presents to the ER with complaints of intermittent chest pain, left arm pain and left leg swelling.  Patient states he was admitted for cellulitis of left lower leg a few weeks ago and states that he thinks his cellulitis is back.  He denies any chest pain at this time.  He does complain of throbbing pain to the left leg that he rates an 8/10.  He states that the swelling went down after he was discharged but came back over the last week.  No fever no chills.  No headache lightheadedness or dizziness.  No history of PE or DVT.  Patient states that he did have a Doppler 2 weeks ago on his last admission that was negative for DVT.    History of Present Illness  I reviewed the patient's provider in triage note and attest this is the HPI        Review of Systems   Constitutional: Negative for chills, fatigue and fever.   HENT: Negative for congestion, tinnitus and trouble swallowing.    Eyes: Negative for photophobia, discharge and redness.   Respiratory: Negative for cough and shortness of breath.    Cardiovascular: Positive for chest pain. Negative for palpitations.   Gastrointestinal: Negative for abdominal pain, diarrhea, nausea and vomiting.   Genitourinary: Negative for dysuria, frequency and urgency.   Musculoskeletal: Negative for back pain, joint swelling and myalgias.        Left lower leg pain swelling and slight redness   Skin: Negative for rash.   Neurological: Negative for dizziness and headaches.   Psychiatric/Behavioral: Negative for confusion.   All other systems reviewed and are negative.      Past Medical History:   Diagnosis Date   • Aneurysm    • Cardiomyopathy     ICD implantation   • Cellulitis of left lower extremity 2010    recurrent   • CHD (coronary heart disease)     S/P CABG & PCI   • Dyslipidemia    • History of ventricular  tachycardia    • Hypertension    • Myocardial infarction     Inferior MI   • Pinched nerve     L4-L5   • Prostate cancer        Allergies   Allergen Reactions   • Lovastatin Myalgia   • Pravastatin Myalgia and Unknown (See Comments)     unknown   • Simvastatin Unknown (See Comments) and Myalgia     unknown   • Spironolactone Other (See Comments)     Gynecomastia        Past Surgical History:   Procedure Laterality Date   • ANGIOPLASTY  1996 Stent: Palmaz- Huy stent/LAD 1996-RCA: -Tetra stent Guidant to proximal RCA, mid to distal artery 2 sequential Guidant Tetra stents   • APPENDECTOMY     • CARDIAC ABLATION  April and May 2019   • CARDIAC CATHETERIZATION  2017    1996 x2, Nov. , 2010-cath 2015-no stents    • CARDIAC DEFIBRILLATOR PLACEMENT     • COLONOSCOPY W/ POLYPECTOMY      Mult colonoscopy's for polyp resection    • CORONARY ARTERY BYPASS GRAFT  05/2010    x5 vessel: LMA to diagonal, LAD, intermedius, obtuse marginal, RCA   • CORONARY STENT PLACEMENT     • ENDOSCOPY N/A 2019    Procedure: ESOPHAGOGASTRODUODENOSCOPY with dilitation Bougie 50, 54;  Surgeon: Roddy Coronel MD;  Location: [facility] ENDOSCOPY;  Service: Gastroenterology   • INSERT / REPLACE / REMOVE PACEMAKER     • KNEE OPEN LATERAL RELEASE Left     reduction   • OTHER SURGICAL HISTORY  2018    ICD implantation   • PROSTATE SURGERY  2015    Robiotic surgery- Cyber Knife:       Family History   Problem Relation Age of Onset   • Pancreatic cancer Mother    • Heart disease Father        Social History     Socioeconomic History   • Marital status:    Tobacco Use   • Smoking status: Former     Packs/day: 1.00     Years: 17.00     Pack years: 17.00     Types: Cigarettes     Quit date: 2002     Years since quittin.9   • Smokeless tobacco: Never   Vaping Use   • Vaping Use: Never used   Substance and Sexual Activity   • 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   Constitutional:       Appearance: He is well-developed and normal weight.   HENT:      Head: Normocephalic and atraumatic.   Eyes:      Conjunctiva/sclera: Conjunctivae normal.      Pupils: Pupils are equal, round, and reactive to light.   Cardiovascular:      Rate and Rhythm: Normal rate and regular rhythm.      Pulses:           Carotid pulses are 1+ on the right side and 1+ on the left side.       Radial pulses are 1+ on the right side and 1+ on the left side.        Dorsalis pedis pulses are 1+ on the right side and 1+ on the left side.        Posterior tibial pulses are 1+ on the right side and 1+ on the left side.      Heart sounds: Normal heart sounds.   Pulmonary:      Effort: Pulmonary effort is normal.      Breath sounds: Normal breath sounds. No decreased breath sounds or wheezing.   Abdominal:      General: Bowel sounds are normal.      Palpations: Abdomen is soft.   Musculoskeletal:         General: Normal range of motion.      Cervical back: Normal range of motion and neck supple.      Right lower leg: No tenderness. No edema.      Left lower leg: Tenderness present. Edema present.   Skin:     General: Skin is warm and dry.      Capillary Refill: Capillary refill takes 2 to 3 seconds.             Comments: Left lower leg has 1+ pitting edema has erythema and tenderness to palpation and a positive Homans' sign   Neurological:      General: No focal deficit present.      Mental Status: He is alert and oriented to person, place, and time.      GCS: GCS eye subscore is 4. GCS verbal subscore is 5. GCS motor subscore is 6.   Psychiatric:         Mood and Affect: Mood normal.         Behavior: Behavior normal.         Procedures                 ED Course      /85 (BP Location: Right arm, Patient Position: Lying)   Pulse 78   Temp 97.7 °F (36.5 °C) (Oral)   Resp 16   Ht 182.9 cm (72\")   Wt 95.3 kg (210 lb)   " SpO2 96%   BMI 28.48 kg/m²   Labs Reviewed   COMPREHENSIVE METABOLIC PANEL - Abnormal; Notable for the following components:       Result Value    Glucose 137 (*)     All other components within normal limits    Narrative:     GFR Normal >60  Chronic Kidney Disease <60  Kidney Failure <15    The GFR formula is only valid for adults with stable renal function between ages 18 and 70.   TROPONIN - Abnormal; Notable for the following components:    HS Troponin T 18 (*)     All other components within normal limits    Narrative:     High Sensitive Troponin T Reference Range:  <10.0 ng/L- Negative Female for AMI  <15.0 ng/L- Negative Male for AMI  >=10 - Abnormal Female indicating possible myocardial injury.  >=15 - Abnormal Male indicating possible myocardial injury.   Clinicians would have to utilize clinical acumen, EKG, Troponin, and serial changes to determine if it is an Acute Myocardial Infarction or myocardial injury due to an underlying chronic condition.        CBC WITH AUTO DIFFERENTIAL - Abnormal; Notable for the following components:    RBC 3.96 (*)     Hemoglobin 12.7 (*)     MCV 98.4 (*)     All other components within normal limits   HIGH SENSITIVITIY TROPONIN T 2HR - Abnormal; Notable for the following components:    HS Troponin T 15 (*)     All other components within normal limits    Narrative:     High Sensitive Troponin T Reference Range:  <10.0 ng/L- Negative Female for AMI  <15.0 ng/L- Negative Male for AMI  >=10 - Abnormal Female indicating possible myocardial injury.  >=15 - Abnormal Male indicating possible myocardial injury.   Clinicians would have to utilize clinical acumen, EKG, Troponin, and serial changes to determine if it is an Acute Myocardial Infarction or myocardial injury due to an underlying chronic condition.        D-DIMER, QUANTITATIVE - Abnormal; Notable for the following components:    D-Dimer, Quantitative 0.89 (*)     All other components within normal limits    Narrative:      "According to the assay 's published package insert, a normal (<0.50 mg/L (FEU)) D-dimer result in conjunction with a non-high clinical probability assessment, excludes deep vein thrombosis (DVT) and pulmonary embolism (PE) with high sensitivity.    D-dimer values increase with age and this can make VTE exclusion of an older population difficult. To address this, the [employer] College [employer], based on best available evidence and recent guidelines, recommends that clinicians use age-adjusted D-dimer thresholds in patients greater than 50 years of age with: a) a low probability of PE who do not meet all Pulmonary Embolism Rule Out Criteria, or b) in those with intermediate probability of PE.   The formula for an age-adjusted D-dimer cut-off is \"age/100\".  For example, a 60 year old patient would have an age-adjusted cut-off of 0.60 mg/L (FEU) and an 80 year old 0.80 mg/L (FEU).   TROPONIN - Abnormal; Notable for the following components:    HS Troponin T 16 (*)     All other components within normal limits    Narrative:     High Sensitive Troponin T Reference Range:  <10.0 ng/L- Negative Female for AMI  <15.0 ng/L- Negative Male for AMI  >=10 - Abnormal Female indicating possible myocardial injury.  >=15 - Abnormal Male indicating possible myocardial injury.   Clinicians would have to utilize clinical acumen, EKG, Troponin, and serial changes to determine if it is an Acute Myocardial Infarction or myocardial injury due to an underlying chronic condition.        BNP (IN-HOUSE) - Normal    Narrative:     Among patients with dyspnea, NT-proBNP is highly sensitive for the detection of acute congestive heart failure. In addition NT-proBNP of <300 pg/ml effectively rules out acute congestive heart failure with 99% negative predictive value.     BLOOD CULTURE   BLOOD CULTURE   RAINBOW DRAW    Narrative:     The following orders were created for panel order [place] Draw.  Procedure                             "   Abnormality         Status                     ---------                               -----------         ------                     Green Top (Gel)[[id number]]                                                             Lavender Top[[id number]]                                     Final result               Gold Top - SST[[id number]]                                   Final result               Light Blue Top[[id number]]                                   Final result                 Please view results for these tests on the individual orders.   CBC AND DIFFERENTIAL    Narrative:     The following orders were created for panel order CBC & Differential.  Procedure                               Abnormality         Status                     ---------                               -----------         ------                     CBC Auto Differential[[id number]]        Abnormal            Final result                 Please view results for these tests on the individual orders.   LAVENDER TOP   GOLD TOP - SST   LIGHT BLUE TOP     Medications   sodium chloride 0.9 % flush 10 mL (has no administration in time range)   cefTRIAXone (ROCEPHIN) 2 g in sodium chloride 0.9 % 100 mL IVPB (has no administration in time range)   sodium chloride 0.9 % flush 10 mL (10 mL Intravenous Not Given 6/15/23 0152)   sodium chloride 0.9 % flush 10 mL (has no administration in time range)   sodium chloride 0.9 % infusion 40 mL (has no administration in time range)   acetaminophen (TYLENOL) tablet 650 mg (has no administration in time range)     Or   acetaminophen (TYLENOL) 160 MG/5ML solution 650 mg (has no administration in time range)     Or   acetaminophen (TYLENOL) suppository 650 mg (has no administration in time range)   aluminum-magnesium hydroxide-simethicone (MAALOX MAX) 400-400-40 MG/5ML suspension 15 mL (has no administration in time range)   sennosides-docusate (PERICOLACE) 8.6-50 MG per tablet 2 tablet (has no administration in time  range)     And   polyethylene glycol (MIRALAX) packet 17 g (has no administration in time range)     And   bisacodyl (DULCOLAX) EC tablet 5 mg (has no administration in time range)     And   bisacodyl (DULCOLAX) suppository 10 mg (has no administration in time range)   ondansetron (ZOFRAN) tablet 4 mg (has no administration in time range)     Or   ondansetron (ZOFRAN) injection 4 mg (has no administration in time range)   melatonin tablet 5 mg (has no administration in time range)   gabapentin (NEURONTIN) capsule 400 mg (has no administration in time range)   aspirin chewable tablet 81 mg (has no administration in time range)   benzonatate (TESSALON) capsule 200 mg (has no administration in time range)   cholecalciferol (VITAMIN D3) tablet 500 Units (has no administration in time range)   lidocaine (LIDODERM) 5 % 1 patch (has no administration in time range)   meclizine (ANTIVERT) tablet 25 mg (has no administration in time range)   methocarbamol (ROBAXIN) tablet 500 mg (has no administration in time range)   metoprolol succinate XL (TOPROL-XL) 24 hr tablet 50 mg (has no administration in time range)   pantoprazole (PROTONIX) EC tablet 40 mg (has no administration in time range)   midodrine (PROAMATINE) tablet 5 mg (has no administration in time range)   cefTRIAXone (ROCEPHIN) 2 g in sodium chloride 0.9 % 100 mL IVPB (2 g Intravenous New Bag 6/14/23 8061)   gabapentin (NEURONTIN) capsule 400 mg (400 mg Oral Given 6/15/23 4443)     XR Chest 1 View    Result Date: 6/14/2023  No acute pulmonary disease. No significant change from prior examination. Electronically Signed: Maikel Moore  6/14/2023 8:24 PM EDT  Workstation ID: [id number]    MDM:    Chart Review:  Discharge summary : 5/22/2023  Hospital Course:  Deion Nicholas is a 82 y.o. male who presented to the hospital with left lower extremity cellulitis that was not responding to outpatient clindamycin therapy.  Patient was placed on IV antibiotics including  Rocephin.  Dose was increased to 2 g.  Patient's left lower extremity improved over the next 48 hours.  He was eventually switched over to oral Augmentin.  Lower extremity Doppler ruled out DVT.  He was seen by cardiology for bilateral lower extremity swelling and mitral regurgitation as well as aortic regurgitation.  His EF remained 35% despite treatment.  He already has ICD placed.  Once cleared by cardiology he was discharged home with close cardiac follow-up instructions and p.o. Augmentin.       ECHO: 5/23/2023  Interpretation Summary       •  Left ventricular ejection fraction appears to be 31 - 35%.  •  Moderate aortic valve regurgitation is present.  •  Moderate mitral valve regurgitation is present.  •  Estimated right ventricular systolic pressure from tricuspid regurgitation is normal (<35 mmHg).  •  Mild dilation of the aortic root is present       Stress Myoview: 4/18/2023  Interpretation Summary       •  Myocardial perfusion imaging indicates a large-sized infarct located in the inferior wall with no significant ischemia noted.  •  Left ventricular ejection fraction is moderately reduced (Calculated EF = 40%).  •  Abnormal LV wall motion consistent with mild hypokineses of the inferior wall.  •  Impressions are consistent with a low risk study.  •  There is no prior study available for comparison.  •  Findings consistent with a normal ECG stress test.    Patient is an 82-year-old male with a history of hypertension hyperlipidemia and coronary artery disease as well as a history of left lower leg cellulitis that comes in today with redness and swelling and pain to the left lower extremity he also complains of some intermittent chest discomfort and left arm discomfort.  This is concerning for acute coronary syndrome unstable angina left lower leg DVT left lower leg cellulitis patient had IV established and I interpreted the blood work as being essentially normal the patient does have an elevated D-dimer  and I did consider doing a Doppler tonight of the left lower extremity but they are not available so the patient will need to have the Doppler in the morning.  The patient does have a history of coronary artery disease with recent rest Myoview and echocardiogram that were unremarkable.  I felt it safe that the patient be admitted to the hospital for chest pain rule out as well as having the Doppler in the morning the patient was stable at admission he verbalized understood the need for admission and was agreeable this plan of care                                      No follow-up provider specified.       Medication List      No changes were made to your prescriptions during this visit.            Natividad Ortiz, APRN  06/15/23 0515

## 2023-06-15 ENCOUNTER — APPOINTMENT (OUTPATIENT)
Dept: CARDIOLOGY | Facility: HOSPITAL | Age: 83
End: 2023-06-15
Payer: MEDICARE

## 2023-06-15 ENCOUNTER — READMISSION MANAGEMENT (OUTPATIENT)
Dept: CALL CENTER | Facility: HOSPITAL | Age: 83
End: 2023-06-15
Payer: OTHER GOVERNMENT

## 2023-06-15 PROBLEM — L03.116 CELLULITIS OF LEFT LOWER EXTREMITY: Status: ACTIVE | Noted: 2023-06-15

## 2023-06-15 LAB
BH CV LOWER VASCULAR LEFT COMMON FEMORAL AUGMENT: NORMAL
BH CV LOWER VASCULAR LEFT COMMON FEMORAL COMPETENT: NORMAL
BH CV LOWER VASCULAR LEFT COMMON FEMORAL COMPRESS: NORMAL
BH CV LOWER VASCULAR LEFT COMMON FEMORAL PHASIC: NORMAL
BH CV LOWER VASCULAR LEFT COMMON FEMORAL SPONT: NORMAL
BH CV LOWER VASCULAR LEFT DISTAL FEMORAL COMPRESS: NORMAL
BH CV LOWER VASCULAR LEFT GASTRONEMIUS COMPRESS: NORMAL
BH CV LOWER VASCULAR LEFT GREATER SAPH AK COMPRESS: NORMAL
BH CV LOWER VASCULAR LEFT GREATER SAPH BK COMPRESS: NORMAL
BH CV LOWER VASCULAR LEFT LESSER SAPH COMPRESS: NORMAL
BH CV LOWER VASCULAR LEFT MID FEMORAL AUGMENT: NORMAL
BH CV LOWER VASCULAR LEFT MID FEMORAL COMPETENT: NORMAL
BH CV LOWER VASCULAR LEFT MID FEMORAL COMPRESS: NORMAL
BH CV LOWER VASCULAR LEFT MID FEMORAL PHASIC: NORMAL
BH CV LOWER VASCULAR LEFT MID FEMORAL SPONT: NORMAL
BH CV LOWER VASCULAR LEFT PERONEAL COMPRESS: NORMAL
BH CV LOWER VASCULAR LEFT POPLITEAL AUGMENT: NORMAL
BH CV LOWER VASCULAR LEFT POPLITEAL COMPETENT: NORMAL
BH CV LOWER VASCULAR LEFT POPLITEAL COMPRESS: NORMAL
BH CV LOWER VASCULAR LEFT POPLITEAL PHASIC: NORMAL
BH CV LOWER VASCULAR LEFT POPLITEAL SPONT: NORMAL
BH CV LOWER VASCULAR LEFT POSTERIOR TIBIAL COMPRESS: NORMAL
BH CV LOWER VASCULAR LEFT PROXIMAL FEMORAL COMPRESS: NORMAL
BH CV LOWER VASCULAR LEFT SAPHENOFEMORAL JUNCTION COMPRESS: NORMAL
BH CV LOWER VASCULAR RIGHT COMMON FEMORAL AUGMENT: NORMAL
BH CV LOWER VASCULAR RIGHT COMMON FEMORAL COMPETENT: NORMAL
BH CV LOWER VASCULAR RIGHT COMMON FEMORAL COMPRESS: NORMAL
BH CV LOWER VASCULAR RIGHT COMMON FEMORAL PHASIC: NORMAL
BH CV LOWER VASCULAR RIGHT COMMON FEMORAL SPONT: NORMAL
BH CV POP FLUID COLLECT LEFT: 1
QT INTERVAL: 366 MS
TROPONIN T SERPL HS-MCNC: 16 NG/L

## 2023-06-15 PROCEDURE — 84484 ASSAY OF TROPONIN QUANT: CPT | Performed by: NURSE PRACTITIONER

## 2023-06-15 PROCEDURE — 36415 COLL VENOUS BLD VENIPUNCTURE: CPT | Performed by: NURSE PRACTITIONER

## 2023-06-15 PROCEDURE — 25010000002 CEFTRIAXONE PER 250 MG: Performed by: NURSE PRACTITIONER

## 2023-06-15 PROCEDURE — 93971 EXTREMITY STUDY: CPT

## 2023-06-15 RX ORDER — ACETAMINOPHEN 325 MG/1
650 TABLET ORAL EVERY 4 HOURS PRN
Status: DISCONTINUED | OUTPATIENT
Start: 2023-06-15 | End: 2023-06-17 | Stop reason: HOSPADM

## 2023-06-15 RX ORDER — ALUMINA, MAGNESIA, AND SIMETHICONE 2400; 2400; 240 MG/30ML; MG/30ML; MG/30ML
15 SUSPENSION ORAL EVERY 6 HOURS PRN
Status: DISCONTINUED | OUTPATIENT
Start: 2023-06-15 | End: 2023-06-17 | Stop reason: HOSPADM

## 2023-06-15 RX ORDER — BISACODYL 10 MG
10 SUPPOSITORY, RECTAL RECTAL DAILY PRN
Status: DISCONTINUED | OUTPATIENT
Start: 2023-06-15 | End: 2023-06-17 | Stop reason: HOSPADM

## 2023-06-15 RX ORDER — MELATONIN
500 DAILY
Status: DISCONTINUED | OUTPATIENT
Start: 2023-06-15 | End: 2023-06-17 | Stop reason: HOSPADM

## 2023-06-15 RX ORDER — ACETAMINOPHEN 650 MG/1
650 SUPPOSITORY RECTAL EVERY 4 HOURS PRN
Status: DISCONTINUED | OUTPATIENT
Start: 2023-06-15 | End: 2023-06-17 | Stop reason: HOSPADM

## 2023-06-15 RX ORDER — GABAPENTIN 400 MG/1
400 CAPSULE ORAL ONCE
Status: COMPLETED | OUTPATIENT
Start: 2023-06-15 | End: 2023-06-15

## 2023-06-15 RX ORDER — MECLIZINE HYDROCHLORIDE 25 MG/1
25 TABLET ORAL 3 TIMES DAILY PRN
Status: DISCONTINUED | OUTPATIENT
Start: 2023-06-15 | End: 2023-06-17 | Stop reason: HOSPADM

## 2023-06-15 RX ORDER — DOXYCYCLINE 100 MG/1
100 CAPSULE ORAL EVERY 12 HOURS SCHEDULED
Status: DISCONTINUED | OUTPATIENT
Start: 2023-06-15 | End: 2023-06-17 | Stop reason: HOSPADM

## 2023-06-15 RX ORDER — BISACODYL 5 MG/1
5 TABLET, DELAYED RELEASE ORAL DAILY PRN
Status: DISCONTINUED | OUTPATIENT
Start: 2023-06-15 | End: 2023-06-17 | Stop reason: HOSPADM

## 2023-06-15 RX ORDER — PANTOPRAZOLE SODIUM 40 MG/1
40 TABLET, DELAYED RELEASE ORAL
Status: DISCONTINUED | OUTPATIENT
Start: 2023-06-15 | End: 2023-06-17 | Stop reason: HOSPADM

## 2023-06-15 RX ORDER — SUCRALFATE 1 G/1
1 TABLET ORAL 4 TIMES DAILY
COMMUNITY

## 2023-06-15 RX ORDER — ONDANSETRON 2 MG/ML
4 INJECTION INTRAMUSCULAR; INTRAVENOUS EVERY 6 HOURS PRN
Status: DISCONTINUED | OUTPATIENT
Start: 2023-06-15 | End: 2023-06-17 | Stop reason: HOSPADM

## 2023-06-15 RX ORDER — MIDODRINE HYDROCHLORIDE 5 MG/1
5 TABLET ORAL EVERY 8 HOURS PRN
Status: DISCONTINUED | OUTPATIENT
Start: 2023-06-15 | End: 2023-06-17 | Stop reason: HOSPADM

## 2023-06-15 RX ORDER — ONDANSETRON 4 MG/1
4 TABLET, FILM COATED ORAL EVERY 6 HOURS PRN
Status: DISCONTINUED | OUTPATIENT
Start: 2023-06-15 | End: 2023-06-17 | Stop reason: HOSPADM

## 2023-06-15 RX ORDER — ACETAMINOPHEN 160 MG/5ML
650 SOLUTION ORAL EVERY 4 HOURS PRN
Status: DISCONTINUED | OUTPATIENT
Start: 2023-06-15 | End: 2023-06-17 | Stop reason: HOSPADM

## 2023-06-15 RX ORDER — MIDODRINE HYDROCHLORIDE 5 MG/1
5 TABLET ORAL EVERY 8 HOURS
Status: DISCONTINUED | OUTPATIENT
Start: 2023-06-15 | End: 2023-06-15

## 2023-06-15 RX ORDER — SODIUM CHLORIDE 0.9 % (FLUSH) 0.9 %
10 SYRINGE (ML) INJECTION EVERY 12 HOURS SCHEDULED
Status: DISCONTINUED | OUTPATIENT
Start: 2023-06-15 | End: 2023-06-17 | Stop reason: HOSPADM

## 2023-06-15 RX ORDER — SODIUM CHLORIDE 9 MG/ML
40 INJECTION, SOLUTION INTRAVENOUS AS NEEDED
Status: DISCONTINUED | OUTPATIENT
Start: 2023-06-15 | End: 2023-06-17 | Stop reason: HOSPADM

## 2023-06-15 RX ORDER — METHOCARBAMOL 500 MG/1
500 TABLET, FILM COATED ORAL 3 TIMES DAILY PRN
Status: DISCONTINUED | OUTPATIENT
Start: 2023-06-15 | End: 2023-06-17 | Stop reason: HOSPADM

## 2023-06-15 RX ORDER — GABAPENTIN 400 MG/1
400 CAPSULE ORAL 4 TIMES DAILY
Status: DISCONTINUED | OUTPATIENT
Start: 2023-06-15 | End: 2023-06-17 | Stop reason: HOSPADM

## 2023-06-15 RX ORDER — POLYETHYLENE GLYCOL 3350 17 G/17G
17 POWDER, FOR SOLUTION ORAL DAILY PRN
Status: DISCONTINUED | OUTPATIENT
Start: 2023-06-15 | End: 2023-06-17 | Stop reason: HOSPADM

## 2023-06-15 RX ORDER — CHLORHEXIDINE GLUCONATE 4 G/100ML
1 SOLUTION TOPICAL DAILY PRN
COMMUNITY

## 2023-06-15 RX ORDER — BENZONATATE 100 MG/1
200 CAPSULE ORAL 3 TIMES DAILY PRN
Status: DISCONTINUED | OUTPATIENT
Start: 2023-06-15 | End: 2023-06-17 | Stop reason: HOSPADM

## 2023-06-15 RX ORDER — LIDOCAINE 50 MG/G
1 PATCH TOPICAL DAILY PRN
Status: DISCONTINUED | OUTPATIENT
Start: 2023-06-15 | End: 2023-06-17 | Stop reason: HOSPADM

## 2023-06-15 RX ORDER — ASPIRIN 81 MG/1
81 TABLET, CHEWABLE ORAL DAILY
Status: DISCONTINUED | OUTPATIENT
Start: 2023-06-15 | End: 2023-06-17 | Stop reason: HOSPADM

## 2023-06-15 RX ORDER — SODIUM CHLORIDE 0.9 % (FLUSH) 0.9 %
10 SYRINGE (ML) INJECTION AS NEEDED
Status: DISCONTINUED | OUTPATIENT
Start: 2023-06-15 | End: 2023-06-17 | Stop reason: HOSPADM

## 2023-06-15 RX ORDER — AMOXICILLIN 250 MG
1 CAPSULE ORAL 2 TIMES DAILY
COMMUNITY

## 2023-06-15 RX ORDER — CHOLECALCIFEROL (VITAMIN D3) 125 MCG
5 CAPSULE ORAL NIGHTLY PRN
Status: DISCONTINUED | OUTPATIENT
Start: 2023-06-15 | End: 2023-06-17 | Stop reason: HOSPADM

## 2023-06-15 RX ORDER — AMOXICILLIN 250 MG
2 CAPSULE ORAL 2 TIMES DAILY
Status: DISCONTINUED | OUTPATIENT
Start: 2023-06-15 | End: 2023-06-17 | Stop reason: HOSPADM

## 2023-06-15 RX ORDER — METOPROLOL SUCCINATE 50 MG/1
50 TABLET, EXTENDED RELEASE ORAL DAILY
Status: DISCONTINUED | OUTPATIENT
Start: 2023-06-15 | End: 2023-06-17 | Stop reason: HOSPADM

## 2023-06-15 RX ADMIN — DOXYCYCLINE 100 MG: 100 CAPSULE ORAL at 21:47

## 2023-06-15 RX ADMIN — GABAPENTIN 400 MG: 400 CAPSULE ORAL at 21:47

## 2023-06-15 RX ADMIN — DOXYCYCLINE 100 MG: 100 CAPSULE ORAL at 09:50

## 2023-06-15 RX ADMIN — Medication 500 UNITS: at 08:08

## 2023-06-15 RX ADMIN — METOPROLOL SUCCINATE 50 MG: 50 TABLET, EXTENDED RELEASE ORAL at 08:08

## 2023-06-15 RX ADMIN — GABAPENTIN 400 MG: 400 CAPSULE ORAL at 08:08

## 2023-06-15 RX ADMIN — Medication 10 ML: at 08:09

## 2023-06-15 RX ADMIN — Medication 10 ML: at 21:48

## 2023-06-15 RX ADMIN — GABAPENTIN 400 MG: 400 CAPSULE ORAL at 02:53

## 2023-06-15 RX ADMIN — PANTOPRAZOLE SODIUM 40 MG: 40 TABLET, DELAYED RELEASE ORAL at 08:08

## 2023-06-15 RX ADMIN — ASPIRIN 81 MG CHEWABLE TABLET 81 MG: 81 TABLET CHEWABLE at 08:08

## 2023-06-15 RX ADMIN — GABAPENTIN 400 MG: 400 CAPSULE ORAL at 14:39

## 2023-06-15 RX ADMIN — GABAPENTIN 400 MG: 400 CAPSULE ORAL at 17:56

## 2023-06-15 RX ADMIN — SENNOSIDES AND DOCUSATE SODIUM 2 TABLET: 50; 8.6 TABLET ORAL at 08:08

## 2023-06-15 RX ADMIN — CEFTRIAXONE 2 G: 2 INJECTION, POWDER, FOR SOLUTION INTRAMUSCULAR; INTRAVENOUS at 22:00

## 2023-06-15 RX ADMIN — SENNOSIDES AND DOCUSATE SODIUM 2 TABLET: 50; 8.6 TABLET ORAL at 21:51

## 2023-06-15 NOTE — PROGRESS NOTES
[facility]     Progress Note    Patient Name: Deion Nicholas  : 1940  MRN: [id number]  Primary Care Physician:  Makenna Motta MD  Date of admission: 2023  Service date and time: 06/15/23 11:55 EDT  Subjective   Subjective     Chief Complaint: Left lower extremity redness and pain, chest pain    HPI:   Deion Nicholas is a 82 y.o. male with PMHx of HTN, HLD, prostate cancer, CAD s/p CABG with left leg saphenous vein harvest, ischemic cardiomyopathy EF 35% status post AICD who presented to [facility] ED 2023 with complaints of left lower extremity pain and redness. He was admitted on 2023 for LLE cellulitis treated with IV rocephin and discharged on Augmentin. He stated he completed his Augmentin and his redness and swelling returned about a week ago. He denied any injury. He stated his initial injury was scratching his left lower leg with his nail early May but that has since healed; however, he keeps having recurrent cellulitis since that time. He has had some chest achiness and left arm achiness for about a week. He has had some intermittent shortness of breath.   Pt stated he experienced a similar situation about a year ago with his left leg. After review of records the patient was admitted 2021 with recurrent cellulitis and was seen by ID. ID did not feel the patient had cellulitis and consulted vascular surgery. Vascular surgery at that time encouraged compression stockings but not much to add from a vascular standpoint. Pt stated he wears compression stockings.      In the ED patient had WBC 6.80, afebrile, d-dimer elevated 0.89. HS troponin 18 > 15, proBNP normal at 499.6. EKG showed sinus rhythm rate 82, PVC, nonspecific intraventricular conduction delay, no significant change from prior.  Blood cultures were drawn.  He was started on IV Rocephin and admitted for further treatment of recurrent left lower extremity cellulitis    Medical record  review:     Lexiscan 4/18/2023: Large-sized infarcted located in the inferior wall with no significant ischemia, LV EF moderately reduced 40%, ow risk study, normal ECG stress test     Echo 5/23/2023: Left ventricular ejection fraction appears to be 31-35% moderate aortic valve regurgitation, moderate mitral valve regurgitation.  Estimated right ventricular systolic pressure from tricuspid regurgitation is normal.  Mild dilation of aortic root is present.         Objective      Seen and examined this morning.  Patient laying on the bed comfortably.    Patient mentioned this a second time he admitted to the hospital for left lower cellulitis and would like to be fixed.  Patient was started on Rocephin this morning, will add doxycycline.  ID is consulted and appreciate their help.      Denies chest pain, shortness of breath, nausea and vomiting.      Disposition: Continue to follow      Objective     Vitals:   Temp:  [97.6 °F (36.4 °C)-98.7 °F (37.1 °C)] 97.6 °F (36.4 °C)  Heart Rate:  [67-84] 67  Resp:  [16-18] 18  BP: (105-170)/(63-85) 130/71  Physical Exam    Constitutional: Awake, alert   Respiratory: Clear to auscultation bilaterally, nonlabored respirations    Cardiovascular: RRR, no murmurs, rubs, or gallops, palpable pedal pulses bilaterally   Gastrointestinal: Positive bowel sounds, soft, nontender, nondistended   Musculoskeletal: No bilateral ankle edema, no clubbing or cyanosis to extremities   Psychiatric: Appropriate affect, cooperative   Neurologic: Oriented x 3, strength symmetric in all extremities, Cranial Nerves grossly intact to confrontation, speech clear   Skin: Erythema present in the left lower leg    Result Review    Result Review:  I have personally reviewed the results from the time of this admission to 6/15/2023 11:55 EDT and agree with these findings:  [x]  Laboratory list / accordion  [x]  Microbiology  []  Radiology  []  EKG/Telemetry   []  Cardiology/Vascular   []  Pathology  []  Old  records  []  Other:  Most notable findings include: Blood culture pending.      Assessment & Plan   Assessment / Plan       Active Hospital Problems:  Active Hospital Problems    Diagnosis    • **Cellulitis of left lower extremity    • Peripheral neuropathy    • Hyperlipidemia, mixed    • Paroxysmal ventricular tachycardia    • Presence of automatic implantable cardioverter-defibrillator    • Ischemic cardiomyopathy    • Coronary artery disease      Plan:      Recurrent left lower extremity cellulitis  -h/o left leg saphenous vein harvest for CABG  -pt failed outpatient treatment with clindamycin initially then treated with IV rocephin and discharged on Augmentin. Cellulitis reoccurred about 1 week after completion  -blood cultures drawn in ED  -WBC 6.8  -d-dimer elevated 0.89, venous duplex ordered by ED  -Rocephin and doxycycline.  -consult ID for further recommendations due to recurrent cellulitis     Chest pain  -EKG: sinus rhythm rate 82, PVC, nonspecific intraventricular conduction delay, no significant change from prior  -HS troponin 18 >15, proBNP normal 299.6  -Lexiscan 4/18/2023: Large-sized infarcted located in the inferior wall with no significant ischemia, LV EF moderately reduced 40%, ow risk study, normal ECG stress test  -Echo 5/23/2023: Left ventricular ejection fraction appears to be 31-35% moderate aortic valve regurgitation, moderate mitral valve regurgitation.  Estimated right ventricular systolic pressure from tricuspid regurgitation is normal.  Mild dilation of aortic root is present.  -cont home asa, BB     CAD s/p CABG  Ischemic cardiomyopathy  LV systolic failure, EF 35% s/p AICD  Hypertension  Hyperlipidemia  -cont home asa, BB, zetia     H/o prostate CA              DVT prophylaxis:  Mechanical DVT prophylaxis orders are present.    CODE STATUS:   Level Of Support Discussed With: Patient  Code Status (Patient has no pulse and is not breathing): CPR (Attempt to Resuscitate)  Medical  Interventions (Patient has pulse or is breathing): Full Support    Disposition:  I expect patient to be discharged 24 to 40 hours  Amrit Silva MD

## 2023-06-15 NOTE — H&P
[facility] Medicine Services  History & Physical    Patient Name: Deion Nicholas  : 1940  MRN: [id number]  Primary Care Physician:  Makenna Motta MD  Date of admission: 2023  Date and Time of Service: 2023 at 2300    Subjective      Chief Complaint: Left lower extremity redness and pain, chest pain    History of Present Illness: Deion Nicholas is a 82 y.o. male with PMHx of HTN, HLD, prostate cancer, CAD s/p CABG with left leg saphenous vein harvest, ischemic cardiomyopathy EF 35% status post AICD who presented to [facility] ED 2023 with complaints of left lower extremity pain and redness. He was admitted on 2023 for LLE cellulitis treated with IV rocephin and discharged on Augmentin. He stated he completed his Augmentin and his redness and swelling returned about a week ago. He denied any injury. He stated his initial injury was scratching his left lower leg with his nail early May but that has since healed; however, he keeps having recurrent cellulitis since that time. He has had some chest achiness and left arm achiness for about a week. He has had some intermittent shortness of breath.   Pt stated he experienced a similar situation about a year ago with his left leg. After review of records the patient was admitted 2021 with recurrent cellulitis and was seen by ID. ID did not feel the patient had cellulitis and consulted vascular surgery. Vascular surgery at that time encouraged compression stockings but not much to add from a vascular standpoint. Pt stated he wears compression stockings.     In the ED patient had WBC 6.80, afebrile, d-dimer elevated 0.89. HS troponin 18 > 15, proBNP normal at 499.6. EKG showed sinus rhythm rate 82, PVC, nonspecific intraventricular conduction delay, no significant change from prior.  Blood cultures were drawn.  He was started on IV Rocephin and admitted for further treatment of recurrent left lower extremity  cellulitis    Medical record review:    Lexiscan 4/18/2023: Large-sized infarcted located in the inferior wall with no significant ischemia, LV EF moderately reduced 40%, ow risk study, normal ECG stress test    Echo 5/23/2023: Left ventricular ejection fraction appears to be 31-35% moderate aortic valve regurgitation, moderate mitral valve regurgitation.  Estimated right ventricular systolic pressure from tricuspid regurgitation is normal.  Mild dilation of aortic root is present.    Review of Systems   Constitutional: Negative.   HENT: Negative.     Eyes: Negative.    Cardiovascular: Negative.    Respiratory: Negative.     Endocrine: Negative.    Hematologic/Lymphatic: Negative.    Skin:  Positive for poor wound healing.   Musculoskeletal: Negative.    Gastrointestinal: Negative.    Genitourinary: Negative.    Neurological: Negative.    Psychiatric/Behavioral: Negative.     Allergic/Immunologic: Negative.    All other systems reviewed and are negative.     Personal History     Past Medical History:   Diagnosis Date    Aneurysm     Cardiomyopathy     ICD implantation    Cellulitis of left lower extremity 2010    recurrent    CHD (coronary heart disease)     S/P CABG & PCI    Dyslipidemia     History of ventricular tachycardia     Hypertension     Myocardial infarction     Inferior MI    Pinched nerve     L4-L5    Prostate cancer        Past Surgical History:   Procedure Laterality Date    ANGIOPLASTY  2000 04/1996 Stent: Palmaz- Huy stent/LAD 07/1996-RCA: 2000-Tetra stent Guidant to proximal RCA, mid to distal artery 2 sequential Guidant Tetra stents    APPENDECTOMY      CARDIAC ABLATION  April and May 2019    CARDIAC CATHETERIZATION  07/2017    1996 x2, Nov. 2000, 05/2010-cath 08/2015-no stents 2017    CARDIAC DEFIBRILLATOR PLACEMENT      COLONOSCOPY W/ POLYPECTOMY      Mult colonoscopy's for polyp resection     CORONARY ARTERY BYPASS GRAFT  05/2010    x5 vessel: LMA to diagonal, LAD, intermedius, obtuse  marginal, RCA    CORONARY STENT PLACEMENT      ENDOSCOPY N/A 6/24/2019    Procedure: ESOPHAGOGASTRODUODENOSCOPY with dilitation Bougie 50, 54;  Surgeon: Roddy Coronel MD;  Location: [facility] ENDOSCOPY;  Service: Gastroenterology    INSERT / REPLACE / REMOVE PACEMAKER      KNEE OPEN LATERAL RELEASE Left     reduction    OTHER SURGICAL HISTORY  01/2018    ICD implantation    PROSTATE SURGERY  2015    Robiotic surgery- Cyber Knife:       Family History: family history includes Heart disease in his father; Pancreatic cancer in his mother.     Social History:  reports that he quit smoking about 20 years ago. His smoking use included cigarettes. He has a 17.00 pack-year smoking history. He has never used smokeless tobacco. He reports that he does not currently use alcohol. He reports that he does not use drugs.    Home Medications:  Prior to Admission Medications       Prescriptions Last Dose Informant Patient Reported? Taking?    aspirin 81 MG chewable tablet   Yes No    Chew 1 tablet Daily.    benzonatate (TESSALON) 200 MG capsule   No No    Take 1 capsule by mouth 3 (Three) Times a Day As Needed for Cough.    Cholecalciferol 10 MCG (400 UNIT) tablet  Self Yes No    Take 2 tablets daily    cyanocobalamin 1000 MCG/ML injection  Self Yes No    Inject 1 mL into the appropriate muscle as directed by prescriber Every 14 (Fourteen) Days.    ezetimibe (ZETIA) 10 MG tablet  Self Yes No    Take 1 tablet by mouth Every Evening.    gabapentin (NEURONTIN) 400 MG capsule  Self Yes No    Take 1 capsule by mouth 4 (Four) Times a Day.    lidocaine (LIDODERM) 5 %  Self Yes No    Place 1 patch on the skin as directed by provider Daily As Needed. Remove & Discard patch within 12 hours or as directed by MD    meclizine (ANTIVERT) 25 MG tablet   No No    Take 1 tablet by mouth 3 (Three) Times a Day As Needed for Dizziness for up to 10 doses.    methocarbamol (ROBAXIN) 500 MG tablet  Self Yes No    Take 1 tablet by mouth 3 (Three) Times a  Day As Needed for Muscle Spasms.    metoprolol succinate XL (TOPROL-XL) 50 MG 24 hr tablet   Yes No    Take 1 tablet by mouth Daily.    midodrine (PROAMATINE) 5 MG tablet  Spouse/Significant Other Yes No    Take 1 tablet by mouth 3 (Three) Times a Day Before Meals.    nitroglycerin (NITROSTAT) 0.4 MG SL tablet  Self Yes No    Place 1 tablet under the tongue Every 5 (Five) Minutes As Needed.    Omega-3 Fatty Acids (fish oil) 1000 MG capsule capsule  Self Yes No    Take 2 capsules by mouth 2 (Two) Times a Day With Meals.    pantoprazole (PROTONIX) 40 MG EC tablet  Self Yes No    Take 1 tablet by mouth Daily.              Allergies:  Allergies   Allergen Reactions    Lovastatin Myalgia    Pravastatin Myalgia and Unknown (See Comments)     unknown    Simvastatin Unknown (See Comments) and Myalgia     unknown    Spironolactone Other (See Comments)     Gynecomastia        Objective      Vitals:   Temp:  [97.8 °F (36.6 °C)-98.7 °F (37.1 °C)] 97.8 °F (36.6 °C)  Heart Rate:  [68-84] 84  Resp:  [16-18] 16  BP: (105-163)/(63-81) 163/81    Physical Exam  Vitals and nursing note reviewed.   HENT:      Head: Normocephalic and atraumatic.   Eyes:      Extraocular Movements: Extraocular movements intact.      Pupils: Pupils are equal, round, and reactive to light.   Cardiovascular:      Rate and Rhythm: Normal rate and regular rhythm.      Pulses: Normal pulses.      Heart sounds: Normal heart sounds.   Pulmonary:      Effort: Pulmonary effort is normal.      Breath sounds: Normal breath sounds.   Abdominal:      General: Bowel sounds are normal.      Palpations: Abdomen is soft.      Tenderness: There is no abdominal tenderness.   Musculoskeletal:         General: Normal range of motion.      Left lower leg: Edema present.   Skin:     General: Skin is warm and dry.      Findings: Erythema present.      Comments: Left lower leg with erythema   Neurological:      Mental Status: He is alert and oriented to person, place, and time.    Psychiatric:         Mood and Affect: Mood normal.         Behavior: Behavior normal.       Result Review    Result Review:  I have personally reviewed the results from the time of this admission to 6/15/2023 03:03 EDT and agree with these findings:  [x]  Laboratory  []  Microbiology  [x]  Radiology  []  EKG/Telemetry   []  Cardiology/Vascular   []  Pathology  [x]  Old records    Assessment & Plan        Active Hospital Problems:  Active Hospital Problems    Diagnosis     **Cellulitis of left lower extremity     Peripheral neuropathy     Hyperlipidemia, mixed     Paroxysmal ventricular tachycardia     Presence of automatic implantable cardioverter-defibrillator     Ischemic cardiomyopathy     Coronary artery disease      Assessment/Plan:    Recurrent left lower extremity cellulitis  -h/o left leg saphenous vein harvest for CABG  -pt failed outpatient treatment with clindamycin initially then treated with IV rocephin and discharged on Augmentin. Cellulitis reoccurred about 1 week after completion  -blood cultures drawn in ED  -WBC 6.8  -d-dimer elevated 0.89, venous duplex ordered by ED  -IV rocephin started in ED and will continue  -consult ID for further recommendations due to recurrent cellulitis    Chest pain  -EKG: sinus rhythm rate 82, PVC, nonspecific intraventricular conduction delay, no significant change from prior  -HS troponin 18 >15, proBNP normal 299.6  -Lexiscan 4/18/2023: Large-sized infarcted located in the inferior wall with no significant ischemia, LV EF moderately reduced 40%, ow risk study, normal ECG stress test  -Echo 5/23/2023: Left ventricular ejection fraction appears to be 31-35% moderate aortic valve regurgitation, moderate mitral valve regurgitation.  Estimated right ventricular systolic pressure from tricuspid regurgitation is normal.  Mild dilation of aortic root is present.  -cont home asa, BB    CAD s/p CABG  Ischemic cardiomyopathy  LV systolic failure, EF 35% s/p  AICD  Hypertension  Hyperlipidemia  -cont home asa, BB, zetia    H/o prostate CA      DVT prophylaxis:  Mechanical DVT prophylaxis orders are present.    CODE STATUS:    Level Of Support Discussed With: Patient  Code Status (Patient has no pulse and is not breathing): CPR (Attempt to Resuscitate)  Medical Interventions (Patient has pulse or is breathing): Full Support    Admission Status:  I believe this patient meets inpatient status.    I discussed the patient's findings and my recommendations with patient.    This patient has been examined wearing appropriate Personal Protective Equipment 06/15/23    Electronically signed by NIMCO Chan, 06/15/23, 3:04 AM EDT.

## 2023-06-15 NOTE — PLAN OF CARE
Goal Outcome Evaluation:   Patient is alert and oriented, able to make needs known. Patient has no complaint of pain noted at this time. Will continue to monitor.

## 2023-06-15 NOTE — CASE MANAGEMENT/SOCIAL WORK
Discharge Planning Assessment   [facility]     Patient Name: Deion Nicholas  MRN: [id number]  Today's Date: 6/15/2023    Admit Date: 6/14/2023    Plan: Return Home   Discharge Needs Assessment       Row Name 06/15/23 1052       Living Environment    People in Home alone    Current Living Arrangements home    Primary Care Provided by self    Provides Primary Care For no one    Family Caregiver if Needed child(tai), adult    Quality of Family Relationships helpful;involved;supportive    Able to Return to Prior Arrangements yes       Resource/Environmental Concerns    Transportation Concerns none       Transition Planning    Patient/Family Anticipates Transition to home    Transportation Anticipated family or friend will provide  son- Diallo       Discharge Needs Assessment    Readmission Within the Last 30 Days no previous admission in last 30 days    Equipment Currently Used at Home walker, rolling;cane, quad tip    Concerns to be Addressed discharge planning                   Discharge Plan       Row Name 06/15/23 1053       Plan    Plan Return Home    Plan Comments Spoke with patient at [facility], Verified PCP and Pharmacy. No transportation or medication coverage issues. DC Barrier :IVAB;s                  Continued Care and Services - Admitted Since 6/14/2023    Coordination has not been started for this encounter.       Expected Discharge Date and Time       Expected Discharge Date Expected Discharge Time    Jun 16, 2023            Demographic Summary       Row Name 06/15/23 1051       General Information    Admission Type inpatient    Required Notices Provided Important Message from Medicare    Referral Source patient    Reason for Consult discharge planning    Preferred Language English                   Functional Status       Row Name 06/15/23 1052       Functional Status    Usual Activity Tolerance good    Current Activity Tolerance good       Functional Status, IADL    Medications independent     Meal Preparation independent    Housekeeping independent    Laundry independent    Shopping independent       Mental Status    General Appearance WDL WDL                Patient Forms       Row Name 06/15/23 1050       Patient Forms    Important Message from Medicare ([facility]) Delivered  Registration 6/15/23           Met with patient in room wearing PPE: mask, face shield/goggles, gloves, gown.      Maintained distance greater than six feet and spent less than 15 minutes in the room.     Hanna Shannon RN

## 2023-06-15 NOTE — OUTREACH NOTE
Medical Week 3 Survey      Flowsheet Row Responses   [facility] facility patient discharged from? [facility]   Does the patient have one of the following disease processes/diagnoses(primary or secondary)? Other   Week 3 attempt successful? No   Unsuccessful attempts Attempt 1   Revoke Readmitted            [facility] - Registered Nurse

## 2023-06-16 LAB
ANION GAP SERPL CALCULATED.3IONS-SCNC: 9 MMOL/L (ref 5–15)
BASOPHILS # BLD AUTO: 0 10*3/MM3 (ref 0–0.2)
BASOPHILS NFR BLD AUTO: 0.9 % (ref 0–1.5)
BUN SERPL-MCNC: 15 MG/DL (ref 8–23)
BUN/CREAT SERPL: 20 (ref 7–25)
CALCIUM SPEC-SCNC: 9.5 MG/DL (ref 8.6–10.5)
CHLORIDE SERPL-SCNC: 103 MMOL/L (ref 98–107)
CO2 SERPL-SCNC: 27 MMOL/L (ref 22–29)
CREAT SERPL-MCNC: 0.75 MG/DL (ref 0.76–1.27)
DEPRECATED RDW RBC AUTO: 48.1 FL (ref 37–54)
EGFRCR SERPLBLD CKD-EPI 2021: 91.2 ML/MIN/1.73
EOSINOPHIL # BLD AUTO: 0.3 10*3/MM3 (ref 0–0.4)
EOSINOPHIL NFR BLD AUTO: 5.2 % (ref 0.3–6.2)
ERYTHROCYTE [DISTWIDTH] IN BLOOD BY AUTOMATED COUNT: 13.6 % (ref 12.3–15.4)
GLUCOSE SERPL-MCNC: 111 MG/DL (ref 65–99)
HCT VFR BLD AUTO: 37.8 % (ref 37.5–51)
HGB BLD-MCNC: 12.9 G/DL (ref 13–17.7)
LYMPHOCYTES # BLD AUTO: 1.7 10*3/MM3 (ref 0.7–3.1)
LYMPHOCYTES NFR BLD AUTO: 32.4 % (ref 19.6–45.3)
MCH RBC QN AUTO: 32.7 PG (ref 26.6–33)
MCHC RBC AUTO-ENTMCNC: 34.1 G/DL (ref 31.5–35.7)
MCV RBC AUTO: 96 FL (ref 79–97)
MONOCYTES # BLD AUTO: 0.6 10*3/MM3 (ref 0.1–0.9)
MONOCYTES NFR BLD AUTO: 11.3 % (ref 5–12)
NEUTROPHILS NFR BLD AUTO: 2.7 10*3/MM3 (ref 1.7–7)
NEUTROPHILS NFR BLD AUTO: 50.2 % (ref 42.7–76)
NRBC BLD AUTO-RTO: 0.1 /100 WBC (ref 0–0.2)
PLATELET # BLD AUTO: 187 10*3/MM3 (ref 140–450)
PMV BLD AUTO: 8.3 FL (ref 6–12)
POTASSIUM SERPL-SCNC: 4.6 MMOL/L (ref 3.5–5.2)
RBC # BLD AUTO: 3.93 10*6/MM3 (ref 4.14–5.8)
SODIUM SERPL-SCNC: 139 MMOL/L (ref 136–145)
WBC NRBC COR # BLD: 5.4 10*3/MM3 (ref 3.4–10.8)

## 2023-06-16 PROCEDURE — 97166 OT EVAL MOD COMPLEX 45 MIN: CPT

## 2023-06-16 PROCEDURE — 97161 PT EVAL LOW COMPLEX 20 MIN: CPT

## 2023-06-16 PROCEDURE — 85025 COMPLETE CBC W/AUTO DIFF WBC: CPT | Performed by: NURSE PRACTITIONER

## 2023-06-16 PROCEDURE — 80048 BASIC METABOLIC PNL TOTAL CA: CPT | Performed by: NURSE PRACTITIONER

## 2023-06-16 PROCEDURE — 36415 COLL VENOUS BLD VENIPUNCTURE: CPT | Performed by: NURSE PRACTITIONER

## 2023-06-16 PROCEDURE — 25010000002 CEFTRIAXONE PER 250 MG: Performed by: NURSE PRACTITIONER

## 2023-06-16 RX ADMIN — DOXYCYCLINE 100 MG: 100 CAPSULE ORAL at 08:31

## 2023-06-16 RX ADMIN — Medication 5 MG: at 20:16

## 2023-06-16 RX ADMIN — SENNOSIDES AND DOCUSATE SODIUM 2 TABLET: 50; 8.6 TABLET ORAL at 08:31

## 2023-06-16 RX ADMIN — PANTOPRAZOLE SODIUM 40 MG: 40 TABLET, DELAYED RELEASE ORAL at 08:31

## 2023-06-16 RX ADMIN — Medication 10 ML: at 08:31

## 2023-06-16 RX ADMIN — METOPROLOL SUCCINATE 50 MG: 50 TABLET, EXTENDED RELEASE ORAL at 08:31

## 2023-06-16 RX ADMIN — GABAPENTIN 400 MG: 400 CAPSULE ORAL at 17:30

## 2023-06-16 RX ADMIN — Medication 10 ML: at 20:16

## 2023-06-16 RX ADMIN — ASPIRIN 81 MG CHEWABLE TABLET 81 MG: 81 TABLET CHEWABLE at 08:31

## 2023-06-16 RX ADMIN — GABAPENTIN 400 MG: 400 CAPSULE ORAL at 20:16

## 2023-06-16 RX ADMIN — GABAPENTIN 400 MG: 400 CAPSULE ORAL at 12:19

## 2023-06-16 RX ADMIN — GABAPENTIN 400 MG: 400 CAPSULE ORAL at 08:31

## 2023-06-16 RX ADMIN — METHOCARBAMOL 500 MG: 500 TABLET ORAL at 20:16

## 2023-06-16 RX ADMIN — CEFTRIAXONE 2 G: 2 INJECTION, POWDER, FOR SOLUTION INTRAMUSCULAR; INTRAVENOUS at 22:13

## 2023-06-16 RX ADMIN — SENNOSIDES AND DOCUSATE SODIUM 2 TABLET: 50; 8.6 TABLET ORAL at 20:16

## 2023-06-16 RX ADMIN — ACETAMINOPHEN 650 MG: 325 TABLET, FILM COATED ORAL at 20:16

## 2023-06-16 RX ADMIN — DOXYCYCLINE 100 MG: 100 CAPSULE ORAL at 20:16

## 2023-06-16 RX ADMIN — Medication 500 UNITS: at 08:31

## 2023-06-16 NOTE — PLAN OF CARE
Goal Outcome Evaluation:              Outcome Evaluation: awaiting ID consult. posisble d/c today

## 2023-06-16 NOTE — PROGRESS NOTES
[facility]     Progress Note    Patient Name: Deion Nicholas  : 1940  MRN: [id number]  Primary Care Physician:  Makenna Motta MD  Date of admission: 2023  Service date and time: 23 11:38 EDT  Subjective   Subjective     Chief Complaint: Left lower extremity redness and pain, chest pain    HPI:   Deion Nicholas is a 82 y.o. male with PMHx of HTN, HLD, prostate cancer, CAD s/p CABG with left leg saphenous vein harvest, ischemic cardiomyopathy EF 35% status post AICD who presented to [facility] ED 2023 with complaints of left lower extremity pain and redness. He was admitted on 2023 for LLE cellulitis treated with IV rocephin and discharged on Augmentin. He stated he completed his Augmentin and his redness and swelling returned about a week ago. He denied any injury. He stated his initial injury was scratching his left lower leg with his nail early May but that has since healed; however, he keeps having recurrent cellulitis since that time. He has had some chest achiness and left arm achiness for about a week. He has had some intermittent shortness of breath.   Pt stated he experienced a similar situation about a year ago with his left leg. After review of records the patient was admitted 2021 with recurrent cellulitis and was seen by ID. ID did not feel the patient had cellulitis and consulted vascular surgery. Vascular surgery at that time encouraged compression stockings but not much to add from a vascular standpoint. Pt stated he wears compression stockings.      In the ED patient had WBC 6.80, afebrile, d-dimer elevated 0.89. HS troponin 18 > 15, proBNP normal at 499.6. EKG showed sinus rhythm rate 82, PVC, nonspecific intraventricular conduction delay, no significant change from prior.  Blood cultures were drawn.  He was started on IV Rocephin and admitted for further treatment of recurrent left lower extremity cellulitis    Medical record review:      Lexiscan 4/18/2023: Large-sized infarcted located in the inferior wall with no significant ischemia, LV EF moderately reduced 40%, ow risk study, normal ECG stress test     Echo 5/23/2023: Left ventricular ejection fraction appears to be 31-35% moderate aortic valve regurgitation, moderate mitral valve regurgitation.  Estimated right ventricular systolic pressure from tricuspid regurgitation is normal.  Mild dilation of aortic root is present.         Objective      Patient laying on the bed comfortably.    Patient has no complaint today.    Left lower extremity cellulitis improving.    Patient stated he started being coming back and forth for the same problem.    Stated he had this before and was resolved with IV antibiotic at home.    Continue current IV antibiotic Rocephin, doxycycline p.o.      Waiting on ID recommendation.    Denies chest pain, shortness of breath, nausea and vomiting.      Disposition: Hopefully discharge home in 24 to 48 hours    Objective     Vitals:   Temp:  [97.4 °F (36.3 °C)-98 °F (36.7 °C)] 97.4 °F (36.3 °C)  Heart Rate:  [65-83] 71  Resp:  [15-18] 16  BP: (139-166)/(64-80) 139/64  Physical Exam    Constitutional: Awake, alert   Respiratory: Clear to auscultation bilaterally, nonlabored respirations    Cardiovascular: RRR, no murmurs, rubs, or gallops, palpable pedal pulses bilaterally   Gastrointestinal: Positive bowel sounds, soft, nontender, nondistended   Musculoskeletal: No bilateral ankle edema, no clubbing or cyanosis to extremities   Psychiatric: Appropriate affect, cooperative   Neurologic: Oriented x 3, strength symmetric in all extremities, Cranial Nerves grossly intact to confrontation, speech clear   Skin: Erythema present in the left lower leg improved    Result Review    Result Review:  I have personally reviewed the results from the time of this admission to 6/16/2023 11:38 EDT and agree with these findings:  [x]  Laboratory list / accordion  [x]  Microbiology  []   Radiology  []  EKG/Telemetry   []  Cardiology/Vascular   []  Pathology  []  Old records  []  Other:  Most notable findings include: Blood culture pending.      Assessment & Plan   Assessment / Plan       Active Hospital Problems:  Active Hospital Problems    Diagnosis     **Cellulitis of left lower extremity     Peripheral neuropathy     Hyperlipidemia, mixed     Paroxysmal ventricular tachycardia     Presence of automatic implantable cardioverter-defibrillator     Ischemic cardiomyopathy     Coronary artery disease      Plan:      Recurrent left lower extremity cellulitis  -h/o left leg saphenous vein harvest for CABG  -pt failed outpatient treatment with clindamycin initially then treated with IV rocephin and discharged on Augmentin. Cellulitis reoccurred about 1 week after completion  -blood cultures drawn in ED  -WBC 6.8  -d-dimer elevated 0.89, venous duplex ordered by ED  -Rocephin and doxycycline.  -consult ID for further recommendations due to recurrent cellulitis     Chest pain  -EKG: sinus rhythm rate 82, PVC, nonspecific intraventricular conduction delay, no significant change from prior  -HS troponin 18 >15, proBNP normal 299.6  -Lexiscan 4/18/2023: Large-sized infarcted located in the inferior wall with no significant ischemia, LV EF moderately reduced 40%, ow risk study, normal ECG stress test  -Echo 5/23/2023: Left ventricular ejection fraction appears to be 31-35% moderate aortic valve regurgitation, moderate mitral valve regurgitation.  Estimated right ventricular systolic pressure from tricuspid regurgitation is normal.  Mild dilation of aortic root is present.  -cont home asa, BB     CAD s/p CABG  Ischemic cardiomyopathy  LV systolic failure, EF 35% s/p AICD  Hypertension  Hyperlipidemia  -cont home asa, BB, zetia     H/o prostate CA              DVT prophylaxis:  Mechanical DVT prophylaxis orders are present.    CODE STATUS:   Level Of Support Discussed With: Patient  Code Status (Patient has no  pulse and is not breathing): CPR (Attempt to Resuscitate)  Medical Interventions (Patient has pulse or is breathing): Full Support    Disposition:  I expect patient to be discharged 24 to 48 hours  Amrit Silva MD

## 2023-06-16 NOTE — CONSULTS
Infectious Diseases Consult Note    Referring Provider: Amrit Silva MD    Reason for Consultation: Left leg cellulitis    Patient Care Team:  Makenna Motta MD as PCP - General  Makenna Motta MD as PCP - Family Medicine    Chief complaint left leg pain redness and swelling    Subjective     The patient has been afebrile for the last 24 hours.  The patient is on room air, hemodynamically stable, and is tolerating antimicrobial therapy.    History of present illness:      Patient has a history of recurrent left leg cellulitis.  Has has been seen by our service multiple times and was sent to vascular for evaluation.  Patient presents on 6/14/2023 with continued left leg pain swelling and redness after being seen several weeks ago at our hospital and sent home on p.o. Augmentin.  Our service did not see the patient at that time.  Denies    Review of Systems   Review of Systems   Constitutional: Negative.    HENT: Negative.    Eyes: Negative.    Respiratory: Negative.    Cardiovascular: Positive for leg swelling.   Gastrointestinal: Negative.    Endocrine: Negative.    Genitourinary: Negative.    Musculoskeletal: Negative.    Skin: Positive for color change.   Neurological: Negative.    Psychiatric/Behavioral: Negative.    All other systems reviewed and are negative.      Medications  Medications Prior to Admission   Medication Sig Dispense Refill Last Dose   • aspirin 81 MG EC tablet Take 1 tablet by mouth Daily.   6/14/2023   • Cholecalciferol 10 MCG (400 UNIT) tablet Take 2 tablets by mouth Daily.   6/14/2023   • cyanocobalamin 1000 MCG/ML injection Inject 1 mL into the appropriate muscle as directed by prescriber Every 14 (Fourteen) Days.   Past Month   • ezetimibe (ZETIA) 10 MG tablet Take 1 tablet by mouth Every Evening.   6/14/2023   • gabapentin (NEURONTIN) 400 MG capsule Take 1 capsule by mouth 4 (Four) Times a Day.   6/14/2023   • methocarbamol (ROBAXIN) 500 MG tablet Take 1 tablet by  mouth 3 (Three) Times a Day As Needed for Muscle Spasms.   6/14/2023   • metoprolol succinate XL (TOPROL-XL) 50 MG 24 hr tablet Take 1 tablet by mouth Daily.   6/14/2023   • midodrine (PROAMATINE) 5 MG tablet Take 1 tablet by mouth 3 (Three) Times a Day Before Meals.   6/14/2023   • Omega-3 Fatty Acids (fish oil) 1000 MG capsule capsule Take 2 capsules by mouth 2 (Two) Times a Day With Meals.   6/14/2023   • pantoprazole (PROTONIX) 40 MG EC tablet Take 1 tablet by mouth Daily.   6/14/2023   • chlorhexidine (HIBICLENS) 4 % external liquid Apply 1 application topically to the appropriate area as directed Daily As Needed for Wound Care. Apply to leg(s)      • mupirocin (BACTROBAN) 2 % ointment Apply 1 application topically to the appropriate area as directed 3 (Three) Times a Day. Apply to leg(s)      • nitroglycerin (NITROSTAT) 0.4 MG SL tablet Place 1 tablet under the tongue Every 5 (Five) Minutes As Needed for Chest Pain.   More than a month   • sennosides-docusate (senna-docusate sodium) 8.6-50 MG per tablet Take 1 tablet by mouth 2 (Two) Times a Day.      • sucralfate (CARAFATE) 1 g tablet Take 1 tablet by mouth 4 (Four) Times a Day.          History  Past Medical History:   Diagnosis Date   • Aneurysm    • Cardiomyopathy     ICD implantation   • Cellulitis of left lower extremity 2010    recurrent   • CHD (coronary heart disease)     S/P CABG & PCI   • Dyslipidemia    • History of ventricular tachycardia    • Hypertension    • Myocardial infarction     Inferior MI   • Pinched nerve     L4-L5   • Prostate cancer      Past Surgical History:   Procedure Laterality Date   • ANGIOPLASTY  2000 04/1996 Stent: Palmaz- Huy stent/LAD 07/1996-RCA: 2000-Tetra stent Guidant to proximal RCA, mid to distal artery 2 sequential Guidant Tetra stents   • APPENDECTOMY     • CARDIAC ABLATION  April and May 2019   • CARDIAC CATHETERIZATION  07/2017    1996 x2, Nov. 2000, 05/2010-cath 08/2015-no stents 2017   • CARDIAC  DEFIBRILLATOR PLACEMENT     • COLONOSCOPY W/ POLYPECTOMY      Mult colonoscopy's for polyp resection    • CORONARY ARTERY BYPASS GRAFT  05/2010    x5 vessel: LMA to diagonal, LAD, intermedius, obtuse marginal, RCA   • CORONARY STENT PLACEMENT     • ENDOSCOPY N/A 6/24/2019    Procedure: ESOPHAGOGASTRODUODENOSCOPY with dilitation Bougie 50, 54;  Surgeon: Roddy Coronel MD;  Location: [facility] ENDOSCOPY;  Service: Gastroenterology   • INSERT / REPLACE / REMOVE PACEMAKER     • KNEE OPEN LATERAL RELEASE Left     reduction   • OTHER SURGICAL HISTORY  01/2018    ICD implantation   • PROSTATE SURGERY  2015    Robiotic surgery- Cyber Knife:       Family History  Family History   Problem Relation Age of Onset   • Pancreatic cancer Mother    • Heart disease Father        Social History   reports that he quit smoking about 20 years ago. His smoking use included cigarettes. He has a 17.00 pack-year smoking history. He has never used smokeless tobacco. He reports that he does not currently use alcohol. He reports that he does not use drugs.    Allergies  Lovastatin, Pravastatin, Simvastatin, and Spironolactone    Objective     Vital Signs   Vital Signs (last 24 hours)       06/15 0700  06/16 0659 06/16 0700  06/16 1819   Most Recent      Temp (°F) 97.5 -  98      97.4     97.4 (36.3) 06/16 1132    Heart Rate 62 -  83      71     71 06/16 1132    Resp 15 -  18      16     16 06/16 1132    /71 -  166/79      139/64     139/64 06/16 1130    SpO2 (%) 93 -  100      96     96 06/16 1132          Physical Exam:  Physical Exam  Vitals and nursing note reviewed.   Constitutional:       General: He is not in acute distress.     Appearance: Normal appearance. He is well-developed and normal weight. He is not diaphoretic.   HENT:      Head: Normocephalic and atraumatic.   Eyes:      Conjunctiva/sclera: Conjunctivae normal.      Pupils: Pupils are equal, round, and reactive to light.   Cardiovascular:      Rate and Rhythm: Normal rate  and regular rhythm.      Heart sounds: Normal heart sounds, S1 normal and S2 normal.   Pulmonary:      Effort: Pulmonary effort is normal. No respiratory distress.      Breath sounds: Normal breath sounds. No stridor. No wheezing or rales.   Abdominal:      General: Bowel sounds are normal. There is no distension.      Palpations: Abdomen is soft. There is no mass.      Tenderness: There is no abdominal tenderness. There is no guarding.   Musculoskeletal:         General: No deformity. Normal range of motion.      Cervical back: Neck supple.      Left lower leg: Edema present.   Skin:     General: Skin is warm and dry.      Coloration: Skin is not pale.      Findings: Erythema present. No rash.      Comments: Very mild left leg swelling erythema and warmth   Neurological:      Mental Status: He is alert and oriented to person, place, and time.      Cranial Nerves: No cranial nerve deficit.   Psychiatric:         Mood and Affect: Mood normal.         Microbiology  Microbiology Results (last 10 days)     Procedure Component Value - Date/Time    Blood Culture - Blood, Arm, Left [[id number]]  (Normal) Collected: 06/14/23 2336    Lab Status: Preliminary result Specimen: Blood from Arm, Left Updated: 06/15/23 2345     Blood Culture No growth at 24 hours    Blood Culture - Blood, Arm, Right [[id number]]  (Normal) Collected: 06/14/23 2336    Lab Status: Preliminary result Specimen: Blood from Arm, Right Updated: 06/15/23 2345     Blood Culture No growth at 24 hours          Laboratory  Results from last 7 days   Lab Units 06/16/23  0300   WBC 10*3/mm3 5.40   HEMOGLOBIN g/dL 12.9*   HEMATOCRIT % 37.8   PLATELETS 10*3/mm3 187     Results from last 7 days   Lab Units 06/16/23  0300   SODIUM mmol/L 139   POTASSIUM mmol/L 4.6   CHLORIDE mmol/L 103   CO2 mmol/L 27.0   BUN mg/dL 15   CREATININE mg/dL 0.75*   GLUCOSE mg/dL 111*   CALCIUM mg/dL 9.5     Results from last 7 days   Lab Units 06/16/23  0300   SODIUM mmol/L 139   POTASSIUM  mmol/L 4.6   CHLORIDE mmol/L 103   CO2 mmol/L 27.0   BUN mg/dL 15   CREATININE mg/dL 0.75*   GLUCOSE mg/dL 111*   CALCIUM mg/dL 9.5                   Radiology  Imaging Results (Last 72 Hours)     Procedure Component Value Units Date/Time    XR Chest 1 View [[id number]] Collected: 06/14/23 2024     Updated: 06/14/23 2026    Narrative:      XR CHEST 1 VW    Date of Exam: 6/14/2023 8:14 PM EDT    Indication: Chest Pain Protocol  Chest Pain Protocol    Comparison: 3/2020 findings:    The patient is status post sternotomy. There is a cardiac device projecting over the left hemithorax.        There is no acute infiltrate.    There is no pleural effusion.    The cardiac silhouette is unremarkable.    The visualized osseous structures demonstrate no gross abnormality.      Impression:        No acute pulmonary disease.    No significant change from prior examination.          Electronically Signed: Maikel Larsonordano    6/14/2023 8:24 PM EDT    Workstation ID: [id number]          Cardiology      Results Review:  I have reviewed all clinical data, test, lab, and imaging results.       Schedule Meds  aspirin, 81 mg, Oral, Daily  cefTRIAXone, 2 g, Intravenous, Q24H  cholecalciferol, 500 Units, Oral, Daily  doxycycline, 100 mg, Oral, Q12H  gabapentin, 400 mg, Oral, 4x Daily  metoprolol succinate XL, 50 mg, Oral, Daily  pantoprazole, 40 mg, Oral, QAM AC  senna-docusate sodium, 2 tablet, Oral, BID  sodium chloride, 10 mL, Intravenous, Q12H        Infusion Meds       PRN Meds  •  acetaminophen **OR** acetaminophen **OR** acetaminophen  •  aluminum-magnesium hydroxide-simethicone  •  benzonatate  •  senna-docusate sodium **AND** polyethylene glycol **AND** bisacodyl **AND** bisacodyl  •  lidocaine  •  meclizine  •  melatonin  •  methocarbamol  •  midodrine  •  ondansetron **OR** ondansetron  •  sodium chloride  •  sodium chloride  •  sodium chloride      Assessment & Plan       Assessment    Recurrent left leg cellulitis.  Patient was  recently seen in the hospital but not by our service and sent home with p.o. Augmentin.  Patient states that the pain redness and swelling returned even on antibiotics.  Patient has a history of recurrent left leg cellulitis.  States that he has to have IV antibiotics to see improvement.  Has been sent to vascular past for evaluation.  Current blood cultures and Doppler negative  -Patient now has mild left leg edema, erythema and swelling states that it is much improved since being on IV antibiotics    Plan    Patient is agreeable to receive his dose of IV Rocephin tonight and then be discharged home tomorrow on p.o. antibiotics  Can be discharged home in the morning on p.o. doxycycline 100 mg twice daily for 10 days  Continue supportive care  A.m. labs  Case discussed with RN    Tamiko Victor, [facility]  06/16/23  18:19 EDT    Note is dictated utilizing voice recognition software/Dragon

## 2023-06-16 NOTE — PLAN OF CARE
Goal Outcome Evaluation:  Plan of Care Reviewed With: (P) patient           Outcome Evaluation: (P) 82 y.o. male adm to [facility] on 6/14/23 w/ cellulitis of LLE, intermittent chest pain, and L arm pain. Pt was recently adm to [facility] on 5/22 for similar issue and d/c home. PMHx includes HTN, prostate CA, and CHD among other comorbidities. At baseline pt lives alone in a [facility]. He reports being indep w/ all household mobility, bed mobility, and ADLs. Pt reports having shoe inserts for his foot drop, but refuses to use them. Today pt is AAOx4. He was using the bathroom mod indep using a rw upon PT arrival. He amb mod indep back to the chair 15 ft. He amb using a rw for 15 more feet w/ CGA and using his two single point canes using CGA 15 ft. Pt appears to be functioning at his baseline. Recommend home at d/c as pt refuses PT.

## 2023-06-16 NOTE — PLAN OF CARE
Problem: Adult Inpatient Plan of Care  Goal: Plan of Care Review  Outcome: Ongoing, Progressing  Flowsheets (Taken 6/16/2023 0208)  Progress: no change  Plan of Care Reviewed With: patient  Goal: Patient-Specific Goal (Individualized)  Outcome: Ongoing, Progressing  Goal: Absence of Hospital-Acquired Illness or Injury  Outcome: Ongoing, Progressing  Intervention: Identify and Manage Fall Risk  Recent Flowsheet Documentation  Taken 6/16/2023 0206 by Brooklyn Kimball RN  Safety Promotion/Fall Prevention:   assistive device/personal items within reach   clutter free environment maintained   nonskid shoes/slippers when out of bed   room organization consistent   safety round/check completed  Taken 6/15/2023 2035 by Brooklyn Kimball RN  Safety Promotion/Fall Prevention:   assistive device/personal items within reach   clutter free environment maintained   nonskid shoes/slippers when out of bed   room organization consistent   safety round/check completed  Intervention: Prevent Skin Injury  Recent Flowsheet Documentation  Taken 6/15/2023 2035 by Brooklyn Kimball RN  Body Position:   position changed independently   supine  Intervention: Prevent and Manage VTE (Venous Thromboembolism) Risk  Recent Flowsheet Documentation  Taken 6/15/2023 2035 by Brooklyn Kimball RN  Activity Management: up ad nas  VTE Prevention/Management: sequential compression devices off  Range of Motion: active ROM (range of motion) encouraged  Intervention: Prevent Infection  Recent Flowsheet Documentation  Taken 6/16/2023 0206 by Brooklyn Kimball RN  Infection Prevention:   single patient room provided   environmental surveillance performed   hand hygiene promoted  Taken 6/15/2023 2035 by Brooklyn Kimball RN  Infection Prevention:   environmental surveillance performed   hand hygiene promoted   single patient room provided  Goal: Optimal Comfort and Wellbeing  Outcome: Ongoing, Progressing  Intervention: Monitor Pain and Promote Comfort  Recent  Flowsheet Documentation  Taken 6/15/2023 2035 by Brooklyn Kimball RN  Pain Management Interventions:   breathing exercises   care clustered   diversional activity provided  Intervention: Provide Person-Centered Care  Recent Flowsheet Documentation  Taken 6/15/2023 2035 by Brooklyn Kimball RN  Trust Relationship/Rapport:   care explained   choices provided  Goal: Readiness for Transition of Care  Outcome: Ongoing, Progressing     Problem: Pain Acute  Goal: Acceptable Pain Control and Functional Ability  Outcome: Ongoing, Progressing  Intervention: Prevent or Manage Pain  Recent Flowsheet Documentation  Taken 6/16/2023 0206 by Brooklyn Kimball RN  Medication Review/Management:   medications reviewed   high-risk medications identified  Taken 6/15/2023 2035 by Brooklyn Kimball RN  Sensory Stimulation Regulation: television on  Bowel Elimination Promotion:   adequate fluid intake promoted   ambulation promoted  Sleep/Rest Enhancement: awakenings minimized  Medication Review/Management:   medications reviewed   high-risk medications identified  Intervention: Develop Pain Management Plan  Recent Flowsheet Documentation  Taken 6/15/2023 2035 by Brooklyn Kimball RN  Pain Management Interventions:   breathing exercises   care clustered   diversional activity provided  Intervention: Optimize Psychosocial Wellbeing  Recent Flowsheet Documentation  Taken 6/15/2023 2035 by Brooklyn Kimball RN  Supportive Measures: active listening utilized  Diversional Activities: television     Problem: Hypertension Comorbidity  Goal: Blood Pressure in Desired Range  Outcome: Ongoing, Progressing  Intervention: Maintain Blood Pressure Management  Recent Flowsheet Documentation  Taken 6/16/2023 0206 by Brooklyn Kimball RN  Medication Review/Management:   medications reviewed   high-risk medications identified  Taken 6/15/2023 2035 by Brooklyn Kimball RN  Medication Review/Management:   medications reviewed   high-risk medications identified     Problem:  Fall Injury Risk  Goal: Absence of Fall and Fall-Related Injury  Outcome: Ongoing, Progressing  Intervention: Identify and Manage Contributors  Recent Flowsheet Documentation  Taken 6/16/2023 0206 by Brooklyn Kimball RN  Medication Review/Management:   medications reviewed   high-risk medications identified  Taken 6/15/2023 2035 by Brooklyn Kimball RN  Medication Review/Management:   medications reviewed   high-risk medications identified  Self-Care Promotion: independence encouraged  Intervention: Promote Injury-Free Environment  Recent Flowsheet Documentation  Taken 6/16/2023 0206 by Brooklyn Kimball RN  Safety Promotion/Fall Prevention:   assistive device/personal items within reach   clutter free environment maintained   nonskid shoes/slippers when out of bed   room organization consistent   safety round/check completed  Taken 6/15/2023 2035 by Brooklyn Kimball RN  Safety Promotion/Fall Prevention:   assistive device/personal items within reach   clutter free environment maintained   nonskid shoes/slippers when out of bed   room organization consistent   safety round/check completed     Problem: Skin Injury Risk Increased  Goal: Skin Health and Integrity  Outcome: Ongoing, Progressing  Intervention: Optimize Skin Protection  Recent Flowsheet Documentation  Taken 6/15/2023 2035 by Brooklyn Kimball RN  Pressure Reduction Techniques: frequent weight shift encouraged  Head of Bed (HOB) Positioning: HOB at 30-45 degrees  Pressure Reduction Devices: specialty bed utilized   Goal Outcome Evaluation:  Plan of Care Reviewed With: patient        Progress: no change     Alert and oriented x 4. Able to verbalize needs and wants. Takes medication whole and tolerates well. Continues to receive PO Doxycycline, no s/s of adverse/allergic reaction noted. Received dose of IV Rocephin, no s/s of adverse/allergic reaction noted. No c/o SOB noted. Does not require O2 therapy at this time. Continent of bowel and bladder noted. Uses walker  for ambulation. Independent for transfers. Currently in bed, awake. Call bell in reach. Infectious disease consulted.

## 2023-06-16 NOTE — THERAPY EVALUATION
Patient Name: Deion Nicholas  : 1940    MRN: [id number]                              Today's Date: 2023       Admit Date: 2023    Visit Dx:     ICD-10-CM ICD-9-CM   1. Cellulitis of left lower extremity  L03.116 682.6   2. Chest pain, unspecified type  R07.9 786.50   3. Positive D dimer  R79.89 790.92     Patient Active Problem List   Diagnosis    Normocytic anemia    Coronary artery disease    Ischemic cardiomyopathy    Paroxysmal ventricular tachycardia    Presence of automatic implantable cardioverter-defibrillator    Essential hypertension    Hyperlipidemia, mixed    Peripheral neuropathy    Cellulitis of left lower extremity without foot    GERD without esophagitis    B12 deficiency    Orthostatic hypotension    Tinea pedis of left foot    Lower extremity pain, left    Cellulitis of left leg    Baker's cyst of knee, left    Gynecomastia, male    Ascending aortic aneurysm    Thyroid nodule    Dizziness    Chest pain, unspecified type    Edema of left lower extremity    Elevated troponin    Left leg cellulitis    Cellulitis of left lower extremity     Past Medical History:   Diagnosis Date    Aneurysm     Cardiomyopathy     ICD implantation    Cellulitis of left lower extremity     recurrent    CHD (coronary heart disease)     S/P CABG & PCI    Dyslipidemia     History of ventricular tachycardia     Hypertension     Myocardial infarction     Inferior MI    Pinched nerve     L4-L5    Prostate cancer      Past Surgical History:   Procedure Laterality Date    ANGIOPLASTY  1996 Stent: Palmaz- Huy stent/LAD 1996-RCA: -Tetra stent Guidant to proximal RCA, mid to distal artery 2 sequential Guidant Tetra stents    APPENDECTOMY      CARDIAC ABLATION  April and May 2019    CARDIAC CATHETERIZATION  2017    1996 x2, Nov. , 2010-cath 2015-no stents 2017    CARDIAC DEFIBRILLATOR PLACEMENT      COLONOSCOPY W/ POLYPECTOMY      Mult colonoscopy's for polyp resection      CORONARY ARTERY BYPASS GRAFT  05/2010    x5 vessel: LMA to diagonal, LAD, intermedius, obtuse marginal, RCA    CORONARY STENT PLACEMENT      ENDOSCOPY N/A 6/24/2019    Procedure: ESOPHAGOGASTRODUODENOSCOPY with dilitation Bougie 50, 54;  Surgeon: Roddy Coronel MD;  Location: [facility] ENDOSCOPY;  Service: Gastroenterology    INSERT / REPLACE / REMOVE PACEMAKER      KNEE OPEN LATERAL RELEASE Left     reduction    OTHER SURGICAL HISTORY  01/2018    ICD implantation    PROSTATE SURGERY  2015    Robiotic surgery- Cyber Knife:      General Information       Row Name 06/16/23 1519          OT Time and Intention    Document Type evaluation  -MS     Mode of Treatment occupational therapy  -MS       Row Name 06/16/23 1519          General Information    Patient Profile Reviewed yes  -MS     Prior Level of Function independent:;ADL's;driving;all household mobility;community mobility  -MS     Existing Precautions/Restrictions fall  -MS     Barriers to Rehab previous functional deficit  -MS       Row Name 06/16/23 1519          Occupational Profile    Reason for Services/Referral (Occupational Profile) Pt is an 83 y/o M admitted to [facility] 6/14/23 with c/o cellulitis LLE, intermittent chest pain, L arm pain. Pt recently admitted 5/22/23 with similar symptoms and d/c home. PMHx significant for HTN, HLD, prostate cancer, CAD s/p CABG with L leg saphenous vein harvest. At baseline pt resides alone in [facility] with 0 WILBERT. P typically independent with ADLs, drives and uses 2 single point canes for mobility, occasional use of rollator within home.  -MS     Environmental Supports and Barriers (Occupational Profile) supportive family  -MS       Row Name 06/16/23 1519          Living Environment    People in Home alone  -MS       Row Name 06/16/23 1519          Home Main Entrance    Number of Stairs, Main Entrance none  -MS       Row Name 06/16/23 1519          Stairs Within Home, Primary    Number of Stairs, Within Home, Primary none  -MS        Row Name 06/16/23 1519          Cognition    Orientation Status (Cognition) oriented x 4  -MS       Row Name 06/16/23 1519          Safety Issues, Functional Mobility    Safety Issues Affecting Function (Mobility) insight into deficits/self-awareness;judgment  -MS     Impairments Affecting Function (Mobility) balance;coordination;endurance/activity tolerance;pain  -MS               User Key  (r) = Recorded By, (t) = Taken By, (c) = Cosigned By      Initials Name Provider Type    Diamond Kulkarni OT Occupational Therapist                     Mobility/ADL's       Row Name 06/16/23 1519          Bed Mobility    Bed Mobility bed mobility (all) activities  -MS     All Activities, [place] (Bed Mobility) independent  -MS       Row Name 06/16/23 1519          Transfers    Transfers sit-stand transfer  -MS       Row Name 06/16/23 1519          Sit-Stand Transfer    Sit-Stand [place] (Transfers) independent  -MS               User Key  (r) = Recorded By, (t) = Taken By, (c) = Cosigned By      Initials Name Provider Type    Diamond Kulkarni OT Occupational Therapist                   Obj/Interventions       Row Name 06/16/23 1520          Sensory Assessment (Somatosensory)    Sensory Assessment (Somatosensory) sensation intact  -MS       Row Name 06/16/23 1520          Vision Assessment/Intervention    Visual Impairment/Limitations WFL  -MS       Row Name 06/16/23 1520          Range of Motion Comprehensive    General Range of Motion no range of motion deficits identified  -MS       Row Name 06/16/23 1520          Strength Comprehensive (MMT)    General Manual Muscle Testing (MMT) Assessment no strength deficits identified  -MS       Row Name 06/16/23 1520          Balance    Balance Assessment sitting static balance;sitting dynamic balance;standing static balance;standing dynamic balance  -MS     Static Sitting Balance modified independence  -MS     Dynamic Sitting Balance modified independence  -MS      Position, Sitting Balance unsupported;sitting edge of bed  -MS     Static Standing Balance standby assist  -MS     Dynamic Standing Balance contact guard  -MS     Position/Device Used, Standing Balance supported;cane, straight  2 straight canes  -MS               User Key  (r) = Recorded By, (t) = Taken By, (c) = Cosigned By      Initials Name Provider Type    MS Diamond Landin, OT Occupational Therapist                   Goals/Plan    No documentation.                  Clinical Impression       Row Name 06/16/23 1521          Pain Assessment    Pretreatment Pain Rating 9/10  -MS     Posttreatment Pain Rating 9/10  -MS     Pain Location - Side/Orientation Left  -MS     Pain Location lower  -MS     Pain Location - extremity;knee  -MS     Pain Intervention(s) Repositioned;Emotional support  -MS       Row Name 06/16/23 1521          Plan of Care Review    Plan of Care Reviewed With patient  -MS     Progress no change  -MS     Outcome Evaluation Pt is an 81 y/o M admitted to [facility] 6/14/23 with c/o cellulitis LLE, intermittent chest pain, L arm pain. Pt recently admitted 5/22/23 with similar symptoms and d/c home. PMHx significant for HTN, HLD, prostate cancer, CAD s/p CABG with L leg saphenous vein harvest. At baseline pt resides alone in [facility] with 0 WILBERT. P typically independent with ADLs, drives and uses 2 single point canes for mobility, occasional use of rollator within home. This date,pt A&Ox4. Pt completes bed mobility independently, comes to stand with 2 single point canes (uses similarly to forearm crutches) independently, and ambulates with 2 straight point canes with CGA for safety. Pt reports pain in LLE, burning sensation, and chronic L knee pain. Pt appears to be at functional baseline and does not require further skilled OT intervention at this time. Pt declines HHOT to increase activity tolerance/energy conservation strategies for increased safety. OT to complete orders at this time.  -MS       Row Name  06/16/23 1521          Therapy Assessment/Plan (OT)    Criteria for Skilled Therapeutic Interventions Met (OT) no;does not meet criteria for skilled intervention  -MS     Therapy Frequency (OT) evaluation only  -MS       Row Name 06/16/23 1521          Therapy Plan Review/Discharge Plan (OT)    Anticipated Discharge Disposition (OT) home  -MS       Row Name 06/16/23 1521          Vital Signs    O2 Delivery Pre Treatment room air  -MS     O2 Delivery Intra Treatment room air  -MS     O2 Delivery Post Treatment room air  -MS     Pre Patient Position Supine  -MS     Intra Patient Position Standing  -MS     Post Patient Position Sitting  -MS       Row Name 06/16/23 1521          Positioning and Restraints    Pre-Treatment Position in bed  -MS     Post Treatment Position chair  -MS     In Chair notified nsg;sitting;call light within reach;encouraged to call for assist;exit alarm on  -MS               User Key  (r) = Recorded By, (t) = Taken By, (c) = Cosigned By      Initials Name Provider Type    Diamond Kulkarni, OT Occupational Therapist                   Outcome Measures       Row Name 06/16/23 1524          How much help from another is currently needed...    Putting on and taking off regular lower body clothing? 4  -MS     Bathing (including washing, rinsing, and drying) 4  -MS     Toileting (which includes using toilet bed pan or urinal) 4  -MS     Putting on and taking off regular upper body clothing 4  -MS     Taking care of personal grooming (such as brushing teeth) 4  -MS     Eating meals 4  -MS     AM-PAC 6 Clicks Score (OT) 24  -MS       Row Name 06/16/23 0737          How much help from another person do you currently need...    Turning from your back to your side while in flat bed without using bedrails? 4  -DM     Moving from lying on back to sitting on the side of a flat bed without bedrails? 4  -DM     Moving to and from a bed to a chair (including a wheelchair)? 4  -DM     Standing up from a chair  using your arms (e.g., wheelchair, bedside chair)? 3  -DM     Climbing 3-5 steps with a railing? 3  -DM     To walk in hospital room? 3  -DM     AM-PAC 6 Clicks Score (PT) 21  -DM     Highest level of mobility 6 --> Walked 10 steps or more  -DM       Row Name 06/16/23 1524          Functional Assessment    Outcome Measure Options AM-PAC 6 Clicks Daily Activity (OT)  -MS               User Key  (r) = Recorded By, (t) = Taken By, (c) = Cosigned By      Initials Name Provider Type    DM Linda Stinson LPN Licensed Nurse    Diamond Kulkarni OT Occupational Therapist                    Occupational Therapy Education       Title: PT OT SLP Therapies (In Progress)       Topic: Occupational Therapy (In Progress)       Point: ADL training (Done)       Description:   Instruct learner(s) on proper safety adaptation and remediation techniques during self care or transfers.   Instruct in proper use of assistive devices.                  Learning Progress Summary             Patient Acceptance, E,TB, VU by MS at 6/16/2023 1525                         Point: Precautions (Not Started)       Description:   Instruct learner(s) on prescribed precautions during self-care and functional transfers.                  Learner Progress:  Not documented in this visit.              Point: Body mechanics (Done)       Description:   Instruct learner(s) on proper positioning and spine alignment during self-care, functional mobility activities and/or exercises.                  Learning Progress Summary             Patient Acceptance, E,TB, VU by MS at 6/16/2023 1525                                         User Key       Initials Effective Dates Name Provider Type Discipline    MS 07/13/22 -  Diamond Landin OT Occupational Therapist OT                  OT Recommendation and Plan  Therapy Frequency (OT): evaluation only  Plan of Care Review  Plan of Care Reviewed With: patient  Progress: no change  Outcome Evaluation: Pt is an 83 y/o M  admitted to [facility] 6/14/23 with c/o cellulitis LLE, intermittent chest pain, L arm pain. Pt recently admitted 5/22/23 with similar symptoms and d/c home. PMHx significant for HTN, HLD, prostate cancer, CAD s/p CABG with L leg saphenous vein harvest. At baseline pt resides alone in [facility] with 0 WILBERT. P typically independent with ADLs, drives and uses 2 single point canes for mobility, occasional use of rollator within home. This date,pt A&Ox4. Pt completes bed mobility independently, comes to stand with 2 single point canes (uses similarly to forearm crutches) independently, and ambulates with 2 straight point canes with CGA for safety. Pt reports pain in LLE, burning sensation, and chronic L knee pain. Pt appears to be at functional baseline and does not require further skilled OT intervention at this time. Pt declines HHOT to increase activity tolerance/energy conservation strategies for increased safety. OT to complete orders at this time.     Time Calculation:              Diamond Landin OT  6/16/2023

## 2023-06-16 NOTE — PLAN OF CARE
Goal Outcome Evaluation:  Plan of Care Reviewed With: patient        Progress: no change  Outcome Evaluation: Pt is an 81 y/o M admitted to [facility] 6/14/23 with c/o cellulitis LLE, intermittent chest pain, L arm pain. Pt recently admitted 5/22/23 with similar symptoms and d/c home. PMHx significant for HTN, HLD, prostate cancer, CAD s/p CABG with L leg saphenous vein harvest. At baseline pt resides alone in [facility] with 0 WILBERT. P typically independent with ADLs, drives and uses 2 single point canes for mobility, occasional use of rollator within home. This date,pt A&Ox4. Pt completes bed mobility independently, comes to stand with 2 single point canes (uses similarly to forearm crutches) independently, and ambulates with 2 straight point canes with CGA for safety. Pt reports pain in LLE, burning sensation, and chronic L knee pain. Pt appears to be at functional baseline and does not require further skilled OT intervention at this time. Pt declines HHOT to increase activity tolerance/energy conservation strategies for increased safety. OT to complete orders at this time.

## 2023-06-16 NOTE — CASE MANAGEMENT/SOCIAL WORK
Continued Stay Note   Daniel     Patient Name: Deion Nicholas  MRN: [id number]  Today's Date: 6/16/2023    Admit Date: 6/14/2023    Plan: Return Home.   Discharge Plan       Row Name 06/16/23 1512       Plan    Plan Return Home.    Plan Comments Spoke with patient at bedside about past IV infusion at home experience. Patient was not sure what past infusion company he had used prior and CM could not find a past company used. Patient is fine doing IV abx at home. Discharge barriers: ID recommendation of either PO abx or IV abx             Met with patient in room.    Maintained distance greater than six feet and spent less than 15 minutes in the room.     Felicitas Jolly RN     VITALS:     ICU Vital Signs Last 24 Hrs  T(C): 34.6 (06 Dec 2021 14:25), Max: 34.6 (06 Dec 2021 14:25)  T(F): 94.2 (06 Dec 2021 14:25), Max: 94.2 (06 Dec 2021 14:25)  HR: 88 (06 Dec 2021 15:36) (80 - 88)  BP: 156/101 (06 Dec 2021 14:25) (156/101 - 156/101)  RR: 24 (06 Dec 2021 14:25) (24 - 24)  SpO2: 94% (06 Dec 2021 15:36) (92% - 94%)      GENERAL: NAD, lying in bed comfortably  HEAD:  Atraumatic, Normocephalic  EYES: EOMI, PERRLA, conjunctiva and sclera clear  ENT: Moist mucous membranes  NECK: Supple, No JVD  CHEST/LUNG: on NIV, saturating 98 % , Clear to auscultation bilaterally; No rales, rhonchi, wheezing, or rubs.   HEART: Regular rate and rhythm;   ABDOMEN: Soft, Nontender, Nondistended. No hepatomegaly, colostomy bag on LLQ with stool.   EXTREMITIES:  bilateral lower extremity, more on feet,   NERVOUS SYSTEM:  Able to open eyes to painful stimulus, unable to answer questions.   MSK: withdrawal to pain on all extremities   SKIN: right forearm, abrasion, ecchymoses.

## 2023-06-17 ENCOUNTER — READMISSION MANAGEMENT (OUTPATIENT)
Dept: CALL CENTER | Facility: HOSPITAL | Age: 83
End: 2023-06-17
Payer: OTHER GOVERNMENT

## 2023-06-17 VITALS
OXYGEN SATURATION: 94 % | HEIGHT: 72 IN | RESPIRATION RATE: 16 BRPM | SYSTOLIC BLOOD PRESSURE: 134 MMHG | TEMPERATURE: 97.5 F | HEART RATE: 77 BPM | BODY MASS INDEX: 28.44 KG/M2 | DIASTOLIC BLOOD PRESSURE: 75 MMHG | WEIGHT: 210 LBS

## 2023-06-17 LAB
ANION GAP SERPL CALCULATED.3IONS-SCNC: 7 MMOL/L (ref 5–15)
BASOPHILS # BLD AUTO: 0 10*3/MM3 (ref 0–0.2)
BASOPHILS NFR BLD AUTO: 0.8 % (ref 0–1.5)
BUN SERPL-MCNC: 21 MG/DL (ref 8–23)
BUN/CREAT SERPL: 25.3 (ref 7–25)
CALCIUM SPEC-SCNC: 9.5 MG/DL (ref 8.6–10.5)
CHLORIDE SERPL-SCNC: 105 MMOL/L (ref 98–107)
CO2 SERPL-SCNC: 26 MMOL/L (ref 22–29)
CREAT SERPL-MCNC: 0.83 MG/DL (ref 0.76–1.27)
DEPRECATED RDW RBC AUTO: 46.8 FL (ref 37–54)
EGFRCR SERPLBLD CKD-EPI 2021: 88.5 ML/MIN/1.73
EOSINOPHIL # BLD AUTO: 0.3 10*3/MM3 (ref 0–0.4)
EOSINOPHIL NFR BLD AUTO: 4.8 % (ref 0.3–6.2)
ERYTHROCYTE [DISTWIDTH] IN BLOOD BY AUTOMATED COUNT: 13.9 % (ref 12.3–15.4)
GLUCOSE SERPL-MCNC: 125 MG/DL (ref 65–99)
HCT VFR BLD AUTO: 38 % (ref 37.5–51)
HGB BLD-MCNC: 12.8 G/DL (ref 13–17.7)
LYMPHOCYTES # BLD AUTO: 1.9 10*3/MM3 (ref 0.7–3.1)
LYMPHOCYTES NFR BLD AUTO: 32.7 % (ref 19.6–45.3)
MCH RBC QN AUTO: 32.9 PG (ref 26.6–33)
MCHC RBC AUTO-ENTMCNC: 33.7 G/DL (ref 31.5–35.7)
MCV RBC AUTO: 97.7 FL (ref 79–97)
MONOCYTES # BLD AUTO: 0.6 10*3/MM3 (ref 0.1–0.9)
MONOCYTES NFR BLD AUTO: 10.9 % (ref 5–12)
NEUTROPHILS NFR BLD AUTO: 3 10*3/MM3 (ref 1.7–7)
NEUTROPHILS NFR BLD AUTO: 50.8 % (ref 42.7–76)
NRBC BLD AUTO-RTO: 0 /100 WBC (ref 0–0.2)
PLATELET # BLD AUTO: 197 10*3/MM3 (ref 140–450)
PMV BLD AUTO: 8.4 FL (ref 6–12)
POTASSIUM SERPL-SCNC: 4.4 MMOL/L (ref 3.5–5.2)
RBC # BLD AUTO: 3.89 10*6/MM3 (ref 4.14–5.8)
SODIUM SERPL-SCNC: 138 MMOL/L (ref 136–145)
WBC NRBC COR # BLD: 5.9 10*3/MM3 (ref 3.4–10.8)

## 2023-06-17 PROCEDURE — 85025 COMPLETE CBC W/AUTO DIFF WBC: CPT | Performed by: NURSE PRACTITIONER

## 2023-06-17 PROCEDURE — 80048 BASIC METABOLIC PNL TOTAL CA: CPT | Performed by: NURSE PRACTITIONER

## 2023-06-17 RX ORDER — AMOXICILLIN AND CLAVULANATE POTASSIUM 875; 125 MG/1; MG/1
1 TABLET, FILM COATED ORAL EVERY 12 HOURS SCHEDULED
Qty: 20 TABLET | Refills: 0 | Status: SHIPPED | OUTPATIENT
Start: 2023-06-17 | End: 2023-06-27

## 2023-06-17 RX ORDER — DOXYCYCLINE 100 MG/1
100 CAPSULE ORAL EVERY 12 HOURS SCHEDULED
Qty: 20 CAPSULE | Refills: 0 | Status: SHIPPED | OUTPATIENT
Start: 2023-06-17 | End: 2023-06-27

## 2023-06-17 RX ORDER — AMOXICILLIN AND CLAVULANATE POTASSIUM 875; 125 MG/1; MG/1
1 TABLET, FILM COATED ORAL EVERY 12 HOURS SCHEDULED
Status: DISCONTINUED | OUTPATIENT
Start: 2023-06-17 | End: 2023-06-17

## 2023-06-17 RX ADMIN — PANTOPRAZOLE SODIUM 40 MG: 40 TABLET, DELAYED RELEASE ORAL at 09:06

## 2023-06-17 RX ADMIN — Medication 10 ML: at 09:08

## 2023-06-17 RX ADMIN — ASPIRIN 81 MG CHEWABLE TABLET 81 MG: 81 TABLET CHEWABLE at 09:06

## 2023-06-17 RX ADMIN — GABAPENTIN 400 MG: 400 CAPSULE ORAL at 09:06

## 2023-06-17 RX ADMIN — Medication 500 UNITS: at 09:06

## 2023-06-17 RX ADMIN — METOPROLOL SUCCINATE 50 MG: 50 TABLET, EXTENDED RELEASE ORAL at 09:06

## 2023-06-17 RX ADMIN — AMOXICILLIN AND CLAVULANATE POTASSIUM 1 TABLET: 875; 125 TABLET, FILM COATED ORAL at 09:17

## 2023-06-17 RX ADMIN — ACETAMINOPHEN 650 MG: 325 TABLET, FILM COATED ORAL at 03:35

## 2023-06-17 RX ADMIN — DOXYCYCLINE 100 MG: 100 CAPSULE ORAL at 09:06

## 2023-06-17 NOTE — NURSING NOTE
Placed a call out to security to see if they found pt's car keys and house keys but they only had one set of locker keys so I informed patient that we could not find his keys that he thought he left in ER.

## 2023-06-17 NOTE — DISCHARGE SUMMARY
[facility]         DISCHARGE SUMMARY    Patient Name: Deion Nicholas  : 1940  MRN: [id number]    Date of Admission: 2023  Date of Discharge:  2023  Primary Care Physician: Makenna Motta MD    Consults     Date and Time Order Name Status Description    6/15/2023 12:52 AM Inpatient Infectious Diseases Consult Completed     2023 10:33 PM Hospitalist (on-call MD unless specified)      2023 11:56 AM Inpatient Cardiology Consult Completed           Presenting Problem:   Positive D dimer [R79.89]  Cellulitis of left lower extremity [L03.116]  Chest pain, unspecified type [R07.9]    Active and Resolved Hospital Problems:  Active Hospital Problems    Diagnosis POA   • **Cellulitis of left lower extremity [L03.116] Yes   • Peripheral neuropathy [G62.9] Yes   • Hyperlipidemia, mixed [E78.2] Yes   • Paroxysmal ventricular tachycardia [I47.29] Yes   • Presence of automatic implantable cardioverter-defibrillator [Z95.810] Yes   • Ischemic cardiomyopathy [I25.5] Yes   • Coronary artery disease [I25.10] Yes      Resolved Hospital Problems   No resolved problems to display.         Hospital Course     Hospital Course:  Deion Nicholas is a 82 y.o. male with PMHx of HTN, HLD, prostate cancer, CAD s/p CABG with left leg saphenous vein harvest, ischemic cardiomyopathy EF 35% status post AICD who admitted to [facility] ED 2023 for recurrent cellulitis of the left lower extremity.  Patient was treated with IV Rocephin and doxycycline.  ID consulted and recommended 10 days course of p.o. antibiotic.    During this admission patient condition continued to improve and was safely discharged home to follow-up with her PCP        DISCHARGE Follow Up Recommendations for labs and diagnostics:     CBC, CMP in 1 week    Day of Discharge     Vital Signs:  Temp:  [97.4 °F (36.3 °C)-97.5 °F (36.4 °C)] 97.5 °F (36.4 °C)  Heart Rate:  [70-74] 74  Resp:  [15-16] 15  BP:  (121-139)/(56-69) 121/69  Physical Exam:  Constitutional: Awake, alert   Eyes: PERRLA, sclerae anicteric, no conjunctival injection   HENT: NCAT, mucous membranes moist   Neck: Supple, no thyromegaly, no lymphadenopathy, trachea midline   Respiratory: Clear to auscultation bilaterally, nonlabored respirations    Cardiovascular: RRR, no murmurs, rubs, or gallops, palpable pedal pulses bilaterally   Gastrointestinal: Positive bowel sounds, soft, nontender, nondistended   Musculoskeletal: No bilateral ankle edema, no clubbing or cyanosis to extremities   Psychiatric: Appropriate affect, cooperative   Neurologic: Oriented x 3, strength symmetric in all extremities, Cranial Nerves grossly intact to confrontation, speech clear   Skin: No rashes     Pertinent  and/or Most Recent Results     LAB RESULTS:      Lab 06/17/23 0137 06/16/23 0300 06/14/23 1956   WBC 5.90 5.40 6.80   HEMOGLOBIN 12.8* 12.9* 12.7*   HEMATOCRIT 38.0 37.8 38.9   PLATELETS 197 187 212   NEUTROS ABS 3.00 2.70 4.60   LYMPHS ABS 1.90 1.70 1.40   MONOS ABS 0.60 0.60 0.60   EOS ABS 0.30 0.30 0.20   MCV 97.7* 96.0 98.4*         Lab 06/17/23  0137 06/16/23 0300 06/14/23 1956   SODIUM 138 139 138   POTASSIUM 4.4 4.6 4.7   CHLORIDE 105 103 103   CO2 26.0 27.0 25.0   ANION GAP 7.0 9.0 10.0   BUN 21 15 17   CREATININE 0.83 0.75* 0.93   EGFR 88.5 91.2 83.0   GLUCOSE 125* 111* 137*   CALCIUM 9.5 9.5 9.6         Lab 06/14/23 1956   TOTAL PROTEIN 7.5   ALBUMIN 4.3   GLOBULIN 3.2   ALT (SGPT) 21   AST (SGOT) 25   BILIRUBIN <0.2   ALK PHOS 102         Lab 06/15/23  0311 06/14/23 2157 06/14/23 1956   PROBNP  --   --  499.6   HSTROP T 16* 15* 18*                 Brief Urine Lab Results     None        Microbiology Results (last 10 days)     Procedure Component Value - Date/Time    Blood Culture - Blood, Arm, Left [[id number]]  (Normal) Collected: 06/14/23 2060    Lab Status: Preliminary result Specimen: Blood from Arm, Left Updated: 06/16/23 1794     Blood Culture  No growth at 2 days    Blood Culture - Blood, Arm, Right [[id number]]  (Normal) Collected: 06/14/23 2336    Lab Status: Preliminary result Specimen: Blood from Arm, Right Updated: 06/16/23 2345     Blood Culture No growth at 2 days          XR Chest 1 View    Result Date: 6/14/2023  Impression: No acute pulmonary disease. No significant change from prior examination. Electronically Signed: Maikel Moore  6/14/2023 8:24 PM EDT  Workstation ID: [id number]      Results for orders placed during the hospital encounter of 06/14/23    Duplex Venous Lower Extremity - Left    Interpretation Summary  •  Left popliteal fossa fluid collection.  •  Normal left lower extremity venous duplex scan.      Results for orders placed during the hospital encounter of 06/14/23    Duplex Venous Lower Extremity - Left    Interpretation Summary  •  Left popliteal fossa fluid collection.  •  Normal left lower extremity venous duplex scan.      Results for orders placed during the hospital encounter of 05/22/23    Adult Transthoracic Echo Complete W/ Cont if Necessary Per Protocol    Interpretation Summary  •  Left ventricular ejection fraction appears to be 31 - 35%.  •  Moderate aortic valve regurgitation is present.  •  Moderate mitral valve regurgitation is present.  •  Estimated right ventricular systolic pressure from tricuspid regurgitation is normal (<35 mmHg).  •  Mild dilation of the aortic root is present.      Labs Pending at Discharge:  Pending Labs     Order Current Status    Blood Culture - Blood, Arm, Left Preliminary result    Blood Culture - Blood, Arm, Right Preliminary result            Discharge Details        Discharge Medications      New Medications      Instructions Start Date   amoxicillin-clavulanate 875-125 MG per tablet  Commonly known as: AUGMENTIN   1 tablet, Oral, Every 12 Hours Scheduled      doxycycline 100 MG capsule  Commonly known as: MONODOX   100 mg, Oral, Every 12 Hours Scheduled         Continue These  Medications      Instructions Start Date   aspirin 81 MG EC tablet   81 mg, Oral, Daily      chlorhexidine 4 % external liquid  Commonly known as: HIBICLENS   1 application, Topical, Daily PRN, Apply to leg(s)      cholecalciferol 10 MCG (400 UNIT) tablet  Commonly known as: VITAMIN D3   800 Units, Oral, Daily      cyanocobalamin 1000 MCG/ML injection   1,000 mcg, Intramuscular, Every 14 Days      ezetimibe 10 MG tablet  Commonly known as: ZETIA   10 mg, Oral, Every Evening      fish oil 1000 MG capsule capsule   2,000 mg, Oral, 2 Times Daily With Meals      gabapentin 400 MG capsule  Commonly known as: NEURONTIN   400 mg, Oral, 4 Times Daily      methocarbamol 500 MG tablet  Commonly known as: ROBAXIN   500 mg, Oral, 3 Times Daily PRN      metoprolol succinate XL 50 MG 24 hr tablet  Commonly known as: TOPROL-XL   50 mg, Oral, Daily      midodrine 5 MG tablet  Commonly known as: PROAMATINE   5 mg, Oral, 3 Times Daily Before Meals      mupirocin 2 % ointment  Commonly known as: BACTROBAN   1 application, Topical, 3 Times Daily, Apply to leg(s)      nitroglycerin 0.4 MG SL tablet  Commonly known as: NITROSTAT   0.4 mg, Sublingual, Every 5 Minutes PRN      pantoprazole 40 MG EC tablet  Commonly known as: PROTONIX   40 mg, Oral, Daily      sennosides-docusate 8.6-50 MG per tablet  Commonly known as: PERICOLACE   1 tablet, Oral, 2 Times Daily      sucralfate 1 g tablet  Commonly known as: CARAFATE   1 g, Oral, 4 Times Daily             Allergies   Allergen Reactions   • Lovastatin Myalgia   • Pravastatin Myalgia and Unknown (See Comments)     unknown   • Simvastatin Unknown (See Comments) and Myalgia     unknown   • Spironolactone Other (See Comments)     Gynecomastia          Discharge Disposition:   Home or Self Care    Discharge Condition: .cond    Diet:  Hospital:  Diet Order   Procedures   • Diet: Regular/House Diet; Texture: Regular Texture (IDDSI 7); Fluid Consistency: Thin (IDDSI 0)         Discharge Activity:    Activity Instructions     Activity as Tolerated              CODE STATUS:  Code Status and Medical Interventions:   Ordered at: 06/15/23 0053     Level Of Support Discussed With:    Patient     Code Status (Patient has no pulse and is not breathing):    CPR (Attempt to Resuscitate)     Medical Interventions (Patient has pulse or is breathing):    Full Support         Future Appointments   Date Time Provider Department Center   7/18/2023  1:15 PM Marcin Childers DO [facility]   7/27/2023  2:00 PM Constantino Arvizu MD [facility]       Additional Instructions for the Follow-ups that You Need to Schedule     Discharge Follow-up with PCP   As directed       Currently Documented PCP:    Makenna Motta MD    PCP Phone Number:    [phone]     Follow Up Details: 1 week               Time spent on Discharge including face to face service:  35 minutes    Amrit Silva MD

## 2023-06-17 NOTE — PLAN OF CARE
Goal Outcome Evaluation:  Plan of Care Reviewed With: patient        Progress: improving  Outcome Evaluation: Pt resting in bed, pain controlled with medication upon request.  Pt remains on IV ABT.  Will continue with current orders care plans and monitoring

## 2023-06-17 NOTE — PLAN OF CARE
Continue to monitor and assess pain.  Patient is alert and oriented and resting in his bed.   Problem: Adult Inpatient Plan of Care  Goal: Plan of Care Review  Outcome: Ongoing, Progressing  Flowsheets (Taken 6/17/2023 0305 by Wyatt Galdamez LPN)  Progress: improving  Plan of Care Reviewed With: patient  Outcome Evaluation: Pt resting in bed, pain controlled with medication upon request.  Pt remains on IV ABT.  Will continue with current orders care plans and monitoring  Goal: Patient-Specific Goal (Individualized)  Outcome: Ongoing, Progressing  Goal: Absence of Hospital-Acquired Illness or Injury  Outcome: Ongoing, Progressing  Intervention: Identify and Manage Fall Risk  Flowsheets (Taken 6/17/2023 0600 by Wyatt Galdamez LPN)  Safety Promotion/Fall Prevention:   assistive device/personal items within reach   clutter free environment maintained   nonskid shoes/slippers when out of bed   room organization consistent   safety round/check completed  Intervention: Prevent Skin Injury  Flowsheets  Taken 6/17/2023 0600 by Wyatt Galdamez LPN  Body Position: position changed independently  Taken 6/16/2023 1939 by Wyatt Galdamez LPN  Skin Protection: adhesive use limited  Intervention: Prevent and Manage VTE (Venous Thromboembolism) Risk  Flowsheets  Taken 6/16/2023 1939 by Wyatt Galdamez LPN  VTE Prevention/Management: sequential compression devices off  Range of Motion: active ROM (range of motion) encouraged  Taken 6/16/2023 0800 by Linda Stinson LPN  Activity Management: up ad nas  Intervention: Prevent Infection  Flowsheets (Taken 6/17/2023 0720)  Infection Prevention:   personal protective equipment utilized   hand hygiene promoted   rest/sleep promoted  Goal: Optimal Comfort and Wellbeing  Outcome: Ongoing, Progressing  Intervention: Monitor Pain and Promote Comfort  Flowsheets (Taken 6/15/2023 2035 by Brooklyn Kimball, RN)  Pain Management Interventions:   breathing exercises   care clustered    diversional activity provided  Intervention: Provide Person-Centered Care  Flowsheets (Taken 6/16/2023 1939 by Wyatt Galdamez LPN)  Trust Relationship/Rapport:   care explained   choices provided   questions answered   questions encouraged  Goal: Readiness for Transition of Care  Outcome: Ongoing, Progressing  Intervention: Mutually Develop Transition Plan  Flowsheets  Taken 6/15/2023 1052 by Hanna Shannon RN  Equipment Currently Used at Home:   walker, rolling   cane, quad tip  Transportation Anticipated: (son- Diallo) family or friend will provide  Transportation Concerns: none  Concerns to be Addressed: discharge planning  Readmission Within the Last 30 Days: no previous admission in last 30 days  Patient/Family Anticipates Transition to: home  Taken 6/15/2023 0132 by Eleanor Estrada RN  Patient/Family Anticipated Services at Transition: none     Problem: Pain Acute  Goal: Acceptable Pain Control and Functional Ability  Outcome: Ongoing, Progressing  Intervention: Prevent or Manage Pain  Flowsheets (Taken 6/16/2023 1939 by Wyatt Galdamez LPN)  Sensory Stimulation Regulation: quiet environment promoted  Bowel Elimination Promotion: adequate fluid intake promoted  Sleep/Rest Enhancement: awakenings minimized  Medication Review/Management: medications reviewed  Intervention: Develop Pain Management Plan  Flowsheets (Taken 6/15/2023 2035 by Brooklyn Kimball, RN)  Pain Management Interventions:   breathing exercises   care clustered   diversional activity provided  Intervention: Optimize Psychosocial Wellbeing  Flowsheets (Taken 6/16/2023 1939 by Wyatt Galdamez LPN)  Supportive Measures: active listening utilized  Diversional Activities: television  Spiritual Activities Assistance: affirmation provided     Problem: Hypertension Comorbidity  Goal: Blood Pressure in Desired Range  Outcome: Ongoing, Progressing  Intervention: Maintain Blood Pressure Management  Flowsheets (Taken 6/16/2023 1939 by Rudolph  Korrina, LPN)  Medication Review/Management: medications reviewed     Problem: Fall Injury Risk  Goal: Absence of Fall and Fall-Related Injury  Outcome: Ongoing, Progressing  Intervention: Identify and Manage Contributors  Flowsheets  Taken 6/16/2023 1939 by Wyatt Galdamez LPN  Medication Review/Management: medications reviewed  Taken 6/16/2023 0800 by Linda Stinson LPN  Self-Care Promotion: independence encouraged  Intervention: Promote Injury-Free Environment  Flowsheets (Taken 6/17/2023 0600 by Wyatt Galdamez LPN)  Safety Promotion/Fall Prevention:   assistive device/personal items within reach   clutter free environment maintained   nonskid shoes/slippers when out of bed   room organization consistent   safety round/check completed     Problem: Skin Injury Risk Increased  Goal: Skin Health and Integrity  Outcome: Ongoing, Progressing  Intervention: Promote and Optimize Oral Intake  Flowsheets (Taken 6/17/2023 0720)  Oral Nutrition Promotion:   calorie-dense foods provided   calorie-dense liquids provided  Intervention: Optimize Skin Protection  Flowsheets  Taken 6/16/2023 1939 by Wyatt Galdamez LPN  Pressure Reduction Techniques: frequent weight shift encouraged  Pressure Reduction Devices: pressure-redistributing mattress utilized  Skin Protection: adhesive use limited  Taken 6/16/2023 0800 by Linda Stinson LPN  Head of Bed (HOB) Positioning: HOB at 30-45 degrees   Goal Outcome Evaluation:

## 2023-06-18 NOTE — CASE MANAGEMENT/SOCIAL WORK
Case Management Discharge Note      Final Note: home with po abx.         Selected Continued Care - Discharged on 6/17/2023 Admission date: 6/14/2023 - Discharge disposition: Home or Self Care         Transportation Services  Private: Car    Final Discharge Disposition Code: 01 - home or self-care

## 2023-06-18 NOTE — OUTREACH NOTE
Prep Survey      Flowsheet Row Responses   [facility] patient discharged from? [facility]   Is LACE score < 7 ? No   Eligibility Readm Mgmt   Discharge diagnosis Cellulitis of left lower extremity   Does the patient have one of the following disease processes/diagnoses(primary or secondary)? Other   Does the patient have Home health ordered? No   Prep survey completed? Yes            Keren DUPONT - Registered Nurse

## 2023-06-19 LAB
BACTERIA SPEC AEROBE CULT: NORMAL
BACTERIA SPEC AEROBE CULT: NORMAL

## 2023-07-19 PROBLEM — I34.0 NONRHEUMATIC MITRAL VALVE REGURGITATION: Status: ACTIVE | Noted: 2023-07-19

## 2023-07-19 PROBLEM — I38 VALVULAR HEART DISEASE: Status: ACTIVE | Noted: 2023-07-19

## 2023-07-19 PROBLEM — I35.1 NONRHEUMATIC AORTIC VALVE INSUFFICIENCY: Status: ACTIVE | Noted: 2023-07-19

## 2023-07-21 ENCOUNTER — TELEPHONE (OUTPATIENT)
Dept: CARDIOLOGY | Facility: CLINIC | Age: 83
End: 2023-07-21

## 2023-07-21 NOTE — TELEPHONE ENCOUNTER
I called the patient- he states he ran over the cord for his home monitor with the vacuum and was afraid it would transmit he had a shock delivered. Online check reviewed- no alerts.

## 2023-07-21 NOTE — TELEPHONE ENCOUNTER
Caller: Deion Nicholas    Relationship: Self    Best call back number: [phone]    What is the best time to reach you: ANY    Who are you requesting to speak with (clinical staff, provider,  specific staff member): CLINICAL        What was the call regarding: PATIENT WOULD LIKE TO KNOW IF HIS MONITOR POPPED. WOULD LIKE A CALL BACK     Is it okay if the provider responds through MyChart: NO

## 2023-07-25 ENCOUNTER — HOSPITAL ENCOUNTER (OUTPATIENT)
Facility: HOSPITAL | Age: 83
Setting detail: OBSERVATION
Discharge: HOME | End: 2023-07-28
Attending: EMERGENCY MEDICINE
Payer: OTHER GOVERNMENT

## 2023-07-25 DIAGNOSIS — L03.116 LEFT LEG CELLULITIS: Primary | ICD-10-CM

## 2023-07-25 LAB
ANION GAP SERPL CALCULATED.3IONS-SCNC: 10 MMOL/L (ref 5–15)
BASOPHILS # BLD AUTO: 0.1 10*3/MM3 (ref 0–0.2)
BASOPHILS NFR BLD AUTO: 0.9 % (ref 0–1.5)
BUN SERPL-MCNC: 19 MG/DL (ref 8–23)
BUN/CREAT SERPL: 18.8 (ref 7–25)
CALCIUM SPEC-SCNC: 9.7 MG/DL (ref 8.6–10.5)
CHLORIDE SERPL-SCNC: 102 MMOL/L (ref 98–107)
CO2 SERPL-SCNC: 24 MMOL/L (ref 22–29)
CREAT SERPL-MCNC: 1.01 MG/DL (ref 0.76–1.27)
D-LACTATE SERPL-SCNC: 1 MMOL/L (ref 0.3–2)
DEPRECATED RDW RBC AUTO: 47.3 FL (ref 37–54)
EGFRCR SERPLBLD CKD-EPI 2021: 75.2 ML/MIN/1.73
EOSINOPHIL # BLD AUTO: 0.2 10*3/MM3 (ref 0–0.4)
EOSINOPHIL NFR BLD AUTO: 3.1 % (ref 0.3–6.2)
ERYTHROCYTE [DISTWIDTH] IN BLOOD BY AUTOMATED COUNT: 14 % (ref 12.3–15.4)
GLUCOSE SERPL-MCNC: 116 MG/DL (ref 65–99)
HCT VFR BLD AUTO: 39.5 % (ref 37.5–51)
HGB BLD-MCNC: 13.3 G/DL (ref 13–17.7)
LYMPHOCYTES # BLD AUTO: 1.6 10*3/MM3 (ref 0.7–3.1)
LYMPHOCYTES NFR BLD AUTO: 25.9 % (ref 19.6–45.3)
MCH RBC QN AUTO: 33 PG (ref 26.6–33)
MCHC RBC AUTO-ENTMCNC: 33.7 G/DL (ref 31.5–35.7)
MCV RBC AUTO: 97.7 FL (ref 79–97)
MONOCYTES # BLD AUTO: 0.6 10*3/MM3 (ref 0.1–0.9)
MONOCYTES NFR BLD AUTO: 9.9 % (ref 5–12)
NEUTROPHILS NFR BLD AUTO: 3.7 10*3/MM3 (ref 1.7–7)
NEUTROPHILS NFR BLD AUTO: 60.2 % (ref 42.7–76)
NRBC BLD AUTO-RTO: 0 /100 WBC (ref 0–0.2)
PLATELET # BLD AUTO: 211 10*3/MM3 (ref 140–450)
PMV BLD AUTO: 8.2 FL (ref 6–12)
POTASSIUM SERPL-SCNC: 4.2 MMOL/L (ref 3.5–5.2)
RBC # BLD AUTO: 4.05 10*6/MM3 (ref 4.14–5.8)
SODIUM SERPL-SCNC: 136 MMOL/L (ref 136–145)
WBC NRBC COR # BLD: 6.1 10*3/MM3 (ref 3.4–10.8)

## 2023-07-25 PROCEDURE — 83605 ASSAY OF LACTIC ACID: CPT

## 2023-07-25 PROCEDURE — 99284 EMERGENCY DEPT VISIT MOD MDM: CPT

## 2023-07-25 PROCEDURE — 25010000002 CEFTRIAXONE PER 250 MG: Performed by: EMERGENCY MEDICINE

## 2023-07-25 PROCEDURE — 25010000002 VANCOMYCIN HCL IN NACL 1.75-0.9 GM/500ML-% SOLUTION: Performed by: EMERGENCY MEDICINE

## 2023-07-25 PROCEDURE — 96365 THER/PROPH/DIAG IV INF INIT: CPT

## 2023-07-25 PROCEDURE — 80048 BASIC METABOLIC PNL TOTAL CA: CPT | Performed by: EMERGENCY MEDICINE

## 2023-07-25 PROCEDURE — G0378 HOSPITAL OBSERVATION PER HR: HCPCS

## 2023-07-25 PROCEDURE — 36415 COLL VENOUS BLD VENIPUNCTURE: CPT

## 2023-07-25 PROCEDURE — 96367 TX/PROPH/DG ADDL SEQ IV INF: CPT

## 2023-07-25 PROCEDURE — 93005 ELECTROCARDIOGRAM TRACING: CPT

## 2023-07-25 PROCEDURE — 85025 COMPLETE CBC W/AUTO DIFF WBC: CPT | Performed by: EMERGENCY MEDICINE

## 2023-07-25 PROCEDURE — 87040 BLOOD CULTURE FOR BACTERIA: CPT | Performed by: EMERGENCY MEDICINE

## 2023-07-25 RX ORDER — VANCOMYCIN 1.75 GRAM/500 ML IN 0.9 % SODIUM CHLORIDE INTRAVENOUS
20 ONCE
Status: COMPLETED | OUTPATIENT
Start: 2023-07-25 | End: 2023-07-25

## 2023-07-25 RX ORDER — ACETAMINOPHEN 325 MG/1
650 TABLET ORAL EVERY 4 HOURS PRN
Status: DISCONTINUED | OUTPATIENT
Start: 2023-07-25 | End: 2023-07-28 | Stop reason: HOSPADM

## 2023-07-25 RX ORDER — PANTOPRAZOLE SODIUM 40 MG/1
40 TABLET, DELAYED RELEASE ORAL DAILY
Status: DISCONTINUED | OUTPATIENT
Start: 2023-07-26 | End: 2023-07-28 | Stop reason: HOSPADM

## 2023-07-25 RX ORDER — GABAPENTIN 400 MG/1
400 CAPSULE ORAL NIGHTLY
Status: DISCONTINUED | OUTPATIENT
Start: 2023-07-26 | End: 2023-07-26

## 2023-07-25 RX ORDER — SODIUM CHLORIDE 0.9 % (FLUSH) 0.9 %
10 SYRINGE (ML) INJECTION AS NEEDED
Status: DISCONTINUED | OUTPATIENT
Start: 2023-07-25 | End: 2023-07-28 | Stop reason: HOSPADM

## 2023-07-25 RX ORDER — ONDANSETRON 2 MG/ML
4 INJECTION INTRAMUSCULAR; INTRAVENOUS EVERY 6 HOURS PRN
Status: DISCONTINUED | OUTPATIENT
Start: 2023-07-25 | End: 2023-07-28 | Stop reason: HOSPADM

## 2023-07-25 RX ORDER — SODIUM CHLORIDE 9 MG/ML
40 INJECTION, SOLUTION INTRAVENOUS AS NEEDED
Status: DISCONTINUED | OUTPATIENT
Start: 2023-07-25 | End: 2023-07-28 | Stop reason: HOSPADM

## 2023-07-25 RX ORDER — SODIUM CHLORIDE 0.9 % (FLUSH) 0.9 %
10 SYRINGE (ML) INJECTION EVERY 12 HOURS SCHEDULED
Status: DISCONTINUED | OUTPATIENT
Start: 2023-07-25 | End: 2023-07-28 | Stop reason: HOSPADM

## 2023-07-25 RX ORDER — MIDODRINE HYDROCHLORIDE 5 MG/1
5 TABLET ORAL EVERY 8 HOURS SCHEDULED
Status: DISCONTINUED | OUTPATIENT
Start: 2023-07-25 | End: 2023-07-28 | Stop reason: HOSPADM

## 2023-07-25 RX ORDER — AMOXICILLIN 250 MG
1 CAPSULE ORAL 2 TIMES DAILY
Status: DISCONTINUED | OUTPATIENT
Start: 2023-07-25 | End: 2023-07-28 | Stop reason: HOSPADM

## 2023-07-25 RX ORDER — SUCRALFATE 1 G/1
1 TABLET ORAL 4 TIMES DAILY
Status: DISCONTINUED | OUTPATIENT
Start: 2023-07-25 | End: 2023-07-28 | Stop reason: HOSPADM

## 2023-07-25 RX ORDER — CHOLECALCIFEROL (VITAMIN D3) 125 MCG
5 CAPSULE ORAL NIGHTLY PRN
Status: DISCONTINUED | OUTPATIENT
Start: 2023-07-25 | End: 2023-07-28 | Stop reason: HOSPADM

## 2023-07-25 RX ADMIN — Medication 10 ML: at 22:22

## 2023-07-25 RX ADMIN — CEFTRIAXONE 1000 MG: 1 INJECTION, POWDER, FOR SOLUTION INTRAMUSCULAR; INTRAVENOUS at 22:21

## 2023-07-25 RX ADMIN — Medication 1750 MG: at 21:10

## 2023-07-25 NOTE — ED PROVIDER NOTES
Subjective   History of Present Illness  82-year-old male states over the last 2 to 3 days had some increased swelling and redness of his left lower leg site where he had recurrent cellulitis.  States last time he treated with antibiotics 3 weeks ago states he did not feel like he fully recovered from that with the oral treatment at home and now its recurring.  He reports no fevers or vomiting.  He states he has had some lightheadedness over the last 2 days.  He reports no headache or focal numbness or weakness or chest pain or shortness of breath.  Review of Systems    Past Medical History:   Diagnosis Date    Aneurysm     Cardiomyopathy     ICD implantation    Cellulitis of left lower extremity 2010    recurrent    CHD (coronary heart disease)     S/P CABG & PCI    Dyslipidemia     History of ventricular tachycardia     Hypertension     Myocardial infarction     Inferior MI    Pinched nerve     L4-L5    Prostate cancer        Allergies   Allergen Reactions    Lovastatin Myalgia    Pravastatin Myalgia and Unknown (See Comments)     unknown    Simvastatin Unknown (See Comments) and Myalgia     unknown    Spironolactone Other (See Comments)     Gynecomastia        Past Surgical History:   Procedure Laterality Date    ANGIOPLASTY  2000 04/1996 Stent: Palmaz- Huy stent/LAD 07/1996-RCA: 2000-Tetra stent Guidant to proximal RCA, mid to distal artery 2 sequential Guidant Tetra stents    APPENDECTOMY      CARDIAC ABLATION  April and May 2019    CARDIAC CATHETERIZATION  07/2017    1996 x2, Nov. 2000, 05/2010-cath 08/2015-no stents 2017    CARDIAC DEFIBRILLATOR PLACEMENT      COLONOSCOPY W/ POLYPECTOMY      Mult colonoscopy's for polyp resection     CORONARY ARTERY BYPASS GRAFT  05/2010    x5 vessel: LMA to diagonal, LAD, intermedius, obtuse marginal, RCA    CORONARY STENT PLACEMENT      ENDOSCOPY N/A 6/24/2019    Procedure: ESOPHAGOGASTRODUODENOSCOPY with dilitation Bougie 50, 54;  Surgeon: Roddy Coronel MD;   Location: [facility] ENDOSCOPY;  Service: Gastroenterology    INSERT / REPLACE / REMOVE PACEMAKER      KNEE OPEN LATERAL RELEASE Left     reduction    OTHER SURGICAL HISTORY  2018    ICD implantation    PROSTATE SURGERY  2015    Robiotic surgery- Cyber Knife:       Family History   Problem Relation Age of Onset    Pancreatic cancer Mother     Heart disease Father        Social History     Socioeconomic History    Marital status:    Tobacco Use    Smoking status: Former     Packs/day: 1.00     Years: 17.00     Pack years: 17.00     Types: Cigarettes     Quit date: 2002     Years since quittin.0    Smokeless tobacco: Never   Vaping Use    Vaping Use: Never used   Substance and Sexual Activity    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 Authorizing Provider   aspirin 81 MG EC tablet Take 1 tablet by mouth Daily.    Cyndee Melgar MD   chlorhexidine (HIBICLENS) 4 % external liquid Apply 1 application topically to the appropriate area as directed Daily As Needed for Wound Care. Apply to leg(s)    Cyndee Melgar MD   Cholecalciferol 10 MCG (400 UNIT) tablet Take 2 tablets by mouth Daily.    Cyndee Melgar MD   cyanocobalamin 1000 MCG/ML injection Inject 1 mL into the appropriate muscle as directed by prescriber Every 14 (Fourteen) Days.    Cyndee Melgar MD   ezetimibe (ZETIA) 10 MG tablet Take 1 tablet by mouth Every Evening.    Cyndee Melgar MD   gabapentin (NEURONTIN) 400 MG capsule Take 1 capsule by mouth 4 (Four) Times a Day.    Cyndee Melgar MD   methocarbamol (ROBAXIN) 500 MG tablet Take 1 tablet by mouth 3 (Three) Times a Day As Needed for Muscle Spasms.    Cyndee Melgar MD   metoprolol succinate XL (TOPROL-XL) 50 MG 24 hr tablet Take 1 tablet by mouth Daily.    Cyndee Melgar MD   midodrine  "(PROAMATINE) 5 MG tablet Take 1 tablet by mouth 3 (Three) Times a Day Before Meals.    Cyndee Melgar MD   mupirocin (BACTROBAN) 2 % ointment Apply 1 application  topically to the appropriate area as directed 3 (Three) Times a Day. Apply to leg(s)    Cyndee Melgar MD   nitroglycerin (NITROSTAT) 0.4 MG SL tablet Place 1 tablet under the tongue Every 5 (Five) Minutes As Needed for Chest Pain.    Cyndee Melgar MD   Omega-3 Fatty Acids (fish oil) 1000 MG capsule capsule Take 2 capsules by mouth 2 (Two) Times a Day With Meals.    Cyndee Melgar MD   pantoprazole (PROTONIX) 40 MG EC tablet Take 1 tablet by mouth Daily.    Cyndee Melgar MD   sennosides-docusate (PERICOLACE) 8.6-50 MG per tablet Take 1 tablet by mouth 2 (Two) Times a Day.    Cyndee Melgar MD   sucralfate (CARAFATE) 1 g tablet Take 1 tablet by mouth 4 (Four) Times a Day.    Cyndee Melgar MD     Blood pressure 135/67, pulse 91, temperature 97.7 °F (36.5 °C), temperature source Oral, resp. rate 17, height 185.4 cm (73\"), weight 89.8 kg (198 lb), SpO2 95 %.  s      Objective   Physical Exam  General: Well-developed well-appearing, no acute distress, alert and appropriate  Eyes: Pupils round and equal, sclera nonicteric  HEENT: Mucous membranes moist, no mucosal swelling  Neck: Supple, no nuchal rigidity,   Respirations: Respirations nonlabored, equal breath sounds bilaterally, clear lungs  Heart regular rate and rhythm,    Abdomen soft nontender nondistended, no hepatosplenomegaly, no hernia, no mass, normal bowel sounds, no CVA tenderness  Extremities trace edema to bilateral lower extremities, there is some erythema in the anterior aspect of the left lower leg with some localized fever, there is no skin induration or deep tissue tenderness he has normal pulses and sensorimotor function distally, calves are symmetric and nontender  Neuro cranial nerves grossly intact, no focal limb deficits  Psych oriented, " pleasant affect  Skin no rash, brisk cap refill  Procedures           ED Course      Results for orders placed or performed during the hospital encounter of 07/25/23   Basic Metabolic Panel    Specimen: Blood   Result Value Ref Range    Glucose 116 (H) 65 - 99 mg/dL    BUN 19 8 - 23 mg/dL    Creatinine 1.01 0.76 - 1.27 mg/dL    Sodium 136 136 - 145 mmol/L    Potassium 4.2 3.5 - 5.2 mmol/L    Chloride 102 98 - 107 mmol/L    CO2 24.0 22.0 - 29.0 mmol/L    Calcium 9.7 8.6 - 10.5 mg/dL    BUN/Creatinine Ratio 18.8 7.0 - 25.0    Anion Gap 10.0 5.0 - 15.0 mmol/L    eGFR 75.2 >60.0 mL/min/1.73   CBC Auto Differential    Specimen: Blood   Result Value Ref Range    WBC 6.10 3.40 - 10.80 10*3/mm3    RBC 4.05 (L) 4.14 - 5.80 10*6/mm3    Hemoglobin 13.3 13.0 - 17.7 g/dL    Hematocrit 39.5 37.5 - 51.0 %    MCV 97.7 (H) 79.0 - 97.0 fL    MCH 33.0 26.6 - 33.0 pg    MCHC 33.7 31.5 - 35.7 g/dL    RDW 14.0 12.3 - 15.4 %    RDW-SD 47.3 37.0 - 54.0 fl    MPV 8.2 6.0 - 12.0 fL    Platelets 211 140 - 450 10*3/mm3    Neutrophil % 60.2 42.7 - 76.0 %    Lymphocyte % 25.9 19.6 - 45.3 %    Monocyte % 9.9 5.0 - 12.0 %    Eosinophil % 3.1 0.3 - 6.2 %    Basophil % 0.9 0.0 - 1.5 %    Neutrophils, Absolute 3.70 1.70 - 7.00 10*3/mm3    Lymphocytes, Absolute 1.60 0.70 - 3.10 10*3/mm3    Monocytes, Absolute 0.60 0.10 - 0.90 10*3/mm3    Eosinophils, Absolute 0.20 0.00 - 0.40 10*3/mm3    Basophils, Absolute 0.10 0.00 - 0.20 10*3/mm3    nRBC 0.0 0.0 - 0.2 /100 WBC   POC Lactate    Specimen: Blood   Result Value Ref Range    Lactate 1.0 0.3 - 2.0 mmol/L   ECG 12 Lead Other; Dizziness   Result Value Ref Range    QT Interval 341 ms                                          Medical Decision Making  Patient presents with left leg redness with history of recurrent cellulitis.      DVT felt to be unlikely based on physical exam as well as patient report that he had a negative Doppler 4 days ago.    Patient states that he does not get improvement with oral  antibiotics and requires IV antibiotics in the past with his recurrent cellulitis.  Today's work-up shows no leukocytosis, normal lactate, Chem-7 essentially normal.  Patient was advised of findings he is agreeable to plan of observation with plan to consult infectious disease.  He was ordered IV Rocephin and vancomycin for initial treatment of his cellulitis with blood cultures pending    Problems Addressed:  Left leg cellulitis: complicated acute illness or injury    Amount and/or Complexity of Data Reviewed  Labs: ordered. Decision-making details documented in ED Course.  ECG/medicine tests: ordered and independent interpretation performed.     Details: My EKG interpretation sinus rhythm, no acute ST or T wave abnormality, rate of 98    Risk  Prescription drug management.  Decision regarding hospitalization.        Final diagnoses:   Left leg cellulitis       ED Disposition  ED Disposition       ED Disposition   Decision to Admit    Condition   --    Comment   --               No follow-up provider specified.       Medication List      No changes were made to your prescriptions during this visit.            Han Faustin MD  07/25/23 2005       Han Faustin MD  07/25/23 2011

## 2023-07-26 LAB
ALBUMIN SERPL-MCNC: 4 G/DL (ref 3.5–5.2)
ALBUMIN/GLOB SERPL: 1.5 G/DL
ALP SERPL-CCNC: 89 U/L (ref 39–117)
ALT SERPL W P-5'-P-CCNC: 20 U/L (ref 1–41)
ANION GAP SERPL CALCULATED.3IONS-SCNC: 9 MMOL/L (ref 5–15)
AST SERPL-CCNC: 21 U/L (ref 1–40)
BASOPHILS # BLD AUTO: 0 10*3/MM3 (ref 0–0.2)
BASOPHILS NFR BLD AUTO: 0.6 % (ref 0–1.5)
BILIRUB SERPL-MCNC: 0.3 MG/DL (ref 0–1.2)
BUN SERPL-MCNC: 16 MG/DL (ref 8–23)
BUN/CREAT SERPL: 19.3 (ref 7–25)
CALCIUM SPEC-SCNC: 9.2 MG/DL (ref 8.6–10.5)
CHLORIDE SERPL-SCNC: 105 MMOL/L (ref 98–107)
CO2 SERPL-SCNC: 24 MMOL/L (ref 22–29)
CREAT SERPL-MCNC: 0.83 MG/DL (ref 0.76–1.27)
DEPRECATED RDW RBC AUTO: 45.9 FL (ref 37–54)
EGFRCR SERPLBLD CKD-EPI 2021: 88.5 ML/MIN/1.73
EOSINOPHIL # BLD AUTO: 0.2 10*3/MM3 (ref 0–0.4)
EOSINOPHIL NFR BLD AUTO: 3.5 % (ref 0.3–6.2)
ERYTHROCYTE [DISTWIDTH] IN BLOOD BY AUTOMATED COUNT: 13.7 % (ref 12.3–15.4)
GLOBULIN UR ELPH-MCNC: 2.6 GM/DL
GLUCOSE SERPL-MCNC: 142 MG/DL (ref 65–99)
HCT VFR BLD AUTO: 38.6 % (ref 37.5–51)
HGB BLD-MCNC: 12.9 G/DL (ref 13–17.7)
LYMPHOCYTES # BLD AUTO: 1.3 10*3/MM3 (ref 0.7–3.1)
LYMPHOCYTES NFR BLD AUTO: 24.3 % (ref 19.6–45.3)
MCH RBC QN AUTO: 32.2 PG (ref 26.6–33)
MCHC RBC AUTO-ENTMCNC: 33.3 G/DL (ref 31.5–35.7)
MCV RBC AUTO: 96.6 FL (ref 79–97)
MONOCYTES # BLD AUTO: 0.7 10*3/MM3 (ref 0.1–0.9)
MONOCYTES NFR BLD AUTO: 12 % (ref 5–12)
NEUTROPHILS NFR BLD AUTO: 3.3 10*3/MM3 (ref 1.7–7)
NEUTROPHILS NFR BLD AUTO: 59.6 % (ref 42.7–76)
NRBC BLD AUTO-RTO: 0 /100 WBC (ref 0–0.2)
PLATELET # BLD AUTO: 185 10*3/MM3 (ref 140–450)
PMV BLD AUTO: 8.3 FL (ref 6–12)
POTASSIUM SERPL-SCNC: 4.1 MMOL/L (ref 3.5–5.2)
PROT SERPL-MCNC: 6.6 G/DL (ref 6–8.5)
QT INTERVAL: 341 MS
RBC # BLD AUTO: 4 10*6/MM3 (ref 4.14–5.8)
SODIUM SERPL-SCNC: 138 MMOL/L (ref 136–145)
WBC NRBC COR # BLD: 5.5 10*3/MM3 (ref 3.4–10.8)

## 2023-07-26 PROCEDURE — 25010000002 CEFTRIAXONE PER 250 MG: Performed by: PHYSICIAN ASSISTANT

## 2023-07-26 PROCEDURE — 25010000002 VANCOMYCIN HCL 1.25 G RECONSTITUTED SOLUTION 1 EACH VIAL: Performed by: PHYSICIAN ASSISTANT

## 2023-07-26 PROCEDURE — 85025 COMPLETE CBC W/AUTO DIFF WBC: CPT | Performed by: EMERGENCY MEDICINE

## 2023-07-26 PROCEDURE — 80053 COMPREHEN METABOLIC PANEL: CPT | Performed by: EMERGENCY MEDICINE

## 2023-07-26 PROCEDURE — 96366 THER/PROPH/DIAG IV INF ADDON: CPT

## 2023-07-26 PROCEDURE — G0378 HOSPITAL OBSERVATION PER HR: HCPCS

## 2023-07-26 PROCEDURE — 97161 PT EVAL LOW COMPLEX 20 MIN: CPT

## 2023-07-26 RX ORDER — METOPROLOL SUCCINATE 50 MG/1
50 TABLET, EXTENDED RELEASE ORAL DAILY
Status: DISCONTINUED | OUTPATIENT
Start: 2023-07-26 | End: 2023-07-28 | Stop reason: HOSPADM

## 2023-07-26 RX ORDER — ASPIRIN 81 MG/1
81 TABLET ORAL DAILY
Status: DISCONTINUED | OUTPATIENT
Start: 2023-07-26 | End: 2023-07-28 | Stop reason: HOSPADM

## 2023-07-26 RX ORDER — GABAPENTIN 400 MG/1
400 CAPSULE ORAL EVERY 8 HOURS SCHEDULED
Status: DISCONTINUED | OUTPATIENT
Start: 2023-07-26 | End: 2023-07-28 | Stop reason: HOSPADM

## 2023-07-26 RX ORDER — CHLORHEXIDINE GLUCONATE 4 G/100ML
1 SOLUTION TOPICAL DAILY PRN
Status: DISCONTINUED | OUTPATIENT
Start: 2023-07-26 | End: 2023-07-28 | Stop reason: HOSPADM

## 2023-07-26 RX ADMIN — MIDODRINE HYDROCHLORIDE 5 MG: 5 TABLET ORAL at 22:54

## 2023-07-26 RX ADMIN — SUCRALFATE 1 G: 1 TABLET ORAL at 22:50

## 2023-07-26 RX ADMIN — METOPROLOL SUCCINATE 50 MG: 50 TABLET, EXTENDED RELEASE ORAL at 09:14

## 2023-07-26 RX ADMIN — SUCRALFATE 1 G: 1 TABLET ORAL at 08:00

## 2023-07-26 RX ADMIN — Medication 10 ML: at 22:55

## 2023-07-26 RX ADMIN — ASPIRIN 81 MG: 81 TABLET, COATED ORAL at 09:14

## 2023-07-26 RX ADMIN — SUCRALFATE 1 G: 1 TABLET ORAL at 11:59

## 2023-07-26 RX ADMIN — GABAPENTIN 400 MG: 400 CAPSULE ORAL at 10:47

## 2023-07-26 RX ADMIN — GABAPENTIN 400 MG: 400 CAPSULE ORAL at 22:50

## 2023-07-26 RX ADMIN — PANTOPRAZOLE SODIUM 40 MG: 40 TABLET, DELAYED RELEASE ORAL at 09:14

## 2023-07-26 RX ADMIN — Medication 10 ML: at 09:15

## 2023-07-26 RX ADMIN — SENNOSIDES AND DOCUSATE SODIUM 1 TABLET: 50; 8.6 TABLET ORAL at 22:50

## 2023-07-26 RX ADMIN — VANCOMYCIN HYDROCHLORIDE 1250 MG: 1.25 INJECTION, POWDER, LYOPHILIZED, FOR SOLUTION INTRAVENOUS at 11:58

## 2023-07-26 RX ADMIN — CEFTRIAXONE 1000 MG: 1 INJECTION, POWDER, FOR SOLUTION INTRAMUSCULAR; INTRAVENOUS at 22:54

## 2023-07-26 RX ADMIN — SUCRALFATE 1 G: 1 TABLET ORAL at 18:59

## 2023-07-26 NOTE — ED NOTES
"Nursing report ED to floor  Deion Nicholas  82 y.o.  male    HPI :   Chief Complaint   Patient presents with    Cellulitis     Pt reports LLE pain and swelling and states \"I have cellulitis again\".  Pt reports L arm aches and dizziness.  Pt reports was seen 3 wks ago and again on Friday.  Pt reports had a Doppler on Friday.        Admitting doctor:   Han Faustin MD    Admitting diagnosis:   The encounter diagnosis was Left leg cellulitis.    Code status:   Current Code Status       Date Active Code Status Order ID Comments User Context       7/25/2023 2009 CPR (Attempt to Resuscitate) [id number]  Han Faustin MD ED        Question Answer    Code Status (Patient has no pulse and is not breathing) CPR (Attempt to Resuscitate)    Medical Interventions (Patient has pulse or is breathing) Full Support    Release to patient Routine Release                    Allergies:   Lovastatin, Pravastatin, Simvastatin, and Spironolactone    Isolation:   No active isolations    Intake and Output  No intake or output data in the 24 hours ending 07/25/23 2027    Weight:       07/25/23 1714   Weight: 89.8 kg (198 lb)       Most recent vitals:   Vitals:    07/25/23 1714 07/25/23 1733 07/25/23 1958   BP: 125/74 135/67    BP Location: Left arm Left arm    Patient Position: Sitting Sitting    Pulse: 95 91 85   Resp: 18 17 17   Temp: 98.3 °F (36.8 °C) 97.7 °F (36.5 °C) 97.5 °F (36.4 °C)   TempSrc: Oral Oral Oral   SpO2: 97% 95%    Weight: 89.8 kg (198 lb)     Height: 185.4 cm (73\")         Active LDAs/IV Access:   Lines, Drains & Airways       Active LDAs       Name Placement date Placement time Site Days    Peripheral IV 07/25/23 1830 Right Antecubital 07/25/23 1830  Antecubital  less than 1                    Labs (abnormal labs have a star):   Labs Reviewed   BASIC METABOLIC PANEL - Abnormal; Notable for the following components:       Result Value    Glucose 116 (*)     All other components within normal limits    Narrative:  "    GFR Normal >60  Chronic Kidney Disease <60  Kidney Failure <15    The GFR formula is only valid for adults with stable renal function between ages 18 and 70.   CBC WITH AUTO DIFFERENTIAL - Abnormal; Notable for the following components:    RBC 4.05 (*)     MCV 97.7 (*)     All other components within normal limits   POC LACTATE - Normal   BLOOD CULTURE   BLOOD CULTURE   POC LACTATE   CBC AND DIFFERENTIAL    Narrative:     The following orders were created for panel order CBC & Differential.  Procedure                               Abnormality         Status                     ---------                               -----------         ------                     CBC Auto Differential[[id number]]        Abnormal            Final result                 Please view results for these tests on the individual orders.       EKG:   ECG 12 Lead Other; Dizziness   Preliminary Result   HEART RATE= 98  bpm   RR Interval= 612  ms   OK Interval= 164  ms   P Horizontal Axis= 0  deg   P Front Axis= 37  deg   QRSD Interval= 129  ms   QT Interval= 341  ms   QRS Axis= -10  deg   T Wave Axis= 154  deg   - ABNORMAL ECG -   Sinus rhythm   Nonspecific intraventricular conduction delay   Probable LVH with secondary repol abnrm   When compared with ECG of 14-Jun-2023 19:22:56,   No significant change   Electronically Signed By:    Date and Time of Study: 2023-07-25 17:22:21          Meds given in ED:   Medications   sodium chloride 0.9 % flush 10 mL (has no administration in time range)   cefTRIAXone (ROCEPHIN) 1,000 mg in sodium chloride 0.9 % 100 mL IVPB (has no administration in time range)   vancomycin IVPB 1750 mg in 0.9% Sodium Chloride (premix) 500 mL (has no administration in time range)       Imaging results:  No radiology results for the last day    Ambulatory status:   - @nas    Social issues:   Social History     Socioeconomic History    Marital status:    Tobacco Use    Smoking status: Former     Packs/day: 1.00      Years: 17.00     Pack years: 17.00     Types: Cigarettes     Quit date: 2002     Years since quittin.0    Smokeless tobacco: Never   Vaping Use    Vaping Use: Never used   Substance and Sexual Activity    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

## 2023-07-26 NOTE — H&P
"[facility] H&P    Patient Name: Deion Nicholas  : 1940  MRN: [id number]  Primary Care Physician: Makenna Motta MD  Date of admission: 2023     Patient Care Team:  Makenna Motta MD as PCP - General  Makenna Motta MD as PCP - Family Medicine          Subjective   History Present Illness     Chief Complaint:   Chief Complaint   Patient presents with    Cellulitis     Pt reports LLE pain and swelling and states \"I have cellulitis again\".  Pt reports L arm aches and dizziness.  Pt reports was seen 3 wks ago and again on Friday.  Pt reports had a Doppler on Friday.      LLE swelling    Cellulitis  Associated symptoms include myalgias.     82-year-old male states over the last 2 to 3 days had some increased swelling and redness of his left lower leg site where he had recurrent cellulitis. States last time he treated with antibiotics 3 weeks ago states he did not feel like he fully recovered from that with the oral treatment at home and now its recurring. He reports no fevers or vomiting. He states he has had some lightheadedness over the last 2 days. He reports no headache or focal numbness or weakness or chest pain or shortness of breath.     Observation 23  Pt concurs with er hpi. Pt states he has been treated multiple times this year for same cellulitis in LLE. This occurred last year per pt and was only resolved by IV abx therapy at home. Pt c/o lightheadedness but this a chronic issue. Afebrile today.       Review of Systems   Cardiovascular:  Positive for leg swelling.   Skin:  Positive for poor wound healing.   Musculoskeletal:  Positive for myalgias.   Neurological:  Positive for light-headedness.   All other systems reviewed and are negative.        Personal History     Past Medical History:   Past Medical History:   Diagnosis Date    Aneurysm     Cardiomyopathy     ICD implantation    Cellulitis of left lower extremity     recurrent    CHD (coronary " heart disease)     S/P CABG & PCI    Dyslipidemia     History of ventricular tachycardia     Hypertension     Myocardial infarction     Inferior MI    Pinched nerve     L4-L5    Prostate cancer        Surgical History:      Past Surgical History:   Procedure Laterality Date    ANGIOPLASTY  2000 04/1996 Stent: Palmaz- Huy stent/LAD 07/1996-RCA: 2000-Tetra stent Guidant to proximal RCA, mid to distal artery 2 sequential Guidant Tetra stents    APPENDECTOMY      CARDIAC ABLATION  April and May 2019    CARDIAC CATHETERIZATION  07/2017    1996 x2, Nov. 2000, 05/2010-cath 08/2015-no stents 2017    CARDIAC DEFIBRILLATOR PLACEMENT      COLONOSCOPY W/ POLYPECTOMY      Mult colonoscopy's for polyp resection     CORONARY ARTERY BYPASS GRAFT  05/2010    x5 vessel: LMA to diagonal, LAD, intermedius, obtuse marginal, RCA    CORONARY STENT PLACEMENT      ENDOSCOPY N/A 6/24/2019    Procedure: ESOPHAGOGASTRODUODENOSCOPY with dilitation Bougie 50, 54;  Surgeon: Roddy Coronel MD;  Location: [facility] ENDOSCOPY;  Service: Gastroenterology    INSERT / REPLACE / REMOVE PACEMAKER      KNEE OPEN LATERAL RELEASE Left     reduction    OTHER SURGICAL HISTORY  01/2018    ICD implantation    PROSTATE SURGERY  2015    Robiotic surgery- Cyber Knife:           Family History: family history includes Heart disease in his father; Pancreatic cancer in his mother. Otherwise pertinent FHx was reviewed and unremarkable.     Social History:  reports that he quit smoking about 21 years ago. His smoking use included cigarettes. He has a 17.00 pack-year smoking history. He has never used smokeless tobacco. He reports that he does not currently use alcohol. He reports that he does not use drugs.      Medications:  Prior to Admission medications    Medication Sig Start Date End Date Taking? Authorizing Provider   aspirin 81 MG EC tablet Take 1 tablet by mouth Daily.   Yes Provider, MD Cyndee   chlorhexidine (HIBICLENS) 4 % external liquid Apply 1  application topically to the appropriate area as directed Daily As Needed for Wound Care. Apply to leg(s)   Yes Cyndee Melgar MD   Cholecalciferol 10 MCG (400 UNIT) tablet Take 2 tablets by mouth Daily.   Yes Cyndee Melgar MD   ezetimibe (ZETIA) 10 MG tablet Take 1 tablet by mouth Every Evening.   Yes Cyndee Melgar MD   gabapentin (NEURONTIN) 400 MG capsule Take 1 capsule by mouth 3 (Three) Times a Day.   Yes Cyndee Melgar MD   methocarbamol (ROBAXIN) 500 MG tablet Take 1 tablet by mouth 2 (Two) Times a Day.   Yes Cyndee Melgar MD   metoprolol succinate XL (TOPROL-XL) 50 MG 24 hr tablet Take 1 tablet by mouth Daily.   Yes Cyndee Melgar MD   midodrine (PROAMATINE) 5 MG tablet Take 1 tablet by mouth 3 (Three) Times a Day Before Meals.   Yes Cyndee Melgar MD   mupirocin (BACTROBAN) 2 % ointment Apply 1 application  topically to the appropriate area as directed 3 (Three) Times a Day. Apply to leg(s)   Yes Cyndee Melgar MD   Omega-3 Fatty Acids (fish oil) 1000 MG capsule capsule Take 2 capsules by mouth 2 (Two) Times a Day With Meals.   Yes Cyndee Melgar MD   pantoprazole (PROTONIX) 40 MG EC tablet Take 1 tablet by mouth Daily.   Yes Cyndee Melgar MD   sennosides-docusate (PERICOLACE) 8.6-50 MG per tablet Take 1 tablet by mouth 2 (Two) Times a Day.   Yes Cyndee Melgar MD   sucralfate (CARAFATE) 1 g tablet Take 1 tablet by mouth 4 (Four) Times a Day.   Yes Cyndee Melgar MD   cyanocobalamin 1000 MCG/ML injection Inject 1 mL into the appropriate muscle as directed by prescriber Every 14 (Fourteen) Days.    Cyndee Melgar MD   nitroglycerin (NITROSTAT) 0.4 MG SL tablet Place 1 tablet under the tongue Every 5 (Five) Minutes As Needed for Chest Pain.    Cyndee Melgar MD       Allergies:    Allergies   Allergen Reactions    Lovastatin Myalgia    Pravastatin Myalgia and Unknown (See Comments)     unknown    Simvastatin  Unknown (See Comments) and Myalgia     unknown    Spironolactone Other (See Comments)     Gynecomastia        Objective   Objective     Vital Signs  Temp:  [97.5 °F (36.4 °C)-98.3 °F (36.8 °C)] 97.7 °F (36.5 °C)  Heart Rate:  [72-95] 72  Resp:  [14-18] 14  BP: (125-169)/(67-89) 169/89  SpO2:  [94 %-99 %] 99 %  on   ;   Device (Oxygen Therapy): room air  Body mass index is 26.12 kg/m².    Physical Exam  Vitals reviewed.   Constitutional:       Appearance: Normal appearance.   HENT:      Mouth/Throat:      Mouth: Mucous membranes are moist.   Cardiovascular:      Rate and Rhythm: Normal rate and regular rhythm.      Pulses: Normal pulses.      Heart sounds: Normal heart sounds.   Pulmonary:      Effort: Pulmonary effort is normal.      Breath sounds: Normal breath sounds.   Abdominal:      Palpations: Abdomen is soft.   Skin:     General: Skin is warm and dry.      Findings: Erythema present.      Comments: Erythema and very slight edema to LLE, good pulse, no warmth   Neurological:      General: No focal deficit present.      Mental Status: He is alert and oriented to person, place, and time.   Psychiatric:         Mood and Affect: Mood normal.         Behavior: Behavior normal.       Results Review:  I have personally reviewed most recent cardiac tracings, lab results, and radiology images and interpretations and agree with findings, most notably: cbc, cmp, EKG, Lactic, previous LE US.    Results from last 7 days   Lab Units 07/26/23  0458   WBC 10*3/mm3 5.50   HEMOGLOBIN g/dL 12.9*   HEMATOCRIT % 38.6   PLATELETS 10*3/mm3 185     Results from last 7 days   Lab Units 07/26/23  0458 07/25/23  1832   SODIUM mmol/L 138  --    POTASSIUM mmol/L 4.1  --    CHLORIDE mmol/L 105  --    CO2 mmol/L 24.0  --    BUN mg/dL 16  --    CREATININE mg/dL 0.83  --    GLUCOSE mg/dL 142*  --    CALCIUM mg/dL 9.2  --    ALT (SGPT) U/L 20  --    AST (SGOT) U/L 21  --    LACTATE mmol/L  --  1.0     Estimated Creatinine Clearance: 90.2  mL/min (by C-G formula based on SCr of 0.83 mg/dL).  Brief Urine Lab Results       None            Microbiology Results (last 10 days)       ** No results found for the last 240 hours. **            ECG/EMG Results (most recent)       Procedure Component Value Units Date/Time    ECG 12 Lead Other; Dizziness [[id number]] Collected: 07/25/23 1722     Updated: 07/26/23 0853     QT Interval 341 ms     Narrative:      HEART RATE= 98  bpm  RR Interval= 612  ms  GA Interval= 164  ms  P Horizontal Axis= 0  deg  P Front Axis= 37  deg  QRSD Interval= 129  ms  QT Interval= 341  ms  QRS Axis= -10  deg  T Wave Axis= 154  deg  - ABNORMAL ECG -  Sinus rhythm  Probable LVH with secondary repol abnrm  Electronically Signed By: Han Faustin ([facility]) 26-Jul-2023 08:53:14  Date and Time of Study: 2023-07-25 17:22:21            Results for orders placed during the hospital encounter of 07/19/23    Duplex Venous Lower Extremity - Left CAR    Interpretation Summary    Left popliteal fossa fluid collection.    Normal left lower extremity venous duplex scan.      Results for orders placed during the hospital encounter of 05/22/23    Adult Transthoracic Echo Complete W/ Cont if Necessary Per Protocol    Interpretation Summary    Left ventricular ejection fraction appears to be 31 - 35%.    Moderate aortic valve regurgitation is present.    Moderate mitral valve regurgitation is present.    Estimated right ventricular systolic pressure from tricuspid regurgitation is normal (<35 mmHg).    Mild dilation of the aortic root is present.      No radiology results for the last 7 days      Estimated Creatinine Clearance: 90.2 mL/min (by C-G formula based on SCr of 0.83 mg/dL).    Assessment & Plan   Assessment/Plan       Active Hospital Problems    Diagnosis  POA    Left leg cellulitis [L03.116]  Yes      Resolved Hospital Problems   No resolved problems to display.       Cellulitis LLE  - US LE performed 4/23 reviewed and showing no dvt  - WBC 6,  cmp unremarkable  - Lactic 1  - IV vanc and rocephin  - ID consulted    Hypertension  -moderately Controlled   BP Readings from Last 1 Encounters:   07/26/23 169/89     - Continue metoprolol  - Monitor while admitted     Orthostatic hypotension  - midodrine      VTE Prophylaxis -   Mechanical Order History:        Ordered        07/25/23 2217  Place Sequential Compression Device  Once            07/25/23 2217  Maintain Sequential Compression Device  Continuous                          Pharmalogical Order History:       None            CODE STATUS:    Code Status and Medical Interventions:   Ordered at: 07/25/23 2009     Code Status (Patient has no pulse and is not breathing):    CPR (Attempt to Resuscitate)     Medical Interventions (Patient has pulse or is breathing):    Full Support     Release to patient:    Routine Release       This patient has been examined wearing personal protective equipment.     I discussed the patient's findings and my recommendations with patient and nursing staff.      Signature:Electronically signed by Andra Kate PA-C, 07/26/23, 8:53 AM EDT.

## 2023-07-26 NOTE — CASE MANAGEMENT/SOCIAL WORK
Discharge Planning Assessment   Daniel     Patient Name: Deion Nicholas  MRN: [id number]  Today's Date: 7/26/2023    Admit Date: 7/25/2023    Plan: Home, Referral to [facility] to Follow in case IV ATB is needed   Discharge Needs Assessment       Row Name 07/26/23 0947       Discharge Needs Assessment    Discharge Facility/Level of Care Needs home with home health    Provided Post Acute Provider List? Yes    Post Acute Provider List Home Health    Provided Post Acute Provider Quality & Resource List? Yes    Post Acute Provider Quality and Resource List Home Health    Delivered To Patient    Method of Delivery In person      Row Name 07/26/23 0946       Living Environment    People in Home alone    Current Living Arrangements home    Potentially Unsafe Housing Conditions none    Primary Care Provided by self    Provides Primary Care For no one    Able to Return to Prior Arrangements yes       Resource/Environmental Concerns    Resource/Environmental Concerns none    Transportation Concerns none       Food Insecurity    Within the past 12 months, you worried that your food would run out before you got the money to buy more. Never true    Within the past 12 months, the food you bought just didn't last and you didn't have money to get more. Never true       Transition Planning    Patient/Family Anticipates Transition to home    Patient/Family Anticipated Services at Transition none    Transportation Anticipated family or friend will provide       Discharge Needs Assessment    Readmission Within the Last 30 Days no previous admission in last 30 days    Equipment Currently Used at Home cane, straight;commode    Concerns to be Addressed denies needs/concerns at this time    Anticipated Changes Related to Illness none    Equipment Needed After Discharge none                   Discharge Plan       Row Name 07/26/23 0947       Plan    Plan Home, Referral to [facility] to Follow in case IV ATB is  needed    Plan Comments Met with Patient at bedside Lives at home alone. Son can provide transportation. IADL's PCP and Pharmacy [facility]. Patient requested that if IV ATB this time he would like to do home health IV ATB elected [facility] and referral sent to Liaison Poornima and Tatiana to follow in case he is to need IV ATB at discharge. d/c barriers: IV ATB, Pending ID Consult                  Continued Care and Services - Admitted Since 7/25/2023       Dialysis/Infusion       Service Provider Request Status Selected Services Address Phone Fax Patient Preferred    [facility] Pending - Request Sent N/A [id number] [address] [phone] --              Home Medical Care       Service Provider Request Status Selected Services Address Phone Fax Patient Preferred    [facility] Pending - Request Sent N/A [address] [place] [address] [phone] --                  Expected Discharge Date and Time       Expected Discharge Date Expected Discharge Time    Jul 27, 2023            Demographic Summary       Row Name 07/26/23 0946       General Information    Admission Type observation    Arrived From emergency department    Referral Source admission list    Preferred Language English                   Functional Status       Row Name 07/26/23 0946       Functional Status    Usual Activity Tolerance good    Current Activity Tolerance good       Functional Status, IADL    Medications independent    Meal Preparation independent    Housekeeping independent    Laundry independent    Shopping independent       Mental Status    General Appearance WDL WDL       Mental Status Summary    Recent Changes in Mental Status/Cognitive Functioning no changes                            Patient Forms       Row Name 07/26/23 0951       Patient Forms    Important Message from Medicare  (IMM) --  Starr 7/25 per reg                      Felicitas Brown RN

## 2023-07-26 NOTE — DISCHARGE PLACEMENT REQUEST
"Ana Aden (82 y.o. Male)       Date of Birth   1940    Social Security Number       Address   [facility] [place] [address]    Home Phone   [phone]    MRN   [id number]       [employer]   [employer]    Marital Status                               Admission Date   7/25/23    Admission Type   Emergency    Admitting Provider       Attending Provider   Han Faustin MD    Department, Room/Bed   [facility] OBSERVATION, 115/1       Discharge Date       Discharge Disposition       Discharge Destination                                 Attending Provider: Han Faustin MD    Allergies: Lovastatin, Pravastatin, Simvastatin, Spironolactone    Isolation: None   Infection: COVID Screen (preop/placement) (10/29/21)   Code Status: CPR    Ht: 185.4 cm (73\")   Wt: 89.8 kg (198 lb)    Admission Cmt: None   Principal Problem: None                  Active Insurance as of 7/25/2023       Primary Coverage       Payor Plan Insurance Group Employer/Plan Group    [facility] DEPT [id number]       Payor Plan Address Payor Plan Phone Number Payor Plan Fax Number Effective Dates    [facility] OFFICE OF COMMUNITY CARE [phone]  4/16/2023 - None Entered    [address] [address]       [facility] [address]         Subscriber Name Subscriber Birth Date Member ID       ANA ADEN 1940 [id number]               Secondary Coverage       Payor Plan Insurance Group Employer/Plan Group    ANTH MEDICARE REPLACEMENT ANTHEM MEDICARE ADVANTAGE [id number]       Payor Plan Address Payor Plan Phone Number Payor Plan Fax Number Effective Dates    [address] [phone]  10/1/2019 - None Entered    [facility] [address]         Subscriber Name Subscriber Birth Date Member ID       ANA ADEN 1940 [id number]               Tertiary Coverage       Payor Plan Insurance Group Employer/Plan Group    [facility] CCN OPTUM        Payor Plan Address Payor Plan " Phone Number Payor Plan Fax Number Effective Dates    [address] [phone]  4/16/2023 - None Entered    [facility] [id number]         Subscriber Name Subscriber Birth Date Member ID       ANA CANTU 1940 [id number]                     Emergency Contacts        (Rel.) Home Phone Work Phone Mobile Phone    suze cantu (Son) [phone] -- --    SIM MOROCHO (Friend) [phone] -- --

## 2023-07-26 NOTE — CASE MANAGEMENT/SOCIAL WORK
Continued Stay Note  MEIR Sanchez     Patient Name: Deion Nicholas  MRN: [id number]  Today's Date: 7/26/2023    Admit Date: 7/25/2023    Plan: Home, Referral to [facility] to Follow in case IV ATB is needed   Discharge Plan       Row Name 07/26/23 1034       Plan    Plan Comments recieved update from [place] [facility], Since Patient wants to Use VA Benefits if IV ATB are needed patient will Need VA Approval prior to [facility] being able to provide service. If IV ATB will be need will need to Call [phone] to start process of VA Approval and expect 3-5 Business days for approval. If patient changes mind and wants to use his [facility] Policy they could arrange same day. Did go ahead and call to get process started ad there requirement, Left Voicemail for Cecilia and return call pending  D/C barriers: Pending determination of if IV ATB will be needed at discharge                                                 Expected Discharge Date and Time       Expected Discharge Date Expected Discharge Time    Jul 27, 2023               Felicitas Brown RN

## 2023-07-26 NOTE — PLAN OF CARE
Goal Outcome Evaluation:  Plan of Care Reviewed With: patient, son           Outcome Evaluation: 83 yo male admitted with recurrent cellulitis LLE.  Pt is from home alone, reports multiple admissions for same issues.  Pt lives alone, uses a rollator or 2 canes for mobility.  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  Pt functions well for short distance using RW. Plans home at d/c.  Recommend OP PT f/u at d/c to address RLE foot drop and gati/balance deficits. Pt states [facility].      Anticipated Discharge Disposition (PT): home with outpatient therapy services (pt states he prefers [facility])

## 2023-07-26 NOTE — CASE MANAGEMENT/SOCIAL WORK
Continued Stay Note  MEIR Sanchez     Patient Name: Deion Nicholas  MRN: [id number]  Today's Date: 7/26/2023    Admit Date: 7/25/2023    Plan: Home, Referral to [facility] to Follow in case IV ATB is needed   Discharge Plan       Row Name 07/26/23 1235       Plan    Plan Comments Recieved [facility] at [facility] ([phone] they will need the following infomration faxed to them once available if IV ATB would be needed to Fax number [phone] ATTN Leah Piper and if Doctor would need to speak with [facility] Infectious disease pharmacist they can reach her at [phone]. The following information is needed H&P, Labs Cultures, if patient has PICC Line, ID Notes. Went ahead and faxed H&P and Labs to put them in the loopp with potential need                         Felicitas Brown RN     Substance abuse

## 2023-07-26 NOTE — CONSULTS
Infectious Diseases Consult Note    Referring Provider: Han Faustin MD    Reason for Consultation: Left leg cellulitis    Patient Care Team:  Makenna Motta MD as PCP - General  Makenna Motta MD as PCP - Family Medicine    Chief complaint left lower swelling and redness    Subjective     History of present illness:      This is 82-year-old male with past medical history significant for recurrent left leg cellulitis and chronic edema of the lower extremities.  Patient presented to [facility] on July 25, 2023 with a complains of swelling and redness of the left leg.  Patient had no fever.  Patient was started on IV vancomycin and IV ceftriaxone.  Patient stated that his legs edema and erythema had improved since admission.  The patient is hemodynamically stable.  Denied having any other complaints.    Review of Systems   Review of Systems   Constitutional: Negative.    HENT: Negative.     Eyes: Negative.    Respiratory: Negative.     Cardiovascular:  Positive for leg swelling.   Gastrointestinal: Negative.    Genitourinary: Negative.    Musculoskeletal: Negative.    Skin: Negative.    Neurological: Negative.    Hematological: Negative.    Psychiatric/Behavioral: Negative.       Medications  Medications Prior to Admission   Medication Sig Dispense Refill Last Dose    aspirin 81 MG EC tablet Take 1 tablet by mouth Daily.   7/25/2023    chlorhexidine (HIBICLENS) 4 % external liquid Apply 1 application topically to the appropriate area as directed Daily As Needed for Wound Care. Apply to leg(s)   7/25/2023    Cholecalciferol 10 MCG (400 UNIT) tablet Take 2 tablets by mouth Daily.   7/25/2023    ezetimibe (ZETIA) 10 MG tablet Take 1 tablet by mouth Every Evening.   7/25/2023    gabapentin (NEURONTIN) 400 MG capsule Take 1 capsule by mouth 3 (Three) Times a Day.   7/25/2023    methocarbamol (ROBAXIN) 500 MG tablet Take 1 tablet by mouth 2 (Two) Times a Day.       metoprolol succinate XL  (TOPROL-XL) 50 MG 24 hr tablet Take 1 tablet by mouth Daily.   7/25/2023    midodrine (PROAMATINE) 5 MG tablet Take 1 tablet by mouth 3 (Three) Times a Day Before Meals.   7/25/2023    mupirocin (BACTROBAN) 2 % ointment Apply 1 application  topically to the appropriate area as directed 3 (Three) Times a Day. Apply to leg(s)   7/25/2023    nitroglycerin (NITROSTAT) 0.4 MG SL tablet Place 1 tablet under the tongue Every 5 (Five) Minutes As Needed for Chest Pain.       Omega-3 Fatty Acids (fish oil) 1000 MG capsule capsule Take 2 capsules by mouth 2 (Two) Times a Day With Meals.   7/25/2023    pantoprazole (PROTONIX) 40 MG EC tablet Take 1 tablet by mouth Daily.   7/25/2023    sennosides-docusate (PERICOLACE) 8.6-50 MG per tablet Take 1 tablet by mouth 2 (Two) Times a Day.   7/25/2023    sucralfate (CARAFATE) 1 g tablet Take 1 tablet by mouth 4 (Four) Times a Day.   7/25/2023       History  Past Medical History:   Diagnosis Date    Aneurysm     Cardiomyopathy     ICD implantation    Cellulitis of left lower extremity 2010    recurrent    CHD (coronary heart disease)     S/P CABG & PCI    Dyslipidemia     History of ventricular tachycardia     Hypertension     Myocardial infarction     Inferior MI    Pinched nerve     L4-L5    Prostate cancer      Past Surgical History:   Procedure Laterality Date    ANGIOPLASTY  2000 04/1996 Stent: Palmaz- Huy stent/LAD 07/1996-RCA: 2000-Tetra stent Guidant to proximal RCA, mid to distal artery 2 sequential Guidant Tetra stents    APPENDECTOMY      CARDIAC ABLATION  April and May 2019    CARDIAC CATHETERIZATION  07/2017    1996 x2, Nov. 2000, 05/2010-cath 08/2015-no stents 2017    CARDIAC DEFIBRILLATOR PLACEMENT      COLONOSCOPY W/ POLYPECTOMY      Mult colonoscopy's for polyp resection     CORONARY ARTERY BYPASS GRAFT  05/2010    x5 vessel: LMA to diagonal, LAD, intermedius, obtuse marginal, RCA    CORONARY STENT PLACEMENT      ENDOSCOPY N/A 6/24/2019    Procedure:  ESOPHAGOGASTRODUODENOSCOPY with dilitation Bougie 50, 54;  Surgeon: Roddy Coronel MD;  Location: [facility] ENDOSCOPY;  Service: Gastroenterology    INSERT / REPLACE / REMOVE PACEMAKER      KNEE OPEN LATERAL RELEASE Left     reduction    OTHER SURGICAL HISTORY  01/2018    ICD implantation    PROSTATE SURGERY  2015    Robiotic surgery- Cyber Knife:       Family History  Family History   Problem Relation Age of Onset    Pancreatic cancer Mother     Heart disease Father        Social History   reports that he quit smoking about 21 years ago. His smoking use included cigarettes. He has a 17.00 pack-year smoking history. He has never used smokeless tobacco. He reports that he does not currently use alcohol. He reports that he does not use drugs.    Allergies  Lovastatin, Pravastatin, Simvastatin, and Spironolactone    Objective     Vital Signs   Vital Signs (last 24 hours)         07/25 0700  07/26 0659 07/26 0700 07/26 1456   Most Recent      Temp (°F) 97.5 -  98.3    97.7 -  97.8     97.8 (36.6) 07/26 1424    Heart Rate 72 -  95    83 -  86     86 07/26 1424    Resp 14 -  18      15     15 07/26 1424    /74 -  169/89    126/69 -  144/78     126/69 07/26 1424    SpO2 (%) 94 -  99      99     99 07/26 1424            Physical Exam:  Physical Exam  Vitals and nursing note reviewed.   Constitutional:       Appearance: He is well-developed.   HENT:      Head: Normocephalic and atraumatic.   Eyes:      Pupils: Pupils are equal, round, and reactive to light.   Cardiovascular:      Rate and Rhythm: Normal rate and regular rhythm.      Heart sounds: Normal heart sounds.   Pulmonary:      Effort: Pulmonary effort is normal. No respiratory distress.      Breath sounds: Normal breath sounds. No wheezing or rales.   Abdominal:      General: Bowel sounds are normal. There is no distension.      Palpations: Abdomen is soft. There is no mass.      Tenderness: There is no abdominal tenderness. There is no guarding or rebound.    Musculoskeletal:         General: No deformity. Normal range of motion.      Cervical back: Normal range of motion and neck supple.      Left lower leg: Edema present.      Comments: Left leg edema with subtle erythema but no streak above the knee.   Skin:     General: Skin is warm.      Findings: No erythema or rash.   Neurological:      Mental Status: He is alert and oriented to person, place, and time.      Cranial Nerves: No cranial nerve deficit.       Microbiology  Microbiology Results (last 10 days)       ** No results found for the last 240 hours. **            Laboratory  Results from last 7 days   Lab Units 07/26/23  0458   WBC 10*3/mm3 5.50   HEMOGLOBIN g/dL 12.9*   HEMATOCRIT % 38.6   PLATELETS 10*3/mm3 185     Results from last 7 days   Lab Units 07/26/23  0458   SODIUM mmol/L 138   POTASSIUM mmol/L 4.1   CHLORIDE mmol/L 105   CO2 mmol/L 24.0   BUN mg/dL 16   CREATININE mg/dL 0.83   GLUCOSE mg/dL 142*   CALCIUM mg/dL 9.2     Results from last 7 days   Lab Units 07/26/23  0458   SODIUM mmol/L 138   POTASSIUM mmol/L 4.1   CHLORIDE mmol/L 105   CO2 mmol/L 24.0   BUN mg/dL 16   CREATININE mg/dL 0.83   GLUCOSE mg/dL 142*   CALCIUM mg/dL 9.2                   Radiology  Imaging Results (Last 72 Hours)       ** No results found for the last 72 hours. **            Cardiology      Results Review:  I have reviewed all clinical data, test, lab, and imaging results.       Schedule Meds  aspirin, 81 mg, Oral, Daily  cefTRIAXone, 1,000 mg, Intravenous, Q24H  gabapentin, 400 mg, Oral, Q8H  metoprolol succinate XL, 50 mg, Oral, Daily  midodrine, 5 mg, Oral, Q8H  pantoprazole, 40 mg, Oral, Daily  sennosides-docusate, 1 tablet, Oral, BID  sodium chloride, 10 mL, Intravenous, Q12H  sucralfate, 1 g, Oral, 4x Daily  vancomycin, 15 mg/kg, Intravenous, Q12H        Infusion Meds  Pharmacy to dose vancomycin, , Last Rate: Stopped (07/26/23 1327)        PRN Meds    acetaminophen    chlorhexidine    melatonin    ondansetron     Pharmacy to dose vancomycin    [COMPLETED] Insert Peripheral IV **AND** sodium chloride    sodium chloride    sodium chloride      Assessment & Plan       Assessment    Recurrent left lower extremity cellulitis.  Patient does not appear to be toxic.  Leg erythema and edema had improved according to patient since admission.        Plan    Continue IV vancomycin and IV ceftriaxone for now  We will de-escalate to p.o. antibiotics in the next day or 2  Patient was advised to keep his left leg elevated at the heart level or higher if possible  A.m. labs    Panda Lopes MD  07/26/23  14:56 EDT    Note is dictated utilizing voice recognition software/Dragon

## 2023-07-26 NOTE — DISCHARGE PLACEMENT REQUEST
"Potential IV ATB Needs, Patient is at [facility]  Felicitas Brown RN    Phone: [phone]  Fax:[phone]    Deion Nicholas (82 y.o. Male)       Date of Birth   1940    Social Security Number       Address   [address] [place] [address] [address] [place] [address]    Home Phone   [phone]    MRN   [id number]       Rastafarian   Sabianist    Marital Status                               Admission Date   7/25/23    Admission Type   Emergency    Admitting Provider       Attending Provider   Han Faustin MD    Department, Room/Bed   [facility] OBSERVATION, 115/1       Discharge Date       Discharge Disposition       Discharge Destination                                 Attending Provider: Han Faustin MD    Allergies: Lovastatin, Pravastatin, Simvastatin, Spironolactone    Isolation: None   Infection: COVID Screen (preop/placement) (10/29/21)   Code Status: CPR    Ht: 185.4 cm (73\")   Wt: 89.8 kg (198 lb)    Admission Cmt: None   Principal Problem: None                  Active Insurance as of 7/25/2023       Primary Coverage       Payor Plan Insurance Group Employer/Plan Group    [facility] DE[id number]       Payor Plan Address Payor Plan Phone Number Payor Plan Fax Number Effective Dates    [facility] OFFICE OF COMMUNITY CARE [phone]  4/16/2023 - None Entered    [address] [address]       [facility] [address]         Subscriber Name Subscriber Birth Date Member ID       DEION NICHOLAS 1940 [id number]               Secondary Coverage       Payor Plan Insurance Group Employer/Plan Group    ANTHEM MEDICARE REPLACEMENT ANTHEM MEDICARE ADVANTAGE [id number]       Payor Plan Address Payor Plan Phone Number Payor Plan Fax Number Effective Dates    [address] [phone]  10/1/2019 - None Entered    [facility] [address]         Subscriber Name Subscriber Birth Date Member ID       DEION NICHOLAS 1940 [id number]               Tertiary Coverage " "      Payor Plan Insurance Group Employer/Plan Group    [facility] CCN OPTUM        Payor Plan Address Payor Plan Phone Number Payor Plan Fax Number Effective Dates    [address] [phone]  4/16/2023 - None Entered    [facility] [id number]         Subscriber Name Subscriber Birth Date Member ID       DEION CANTU 1940 [id number]                     Emergency Contacts        (Rel.) Home Phone Work Phone Mobile Phone    suze cantu (Son) [phone] -- --    PHILLSIM BUSTOS (Friend) [phone] -- --               Vanessa Puente PharmD   Pharmacist  Pharmacy     Progress Notes      Signed     Date of Service: 07/26/23 1139  Creation Time: 07/26/23 1139     Signed         Pharmacy Antimicrobial Dosing Service     Subjective:  Deion Cantu is a 82 y.o.male admitted with increased swelling in left lower leg and redness at the site where patient had recurrent cellulitis. Recent fill for Augmentin x 10 days per med review. Patient  Pharmacy has been consulted to dose Vancomycin for possible cellulitis.     ID consulted        Assessment/Plan     1. Day #2 Vancomycin: Goal trough 10-20 mcg/mL for uncomplicated SSTI. Vancomycin 1750 mg (~0 mg/kg ABW) IV x 1, followed by 1250 mg (~15 mg/kg ABW) IV q12h. Obtain trough on 7/27 at 1100 for clinical review, or earlier if clinically warranted.      2. Day #2 Ceftriaxone: 1gm IV q24h      Will continue to monitor drug levels, renal function, culture and sensitivities, and patient clinical status.         Objective:  Relevant clinical data and objective history reviewed:  185.4 cm (73\")   89.8 kg (198 lb)   Ideal body weight: 79.9 kg (176 lb 2.4 oz)  Adjusted ideal body weight: 83.9 kg (184 lb 14.2 oz)  Body mass index is 26.12 kg/m².               Results from last 7 days   Lab Units 07/26/23  0458 07/25/23  1737   CREATININE mg/dL 0.83 1.01      Estimated Creatinine Clearance: 90.2 mL/min (by C-G formula based on SCr of 0.83 " "mg/dL).  I/O last 3 completed shifts:  In: 240 [P.O.:240]  Out: 1100 [Urine:1100]           Results from last 7 days   Lab Units 23  0458 23  1737   WBC 10*3/mm3 5.50 6.10            Temperature     23 0300 23 0555 23 1054   Temp: 97.7 °F (36.5 °C) 97.7 °F (36.5 °C) 97.7 °F (36.5 °C)      Baseline culture/source/susceptibility:  Microbiology Results (last 10 days)         ** No results found for the last 240 hours. **                Vanessa Puente, PharmD  23 11:31 ARLENET                       Andra Kate PA-C   Physician Assistant  Specialty: Emergency Medicine     H&P      Cosign Needed     Date of Service: 23  Creation Time: 23     Cosign Needed       Expand All Collapse All    [facility] H&P     Patient Name: Deion Nicholas  : 1940  MRN: [id number]  Primary Care Physician: Makenna Motta MD  Date of admission: 2023      Patient Care Team:  Makenna Motta MD as PCP - General  Makenna Motta MD as PCP - Family Medicine                 Subjective      History Present Illness      Chief Complaint:        Chief Complaint   Patient presents with    Cellulitis       Pt reports LLE pain and swelling and states \"I have cellulitis again\".  Pt reports L arm aches and dizziness.  Pt reports was seen 3 wks ago and again on Friday.  Pt reports had a Doppler on Friday.       LLE swelling     Cellulitis  Associated symptoms include myalgias.      82-year-old male states over the last 2 to 3 days had some increased swelling and redness of his left lower leg site where he had recurrent cellulitis. States last time he treated with antibiotics 3 weeks ago states he did not feel like he fully recovered from that with the oral treatment at home and now its recurring. He reports no fevers or vomiting. He states he has had some lightheadedness over the last 2 days. He reports no headache or focal numbness or weakness or " chest pain or shortness of breath.      Observation 7/26/23  Pt concurs with er hpi. Pt states he has been treated multiple times this year for same cellulitis in LLE. This occurred last year per pt and was only resolved by IV abx therapy at home. Pt c/o lightheadedness but this a chronic issue. Afebrile today.         Review of Systems   Cardiovascular:  Positive for leg swelling.   Skin:  Positive for poor wound healing.   Musculoskeletal:  Positive for myalgias.   Neurological:  Positive for light-headedness.   All other systems reviewed and are negative.           Personal History      Past Medical History:   Medical History        Past Medical History:   Diagnosis Date    Aneurysm      Cardiomyopathy       ICD implantation    Cellulitis of left lower extremity 2010     recurrent    CHD (coronary heart disease)       S/P CABG & PCI    Dyslipidemia      History of ventricular tachycardia      Hypertension      Myocardial infarction       Inferior MI    Pinched nerve       L4-L5    Prostate cancer              Surgical History:      Surgical History         Past Surgical History:   Procedure Laterality Date    ANGIOPLASTY   2000 04/1996 Stent: Palmaz- Huy stent/LAD 07/1996-RCA: 2000-Tetra stent Guidant to proximal RCA, mid to distal artery 2 sequential Guidant Tetra stents    APPENDECTOMY        CARDIAC ABLATION   April and May 2019    CARDIAC CATHETERIZATION   07/2017     1996 x2, Nov. 2000, 05/2010-cath 08/2015-no stents 2017    CARDIAC DEFIBRILLATOR PLACEMENT        COLONOSCOPY W/ POLYPECTOMY         Mult colonoscopy's for polyp resection     CORONARY ARTERY BYPASS GRAFT   05/2010     x5 vessel: LMA to diagonal, LAD, intermedius, obtuse marginal, RCA    CORONARY STENT PLACEMENT        ENDOSCOPY N/A 6/24/2019     Procedure: ESOPHAGOGASTRODUODENOSCOPY with dilitation Bougie 50, 54;  Surgeon: Roddy Coronel MD;  Location: [facility] ENDOSCOPY;  Service: Gastroenterology    INSERT / REPLACE / REMOVE PACEMAKER         KNEE OPEN LATERAL RELEASE Left       reduction    OTHER SURGICAL HISTORY   01/2018     ICD implantation    PROSTATE SURGERY   2015     Robiotic surgery- Cyber Knife:                  Family History: family history includes Heart disease in his father; Pancreatic cancer in his mother. Otherwise pertinent FHx was reviewed and unremarkable.      Social History:  reports that he quit smoking about 21 years ago. His smoking use included cigarettes. He has a 17.00 pack-year smoking history. He has never used smokeless tobacco. He reports that he does not currently use alcohol. He reports that he does not use drugs.        Medications:          Prior to Admission medications    Medication Sig Start Date End Date Taking? Authorizing Provider   aspirin 81 MG EC tablet Take 1 tablet by mouth Daily.     Yes Cyndee Melgar MD   chlorhexidine (HIBICLENS) 4 % external liquid Apply 1 application topically to the appropriate area as directed Daily As Needed for Wound Care. Apply to leg(s)     Yes Cyndee Melgar MD   Cholecalciferol 10 MCG (400 UNIT) tablet Take 2 tablets by mouth Daily.     Yes Cyndee Melgar MD   ezetimibe (ZETIA) 10 MG tablet Take 1 tablet by mouth Every Evening.     Yes Cyndee Melgar MD   gabapentin (NEURONTIN) 400 MG capsule Take 1 capsule by mouth 3 (Three) Times a Day.     Yes Cyndee Melgar MD   methocarbamol (ROBAXIN) 500 MG tablet Take 1 tablet by mouth 2 (Two) Times a Day.     Yes Cyndee Melgar MD   metoprolol succinate XL (TOPROL-XL) 50 MG 24 hr tablet Take 1 tablet by mouth Daily.     Yes Cyndee Melgar MD   midodrine (PROAMATINE) 5 MG tablet Take 1 tablet by mouth 3 (Three) Times a Day Before Meals.     Yes Cyndee Melgar MD   mupirocin (BACTROBAN) 2 % ointment Apply 1 application  topically to the appropriate area as directed 3 (Three) Times a Day. Apply to leg(s)     Yes Cyndee Melgar MD   Omega-3 Fatty Acids (fish oil) 1000 MG  capsule capsule Take 2 capsules by mouth 2 (Two) Times a Day With Meals.     Yes Cyndee Melgar MD   pantoprazole (PROTONIX) 40 MG EC tablet Take 1 tablet by mouth Daily.     Yes Cyndee Melgar MD   sennosides-docusate (PERICOLACE) 8.6-50 MG per tablet Take 1 tablet by mouth 2 (Two) Times a Day.     Yes Cyndee Melgar MD   sucralfate (CARAFATE) 1 g tablet Take 1 tablet by mouth 4 (Four) Times a Day.     Yes Cyndee Melgar MD   cyanocobalamin 1000 MCG/ML injection Inject 1 mL into the appropriate muscle as directed by prescriber Every 14 (Fourteen) Days.       Cyndee Melgar MD   nitroglycerin (NITROSTAT) 0.4 MG SL tablet Place 1 tablet under the tongue Every 5 (Five) Minutes As Needed for Chest Pain.       Cyndee Melgar MD         Allergies:          Allergies   Allergen Reactions    Lovastatin Myalgia    Pravastatin Myalgia and Unknown (See Comments)       unknown    Simvastatin Unknown (See Comments) and Myalgia       unknown    Spironolactone Other (See Comments)       Gynecomastia                Objective      Objective      Vital Signs  Temp:  [97.5 °F (36.4 °C)-98.3 °F (36.8 °C)] 97.7 °F (36.5 °C)  Heart Rate:  [72-95] 72  Resp:  [14-18] 14  BP: (125-169)/(67-89) 169/89  SpO2:  [94 %-99 %] 99 %  on   ;   Device (Oxygen Therapy): room air  Body mass index is 26.12 kg/m².     Physical Exam  Vitals reviewed.   Constitutional:       Appearance: Normal appearance.   HENT:      Mouth/Throat:      Mouth: Mucous membranes are moist.   Cardiovascular:      Rate and Rhythm: Normal rate and regular rhythm.      Pulses: Normal pulses.      Heart sounds: Normal heart sounds.   Pulmonary:      Effort: Pulmonary effort is normal.      Breath sounds: Normal breath sounds.   Abdominal:      Palpations: Abdomen is soft.   Skin:     General: Skin is warm and dry.      Findings: Erythema present.      Comments: Erythema and very slight edema to LLE, good pulse, no warmth   Neurological:       General: No focal deficit present.      Mental Status: He is alert and oriented to person, place, and time.   Psychiatric:         Mood and Affect: Mood normal.         Behavior: Behavior normal.         Results Review:  I have personally reviewed most recent cardiac tracings, lab results, and radiology images and interpretations and agree with findings, most notably: cbc, cmp, EKG, Lactic, previous LE US.          Results from last 7 days   Lab Units 07/26/23  0458   WBC 10*3/mm3 5.50   HEMOGLOBIN g/dL 12.9*   HEMATOCRIT % 38.6   PLATELETS 10*3/mm3 185            Results from last 7 days   Lab Units 07/26/23  0458 07/25/23  1832   SODIUM mmol/L 138  --    POTASSIUM mmol/L 4.1  --    CHLORIDE mmol/L 105  --    CO2 mmol/L 24.0  --    BUN mg/dL 16  --    CREATININE mg/dL 0.83  --    GLUCOSE mg/dL 142*  --    CALCIUM mg/dL 9.2  --    ALT (SGPT) U/L 20  --    AST (SGOT) U/L 21  --    LACTATE mmol/L  --  1.0      Estimated Creatinine Clearance: 90.2 mL/min (by C-G formula based on SCr of 0.83 mg/dL).  Brief Urine Lab Results         None                Microbiology Results (last 10 days)         ** No results found for the last 240 hours. **                ECG/EMG Results (most recent)         Procedure Component Value Units Date/Time     ECG 12 Lead Other; Dizziness [[id number]] Collected: 07/25/23 1722       Updated: 07/26/23 0853       QT Interval 341 ms       Narrative:       HEART RATE= 98  bpm  RR Interval= 612  ms  NC Interval= 164  ms  P Horizontal Axis= 0  deg  P Front Axis= 37  deg  QRSD Interval= 129  ms  QT Interval= 341  ms  QRS Axis= -10  deg  T Wave Axis= 154  deg  - ABNORMAL ECG -  Sinus rhythm  Probable LVH with secondary repol abnrm  Electronically Signed By: Han Faustin (Vinny) 26-Jul-2023 08:53:14  Date and Time of Study: 2023-07-25 17:22:21                Results for orders placed during the hospital encounter of 07/19/23     Duplex Venous Lower Extremity - Left CAR     Interpretation Summary     Left popliteal fossa fluid collection.    Normal left lower extremity venous duplex scan.        Results for orders placed during the hospital encounter of 05/22/23     Adult Transthoracic Echo Complete W/ Cont if Necessary Per Protocol     Interpretation Summary    Left ventricular ejection fraction appears to be 31 - 35%.    Moderate aortic valve regurgitation is present.    Moderate mitral valve regurgitation is present.    Estimated right ventricular systolic pressure from tricuspid regurgitation is normal (<35 mmHg).    Mild dilation of the aortic root is present.        No radiology results for the last 7 days        Estimated Creatinine Clearance: 90.2 mL/min (by C-G formula based on SCr of 0.83 mg/dL).           Assessment & Plan     Assessment/Plan               Active Hospital Problems     Diagnosis   POA    Left leg cellulitis [L03.116]   Yes       Resolved Hospital Problems   No resolved problems to display.         Cellulitis LLE  - US LE performed 4/23 reviewed and showing no dvt  - WBC 6, cmp unremarkable  - Lactic 1  - IV vanc and rocephin  - ID consulted     Hypertension  -moderately Controlled       BP Readings from Last 1 Encounters:   07/26/23 169/89      - Continue metoprolol  - Monitor while admitted      Orthostatic hypotension  - midodrine        VTE Prophylaxis -   Mechanical Order History:           Ordered         07/25/23 2217   Place Sequential Compression Device  Once             07/25/23 2217   Maintain Sequential Compression Device  Continuous                               Pharmalogical Order History:         None                CODE STATUS:        Code Status and Medical Interventions:   Ordered at: 07/25/23 2009     Code Status (Patient has no pulse and is not breathing):     CPR (Attempt to Resuscitate)     Medical Interventions (Patient has pulse or is breathing):     Full Support     Release to patient:     Routine Release         This patient has been examined wearing personal  protective equipment.      I discussed the patient's findings and my recommendations with patient and nursing staff.        Signature:Electronically signed by Andra Kate PA-C, 07/26/23, 8:53 AM EDT.

## 2023-07-26 NOTE — PROGRESS NOTES
"Pharmacy Antimicrobial Dosing Service    Subjective:  Deion Nicholas is a 82 y.o.male admitted with increased swelling in left lower leg and redness at the site where patient had recurrent cellulitis. Recent fill for Augmentin x 10 days per med review. Patient  Pharmacy has been consulted to dose Vancomycin for possible cellulitis.    ID consulted      Assessment/Plan    1. Day #2 Vancomycin: Goal trough 10-20 mcg/mL for uncomplicated SSTI. Vancomycin 1750 mg (~0 mg/kg ABW) IV x 1, followed by 1250 mg (~15 mg/kg ABW) IV q12h. Obtain trough on 7/27 at 1100 for clinical review, or earlier if clinically warranted.     2. Day #2 Ceftriaxone: 1gm IV q24h     Will continue to monitor drug levels, renal function, culture and sensitivities, and patient clinical status.       Objective:  Relevant clinical data and objective history reviewed:  185.4 cm (73\")   89.8 kg (198 lb)   Ideal body weight: 79.9 kg (176 lb 2.4 oz)  Adjusted ideal body weight: 83.9 kg (184 lb 14.2 oz)  Body mass index is 26.12 kg/m².        Results from last 7 days   Lab Units 07/26/23  0458 07/25/23  1737   CREATININE mg/dL 0.83 1.01     Estimated Creatinine Clearance: 90.2 mL/min (by C-G formula based on SCr of 0.83 mg/dL).  I/O last 3 completed shifts:  In: 240 [P.O.:240]  Out: 1100 [Urine:1100]    Results from last 7 days   Lab Units 07/26/23  0458 07/25/23  1737   WBC 10*3/mm3 5.50 6.10     Temperature    07/26/23 0300 07/26/23 0555 07/26/23 1054   Temp: 97.7 °F (36.5 °C) 97.7 °F (36.5 °C) 97.7 °F (36.5 °C)     Baseline culture/source/susceptibility:  Microbiology Results (last 10 days)       ** No results found for the last 240 hours. **            Vanessa Puente, PharmD  07/26/23 11:31 EDT    "

## 2023-07-27 LAB
ALBUMIN SERPL-MCNC: 4 G/DL (ref 3.5–5.2)
ALBUMIN/GLOB SERPL: 1.5 G/DL
ALP SERPL-CCNC: 92 U/L (ref 39–117)
ALT SERPL W P-5'-P-CCNC: 21 U/L (ref 1–41)
ANION GAP SERPL CALCULATED.3IONS-SCNC: 8 MMOL/L (ref 5–15)
AST SERPL-CCNC: 22 U/L (ref 1–40)
BASOPHILS # BLD AUTO: 0 10*3/MM3 (ref 0–0.2)
BASOPHILS NFR BLD AUTO: 0.8 % (ref 0–1.5)
BILIRUB SERPL-MCNC: 0.2 MG/DL (ref 0–1.2)
BUN SERPL-MCNC: 16 MG/DL (ref 8–23)
BUN/CREAT SERPL: 19.5 (ref 7–25)
CALCIUM SPEC-SCNC: 9 MG/DL (ref 8.6–10.5)
CHLORIDE SERPL-SCNC: 102 MMOL/L (ref 98–107)
CO2 SERPL-SCNC: 24 MMOL/L (ref 22–29)
CREAT SERPL-MCNC: 0.82 MG/DL (ref 0.76–1.27)
DEPRECATED RDW RBC AUTO: 46.4 FL (ref 37–54)
EGFRCR SERPLBLD CKD-EPI 2021: 88.8 ML/MIN/1.73
EOSINOPHIL # BLD AUTO: 0.2 10*3/MM3 (ref 0–0.4)
EOSINOPHIL NFR BLD AUTO: 4.2 % (ref 0.3–6.2)
ERYTHROCYTE [DISTWIDTH] IN BLOOD BY AUTOMATED COUNT: 13.8 % (ref 12.3–15.4)
GLOBULIN UR ELPH-MCNC: 2.6 GM/DL
GLUCOSE SERPL-MCNC: 154 MG/DL (ref 65–99)
HCT VFR BLD AUTO: 38.3 % (ref 37.5–51)
HGB BLD-MCNC: 12.7 G/DL (ref 13–17.7)
LYMPHOCYTES # BLD AUTO: 1.4 10*3/MM3 (ref 0.7–3.1)
LYMPHOCYTES NFR BLD AUTO: 23.1 % (ref 19.6–45.3)
MCH RBC QN AUTO: 32.1 PG (ref 26.6–33)
MCHC RBC AUTO-ENTMCNC: 33.3 G/DL (ref 31.5–35.7)
MCV RBC AUTO: 96.6 FL (ref 79–97)
MONOCYTES # BLD AUTO: 0.5 10*3/MM3 (ref 0.1–0.9)
MONOCYTES NFR BLD AUTO: 8.6 % (ref 5–12)
NEUTROPHILS NFR BLD AUTO: 3.8 10*3/MM3 (ref 1.7–7)
NEUTROPHILS NFR BLD AUTO: 63.3 % (ref 42.7–76)
NRBC BLD AUTO-RTO: 0 /100 WBC (ref 0–0.2)
PLATELET # BLD AUTO: 178 10*3/MM3 (ref 140–450)
PMV BLD AUTO: 8 FL (ref 6–12)
POTASSIUM SERPL-SCNC: 3.9 MMOL/L (ref 3.5–5.2)
PROT SERPL-MCNC: 6.6 G/DL (ref 6–8.5)
RBC # BLD AUTO: 3.97 10*6/MM3 (ref 4.14–5.8)
SODIUM SERPL-SCNC: 134 MMOL/L (ref 136–145)
VANCOMYCIN TROUGH SERPL-MCNC: 22 MCG/ML (ref 5–20)
WBC NRBC COR # BLD: 6 10*3/MM3 (ref 3.4–10.8)

## 2023-07-27 PROCEDURE — 25010000002 CEFTAROLINE FOSAMIL PER 10 MG: Performed by: NURSE PRACTITIONER

## 2023-07-27 PROCEDURE — C1751 CATH, INF, PER/CENT/MIDLINE: HCPCS

## 2023-07-27 PROCEDURE — 36410 VNPNXR 3YR/> PHY/QHP DX/THER: CPT

## 2023-07-27 PROCEDURE — 96366 THER/PROPH/DIAG IV INF ADDON: CPT

## 2023-07-27 PROCEDURE — 80053 COMPREHEN METABOLIC PANEL: CPT | Performed by: EMERGENCY MEDICINE

## 2023-07-27 PROCEDURE — G0378 HOSPITAL OBSERVATION PER HR: HCPCS

## 2023-07-27 PROCEDURE — 80202 ASSAY OF VANCOMYCIN: CPT | Performed by: PHYSICIAN ASSISTANT

## 2023-07-27 PROCEDURE — 25010000002 VANCOMYCIN HCL 1.25 G RECONSTITUTED SOLUTION 1 EACH VIAL: Performed by: PHYSICIAN ASSISTANT

## 2023-07-27 PROCEDURE — 85025 COMPLETE CBC W/AUTO DIFF WBC: CPT | Performed by: INTERNAL MEDICINE

## 2023-07-27 RX ADMIN — SUCRALFATE 1 G: 1 TABLET ORAL at 12:30

## 2023-07-27 RX ADMIN — VANCOMYCIN HYDROCHLORIDE 1250 MG: 1.25 INJECTION, POWDER, LYOPHILIZED, FOR SOLUTION INTRAVENOUS at 12:30

## 2023-07-27 RX ADMIN — Medication 10 ML: at 21:15

## 2023-07-27 RX ADMIN — METOPROLOL SUCCINATE 50 MG: 50 TABLET, EXTENDED RELEASE ORAL at 09:19

## 2023-07-27 RX ADMIN — CEFTAROLINE FOSAMIL 600 MG: 600 POWDER, FOR SOLUTION INTRAVENOUS at 16:54

## 2023-07-27 RX ADMIN — MIDODRINE HYDROCHLORIDE 5 MG: 5 TABLET ORAL at 06:46

## 2023-07-27 RX ADMIN — PANTOPRAZOLE SODIUM 40 MG: 40 TABLET, DELAYED RELEASE ORAL at 09:19

## 2023-07-27 RX ADMIN — MIDODRINE HYDROCHLORIDE 5 MG: 5 TABLET ORAL at 17:13

## 2023-07-27 RX ADMIN — SUCRALFATE 1 G: 1 TABLET ORAL at 21:14

## 2023-07-27 RX ADMIN — GABAPENTIN 400 MG: 400 CAPSULE ORAL at 13:40

## 2023-07-27 RX ADMIN — MIDODRINE HYDROCHLORIDE 5 MG: 5 TABLET ORAL at 21:14

## 2023-07-27 RX ADMIN — SUCRALFATE 1 G: 1 TABLET ORAL at 08:59

## 2023-07-27 RX ADMIN — GABAPENTIN 400 MG: 400 CAPSULE ORAL at 06:46

## 2023-07-27 RX ADMIN — Medication 10 ML: at 09:21

## 2023-07-27 RX ADMIN — SUCRALFATE 1 G: 1 TABLET ORAL at 17:06

## 2023-07-27 RX ADMIN — ASPIRIN 81 MG: 81 TABLET, COATED ORAL at 09:19

## 2023-07-27 RX ADMIN — GABAPENTIN 400 MG: 400 CAPSULE ORAL at 21:13

## 2023-07-27 RX ADMIN — SENNOSIDES AND DOCUSATE SODIUM 1 TABLET: 50; 8.6 TABLET ORAL at 21:14

## 2023-07-27 RX ADMIN — VANCOMYCIN HYDROCHLORIDE 1250 MG: 1.25 INJECTION, POWDER, LYOPHILIZED, FOR SOLUTION INTRAVENOUS at 01:26

## 2023-07-27 NOTE — PROGRESS NOTES
Infectious Diseases Progress Note      LOS: 0 days   Patient Care Team:  Maknena Motta MD as PCP - General  Makenna Motta MD as PCP - Family Medicine    Chief Complaint: Left leg pain swelling and redness    Subjective       The patient has been afebrile for the last 24 hours.  The patient is on room air, hemodynamically stable, and is tolerating antimicrobial therapy.  Patient complaining of lightheadedness when he stands up      Review of Systems:   Review of Systems   Constitutional: Negative.    HENT: Negative.     Eyes: Negative.    Respiratory: Negative.     Cardiovascular:  Positive for leg swelling.   Gastrointestinal: Negative.    Endocrine: Negative.    Genitourinary: Negative.    Musculoskeletal: Negative.    Skin:  Positive for color change.   Neurological:  Positive for light-headedness.   Psychiatric/Behavioral: Negative.     All other systems reviewed and are negative.     Objective     Vital Signs  Temp:  [97.6 °F (36.4 °C)-98 °F (36.7 °C)] 97.6 °F (36.4 °C)  Heart Rate:  [70-96] 77  Resp:  [15-17] 17  BP: (126-158)/(69-82) 146/82    Physical Exam:  Physical Exam  Vitals and nursing note reviewed.   Constitutional:       General: He is not in acute distress.     Appearance: Normal appearance. He is well-developed and normal weight. He is not diaphoretic.   HENT:      Head: Normocephalic and atraumatic.   Eyes:      Conjunctiva/sclera: Conjunctivae normal.      Pupils: Pupils are equal, round, and reactive to light.   Cardiovascular:      Rate and Rhythm: Normal rate and regular rhythm.      Heart sounds: Normal heart sounds, S1 normal and S2 normal.   Pulmonary:      Effort: Pulmonary effort is normal. No respiratory distress.      Breath sounds: Normal breath sounds. No stridor. No wheezing or rales.   Abdominal:      General: Bowel sounds are normal. There is no distension.      Palpations: Abdomen is soft. There is no mass.      Tenderness: There is no abdominal tenderness.  There is no guarding.   Musculoskeletal:         General: No deformity. Normal range of motion.      Cervical back: Neck supple.      Left lower leg: Edema present.   Skin:     General: Skin is warm and dry.      Coloration: Skin is not pale.      Findings: Erythema present. No rash.      Comments: Very vague erythema without any significant warmth   Neurological:      Mental Status: He is alert and oriented to person, place, and time.      Cranial Nerves: No cranial nerve deficit.   Psychiatric:         Mood and Affect: Mood normal.        Results Review:    I have reviewed all clinical data, test, lab, and imaging results.     Radiology  No Radiology Exams Resulted Within Past 24 Hours    Cardiology    Laboratory    Results from last 7 days   Lab Units 07/27/23 0443 07/26/23 0458 07/25/23 1737   WBC 10*3/mm3 6.00 5.50 6.10   HEMOGLOBIN g/dL 12.7* 12.9* 13.3   HEMATOCRIT % 38.3 38.6 39.5   PLATELETS 10*3/mm3 178 185 211     Results from last 7 days   Lab Units 07/27/23 0443 07/26/23 0458 07/25/23 1737   SODIUM mmol/L 134* 138 136   POTASSIUM mmol/L 3.9 4.1 4.2   CHLORIDE mmol/L 102 105 102   CO2 mmol/L 24.0 24.0 24.0   BUN mg/dL 16 16 19   CREATININE mg/dL 0.82 0.83 1.01   GLUCOSE mg/dL 154* 142* 116*   ALBUMIN g/dL 4.0 4.0  --    BILIRUBIN mg/dL 0.2 0.3  --    ALK PHOS U/L 92 89  --    AST (SGOT) U/L 22 21  --    ALT (SGPT) U/L 21 20  --    CALCIUM mg/dL 9.0 9.2 9.7                 Microbiology   Microbiology Results (last 10 days)       Procedure Component Value - Date/Time    Blood Culture - Blood, Arm, Left [[id number]]  (Normal) Collected: 07/25/23 1844    Lab Status: Preliminary result Specimen: Blood from Arm, Left Updated: 07/26/23 1900     Blood Culture No growth at 24 hours    Blood Culture - Blood, Arm, Left [[id number]]  (Normal) Collected: 07/25/23 1737    Lab Status: Preliminary result Specimen: Blood from Arm, Left Updated: 07/26/23 1845     Blood Culture No growth at 24 hours             Medication Review:       Schedule Meds  aspirin, 81 mg, Oral, Daily  cefTRIAXone, 1,000 mg, Intravenous, Q24H  gabapentin, 400 mg, Oral, Q8H  metoprolol succinate XL, 50 mg, Oral, Daily  midodrine, 5 mg, Oral, Q8H  pantoprazole, 40 mg, Oral, Daily  sennosides-docusate, 1 tablet, Oral, BID  sodium chloride, 10 mL, Intravenous, Q12H  sucralfate, 1 g, Oral, 4x Daily  vancomycin, 15 mg/kg, Intravenous, Q12H        Infusion Meds  Pharmacy to dose vancomycin, , Last Rate: Stopped (07/26/23 1327)        PRN Meds    acetaminophen    chlorhexidine    melatonin    ondansetron    Pharmacy to dose vancomycin    [COMPLETED] Insert Peripheral IV **AND** sodium chloride    sodium chloride    sodium chloride        Assessment & Plan       Antimicrobial Therapy   1.  IV vancomycin        2.  IV ceftriaxone        3.  IV Teflaro        4.        5.          Assessment     Recurrent left lower extremity cellulitis.  Patient does not appear to be toxic.  Leg erythema and edema had improved according to patient since admission.  When on oral  Patient complains of recurrent cellulitis when placed on oral antibiotics.  States the last time we treated him with IV Teflaro the cellulitis was resolved for over a year and is wanting to go home and IV antibiotics to hopefully resolve the cellulitis issue once and for all      Plan     Discontinue IV vancomycin and IV ceftriaxone   Start IV Teflaro 600 mg every 12 hours for 7 days  Patient will need a midline before discharge-please remove when IV antibiotics are completed  Continue supportive care  Okay to discharge from Infectious Disease standpoint when IV antibiotics are arranged  Not much more to add from infectious disease standpoint-we will sign off at this time-please call with any questions.      Tamiko Victor, APRN  07/27/23  13:39 EDT    Note is dictated utilizing voice recognition software/Dragon

## 2023-07-27 NOTE — CASE MANAGEMENT/SOCIAL WORK
Continued Stay Note  MEIR Sanchez     Patient Name: Deion Nicholas  MRN: [id number]  Today's Date: 7/27/2023    Admit Date: 7/25/2023    Plan: Home with [facility] and OPtion care Pending [facility] Approval   Discharge Plan       Row Name 07/27/23 1752       Plan    Plan Home with [facility] and OPtion care Pending [facility] Approval    Plan Comments F[facility]ed [facility] recomm[facility]ations [facility], pending [facility] Approval                      Expected Discharge Date and Time       Expected Discharge Date Expected Discharge Time    Jul 28, 2023               Felicitas Brown RN

## 2023-07-27 NOTE — DISCHARGE PLACEMENT REQUEST
"Please see attached IV ATB Reccomendations  If you have any questions please reach out   Felicitas Brown RN    Phone: [phone]  Fax:[phone]      Deion Aden (82 y.o. Male)       Date of Birth   1940    Social Security Number       Address   [address] [place] [address]  [place] [address]    Home Phone   [phone]    MRN   [id number]       Catholic   Jew    Marital Status                               Admission Date   7/25/23    Admission Type   Emergency    Admitting Provider       Attending Provider   Han Faustin MD    Department, Room/Bed   [facility] OBSERVATION, 115/1       Discharge Date       Discharge Disposition       Discharge Destination                                 Attending Provider: Han Faustin MD    Allergies: Lovastatin, Pravastatin, Simvastatin, Spironolactone    Isolation: None   Infection: COVID Screen (preop/placement) (10/29/21)   Code Status: CPR    Ht: 185.4 cm (73\")   Wt: 89.8 kg (198 lb)    Admission Cmt: None   Principal Problem: None                  Active Insurance as of 7/25/2023       Primary Coverage       Payor Plan Insurance Group Employer/Plan Group    [employer] [place] DEPT [id number]       Payor Plan Address Payor Plan Phone Number Payor Plan Fax Number Effective Dates    [facility] OFFICE OF COMMUNITY CARE [phone]  4/16/2023 - None Entered    [address] [address]       [facility] [address]         Subscriber Name Subscriber Birth Date Member ID       DEION ADEN 1940 [id number]               Secondary Coverage       Payor Plan Insurance Group Employer/Plan Group    [employer]HEM MEDICARE REPLACEMENT ANTHEM MEDICARE ADVANTAGE [id number]       Payor Plan Address Payor Plan Phone Number Payor Plan Fax Number Effective Dates    [address] [phone]  10/1/2019 - None Entered    [facility] [address]         Subscriber Name Subscriber Birth Date Member ID       DEION ADEN 1940 [id number]  "              Tertiary Coverage       Payor Plan Insurance Group Employer/Plan Group    [facility] [place]        Payor Plan Address Payor Plan Phone Number Payor Plan Fax Number Effective Dates    [address] [phone]  4/16/2023 - None Entered    [facility] [id number]         Subscriber Name Subscriber Birth Date Member ID       ANA CANTU 1940 [id number]                     Emergency Contacts        (Rel.) Home Phone Work Phone Mobile Phone    suze cantu (Son) [phone] -- --    PHILLSIM BUSTOS (Friend) [phone] -- --                 Tamiko Victor APRN   Nurse Practitioner  Infectious Disease     Progress Notes      Attested     Date of Service: 07/27/23 1339  Creation Time: 07/27/23 1339     Attested           Attestation signed by Panda Lopes MD at 07/27/23 1402     I have reviewed this documentation and agree.               Expand All Collapse All    Infectious Diseases Progress Note       LOS: 0 days   Patient Care Team:  Makenna Motta MD as PCP - General  Makenna Motta MD as PCP - Family Medicine     Chief Complaint: Left leg pain swelling and redness        Subjective           The patient has been afebrile for the last 24 hours.  The patient is on room air, hemodynamically stable, and is tolerating antimicrobial therapy.  Patient complaining of lightheadedness when he stands up        Review of Systems:   Review of Systems   Constitutional: Negative.    HENT: Negative.     Eyes: Negative.    Respiratory: Negative.     Cardiovascular:  Positive for leg swelling.   Gastrointestinal: Negative.    Endocrine: Negative.    Genitourinary: Negative.    Musculoskeletal: Negative.    Skin:  Positive for color change.   Neurological:  Positive for light-headedness.   Psychiatric/Behavioral: Negative.     All other systems reviewed and are negative.                      Objective         Vital Signs  Temp:  [97.6 °F (36.4 °C)-98 °F (36.7  °C)] 97.6 °F (36.4 °C)  Heart Rate:  [70-96] 77  Resp:  [15-17] 17  BP: (126-158)/(69-82) 146/82     Physical Exam:  Physical Exam  Vitals and nursing note reviewed.   Constitutional:       General: He is not in acute distress.     Appearance: Normal appearance. He is well-developed and normal weight. He is not diaphoretic.   HENT:      Head: Normocephalic and atraumatic.   Eyes:      Conjunctiva/sclera: Conjunctivae normal.      Pupils: Pupils are equal, round, and reactive to light.   Cardiovascular:      Rate and Rhythm: Normal rate and regular rhythm.      Heart sounds: Normal heart sounds, S1 normal and S2 normal.   Pulmonary:      Effort: Pulmonary effort is normal. No respiratory distress.      Breath sounds: Normal breath sounds. No stridor. No wheezing or rales.   Abdominal:      General: Bowel sounds are normal. There is no distension.      Palpations: Abdomen is soft. There is no mass.      Tenderness: There is no abdominal tenderness. There is no guarding.   Musculoskeletal:         General: No deformity. Normal range of motion.      Cervical back: Neck supple.      Left lower leg: Edema present.   Skin:     General: Skin is warm and dry.      Coloration: Skin is not pale.      Findings: Erythema present. No rash.      Comments: Very vague erythema without any significant warmth   Neurological:      Mental Status: He is alert and oriented to person, place, and time.      Cranial Nerves: No cranial nerve deficit.   Psychiatric:         Mood and Affect: Mood normal.                    Results Review:    I have reviewed all clinical data, test, lab, and imaging results.      Radiology  No Radiology Exams Resulted Within Past 24 Hours     Cardiology     Laboratory            Results from last 7 days   Lab Units 07/27/23  0443 07/26/23  0458 07/25/23  1737   WBC 10*3/mm3 6.00 5.50 6.10   HEMOGLOBIN g/dL 12.7* 12.9* 13.3   HEMATOCRIT % 38.3 38.6 39.5   PLATELETS 10*3/mm3 178 185 211             Results from  last 7 days   Lab Units 07/27/23  0443 07/26/23  0458 07/25/23  1737   SODIUM mmol/L 134* 138 136   POTASSIUM mmol/L 3.9 4.1 4.2   CHLORIDE mmol/L 102 105 102   CO2 mmol/L 24.0 24.0 24.0   BUN mg/dL 16 16 19   CREATININE mg/dL 0.82 0.83 1.01   GLUCOSE mg/dL 154* 142* 116*   ALBUMIN g/dL 4.0 4.0  --    BILIRUBIN mg/dL 0.2 0.3  --    ALK PHOS U/L 92 89  --    AST (SGOT) U/L 22 21  --    ALT (SGPT) U/L 21 20  --    CALCIUM mg/dL 9.0 9.2 9.7                    Microbiology   Microbiology Results (last 10 days)         Procedure Component Value - Date/Time     Blood Culture - Blood, Arm, Left [[id number]]  (Normal) Collected: 07/25/23 1844     Lab Status: Preliminary result Specimen: Blood from Arm, Left Updated: 07/26/23 1900       Blood Culture No growth at 24 hours     Blood Culture - Blood, Arm, Left [[id number]]  (Normal) Collected: 07/25/23 1737     Lab Status: Preliminary result Specimen: Blood from Arm, Left Updated: 07/26/23 1845       Blood Culture No growth at 24 hours                Medication Review:         Schedule Meds  aspirin, 81 mg, Oral, Daily  cefTRIAXone, 1,000 mg, Intravenous, Q24H  gabapentin, 400 mg, Oral, Q8H  metoprolol succinate XL, 50 mg, Oral, Daily  midodrine, 5 mg, Oral, Q8H  pantoprazole, 40 mg, Oral, Daily  sennosides-docusate, 1 tablet, Oral, BID  sodium chloride, 10 mL, Intravenous, Q12H  sucralfate, 1 g, Oral, 4x Daily  vancomycin, 15 mg/kg, Intravenous, Q12H           Infusion Meds  Pharmacy to dose vancomycin, , Last Rate: Stopped (07/26/23 1327)           PRN Meds    acetaminophen    chlorhexidine    melatonin    ondansetron    Pharmacy to dose vancomycin    [COMPLETED] Insert Peripheral IV **AND** sodium chloride    sodium chloride    sodium chloride                 Assessment & Plan           Antimicrobial Therapy   1.  IV vancomycin                                                                     2.  IV ceftriaxone                                                                        3.  IV Teflaro                                                                 4.                                                                       5.                                                                          Assessment     Recurrent left lower extremity cellulitis.  Patient does not appear to be toxic.  Leg erythema and edema had improved according to patient since admission.  When on oral  Patient complains of recurrent cellulitis when placed on oral antibiotics.  States the last time we treated him with IV Teflaro the cellulitis was resolved for over a year and is wanting to go home and IV antibiotics to hopefully resolve the cellulitis issue once and for all        Plan     Discontinue IV vancomycin and IV ceftriaxone   Start IV Teflaro 600 mg every 12 hours for 7 days  Patient will need a midline before discharge-please remove when IV antibiotics are completed  Continue supportive care  Okay to discharge from Infectious Disease standpoint when IV antibiotics are arranged  Not much more to add from infectious disease standpoint-we will sign off at this time-please call with any questions.        NIMCO Jones  07/27/23  13:39 EDT     Note is dictated utilizing voice recognition software/Dragon                    Cosigned by: Panda Lopes MD at 07/27/23 1402     Revision History        Mela Sandra, RN   Registered Nurse  Infusion Therapy     Consults      Signed     Date of Service: 07/27/23 1501  Creation Time: 07/27/23 1501  Consult Orders   Inpatient IV Team Consult Midline 1 Lumen [[id number]] ordered by Panda Lopes MD at 07/27/23 1501   IV TEAM - Consult for Midline Placement (IV Team to Determine Number of Lumens) [[id number]] ordered by Tamiko Victor APRN at 07/27/23 1353          Signed         Picc team consult:     Procedure explained to patient and agrees to proceed.  Patient familiar with procedure.  Time out performed per protocol.  Power glide  midline catheter placed LUE basilic vessel utilizing US guidance and sterile technique with easily compressible vessel without difficulty.  Dark venous blood return noted and line flushes without difficulty.

## 2023-07-27 NOTE — CONSULTS
Picc team consult:    Patient c/o pain existing midline catheter with bending of left arm.  Line patent, flushes easily and draws blood without signs of infiltration.  Patient requests replacement of midline catheter.  LUE midline catheter discontinued intact.  Replaced power glide midline catheter LUE basilic vessel at more distal insertion site.  Patient reports no pain at this time.  Dark venous blood return noted and line flushes without difficulty.

## 2023-07-27 NOTE — PROGRESS NOTES
"[facility] Observation Unit progress note    Patient Name: Deion Nicholas  : 1940  MRN: [id number]  Primary Care Physician: Makenna Motta MD  Date of admission: 2023     Patient Care Team:  Makenna Motta MD as PCP - General  Makenna Motta MD as PCP - Family Medicine          Subjective   History Present Illness     Chief Complaint:   Chief Complaint   Patient presents with    Cellulitis     Pt reports LLE pain and swelling and states \"I have cellulitis again\".  Pt reports L arm aches and dizziness.  Pt reports was seen 3 wks ago and again on Friday.  Pt reports had a Doppler on Friday.      cellulitis    History of Present Illness      82-year-old male states over the last 2 to 3 days had some increased swelling and redness of his left lower leg site where he had recurrent cellulitis. States last time he treated with antibiotics 3 weeks ago states he did not feel like he fully recovered from that with the oral treatment at home and now its recurring. He reports no fevers or vomiting. He states he has had some lightheadedness over the last 2 days. He reports no headache or focal numbness or weakness or chest pain or shortness of breath.      Observation 23  Pt concurs with er hpi. Pt states he has been treated multiple times this year for same cellulitis in LLE. This occurred last year per pt and was only resolved by IV abx therapy at home. Pt c/o lightheadedness but this a chronic issue. Afebrile today.    Observation 23  Pt had midline placed. ID to start IV teflaro. Pt should be dcd in am after midline placed and abx started and transportation for pt home.     ROS    Cardiovascular:  Positive for leg swelling.   Skin:  Positive for poor wound healing.   Musculoskeletal:  Positive for myalgias.   Neurological:  Positive for light-headedness.   All other systems reviewed and are negative.    Personal History     Past Medical History:   Past Medical History:   Diagnosis " Date    Aneurysm     Cardiomyopathy     ICD implantation    Cellulitis of left lower extremity 2010    recurrent    CHD (coronary heart disease)     S/P CABG & PCI    Dyslipidemia     History of ventricular tachycardia     Hypertension     Myocardial infarction     Inferior MI    Pinched nerve     L4-L5    Prostate cancer        Surgical History:      Past Surgical History:   Procedure Laterality Date    ANGIOPLASTY  2000 04/1996 Stent: Palmaz- Huy stent/LAD 07/1996-RCA: 2000-Tetra stent Guidant to proximal RCA, mid to distal artery 2 sequential Guidant Tetra stents    APPENDECTOMY      CARDIAC ABLATION  April and May 2019    CARDIAC CATHETERIZATION  07/2017    1996 x2, Nov. 2000, 05/2010-cath 08/2015-no stents 2017    CARDIAC DEFIBRILLATOR PLACEMENT      COLONOSCOPY W/ POLYPECTOMY      Mult colonoscopy's for polyp resection     CORONARY ARTERY BYPASS GRAFT  05/2010    x5 vessel: LMA to diagonal, LAD, intermedius, obtuse marginal, RCA    CORONARY STENT PLACEMENT      ENDOSCOPY N/A 6/24/2019    Procedure: ESOPHAGOGASTRODUODENOSCOPY with dilitation Bougie 50, 54;  Surgeon: Roddy Coronel MD;  Location: [facility] ENDOSCOPY;  Service: Gastroenterology    INSERT / REPLACE / REMOVE PACEMAKER      KNEE OPEN LATERAL RELEASE Left     reduction    OTHER SURGICAL HISTORY  01/2018    ICD implantation    PROSTATE SURGERY  2015    Robiotic surgery- Cyber Knife:           Family History: family history includes Heart disease in his father; Pancreatic cancer in his mother. Otherwise pertinent FHx was reviewed and unremarkable.     Social History:  reports that he quit smoking about 21 years ago. His smoking use included cigarettes. He has a 17.00 pack-year smoking history. He has never used smokeless tobacco. He reports that he does not currently use alcohol. He reports that he does not use drugs.      Medications:  Prior to Admission medications    Medication Sig Start Date End Date Taking? Authorizing Provider   aspirin 81  MG EC tablet Take 1 tablet by mouth Daily.   Yes Cyndee Melgar MD   chlorhexidine (HIBICLENS) 4 % external liquid Apply 1 application topically to the appropriate area as directed Daily As Needed for Wound Care. Apply to leg(s)   Yes Cyndee Melgar MD   Cholecalciferol 10 MCG (400 UNIT) tablet Take 2 tablets by mouth Daily.   Yes Cyndee Melgar MD   ezetimibe (ZETIA) 10 MG tablet Take 1 tablet by mouth Every Evening.   Yes Cyndee Melgar MD   gabapentin (NEURONTIN) 400 MG capsule Take 1 capsule by mouth 3 (Three) Times a Day.   Yes Cyndee Melgar MD   methocarbamol (ROBAXIN) 500 MG tablet Take 1 tablet by mouth 2 (Two) Times a Day.   Yes Cyndee Melgar MD   metoprolol succinate XL (TOPROL-XL) 50 MG 24 hr tablet Take 1 tablet by mouth Daily.   Yes Cyndee Melgar MD   midodrine (PROAMATINE) 5 MG tablet Take 1 tablet by mouth 3 (Three) Times a Day Before Meals.   Yes Cyndee Melgar MD   mupirocin (BACTROBAN) 2 % ointment Apply 1 application  topically to the appropriate area as directed 3 (Three) Times a Day. Apply to leg(s)   Yes Cyndee Melgar MD   nitroglycerin (NITROSTAT) 0.4 MG SL tablet Place 1 tablet under the tongue Every 5 (Five) Minutes As Needed for Chest Pain.   Yes Cyndee Melgar MD   Omega-3 Fatty Acids (fish oil) 1000 MG capsule capsule Take 2 capsules by mouth 2 (Two) Times a Day With Meals.   Yes Cyndee Melgar MD   pantoprazole (PROTONIX) 40 MG EC tablet Take 1 tablet by mouth Daily.   Yes Cyndee Melgar MD   sennosides-docusate (PERICOLACE) 8.6-50 MG per tablet Take 1 tablet by mouth 2 (Two) Times a Day.   Yes Cyndee Melgar MD   sucralfate (CARAFATE) 1 g tablet Take 1 tablet by mouth 4 (Four) Times a Day.   Yes Cyndee Melgar MD       Allergies:    Allergies   Allergen Reactions    Lovastatin Myalgia    Pravastatin Myalgia and Unknown (See Comments)     unknown    Simvastatin Unknown (See Comments) and  Myalgia     unknown    Spironolactone Other (See Comments)     Gynecomastia        Objective   Objective     Vital Signs  Temp:  [97.6 °F (36.4 °C)-98 °F (36.7 °C)] 97.9 °F (36.6 °C)  Heart Rate:  [70-96] 75  Resp:  [15-17] 17  BP: (129-158)/(70-82) 149/72  SpO2:  [95 %-99 %] 95 %  on   ;   Device (Oxygen Therapy): room air  Body mass index is 26.12 kg/m².    Physical Exam  Vitals reviewed.   Constitutional:       Appearance: Normal appearance.   HENT:      Mouth/Throat:      Mouth: Mucous membranes are moist.   Cardiovascular:      Rate and Rhythm: Normal rate and regular rhythm.      Pulses: Normal pulses.      Heart sounds: Normal heart sounds.   Pulmonary:      Effort: Pulmonary effort is normal.      Breath sounds: Normal breath sounds.   Abdominal:      Palpations: Abdomen is soft.   Skin:     General: Skin is warm and dry.      Findings: Erythema present.      Comments: Erythema and very slight edema to LLE, good pulse, no warmth   Neurological:      General: No focal deficit present.      Mental Status: He is alert and oriented to person, place, and time.   Psychiatric:         Mood and Affect: Mood normal.         Behavior: Behavior normal.     Results Review:  I have personally reviewed most recent lab results and microbiology results and agree with findings, most notably: cbc, cmp, blood cxs.    Results from last 7 days   Lab Units 07/27/23  0443   WBC 10*3/mm3 6.00   HEMOGLOBIN g/dL 12.7*   HEMATOCRIT % 38.3   PLATELETS 10*3/mm3 178     Results from last 7 days   Lab Units 07/27/23  0443 07/26/23  0458 07/25/23  1832   SODIUM mmol/L 134*   < >  --    POTASSIUM mmol/L 3.9   < >  --    CHLORIDE mmol/L 102   < >  --    CO2 mmol/L 24.0   < >  --    BUN mg/dL 16   < >  --    CREATININE mg/dL 0.82   < >  --    GLUCOSE mg/dL 154*   < >  --    CALCIUM mg/dL 9.0   < >  --    ALT (SGPT) U/L 21   < >  --    AST (SGOT) U/L 22   < >  --    LACTATE mmol/L  --   --  1.0    < > = values in this interval not displayed.      Estimated Creatinine Clearance: 91.3 mL/min (by C-G formula based on SCr of 0.82 mg/dL).  Brief Urine Lab Results       None            Microbiology Results (last 10 days)       Procedure Component Value - Date/Time    Blood Culture - Blood, Arm, Left [[id number]]  (Normal) Collected: 07/25/23 1844    Lab Status: Preliminary result Specimen: Blood from Arm, Left Updated: 07/26/23 1900     Blood Culture No growth at 24 hours    Blood Culture - Blood, Arm, Left [[id number]]  (Normal) Collected: 07/25/23 1737    Lab Status: Preliminary result Specimen: Blood from Arm, Left Updated: 07/26/23 1845     Blood Culture No growth at 24 hours            ECG/EMG Results (most recent)       Procedure Component Value Units Date/Time    ECG 12 Lead Other; Dizziness [[id number]] Collected: 07/25/23 1722     Updated: 07/26/23 0853     QT Interval 341 ms     Narrative:      HEART RATE= 98  bpm  RR Interval= 612  ms  IN Interval= 164  ms  P Horizontal Axis= 0  deg  P Front Axis= 37  deg  QRSD Interval= 129  ms  QT Interval= 341  ms  QRS Axis= -10  deg  T Wave Axis= 154  deg  - ABNORMAL ECG -  Sinus rhythm  Probable LVH with secondary repol abnrm  Electronically Signed By: Han Faustin ([facility]) 26-Jul-2023 08:53:14  Date and Time of Study: 2023-07-25 17:22:21            Results for orders placed during the hospital encounter of 07/19/23    Duplex Venous Lower Extremity - Left CAR    Interpretation Summary    Left popliteal fossa fluid collection.    Normal left lower extremity venous duplex scan.      Results for orders placed during the hospital encounter of 05/22/23    Adult Transthoracic Echo Complete W/ Cont if Necessary Per Protocol    Interpretation Summary    Left ventricular ejection fraction appears to be 31 - 35%.    Moderate aortic valve regurgitation is present.    Moderate mitral valve regurgitation is present.    Estimated right ventricular systolic pressure from tricuspid regurgitation is normal (<35 mmHg).    Mild  dilation of the aortic root is present.      No radiology results for the last 7 days      Estimated Creatinine Clearance: 91.3 mL/min (by C-G formula based on SCr of 0.82 mg/dL).    Assessment & Plan   Assessment/Plan       Active Hospital Problems    Diagnosis  POA    Left leg cellulitis [L03.116]  Yes      Resolved Hospital Problems   No resolved problems to display.     Cellulitis LLE  - US LE performed 4/23 reviewed and showing no dvt  - WBC 6, cmp unremarkable  - Lactic 1  - IV vanc and rocephin dcd  - ID consulted and recommends starting iv teflaro  - midline to be placed for Outpt abx therapy     Hypertension  -moderately Controlled       BP Readings from Last 1 Encounters:   07/26/23 169/89      - Continue metoprolol  - Monitor while admitted      Orthostatic hypotension  - midodrine      VTE Prophylaxis -   Mechanical Order History:        Ordered        07/25/23 2217  Place Sequential Compression Device  Once            07/25/23 2217  Maintain Sequential Compression Device  Continuous                          Pharmalogical Order History:       None            CODE STATUS:    Code Status and Medical Interventions:   Ordered at: 07/25/23 2009     Code Status (Patient has no pulse and is not breathing):    CPR (Attempt to Resuscitate)     Medical Interventions (Patient has pulse or is breathing):    Full Support     Release to patient:    Routine Release       This patient has been examined wearing personal protective equipment.     I discussed the patient's findings and my recommendations with patient and nursing staff.      Signature:Electronically signed by Andra Kate PA-C, 07/27/23, 3:19 PM EDT.

## 2023-07-27 NOTE — CASE MANAGEMENT/SOCIAL WORK
Continued Stay Note  MEIR Sanchez     Patient Name: Deion Nicholas  MRN: [id number]  Today's Date: 7/27/2023    Admit Date: 7/25/2023    Plan: Home, Referral to [facility] to Follow in case IV ATB is needed   Discharge Plan       Row Name 07/27/23 0850       Plan    Plan Comments D/C BArriers: ID Following possible d/c today or tomorrow on oral Antibiotics                          Felicitas Brown RN

## 2023-07-27 NOTE — CONSULTS
Picc team consult:    Procedure explained to patient and agrees to proceed.  Patient familiar with procedure.  Time out performed per protocol.  Power glide midline catheter placed LUE basilic vessel utilizing US guidance and sterile technique with easily compressible vessel without difficulty.  Dark venous blood return noted and line flushes without difficulty.

## 2023-07-27 NOTE — PLAN OF CARE
Goal Outcome Evaluation:  Plan of Care Reviewed With: patient        Progress: improving  Outcome Evaluation: Patient is still receiving IV antibiotics, should be a possible discharge to have the treatments done in an outpatient setting. Care continues.

## 2023-07-28 ENCOUNTER — READMISSION MANAGEMENT (OUTPATIENT)
Dept: CALL CENTER | Facility: HOSPITAL | Age: 83
End: 2023-07-28
Payer: OTHER GOVERNMENT

## 2023-07-28 VITALS
TEMPERATURE: 97.5 F | HEIGHT: 73 IN | WEIGHT: 198 LBS | BODY MASS INDEX: 26.24 KG/M2 | SYSTOLIC BLOOD PRESSURE: 134 MMHG | OXYGEN SATURATION: 98 % | HEART RATE: 87 BPM | RESPIRATION RATE: 18 BRPM | DIASTOLIC BLOOD PRESSURE: 69 MMHG

## 2023-07-28 LAB
ALBUMIN SERPL-MCNC: 3.6 G/DL (ref 3.5–5.2)
ALBUMIN/GLOB SERPL: 1.3 G/DL
ALP SERPL-CCNC: 88 U/L (ref 39–117)
ALT SERPL W P-5'-P-CCNC: 16 U/L (ref 1–41)
ANION GAP SERPL CALCULATED.3IONS-SCNC: 9 MMOL/L (ref 5–15)
AST SERPL-CCNC: 17 U/L (ref 1–40)
BILIRUB SERPL-MCNC: 0.2 MG/DL (ref 0–1.2)
BUN SERPL-MCNC: 17 MG/DL (ref 8–23)
BUN/CREAT SERPL: 17.7 (ref 7–25)
CALCIUM SPEC-SCNC: 9 MG/DL (ref 8.6–10.5)
CHLORIDE SERPL-SCNC: 102 MMOL/L (ref 98–107)
CO2 SERPL-SCNC: 24 MMOL/L (ref 22–29)
CREAT SERPL-MCNC: 0.96 MG/DL (ref 0.76–1.27)
EGFRCR SERPLBLD CKD-EPI 2021: 79.9 ML/MIN/1.73
GLOBULIN UR ELPH-MCNC: 2.8 GM/DL
GLUCOSE SERPL-MCNC: 126 MG/DL (ref 65–99)
POTASSIUM SERPL-SCNC: 4.3 MMOL/L (ref 3.5–5.2)
PROT SERPL-MCNC: 6.4 G/DL (ref 6–8.5)
SODIUM SERPL-SCNC: 135 MMOL/L (ref 136–145)

## 2023-07-28 PROCEDURE — G0378 HOSPITAL OBSERVATION PER HR: HCPCS

## 2023-07-28 PROCEDURE — 25010000002 CEFTAROLINE FOSAMIL PER 10 MG: Performed by: NURSE PRACTITIONER

## 2023-07-28 PROCEDURE — 80053 COMPREHEN METABOLIC PANEL: CPT | Performed by: EMERGENCY MEDICINE

## 2023-07-28 RX ORDER — SODIUM CHLORIDE 0.9 % (FLUSH) 0.9 %
10 SYRINGE (ML) INJECTION AS NEEDED
Status: DISCONTINUED | OUTPATIENT
Start: 2023-07-28 | End: 2023-07-28 | Stop reason: HOSPADM

## 2023-07-28 RX ORDER — SODIUM CHLORIDE 0.9 % (FLUSH) 0.9 %
10 SYRINGE (ML) INJECTION EVERY 12 HOURS SCHEDULED
Status: DISCONTINUED | OUTPATIENT
Start: 2023-07-28 | End: 2023-07-28 | Stop reason: HOSPADM

## 2023-07-28 RX ORDER — SODIUM CHLORIDE 0.9 % (FLUSH) 0.9 %
20 SYRINGE (ML) INJECTION AS NEEDED
Status: DISCONTINUED | OUTPATIENT
Start: 2023-07-28 | End: 2023-07-28 | Stop reason: HOSPADM

## 2023-07-28 RX ADMIN — METOPROLOL SUCCINATE 50 MG: 50 TABLET, EXTENDED RELEASE ORAL at 08:24

## 2023-07-28 RX ADMIN — PANTOPRAZOLE SODIUM 40 MG: 40 TABLET, DELAYED RELEASE ORAL at 08:24

## 2023-07-28 RX ADMIN — SUCRALFATE 1 G: 1 TABLET ORAL at 08:24

## 2023-07-28 RX ADMIN — SUCRALFATE 1 G: 1 TABLET ORAL at 13:00

## 2023-07-28 RX ADMIN — Medication 10 ML: at 08:26

## 2023-07-28 RX ADMIN — CEFTAROLINE FOSAMIL 600 MG: 600 POWDER, FOR SOLUTION INTRAVENOUS at 04:15

## 2023-07-28 RX ADMIN — Medication 10 ML: at 08:28

## 2023-07-28 RX ADMIN — GABAPENTIN 400 MG: 400 CAPSULE ORAL at 14:00

## 2023-07-28 RX ADMIN — MIDODRINE HYDROCHLORIDE 5 MG: 5 TABLET ORAL at 08:26

## 2023-07-28 RX ADMIN — GABAPENTIN 400 MG: 400 CAPSULE ORAL at 05:36

## 2023-07-28 RX ADMIN — ASPIRIN 81 MG: 81 TABLET, COATED ORAL at 08:24

## 2023-07-28 NOTE — PLAN OF CARE
Problem: Fall Injury Risk  Goal: Absence of Fall and Fall-Related Injury  Outcome: Ongoing, Progressing  Intervention: Identify and Manage Contributors  Recent Flowsheet Documentation  Taken 7/28/2023 0200 by Yuliya Meza RN  Medication Review/Management: medications reviewed  Taken 7/27/2023 2200 by Yuliya Meza RN  Medication Review/Management: medications reviewed  Intervention: Promote Injury-Free Environment  Recent Flowsheet Documentation  Taken 7/28/2023 0200 by Yuliya Meza RN  Safety Promotion/Fall Prevention:   safety round/check completed   nonskid shoes/slippers when out of bed   lighting adjusted  Taken 7/28/2023 0000 by Yuliya Meza RN  Safety Promotion/Fall Prevention:   safety round/check completed   nonskid shoes/slippers when out of bed   lighting adjusted  Taken 7/27/2023 2200 by Yuliya Meza RN  Safety Promotion/Fall Prevention:   safety round/check completed   nonskid shoes/slippers when out of bed   lighting adjusted  Taken 7/27/2023 2000 by Yuliya Meza RN  Safety Promotion/Fall Prevention:   safety round/check completed   nonskid shoes/slippers when out of bed   lighting adjusted     Problem: Pain Acute  Goal: Acceptable Pain Control and Functional Ability  Outcome: Ongoing, Progressing  Intervention: Prevent or Manage Pain  Recent Flowsheet Documentation  Taken 7/28/2023 0200 by Yuliya Meza RN  Medication Review/Management: medications reviewed  Taken 7/27/2023 2200 by Yuliya Meza RN  Medication Review/Management: medications reviewed     Problem: Adult Inpatient Plan of Care  Goal: Plan of Care Review  Outcome: Ongoing, Progressing  Flowsheets (Taken 7/28/2023 0423)  Plan of Care Reviewed With: patient  Outcome Evaluation: recieving iv abx. likely home today with home iv abx, leg im,prved-less swelling and less pain  Goal: Patient-Specific Goal (Individualized)  Outcome: Ongoing, Progressing  Goal: Absence of Hospital-Acquired  Illness or Injury  Outcome: Ongoing, Progressing  Intervention: Identify and Manage Fall Risk  Recent Flowsheet Documentation  Taken 7/28/2023 0200 by Yuliya Meza RN  Safety Promotion/Fall Prevention:   safety round/check completed   nonskid shoes/slippers when out of bed   lighting adjusted  Taken 7/28/2023 0000 by Yuliya Meza RN  Safety Promotion/Fall Prevention:   safety round/check completed   nonskid shoes/slippers when out of bed   lighting adjusted  Taken 7/27/2023 2200 by Yuliya Meza RN  Safety Promotion/Fall Prevention:   safety round/check completed   nonskid shoes/slippers when out of bed   lighting adjusted  Taken 7/27/2023 2000 by Yuliya Meza RN  Safety Promotion/Fall Prevention:   safety round/check completed   nonskid shoes/slippers when out of bed   lighting adjusted  Intervention: Prevent Skin Injury  Recent Flowsheet Documentation  Taken 7/28/2023 0000 by Yuliya Meza RN  Body Position: position changed independently  Taken 7/27/2023 2000 by Yuliya Meza RN  Body Position: position changed independently  Intervention: Prevent and Manage VTE (Venous Thromboembolism) Risk  Recent Flowsheet Documentation  Taken 7/28/2023 0200 by Yuliya Meza RN  Activity Management: up to bedside commode  Taken 7/28/2023 0000 by Yuliya Meza RN  Activity Management: up to bedside commode  Taken 7/27/2023 2000 by Yuliya Meza RN  Activity Management: up ad nas  Range of Motion: active ROM (range of motion) encouraged  Intervention: Prevent Infection  Recent Flowsheet Documentation  Taken 7/28/2023 0200 by Yuliya Meza RN  Infection Prevention:   rest/sleep promoted   single patient room provided  Taken 7/27/2023 2200 by Yuliya Meza RN  Infection Prevention:   rest/sleep promoted   single patient room provided  Goal: Optimal Comfort and Wellbeing  Outcome: Ongoing, Progressing  Intervention: Provide Person-Centered Care  Recent Flowsheet  Documentation  Taken 7/27/2023 2000 by Yuliya Meza, RN  Trust Relationship/Rapport:   questions answered   reassurance provided  Goal: Readiness for Transition of Care  Outcome: Ongoing, Progressing     Problem: Skin Injury Risk Increased  Goal: Skin Health and Integrity  Outcome: Ongoing, Progressing  Intervention: Optimize Skin Protection  Recent Flowsheet Documentation  Taken 7/28/2023 0000 by Yuliya Meza, RN  Head of Bed (HOB) Positioning: HOB at 30-45 degrees  Taken 7/27/2023 2000 by Yuliya Meza, RN  Head of Bed (HOB) Positioning: HOB at 30-45 degrees   Goal Outcome Evaluation:  Plan of Care Reviewed With: patient           Outcome Evaluation: recieving iv abx. likely home today with home iv abx, leg im,prved-less swelling and less pain

## 2023-07-28 NOTE — DISCHARGE SUMMARY
[facility]    Makenna Motta MD    CHIEF COMPLAINT:     Left lower extremity swelling    HISTORY OF PRESENT ILLNESS:    HPI    82-year-old male states over the last 2 to 3 days had some increased swelling and redness of his left lower leg site where he had recurrent cellulitis. States last time he treated with antibiotics 3 weeks ago states he did not feel like he fully recovered from that with the oral treatment at home and now its recurring. He reports no fevers or vomiting. He states he has had some lightheadedness over the last 2 days. He reports no headache or focal numbness or weakness or chest pain or shortness of breath.      Observation 7/26/23  Pt concurs with er hpi. Pt states he has been treated multiple times this year for same cellulitis in LLE. This occurred last year per pt and was only resolved by IV abx therapy at home. Pt c/o lightheadedness but this a chronic issue. Afebrile today.     Observation 7/27/23  Pt had midline placed. ID to start IV teflaro. Pt should be dcd in am after midline placed and abx started and transportation for pt home.     Past Medical History:   Diagnosis Date    Aneurysm     Cardiomyopathy     ICD implantation    Cellulitis of left lower extremity 2010    recurrent    CHD (coronary heart disease)     S/P CABG & PCI    Dyslipidemia     History of ventricular tachycardia     Hypertension     Myocardial infarction     Inferior MI    Pinched nerve     L4-L5    Prostate cancer      Past Surgical History:   Procedure Laterality Date    ANGIOPLASTY  2000 04/1996 Stent: Palmaz- Huy stent/LAD 07/1996-RCA: 2000-Tetra stent Guidant to proximal RCA, mid to distal artery 2 sequential Guidant Tetra stents    APPENDECTOMY      CARDIAC ABLATION  April and May 2019    CARDIAC CATHETERIZATION  07/2017    1996 x2, Nov. 2000, 05/2010-cath 08/2015-no stents 2017    CARDIAC DEFIBRILLATOR PLACEMENT      COLONOSCOPY W/ POLYPECTOMY      Mult colonoscopy's  for polyp resection     CORONARY ARTERY BYPASS GRAFT  05/2010    x5 vessel: LMA to diagonal, LAD, intermedius, obtuse marginal, RCA    CORONARY STENT PLACEMENT      ENDOSCOPY N/A 2019    Procedure: ESOPHAGOGASTRODUODENOSCOPY with dilitation Bougie 50, 54;  Surgeon: Roddy Coronel MD;  Location: [facility] ENDOSCOPY;  Service: Gastroenterology    INSERT / REPLACE / REMOVE PACEMAKER      KNEE OPEN LATERAL RELEASE Left     reduction    OTHER SURGICAL HISTORY  2018    ICD implantation    PROSTATE SURGERY  2015    Robiotic surgery- Cyber Knife:     Family History   Problem Relation Age of Onset    Pancreatic cancer Mother     Heart disease Father      Social History     Tobacco Use    Smoking status: Former     Packs/day: 1.00     Years: 17.00     Pack years: 17.00     Types: Cigarettes     Quit date: 2002     Years since quittin.0    Smokeless tobacco: Never   Vaping Use    Vaping Use: Never used   Substance Use Topics    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   2023    chlorhexidine (HIBICLENS) 4 % external liquid Apply 1 application topically to the appropriate area as directed Daily As Needed for Wound Care. Apply to leg(s)   2023    Cholecalciferol 10 MCG (400 UNIT) tablet Take 2 tablets by mouth Daily.   2023    ezetimibe (ZETIA) 10 MG tablet Take 1 tablet by mouth Every Evening.   2023    gabapentin (NEURONTIN) 400 MG capsule Take 1 capsule by mouth 3 (Three) Times a Day.   2023    methocarbamol (ROBAXIN) 500 MG tablet Take 1 tablet by mouth 2 (Two) Times a Day.       metoprolol succinate XL (TOPROL-XL) 50 MG 24 hr tablet Take 1 tablet by mouth Daily.   2023    midodrine (PROAMATINE) 5 MG tablet Take 1 tablet by mouth 3 (Three) Times a Day Before Meals.   2023    mupirocin (BACTROBAN) 2 %  ointment Apply 1 application  topically to the appropriate area as directed 3 (Three) Times a Day. Apply to leg(s)   7/25/2023    nitroglycerin (NITROSTAT) 0.4 MG SL tablet Place 1 tablet under the tongue Every 5 (Five) Minutes As Needed for Chest Pain.       Omega-3 Fatty Acids (fish oil) 1000 MG capsule capsule Take 2 capsules by mouth 2 (Two) Times a Day With Meals.   7/25/2023    pantoprazole (PROTONIX) 40 MG EC tablet Take 1 tablet by mouth Daily.   7/25/2023    sennosides-docusate (PERICOLACE) 8.6-50 MG per tablet Take 1 tablet by mouth 2 (Two) Times a Day.   7/25/2023    sucralfate (CARAFATE) 1 g tablet Take 1 tablet by mouth 4 (Four) Times a Day.   7/25/2023     Allergies:  Lovastatin, Pravastatin, Simvastatin, and Spironolactone    Immunization History   Administered Date(s) Administered    COVID-19 (MODERNA) 1st,2nd,3rd Dose Monovalent 02/14/2021, 03/13/2021           REVIEW OF SYSTEMS:    ROS    Cardiovascular:  Positive for leg swelling.   Skin:  Positive for poor wound healing.   Musculoskeletal:  Positive for myalgias.   Neurological:  Positive for light-headedness.   All other systems reviewed and are negative.  Vital Signs  Temp:  [97.2 °F (36.2 °C)-97.9 °F (36.6 °C)] 97.7 °F (36.5 °C)  Heart Rate:  [71-93] 78  Resp:  [16] 16  BP: (135-163)/(62-87) 136/72          Physical Exam:  Physical Exam  Vitals reviewed.   Constitutional:       Appearance: Normal appearance.   HENT:      Mouth/Throat:      Mouth: Mucous membranes are moist.   Cardiovascular:      Rate and Rhythm: Normal rate and regular rhythm.      Pulses: Normal pulses.      Heart sounds: Normal heart sounds.   Pulmonary:      Effort: Pulmonary effort is normal.      Breath sounds: Normal breath sounds.   Abdominal:      Palpations: Abdomen is soft.   Skin:     General: Skin is warm and dry.      Findings: Erythema present.      Comments: Erythema and very slight edema to LLE, good pulse, no warmth   Neurological:      General: No focal  deficit present.      Mental Status: He is alert and oriented to person, place, and time.   Psychiatric:         Mood and Affect: Mood normal.         Behavior: Behavior normal.     Emotional Behavior:    wnl   Debilities:   none  Results Review:    I reviewed the patient's new clinical results.  Lab Results (most recent)       Procedure Component Value Units Date/Time    Comprehensive Metabolic Panel [[id number]]  (Abnormal) Collected: 07/28/23 0416    Specimen: Blood from Arm, Left Updated: 07/28/23 0458     Glucose 126 mg/dL      BUN 17 mg/dL      Creatinine 0.96 mg/dL      Sodium 135 mmol/L      Potassium 4.3 mmol/L      Comment: Slight hemolysis detected by analyzer. Results may be affected.        Chloride 102 mmol/L      CO2 24.0 mmol/L      Calcium 9.0 mg/dL      Total Protein 6.4 g/dL      Albumin 3.6 g/dL      ALT (SGPT) 16 U/L      AST (SGOT) 17 U/L      Alkaline Phosphatase 88 U/L      Total Bilirubin 0.2 mg/dL      Globulin 2.8 gm/dL      A/G Ratio 1.3 g/dL      BUN/Creatinine Ratio 17.7     Anion Gap 9.0 mmol/L      eGFR 79.9 mL/min/1.73     Narrative:      GFR Normal >60  Chronic Kidney Disease <60  Kidney Failure <15    The GFR formula is only valid for adults with stable renal function between ages 18 and 70.    Blood Culture - Blood, Arm, Left [[id number]]  (Normal) Collected: 07/25/23 1844    Specimen: Blood from Arm, Left Updated: 07/27/23 1900     Blood Culture No growth at 2 days    Blood Culture - Blood, Arm, Left [[id number]]  (Normal) Collected: 07/25/23 1737    Specimen: Blood from Arm, Left Updated: 07/27/23 1845     Blood Culture No growth at 2 days    Comprehensive Metabolic Panel [[id number]]  (Abnormal) Collected: 07/27/23 0443    Specimen: Blood from Arm, Right Updated: 07/27/23 0521     Glucose 154 mg/dL      BUN 16 mg/dL      Creatinine 0.82 mg/dL      Sodium 134 mmol/L      Potassium 3.9 mmol/L      Chloride 102 mmol/L      CO2 24.0 mmol/L      Calcium 9.0 mg/dL      Total Protein 6.6  g/dL      Albumin 4.0 g/dL      ALT (SGPT) 21 U/L      AST (SGOT) 22 U/L      Alkaline Phosphatase 92 U/L      Total Bilirubin 0.2 mg/dL      Globulin 2.6 gm/dL      A/G Ratio 1.5 g/dL      BUN/Creatinine Ratio 19.5     Anion Gap 8.0 mmol/L      eGFR 88.8 mL/min/1.73     Narrative:      GFR Normal >60  Chronic Kidney Disease <60  Kidney Failure <15    The GFR formula is only valid for adults with stable renal function between ages 18 and 70.    Vancomycin, Trough [[id number]]  (Abnormal) Collected: 07/27/23 0443    Specimen: Blood from Arm, Right Updated: 07/27/23 0521     Vancomycin Trough 22.00 mcg/mL     Narrative:      Therapeutic Ranges for Vancomycin    Vancomycin Random   5.0-40.0 mcg/mL  Vancomycin Trough   5.0-20.0 mcg/mL  Vancomycin Peak     20.0-40.0 mcg/mL    CBC & Differential [[id number]]  (Abnormal) Collected: 07/27/23 0443    Specimen: Blood from Arm, Right Updated: 07/27/23 0455    Narrative:      The following orders were created for panel order CBC & Differential.  Procedure                               Abnormality         Status                     ---------                               -----------         ------                     CBC Auto Differential[[id number]]        Abnormal            Final result                 Please view results for these tests on the individual orders.    CBC Auto Differential [[id number]]  (Abnormal) Collected: 07/27/23 0443    Specimen: Blood from Arm, Right Updated: 07/27/23 0455     WBC 6.00 10*3/mm3      RBC 3.97 10*6/mm3      Hemoglobin 12.7 g/dL      Hematocrit 38.3 %      MCV 96.6 fL      MCH 32.1 pg      MCHC 33.3 g/dL      RDW 13.8 %      RDW-SD 46.4 fl      MPV 8.0 fL      Platelets 178 10*3/mm3      Neutrophil % 63.3 %      Lymphocyte % 23.1 %      Monocyte % 8.6 %      Eosinophil % 4.2 %      Basophil % 0.8 %      Neutrophils, Absolute 3.80 10*3/mm3      Lymphocytes, Absolute 1.40 10*3/mm3      Monocytes, Absolute 0.50 10*3/mm3      Eosinophils, Absolute  0.20 10*3/mm3      Basophils, Absolute 0.00 10*3/mm3      nRBC 0.0 /100 WBC     CBC Auto Differential [[id number]]  (Abnormal) Collected: 07/26/23 0458    Specimen: Blood Updated: 07/26/23 0550     WBC 5.50 10*3/mm3      RBC 4.00 10*6/mm3      Hemoglobin 12.9 g/dL      Hematocrit 38.6 %      MCV 96.6 fL      MCH 32.2 pg      MCHC 33.3 g/dL      RDW 13.7 %      RDW-SD 45.9 fl      MPV 8.3 fL      Platelets 185 10*3/mm3      Neutrophil % 59.6 %      Lymphocyte % 24.3 %      Monocyte % 12.0 %      Eosinophil % 3.5 %      Basophil % 0.6 %      Neutrophils, Absolute 3.30 10*3/mm3      Lymphocytes, Absolute 1.30 10*3/mm3      Monocytes, Absolute 0.70 10*3/mm3      Eosinophils, Absolute 0.20 10*3/mm3      Basophils, Absolute 0.00 10*3/mm3      nRBC 0.0 /100 WBC     Basic Metabolic Panel [[id number]]  (Abnormal) Collected: 07/25/23 1737    Specimen: Blood Updated: 07/25/23 1856     Glucose 116 mg/dL      BUN 19 mg/dL      Creatinine 1.01 mg/dL      Sodium 136 mmol/L      Potassium 4.2 mmol/L      Comment: Slight hemolysis detected by analyzer. Results may be affected.        Chloride 102 mmol/L      CO2 24.0 mmol/L      Calcium 9.7 mg/dL      BUN/Creatinine Ratio 18.8     Anion Gap 10.0 mmol/L      eGFR 75.2 mL/min/1.73     Narrative:      GFR Normal >60  Chronic Kidney Disease <60  Kidney Failure <15    The GFR formula is only valid for adults with stable renal function between ages 18 and 70.    CBC & Differential [[id number]]  (Abnormal) Collected: 07/25/23 1737    Specimen: Blood Updated: 07/25/23 5825    Narrative:      The following orders were created for panel order CBC & Differential.  Procedure                               Abnormality         Status                     ---------                               -----------         ------                     CBC Auto Differential[[id number]]        Abnormal            Final result                 Please view results for these tests on the individual orders.    POC  Lactate [[id number]]  (Normal) Collected: 07/25/23 1832    Specimen: Blood Updated: 07/25/23 1834     Lactate 1.0 mmol/L      Comment: Serial Number: [id number]:  [id number]               Imaging Results (Most Recent)       None          reviewed    ECG/EMG Results (most recent)       Procedure Component Value Units Date/Time    ECG 12 Lead Other; Dizziness [[id number]] Collected: 07/25/23 1722     Updated: 07/26/23 0853     QT Interval 341 ms     Narrative:      HEART RATE= 98  bpm  RR Interval= 612  ms  FL Interval= 164  ms  P Horizontal Axis= 0  deg  P Front Axis= 37  deg  QRSD Interval= 129  ms  QT Interval= 341  ms  QRS Axis= -10  deg  T Wave Axis= 154  deg  - ABNORMAL ECG -  Sinus rhythm  Probable LVH with secondary repol abnrm  Electronically Signed By: Han Faustin ([facility]) 26-Jul-2023 08:53:14  Date and Time of Study: 2023-07-25 17:22:21          reviewed    Results for orders placed during the hospital encounter of 07/19/23    Duplex Venous Lower Extremity - Left CAR    Interpretation Summary    Left popliteal fossa fluid collection.    Normal left lower extremity venous duplex scan.      Results for orders placed during the hospital encounter of 05/22/23    Adult Transthoracic Echo Complete W/ Cont if Necessary Per Protocol    Interpretation Summary    Left ventricular ejection fraction appears to be 31 - 35%.    Moderate aortic valve regurgitation is present.    Moderate mitral valve regurgitation is present.    Estimated right ventricular systolic pressure from tricuspid regurgitation is normal (<35 mmHg).    Mild dilation of the aortic root is present.      Microbiology Results (last 10 days)       Procedure Component Value - Date/Time    Blood Culture - Blood, Arm, Left [[id number]]  (Normal) Collected: 07/25/23 1844    Lab Status: Preliminary result Specimen: Blood from Arm, Left Updated: 07/27/23 1900     Blood Culture No growth at 2 days    Blood Culture - Blood, Arm, Left [[id number]]  (Normal)  Collected: 07/25/23 3642    Lab Status: Preliminary result Specimen: Blood from Arm, Left Updated: 07/27/23 1845     Blood Culture No growth at 2 days            Assessment & Plan     Left leg cellulitis     Cellulitis LLE  - US LE performed 4/23 reviewed and showing no dvt  - WBC 6, cmp unremarkable  - Lactic 1  - IV vanc and rocephin dcd  - ID consulted and recommends starting iv teflaro  - midline has been placed for Outpt abx therapy     Hypertension  -moderately Controlled         BP Readings from Last 1 Encounters:   07/26/23 169/89      - Continue metoprolol  - Monitor while admitted      Orthostatic hypotension  - midodrine    I discussed the patients findings and my recommendations with patient and nursing staff.     Discharge Diagnosis:      Left leg cellulitis      Hospital Course  Patient is a 82 y.o. male presented with left le swelling and redness. Pt has a hx of cellulitis with failure of oral abx. Pt was evaluated in er and admitted to observation. Pt was started on iv abx and ID was consulted. Cbc, cmp and blood cultures were all unremarkable. [facility] will [facility] with [facility].  Discharge plans discussed with pt and pt is agreeable to discharge. Instructed pt to return to er if symptoms worsen.     Past Medical History:     Past Medical History:   Diagnosis Date    Aneurysm     Cardiomyopathy     ICD implantation    Cellulitis of left lower extremity 2010    recurrent    CHD (coronary heart disease)     S/P CABG & PCI    Dyslipidemia     History of ventricular tachycardia     Hypertension     Myocardial infarction     Inferior MI    Pinched nerve     L4-L5    Prostate cancer        Past Surgical History:     Past Surgical History:   Procedure Laterality Date    ANGIOPLASTY  2000 04/1996 Stent: Palmaz- Huy stent/LAD 07/1996-RCA: 2000-Tetra stent Guidant to proximal RCA, mid to distal artery 2 sequential Guidant Tetra stents    APPENDECTOMY       CARDIAC ABLATION  April and May 2019    CARDIAC CATHETERIZATION  2017    1996 x2, Nov. 2000, 2010-cath 2015-no stents 2017    CARDIAC DEFIBRILLATOR PLACEMENT      COLONOSCOPY W/ POLYPECTOMY      Mult colonoscopy's for polyp resection     CORONARY ARTERY BYPASS GRAFT  05/2010    x5 vessel: LMA to diagonal, LAD, intermedius, obtuse marginal, RCA    CORONARY STENT PLACEMENT      ENDOSCOPY N/A 2019    Procedure: ESOPHAGOGASTRODUODENOSCOPY with dilitation Bougie 50, 54;  Surgeon: Roddy Coronel MD;  Location: [facility] ENDOSCOPY;  Service: Gastroenterology    INSERT / REPLACE / REMOVE PACEMAKER      KNEE OPEN LATERAL RELEASE Left     reduction    OTHER SURGICAL HISTORY  2018    ICD implantation    PROSTATE SURGERY      Robiotic surgery- Cyber Knife:       Social History:   Social History     Socioeconomic History    Marital status:    Tobacco Use    Smoking status: Former     Packs/day: 1.00     Years: 17.00     Pack years: 17.00     Types: Cigarettes     Quit date: 2002     Years since quittin.0    Smokeless tobacco: Never   Vaping Use    Vaping Use: Never used   Substance and Sexual Activity    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    Drug use: Never    Sexual activity: Defer       Procedures Performed         Consults:   Consults       Date and Time Order Name Status Description    2023  7:09 AM Inpatient Infectious Diseases Consult Completed             Condition on Discharge:     Stable    Discharge Disposition  Home or Self Care    Discharge Medications     Discharge Medications        Continue These Medications        Instructions Start Date   aspirin 81 MG EC tablet   81 mg, Oral, Daily      chlorhexidine 4 % external liquid  Commonly known as: HIBICLENS   1 application , Topical, Daily PRN, Apply to leg(s)      cholecalciferol 10 MCG (400 UNIT) tablet  Commonly known as: VITAMIN D3   800 Units, Oral, Daily      ezetimibe 10 MG tablet  Commonly  known as: ZETIA   10 mg, Oral, Every Evening      fish oil 1000 MG capsule capsule   2,000 mg, Oral, 2 Times Daily With Meals      gabapentin 400 MG capsule  Commonly known as: NEURONTIN   400 mg, Oral, 3 Times Daily      methocarbamol 500 MG tablet  Commonly known as: ROBAXIN   500 mg, Oral, 2 Times Daily      metoprolol succinate XL 50 MG 24 hr tablet  Commonly known as: TOPROL-XL   50 mg, Oral, Daily      midodrine 5 MG tablet  Commonly known as: PROAMATINE   5 mg, Oral, 3 Times Daily Before Meals      mupirocin 2 % ointment  Commonly known as: BACTROBAN   1 application , Topical, 3 Times Daily, Apply to leg(s)      nitroglycerin 0.4 MG SL tablet  Commonly known as: NITROSTAT   0.4 mg, Sublingual, Every 5 Minutes PRN      pantoprazole 40 MG EC tablet  Commonly known as: PROTONIX   40 mg, Oral, Daily      sennosides-docusate 8.6-50 MG per tablet  Commonly known as: PERICOLACE   1 tablet, Oral, 2 Times Daily      sucralfate 1 g tablet  Commonly known as: CARAFATE   1 g, Oral, 4 Times Daily               Discharge Diet:     Activity at Discharge:     Follow-up Appointments  Future Appointments   Date Time Provider Department Center   8/3/2023  3:15 PM ROOM 1,  [facility] None   10/20/2023 11:00 AM Constantino Arvizu MD [facility]   1/30/2024  2:15 PM Marcin Childers,  [facility]     Additional Instructions for the Follow-ups that You Need to Schedule       Discharge Follow-up with PCP   As directed       Currently Documented PCP:    Makenna Motta MD    PCP Phone Number:    [phone]     Follow Up Details: 1-2 weeks                Test Results Pending at Discharge  Pending Labs       Order Current Status    Blood Culture - Blood, Arm, Left Preliminary result    Blood Culture - Blood, Arm, Left Preliminary result             Risk for Readmission (LACE) Score: 8 (7/28/2023  6:00 AM)      Greater than 30 minutes spent in discharge activities for this  patient    Andra Kate PA-C  07/28/23  15:00 EDT

## 2023-07-28 NOTE — PLAN OF CARE
Goal Outcome Evaluation:  Plan of Care Reviewed With: patient        Progress: improving  Outcome Evaluation: Pt seen by provider and has case management consult in. Pt is suppose to be going home today. IV antibiotics and a ride is to be situated through .

## 2023-07-28 NOTE — CASE MANAGEMENT/SOCIAL WORK
Continued Stay Note  MEIR Sanchez     Patient Name: Deion Nicholas  MRN: [id number]  Today's Date: 7/28/2023    Admit Date: 7/25/2023    Plan: Home with [facility]    Discharge Plan       Row Name 07/28/23 1440       Plan    Final Discharge Disposition Code 06 - home with home health care    Final Note home with [facility]      Row Name 07/28/23 1439       Plan    Plan Home with [facility]    Plan Comments [facility] Coordinator Aga to Order Home with with [facility]. IV ATB not needed after Discharge patient to [facility] with [facility]. Doroteo with [facility] updated. patient son picking patient up at 430pm                          Felicitas Brown RN

## 2023-07-28 NOTE — PROGRESS NOTES
Case discussed with [facility] pharmacist Leah Piper and they are not willing to pay for the patient's IV Teflaro.  We did discuss the patient's case and I feel like he is fine to go off IV antibiotics at this point and will follow-[facility].

## 2023-07-28 NOTE — CASE MANAGEMENT/SOCIAL WORK
Continued Stay Note  MEIR Sanchez     Patient Name: Deion Nicholas  MRN: [id number]  Today's Date: 7/28/2023    Admit Date: 7/25/2023    Plan: Home with [facility] and [facility] Pending [facility] Approval   Discharge Plan       Row Name 07/28/23 0845       Plan    Plan Comments Called Aga with [facility] and asked if it IV ATB for discharge has been approved yet per Aga still waiting approval explained that patient is ready for discharge and this is the only thing preventing discharge. Stated she will do her best to make it happen today. Aga's Direct number at [phone].    Spoke with Doroteo Quezada [phone] with [facility] to notify that patient is discharge ready and we are just waiting for [facility] Approval. CM will need to notify him once approval received that way he can schedule the IV ATB deliver to hospital. Patient will need to have evening dose of IV ATB at hospital prior to discharge    Notified [facility] that Patient will likely DC today pending [facility] Approval to confirm they can do set up tomorrow with patient at home.                       1115 am was notified By Leah Piper they would like to speak with ID in regards to IV ATB choice. Secure chat was sent to Tamiko Duffy, notifying them of request and gave her phone number for them to call.     Felicitas Brown RN

## 2023-07-29 NOTE — OUTREACH NOTE
Prep Survey      Flowsheet Row Responses   [facility] facility patient discharged from? [facility]   Is LACE score < 7 ? No   Eligibility Readm Mgmt   Discharge diagnosis Left lower extremity swelling   Does the patient have one of the following disease processes/diagnoses(primary or secondary)? Other   Does the patient have Home health ordered? Yes   What is the Home health agency?  Home with with [facility].   Is there a DME ordered? No   Medication alerts for this patient no changes   Prep survey completed? Yes            Treva RAYMUNDO - Registered Nurse

## 2023-07-30 LAB
BACTERIA SPEC AEROBE CULT: NORMAL
BACTERIA SPEC AEROBE CULT: NORMAL

## 2023-08-01 ENCOUNTER — APPOINTMENT (OUTPATIENT)
Dept: CT IMAGING | Facility: HOSPITAL | Age: 83
End: 2023-08-01
Payer: MEDICARE

## 2023-08-01 ENCOUNTER — APPOINTMENT (OUTPATIENT)
Dept: GENERAL RADIOLOGY | Facility: HOSPITAL | Age: 83
End: 2023-08-01
Payer: MEDICARE

## 2023-08-01 ENCOUNTER — READMISSION MANAGEMENT (OUTPATIENT)
Dept: CALL CENTER | Facility: HOSPITAL | Age: 83
End: 2023-08-01
Payer: OTHER GOVERNMENT

## 2023-08-01 ENCOUNTER — HOSPITAL ENCOUNTER (OUTPATIENT)
Facility: HOSPITAL | Age: 83
Discharge: HOME | End: 2023-08-01
Attending: STUDENT IN AN ORGANIZED HEALTH CARE EDUCATION/TRAINING PROGRAM | Admitting: STUDENT IN AN ORGANIZED HEALTH CARE EDUCATION/TRAINING PROGRAM
Payer: MEDICARE

## 2023-08-01 VITALS
BODY MASS INDEX: 26.51 KG/M2 | HEART RATE: 96 BPM | WEIGHT: 200 LBS | SYSTOLIC BLOOD PRESSURE: 118 MMHG | TEMPERATURE: 97.9 F | OXYGEN SATURATION: 97 % | HEIGHT: 73 IN | DIASTOLIC BLOOD PRESSURE: 85 MMHG | RESPIRATION RATE: 17 BRPM

## 2023-08-01 DIAGNOSIS — M25.511 ACUTE PAIN OF RIGHT SHOULDER: Primary | ICD-10-CM

## 2023-08-01 PROCEDURE — 73030 X-RAY EXAM OF SHOULDER: CPT

## 2023-08-01 PROCEDURE — 93005 ELECTROCARDIOGRAM TRACING: CPT | Performed by: STUDENT IN AN ORGANIZED HEALTH CARE EDUCATION/TRAINING PROGRAM

## 2023-08-01 PROCEDURE — 71046 X-RAY EXAM CHEST 2 VIEWS: CPT

## 2023-08-01 PROCEDURE — G0463 HOSPITAL OUTPT CLINIC VISIT: HCPCS | Performed by: STUDENT IN AN ORGANIZED HEALTH CARE EDUCATION/TRAINING PROGRAM

## 2023-08-01 RX ORDER — LIDOCAINE 50 MG/G
1 PATCH TOPICAL EVERY 24 HOURS
Qty: 5 PATCH | Refills: 0 | Status: SHIPPED | OUTPATIENT
Start: 2023-08-01 | End: 2023-08-06

## 2023-08-01 RX ORDER — CYCLOBENZAPRINE HCL 10 MG
10 TABLET ORAL 3 TIMES DAILY PRN
Qty: 15 TABLET | Refills: 0 | Status: SHIPPED | OUTPATIENT
Start: 2023-08-01 | End: 2023-08-06

## 2023-08-01 RX ORDER — IBUPROFEN 800 MG/1
800 TABLET ORAL EVERY 8 HOURS PRN
Qty: 15 TABLET | Refills: 0 | Status: SHIPPED | OUTPATIENT
Start: 2023-08-01 | End: 2023-08-06

## 2023-08-01 NOTE — DISCHARGE INSTRUCTIONS
You were seen in the emergency department for right shoulder pain.  Your x-ray did not show any evidence of pneumonia or a fracture.  You may have a muscle spasm, given you are tender over your muscle above the right scapula.  Your EKG did not show any concerning findings.  You may ice the area, please continue to stretch.  You have been discharged home with a muscle relaxer as well which can be taken at night.  Please do not drive or operate machinery when taking this medication.  Please be sure to follow-up with your primary care physician return to the emergency department for any worsening symptoms.

## 2023-08-01 NOTE — FSED PROVIDER NOTE
Subjective   History of Present Illness  Patient is an 82-year-old male presents emergency department for right shoulder blade pain and cough.  Patient is a history of cardiomyopathy status post ICD, CHF status post CABG in 2010, dyslipidemia, hypertension, prostate cancer, radiculopathy.  Patient states last week he was in the hospital for 4 days being treated for a chronic cellulitis of the left lower extremity.  Patient states for the last 2 or 3 days since being discharged from the hospital he has had a nonproductive cough and some pain to his right shoulder blade that does appear worse with movement.  He denies any falls or injury.  He has no pain in the front of his chest, no radiation of pain down his arms, into his back or into his neck.  He denies any associated shortness of breath, fever, chills, exertional dyspnea.  Patient states his cellulitis has improved.  Patient states he called his primary care physician who recommended he come in for a chest x-ray to be sure he does not have pneumonia given his recent hospitalization.    Review of Systems   Constitutional:  Negative for activity change and fever.   HENT:  Negative for congestion, rhinorrhea and sore throat.    Respiratory:  Positive for cough. Negative for shortness of breath.    Cardiovascular:  Negative for chest pain.   Gastrointestinal:  Negative for abdominal pain, nausea and vomiting.   Genitourinary:  Negative for dysuria.   Musculoskeletal:  Positive for arthralgias. Negative for back pain and myalgias.   Skin:  Negative for rash.   Neurological:  Negative for dizziness, light-headedness and headaches.   Psychiatric/Behavioral:  Negative for confusion.      Past Medical History:   Diagnosis Date    Aneurysm     Cardiomyopathy     ICD implantation    Cellulitis of left lower extremity 2010    recurrent    CHD (coronary heart disease)     S/P CABG & PCI    Dyslipidemia     History of ventricular tachycardia     Hypertension     Myocardial  infarction     Inferior MI    Pinched nerve     L4-L5    Prostate cancer        Allergies   Allergen Reactions    Lovastatin Myalgia    Pravastatin Myalgia and Unknown (See Comments)     unknown    Simvastatin Unknown (See Comments) and Myalgia     unknown    Spironolactone Other (See Comments)     Gynecomastia        Past Surgical History:   Procedure Laterality Date    ANGIOPLASTY  1996 Stent: Palmaz- Huy stent/LAD 1996-RCA: -Tetra stent Guidant to proximal RCA, mid to distal artery 2 sequential Guidant Tetra stents    APPENDECTOMY      CARDIAC ABLATION  April and May 2019    CARDIAC CATHETERIZATION  2017    1996 x2, Nov. , 2010-cath 2015-no stents 2017    CARDIAC DEFIBRILLATOR PLACEMENT      COLONOSCOPY W/ POLYPECTOMY      Mult colonoscopy's for polyp resection     CORONARY ARTERY BYPASS GRAFT  05/2010    x5 vessel: LMA to diagonal, LAD, intermedius, obtuse marginal, RCA    CORONARY STENT PLACEMENT      ENDOSCOPY N/A 2019    Procedure: ESOPHAGOGASTRODUODENOSCOPY with dilitation Bougie 50, 54;  Surgeon: Roddy Coronel MD;  Location: [facility] ENDOSCOPY;  Service: Gastroenterology    INSERT / REPLACE / REMOVE PACEMAKER      KNEE OPEN LATERAL RELEASE Left     reduction    OTHER SURGICAL HISTORY  2018    ICD implantation    PROSTATE SURGERY      Robiotic surgery- Cyber Knife:       Family History   Problem Relation Age of Onset    Pancreatic cancer Mother     Heart disease Father        Social History     Socioeconomic History    Marital status:    Tobacco Use    Smoking status: Former     Packs/day: 1.00     Years: 17.00     Pack years: 17.00     Types: Cigarettes     Quit date: 2002     Years since quittin.0    Smokeless tobacco: Never   Vaping Use    Vaping Use: Never used   Substance and Sexual Activity    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   Constitutional:       General: He is not in acute distress.     Appearance: Normal appearance. He is not ill-appearing.   HENT:      Head: Normocephalic and atraumatic.      Nose: Nose normal. No congestion.      Mouth/Throat:      Mouth: Mucous membranes are moist.      Pharynx: No oropharyngeal exudate.   Eyes:      Conjunctiva/sclera: Conjunctivae normal.   Cardiovascular:      Rate and Rhythm: Normal rate and regular rhythm.      Heart sounds: No murmur heard.  Pulmonary:      Effort: Pulmonary effort is normal.      Breath sounds: No wheezing, rhonchi or rales.   Abdominal:      General: Abdomen is flat.      Palpations: Abdomen is soft.      Tenderness: There is no abdominal tenderness. There is no guarding or rebound.   Musculoskeletal:         General: No swelling or tenderness. Normal range of motion.      Cervical back: Normal range of motion and neck supple. No bony tenderness.      Thoracic back: No bony tenderness.      Lumbar back: No bony tenderness.        Back:       Comments: Patient with palpable muscle spasm and tenderness to the right upper trapezium to the superior aspect of the scapular spine   Skin:     General: Skin is warm and dry.      Findings: No rash.   Neurological:      General: No focal deficit present.      Mental Status: He is alert and oriented to person, place, and time.       Procedures           ED Course  ED Course as of 08/01/23 2015   Tue Aug 01, 2023   1943 ECG 12 Lead Other; shoulder pain  EKG is sinus rhythm rate 79, normal axis, nonspecific intraventricular conduction delay, unchanged from previous.  Unchanged from previous [CO]      ED Course User Index  [CO] Rosemary Stewart,                                            Medical Decision Making  Patient is a an 82-year-old male presented to emergency department for right shoulder blade pain and cough.  HPI as listed above.  On arrival he is afebrile, hemodynamically stable in no acute  distress.  Physical examination he does have some tenderness to the right superior aspect of the scapula with hypertonicity.  He has no decreased range of motion, no known trauma or injury.  Differential diagnosis includes pneumonia given recent hospitalization, musculoskeletal pain.  Does not appear specific with ACS, but given he does have cardiac history EKG was obtained.  EKG shows a nonspecific intraventricular conduction delay, otherwise unremarkable.  I do not believe this represents ACS, I do not believe the patient requires a troponin level at this time.  Patient's x-ray shows no fracture, dislocation, pneumonia.  Patient instructed to ice the area, follow-up with his primary care physician return for any worsening symptoms.  He was discharged home in stable condition.    Problems Addressed:  Acute pain of right shoulder: complicated acute illness or injury    Amount and/or Complexity of Data Reviewed  Radiology: ordered.  ECG/medicine tests: ordered. Decision-making details documented in ED Course.    Risk  Prescription drug management.        Final diagnoses:   Acute pain of right shoulder       ED Disposition  ED Disposition       ED Disposition   Discharge    Condition   Stable    Comment   --               Makenna Motta MD  [address] [facility] [address] [phone]    Schedule an appointment as soon as possible for a visit in 1 week      [place] [address] [place] [id number] [phone]    As needed, If symptoms worsen         Medication List        New Prescriptions      cyclobenzaprine 10 MG tablet  Commonly known as: FLEXERIL  Take 1 tablet by mouth 3 (Three) Times a Day As Needed for Muscle Spasms for up to 5 days.     ibuprofen 800 MG tablet  Commonly known as: ADVIL,MOTRIN  Take 1 tablet by mouth Every 8 (Eight) Hours As Needed for Mild Pain for up to 5 days.     lidocaine 5 %  Commonly known as: LIDODERM  Place 1  patch on the skin as directed by provider Daily for 5 days. Remove & Discard patch within 12 hours or as directed by MD               Where to Get Your Medications        These medications were sent to [id number] [place] - [address] AT [facility] OF [place] [address] [address] - [phone] [facility] [phone] FX  [address], [facility] [place] [address]      Phone: [phone]   cyclobenzaprine 10 MG tablet  ibuprofen 800 MG tablet  lidocaine 5 %

## 2023-08-03 ENCOUNTER — APPOINTMENT (OUTPATIENT)
Dept: VASCULAR SURGERY | Facility: HOSPITAL | Age: 83
End: 2023-08-03
Payer: MEDICARE

## 2023-08-03 LAB — QT INTERVAL: 376 MS

## 2023-08-03 PROCEDURE — G0463 HOSPITAL OUTPT CLINIC VISIT: HCPCS

## 2023-08-09 ENCOUNTER — READMISSION MANAGEMENT (OUTPATIENT)
Dept: CALL CENTER | Facility: HOSPITAL | Age: 83
End: 2023-08-09
Payer: OTHER GOVERNMENT

## 2023-08-17 ENCOUNTER — READMISSION MANAGEMENT (OUTPATIENT)
Dept: CALL CENTER | Facility: HOSPITAL | Age: 83
End: 2023-08-17
Payer: OTHER GOVERNMENT

## 2023-08-17 NOTE — OUTREACH NOTE
Medical Week 3 Survey      Flowsheet Row Responses   [facility] [place] facility patient discharged from? [facility]   Does the patient have one of the following disease processes/diagnoses(primary or secondary)? Other   Week 3 attempt successful? No   Unsuccessful attempts Attempt 1            Panda RAYMUNDO - Registered Nurse

## 2023-08-21 ENCOUNTER — READMISSION MANAGEMENT (OUTPATIENT)
Dept: CALL CENTER | Facility: HOSPITAL | Age: 83
End: 2023-08-21
Payer: OTHER GOVERNMENT

## 2023-08-21 NOTE — OUTREACH NOTE
Medical Week 3 Survey      Flowsheet Row Responses   [facility] patient discharged from? [facility]   Does the patient have one of the following disease processes/diagnoses(primary or secondary)? Other   Week 3 attempt successful? Yes   Call start time 1829   Call end time 1833   Discharge diagnosis Left lower extremity swelling   Is the patient taking all medications as directed (includes completed medication regime)? Yes   Does the patient have a primary care provider?  Yes   Comments regarding PCP [facility]   Has the patient kept scheduled appointments due by today? Yes   Comments Had follow up and advised to make another f/u appt.   Psychosocial issues? No   What is the patient's perception of their health status since discharge? Same   Is the patient/caregiver able to teach back signs and symptoms related to disease process for when to call PCP? Yes   Is the patient/caregiver able to teach back signs and symptoms related to disease process for when to call 911? Yes   Is the patient/caregiver able to teach back the hierarchy of who to call/visit for symptoms/problems? PCP, Specialist, Home health nurse, Urgent Care, ED, 911 Yes   If the patient is a current smoker, are they able to teach back resources for cessation? Not a smoker   Additional teach back comments States leg is still swollen and it had it checked.  They told him it wasn't infected and probably fluid build up.  He is wearing CRISPIN hose and keeping elevated.  If no improvement [facility].   Week 3 Call Completed? Yes   Graduated/Revoked comments If symptoms worsen pt to go to ED.   Call end time 1833            Taylor DUPONT - Licensed Nurse

## 2023-08-31 PROCEDURE — 93295 DEV INTERROG REMOTE 1/2/MLT: CPT | Performed by: INTERNAL MEDICINE

## 2023-08-31 PROCEDURE — 93296 REM INTERROG EVL PM/IDS: CPT | Performed by: INTERNAL MEDICINE

## 2023-10-04 ENCOUNTER — HOSPITAL ENCOUNTER (OUTPATIENT)
Facility: HOSPITAL | Age: 83
Setting detail: OBSERVATION
Discharge: HOME | End: 2023-10-08
Attending: EMERGENCY MEDICINE | Admitting: EMERGENCY MEDICINE
Payer: OTHER GOVERNMENT

## 2023-10-04 DIAGNOSIS — R60.0 PEDAL EDEMA: ICD-10-CM

## 2023-10-04 DIAGNOSIS — R79.89 ELEVATED TROPONIN: Primary | ICD-10-CM

## 2023-10-04 LAB
ALBUMIN SERPL-MCNC: 3.6 G/DL (ref 3.5–5.2)
ALBUMIN/GLOB SERPL: 1.3 G/DL
ALP SERPL-CCNC: 80 U/L (ref 39–117)
ALT SERPL W P-5'-P-CCNC: 19 U/L (ref 1–41)
ANION GAP SERPL CALCULATED.3IONS-SCNC: 9 MMOL/L (ref 5–15)
APTT PPP: 25.1 SECONDS (ref 61–76.5)
AST SERPL-CCNC: 26 U/L (ref 1–40)
BASOPHILS # BLD AUTO: 0.1 10*3/MM3 (ref 0–0.2)
BASOPHILS NFR BLD AUTO: 1.1 % (ref 0–1.5)
BILIRUB SERPL-MCNC: 0.2 MG/DL (ref 0–1.2)
BUN SERPL-MCNC: 14 MG/DL (ref 8–23)
BUN/CREAT SERPL: 16.3 (ref 7–25)
CALCIUM SPEC-SCNC: 9 MG/DL (ref 8.6–10.5)
CHLORIDE SERPL-SCNC: 105 MMOL/L (ref 98–107)
CO2 SERPL-SCNC: 23 MMOL/L (ref 22–29)
CREAT SERPL-MCNC: 0.86 MG/DL (ref 0.76–1.27)
DEPRECATED RDW RBC AUTO: 49.4 FL (ref 37–54)
EGFRCR SERPLBLD CKD-EPI 2021: 87.5 ML/MIN/1.73
EOSINOPHIL # BLD AUTO: 0.2 10*3/MM3 (ref 0–0.4)
EOSINOPHIL NFR BLD AUTO: 3.7 % (ref 0.3–6.2)
ERYTHROCYTE [DISTWIDTH] IN BLOOD BY AUTOMATED COUNT: 14 % (ref 12.3–15.4)
GLOBULIN UR ELPH-MCNC: 2.7 GM/DL
GLUCOSE SERPL-MCNC: 129 MG/DL (ref 65–99)
HCT VFR BLD AUTO: 35 % (ref 37.5–51)
HGB BLD-MCNC: 11.6 G/DL (ref 13–17.7)
HOLD SPECIMEN: NORMAL
HOLD SPECIMEN: NORMAL
INR PPP: 1.01 (ref 0.93–1.1)
LYMPHOCYTES # BLD AUTO: 1.3 10*3/MM3 (ref 0.7–3.1)
LYMPHOCYTES NFR BLD AUTO: 24.1 % (ref 19.6–45.3)
MCH RBC QN AUTO: 31.6 PG (ref 26.6–33)
MCHC RBC AUTO-ENTMCNC: 33.1 G/DL (ref 31.5–35.7)
MCV RBC AUTO: 95.5 FL (ref 79–97)
MONOCYTES # BLD AUTO: 0.6 10*3/MM3 (ref 0.1–0.9)
MONOCYTES NFR BLD AUTO: 10.6 % (ref 5–12)
NEUTROPHILS NFR BLD AUTO: 3.2 10*3/MM3 (ref 1.7–7)
NEUTROPHILS NFR BLD AUTO: 60.5 % (ref 42.7–76)
NRBC BLD AUTO-RTO: 0 /100 WBC (ref 0–0.2)
NT-PROBNP SERPL-MCNC: 496.9 PG/ML (ref 0–1800)
PLATELET # BLD AUTO: 166 10*3/MM3 (ref 140–450)
PMV BLD AUTO: 8.5 FL (ref 6–12)
POTASSIUM SERPL-SCNC: 4.6 MMOL/L (ref 3.5–5.2)
PROT SERPL-MCNC: 6.3 G/DL (ref 6–8.5)
PROTHROMBIN TIME: 11 SECONDS (ref 9.6–11.7)
RBC # BLD AUTO: 3.66 10*6/MM3 (ref 4.14–5.8)
SODIUM SERPL-SCNC: 137 MMOL/L (ref 136–145)
TROPONIN T SERPL HS-MCNC: 20 NG/L
WBC NRBC COR # BLD: 5.3 10*3/MM3 (ref 3.4–10.8)
WHOLE BLOOD HOLD COAG: NORMAL

## 2023-10-04 PROCEDURE — G0378 HOSPITAL OBSERVATION PER HR: HCPCS

## 2023-10-04 PROCEDURE — 85025 COMPLETE CBC W/AUTO DIFF WBC: CPT

## 2023-10-04 PROCEDURE — 84484 ASSAY OF TROPONIN QUANT: CPT

## 2023-10-04 PROCEDURE — 99284 EMERGENCY DEPT VISIT MOD MDM: CPT

## 2023-10-04 PROCEDURE — 80053 COMPREHEN METABOLIC PANEL: CPT

## 2023-10-04 PROCEDURE — 96365 THER/PROPH/DIAG IV INF INIT: CPT

## 2023-10-04 PROCEDURE — 25010000002 CEFTRIAXONE PER 250 MG

## 2023-10-04 PROCEDURE — 85730 THROMBOPLASTIN TIME PARTIAL: CPT

## 2023-10-04 PROCEDURE — 85610 PROTHROMBIN TIME: CPT

## 2023-10-04 PROCEDURE — 83880 ASSAY OF NATRIURETIC PEPTIDE: CPT

## 2023-10-04 RX ORDER — AMOXICILLIN 250 MG
2 CAPSULE ORAL 2 TIMES DAILY
Status: DISCONTINUED | OUTPATIENT
Start: 2023-10-04 | End: 2023-10-08 | Stop reason: HOSPADM

## 2023-10-04 RX ORDER — SODIUM CHLORIDE 0.9 % (FLUSH) 0.9 %
10 SYRINGE (ML) INJECTION EVERY 12 HOURS SCHEDULED
Status: DISCONTINUED | OUTPATIENT
Start: 2023-10-04 | End: 2023-10-08 | Stop reason: HOSPADM

## 2023-10-04 RX ORDER — GABAPENTIN 400 MG/1
400 CAPSULE ORAL 3 TIMES DAILY
Status: DISCONTINUED | OUTPATIENT
Start: 2023-10-04 | End: 2023-10-08 | Stop reason: HOSPADM

## 2023-10-04 RX ORDER — ASPIRIN 81 MG/1
324 TABLET, CHEWABLE ORAL ONCE
Status: COMPLETED | OUTPATIENT
Start: 2023-10-04 | End: 2023-10-04

## 2023-10-04 RX ORDER — BISACODYL 5 MG/1
5 TABLET, DELAYED RELEASE ORAL DAILY PRN
Status: DISCONTINUED | OUTPATIENT
Start: 2023-10-04 | End: 2023-10-08 | Stop reason: HOSPADM

## 2023-10-04 RX ORDER — METHOCARBAMOL 500 MG/1
500 TABLET, FILM COATED ORAL EVERY 12 HOURS SCHEDULED
Status: DISCONTINUED | OUTPATIENT
Start: 2023-10-04 | End: 2023-10-05

## 2023-10-04 RX ORDER — SODIUM CHLORIDE 0.9 % (FLUSH) 0.9 %
10 SYRINGE (ML) INJECTION AS NEEDED
Status: DISCONTINUED | OUTPATIENT
Start: 2023-10-04 | End: 2023-10-08 | Stop reason: HOSPADM

## 2023-10-04 RX ORDER — MORPHINE SULFATE 2 MG/ML
1 INJECTION, SOLUTION INTRAMUSCULAR; INTRAVENOUS EVERY 4 HOURS PRN
Status: DISCONTINUED | OUTPATIENT
Start: 2023-10-04 | End: 2023-10-08 | Stop reason: HOSPADM

## 2023-10-04 RX ORDER — METOPROLOL SUCCINATE 50 MG/1
50 TABLET, EXTENDED RELEASE ORAL NIGHTLY
Status: DISCONTINUED | OUTPATIENT
Start: 2023-10-04 | End: 2023-10-05

## 2023-10-04 RX ORDER — NITROGLYCERIN 0.4 MG/1
0.4 TABLET SUBLINGUAL
Status: DISCONTINUED | OUTPATIENT
Start: 2023-10-04 | End: 2023-10-08 | Stop reason: HOSPADM

## 2023-10-04 RX ORDER — SODIUM CHLORIDE 9 MG/ML
40 INJECTION, SOLUTION INTRAVENOUS AS NEEDED
Status: DISCONTINUED | OUTPATIENT
Start: 2023-10-04 | End: 2023-10-08 | Stop reason: HOSPADM

## 2023-10-04 RX ORDER — POLYETHYLENE GLYCOL 3350 17 G/17G
17 POWDER, FOR SOLUTION ORAL DAILY PRN
Status: DISCONTINUED | OUTPATIENT
Start: 2023-10-04 | End: 2023-10-08 | Stop reason: HOSPADM

## 2023-10-04 RX ORDER — BISACODYL 10 MG
10 SUPPOSITORY, RECTAL RECTAL DAILY PRN
Status: DISCONTINUED | OUTPATIENT
Start: 2023-10-04 | End: 2023-10-08 | Stop reason: HOSPADM

## 2023-10-04 RX ORDER — NALOXONE HCL 0.4 MG/ML
0.4 VIAL (ML) INJECTION
Status: DISCONTINUED | OUTPATIENT
Start: 2023-10-04 | End: 2023-10-08 | Stop reason: HOSPADM

## 2023-10-04 RX ADMIN — METHOCARBAMOL 500 MG: 500 TABLET ORAL at 22:30

## 2023-10-04 RX ADMIN — Medication 10 ML: at 22:32

## 2023-10-04 RX ADMIN — ASPIRIN 324 MG: 81 TABLET, CHEWABLE ORAL at 20:33

## 2023-10-04 RX ADMIN — METOPROLOL SUCCINATE 50 MG: 50 TABLET, EXTENDED RELEASE ORAL at 22:30

## 2023-10-04 RX ADMIN — CEFTRIAXONE 1000 MG: 1 INJECTION, POWDER, FOR SOLUTION INTRAMUSCULAR; INTRAVENOUS at 20:33

## 2023-10-04 RX ADMIN — GABAPENTIN 400 MG: 400 CAPSULE ORAL at 22:30

## 2023-10-04 NOTE — ED PROVIDER NOTES
"Subjective   History of Present Illness  Chief Complaint: \" I have cellulitis\"      HPI: Patient is an 82-year-old male who presents to the emergency room by vehicle stating that he has cellulitis on his left leg.  He has had intermittent flareups over the last 6 months, he is also complaining of left arm pain he has no associated chest pain or shortness of breath.  He has had no fevers nausea or vomiting.  He has had bilateral lower extremity swelling which is abnormal for him.  He denies a history of congestive heart failure.    PCP: Syd  Cardio: Liseth    History provided by:  Patient    Review of Systems   Respiratory:  Negative for shortness of breath.    Cardiovascular:  Positive for leg swelling. Negative for chest pain.   Musculoskeletal:  Positive for arthralgias.   Skin:  Positive for wound.     Past Medical History:   Diagnosis Date    Aneurysm     Cardiomyopathy     ICD implantation    Cellulitis of left lower extremity 2010    recurrent    CHD (coronary heart disease)     S/P CABG & PCI    Dyslipidemia     History of ventricular tachycardia     Hypertension     Myocardial infarction     Inferior MI    Pinched nerve     L4-L5    Prostate cancer        Allergies   Allergen Reactions    Lovastatin Myalgia    Pravastatin Myalgia and Unknown (See Comments)     unknown    Simvastatin Unknown (See Comments) and Myalgia     unknown    Spironolactone Other (See Comments)     Gynecomastia        Past Surgical History:   Procedure Laterality Date    ANGIOPLASTY  2000 04/1996 Stent: Palmaz- Huy stent/LAD 07/1996-RCA: 2000-Tetra stent Guidant to proximal RCA, mid to distal artery 2 sequential Guidant Tetra stents    APPENDECTOMY      CARDIAC ABLATION  April and May 2019    CARDIAC CATHETERIZATION  07/2017    1996 x2, Nov. 2000, 05/2010-cath 08/2015-no stents 2017    CARDIAC DEFIBRILLATOR PLACEMENT      COLONOSCOPY W/ POLYPECTOMY      Mult colonoscopy's for polyp resection     CORONARY ARTERY BYPASS " GRAFT  05/2010    x5 vessel: LMA to diagonal, LAD, intermedius, obtuse marginal, RCA    CORONARY STENT PLACEMENT      ENDOSCOPY N/A 2019    Procedure: ESOPHAGOGASTRODUODENOSCOPY with dilitation Bougie 50, 54;  Surgeon: Roddy Coronel MD;  Location: [facility] ENDOSCOPY;  Service: Gastroenterology    INSERT / REPLACE / REMOVE PACEMAKER      KNEE OPEN LATERAL RELEASE Left     reduction    OTHER SURGICAL HISTORY  2018    ICD implantation    PROSTATE SURGERY  2015    Robiotic surgery- Cyber Knife:       Family History   Problem Relation Age of Onset    Pancreatic cancer Mother     Heart disease Father        Social History     Socioeconomic History    Marital status:    Tobacco Use    Smoking status: Former     Packs/day: 1.00     Years: 17.00     Pack years: 17.00     Types: Cigarettes     Quit date: 2002     Years since quittin.2    Smokeless tobacco: Never   Vaping Use    Vaping Use: Never used   Substance and Sexual Activity    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   Constitutional:       Appearance: He is obese.   HENT:      Head: Normocephalic.   Eyes:      Extraocular Movements: Extraocular movements intact.      Pupils: Pupils are equal, round, and reactive to light.   Neck:      Comments: No JVD    Cardiovascular:      Rate and Rhythm: Normal rate.      Pulses: Normal pulses.      Heart sounds: Normal heart sounds.   Pulmonary:      Effort: Pulmonary effort is normal.      Breath sounds: Normal breath sounds.   Abdominal:      General: Bowel sounds are normal.      Palpations: Abdomen is soft.   Musculoskeletal:      Cervical back: Normal range of motion.      Right lower le+ Pitting Edema present.      Left lower le+ Pitting Edema present.   Skin:     General: Skin is warm and dry.             Comments: Mild erythema, blanchable    Neurological:      General:  "No focal deficit present.      Mental Status: He is alert and oriented to person, place, and time.       Procedures           ED Course      /75   Pulse 80   Temp 97.7 øF (36.5 øC)   Resp 18   Ht 185.4 cm (73\")   Wt 90.7 kg (200 lb)   SpO2 96%   BMI 26.39 kg/mý   Labs Reviewed   COMPREHENSIVE METABOLIC PANEL - Abnormal; Notable for the following components:       Result Value    Glucose 129 (*)     All other components within normal limits    Narrative:     GFR Normal >60  Chronic Kidney Disease <60  Kidney Failure <15    The GFR formula is only valid for adults with stable renal function between ages 18 and 70.   APTT - Abnormal; Notable for the following components:    PTT 25.1 (*)     All other components within normal limits   SINGLE HSTROPONIN T - Abnormal; Notable for the following components:    HS Troponin T 20 (*)     All other components within normal limits    Narrative:     High Sensitive Troponin T Reference Range:  <10.0 ng/L- Negative Female for AMI  <15.0 ng/L- Negative Male for AMI  >=10 - Abnormal Female indicating possible myocardial injury.  >=15 - Abnormal Male indicating possible myocardial injury.   Clinicians would have to utilize clinical acumen, EKG, Troponin, and serial changes to determine if it is an Acute Myocardial Infarction or myocardial injury due to an underlying chronic condition.        CBC WITH AUTO DIFFERENTIAL - Abnormal; Notable for the following components:    RBC 3.66 (*)     Hemoglobin 11.6 (*)     Hematocrit 35.0 (*)     All other components within normal limits   PROTIME-INR - Normal   BNP (IN-HOUSE) - Normal    Narrative:     This assay is used as an aid in the diagnosis of individuals suspected of having heart failure. It can be used as an aid in the diagnosis of acute decompensated heart failure (ADHF) in patients presenting with signs and symptoms of ADHF to the emergency department (ED). In addition, NT-proBNP of <300 pg/mL indicates ADHF is not " likely.    Age Range Result Interpretation  NT-proBNP Concentration (pg/mL:      <50             Positive            >450                   Gray                 300-450                    Negative             <300    50-75           Positive            >900                  Gray                300-900                  Negative            <300      >75             Positive            >1800                  Gray                300-1800                  Negative            <300   RAINBOW DRAW    Narrative:     The following orders were created for panel order [place] Draw.  Procedure                               Abnormality         Status                     ---------                               -----------         ------                     Green Top (Gel)[[id number]]                                  Final result               Lavender Top[[id number]]                                                                Gold Top - SST[[id number]]                                   Final result               Light Blue Top[[id number]]                                   Final result                 Please view results for these tests on the individual orders.   GREEN TOP   GOLD TOP - SST   LIGHT BLUE TOP   CBC AND DIFFERENTIAL    Narrative:     The following orders were created for panel order CBC & Differential.  Procedure                               Abnormality         Status                     ---------                               -----------         ------                     CBC Auto Differential[[id number]]        Abnormal            Final result                 Please view results for these tests on the individual orders.     Medications   sodium chloride 0.9 % flush 10 mL (has no administration in time range)   sodium chloride 0.9 % flush 10 mL (has no administration in time range)   aspirin chewable tablet 324 mg (has no administration in time range)   nitroglycerin (NITROSTAT) SL tablet 0.4 mg (has no administration  in time range)   cefTRIAXone (ROCEPHIN) 1,000 mg in sodium chloride 0.9 % 100 mL IVPB (has no administration in time range)     No radiology results for the last day                                       Medical Decision Making  Patient presented to the emergency room with complaints of cellulitis to his left lower leg as well as left arm pain, he has pedal edema noted on physical exam, initial troponin at 20, there is some erythema noted to the left lower extremity may be consistent with early cellulitis a dose of Rocephin was administered.  CBC notes anemia which appears baseline for this patient he has no leukocytosis.  He has no acute renal insufficiency no electrolyte imbalance.  With patient's presenting symptoms as well as elevated troponin he was placed in ED observation for continued monitoring repeat labs, will also order a duplex ultrasound of the left lower extremity to be completed in the a.m.  At bedside discussed ED findings with ablation and plan for continued monitoring, he was agreeable to this plan of care and denied further questions or complaints.    Chart review: 8/1/2023 outpatient visit with freestanding emergency room related to acute right shoulder pain.      Note Disclaimer: At [facility], we believe that sharing information builds trust and better  relationships. You are receiving this note because you recently visited [facility]. It is possible you will see health information before a provider has talked with you about it. This kind of information can be easy to misunderstand. To help you fully understand what it means for your health, we urge you to discuss this note with your provider.       Part of this note may be an electronic transcription/translation of spoken language to printed text using the Dragon Dictation System.    Appropriate PPE worn during exam.    Amount and/or Complexity of Data Reviewed  External Data Reviewed: labs and notes.  Labs: ordered. Decision-making  details documented in ED Course.    Risk  Prescription drug management.        Final diagnoses:   Elevated troponin   Pedal edema       ED Disposition  ED Disposition       ED Disposition   Decision to Admit    Condition   --    Comment   --               No follow-up provider specified.       Medication List      No changes were made to your prescriptions during this visit.            Sommer Dodd, APRN  10/04/23 2025

## 2023-10-05 ENCOUNTER — APPOINTMENT (OUTPATIENT)
Dept: CARDIOLOGY | Facility: HOSPITAL | Age: 83
End: 2023-10-05
Payer: OTHER GOVERNMENT

## 2023-10-05 LAB
ANION GAP SERPL CALCULATED.3IONS-SCNC: 7 MMOL/L (ref 5–15)
BH CV LOWER VASCULAR LEFT COMMON FEMORAL AUGMENT: NORMAL
BH CV LOWER VASCULAR LEFT COMMON FEMORAL COMPETENT: NORMAL
BH CV LOWER VASCULAR LEFT COMMON FEMORAL COMPRESS: NORMAL
BH CV LOWER VASCULAR LEFT COMMON FEMORAL PHASIC: NORMAL
BH CV LOWER VASCULAR LEFT COMMON FEMORAL SPONT: NORMAL
BH CV LOWER VASCULAR LEFT DISTAL FEMORAL COMPRESS: NORMAL
BH CV LOWER VASCULAR LEFT GASTRONEMIUS COMPRESS: NORMAL
BH CV LOWER VASCULAR LEFT GREATER SAPH AK COMPRESS: NORMAL
BH CV LOWER VASCULAR LEFT GREATER SAPH BK COMPRESS: NORMAL
BH CV LOWER VASCULAR LEFT LESSER SAPH COMPRESS: NORMAL
BH CV LOWER VASCULAR LEFT MID FEMORAL AUGMENT: NORMAL
BH CV LOWER VASCULAR LEFT MID FEMORAL COMPETENT: NORMAL
BH CV LOWER VASCULAR LEFT MID FEMORAL COMPRESS: NORMAL
BH CV LOWER VASCULAR LEFT MID FEMORAL PHASIC: NORMAL
BH CV LOWER VASCULAR LEFT MID FEMORAL SPONT: NORMAL
BH CV LOWER VASCULAR LEFT POPLITEAL AUGMENT: NORMAL
BH CV LOWER VASCULAR LEFT POPLITEAL COMPETENT: NORMAL
BH CV LOWER VASCULAR LEFT POPLITEAL COMPRESS: NORMAL
BH CV LOWER VASCULAR LEFT POPLITEAL PHASIC: NORMAL
BH CV LOWER VASCULAR LEFT POPLITEAL SPONT: NORMAL
BH CV LOWER VASCULAR LEFT POSTERIOR TIBIAL COMPRESS: NORMAL
BH CV LOWER VASCULAR LEFT PROXIMAL FEMORAL COMPRESS: NORMAL
BH CV LOWER VASCULAR LEFT SAPHENOFEMORAL JUNCTION COMPRESS: NORMAL
BH CV LOWER VASCULAR RIGHT COMMON FEMORAL AUGMENT: NORMAL
BH CV LOWER VASCULAR RIGHT COMMON FEMORAL COMPETENT: NORMAL
BH CV LOWER VASCULAR RIGHT COMMON FEMORAL COMPRESS: NORMAL
BH CV LOWER VASCULAR RIGHT COMMON FEMORAL PHASIC: NORMAL
BH CV LOWER VASCULAR RIGHT COMMON FEMORAL SPONT: NORMAL
BH CV POP FLUID COLLECT LEFT: 1
BUN SERPL-MCNC: 14 MG/DL (ref 8–23)
BUN/CREAT SERPL: 16.5 (ref 7–25)
CALCIUM SPEC-SCNC: 8.6 MG/DL (ref 8.6–10.5)
CHLORIDE SERPL-SCNC: 105 MMOL/L (ref 98–107)
CO2 SERPL-SCNC: 26 MMOL/L (ref 22–29)
CREAT SERPL-MCNC: 0.85 MG/DL (ref 0.76–1.27)
DEPRECATED RDW RBC AUTO: 49.4 FL (ref 37–54)
EGFRCR SERPLBLD CKD-EPI 2021: 87.8 ML/MIN/1.73
ERYTHROCYTE [DISTWIDTH] IN BLOOD BY AUTOMATED COUNT: 14.1 % (ref 12.3–15.4)
GLUCOSE SERPL-MCNC: 126 MG/DL (ref 65–99)
HCT VFR BLD AUTO: 36.6 % (ref 37.5–51)
HGB BLD-MCNC: 12.2 G/DL (ref 13–17.7)
MCH RBC QN AUTO: 31.6 PG (ref 26.6–33)
MCHC RBC AUTO-ENTMCNC: 33.3 G/DL (ref 31.5–35.7)
MCV RBC AUTO: 94.8 FL (ref 79–97)
PLATELET # BLD AUTO: 169 10*3/MM3 (ref 140–450)
PMV BLD AUTO: 8.4 FL (ref 6–12)
POTASSIUM SERPL-SCNC: 4.5 MMOL/L (ref 3.5–5.2)
RBC # BLD AUTO: 3.86 10*6/MM3 (ref 4.14–5.8)
SODIUM SERPL-SCNC: 138 MMOL/L (ref 136–145)
TROPONIN T SERPL HS-MCNC: 19 NG/L
WBC NRBC COR # BLD: 4.3 10*3/MM3 (ref 3.4–10.8)

## 2023-10-05 PROCEDURE — 93971 EXTREMITY STUDY: CPT

## 2023-10-05 PROCEDURE — 84484 ASSAY OF TROPONIN QUANT: CPT | Performed by: PHYSICIAN ASSISTANT

## 2023-10-05 PROCEDURE — 93005 ELECTROCARDIOGRAM TRACING: CPT

## 2023-10-05 PROCEDURE — 85027 COMPLETE CBC AUTOMATED: CPT

## 2023-10-05 PROCEDURE — 25010000002 CEFTAROLINE FOSAMIL PER 10 MG: Performed by: NURSE PRACTITIONER

## 2023-10-05 PROCEDURE — G0378 HOSPITAL OBSERVATION PER HR: HCPCS

## 2023-10-05 PROCEDURE — 99214 OFFICE O/P EST MOD 30 MIN: CPT | Performed by: INTERNAL MEDICINE

## 2023-10-05 PROCEDURE — 93010 ELECTROCARDIOGRAM REPORT: CPT | Performed by: STUDENT IN AN ORGANIZED HEALTH CARE EDUCATION/TRAINING PROGRAM

## 2023-10-05 PROCEDURE — 80048 BASIC METABOLIC PNL TOTAL CA: CPT

## 2023-10-05 RX ORDER — SODIUM CHLORIDE 9 MG/ML
40 INJECTION, SOLUTION INTRAVENOUS AS NEEDED
Status: DISCONTINUED | OUTPATIENT
Start: 2023-10-05 | End: 2023-10-08 | Stop reason: HOSPADM

## 2023-10-05 RX ORDER — SODIUM CHLORIDE 0.9 % (FLUSH) 0.9 %
10 SYRINGE (ML) INJECTION AS NEEDED
Status: DISCONTINUED | OUTPATIENT
Start: 2023-10-05 | End: 2023-10-08 | Stop reason: HOSPADM

## 2023-10-05 RX ORDER — METHOCARBAMOL 500 MG/1
500 TABLET, FILM COATED ORAL 2 TIMES DAILY
Status: DISCONTINUED | OUTPATIENT
Start: 2023-10-05 | End: 2023-10-08 | Stop reason: HOSPADM

## 2023-10-05 RX ORDER — SUCRALFATE 1 G/1
1 TABLET ORAL
Status: DISCONTINUED | OUTPATIENT
Start: 2023-10-05 | End: 2023-10-08 | Stop reason: HOSPADM

## 2023-10-05 RX ORDER — PANTOPRAZOLE SODIUM 40 MG/1
40 TABLET, DELAYED RELEASE ORAL DAILY
Status: DISCONTINUED | OUTPATIENT
Start: 2023-10-05 | End: 2023-10-08 | Stop reason: HOSPADM

## 2023-10-05 RX ORDER — GABAPENTIN 400 MG/1
400 CAPSULE ORAL 3 TIMES DAILY
Status: DISCONTINUED | OUTPATIENT
Start: 2023-10-05 | End: 2023-10-05 | Stop reason: SDUPTHER

## 2023-10-05 RX ORDER — METOPROLOL SUCCINATE 50 MG/1
50 TABLET, EXTENDED RELEASE ORAL DAILY
Status: DISCONTINUED | OUTPATIENT
Start: 2023-10-05 | End: 2023-10-08 | Stop reason: HOSPADM

## 2023-10-05 RX ORDER — MIDODRINE HYDROCHLORIDE 5 MG/1
5 TABLET ORAL
Status: DISCONTINUED | OUTPATIENT
Start: 2023-10-05 | End: 2023-10-08 | Stop reason: HOSPADM

## 2023-10-05 RX ORDER — ASPIRIN 81 MG/1
81 TABLET ORAL DAILY
Status: DISCONTINUED | OUTPATIENT
Start: 2023-10-05 | End: 2023-10-08 | Stop reason: HOSPADM

## 2023-10-05 RX ORDER — SODIUM CHLORIDE 0.9 % (FLUSH) 0.9 %
10 SYRINGE (ML) INJECTION EVERY 12 HOURS SCHEDULED
Status: DISCONTINUED | OUTPATIENT
Start: 2023-10-05 | End: 2023-10-08 | Stop reason: HOSPADM

## 2023-10-05 RX ADMIN — Medication 10 ML: at 20:50

## 2023-10-05 RX ADMIN — GABAPENTIN 400 MG: 400 CAPSULE ORAL at 17:30

## 2023-10-05 RX ADMIN — ASPIRIN 81 MG: 81 TABLET, COATED ORAL at 09:05

## 2023-10-05 RX ADMIN — MIDODRINE HYDROCHLORIDE 5 MG: 5 TABLET ORAL at 17:30

## 2023-10-05 RX ADMIN — GABAPENTIN 400 MG: 400 CAPSULE ORAL at 08:13

## 2023-10-05 RX ADMIN — Medication 10 ML: at 08:13

## 2023-10-05 RX ADMIN — PANTOPRAZOLE SODIUM 40 MG: 40 TABLET, DELAYED RELEASE ORAL at 09:05

## 2023-10-05 RX ADMIN — SUCRALFATE 1 G: 1 TABLET ORAL at 20:51

## 2023-10-05 RX ADMIN — SUCRALFATE 1 G: 1 TABLET ORAL at 09:05

## 2023-10-05 RX ADMIN — METHOCARBAMOL 500 MG: 500 TABLET ORAL at 08:13

## 2023-10-05 RX ADMIN — Medication 10 ML: at 10:43

## 2023-10-05 RX ADMIN — CEFTAROLINE FOSAMIL 600 MG: 600 POWDER, FOR SOLUTION INTRAVENOUS at 17:24

## 2023-10-05 RX ADMIN — SENNOSIDES AND DOCUSATE SODIUM 2 TABLET: 50; 8.6 TABLET ORAL at 20:51

## 2023-10-05 RX ADMIN — METHOCARBAMOL 500 MG: 500 TABLET ORAL at 20:51

## 2023-10-05 RX ADMIN — MUPIROCIN 1 APPLICATION: 20 OINTMENT TOPICAL at 10:42

## 2023-10-05 RX ADMIN — GABAPENTIN 400 MG: 400 CAPSULE ORAL at 20:51

## 2023-10-05 RX ADMIN — METOPROLOL SUCCINATE 50 MG: 50 TABLET, EXTENDED RELEASE ORAL at 10:42

## 2023-10-05 RX ADMIN — MUPIROCIN 1 APPLICATION: 20 OINTMENT TOPICAL at 17:31

## 2023-10-05 RX ADMIN — MUPIROCIN 1 APPLICATION: 20 OINTMENT TOPICAL at 20:50

## 2023-10-05 RX ADMIN — SUCRALFATE 1 G: 1 TABLET ORAL at 17:30

## 2023-10-05 RX ADMIN — MIDODRINE HYDROCHLORIDE 5 MG: 5 TABLET ORAL at 11:35

## 2023-10-05 NOTE — H&P
[facility] H&P    Patient Name: Deion Nicholas  : 1940  MRN: [id number]  Primary Care Physician: Makenna Motta MD  Date of admission: 10/4/2023     Patient Care Team:  Makenna Motta MD as PCP - General  Makenna Motta MD as PCP - Family Medicine          Subjective   History Present Illness     Chief Complaint:   Chief Complaint   Patient presents with    Leg Swelling     cellulitis    Leg Swelling  Pertinent negatives include no chest pain, chills, fever, nausea or vomiting.     82-year-old male who presents to the emergency room by vehicle stating that he has cellulitis on his left leg. He has had intermittent flareups over the last 6 months, he is also complaining of left arm pain he has no associated chest pain or shortness of breath. He has had no fevers nausea or vomiting. He has had bilateral lower extremity swelling which is abnormal for him. He denies a history of congestive heart failure.     Observation 10/5/23  Pt concurs with er hpi. Cardiology consulted due to elevated troponin. ID consulted due to multiple admissions for cellulitis in the last year.       Review of Systems   Constitutional: Negative for chills and fever.   Cardiovascular:  Positive for leg swelling. Negative for chest pain.   Respiratory:  Negative for shortness of breath.    Skin:  Positive for color change.   Gastrointestinal:  Negative for nausea and vomiting.           Personal History     Past Medical History:   Past Medical History:   Diagnosis Date    Aneurysm     Cardiomyopathy     ICD implantation    Cellulitis of left lower extremity     recurrent    CHD (coronary heart disease)     S/P CABG & PCI    Dyslipidemia     History of ventricular tachycardia     Hypertension     Myocardial infarction     Inferior MI    Pinched nerve     L4-L5    Prostate cancer        Surgical History:      Past Surgical History:   Procedure Laterality Date    ANGIOPLASTY  1996 Stent:  Palmaz- Huy stent/LAD 07/1996-RCA: 2000-Tetra stent Guidant to proximal RCA, mid to distal artery 2 sequential Guidant Tetra stents    APPENDECTOMY      CARDIAC ABLATION  April and May 2019    CARDIAC CATHETERIZATION  07/2017    1996 x2, Nov. 2000, 05/2010-cath 08/2015-no stents 2017    CARDIAC DEFIBRILLATOR PLACEMENT      COLONOSCOPY W/ POLYPECTOMY      Mult colonoscopy's for polyp resection     CORONARY ARTERY BYPASS GRAFT  05/2010    x5 vessel: LMA to diagonal, LAD, intermedius, obtuse marginal, RCA    CORONARY STENT PLACEMENT      ENDOSCOPY N/A 6/24/2019    Procedure: ESOPHAGOGASTRODUODENOSCOPY with dilitation Bougie 50, 54;  Surgeon: Roddy Coronel MD;  Location: [facility] ENDOSCOPY;  Service: Gastroenterology    INSERT / REPLACE / REMOVE PACEMAKER      KNEE OPEN LATERAL RELEASE Left     reduction    OTHER SURGICAL HISTORY  01/2018    ICD implantation    PROSTATE SURGERY  2015    Robiotic surgery- Cyber Knife:           Family History: family history includes Heart disease in his father; Pancreatic cancer in his mother. Otherwise pertinent FHx was reviewed and unremarkable.     Social History:  reports that he quit smoking about 21 years ago. His smoking use included cigarettes. He has a 17.00 pack-year smoking history. He has never used smokeless tobacco. He reports that he does not currently use alcohol. He reports that he does not use drugs.      Medications:  Prior to Admission medications    Medication Sig Start Date End Date Taking? Authorizing Provider   aspirin 81 MG EC tablet Take 1 tablet by mouth Daily.   Yes Cyndee Melgar MD   Cholecalciferol 10 MCG (400 UNIT) tablet Take 2 tablets by mouth Daily.   Yes Cyndee Melgar MD   ezetimibe (ZETIA) 10 MG tablet Take 1 tablet by mouth Every Evening.   Yes Cyndee Melgar MD   gabapentin (NEURONTIN) 400 MG capsule Take 1 capsule by mouth 3 (Three) Times a Day.   Yes Cyndee Melgar MD   methocarbamol (ROBAXIN) 500 MG tablet Take  1 tablet by mouth 2 (Two) Times a Day.   Yes Cyndee Melgar MD   metoprolol succinate XL (TOPROL-XL) 50 MG 24 hr tablet Take 1 tablet by mouth Daily.   Yes Cyndee Melgar MD   midodrine (PROAMATINE) 5 MG tablet Take 1 tablet by mouth 3 (Three) Times a Day Before Meals.   Yes Cyndee Melgar MD   mupirocin (BACTROBAN) 2 % ointment Apply 1 application  topically to the appropriate area as directed 3 (Three) Times a Day. Apply to leg(s)   Yes Cyndee Melgar MD   Omega-3 Fatty Acids (fish oil) 1000 MG capsule capsule Take 2 capsules by mouth 2 (Two) Times a Day With Meals.   Yes Cyndee Melgar MD   pantoprazole (PROTONIX) 40 MG EC tablet Take 1 tablet by mouth Daily.   Yes Cyndee Melgar MD   sennosides-docusate (PERICOLACE) 8.6-50 MG per tablet Take 1 tablet by mouth 2 (Two) Times a Day.   Yes Cyndee Melgar MD   sucralfate (CARAFATE) 1 g tablet Take 1 tablet by mouth 4 (Four) Times a Day.   Yes Cyndee Melgar MD   chlorhexidine (HIBICLENS) 4 % external liquid Apply 1 application topically to the appropriate area as directed Daily As Needed for Wound Care. Apply to leg(s)    Cyndee Melgar MD   nitroglycerin (NITROSTAT) 0.4 MG SL tablet Place 1 tablet under the tongue Every 5 (Five) Minutes As Needed for Chest Pain.    Cyndee Melgar MD       Allergies:    Allergies   Allergen Reactions    Lovastatin Myalgia    Pravastatin Myalgia and Unknown (See Comments)     unknown    Simvastatin Unknown (See Comments) and Myalgia     unknown    Spironolactone Other (See Comments)     Gynecomastia        Objective   Objective     Vital Signs  Temp:  [97.6 øF (36.4 øC)-97.9 øF (36.6 øC)] 97.9 øF (36.6 øC)  Heart Rate:  [68-90] 78  Resp:  [12-18] 12  BP: (123-148)/(62-80) 141/78  SpO2:  [94 %-98 %] 95 %  on   ;   Device (Oxygen Therapy): room air  Body mass index is 26.47 kg/mý.    Physical Exam  Constitutional:       Appearance: Normal appearance.   HENT:       Mouth/Throat:      Mouth: Mucous membranes are moist.   Cardiovascular:      Rate and Rhythm: Normal rate and regular rhythm.      Pulses: Normal pulses.      Heart sounds: Normal heart sounds.   Pulmonary:      Effort: Pulmonary effort is normal.      Breath sounds: Normal breath sounds.   Musculoskeletal:      Right lower leg: Edema present.      Left lower leg: Edema present.      Comments: Left worse than right   Skin:     Findings: Erythema present.   Neurological:      General: No focal deficit present.      Mental Status: He is alert and oriented to person, place, and time.   Psychiatric:         Mood and Affect: Mood normal.         Behavior: Behavior normal.         Results Review:  I have personally reviewed most recent cardiac tracings, lab results, and radiology images and interpretations and agree with findings, most notably: cbc, cmp, bnp, inr, trop, us leg, ekg.    Results from last 7 days   Lab Units 10/05/23  0515 10/04/23  1856   WBC 10*3/mm3 4.30 5.30   HEMOGLOBIN g/dL 12.2* 11.6*   HEMATOCRIT % 36.6* 35.0*   PLATELETS 10*3/mm3 169 166   INR   --  1.01     Results from last 7 days   Lab Units 10/05/23  0515 10/04/23  1856   SODIUM mmol/L 138 137   POTASSIUM mmol/L 4.5 4.6   CHLORIDE mmol/L 105 105   CO2 mmol/L 26.0 23.0   BUN mg/dL 14 14   CREATININE mg/dL 0.85 0.86   GLUCOSE mg/dL 126* 129*   CALCIUM mg/dL 8.6 9.0   ALT (SGPT) U/L  --  19   AST (SGOT) U/L  --  26   HSTROP T ng/L 19* 20*   PROBNP pg/mL  --  496.9     Estimated Creatinine Clearance: 89.2 mL/min (by C-G formula based on SCr of 0.85 mg/dL).  Brief Urine Lab Results       None            Microbiology Results (last 10 days)       ** No results found for the last 240 hours. **            ECG/EMG Results (most recent)       Procedure Component Value Units Date/Time    ECG 12 Lead Chest Pain [[id number]] Collected: 10/05/23 0505     Updated: 10/05/23 0506     QT Interval 418 ms      QTC Interval 430 ms     Narrative:      HEART RATE= 64   bpm  RR Interval= 944  ms  MD Interval= 172  ms  P Horizontal Axis= -9  deg  P Front Axis= 45  deg  QRSD Interval= 132  ms  QT Interval= 418  ms  QTcB= 430  ms  QRS Axis= -4  deg  T Wave Axis= -14  deg  - ABNORMAL ECG -  Sinus rhythm  Ventricular premature complex  IVCD, consider LBBB  Electronically Signed By:   Date and Time of Study: 2023-10-05 05:05:05    SCANNED - TELEMETRY   [[id number]] Resulted: 10/04/23     Updated: 10/05/23 0601    SCANNED - TELEMETRY   [[id number]] Resulted: 10/04/23     Updated: 10/05/23 0855    SCANNED - TELEMETRY   [[id number]] Resulted: 10/04/23     Updated: 10/05/23 0922    SCANNED - TELEMETRY   [[id number]] Resulted: 10/04/23     Updated: 10/05/23 1023    SCANNED - TELEMETRY   [[id number]] Resulted: 10/04/23     Updated: 10/05/23 1228            Results for orders placed during the hospital encounter of 10/04/23    Duplex Venous Lower Extremity - Left CAR    Interpretation Summary    Left popliteal fossa fluid collection.    Normal left lower extremity venous duplex scan.      Results for orders placed during the hospital encounter of 05/22/23    Adult Transthoracic Echo Complete W/ Cont if Necessary Per Protocol    Interpretation Summary    Left ventricular ejection fraction appears to be 31 - 35%.    Moderate aortic valve regurgitation is present.    Moderate mitral valve regurgitation is present.    Estimated right ventricular systolic pressure from tricuspid regurgitation is normal (<35 mmHg).    Mild dilation of the aortic root is present.      No radiology results for the last 7 days      Estimated Creatinine Clearance: 89.2 mL/min (by C-G formula based on SCr of 0.85 mg/dL).    Assessment & Plan   Assessment/Plan       Active Hospital Problems    Diagnosis  POA    **Elevated troponin [R79.89]  Yes      Resolved Hospital Problems   No resolved problems to display.     Cellulitis  - cbc, cmp, bnp are unremarkable  - trop was slightly elevated at 20,19  - EKG rate 64 sinus w/ pvcs  -  us leg reviewed and showing left popliteal fossa fluid collection  - iv rocephin  - id consulted and recommends changing iv abx to ceftaroline    Elevated troponin  - cardiology consulted  - trop 20. Repeat 19  - cont bb, zetia and midodrine  - recent echo and stress reviewed    Hypertension  -moderately  Controlled   BP Readings from Last 1 Encounters:   10/05/23 141/78     - Continue metoprolol  - Monitor while admitted       VTE Prophylaxis -   Mechanical Order History:        Ordered        10/05/23 0821  Place Sequential Compression Device  Once            10/05/23 0821  Maintain Sequential Compression Device  Continuous                          Pharmalogical Order History:       None            CODE STATUS:    Code Status and Medical Interventions:   Ordered at: 10/05/23 0821     Code Status (Patient has no pulse and is not breathing):    CPR (Attempt to Resuscitate)     Medical Interventions (Patient has pulse or is breathing):    Full Support       This patient has been examined wearing personal protective equipment.     I discussed the patient's findings and my recommendations with patient and nursing staff.      Signature:Electronically signed by Andra Kate PA-C, 10/05/23, 3:46 PM EDT.

## 2023-10-05 NOTE — DISCHARGE PLACEMENT REQUEST
"Ana Aden (82 y.o. Male)       Date of Birth   1940    Social Security Number       Address   [address] [place] [address] [place] [address]    Home Phone   [phone]    MRN   [id number]       Mandaeism   Sikh    Marital Status                               Admission Date   10/4/23    Admission Type   Emergency    Admitting Provider   Fei Becerril MD    Attending Provider   Fei Becerril MD    Department, Room/Bed   [facility] OBSERVATION, 232/1       Discharge Date       Discharge Disposition       Discharge Destination                                 Attending Provider: Fei Becerril MD    Allergies: Lovastatin, Pravastatin, Simvastatin, Spironolactone    Isolation: None   Infection: COVID Screen (preop/placement) (10/29/21)   Code Status: CPR    Ht: 185.4 cm (72.99\")   Wt: 91 kg (200 lb 9.9 oz)    Admission Cmt: None   Principal Problem: Elevated troponin [R79.89]                   Active Insurance as of 10/4/2023       Primary Coverage       Payor Plan Insurance Group Employer/Plan Group    [facility] DEPT [id number]       Payor Plan Address Payor Plan Phone Number Payor Plan Fax Number Effective Dates    [facility] OFFICE OF COMMUNITY [facility] [phone]  4/16/2023 - None Entered    [address] [address]       [facility] [address]         Subscriber Name Subscriber Birth Date Member ID       ANA ADEN 1940 [id number]               Secondary Coverage       Payor Plan Insurance Group Employer/Plan Group    [employer]HE[employer] MEDICARE REPLACEMENT ANTHEM MEDICARE ADVANTAGE [id number]       Payor Plan Address Payor Plan Phone Number Payor Plan Fax Number Effective Dates    [address] [phone]  10/1/2019 - None Entered    [facility] [address]         Subscriber Name Subscriber Birth Date Member ID       ANA ADEN 1940 [id number]               Tertiary Coverage       Payor Plan Insurance Group Employer/Plan Group    [facility] CCN OPTUM   "      Payor Plan Address Payor Plan Phone Number Payor Plan Fax Number Effective Dates    [address] [phone]  4/16/2023 - None Entered    [facility] [address]         Subscriber Name Subscriber Birth Date Member ID       ANA CANTU 1940 [id number]                     Emergency Contacts        (Rel.) Home Phone Work Phone Mobile Phone    suze cantu (Son) [phone] -- --    SIM MOROCHO (Friend) [phone] -- --

## 2023-10-05 NOTE — PLAN OF CARE
Goal Outcome Evaluation:      Patient is being monitored for cellulitis of left leg. ID and cardiology consulted. Plan is for IV antibiotics and ointment to the affected leg. Will continue to monitor.

## 2023-10-05 NOTE — CONSULTS
Cardiology Consult Note      REQUESTING PHYSICIAN    Fei Becerril MD    PATIENT IDENTIFICATION  Name: Deion Nicholas  Age: 82 y.o.  Sex: male  :  1940  MRN: [id number]             REASON FOR CONSULTATION:  82-year-old male with established history of ischemic heart disease status post 5 vessel CABG May 2010, ischemic cardiomyopathy with most recent EF 30-35%, ICD in situ, hypertension with hypertensive cardiovascular disease, amiodarone therapy, antiplatelet therapy, dyslipidemia, history of VT storm status post ablation, ascending aortic aneurysm, valvular heart disease including mild-mod AI/MR May 2023.  History of orthostatic hypotension on midodrine.  [facility]  [facility] has been admitted this year to [facility] for cellulitis twice as well.    Nuclear stress testing 2023 with large sized infarct inferior wall and no significant ischemia, EF 40%.    CC:  Cellulitis  Left arm pain       HISTORY OF PRESENT ILLNESS:   Patient presented to the emergency department at [facility] 10/4/2023 for further evaluation of redness to left lower extremity.  He was admitted to this facility 2023 - 2023 for cellulitis was well treated with IV antibiotics.  He reported some discomfort in his left arm in the ED as well.  He was given 1 dose IV Rocephin, admitted for infectious disease and cardiology consult.  High-sensitivity troponin was drawn at 20 and repeat 19.  Upon my evaluation, he is sitting up in bed watching television and appears in no acute distress.  He denies any arm discomfort at present.  He denies any dizziness, orthopnea, shortness of breath, palpitations.    Of note: Last office visit with EP 7/10/2023: Patient had markedly abnormal thyroid function necessitating discontinuing amiodarone.  Eliquis discontinued due to diffuse subcutaneous bleeding      REVIEW OF SYSTEMS:  Pertinent items are noted in HPI, all other systems reviewed and negative    OBJECTIVE  "  20, 19 (16, 15, 18 June 2023, 16, 19 May 2023)  proBNP 496.9    ASSESSMENT  Recurrent cellulitis left lower extremity  Abnormal high-sensitivity troponin  History of recurrent cellulitis  Severe multivessel coronary artery disease status post 5 vessel CABG  History of dilated, ischemic cardiomyopathy with EF 30-35%  AICD in situ  Hypertension with hypertensive cardiovascular disease  History of VT storm status post ablation  History of ascending aortic aneurysm  Valvular heart disease    PLAN  Patient has again very mild, nontrending high-sensitivity troponin which has been present since May.  He denies any angina or anginal equivalent at present.  This finding is likely due to underlying ischemic dilated cardiomyopathy and valvular heart disease.  Recent echo and stress testing as above.  Continue BB, aspirin, Zetia, midodrine        Vital Signs  Visit Vitals  /80 (BP Location: Left arm, Patient Position: Lying)   Pulse 68   Temp 97.9 øF (36.6 øC) (Oral)   Resp 18   Ht 185.4 cm (72.99\")   Wt 91 kg (200 lb 9.9 oz)   SpO2 95%   BMI 26.47 kg/mý     Oxygen Therapy  SpO2: 95 %  Pulse Oximetry Type: Continuous  Device (Oxygen Therapy): room air  Flowsheet Rows      Flowsheet Row First Filed Value   Admission Height 185.4 cm (73\") Documented at 10/04/2023 1815   Admission Weight 90.7 kg (200 lb) Documented at 10/04/2023 1815          Intake & Output (last 3 days)         10/02 0701  10/03 0700 10/03 0701  10/04 0700 10/04 0701  10/05 0700 10/05 0701  10/06 0700    IV Piggyback   100     Total Intake(mL/kg)   100 (1.1)     Urine (mL/kg/hr)   675     Total Output   675     Net   -575                   Lines, Drains & Airways       Active LDAs       Name Placement date Placement time Site Days    Peripheral IV 10/04/23 1907 Anterior;Left Forearm 10/04/23  1907  Forearm  less than 1                    MEDICAL HISTORY    Past Medical History:   Diagnosis Date    Aneurysm     Cardiomyopathy     ICD implantation    " Cellulitis of left lower extremity     recurrent    CHD (coronary heart disease)     S/P CABG & PCI    Dyslipidemia     History of ventricular tachycardia     Hypertension     Myocardial infarction     Inferior MI    Pinched nerve     L4-L5    Prostate cancer         SURGICAL HISTORY    Past Surgical History:   Procedure Laterality Date    ANGIOPLASTY  1996 Stent: Palmaz- Huy stent/LAD 1996-RCA: -Tetra stent Guidant to proximal RCA, mid to distal artery 2 sequential Guidant Tetra stents    APPENDECTOMY      CARDIAC ABLATION  April and May 2019    CARDIAC CATHETERIZATION  2017    1996 x2, Nov. , 2010-cath 2015-no stents 2017    CARDIAC DEFIBRILLATOR PLACEMENT      COLONOSCOPY W/ POLYPECTOMY      Mult colonoscopy's for polyp resection     CORONARY ARTERY BYPASS GRAFT  05/2010    x5 vessel: LMA to diagonal, LAD, intermedius, obtuse marginal, RCA    CORONARY STENT PLACEMENT      ENDOSCOPY N/A 2019    Procedure: ESOPHAGOGASTRODUODENOSCOPY with dilitation Bougie 50, 54;  Surgeon: Roddy Coronel MD;  Location: [facility] ENDOSCOPY;  Service: Gastroenterology    INSERT / REPLACE / REMOVE PACEMAKER      KNEE OPEN LATERAL RELEASE Left     reduction    OTHER SURGICAL HISTORY  2018    ICD implantation    PROSTATE SURGERY  2015    Robiotic surgery- Cyber Knife:        FAMILY HISTORY    Family History   Problem Relation Age of Onset    Pancreatic cancer Mother     Heart disease Father        SOCIAL HISTORY    Social History     Tobacco Use    Smoking status: Former     Packs/day: 1.00     Years: 17.00     Pack years: 17.00     Types: Cigarettes     Quit date: 2002     Years since quittin.2    Smokeless tobacco: Never   Substance Use Topics    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        ALLERGIES    Allergies   Allergen Reactions    Lovastatin Myalgia    Pravastatin Myalgia and Unknown (See Comments)     unknown    Simvastatin Unknown (See Comments) and  "Myalgia     unknown    Spironolactone Other (See Comments)     Gynecomastia               /80 (BP Location: Left arm, Patient Position: Lying)   Pulse 68   Temp 97.9 øF (36.6 øC) (Oral)   Resp 18   Ht 185.4 cm (72.99\")   Wt 91 kg (200 lb 9.9 oz)   SpO2 95%   BMI 26.47 kg/mý   Intake/Output last 3 shifts:  I/O last 3 completed shifts:  In: 100 [IV Piggyback:100]  Out: 675 [Urine:675]  Intake/Output this shift:  No intake/output data recorded.    PHYSICAL EXAM:    General: Alert, cooperative, no distress, appears stated age  Head:  Normocephalic, atraumatic, mucous membranes moist  Eyes:  Conjunctivae/corneas clear, EOM's intact     Neck:  Supple,  no adenopathy; no JVD or bruit  Lungs: Diminished but clear to auscultation bilaterally, no wheezes, rhonchi or rales are noted  Chest wall: No tenderness  Heart::  Regular rate and rhythm, S1 and S2 normal, 2/6 holosystolic murmur  Abdomen: Soft, nontender, nondistended, bowel sounds active  Extremities: Diffuse mild erythema to left lower extremity from below knee to ankle, no blistering.  1+ edema  Pulses: 2+ and symmetric all extremities  Skin:  No rashes or lesions  Neuro/psych: A&O x3. CN II through XII are grossly intact with appropriate affect      Scheduled Meds:      gabapentin, 400 mg, Oral, TID  methocarbamol, 500 mg, Oral, Q12H  metoprolol succinate XL, 50 mg, Oral, Nightly  senna-docusate sodium, 2 tablet, Oral, BID  sodium chloride, 10 mL, Intravenous, Q12H        Continuous Infusions:         PRN Meds:      senna-docusate sodium **AND** polyethylene glycol **AND** bisacodyl **AND** bisacodyl    Morphine **AND** naloxone    nitroglycerin    sodium chloride    [COMPLETED] Insert Peripheral IV **AND** sodium chloride    sodium chloride    sodium chloride        Results Review:     I reviewed the patient's new clinical results.    CBC    Results from last 7 days   Lab Units 10/05/23  0515 10/04/23  1856   WBC 10*3/mm3 4.30 5.30   HEMOGLOBIN g/dL " 12.2* 11.6*   PLATELETS 10*3/mm3 169 166     Cr Clearance Estimated Creatinine Clearance: 89.2 mL/min (by C-G formula based on SCr of 0.85 mg/dL).  Coag   Results from last 7 days   Lab Units 10/04/23  1856   INR  1.01   APTT seconds 25.1*     HbA1C   Lab Results   Component Value Date    HGBA1C 6.1 (H) 10/26/2022     Blood Glucose No results found for: POCGLU  Infection     CMP   Results from last 7 days   Lab Units 10/05/23  0515 10/04/23  1856   SODIUM mmol/L 138 137   POTASSIUM mmol/L 4.5 4.6   CHLORIDE mmol/L 105 105   CO2 mmol/L 26.0 23.0   BUN mg/dL 14 14   CREATININE mg/dL 0.85 0.86   GLUCOSE mg/dL 126* 129*   ALBUMIN g/dL  --  3.6   BILIRUBIN mg/dL  --  0.2   ALK PHOS U/L  --  80   AST (SGOT) U/L  --  26   ALT (SGPT) U/L  --  19     ABG      UA      LEIGH ANN  No results found for: POCMETH, POCAMPHET, POCBARBITUR, POCBENZO, POCCOCAINE, POCOPIATES, POCOXYCODO, POCPHENCYC, POCPROPOXY, POCTHC, POCTRICYC  Lysis Labs   Results from last 7 days   Lab Units 10/05/23  0515 10/04/23  1856   INR   --  1.01   APTT seconds  --  25.1*   HEMOGLOBIN g/dL 12.2* 11.6*   PLATELETS 10*3/mm3 169 166   CREATININE mg/dL 0.85 0.86     Radiology(recent) No radiology results for the last day      Results from last 7 days   Lab Units 10/04/23  1856   HSTROP T ng/L 20*       Xrays, labs reviewed personally by physician.    ECG/EMG Results (most recent)       Procedure Component Value Units Date/Time    ECG 12 Lead Chest Pain [[id number]] Collected: 10/05/23 0505     Updated: 10/05/23 0506     QT Interval 418 ms      QTC Interval 430 ms     Narrative:      HEART RATE= 64  bpm  RR Interval= 944  ms  AK Interval= 172  ms  P Horizontal Axis= -9  deg  P Front Axis= 45  deg  QRSD Interval= 132  ms  QT Interval= 418  ms  QTcB= 430  ms  QRS Axis= -4  deg  T Wave Axis= -14  deg  - ABNORMAL ECG -  Sinus rhythm  Ventricular premature complex  IVCD, consider LBBB  Electronically Signed By:   Date and Time of Study: 2023-10-05 05:05:05    SCANNED -  "TELEMETRY   [[id number]] Resulted: 10/04/23     Updated: 10/05/23 0601              Medication Review:   I have reviewed the patient's current medication list  Scheduled Meds:gabapentin, 400 mg, Oral, TID  methocarbamol, 500 mg, Oral, Q12H  metoprolol succinate XL, 50 mg, Oral, Nightly  senna-docusate sodium, 2 tablet, Oral, BID  sodium chloride, 10 mL, Intravenous, Q12H      Continuous Infusions:   PRN Meds:.  senna-docusate sodium **AND** polyethylene glycol **AND** bisacodyl **AND** bisacodyl    Morphine **AND** naloxone    nitroglycerin    sodium chloride    [COMPLETED] Insert Peripheral IV **AND** sodium chloride    sodium chloride    sodium chloride    Imaging:  Imaging Results (Last 72 Hours)       ** No results found for the last 72 hours. **              NICMO Khoury  10/05/23  07:48 EDT       EMR Dragon/Transcription:   \"Dictated utilizing Dragon dictation\".                 Electronically signed by NIMCO Khoury, 10/05/23, 7:48 AM EDT.    Cardiology attending:  Seen and examined.  Chart and labs reviewed. Independant interpretations of cardiac testing was performed. History and exam findings are verified with above changes noted.  Assessment and plan notated by APC after being formulated by attending consultant.  Note that greater than 50% of the time spent in care of the patient was provided by attending consultant.    Patient denies any chest or arm discomfort.  Breathing is at baseline.  Reports has had trouble with cellulitis in this lower extremity for some time since he scratched his leg putting on compression socks earlier this year.  Prior to that, it had been a couple of years since he had any cellulitis.  He attributes this to some home antibiotics that he received via a PICC line after intensive IV antibiotics in the hospital.  He would like to go home on IV antibiotics and see if this will keep him from being readmitted with cellulitis.  I will defer this decision to " infectious disease.  Patient may benefit from some gentle diuresis if renal function allows.    Physical Exam:    General: Alert, cooperative, no distress, appears stated age  Head:  Normocephalic, atraumatic, mucous membranes moist  Eyes:  Conjunctiva/corneas clear, EOM's intact     Neck:  Supple,  no bruit  Lungs:            Coarse and diminished bilaterally  Chest wall: No tenderness  Heart::  Regular rate and rhythm, S1 and S2 normal, 1/6 holosystolic murmur.  No rub or gallop  Abdomen: Soft, non-tender, nondistended bowel sounds active  Extremities: No cyanosis, clubbing.  Trace right lower extremity edema.  1+ edema left lower extremity.  Pulses:            2+ and symmetric all extremities  Skin:  Erythema and warmth left lower extremity.  Neuro/psych: A&O x3. CN II through XII are grossly intact with appropriate affect

## 2023-10-05 NOTE — CASE MANAGEMENT/SOCIAL WORK
Discharge Planning Assessment  [facility]     Patient Name: Deion Nicholas  MRN: [id number]  Today's Date: 10/5/2023    Admit Date: 10/4/2023    Plan: Return home and resume [facility]   Discharge Needs Assessment       Row Name 10/05/23 1311       Living Environment    People in Home alone    Current Living Arrangements apartment    Potentially Unsafe Housing Conditions none    Primary Care Provided by self    Provides Primary Care For no one, unable/limited ability to care for self    Family Caregiver if Needed child(tai), adult    Family Caregiver Names son, Diallo    Quality of Family Relationships helpful;involved;supportive    Able to Return to Prior Arrangements yes       Resource/Environmental Concerns    Transportation Concerns none       Transition Planning    Patient/Family Anticipates Transition to home    Patient/Family Anticipated Services at Transition home health care    Transportation Anticipated family or friend will provide       Discharge Needs Assessment    Readmission Within the Last 30 Days no previous admission in last 30 days    Current Outpatient/Agency/Support Group homecare agency    Equipment Currently Used at Home cane, straight    Concerns to be Addressed discharge planning    Anticipated Changes Related to Illness none    Equipment Needed After Discharge none    Outpatient/Agency/Support Group Needs homecare agency                   Discharge Plan       Row Name 10/05/23 1929       Plan    Plan Return home and resume [facility]    Patient/Family in Agreement with Plan yes    Plan Comments Barriers: ID and Cardiology following. IV antibiotics. CM met with Mr Pierre Nicholas who lives alone, drives and is mostly independent. His son, Diallo helps as needed with home duties. He use two canes to ambulate with. He reported the [facility] clinic sends out [facility] for nursing once per week and he wants to resume. He had home IV antibiotics in 2021.CM will need to follow for IV  antibiotics and coordinate with the [facility] for orders. CM contacted Poornima with [facility] and added to basket to resume services. Verified PCP and Pharmacy.                  Continued Care and Services - Admitted Since 10/4/2023       Home Medical Care       Service Provider Request Status Selected Services Address Phone Fax Patient Preferred    [facility] Pending - Request Sent N/A [id number] [place] [address] [phone] -- --                       Demographic Summary       Row Name 10/05/23 1310       General Information    Admission Type observation    Arrived From emergency department    Referral Source admission list    Reason for Consult discharge planning    Preferred Language English       Contact Information    Permission Granted to Share Info With                    Functional Status       Row Name 10/05/23 1310       Functional Status    Usual Activity Tolerance good    Current Activity Tolerance moderate       Functional Status, IADL    Medications independent    Meal Preparation independent    Housekeeping assistive person    Laundry assistive person    Shopping assistive person    IADL Comments independent                    Maintained distance greater than six feet and spent less than 15 minutes in the room.     Reba HARO,RN Case Manager  [facility]  Phone: Desk- [phone] cell- [phone]

## 2023-10-05 NOTE — CASE MANAGEMENT/SOCIAL WORK
Continued Stay Note  [facility]     Patient Name: Deion Nicholas  MRN: [id number]  Today's Date: 10/5/2023    Admit Date: 10/4/2023    Plan: Return home and resume [facility]. Referred to [facility] for home IV antibiotics; acceptance pending   Discharge Plan       Row Name 10/05/23 1356       Plan    Plan Return home and resume [facility]. Referred to [facility] for home IV antibiotics; acceptance pending    Patient/Family in Agreement with Plan yes    Plan Comments CM met with Mr. Nicholas to discuss probable IV antibiotics at home and he wants to use [facility] and have them [facility] should get worked out quicker. CM contacted Tatiana with [facility] and added to basket and asked her to evaluate and use his [facility] not [facility].      Row Name 10/05/23 7624       Plan    Plan Return home and resume [facility]    Patient/Family in Agreement with Plan yes    Plan Comments Barriers: ID and Cardiology following. IV antibiotics. CM met with Mr Pierre Nicholas who lives alone, drives and is mostly independent. His son, Diallo helps as needed with home duties. He use two canes to ambulate with. He reported the [facility] sends out [facility] for nursing once per week and he wants to resume. He had home IV antibiotics in 2021.CM will need to follow for IV antibiotics and coordinate with the [facility] for orders. CM contacted Poornima with [facility] and added to basket to resume services. Verified PCP and Pharmacy.                    Maintained distance greater than six feet and spent less than 15 minutes in the room.     Reba DENISEN,RN Case Manager  [facility]  Phone: Desk- [phone]  Cell- [phone]

## 2023-10-05 NOTE — CONSULTS
Infectious Diseases Consult Note    Referring Provider: Fei Becerril MD    Reason for Consultation: Recurrent left leg cellulitis    Patient Care Team:  Makenna Motta MD as PCP - General  Makenna Motta MD as PCP - Family Medicine    Chief complaint left leg pain swelling and redness    Subjective     The patient has been afebrile for the last 24 hours.  The patient is on room air, hemodynamically stable, and is tolerating antimicrobial therapy.    History of present illness:      This is a 82-year-old male presents to the hospital on 10/4/2023 with complaints of worsening left leg pain swelling and redness.  Patient has persistent mild left leg cellulitis.  States he he has been seen once a week by [facility] and she suggested that he would be seen at the hospital.  Patient denies fever, chills, diaphoresis, shortness of breath, cough, GI symptoms, or any urinary symptoms.    Review of Systems   Review of Systems   Constitutional: Negative.    HENT: Negative.     Eyes: Negative.    Respiratory: Negative.     Cardiovascular:  Positive for leg swelling.   Gastrointestinal: Negative.    Endocrine: Negative.    Genitourinary: Negative.    Musculoskeletal: Negative.    Skin:  Positive for color change.   Neurological: Negative.    Psychiatric/Behavioral: Negative.     All other systems reviewed and are negative.    Medications  Medications Prior to Admission   Medication Sig Dispense Refill Last Dose    aspirin 81 MG EC tablet Take 1 tablet by mouth Daily.   10/4/2023    Cholecalciferol 10 MCG (400 UNIT) tablet Take 2 tablets by mouth Daily.   10/4/2023    ezetimibe (ZETIA) 10 MG tablet Take 1 tablet by mouth Every Evening.   10/3/2023    gabapentin (NEURONTIN) 400 MG capsule Take 1 capsule by mouth 3 (Three) Times a Day.   10/4/2023    methocarbamol (ROBAXIN) 500 MG tablet Take 1 tablet by mouth 2 (Two) Times a Day.   10/3/2023    metoprolol succinate XL (TOPROL-XL) 50 MG  24 hr tablet Take 1 tablet by mouth Daily.   10/3/2023    midodrine (PROAMATINE) 5 MG tablet Take 1 tablet by mouth 3 (Three) Times a Day Before Meals.   10/4/2023    mupirocin (BACTROBAN) 2 % ointment Apply 1 application  topically to the appropriate area as directed 3 (Three) Times a Day. Apply to leg(s)   10/4/2023    Omega-3 Fatty Acids (fish oil) 1000 MG capsule capsule Take 2 capsules by mouth 2 (Two) Times a Day With Meals.   10/4/2023    pantoprazole (PROTONIX) 40 MG EC tablet Take 1 tablet by mouth Daily.   10/4/2023    sennosides-docusate (PERICOLACE) 8.6-50 MG per tablet Take 1 tablet by mouth 2 (Two) Times a Day.   10/3/2023    sucralfate (CARAFATE) 1 g tablet Take 1 tablet by mouth 4 (Four) Times a Day.   10/3/2023    chlorhexidine (HIBICLENS) 4 % external liquid Apply 1 application topically to the appropriate area as directed Daily As Needed for Wound Care. Apply to leg(s)       nitroglycerin (NITROSTAT) 0.4 MG SL tablet Place 1 tablet under the tongue Every 5 (Five) Minutes As Needed for Chest Pain.   More than a month       History  Past Medical History:   Diagnosis Date    Aneurysm     Cardiomyopathy     ICD implantation    Cellulitis of left lower extremity 2010    recurrent    CHD (coronary heart disease)     S/P CABG & PCI    Dyslipidemia     History of ventricular tachycardia     Hypertension     Myocardial infarction     Inferior MI    Pinched nerve     L4-L5    Prostate cancer      Past Surgical History:   Procedure Laterality Date    ANGIOPLASTY  2000 04/1996 Stent: Palmaz- Huy stent/LAD 07/1996-RCA: 2000-Tetra stent Guidant to proximal RCA, mid to distal artery 2 sequential Guidant Tetra stents    APPENDECTOMY      CARDIAC ABLATION  April and May 2019    CARDIAC CATHETERIZATION  07/2017    1996 x2, Nov. 2000, 05/2010-cath 08/2015-no stents 2017    CARDIAC DEFIBRILLATOR PLACEMENT      COLONOSCOPY W/ POLYPECTOMY      Mult colonoscopy's for polyp resection     CORONARY ARTERY BYPASS  GRAFT  05/2010    x5 vessel: LMA to diagonal, LAD, intermedius, obtuse marginal, RCA    CORONARY STENT PLACEMENT      ENDOSCOPY N/A 6/24/2019    Procedure: ESOPHAGOGASTRODUODENOSCOPY with dilitation Bougie 50, 54;  Surgeon: Roddy Coronel MD;  Location: [facility] ENDOSCOPY;  Service: Gastroenterology    INSERT / REPLACE / REMOVE PACEMAKER      KNEE OPEN LATERAL RELEASE Left     reduction    OTHER SURGICAL HISTORY  01/2018    ICD implantation    PROSTATE SURGERY  2015    Robiotic surgery- Cyber Knife:       Family History  Family History   Problem Relation Age of Onset    Pancreatic cancer Mother     Heart disease Father        Social History   reports that he quit smoking about 21 years ago. His smoking use included cigarettes. He has a 17.00 pack-year smoking history. He has never used smokeless tobacco. He reports that he does not currently use alcohol. He reports that he does not use drugs.    Allergies  Lovastatin, Pravastatin, Simvastatin, and Spironolactone    Objective     Vital Signs   Vital Signs (last 24 hours)         10/04 0700  10/05 0659 10/05 0700  10/05 1450   Most Recent      Temp (øF) 97.6 -  97.9       97.9 (36.6) 10/05 0206    Heart Rate 68 -  90      78     78 10/05 1044    Resp 17 -  18      12     12 10/05 1044    /62 -  148/79      141/78     141/78 10/05 1044    SpO2 (%) 94 -  98       95 10/05 0508            Physical Exam:  Physical Exam  Vitals and nursing note reviewed.   Constitutional:       General: He is not in acute distress.     Appearance: Normal appearance. He is well-developed and normal weight. He is not diaphoretic.   HENT:      Head: Normocephalic and atraumatic.   Eyes:      Conjunctiva/sclera: Conjunctivae normal.      Pupils: Pupils are equal, round, and reactive to light.   Cardiovascular:      Rate and Rhythm: Normal rate and regular rhythm.      Heart sounds: Normal heart sounds, S1 normal and S2 normal.   Pulmonary:      Effort: Pulmonary effort is normal. No  respiratory distress.      Breath sounds: Normal breath sounds. No stridor. No wheezing or rales.   Abdominal:      General: Bowel sounds are normal. There is no distension.      Palpations: Abdomen is soft. There is no mass.      Tenderness: There is no abdominal tenderness. There is no guarding.   Musculoskeletal:         General: No deformity. Normal range of motion.      Cervical back: Neck supple.      Left lower leg: Edema present.      Comments: Very mild left leg edema and erythema.  Mild warmth but leg is fairly tender   Skin:     General: Skin is warm and dry.      Coloration: Skin is not pale.      Findings: No erythema or rash.   Neurological:      Mental Status: He is alert and oriented to person, place, and time.      Cranial Nerves: No cranial nerve deficit.   Psychiatric:         Mood and Affect: Mood normal.       Microbiology  Microbiology Results (last 10 days)       ** No results found for the last 240 hours. **            Laboratory  Results from last 7 days   Lab Units 10/05/23  0515   WBC 10*3/mm3 4.30   HEMOGLOBIN g/dL 12.2*   HEMATOCRIT % 36.6*   PLATELETS 10*3/mm3 169     Results from last 7 days   Lab Units 10/05/23  0515   SODIUM mmol/L 138   POTASSIUM mmol/L 4.5   CHLORIDE mmol/L 105   CO2 mmol/L 26.0   BUN mg/dL 14   CREATININE mg/dL 0.85   GLUCOSE mg/dL 126*   CALCIUM mg/dL 8.6     Results from last 7 days   Lab Units 10/05/23  0515   SODIUM mmol/L 138   POTASSIUM mmol/L 4.5   CHLORIDE mmol/L 105   CO2 mmol/L 26.0   BUN mg/dL 14   CREATININE mg/dL 0.85   GLUCOSE mg/dL 126*   CALCIUM mg/dL 8.6                   Radiology  Imaging Results (Last 72 Hours)       ** No results found for the last 72 hours. **            Cardiology      Results Review:  I have reviewed all clinical data, test, lab, and imaging results.       Schedule Meds  aspirin, 81 mg, Oral, Daily  gabapentin, 400 mg, Oral, TID  methocarbamol, 500 mg, Oral, BID  metoprolol succinate XL, 50 mg, Oral, Daily  midodrine, 5 mg,  Oral, TID AC  mupirocin, 1 application , Topical, TID  pantoprazole, 40 mg, Oral, Daily  senna-docusate sodium, 2 tablet, Oral, BID  sodium chloride, 10 mL, Intravenous, Q12H  sodium chloride, 10 mL, Intravenous, Q12H  sucralfate, 1 g, Oral, 4x Daily AC & at Bedtime        Infusion Meds       PRN Meds    senna-docusate sodium **AND** polyethylene glycol **AND** bisacodyl **AND** bisacodyl    Morphine **AND** naloxone    nitroglycerin    sodium chloride    [COMPLETED] Insert Peripheral IV **AND** sodium chloride    sodium chloride    sodium chloride    sodium chloride    sodium chloride      Assessment & Plan       Assessment    Recurrent left lower leg cellulitis.  There is mild  edema, erythema and warmth.  Leg is very tender.  Dopplers negative for DVT    Plan    Patient normally responds well with IV Teflaro for several days and then we will switch to oral antibiotics at discharge    Start IV Teflaro 600 mg every 12 hours   Continue supportive care  A.m. labs  Case discussed with RN  Keep left leg elevated is much as possible        Tamiko Victor, [facility]  10/05/23  14:50 EDT    Note is dictated utilizing voice recognition software/Dragon

## 2023-10-05 NOTE — PLAN OF CARE
Problem: Fall Injury Risk  Goal: Absence of Fall and Fall-Related Injury  10/5/2023 0530 by Dayo Romero RN  Outcome: Ongoing, Progressing  10/5/2023 0530 by Dayo Romero RN  Outcome: Ongoing, Progressing  Intervention: Promote Injury-Free Environment  Recent Flowsheet Documentation  Taken 10/5/2023 0407 by Dayo Romero RN  Safety Promotion/Fall Prevention:   safety round/check completed   room organization consistent   nonskid shoes/slippers when out of bed   gait belt   fall prevention program maintained   clutter free environment maintained   assistive device/personal items within reach  Taken 10/5/2023 0200 by Dayo Romero RN  Safety Promotion/Fall Prevention: safety round/check completed  Taken 10/5/2023 0014 by Dayo Romero RN  Safety Promotion/Fall Prevention:   safety round/check completed   room organization consistent   nonskid shoes/slippers when out of bed   gait belt   fall prevention program maintained   clutter free environment maintained   assistive device/personal items within reach  Taken 10/4/2023 2119 by Dayo Romero RN  Safety Promotion/Fall Prevention:   safety round/check completed   room organization consistent   nonskid shoes/slippers when out of bed   fall prevention program maintained   elopement precautions   assistive device/personal items within reach   clutter free environment maintained     Problem: Hypertension Comorbidity  Goal: Blood Pressure in Desired Range  10/5/2023 0530 by Dayo Romero RN  Outcome: Ongoing, Progressing  10/5/2023 0530 by Dayo Romero RN  Outcome: Ongoing, Progressing     Problem: Pain Chronic (Persistent) (Comorbidity Management)  Goal: Acceptable Pain Control and Functional Ability  10/5/2023 0530 by Dayo Romero, RN  Outcome: Ongoing, Progressing  10/5/2023 0530 by Dayo Romero RN  Outcome: Ongoing, Progressing  Intervention: Optimize Psychosocial Wellbeing  Recent Flowsheet Documentation  Taken 10/4/2023  2119 by Dayo Romero RN  Diversional Activities:   television   smartphone     Problem: Pain Acute  Goal: Acceptable Pain Control and Functional Ability  10/5/2023 0530 by Dayo Romero RN  Outcome: Ongoing, Progressing  10/5/2023 0530 by Dayo Romero RN  Outcome: Ongoing, Progressing  Intervention: Optimize Psychosocial Wellbeing  Recent Flowsheet Documentation  Taken 10/4/2023 2119 by Dayo Romero RN  Diversional Activities:   television   smartphone     Problem: Chest Pain  Goal: Resolution of Chest Pain Symptoms  10/5/2023 0530 by Dayo Romero RN  Outcome: Ongoing, Progressing  10/5/2023 0530 by Dayo Romero RN  Outcome: Ongoing, Progressing   Goal Outcome Evaluation:      Pts blood pressure stable during monitoring. No complaints of chest pain throughout shift.

## 2023-10-06 LAB
ANION GAP SERPL CALCULATED.3IONS-SCNC: 10 MMOL/L (ref 5–15)
BASOPHILS # BLD AUTO: 0 10*3/MM3 (ref 0–0.2)
BASOPHILS NFR BLD AUTO: 0.8 % (ref 0–1.5)
BUN SERPL-MCNC: 15 MG/DL (ref 8–23)
BUN/CREAT SERPL: 17.9 (ref 7–25)
CALCIUM SPEC-SCNC: 9.4 MG/DL (ref 8.6–10.5)
CHLORIDE SERPL-SCNC: 103 MMOL/L (ref 98–107)
CO2 SERPL-SCNC: 24 MMOL/L (ref 22–29)
CREAT SERPL-MCNC: 0.84 MG/DL (ref 0.76–1.27)
DEPRECATED RDW RBC AUTO: 49.9 FL (ref 37–54)
EGFRCR SERPLBLD CKD-EPI 2021: 88.2 ML/MIN/1.73
EOSINOPHIL # BLD AUTO: 0.3 10*3/MM3 (ref 0–0.4)
EOSINOPHIL NFR BLD AUTO: 4.7 % (ref 0.3–6.2)
ERYTHROCYTE [DISTWIDTH] IN BLOOD BY AUTOMATED COUNT: 14.5 % (ref 12.3–15.4)
GEN 5 2HR TROPONIN T REFLEX: 22 NG/L
GLUCOSE SERPL-MCNC: 122 MG/DL (ref 65–99)
HCT VFR BLD AUTO: 39.3 % (ref 37.5–51)
HGB BLD-MCNC: 13.3 G/DL (ref 13–17.7)
LYMPHOCYTES # BLD AUTO: 1.8 10*3/MM3 (ref 0.7–3.1)
LYMPHOCYTES NFR BLD AUTO: 28.3 % (ref 19.6–45.3)
MCH RBC QN AUTO: 31.9 PG (ref 26.6–33)
MCHC RBC AUTO-ENTMCNC: 33.8 G/DL (ref 31.5–35.7)
MCV RBC AUTO: 94.4 FL (ref 79–97)
MONOCYTES # BLD AUTO: 0.7 10*3/MM3 (ref 0.1–0.9)
MONOCYTES NFR BLD AUTO: 11.5 % (ref 5–12)
NEUTROPHILS NFR BLD AUTO: 3.4 10*3/MM3 (ref 1.7–7)
NEUTROPHILS NFR BLD AUTO: 54.7 % (ref 42.7–76)
NRBC BLD AUTO-RTO: 0.1 /100 WBC (ref 0–0.2)
PLATELET # BLD AUTO: 191 10*3/MM3 (ref 140–450)
PMV BLD AUTO: 8.2 FL (ref 6–12)
POTASSIUM SERPL-SCNC: 4.5 MMOL/L (ref 3.5–5.2)
RBC # BLD AUTO: 4.16 10*6/MM3 (ref 4.14–5.8)
SODIUM SERPL-SCNC: 137 MMOL/L (ref 136–145)
TROPONIN T DELTA: 3 NG/L
WBC NRBC COR # BLD: 6.2 10*3/MM3 (ref 3.4–10.8)

## 2023-10-06 PROCEDURE — 25010000002 CEFTAROLINE FOSAMIL PER 10 MG: Performed by: NURSE PRACTITIONER

## 2023-10-06 PROCEDURE — 85025 COMPLETE CBC W/AUTO DIFF WBC: CPT | Performed by: PHYSICIAN ASSISTANT

## 2023-10-06 PROCEDURE — 96375 TX/PRO/DX INJ NEW DRUG ADDON: CPT

## 2023-10-06 PROCEDURE — G0378 HOSPITAL OBSERVATION PER HR: HCPCS

## 2023-10-06 PROCEDURE — 25010000002 FUROSEMIDE PER 20 MG: Performed by: PHYSICIAN ASSISTANT

## 2023-10-06 PROCEDURE — 84484 ASSAY OF TROPONIN QUANT: CPT | Performed by: PHYSICIAN ASSISTANT

## 2023-10-06 PROCEDURE — 99214 OFFICE O/P EST MOD 30 MIN: CPT | Performed by: INTERNAL MEDICINE

## 2023-10-06 PROCEDURE — 80048 BASIC METABOLIC PNL TOTAL CA: CPT | Performed by: PHYSICIAN ASSISTANT

## 2023-10-06 RX ORDER — FUROSEMIDE 10 MG/ML
40 INJECTION INTRAMUSCULAR; INTRAVENOUS ONCE
Status: COMPLETED | OUTPATIENT
Start: 2023-10-06 | End: 2023-10-06

## 2023-10-06 RX ORDER — FUROSEMIDE 10 MG/ML
40 INJECTION INTRAMUSCULAR; INTRAVENOUS DAILY
Status: DISCONTINUED | OUTPATIENT
Start: 2023-10-07 | End: 2023-10-08 | Stop reason: HOSPADM

## 2023-10-06 RX ADMIN — SENNOSIDES AND DOCUSATE SODIUM 2 TABLET: 50; 8.6 TABLET ORAL at 08:01

## 2023-10-06 RX ADMIN — Medication 10 ML: at 08:02

## 2023-10-06 RX ADMIN — MUPIROCIN 1 APPLICATION: 20 OINTMENT TOPICAL at 16:33

## 2023-10-06 RX ADMIN — GABAPENTIN 400 MG: 400 CAPSULE ORAL at 20:14

## 2023-10-06 RX ADMIN — SUCRALFATE 1 G: 1 TABLET ORAL at 11:50

## 2023-10-06 RX ADMIN — SUCRALFATE 1 G: 1 TABLET ORAL at 16:33

## 2023-10-06 RX ADMIN — METHOCARBAMOL 500 MG: 500 TABLET ORAL at 20:13

## 2023-10-06 RX ADMIN — SUCRALFATE 1 G: 1 TABLET ORAL at 20:14

## 2023-10-06 RX ADMIN — Medication 10 ML: at 20:17

## 2023-10-06 RX ADMIN — SENNOSIDES AND DOCUSATE SODIUM 2 TABLET: 50; 8.6 TABLET ORAL at 20:13

## 2023-10-06 RX ADMIN — GABAPENTIN 400 MG: 400 CAPSULE ORAL at 16:33

## 2023-10-06 RX ADMIN — MUPIROCIN 1 APPLICATION: 20 OINTMENT TOPICAL at 08:02

## 2023-10-06 RX ADMIN — MUPIROCIN 1 APPLICATION: 20 OINTMENT TOPICAL at 20:58

## 2023-10-06 RX ADMIN — SUCRALFATE 1 G: 1 TABLET ORAL at 08:01

## 2023-10-06 RX ADMIN — CEFTAROLINE FOSAMIL 600 MG: 600 POWDER, FOR SOLUTION INTRAVENOUS at 16:33

## 2023-10-06 RX ADMIN — CEFTAROLINE FOSAMIL 600 MG: 600 POWDER, FOR SOLUTION INTRAVENOUS at 05:26

## 2023-10-06 RX ADMIN — ASPIRIN 81 MG: 81 TABLET, COATED ORAL at 08:01

## 2023-10-06 RX ADMIN — GABAPENTIN 400 MG: 400 CAPSULE ORAL at 08:01

## 2023-10-06 RX ADMIN — PANTOPRAZOLE SODIUM 40 MG: 40 TABLET, DELAYED RELEASE ORAL at 08:01

## 2023-10-06 RX ADMIN — FUROSEMIDE 40 MG: 10 INJECTION, SOLUTION INTRAMUSCULAR; INTRAVENOUS at 13:37

## 2023-10-06 RX ADMIN — METHOCARBAMOL 500 MG: 500 TABLET ORAL at 08:01

## 2023-10-06 NOTE — CASE MANAGEMENT/SOCIAL WORK
Continued Stay Note  [facility]     Patient Name: Deion Nicholas  MRN: [id number]  Today's Date: 10/6/2023    Admit Date: 10/4/2023    Plan: Return home and resume [facility]. Referred to [facility] for home IV antibiotics; acceptance pending   Discharge Plan       Row Name 10/06/23 1319       Plan    Plan Comments Barriers:  IV Teflaro every 12 hours and followed closely by ID. Tatiana with [facility] is following for home IV antibiotics. Mr. Nicholas would like to go home on IV antibiotics through his [facility] and [facility] is aware of this. [facility] accepted to resume at discharge             Tatiana with [facility] informed CM that current dose of Teflaro will cost $94.13 per week for Mr. Nicholas if he goes home with it. She will call Mr. Nicholas and discuss cost with him       Phone communication or documentation only - no physical contact with patient or family.   Reba HARO,RN Case Manager  [facility]  Phone: Desk- [phone] cell- [phone]

## 2023-10-06 NOTE — PROGRESS NOTES
Infectious Diseases Progress Note      LOS: 0 days   Patient Care Team:  Makenna Motta MD as PCP - General  Makenna Motta MD as PCP - Family Medicine    Chief Complaint: Left leg pain, swelling, and redness.    Subjective       The patient has been afebrile for the last 24 hours.  The patient is on room air, hemodynamically stable, and is tolerating antimicrobial therapy.  Patient reports minimal improvement in symptoms today      Review of Systems:   Review of Systems   Constitutional: Negative.    HENT: Negative.     Eyes: Negative.    Respiratory: Negative.     Cardiovascular:  Positive for leg swelling.   Gastrointestinal: Negative.    Endocrine: Negative.    Genitourinary: Negative.    Musculoskeletal: Negative.    Skin:  Positive for color change.   Neurological: Negative.    Psychiatric/Behavioral: Negative.     All other systems reviewed and are negative.     Objective     Vital Signs  Temp:  [97.4 øF (36.3 øC)-98 øF (36.7 øC)] 97.8 øF (36.6 øC)  Heart Rate:  [74-84] 84  Resp:  [15-18] 15  BP: (127-150)/(62-96) 133/67    Physical Exam:  Physical Exam  Vitals and nursing note reviewed.   Constitutional:       General: He is not in acute distress.     Appearance: Normal appearance. He is well-developed and normal weight. He is not diaphoretic.   HENT:      Head: Normocephalic and atraumatic.   Eyes:      Conjunctiva/sclera: Conjunctivae normal.      Pupils: Pupils are equal, round, and reactive to light.   Cardiovascular:      Rate and Rhythm: Normal rate and regular rhythm.      Heart sounds: Normal heart sounds, S1 normal and S2 normal.   Pulmonary:      Effort: Pulmonary effort is normal. No respiratory distress.      Breath sounds: Normal breath sounds. No stridor. No wheezing or rales.   Abdominal:      General: Bowel sounds are normal. There is no distension.      Palpations: Abdomen is soft. There is no mass.      Tenderness: There is no abdominal tenderness. There is no guarding.    Musculoskeletal:         General: No deformity. Normal range of motion.      Cervical back: Neck supple.      Left lower leg: Edema present.   Skin:     General: Skin is warm and dry.      Coloration: Skin is not pale.      Findings: No erythema or rash.      Comments: Very mild left leg edema and erythema.  Mild warmth but leg is fairly tender    Neurological:      Mental Status: He is alert and oriented to person, place, and time.      Cranial Nerves: No cranial nerve deficit.   Psychiatric:         Mood and Affect: Mood normal.        Results Review:    I have reviewed all clinical data, test, lab, and imaging results.     Radiology  No Radiology Exams Resulted Within Past 24 Hours    Cardiology    Laboratory    Results from last 7 days   Lab Units 10/06/23  0538 10/05/23  0515 10/04/23  1856   WBC 10*3/mm3 6.20 4.30 5.30   HEMOGLOBIN g/dL 13.3 12.2* 11.6*   HEMATOCRIT % 39.3 36.6* 35.0*   PLATELETS 10*3/mm3 191 169 166     Results from last 7 days   Lab Units 10/06/23  0538 10/05/23  0515 10/04/23  1856   SODIUM mmol/L 137 138 137   POTASSIUM mmol/L 4.5 4.5 4.6   CHLORIDE mmol/L 103 105 105   CO2 mmol/L 24.0 26.0 23.0   BUN mg/dL 15 14 14   CREATININE mg/dL 0.84 0.85 0.86   GLUCOSE mg/dL 122* 126* 129*   ALBUMIN g/dL  --   --  3.6   BILIRUBIN mg/dL  --   --  0.2   ALK PHOS U/L  --   --  80   AST (SGOT) U/L  --   --  26   ALT (SGPT) U/L  --   --  19   CALCIUM mg/dL 9.4 8.6 9.0                 Microbiology   Microbiology Results (last 10 days)       ** No results found for the last 240 hours. **            Medication Review:       Schedule Meds  aspirin, 81 mg, Oral, Daily  ceftaroline, 600 mg, Intravenous, Q12H  gabapentin, 400 mg, Oral, TID  methocarbamol, 500 mg, Oral, BID  metoprolol succinate XL, 50 mg, Oral, Daily  midodrine, 5 mg, Oral, TID AC  mupirocin, 1 application , Topical, TID  pantoprazole, 40 mg, Oral, Daily  senna-docusate sodium, 2 tablet, Oral, BID  sodium chloride, 10 mL, Intravenous,  Q12H  sodium chloride, 10 mL, Intravenous, Q12H  sucralfate, 1 g, Oral, 4x Daily AC & at Bedtime        Infusion Meds       PRN Meds    senna-docusate sodium **AND** polyethylene glycol **AND** bisacodyl **AND** bisacodyl    Morphine **AND** naloxone    nitroglycerin    sodium chloride    [COMPLETED] Insert Peripheral IV **AND** sodium chloride    sodium chloride    sodium chloride    sodium chloride    sodium chloride        Assessment & Plan       Antimicrobial Therapy   1.  IV Teflaro        2.        3.        4.        5.          Assessment     Recurrent left lower leg cellulitis.  There is mild  edema, erythema and warmth.  Leg is very tender.  Dopplers negative for DVT.  Mild improvement erythema today-leg is still warm and tender     Plan     Patient normally responds well with IV Teflaro for several days and then we will switch to oral antibiotics at discharge     Continue V Teflaro 600 mg every 12 hours   Continue supportive care  A.m. labs  Keep left leg elevated is much as possible        Tamiko Victor, [facility]  10/06/23  11:58 EDT    Note is dictated utilizing voice recognition software/Dragon

## 2023-10-06 NOTE — PROGRESS NOTES
[facility] Progress note    Patient Name: Deion Nicholas  : 1940  MRN: [id number]  Primary Care Physician: Makenna Motta MD  Date of admission: 10/4/2023     Patient Care Team:  Makenna Motta MD as PCP - General  Makenna Motta MD as PCP - Family Medicine          Subjective   History Present Illness     Chief Complaint:   Chief Complaint   Patient presents with    Leg Swelling     Leg swelling and redness    History of Present Illness    82-year-old male who presents to the emergency room by vehicle stating that he has cellulitis on his left leg. He has had intermittent flareups over the last 6 months, he is also complaining of left arm pain he has no associated chest pain or shortness of breath. He has had no fevers nausea or vomiting. He has had bilateral lower extremity swelling which is abnormal for him. He denies a history of congestive heart failure.      Observation 10/5/23  Pt concurs with er hpi. Cardiology consulted due to elevated troponin. ID consulted due to multiple admissions for cellulitis in the last year.      Observation 10/6/23  Pt is tolerating abx well. ID following. Legs a little more swollen today. Gave 40 of lasix and ace wrap rigoberto le      ROS    Constitutional: Negative for chills and fever.   Cardiovascular:  Positive for leg swelling. Negative for chest pain.   Respiratory:  Negative for shortness of breath.    Skin:  Positive for color change.   Gastrointestinal:  Negative for nausea and vomiting.       Personal History     Past Medical History:   Past Medical History:   Diagnosis Date    Aneurysm     Cardiomyopathy     ICD implantation    Cellulitis of left lower extremity     recurrent    CHD (coronary heart disease)     S/P CABG & PCI    Dyslipidemia     History of ventricular tachycardia     Hypertension     Myocardial infarction     Inferior MI    Pinched nerve     L4-L5    Prostate cancer        Surgical History:      Past  Surgical History:   Procedure Laterality Date    ANGIOPLASTY  2000 04/1996 Stent: Palmaz- Huy stent/LAD 07/1996-RCA: 2000-Tetra stent Guidant to proximal RCA, mid to distal artery 2 sequential Guidant Tetra stents    APPENDECTOMY      CARDIAC ABLATION  April and May 2019    CARDIAC CATHETERIZATION  07/2017    1996 x2, Nov. 2000, 05/2010-cath 08/2015-no stents 2017    CARDIAC DEFIBRILLATOR PLACEMENT      COLONOSCOPY W/ POLYPECTOMY      Mult colonoscopy's for polyp resection     CORONARY ARTERY BYPASS GRAFT  05/2010    x5 vessel: LMA to diagonal, LAD, intermedius, obtuse marginal, RCA    CORONARY STENT PLACEMENT      ENDOSCOPY N/A 6/24/2019    Procedure: ESOPHAGOGASTRODUODENOSCOPY with dilitation Bougie 50, 54;  Surgeon: Roddy Coronel MD;  Location: [facility] ENDOSCOPY;  Service: Gastroenterology    INSERT / REPLACE / REMOVE PACEMAKER      KNEE OPEN LATERAL RELEASE Left     reduction    OTHER SURGICAL HISTORY  01/2018    ICD implantation    PROSTATE SURGERY  2015    Robiotic surgery- Cyber Knife:           Family History: family history includes Heart disease in his father; Pancreatic cancer in his mother. Otherwise pertinent FHx was reviewed and unremarkable.     Social History:  reports that he quit smoking about 21 years ago. His smoking use included cigarettes. He has a 17.00 pack-year smoking history. He has never used smokeless tobacco. He reports that he does not currently use alcohol. He reports that he does not use drugs.      Medications:  Prior to Admission medications    Medication Sig Start Date End Date Taking? Authorizing Provider   aspirin 81 MG EC tablet Take 1 tablet by mouth Daily.   Yes Cyndee Melgar MD   Cholecalciferol 10 MCG (400 UNIT) tablet Take 2 tablets by mouth Daily.   Yes Cyndee Melgar MD   ezetimibe (ZETIA) 10 MG tablet Take 1 tablet by mouth Every Evening.   Yes Cyndee Melgar MD   gabapentin (NEURONTIN) 400 MG capsule Take 1 capsule by mouth 3 (Three)  Times a Day.   Yes Cyndee Melgar MD   methocarbamol (ROBAXIN) 500 MG tablet Take 1 tablet by mouth 2 (Two) Times a Day.   Yes Cyndee Melgar MD   metoprolol succinate XL (TOPROL-XL) 50 MG 24 hr tablet Take 1 tablet by mouth Daily.   Yes Cyndee Melgar MD   midodrine (PROAMATINE) 5 MG tablet Take 1 tablet by mouth 3 (Three) Times a Day Before Meals.   Yes Cyndee Melgar MD   mupirocin (BACTROBAN) 2 % ointment Apply 1 application  topically to the appropriate area as directed 3 (Three) Times a Day. Apply to leg(s)   Yes Cyndee Melgar MD   Omega-3 Fatty Acids (fish oil) 1000 MG capsule capsule Take 2 capsules by mouth 2 (Two) Times a Day With Meals.   Yes Cyndee Melgar MD   pantoprazole (PROTONIX) 40 MG EC tablet Take 1 tablet by mouth Daily.   Yes Cyndee Melgar MD   sennosides-docusate (PERICOLACE) 8.6-50 MG per tablet Take 1 tablet by mouth 2 (Two) Times a Day.   Yes Cyndee Melgar MD   sucralfate (CARAFATE) 1 g tablet Take 1 tablet by mouth 4 (Four) Times a Day.   Yes Cyndee Melgar MD   chlorhexidine (HIBICLENS) 4 % external liquid Apply 1 application topically to the appropriate area as directed Daily As Needed for Wound Care. Apply to leg(s)    Cyndee Melgar MD   nitroglycerin (NITROSTAT) 0.4 MG SL tablet Place 1 tablet under the tongue Every 5 (Five) Minutes As Needed for Chest Pain.    Cyndee Melgar MD       Allergies:    Allergies   Allergen Reactions    Lovastatin Myalgia    Pravastatin Myalgia and Unknown (See Comments)     unknown    Simvastatin Unknown (See Comments) and Myalgia     unknown    Spironolactone Other (See Comments)     Gynecomastia        Objective   Objective     Vital Signs  Temp:  [97.4 øF (36.3 øC)-98 øF (36.7 øC)] 97.8 øF (36.6 øC)  Heart Rate:  [74-84] 84  Resp:  [15-18] 15  BP: (127-150)/(62-96) 133/67  SpO2:  [95 %-97 %] 96 %  on   ;   Device (Oxygen Therapy): room air  Body mass index is 26.47  kg/mý.    Physical Exam    Constitutional:       Appearance: Normal appearance.   HENT:      Mouth/Throat:      Mouth: Mucous membranes are moist.   Cardiovascular:      Rate and Rhythm: Normal rate and regular rhythm.      Pulses: Normal pulses.      Heart sounds: Normal heart sounds.   Pulmonary:      Effort: Pulmonary effort is normal.      Breath sounds: Normal breath sounds.   Musculoskeletal:      Right lower leg: Edema present.      Left lower leg: Edema present.      Comments: Left worse than right   Skin:     Findings: Erythema present.   Neurological:      General: No focal deficit present.      Mental Status: He is alert and oriented to person, place, and time.   Psychiatric:         Mood and Affect: Mood normal.         Behavior: Behavior normal.     Results Review:  I have personally reviewed most recent cardiac tracings, lab results, and radiology images and interpretations and agree with findings, most notably: cbc, cmp, bnp, inr, trop, us leg, ekg.    Results from last 7 days   Lab Units 10/06/23  0538 10/05/23  0515 10/04/23  1856   WBC 10*3/mm3 6.20   < > 5.30   HEMOGLOBIN g/dL 13.3   < > 11.6*   HEMATOCRIT % 39.3   < > 35.0*   PLATELETS 10*3/mm3 191   < > 166   INR   --   --  1.01    < > = values in this interval not displayed.     Results from last 7 days   Lab Units 10/06/23  0538 10/05/23  0515 10/04/23  1856   SODIUM mmol/L 137 138 137   POTASSIUM mmol/L 4.5 4.5 4.6   CHLORIDE mmol/L 103 105 105   CO2 mmol/L 24.0 26.0 23.0   BUN mg/dL 15 14 14   CREATININE mg/dL 0.84 0.85 0.86   GLUCOSE mg/dL 122* 126* 129*   CALCIUM mg/dL 9.4 8.6 9.0   ALT (SGPT) U/L  --   --  19   AST (SGOT) U/L  --   --  26   HSTROP T ng/L 22* 19* 20*   PROBNP pg/mL  --   --  496.9     Estimated Creatinine Clearance: 90.3 mL/min (by C-G formula based on SCr of 0.84 mg/dL).  Brief Urine Lab Results       None            Microbiology Results (last 10 days)       ** No results found for the last 240 hours. **             ECG/EMG Results (most recent)       Procedure Component Value Units Date/Time    ECG 12 Lead Chest Pain [[id number]] Collected: 10/05/23 0505     Updated: 10/05/23 0506     QT Interval 418 ms      QTC Interval 430 ms     Narrative:      HEART RATE= 64  bpm  RR Interval= 944  ms  UT Interval= 172  ms  P Horizontal Axis= -9  deg  P Front Axis= 45  deg  QRSD Interval= 132  ms  QT Interval= 418  ms  QTcB= 430  ms  QRS Axis= -4  deg  T Wave Axis= -14  deg  - ABNORMAL ECG -  Sinus rhythm  Ventricular premature complex  IVCD, consider LBBB  Electronically Signed By:   Date and Time of Study: 2023-10-05 05:05:05    SCANNED - TELEMETRY   [[id number]] Resulted: 10/04/23     Updated: 10/05/23 0601    SCANNED - TELEMETRY   [[id number]] Resulted: 10/04/23     Updated: 10/05/23 0855    SCANNED - TELEMETRY   [[id number]] Resulted: 10/04/23     Updated: 10/05/23 0922    SCANNED - TELEMETRY   [[id number]] Resulted: 10/04/23     Updated: 10/05/23 1023    SCANNED - TELEMETRY   [[id number]] Resulted: 10/04/23     Updated: 10/05/23 1228    SCANNED - TELEMETRY   [[id number]] Resulted: 10/04/23     Updated: 10/05/23 2033    SCANNED - TELEMETRY   [[id number]] Resulted: 10/04/23     Updated: 10/06/23 0530    SCANNED - TELEMETRY   [[id number]] Resulted: 10/04/23     Updated: 10/06/23 0530    SCANNED - TELEMETRY   [[id number]] Resulted: 10/04/23     Updated: 10/06/23 0702    SCANNED - TELEMETRY   [[id number]] Resulted: 10/04/23     Updated: 10/06/23 0825    SCANNED - TELEMETRY   [[id number]] Resulted: 10/04/23     Updated: 10/06/23 1233            Results for orders placed during the hospital encounter of 10/04/23    Duplex Venous Lower Extremity - Left CAR    Interpretation Summary    Left popliteal fossa fluid collection.    Normal left lower extremity venous duplex scan.      Results for orders placed during the hospital encounter of 05/22/23    Adult Transthoracic Echo Complete W/ Cont if Necessary Per Protocol    Interpretation Summary    Left  ventricular ejection fraction appears to be 31 - 35%.    Moderate aortic valve regurgitation is present.    Moderate mitral valve regurgitation is present.    Estimated right ventricular systolic pressure from tricuspid regurgitation is normal (<35 mmHg).    Mild dilation of the aortic root is present.      No radiology results for the last 7 days      Estimated Creatinine Clearance: 90.3 mL/min (by C-G formula based on SCr of 0.84 mg/dL).    Assessment & Plan   Assessment/Plan       Active Hospital Problems    Diagnosis  POA    **Elevated troponin [R79.89]  Yes      Resolved Hospital Problems   No resolved problems to display.       Cellulitis  - cbc, cmp, bnp are unremarkable  - trop was slightly elevated at 20,19  - EKG rate 64 sinus w/ pvcs  - us leg reviewed and showing left popliteal fossa fluid collection  - iv rocephin  - id consulted and recommends changing iv abx to ceftaroline     Elevated troponin  - cardiology consulted  - trop 20. Repeat 19  - cont bb, zetia and midodrine  - recent echo and stress reviewed     Hypertension  -moderately  Controlled       BP Readings from Last 1 Encounters:   10/05/23 141/78      - Continue metoprolol  - Monitor while admitted              VTE Prophylaxis -   Mechanical Order History:        Ordered        10/05/23 0821  Place Sequential Compression Device  Once            10/05/23 0821  Maintain Sequential Compression Device  Continuous                          Pharmalogical Order History:       None            CODE STATUS:    Code Status and Medical Interventions:   Ordered at: 10/05/23 0821     Code Status (Patient has no pulse and is not breathing):    CPR (Attempt to Resuscitate)     Medical Interventions (Patient has pulse or is breathing):    Full Support       This patient has been examined wearing personal protective equipment.     I discussed the patient's findings and my recommendations with patient and nursing staff.      Signature:,Electronically signed by  Andra Kate PA-C, 10/06/23, 2:57 PM EDT.

## 2023-10-06 NOTE — PLAN OF CARE
Goal Outcome Evaluation:              Outcome Evaluation: Pt receiving IV antibiotics. Cardiology following. Added IV lasix due to increase right lower leg swelling. Vitals WNL. Will continue to monitor.

## 2023-10-06 NOTE — PROGRESS NOTES
Cardiology Progress  Note      Patient Care Team:  Makenna Motta MD as PCP - General  Makenna Motta MD as PCP - Family Medicine    PATIENT IDENTIFICATION  Name: Deion Nicholas  Age: 82 y.o.  Sex: male  :  1940  MRN: [id number]      Length of stay:    LOS: 0 days           REASON FOR FOLLOW-UP:  Abnormal high-sensitivity troponin  Right lower extremity edema  Known history of coronary artery disease  Ischemic cardiomyopathy      INTERVAL HISTORY  Patient seen and examined, chart and labs reviewed.  Patient had some swelling to right lower extremity overnight and Ace wrap was applied.  He denies any chest discomfort      SUBJECTIVE    No chest discomfort  No shortness of breath  Edema to right lower extremity  Erythema to left lower extremity      REVIEW OF SYSTEMS:  Pertinent items are noted in HPI, all other systems reviewed and negative    OBJECTIVE   A.m. labs reviewed unremarkable    ASSESSMENT  Recurrent cellulitis left lower extremity  Abnormal high-sensitivity troponin  History of recurrent cellulitis  Severe multivessel coronary artery disease status post 5 vessel CABG  History of dilated, ischemic cardiomyopathy with EF 30-35%-compensated  AICD in situ  Hypertension with hypertensive cardiovascular disease  History of VT storm status post ablation  History of ascending aortic aneurysm  Valvular heart disease      RECOMMENDATIONS  Patient has again very mild, nontrending high-sensitivity troponin which has been present since May. He denies any angina or anginal equivalent at present. This finding is likely due to underlying ischemic dilated cardiomyopathy and valvular heart disease. Recent echo and stress testing as above. Continue BB, aspirin, Zetia, midodrine.  May benefit from some gentle diuresis if renal function allows          Vital Signs  Visit Vitals  /68 (BP Location: Right arm, Patient Position: Lying)   Pulse 85   Temp 97.8 øF (36.6 øC) (Oral)   Resp 11  "  Ht 185.4 cm (72.99\")   Wt 91 kg (200 lb 9.9 oz)   SpO2 98%   BMI 26.47 kg/mý     Oxygen Therapy  SpO2: 98 %  Pulse Oximetry Type: Intermittent  Device (Oxygen Therapy): room air  Flowsheet Rows      Flowsheet Row First Filed Value   Admission Height 185.4 cm (73\") Documented at 10/04/2023 1815   Admission Weight 90.7 kg (200 lb) Documented at 10/04/2023 1815          Intake & Output (last 3 days)         10/03 0701  10/04 0700 10/04 0701  10/05 0700 10/05 0701  10/06 0700 10/06 0701  10/07 0700    P.O.   240 360    IV Piggyback  100 100     Total Intake(mL/kg)  100 (1.1) 340 (3.7) 360 (4)    Urine (mL/kg/hr)  675 1400 (0.6) 1575 (2.1)    Stool   0     Total Output  675 1400 1575    Net  -575 -1060 -1215            Stool Unmeasured Occurrence   1 x           Lines, Drains & Airways       Active LDAs       Name Placement date Placement time Site Days    Peripheral IV 10/05/23 2025 Anterior;Distal;Left;Upper Arm 10/05/23  2025  Arm  less than 1                           /68 (BP Location: Right arm, Patient Position: Lying)   Pulse 85   Temp 97.8 øF (36.6 øC) (Oral)   Resp 11   Ht 185.4 cm (72.99\")   Wt 91 kg (200 lb 9.9 oz)   SpO2 98%   BMI 26.47 kg/mý   Intake/Output last 3 shifts:  I/O last 3 completed shifts:  In: 440 [P.O.:240; IV Piggyback:200]  Out: 2075 [Urine:2075]  Intake/Output this shift:  I/O this shift:  In: 360 [P.O.:360]  Out: 1575 [Urine:1575]    PHYSICAL EXAM:    General: Alert, cooperative, no distress, appears stated age  Head:  Normocephalic, atraumatic, mucous membranes moist  Eyes:  Conjunctivae/corneas clear, EOM's intact     Neck:  Supple,  no adenopathy; no JVD or bruit  Lungs: Clear to auscultation bilaterally, no wheezes, rhonchi or rales are noted  Chest wall: No tenderness  Heart::  Regular rate and rhythm, S1 and S2 normal, no murmur, rub or gallop  Abdomen: Soft, nontender, nondistended, bowel sounds active  Extremities: No cyanosis, clubbing, or edema   Pulses: 2+ and " symmetric all extremities  Skin:  Mild erythema with trace edema to left lower extremity, Ace to right lower extremity from knee distally  Neuro/psych: A&O x3. CN II through XII are grossly intact with appropriate affect        Scheduled Meds:      aspirin, 81 mg, Oral, Daily  ceftaroline, 600 mg, Intravenous, Q12H  gabapentin, 400 mg, Oral, TID  methocarbamol, 500 mg, Oral, BID  metoprolol succinate XL, 50 mg, Oral, Daily  midodrine, 5 mg, Oral, TID AC  mupirocin, 1 application , Topical, TID  pantoprazole, 40 mg, Oral, Daily  senna-docusate sodium, 2 tablet, Oral, BID  sodium chloride, 10 mL, Intravenous, Q12H  sodium chloride, 10 mL, Intravenous, Q12H  sucralfate, 1 g, Oral, 4x Daily AC & at Bedtime        Continuous Infusions:         PRN Meds:      senna-docusate sodium **AND** polyethylene glycol **AND** bisacodyl **AND** bisacodyl    Morphine **AND** naloxone    nitroglycerin    sodium chloride    [COMPLETED] Insert Peripheral IV **AND** sodium chloride    sodium chloride    sodium chloride    sodium chloride    sodium chloride        Results Review:     I reviewed the patient's new clinical results.    CBC    Results from last 7 days   Lab Units 10/06/23  0538 10/05/23  0515 10/04/23  1856   WBC 10*3/mm3 6.20 4.30 5.30   HEMOGLOBIN g/dL 13.3 12.2* 11.6*   PLATELETS 10*3/mm3 191 169 166     Cr Clearance Estimated Creatinine Clearance: 90.3 mL/min (by C-G formula based on SCr of 0.84 mg/dL).  Coag   Results from last 7 days   Lab Units 10/04/23  1856   INR  1.01   APTT seconds 25.1*     HbA1C   Lab Results   Component Value Date    HGBA1C 6.1 (H) 10/26/2022     Blood Glucose No results found for: POCGLU  Infection     CMP   Results from last 7 days   Lab Units 10/06/23  0538 10/05/23  0515 10/04/23  1856   SODIUM mmol/L 137 138 137   POTASSIUM mmol/L 4.5 4.5 4.6   CHLORIDE mmol/L 103 105 105   CO2 mmol/L 24.0 26.0 23.0   BUN mg/dL 15 14 14   CREATININE mg/dL 0.84 0.85 0.86   GLUCOSE mg/dL 122* 126* 129*    ALBUMIN g/dL  --   --  3.6   BILIRUBIN mg/dL  --   --  0.2   ALK PHOS U/L  --   --  80   AST (SGOT) U/L  --   --  26   ALT (SGPT) U/L  --   --  19     ABG      UA      LEIGH ANN  No results found for: POCMETH, POCAMPHET, POCBARBITUR, POCBENZO, POCCOCAINE, POCOPIATES, POCOXYCODO, POCPHENCYC, POCPROPOXY, POCTHC, POCTRICYC  Lysis Labs   Results from last 7 days   Lab Units 10/06/23  0538 10/05/23  0515 10/04/23  1856   INR   --   --  1.01   APTT seconds  --   --  25.1*   HEMOGLOBIN g/dL 13.3 12.2* 11.6*   PLATELETS 10*3/mm3 191 169 166   CREATININE mg/dL 0.84 0.85 0.86     Radiology(recent) No radiology results for the last day      Results from last 7 days   Lab Units 10/06/23  0538   HSTROP T ng/L 22*       Xrays, labs reviewed personally by physician.    ECG/EMG Results (most recent)       Procedure Component Value Units Date/Time    ECG 12 Lead Chest Pain [[id number]] Collected: 10/05/23 0505     Updated: 10/05/23 0506     QT Interval 418 ms      QTC Interval 430 ms     Narrative:      HEART RATE= 64  bpm  RR Interval= 944  ms  HI Interval= 172  ms  P Horizontal Axis= -9  deg  P Front Axis= 45  deg  QRSD Interval= 132  ms  QT Interval= 418  ms  QTcB= 430  ms  QRS Axis= -4  deg  T Wave Axis= -14  deg  - ABNORMAL ECG -  Sinus rhythm  Ventricular premature complex  IVCD, consider LBBB  Electronically Signed By:   Date and Time of Study: 2023-10-05 05:05:05    SCANNED - TELEMETRY   [[id number]] Resulted: 10/04/23     Updated: 10/05/23 0601    SCANNED - TELEMETRY   [[id number]] Resulted: 10/04/23     Updated: 10/05/23 0855    SCANNED - TELEMETRY   [[id number]] Resulted: 10/04/23     Updated: 10/05/23 0922    SCANNED - TELEMETRY   [[id number]] Resulted: 10/04/23     Updated: 10/05/23 1023    SCANNED - TELEMETRY   [[id number]] Resulted: 10/04/23     Updated: 10/05/23 1228    SCANNED - TELEMETRY   [[id number]] Resulted: 10/04/23     Updated: 10/05/23 2033    SCANNED - TELEMETRY   [[id number]] Resulted: 10/04/23     Updated: 10/06/23  "0530    SCANNED - TELEMETRY   [[id number]] Resulted: 10/04/23     Updated: 10/06/23 0530    SCANNED - TELEMETRY   [[id number]] Resulted: 10/04/23     Updated: 10/06/23 0702    SCANNED - TELEMETRY   [[id number]] Resulted: 10/04/23     Updated: 10/06/23 0825    SCANNED - TELEMETRY   [[id number]] Resulted: 10/04/23     Updated: 10/06/23 1233              Medication Review:   I have reviewed the patient's current medication list  Scheduled Meds:aspirin, 81 mg, Oral, Daily  ceftaroline, 600 mg, Intravenous, Q12H  gabapentin, 400 mg, Oral, TID  methocarbamol, 500 mg, Oral, BID  metoprolol succinate XL, 50 mg, Oral, Daily  midodrine, 5 mg, Oral, TID AC  mupirocin, 1 application , Topical, TID  pantoprazole, 40 mg, Oral, Daily  senna-docusate sodium, 2 tablet, Oral, BID  sodium chloride, 10 mL, Intravenous, Q12H  sodium chloride, 10 mL, Intravenous, Q12H  sucralfate, 1 g, Oral, 4x Daily AC & at Bedtime      Continuous Infusions:   PRN Meds:.  senna-docusate sodium **AND** polyethylene glycol **AND** bisacodyl **AND** bisacodyl    Morphine **AND** naloxone    nitroglycerin    sodium chloride    [COMPLETED] Insert Peripheral IV **AND** sodium chloride    sodium chloride    sodium chloride    sodium chloride    sodium chloride    Imaging:  Imaging Results (Last 72 Hours)       ** No results found for the last 72 hours. **              NIMCO Khoury  10/06/23  15:19 EDT       EMR Dragon/Transcription:   \"Dictated utilizing Dragon dictation\".       Electronically signed by NIMCO Khoury, 10/06/23, 3:19 PM EDT.    Cardiology attending:  Seen and examined.  Chart and labs reviewed. Independant interpretations of cardiac testing was performed. History and exam findings are verified with above changes noted.  Assessment and plan notated by APC after being formulated by attending consultant.  Note that greater than 50% of the time spent in care of the patient was provided by attending consultant.    Patient denies any " chest pain pressure heaviness or tightness.  Had some edema that has resolved with diuresis and Ace wraps.  Recommend continued gentle diuresis as renal function allows.  Antimicrobials as per admitting service/infectious disease.  Continue to monitor rate and rhythm.  Monitor renal function for electrolyte derangement.    Physical Exam:    General: Alert, cooperative, no distress, appears stated age  Head:  Normocephalic, atraumatic, mucous membranes moist  Eyes:  Conjunctiva/corneas clear, EOM's intact     Neck:  Supple,  no bruit  Lungs:            Clear to auscultation bilaterally, no wheezes rhonchi rales are noted  Chest wall: No tenderness  Heart::  Regular rate and rhythm, S1 and S2 normal, 1/6 holosystolic murmur.  No rub or gallop  Abdomen: Soft, non-tender, nondistended bowel sounds active  Extremities: No cyanosis, clubbing,.  Edema noted  Pulses:            2+ and symmetric all extremities  Skin:  No rashes or lesions  Neuro/psych: A&O x3. CN II through XII are grossly intact with appropriate affect

## 2023-10-06 NOTE — PLAN OF CARE
Goal Outcome Evaluation:                      Pt is A&O x4, on room air. Pt refusing bed alarm, education provided. Call light within reach.

## 2023-10-07 LAB
ANION GAP SERPL CALCULATED.3IONS-SCNC: 9 MMOL/L (ref 5–15)
BASOPHILS # BLD AUTO: 0.1 10*3/MM3 (ref 0–0.2)
BASOPHILS NFR BLD AUTO: 0.7 % (ref 0–1.5)
BUN SERPL-MCNC: 19 MG/DL (ref 8–23)
BUN/CREAT SERPL: 22.4 (ref 7–25)
CALCIUM SPEC-SCNC: 9.5 MG/DL (ref 8.6–10.5)
CHLORIDE SERPL-SCNC: 103 MMOL/L (ref 98–107)
CO2 SERPL-SCNC: 25 MMOL/L (ref 22–29)
CREAT SERPL-MCNC: 0.85 MG/DL (ref 0.76–1.27)
DEPRECATED RDW RBC AUTO: 48.1 FL (ref 37–54)
EGFRCR SERPLBLD CKD-EPI 2021: 87.8 ML/MIN/1.73
EOSINOPHIL # BLD AUTO: 0.2 10*3/MM3 (ref 0–0.4)
EOSINOPHIL NFR BLD AUTO: 2.7 % (ref 0.3–6.2)
ERYTHROCYTE [DISTWIDTH] IN BLOOD BY AUTOMATED COUNT: 13.8 % (ref 12.3–15.4)
GLUCOSE SERPL-MCNC: 122 MG/DL (ref 65–99)
HCT VFR BLD AUTO: 39.1 % (ref 37.5–51)
HGB BLD-MCNC: 13.3 G/DL (ref 13–17.7)
LYMPHOCYTES # BLD AUTO: 1.1 10*3/MM3 (ref 0.7–3.1)
LYMPHOCYTES NFR BLD AUTO: 12.9 % (ref 19.6–45.3)
MCH RBC QN AUTO: 32 PG (ref 26.6–33)
MCHC RBC AUTO-ENTMCNC: 34 G/DL (ref 31.5–35.7)
MCV RBC AUTO: 94.2 FL (ref 79–97)
MONOCYTES # BLD AUTO: 0.7 10*3/MM3 (ref 0.1–0.9)
MONOCYTES NFR BLD AUTO: 8.9 % (ref 5–12)
NEUTROPHILS NFR BLD AUTO: 6.1 10*3/MM3 (ref 1.7–7)
NEUTROPHILS NFR BLD AUTO: 74.8 % (ref 42.7–76)
NRBC BLD AUTO-RTO: 0 /100 WBC (ref 0–0.2)
PLATELET # BLD AUTO: 185 10*3/MM3 (ref 140–450)
PMV BLD AUTO: 8.2 FL (ref 6–12)
POTASSIUM SERPL-SCNC: 3.9 MMOL/L (ref 3.5–5.2)
QT INTERVAL: 418 MS
QTC INTERVAL: 430 MS
RBC # BLD AUTO: 4.16 10*6/MM3 (ref 4.14–5.8)
SODIUM SERPL-SCNC: 137 MMOL/L (ref 136–145)
WBC NRBC COR # BLD: 8.2 10*3/MM3 (ref 3.4–10.8)

## 2023-10-07 PROCEDURE — 80048 BASIC METABOLIC PNL TOTAL CA: CPT | Performed by: PHYSICIAN ASSISTANT

## 2023-10-07 PROCEDURE — G0378 HOSPITAL OBSERVATION PER HR: HCPCS

## 2023-10-07 PROCEDURE — 96376 TX/PRO/DX INJ SAME DRUG ADON: CPT

## 2023-10-07 PROCEDURE — 85025 COMPLETE CBC W/AUTO DIFF WBC: CPT | Performed by: PHYSICIAN ASSISTANT

## 2023-10-07 PROCEDURE — 25010000002 FUROSEMIDE PER 20 MG: Performed by: NURSE PRACTITIONER

## 2023-10-07 PROCEDURE — 99214 OFFICE O/P EST MOD 30 MIN: CPT | Performed by: INTERNAL MEDICINE

## 2023-10-07 PROCEDURE — 25010000002 CEFTAROLINE FOSAMIL PER 10 MG: Performed by: NURSE PRACTITIONER

## 2023-10-07 RX ORDER — ACETAMINOPHEN 325 MG/1
650 TABLET ORAL EVERY 6 HOURS PRN
Status: DISCONTINUED | OUTPATIENT
Start: 2023-10-07 | End: 2023-10-08 | Stop reason: HOSPADM

## 2023-10-07 RX ADMIN — SENNOSIDES AND DOCUSATE SODIUM 2 TABLET: 50; 8.6 TABLET ORAL at 23:57

## 2023-10-07 RX ADMIN — SUCRALFATE 1 G: 1 TABLET ORAL at 11:54

## 2023-10-07 RX ADMIN — METOPROLOL SUCCINATE 50 MG: 50 TABLET, EXTENDED RELEASE ORAL at 08:38

## 2023-10-07 RX ADMIN — PANTOPRAZOLE SODIUM 40 MG: 40 TABLET, DELAYED RELEASE ORAL at 08:35

## 2023-10-07 RX ADMIN — CEFTAROLINE FOSAMIL 600 MG: 600 POWDER, FOR SOLUTION INTRAVENOUS at 16:53

## 2023-10-07 RX ADMIN — SUCRALFATE 1 G: 1 TABLET ORAL at 08:35

## 2023-10-07 RX ADMIN — SENNOSIDES AND DOCUSATE SODIUM 2 TABLET: 50; 8.6 TABLET ORAL at 08:34

## 2023-10-07 RX ADMIN — GABAPENTIN 400 MG: 400 CAPSULE ORAL at 08:35

## 2023-10-07 RX ADMIN — SUCRALFATE 1 G: 1 TABLET ORAL at 16:53

## 2023-10-07 RX ADMIN — SUCRALFATE 1 G: 1 TABLET ORAL at 23:57

## 2023-10-07 RX ADMIN — Medication 10 ML: at 08:46

## 2023-10-07 RX ADMIN — ACETAMINOPHEN 650 MG: 325 TABLET, FILM COATED ORAL at 09:58

## 2023-10-07 RX ADMIN — METHOCARBAMOL 500 MG: 500 TABLET ORAL at 23:57

## 2023-10-07 RX ADMIN — MIDODRINE HYDROCHLORIDE 5 MG: 5 TABLET ORAL at 16:53

## 2023-10-07 RX ADMIN — MIDODRINE HYDROCHLORIDE 5 MG: 5 TABLET ORAL at 11:54

## 2023-10-07 RX ADMIN — MUPIROCIN 1 APPLICATION: 20 OINTMENT TOPICAL at 08:46

## 2023-10-07 RX ADMIN — MUPIROCIN 1 APPLICATION: 20 OINTMENT TOPICAL at 17:04

## 2023-10-07 RX ADMIN — GABAPENTIN 400 MG: 400 CAPSULE ORAL at 23:57

## 2023-10-07 RX ADMIN — CEFTAROLINE FOSAMIL 600 MG: 600 POWDER, FOR SOLUTION INTRAVENOUS at 04:24

## 2023-10-07 RX ADMIN — FUROSEMIDE 40 MG: 10 INJECTION, SOLUTION INTRAMUSCULAR; INTRAVENOUS at 08:34

## 2023-10-07 RX ADMIN — ASPIRIN 81 MG: 81 TABLET, COATED ORAL at 08:35

## 2023-10-07 RX ADMIN — ACETAMINOPHEN 650 MG: 325 TABLET, FILM COATED ORAL at 23:57

## 2023-10-07 RX ADMIN — GABAPENTIN 400 MG: 400 CAPSULE ORAL at 16:53

## 2023-10-07 RX ADMIN — METHOCARBAMOL 500 MG: 500 TABLET ORAL at 08:35

## 2023-10-07 NOTE — PROGRESS NOTES
Infectious Diseases Progress Note      LOS: 0 days   Patient Care Team:  Makenna Motta MD as PCP - General  Makenna Motta MD as PCP - Family Medicine    Chief Complaint: Left leg pain, swelling, and redness.    Subjective       The patient has been afebrile for the last 24 hours.  The patient is on room air, hemodynamically stable, and is tolerating antimicrobial therapy.  Patient reports minimal improvement in symptoms today      Review of Systems:   Review of Systems   Constitutional: Negative.    HENT: Negative.     Eyes: Negative.    Respiratory: Negative.     Cardiovascular:  Positive for leg swelling.   Gastrointestinal: Negative.    Endocrine: Negative.    Genitourinary: Negative.    Musculoskeletal: Negative.    Skin:  Positive for color change.   Neurological: Negative.    Psychiatric/Behavioral: Negative.     All other systems reviewed and are negative.       Objective     Vital Signs  Temp:  [97 øF (36.1 øC)-97.8 øF (36.6 øC)] 97.6 øF (36.4 øC)  Heart Rate:  [74-98] 74  Resp:  [12-19] 12  BP: (101-139)/(54-81) 101/54    Physical Exam:  Physical Exam  Vitals and nursing note reviewed.   Constitutional:       General: He is not in acute distress.     Appearance: Normal appearance. He is well-developed and normal weight. He is not diaphoretic.   HENT:      Head: Normocephalic and atraumatic.   Eyes:      Conjunctiva/sclera: Conjunctivae normal.      Pupils: Pupils are equal, round, and reactive to light.   Cardiovascular:      Rate and Rhythm: Normal rate and regular rhythm.      Heart sounds: Normal heart sounds, S1 normal and S2 normal.   Pulmonary:      Effort: Pulmonary effort is normal. No respiratory distress.      Breath sounds: Normal breath sounds. No stridor. No wheezing or rales.   Abdominal:      General: Bowel sounds are normal. There is no distension.      Palpations: Abdomen is soft. There is no mass.      Tenderness: There is no abdominal tenderness. There is no guarding.    Musculoskeletal:         General: No deformity. Normal range of motion.      Cervical back: Neck supple.      Left lower leg: Edema present.   Skin:     General: Skin is warm and dry.      Coloration: Skin is not pale.      Findings: No erythema or rash.      Comments: Very mild left leg edema and erythema.  Mild warmth but leg is fairly tender    Neurological:      Mental Status: He is alert and oriented to person, place, and time.      Cranial Nerves: No cranial nerve deficit.   Psychiatric:         Mood and Affect: Mood normal.          Results Review:    I have reviewed all clinical data, test, lab, and imaging results.     Radiology  No Radiology Exams Resulted Within Past 24 Hours    Cardiology    Laboratory    Results from last 7 days   Lab Units 10/07/23  0441 10/06/23  0538 10/05/23  0515 10/04/23  1856   WBC 10*3/mm3 8.20 6.20 4.30 5.30   HEMOGLOBIN g/dL 13.3 13.3 12.2* 11.6*   HEMATOCRIT % 39.1 39.3 36.6* 35.0*   PLATELETS 10*3/mm3 185 191 169 166     Results from last 7 days   Lab Units 10/07/23  0441 10/06/23  0538 10/05/23  0515 10/04/23  1856   SODIUM mmol/L 137 137 138 137   POTASSIUM mmol/L 3.9 4.5 4.5 4.6   CHLORIDE mmol/L 103 103 105 105   CO2 mmol/L 25.0 24.0 26.0 23.0   BUN mg/dL 19 15 14 14   CREATININE mg/dL 0.85 0.84 0.85 0.86   GLUCOSE mg/dL 122* 122* 126* 129*   ALBUMIN g/dL  --   --   --  3.6   BILIRUBIN mg/dL  --   --   --  0.2   ALK PHOS U/L  --   --   --  80   AST (SGOT) U/L  --   --   --  26   ALT (SGPT) U/L  --   --   --  19   CALCIUM mg/dL 9.5 9.4 8.6 9.0                 Microbiology   Microbiology Results (last 10 days)       ** No results found for the last 240 hours. **            Medication Review:       Schedule Meds  aspirin, 81 mg, Oral, Daily  ceftaroline, 600 mg, Intravenous, Q12H  furosemide, 40 mg, Intravenous, Daily  gabapentin, 400 mg, Oral, TID  methocarbamol, 500 mg, Oral, BID  metoprolol succinate XL, 50 mg, Oral, Daily  midodrine, 5 mg, Oral, TID AC  mupirocin, 1  application , Topical, TID  pantoprazole, 40 mg, Oral, Daily  senna-docusate sodium, 2 tablet, Oral, BID  sodium chloride, 10 mL, Intravenous, Q12H  sodium chloride, 10 mL, Intravenous, Q12H  sucralfate, 1 g, Oral, 4x Daily AC & at Bedtime        Infusion Meds       PRN Meds    acetaminophen    senna-docusate sodium **AND** polyethylene glycol **AND** bisacodyl **AND** bisacodyl    Morphine **AND** naloxone    nitroglycerin    sodium chloride    [COMPLETED] Insert Peripheral IV **AND** sodium chloride    sodium chloride    sodium chloride    sodium chloride    sodium chloride        Assessment & Plan       Antimicrobial Therapy   1.  IV Teflaro        2.        3.        4.        5.          Assessment     Recurrent left lower leg cellulitis.  There is mild  edema, erythema and warmth.  Leg is very tender.  Dopplers negative for DVT.  Mild improvement erythema today-leg is still warm and tender     Plan     Patient normally responds well with IV Teflaro for several days and then we will switch to oral antibiotics at discharge     Continue V Teflaro 600 mg every 12 hours   Probable discharge home tomorrow  Keep left leg elevated is much as possible        Panda Lopes MD  10/07/23  15:11 EDT    Note is dictated utilizing voice recognition software/Dragon

## 2023-10-07 NOTE — PROGRESS NOTES
"  Cardiology Progress  Note      Patient Care Team:  Makenna Motta MD as PCP - General  Makenna Motta MD as PCP - Family Medicine    PATIENT IDENTIFICATION  Name: Deion Nicholas  Age: 82 y.o.  Sex: male  :  1940  MRN: [id number]      Length of stay:    LOS: 0 days           REASON FOR FOLLOW-UP:  Abnormal high-sensitivity troponin  Right lower extremity edema  Known history of coronary artery disease  Ischemic cardiomyopathy      INTERVAL HISTORY  Patient seen and examined, chart and labs reviewed.  Patient reports swelling is much better today.  No chest pain.      SUBJECTIVE    No chest discomfort  No shortness of breath  Edema to right lower extremity  Erythema to left lower extremity      REVIEW OF SYSTEMS:  Pertinent items are noted in HPI, all other systems reviewed and negative    OBJECTIVE   A.m. 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  Cardiology status is appropriate for discharge to next destination of care when okay with other services.  Follow-up as scheduled in the office.        Vital Signs  Visit Vitals  /64 (BP Location: Right arm, Patient Position: Lying)   Pulse 79   Temp 97.1 øF (36.2 øC) (Oral)   Resp 21   Ht 185.4 cm (72.99\")   Wt 91 kg (200 lb 9.9 oz)   SpO2 96%   BMI 26.47 kg/mý     Oxygen Therapy  SpO2: 96 %  Pulse Oximetry Type: Continuous  Device (Oxygen Therapy): room air  Flowsheet Rows      Flowsheet Row First Filed Value   Admission Height " "185.4 cm (73\") Documented at 10/04/2023 1815   Admission Weight 90.7 kg (200 lb) Documented at 10/04/2023 1815          Intake & Output (last 3 days)         10/05 0701  10/06 0700 10/06 0701  10/07 0700 10/07 0701  10/08 0700    P.O. 240 360 940    IV Piggyback 100 100     Total Intake(mL/kg) 340 (3.7) 460 (5.1) 940 (10.3)    Urine (mL/kg/hr) 1400 (0.6) 3175 (1.5) 1000 (0.9)    Stool 0 0 0    Total Output 1400 3175 1000    Net -1060 -2715 -60           Stool Unmeasured Occurrence 1 x 2 x 2 x          Lines, Drains & Airways       Active LDAs       Name Placement date Placement time Site Days    Peripheral IV 10/05/23 2025 Anterior;Distal;Left;Upper Arm 10/05/23  2025  Arm  less than 1                           /64 (BP Location: Right arm, Patient Position: Lying)   Pulse 79   Temp 97.1 øF (36.2 øC) (Oral)   Resp 21   Ht 185.4 cm (72.99\")   Wt 91 kg (200 lb 9.9 oz)   SpO2 96%   BMI 26.47 kg/mý   Intake/Output last 3 shifts:  I/O last 3 completed shifts:  In: 1400 [P.O.:1300; IV Piggyback:100]  Out: 4175 [Urine:4175]  Intake/Output this shift:  No intake/output data recorded.    PHYSICAL EXAM:    General: Alert, cooperative, no distress, appears stated age  Head:  Normocephalic, atraumatic, mucous membranes moist  Eyes:  Conjunctivae/corneas clear, EOM's intact     Neck:  Supple,  no bruit  Lungs: Clear to auscultation bilaterally, no wheezes, rhonchi or rales are noted  Chest wall: No tenderness  Heart::  Regular rate and rhythm, S1 and S2 normal, 1/6 holosystolic murmur.  No rub or gallop  Abdomen: Soft, nontender, nondistended, bowel sounds active  Extremities: No cyanosis, clubbing, trace edema.  Pulses: 2+ and symmetric all extremities  Skin:  Mild erythema bilateral lower extremities.  Trace edema.  Neuro/psych: A&O x3. CN II through XII are grossly intact with appropriate affect        Scheduled Meds:      aspirin, 81 mg, Oral, Daily  ceftaroline, 600 mg, Intravenous, Q12H  furosemide, 40 mg, " "Intravenous, Daily  gabapentin, 400 mg, Oral, TID  methocarbamol, 500 mg, Oral, BID  metoprolol succinate XL, 50 mg, Oral, Daily  midodrine, 5 mg, Oral, TID AC  mupirocin, 1 application , Topical, TID  pantoprazole, 40 mg, Oral, Daily  senna-docusate sodium, 2 tablet, Oral, BID  sodium chloride, 10 mL, Intravenous, Q12H  sodium chloride, 10 mL, Intravenous, Q12H  sucralfate, 1 g, Oral, 4x Daily AC & at Bedtime        Continuous Infusions:         PRN Meds:      acetaminophen    senna-docusate sodium **AND** polyethylene glycol **AND** bisacodyl **AND** bisacodyl    Morphine **AND** naloxone    nitroglycerin    sodium chloride    [COMPLETED] Insert Peripheral IV **AND** sodium chloride    sodium chloride    sodium chloride    sodium chloride    sodium chloride        Results Review:     I reviewed the patient's new clinical results.    CBC    Results from last 7 days   Lab Units 10/07/23  0441 10/06/23  0538 10/05/23  0515 10/04/23  1856   WBC 10*3/mm3 8.20 6.20 4.30 5.30   HEMOGLOBIN g/dL 13.3 13.3 12.2* 11.6*   PLATELETS 10*3/mm3 185 191 169 166     Cr Clearance Estimated Creatinine Clearance: 89.2 mL/min (by C-G formula based on SCr of 0.85 mg/dL).  Coag   Results from last 7 days   Lab Units 10/04/23  1856   INR  1.01   APTT seconds 25.1*     HbA1C   Lab Results   Component Value Date    HGBA1C 6.1 (H) 10/26/2022     Blood Glucose No results found for: \"POCGLU\"  Infection     CMP   Results from last 7 days   Lab Units 10/07/23  0441 10/06/23  0538 10/05/23  0515 10/04/23  1856   SODIUM mmol/L 137 137 138 137   POTASSIUM mmol/L 3.9 4.5 4.5 4.6   CHLORIDE mmol/L 103 103 105 105   CO2 mmol/L 25.0 24.0 26.0 23.0   BUN mg/dL 19 15 14 14   CREATININE mg/dL 0.85 0.84 0.85 0.86   GLUCOSE mg/dL 122* 122* 126* 129*   ALBUMIN g/dL  --   --   --  3.6   BILIRUBIN mg/dL  --   --   --  0.2   ALK PHOS U/L  --   --   --  80   AST (SGOT) U/L  --   --   --  26   ALT (SGPT) U/L  --   --   --  19     ABG      UA      LEIGH ANN  No results " "found for: \"POCMETH\", \"POCAMPHET\", \"POCBARBITUR\", \"POCBENZO\", \"POCCOCAINE\", \"POCOPIATES\", \"POCOXYCODO\", \"POCPHENCYC\", \"POCPROPOXY\", \"POCTHC\", \"POCTRICYC\"  Lysis Labs   Results from last 7 days   Lab Units 10/07/23  0441 10/06/23  0538 10/05/23  0515 10/04/23  1856   INR   --   --   --  1.01   APTT seconds  --   --   --  25.1*   HEMOGLOBIN g/dL 13.3 13.3 12.2* 11.6*   PLATELETS 10*3/mm3 185 191 169 166   CREATININE mg/dL 0.85 0.84 0.85 0.86     Radiology(recent) No radiology results for the last day      Results from last 7 days   Lab Units 10/06/23  0538   HSTROP T ng/L 22*       Xrays, labs reviewed personally by physician.    ECG/EMG Results (most recent)       Procedure Component Value Units Date/Time    SCANNED - TELEMETRY   [[id number]] Resulted: 10/04/23     Updated: 10/05/23 0601    SCANNED - TELEMETRY   [[id number]] Resulted: 10/04/23     Updated: 10/05/23 0855    SCANNED - TELEMETRY   [[id number]] Resulted: 10/04/23     Updated: 10/05/23 0922    SCANNED - TELEMETRY   [[id number]] Resulted: 10/04/23     Updated: 10/05/23 1023    SCANNED - TELEMETRY   [[id number]] Resulted: 10/04/23     Updated: 10/05/23 1228    SCANNED - TELEMETRY   [[id number]] Resulted: 10/04/23     Updated: 10/05/23 2033    SCANNED - TELEMETRY   [[id number]] Resulted: 10/04/23     Updated: 10/06/23 0530    SCANNED - TELEMETRY   [[id number]] Resulted: 10/04/23     Updated: 10/06/23 0530    SCANNED - TELEMETRY   [[id number]] Resulted: 10/04/23     Updated: 10/06/23 0702    SCANNED - TELEMETRY   [[id number]] Resulted: 10/04/23     Updated: 10/06/23 0825    SCANNED - TELEMETRY   [[id number]] Resulted: 10/04/23     Updated: 10/06/23 1233    SCANNED - TELEMETRY   [[id number]] Resulted: 10/04/23     Updated: 10/06/23 1927    SCANNED - TELEMETRY   [[id number]] Resulted: 10/04/23     Updated: 10/07/23 0240    SCANNED - TELEMETRY   [[id number]] Resulted: 10/04/23     Updated: 10/07/23 0240    ECG 12 Lead Chest Pain [[id number]] Collected: 10/05/23 0505     " Updated: 10/07/23 1013     QT Interval 418 ms      QTC Interval 430 ms     Narrative:      HEART RATE= 64  bpm  RR Interval= 944  ms  KS Interval= 172  ms  P Horizontal Axis= -9  deg  P Front Axis= 45  deg  QRSD Interval= 132  ms  QT Interval= 418  ms  QTcB= 430  ms  QRS Axis= -4  deg  T Wave Axis= -14  deg  - ABNORMAL ECG -  Sinus rhythm  Ventricular premature complex  IVCD, consider LBBB  When compared with ECG of 01-Aug-2023 19:30:53,  No significant change  Electronically Signed By: Kee Torres (EROS) 07-Oct-2023 10:12:51  Date and Time of Study: 2023-10-05 05:05:05    SCANNED - TELEMETRY   [[id number]] Resulted: 10/04/23     Updated: 10/07/23 1816    SCANNED - TELEMETRY   [[id number]] Resulted: 10/04/23     Updated: 10/07/23 1913              Medication Review:   I have reviewed the patient's current medication list  Scheduled Meds:aspirin, 81 mg, Oral, Daily  ceftaroline, 600 mg, Intravenous, Q12H  furosemide, 40 mg, Intravenous, Daily  gabapentin, 400 mg, Oral, TID  methocarbamol, 500 mg, Oral, BID  metoprolol succinate XL, 50 mg, Oral, Daily  midodrine, 5 mg, Oral, TID AC  mupirocin, 1 application , Topical, TID  pantoprazole, 40 mg, Oral, Daily  senna-docusate sodium, 2 tablet, Oral, BID  sodium chloride, 10 mL, Intravenous, Q12H  sodium chloride, 10 mL, Intravenous, Q12H  sucralfate, 1 g, Oral, 4x Daily AC & at Bedtime      Continuous Infusions:   PRN Meds:.  acetaminophen    senna-docusate sodium **AND** polyethylene glycol **AND** bisacodyl **AND** bisacodyl    Morphine **AND** naloxone    nitroglycerin    sodium chloride    [COMPLETED] Insert Peripheral IV **AND** sodium chloride    sodium chloride    sodium chloride    sodium chloride    sodium chloride    Imaging:  Imaging Results (Last 72 Hours)       ** No results found for the last 72 hours. **              Marcin Childers DO  10/07/23  19:15 EDT

## 2023-10-07 NOTE — CONSULTS
Nutrition Services    Patient Name: Deion Nicholas  YOB: 1940  MRN: [id number]  Admission date: 10/4/2023    Continue current diet. Pt declined ONS.    Moderate starvation related malnutrition related to inadequate PO intake as evidenced by PO intake meeting less than 75% of estimated energy requirement for greater than or equal to 3 months and mainly severe muscle wasting per NFPE.    See MSA below.    PPE Documentation        PPE Worn By Provider gloves   PPE Worn By Patient  N/A     NUTRITION SCREENING      Trending Narrative: 10/7: Pt being assessed for MST: 2. Pt admitted to [facility] with elevated troponin and cellulitis. RD visited pt at bedside. Pt reports he used to be very active, lifting weights regularly. Pt reports he eats 2 meals daily and it sounds like his supper meal is the more substantial meal. Pt has these two meals delivered to him. Pt also eats some snacks//desserts, including peach yogurt with honey peanut butter, candy bars, and little leigh's. Pt denied food allergies. Pt with missing teeth but says this doesn't cause him chewing/swallowing issues. NFPE completed, consistent with nutrition diagnosis of malnutrition using AND/ASPEN criteria. See MSA below.          PO Diet: Diet: Cardiac Diets; Healthy Heart (2-3 Na+); Texture: Regular Texture (IDDSI 7); Fluid Consistency: Thin (IDDSI 0)   PO Supplements: -   Trending PO Intake:  10/7: %       Nutritionally-Pertinent Medications RDN Reviewed, C/W clinical course         Labs (reviewed below): Reviewed.      Results from last 7 days   Lab Units 10/07/23  0441 10/06/23  0538 10/05/23  0515 10/04/23  1856   SODIUM mmol/L 137 137 138 137   POTASSIUM mmol/L 3.9 4.5 4.5 4.6   CHLORIDE mmol/L 103 103 105 105   CO2 mmol/L 25.0 24.0 26.0 23.0   BUN mg/dL 19 15 14 14   CREATININE mg/dL 0.85 0.84 0.85 0.86   CALCIUM mg/dL 9.5 9.4 8.6 9.0   BILIRUBIN mg/dL  --   --   --  0.2   ALK PHOS U/L  --   --   --  80   ALT (SGPT) U/L  --   --   --   "19   AST (SGOT) U/L  --   --   --  26   GLUCOSE mg/dL 122* 122* 126* 129*     Results from last 7 days   Lab Units 10/07/23  0441   HEMOGLOBIN g/dL 13.3   HEMATOCRIT % 39.1     Lab Results   Component Value Date    HGBA1C 6.1 (H) 10/26/2022          GI Function:  + BM on 10/7       Skin: Intact        Weight Review: Estimated body mass index is 26.47 kg/mý as calculated from the following:    Height as of this encounter: 185.4 cm (72.99\").    Weight as of this encounter: 91 kg (200 lb 9.9 oz).    Comment:   Wt fluctuation ~190-210s since February.    Wt Readings from Last 30 Encounters:   10/04/23 2126 91 kg (200 lb 9.9 oz)   10/04/23 1815 90.7 kg (200 lb)   08/01/23 1728 90.7 kg (200 lb)   07/25/23 1714 89.8 kg (198 lb)   07/18/23 1325 93.4 kg (206 lb)   07/10/23 1252 93.4 kg (206 lb)   06/14/23 1914 95.3 kg (210 lb)   05/26/23 0410 93.2 kg (205 lb 7.5 oz)   05/25/23 0410 93.6 kg (206 lb 5.6 oz)   05/24/23 0546 97.2 kg (214 lb 4.6 oz)   05/23/23 1602 89.8 kg (198 lb)   05/23/23 0859 89.8 kg (198 lb)   05/23/23 0339 96.3 kg (212 lb 4.9 oz)   05/22/23 1919 97.2 kg (214 lb 4.6 oz)   05/09/23 1643 96 kg (211 lb 10.3 oz)   04/16/23 1823 96 kg (211 lb 10.3 oz)   04/05/23 1257 84.8 kg (187 lb)   02/24/23 1654 85.3 kg (188 lb)   01/27/23 1137 85.3 kg (188 lb)   01/12/23 1413 85.3 kg (188 lb)   01/05/23 1338 85.3 kg (188 lb)   10/26/22 1938 87.1 kg (192 lb)   10/20/22 1447 89.4 kg (197 lb)   07/05/22 1323 86.6 kg (191 lb)   07/01/22 1115 86.6 kg (191 lb)   03/28/22 1111 86.6 kg (191 lb)   03/03/22 1054 90.3 kg (199 lb)   12/10/21 1134 84.8 kg (187 lb)   12/03/21 1113 79.4 kg (175 lb)   11/29/21 1438 83.5 kg (184 lb)   10/29/21 1143 76.2 kg (168 lb)   10/22/21 1013 83.9 kg (185 lb)   09/17/21 1132 83.9 kg (185 lb)   08/20/21 1024 83.5 kg (184 lb)   07/15/21 0509 98.2 kg (216 lb 7.2 oz)   07/14/21 0342 98.2 kg (216 lb 7.9 oz)   07/13/21 2308 98.2 kg (216 lb 7.9 oz)   07/13/21 1753 98.7 kg (217 lb 9.5 oz)   06/25/21 1818 88.7 " kg (195 lb 8.8 oz)   06/16/21 1652 90.1 kg (198 lb 10.2 oz)          --       Nutrition Problem Statement: Moderate starvation related malnutrition related to inadequate PO intake as evidenced by PO intake meeting less than 75% of estimated energy requirement for greater than or equal to 3 months and mainly severe muscle wasting per NFPE.        Nutrition Intervention: Continue current diet. Pt declined ONS.          Monitoring/Evaluation Per protocol, PO intake, Pertinent labs, Weight        Malnutrition Severity Assessment      Patient meets criteria for : Moderate (non-severe) Malnutrition  Malnutrition Type (last 8 hours)       Malnutrition Severity Assessment       Row Name 10/07/23 1104       Malnutrition Severity Assessment    Malnutrition Type Starvation - Related Malnutrition      Row Name 10/07/23 1104       Insufficient Energy Intake     Insufficient Energy Intake Findings Moderate    Insufficient Energy Intake  <75% of est. energy requirement for > or equal to 3 months      Row Name 10/07/23 1104       Muscle Loss    Loss of Muscle Mass Findings Severe    Sikhism Region Severe - deep hollowing/scooping, lack of muscle to touch, facial bones well defined    Clavicle Bone Region Severe - protruding prominent bone    Dorsal Hand Region Moderate - slight depression    Posterior Calf Region Severe - thin with very little definition/firmness      Row Name 10/07/23 1104       Fat Loss    Upper Arm Region Moderate - some fat tissue, not ample      Row Name 10/07/23 1104       Criteria Met (Must meet criteria for severity in at least 2 of these categories: M Wasting, Fat Loss, Fluid, Secondary Signs, Wt. Status, Intake)    Patient meets criteria for  Moderate (non-severe) Malnutrition                       RD to follow up per protocol.    Electronically signed by:  Heather Morrison RD  10/07/23 10:49 EDT

## 2023-10-07 NOTE — CASE MANAGEMENT/SOCIAL WORK
Continued Stay Note  MEIR Sanchez     Patient Name: Deion Nicholas  MRN: [id number]  Today's Date: 10/7/2023    Admit Date: 10/4/2023    Plan: Return home and resume [facility]. Referred to [facility] for home IV antibiotics; acceptance pending   Discharge Plan       Row Name 10/07/23 1042       Plan    Plan Comments D/C barriers: IV antibiotics, IV lasix, cardiology and ID following

## 2023-10-07 NOTE — PROGRESS NOTES
[facility] Progress note    Patient Name: Deion Nicholas  : 1940  MRN: [id number]  Primary Care Physician: Makenna Motta MD  Date of admission: 10/4/2023     Patient Care Team:  Makenna Motta MD as PCP - General  Makenna Motta MD as PCP - Family Medicine          Subjective   History Present Illness     Chief Complaint:   Chief Complaint   Patient presents with    Leg Swelling     Leg swelling    History of Present Illness    82-year-old male who presents to the emergency room by vehicle stating that he has cellulitis on his left leg. He has had intermittent flareups over the last 6 months, he is also complaining of left arm pain he has no associated chest pain or shortness of breath. He has had no fevers nausea or vomiting. He has had bilateral lower extremity swelling which is abnormal for him. He denies a history of congestive heart failure.      Observation 10/5/23  Pt concurs with er hpi. Cardiology consulted due to elevated troponin. ID consulted due to multiple admissions for cellulitis in the last year.        Observation 10/6/23  Pt is tolerating abx well. ID following. Legs a little more swollen today. Gave 40 of lasix and ace wrap rigoberto le    ROS    Constitutional: Negative for chills and fever.   Cardiovascular:  Positive for leg swelling. Negative for chest pain.   Respiratory:  Negative for shortness of breath.    Skin:  Positive for color change.   Gastrointestinal:  Negative for nausea and vomiting.         Personal History     Past Medical History:   Past Medical History:   Diagnosis Date    Aneurysm     Cardiomyopathy     ICD implantation    Cellulitis of left lower extremity     recurrent    CHD (coronary heart disease)     S/P CABG & PCI    Dyslipidemia     History of ventricular tachycardia     Hypertension     Myocardial infarction     Inferior MI    Pinched nerve     L4-L5    Prostate cancer        Surgical History:      Past Surgical History:    Procedure Laterality Date    ANGIOPLASTY  2000 04/1996 Stent: Palmaz- Huy stent/LAD 07/1996-RCA: 2000-Tetra stent Guidant to proximal RCA, mid to distal artery 2 sequential Guidant Tetra stents    APPENDECTOMY      CARDIAC ABLATION  April and May 2019    CARDIAC CATHETERIZATION  07/2017    1996 x2, Nov. 2000, 05/2010-cath 08/2015-no stents 2017    CARDIAC DEFIBRILLATOR PLACEMENT      COLONOSCOPY W/ POLYPECTOMY      Mult colonoscopy's for polyp resection     CORONARY ARTERY BYPASS GRAFT  05/2010    x5 vessel: LMA to diagonal, LAD, intermedius, obtuse marginal, RCA    CORONARY STENT PLACEMENT      ENDOSCOPY N/A 6/24/2019    Procedure: ESOPHAGOGASTRODUODENOSCOPY with dilitation Bougie 50, 54;  Surgeon: Roddy Coronel MD;  Location: [facility];  Service: Gastroenterology    INSERT / REPLACE / REMOVE PACEMAKER      KNEE OPEN LATERAL RELEASE Left     reduction    OTHER SURGICAL HISTORY  01/2018    ICD implantation    PROSTATE SURGERY  2015    Robiotic surgery- Cyber Knife:           Family History: family history includes Heart disease in his father; Pancreatic cancer in his mother. Otherwise pertinent FHx was reviewed and unremarkable.     Social History:  reports that he quit smoking about 21 years ago. His smoking use included cigarettes. He has a 17.00 pack-year smoking history. He has never used smokeless tobacco. He reports that he does not currently use alcohol. He reports that he does not use drugs.      Medications:  Prior to Admission medications    Medication Sig Start Date End Date Taking? Authorizing Provider   aspirin 81 MG EC tablet Take 1 tablet by mouth Daily.   Yes Cyndee Melgar MD   Cholecalciferol 10 MCG (400 UNIT) tablet Take 2 tablets by mouth Daily.   Yes ProviderCyndee MD   ezetimibe (ZETIA) 10 MG tablet Take 1 tablet by mouth Every Evening.   Yes Cyndee Melgar MD   gabapentin (NEURONTIN) 400 MG capsule Take 1 capsule by mouth 3 (Three) Times a Day.   Yes  Cyndee Melgar MD   methocarbamol (ROBAXIN) 500 MG tablet Take 1 tablet by mouth 2 (Two) Times a Day.   Yes Cyndee Melgar MD   metoprolol succinate XL (TOPROL-XL) 50 MG 24 hr tablet Take 1 tablet by mouth Daily.   Yes Cyndee Melgar MD   midodrine (PROAMATINE) 5 MG tablet Take 1 tablet by mouth 3 (Three) Times a Day Before Meals.   Yes Cyndee Melgar MD   mupirocin (BACTROBAN) 2 % ointment Apply 1 application  topically to the appropriate area as directed 3 (Three) Times a Day. Apply to leg(s)   Yes Cyndee Melgar MD   Omega-3 Fatty Acids (fish oil) 1000 MG capsule capsule Take 2 capsules by mouth 2 (Two) Times a Day With Meals.   Yes Cyndee Melgar MD   pantoprazole (PROTONIX) 40 MG EC tablet Take 1 tablet by mouth Daily.   Yes Cyndee Melgar MD   sennosides-docusate (PERICOLACE) 8.6-50 MG per tablet Take 1 tablet by mouth 2 (Two) Times a Day.   Yes Cyndee Melgar MD   sucralfate (CARAFATE) 1 g tablet Take 1 tablet by mouth 4 (Four) Times a Day.   Yes Cyndee Melgar MD   chlorhexidine (HIBICLENS) 4 % external liquid Apply 1 application topically to the appropriate area as directed Daily As Needed for Wound Care. Apply to leg(s)    Cyndee Melgar MD   nitroglycerin (NITROSTAT) 0.4 MG SL tablet Place 1 tablet under the tongue Every 5 (Five) Minutes As Needed for Chest Pain.    Cyndee Melgar MD       Allergies:    Allergies   Allergen Reactions    Lovastatin Myalgia    Pravastatin Myalgia and Unknown (See Comments)     unknown    Simvastatin Unknown (See Comments) and Myalgia     unknown    Spironolactone Other (See Comments)     Gynecomastia        Objective   Objective     Vital Signs  Temp:  [97 øF (36.1 øC)-97.8 øF (36.6 øC)] 97.6 øF (36.4 øC)  Heart Rate:  [74-98] 74  Resp:  [12-19] 12  BP: (101-139)/(54-81) 101/54  SpO2:  [94 %-97 %] 95 %  on   ;   Device (Oxygen Therapy): room air  Body mass index is 26.47 kg/mý.    Physical  Exam    Constitutional:       Appearance: Normal appearance.   HENT:      Mouth/Throat:      Mouth: Mucous membranes are moist.   Cardiovascular:      Rate and Rhythm: Normal rate and regular rhythm.      Pulses: Normal pulses.      Heart sounds: Normal heart sounds.   Pulmonary:      Effort: Pulmonary effort is normal.      Breath sounds: Normal breath sounds.   Musculoskeletal:      Right lower leg: Edema present.      Left lower leg: Edema present.      Comments: Left worse than right   Skin:     Findings: Erythema present.   Neurological:      General: No focal deficit present.      Mental Status: He is alert and oriented to person, place, and time.   Psychiatric:         Mood and Affect: Mood normal.         Behavior: Behavior normal.     Results Review:  I have personally reviewed most recent cardiac tracings, lab results, and radiology images and interpretations and agree with findings, most notably: cbc, cmp, bnp, inr, trop, us leg, ekg.    Results from last 7 days   Lab Units 10/07/23  0441 10/05/23  0515 10/04/23  1856   WBC 10*3/mm3 8.20   < > 5.30   HEMOGLOBIN g/dL 13.3   < > 11.6*   HEMATOCRIT % 39.1   < > 35.0*   PLATELETS 10*3/mm3 185   < > 166   INR   --   --  1.01    < > = values in this interval not displayed.     Results from last 7 days   Lab Units 10/07/23  0441 10/06/23  0538 10/05/23  0515 10/04/23  1856   SODIUM mmol/L 137 137 138 137   POTASSIUM mmol/L 3.9 4.5 4.5 4.6   CHLORIDE mmol/L 103 103 105 105   CO2 mmol/L 25.0 24.0 26.0 23.0   BUN mg/dL 19 15 14 14   CREATININE mg/dL 0.85 0.84 0.85 0.86   GLUCOSE mg/dL 122* 122* 126* 129*   CALCIUM mg/dL 9.5 9.4 8.6 9.0   ALK PHOS U/L  --   --   --  80   ALT (SGPT) U/L  --   --   --  19   AST (SGOT) U/L  --   --   --  26   HSTROP T ng/L  --  22* 19* 20*   PROBNP pg/mL  --   --   --  496.9     Estimated Creatinine Clearance: 89.2 mL/min (by C-G formula based on SCr of 0.85 mg/dL).  Brief Urine Lab Results       None            Microbiology Results  (last 10 days)       ** No results found for the last 240 hours. **            ECG/EMG Results (most recent)       Procedure Component Value Units Date/Time    SCANNED - TELEMETRY   [[id number]] Resulted: 10/04/23     Updated: 10/05/23 0601    SCANNED - TELEMETRY   [[id number]] Resulted: 10/04/23     Updated: 10/05/23 0855    SCANNED - TELEMETRY   [[id number]] Resulted: 10/04/23     Updated: 10/05/23 0922    SCANNED - TELEMETRY   [[id number]] Resulted: 10/04/23     Updated: 10/05/23 1023    SCANNED - TELEMETRY   [[id number]] Resulted: 10/04/23     Updated: 10/05/23 1228    SCANNED - TELEMETRY   [[id number]] Resulted: 10/04/23     Updated: 10/05/23 2033    SCANNED - TELEMETRY   [[id number]] Resulted: 10/04/23     Updated: 10/06/23 0530    SCANNED - TELEMETRY   [[id number]] Resulted: 10/04/23     Updated: 10/06/23 0530    SCANNED - TELEMETRY   [[id number]] Resulted: 10/04/23     Updated: 10/06/23 0702    SCANNED - TELEMETRY   [[id number]] Resulted: 10/04/23     Updated: 10/06/23 0825    SCANNED - TELEMETRY   [[id number]] Resulted: 10/04/23     Updated: 10/06/23 1233    SCANNED - TELEMETRY   [[id number]] Resulted: 10/04/23     Updated: 10/06/23 1927    SCANNED - TELEMETRY   [[id number]] Resulted: 10/04/23     Updated: 10/07/23 0240    SCANNED - TELEMETRY   [[id number]] Resulted: 10/04/23     Updated: 10/07/23 0240    ECG 12 Lead Chest Pain [[id number]] Collected: 10/05/23 0505     Updated: 10/07/23 1013     QT Interval 418 ms      QTC Interval 430 ms     Narrative:      HEART RATE= 64  bpm  RR Interval= 944  ms  RI Interval= 172  ms  P Horizontal Axis= -9  deg  P Front Axis= 45  deg  QRSD Interval= 132  ms  QT Interval= 418  ms  QTcB= 430  ms  QRS Axis= -4  deg  T Wave Axis= -14  deg  - ABNORMAL ECG -  Sinus rhythm  Ventricular premature complex  IVCD, consider LBBB  When compared with ECG of 01-Aug-2023 19:30:53,  No significant change  Electronically Signed By: Kee Torres (EROS) 07-Oct-2023 10:12:51  Date and Time of Study:  2023-10-05 05:05:05            Results for orders placed during the hospital encounter of 10/04/23    Duplex Venous Lower Extremity - Left CAR    Interpretation Summary    Left popliteal fossa fluid collection.    Normal left lower extremity venous duplex scan.      Results for orders placed during the hospital encounter of 05/22/23    Adult Transthoracic Echo Complete W/ Cont if Necessary Per Protocol    Interpretation Summary    Left ventricular ejection fraction appears to be 31 - 35%.    Moderate aortic valve regurgitation is present.    Moderate mitral valve regurgitation is present.    Estimated right ventricular systolic pressure from tricuspid regurgitation is normal (<35 mmHg).    Mild dilation of the aortic root is present.      No radiology results for the last 7 days      Estimated Creatinine Clearance: 89.2 mL/min (by C-G formula based on SCr of 0.85 mg/dL).    Assessment & Plan   Assessment/Plan       Active Hospital Problems    Diagnosis  POA    **Elevated troponin [R79.89]  Yes      Resolved Hospital Problems   No resolved problems to display.     Cellulitis  - cbc, cmp, bnp are unremarkable  - trop was slightly elevated at 20,19  - EKG rate 64 sinus w/ pvcs  - us leg reviewed and showing left popliteal fossa fluid collection  - iv rocephin  - id consulted and recommends changing iv abx to ceftaroline     Elevated troponin  - cardiology consulted  - trop 20. Repeat 19  - cont bb, zetia and midodrine  - recent echo and stress reviewed     Hypertension  -moderately  Controlled         BP Readings from Last 1 Encounters:   10/05/23 141/78      - Continue metoprolol  - Monitor while admitted       VTE Prophylaxis -   Mechanical Order History:        Ordered        10/05/23 0821  Place Sequential Compression Device  Once            10/05/23 0821  Maintain Sequential Compression Device  Continuous                          Pharmalogical Order History:       None            CODE STATUS:    Code Status and  Medical Interventions:   Ordered at: 10/05/23 0821     Code Status (Patient has no pulse and is not breathing):    CPR (Attempt to Resuscitate)     Medical Interventions (Patient has pulse or is breathing):    Full Support       This patient has been examined wearing personal protective equipment.     I discussed the patient's findings and my recommendations with patient and nursing staff.      Signature:Electronically signed by Andra Kate PA-C, 10/07/23, 4:21 PM EDT.

## 2023-10-07 NOTE — PLAN OF CARE
Goal Outcome Evaluation:  Plan of Care Reviewed With: patient           Outcome Evaluation: Pt receiving antibiotics, lower legs swollen bilaterally +3, vitrals wnl, pt will be pain free and no falls for the duration of the shift.

## 2023-10-08 ENCOUNTER — READMISSION MANAGEMENT (OUTPATIENT)
Dept: CALL CENTER | Facility: HOSPITAL | Age: 83
End: 2023-10-08
Payer: OTHER GOVERNMENT

## 2023-10-08 VITALS
RESPIRATION RATE: 17 BRPM | HEART RATE: 87 BPM | DIASTOLIC BLOOD PRESSURE: 68 MMHG | BODY MASS INDEX: 26.59 KG/M2 | OXYGEN SATURATION: 95 % | HEIGHT: 73 IN | WEIGHT: 200.62 LBS | TEMPERATURE: 97.7 F | SYSTOLIC BLOOD PRESSURE: 125 MMHG

## 2023-10-08 PROCEDURE — 96376 TX/PRO/DX INJ SAME DRUG ADON: CPT

## 2023-10-08 PROCEDURE — 25010000002 FUROSEMIDE PER 20 MG: Performed by: NURSE PRACTITIONER

## 2023-10-08 PROCEDURE — 25010000002 CEFTAROLINE FOSAMIL PER 10 MG: Performed by: NURSE PRACTITIONER

## 2023-10-08 PROCEDURE — G0378 HOSPITAL OBSERVATION PER HR: HCPCS

## 2023-10-08 RX ADMIN — MUPIROCIN 1 APPLICATION: 20 OINTMENT TOPICAL at 08:04

## 2023-10-08 RX ADMIN — GABAPENTIN 400 MG: 400 CAPSULE ORAL at 08:03

## 2023-10-08 RX ADMIN — Medication 10 ML: at 04:08

## 2023-10-08 RX ADMIN — METHOCARBAMOL 500 MG: 500 TABLET ORAL at 08:03

## 2023-10-08 RX ADMIN — Medication 10 ML: at 08:04

## 2023-10-08 RX ADMIN — PANTOPRAZOLE SODIUM 40 MG: 40 TABLET, DELAYED RELEASE ORAL at 08:04

## 2023-10-08 RX ADMIN — MUPIROCIN 1 APPLICATION: 20 OINTMENT TOPICAL at 00:00

## 2023-10-08 RX ADMIN — SUCRALFATE 1 G: 1 TABLET ORAL at 08:03

## 2023-10-08 RX ADMIN — CEFTAROLINE FOSAMIL 600 MG: 600 POWDER, FOR SOLUTION INTRAVENOUS at 05:17

## 2023-10-08 RX ADMIN — SUCRALFATE 1 G: 1 TABLET ORAL at 11:50

## 2023-10-08 RX ADMIN — ASPIRIN 81 MG: 81 TABLET, COATED ORAL at 08:03

## 2023-10-08 RX ADMIN — FUROSEMIDE 40 MG: 10 INJECTION, SOLUTION INTRAMUSCULAR; INTRAVENOUS at 08:03

## 2023-10-08 RX ADMIN — Medication 10 ML: at 08:03

## 2023-10-08 RX ADMIN — MIDODRINE HYDROCHLORIDE 5 MG: 5 TABLET ORAL at 10:19

## 2023-10-08 NOTE — PLAN OF CARE
Problem: Pain Chronic (Persistent) (Comorbidity Management)  Goal: Acceptable Pain Control and Functional Ability  Outcome: Ongoing, Progressing  Intervention: Manage Persistent Pain  Recent Flowsheet Documentation  Taken 10/7/2023 2000 by Linda Kinney RN  Sleep/Rest Enhancement: awakenings minimized  Intervention: Optimize Psychosocial Wellbeing  Recent Flowsheet Documentation  Taken 10/7/2023 2000 by Linda Kinney, RN  Supportive Measures: active listening utilized  Diversional Activities: television  Family/Support System Care: self-care encouraged     Problem: Pain Acute  Goal: Acceptable Pain Control and Functional Ability  Outcome: Ongoing, Progressing  Intervention: Prevent or Manage Pain  Recent Flowsheet Documentation  Taken 10/7/2023 2000 by Linda Kinney, RN  Sleep/Rest Enhancement: awakenings minimized  Intervention: Optimize Psychosocial Wellbeing  Recent Flowsheet Documentation  Taken 10/7/2023 2000 by Linda Kinney, RN  Supportive Measures: active listening utilized  Diversional Activities: television     Problem: Chest Pain  Goal: Resolution of Chest Pain Symptoms  Outcome: Ongoing, Progressing   Goal Outcome Evaluation:

## 2023-10-08 NOTE — PROGRESS NOTES
Infectious Diseases Progress Note      LOS: 0 days   Patient Care Team:  Makenna Motta MD as PCP - General  Makenna Motta MD as PCP - Family Medicine    Chief Complaint: Left leg pain, swelling, and redness.    Subjective       The patient has been afebrile for the last 24 hours.  The patient is on room air, hemodynamically stable, and is tolerating antimicrobial therapy.  Patient reports improvement to leg swelling and pain      Review of Systems:   Review of Systems   Constitutional: Negative.    HENT: Negative.     Eyes: Negative.    Respiratory: Negative.     Cardiovascular:  Positive for leg swelling.   Gastrointestinal: Negative.    Endocrine: Negative.    Genitourinary: Negative.    Musculoskeletal: Negative.    Skin:  Positive for color change.   Neurological: Negative.    Psychiatric/Behavioral: Negative.     All other systems reviewed and are negative.       Objective     Vital Signs  Temp:  [97.1 øF (36.2 øC)-97.9 øF (36.6 øC)] 97.7 øF (36.5 øC)  Heart Rate:  [79-95] 87  Resp:  [14-21] 17  BP: ()/(51-74) 125/68    Physical Exam:  Physical Exam  Vitals and nursing note reviewed.   Constitutional:       General: He is not in acute distress.     Appearance: Normal appearance. He is well-developed and normal weight. He is not diaphoretic.   HENT:      Head: Normocephalic and atraumatic.   Eyes:      Conjunctiva/sclera: Conjunctivae normal.      Pupils: Pupils are equal, round, and reactive to light.   Cardiovascular:      Rate and Rhythm: Normal rate and regular rhythm.      Heart sounds: Normal heart sounds, S1 normal and S2 normal.   Pulmonary:      Effort: Pulmonary effort is normal. No respiratory distress.      Breath sounds: Normal breath sounds. No stridor. No wheezing or rales.   Abdominal:      General: Bowel sounds are normal. There is no distension.      Palpations: Abdomen is soft. There is no mass.      Tenderness: There is no abdominal tenderness. There is no guarding.    Musculoskeletal:         General: No deformity. Normal range of motion.      Cervical back: Neck supple.      Left lower leg: Edema present.   Skin:     General: Skin is warm and dry.      Coloration: Skin is not pale.      Findings: No erythema or rash.      Comments: Very mild left leg edema and erythema.  Mild warmth but leg is fairly tender    Neurological:      Mental Status: He is alert and oriented to person, place, and time.      Cranial Nerves: No cranial nerve deficit.   Psychiatric:         Mood and Affect: Mood normal.          Results Review:    I have reviewed all clinical data, test, lab, and imaging results.     Radiology  No Radiology Exams Resulted Within Past 24 Hours    Cardiology    Laboratory    Results from last 7 days   Lab Units 10/07/23  0441 10/06/23  0538 10/05/23  0515 10/04/23  1856   WBC 10*3/mm3 8.20 6.20 4.30 5.30   HEMOGLOBIN g/dL 13.3 13.3 12.2* 11.6*   HEMATOCRIT % 39.1 39.3 36.6* 35.0*   PLATELETS 10*3/mm3 185 191 169 166     Results from last 7 days   Lab Units 10/07/23  0441 10/06/23  0538 10/05/23  0515 10/04/23  1856   SODIUM mmol/L 137 137 138 137   POTASSIUM mmol/L 3.9 4.5 4.5 4.6   CHLORIDE mmol/L 103 103 105 105   CO2 mmol/L 25.0 24.0 26.0 23.0   BUN mg/dL 19 15 14 14   CREATININE mg/dL 0.85 0.84 0.85 0.86   GLUCOSE mg/dL 122* 122* 126* 129*   ALBUMIN g/dL  --   --   --  3.6   BILIRUBIN mg/dL  --   --   --  0.2   ALK PHOS U/L  --   --   --  80   AST (SGOT) U/L  --   --   --  26   ALT (SGPT) U/L  --   --   --  19   CALCIUM mg/dL 9.5 9.4 8.6 9.0                 Microbiology   Microbiology Results (last 10 days)       ** No results found for the last 240 hours. **            Medication Review:       Schedule Meds  aspirin, 81 mg, Oral, Daily  ceftaroline, 600 mg, Intravenous, Q12H  furosemide, 40 mg, Intravenous, Daily  gabapentin, 400 mg, Oral, TID  methocarbamol, 500 mg, Oral, BID  metoprolol succinate XL, 50 mg, Oral, Daily  midodrine, 5 mg, Oral, TID AC  mupirocin, 1  application , Topical, TID  pantoprazole, 40 mg, Oral, Daily  senna-docusate sodium, 2 tablet, Oral, BID  sodium chloride, 10 mL, Intravenous, Q12H  sodium chloride, 10 mL, Intravenous, Q12H  sucralfate, 1 g, Oral, 4x Daily AC & at Bedtime        Infusion Meds       PRN Meds    acetaminophen    senna-docusate sodium **AND** polyethylene glycol **AND** bisacodyl **AND** bisacodyl    Morphine **AND** naloxone    nitroglycerin    sodium chloride    [COMPLETED] Insert Peripheral IV **AND** sodium chloride    sodium chloride    sodium chloride    sodium chloride    sodium chloride        Assessment & Plan       Antimicrobial Therapy   1.  IV Teflaro        2.        3.        4.        5.          Assessment     Recurrent left lower leg cellulitis.  There is mild  edema, erythema and warmth.  Leg is very tender.  Dopplers negative for DVT.  Erythema is almost completely resolved along with warmth and tenderness     Plan     Patient normally responds well with IV Teflaro for several days and then we will switch to oral antibiotics at discharge     Discontinue IV Teflaro   Okay to discharge from Infectious Disease standpoint  Case discussed with RN at bedside  Not much more to add from infectious disease standpoint-we will sign off at this time-please call with any questions.      Tamiko Victor, APRN  10/08/23  15:14 EDT    Note is dictated utilizing voice recognition software/Dragon

## 2023-10-08 NOTE — DISCHARGE SUMMARY
[facility]    Makenna Motta MD    CHIEF COMPLAINT:     Leg swelling    HISTORY OF PRESENT ILLNESS:    HPI    82-year-old male who presents to the emergency room by vehicle stating that he has cellulitis on his left leg. He has had intermittent flareups over the last 6 months, he is also complaining of left arm pain he has no associated chest pain or shortness of breath. He has had no fevers nausea or vomiting. He has had bilateral lower extremity swelling which is abnormal for him. He denies a history of congestive heart failure.      Observation 10/5/23  Pt concurs with er hpi. Cardiology consulted due to elevated troponin. ID consulted due to multiple admissions for cellulitis in the last year.        Observation 10/6/23  Pt is tolerating abx well. ID following. Legs a little more swollen today. Gave 40 of lasix and ace wrap rigoberto le    Observation 10/7/23  Cont iv abx and id following    Past Medical History:   Diagnosis Date    Aneurysm     Cardiomyopathy     ICD implantation    Cellulitis of left lower extremity 2010    recurrent    CHD (coronary heart disease)     S/P CABG & PCI    Dyslipidemia     History of ventricular tachycardia     Hypertension     Myocardial infarction     Inferior MI    Pinched nerve     L4-L5    Prostate cancer      Past Surgical History:   Procedure Laterality Date    ANGIOPLASTY  2000 04/1996 Stent: Palmaz- Huy stent/LAD 07/1996-RCA: 2000-Tetra stent Guidant to proximal RCA, mid to distal artery 2 sequential Guidant Tetra stents    APPENDECTOMY      CARDIAC ABLATION  April and May 2019    CARDIAC CATHETERIZATION  07/2017    1996 x2, Nov. 2000, 05/2010-cath 08/2015-no stents 2017    CARDIAC DEFIBRILLATOR PLACEMENT      COLONOSCOPY W/ POLYPECTOMY      Mult colonoscopy's for polyp resection     CORONARY ARTERY BYPASS GRAFT  05/2010    x5 vessel: LMA to diagonal, LAD, intermedius, obtuse marginal, RCA    CORONARY STENT PLACEMENT      ENDOSCOPY N/A  2019    Procedure: ESOPHAGOGASTRODUODENOSCOPY with dilitation Bougie 50, 54;  Surgeon: Roddy Coronel MD;  Location: [facility] ENDOSCOPY;  Service: Gastroenterology    INSERT / REPLACE / REMOVE PACEMAKER      KNEE OPEN LATERAL RELEASE Left     reduction    OTHER SURGICAL HISTORY  2018    ICD implantation    PROSTATE SURGERY  2015    Robiotic surgery- Cyber Knife:     Family History   Problem Relation Age of Onset    Pancreatic cancer Mother     Heart disease Father      Social History     Tobacco Use    Smoking status: Former     Packs/day: 1.00     Years: 17.00     Additional pack years: 0.00     Total pack years: 17.00     Types: Cigarettes     Quit date: 2002     Years since quittin.2    Smokeless tobacco: Never   Vaping Use    Vaping Use: Never used   Substance Use Topics    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   10/4/2023    Cholecalciferol 10 MCG (400 UNIT) tablet Take 2 tablets by mouth Daily.   10/4/2023    ezetimibe (ZETIA) 10 MG tablet Take 1 tablet by mouth Every Evening.   10/3/2023    gabapentin (NEURONTIN) 400 MG capsule Take 1 capsule by mouth 3 (Three) Times a Day.   10/4/2023    methocarbamol (ROBAXIN) 500 MG tablet Take 1 tablet by mouth 2 (Two) Times a Day.   10/3/2023    metoprolol succinate XL (TOPROL-XL) 50 MG 24 hr tablet Take 1 tablet by mouth Daily.   10/3/2023    midodrine (PROAMATINE) 5 MG tablet Take 1 tablet by mouth 3 (Three) Times a Day Before Meals.   10/4/2023    mupirocin (BACTROBAN) 2 % ointment Apply 1 application  topically to the appropriate area as directed 3 (Three) Times a Day. Apply to leg(s)   10/4/2023    Omega-3 Fatty Acids (fish oil) 1000 MG capsule capsule Take 2 capsules by mouth 2 (Two) Times a Day With Meals.   10/4/2023    pantoprazole (PROTONIX) 40 MG EC tablet Take 1 tablet  by mouth Daily.   10/4/2023    sennosides-docusate (PERICOLACE) 8.6-50 MG per tablet Take 1 tablet by mouth 2 (Two) Times a Day.   10/3/2023    sucralfate (CARAFATE) 1 g tablet Take 1 tablet by mouth 4 (Four) Times a Day.   10/3/2023    chlorhexidine (HIBICLENS) 4 % external liquid Apply 1 application topically to the appropriate area as directed Daily As Needed for Wound Care. Apply to leg(s)       nitroglycerin (NITROSTAT) 0.4 MG SL tablet Place 1 tablet under the tongue Every 5 (Five) Minutes As Needed for Chest Pain.   More than a month     Allergies:  Lovastatin, Pravastatin, Simvastatin, and Spironolactone    Immunization History   Administered Date(s) Administered    COVID-19 (MODERNA) 1st,2nd,3rd Dose Monovalent 02/14/2021, 03/13/2021           REVIEW OF SYSTEMS:    ROS  Constitutional: Negative for chills and fever.   Cardiovascular:  Positive for leg swelling. Negative for chest pain.   Respiratory:  Negative for shortness of breath.    Skin:  Positive for color change.   Gastrointestinal:  Negative for nausea and vomiting.    Vital Signs  Temp:  [97.1 øF (36.2 øC)-97.9 øF (36.6 øC)] 97.7 øF (36.5 øC)  Heart Rate:  [79-95] 87  Resp:  [14-21] 17  BP: ()/(51-74) 125/68          Physical Exam:  Physical Exam  Constitutional:       Appearance: Normal appearance.   HENT:      Mouth/Throat:      Mouth: Mucous membranes are moist.   Cardiovascular:      Rate and Rhythm: Normal rate and regular rhythm.      Pulses: Normal pulses.      Heart sounds: Normal heart sounds.   Pulmonary:      Effort: Pulmonary effort is normal.      Breath sounds: Normal breath sounds.   Musculoskeletal:      Right lower leg: Edema present.      Left lower leg: Edema present.      Comments: Left worse than right   Skin:     Findings: Erythema present.   Neurological:      General: No focal deficit present.      Mental Status: He is alert and oriented to person, place, and time.   Psychiatric:         Mood and Affect: Mood normal.          Behavior: Behavior normal.     Emotional Behavior:    wnl   Debilities:   none  Results Review:    I reviewed the patient's new clinical results.  Lab Results (most recent)       Procedure Component Value Units Date/Time    Basic Metabolic Panel [[id number]]  (Abnormal) Collected: 10/07/23 0441    Specimen: Blood from Arm, Left Updated: 10/07/23 0506     Glucose 122 mg/dL      BUN 19 mg/dL      Creatinine 0.85 mg/dL      Sodium 137 mmol/L      Potassium 3.9 mmol/L      Chloride 103 mmol/L      CO2 25.0 mmol/L      Calcium 9.5 mg/dL      BUN/Creatinine Ratio 22.4     Anion Gap 9.0 mmol/L      eGFR 87.8 mL/min/1.73     Narrative:      GFR Normal >60  Chronic Kidney Disease <60  Kidney Failure <15    The GFR formula is only valid for adults with stable renal function between ages 18 and 70.    CBC & Differential [[id number]]  (Abnormal) Collected: 10/07/23 0441    Specimen: Blood from Arm, Left Updated: 10/07/23 0448    Narrative:      The following orders were created for panel order CBC & Differential.  Procedure                               Abnormality         Status                     ---------                               -----------         ------                     CBC Auto Differential[[id number]]        Abnormal            Final result                 Please view results for these tests on the individual orders.    CBC Auto Differential [[id number]]  (Abnormal) Collected: 10/07/23 0441    Specimen: Blood from Arm, Left Updated: 10/07/23 0448     WBC 8.20 10*3/mm3      RBC 4.16 10*6/mm3      Hemoglobin 13.3 g/dL      Hematocrit 39.1 %      MCV 94.2 fL      MCH 32.0 pg      MCHC 34.0 g/dL      RDW 13.8 %      RDW-SD 48.1 fl      MPV 8.2 fL      Platelets 185 10*3/mm3      Neutrophil % 74.8 %      Lymphocyte % 12.9 %      Monocyte % 8.9 %      Eosinophil % 2.7 %      Basophil % 0.7 %      Neutrophils, Absolute 6.10 10*3/mm3      Lymphocytes, Absolute 1.10 10*3/mm3      Monocytes, Absolute 0.70 10*3/mm3       Eosinophils, Absolute 0.20 10*3/mm3      Basophils, Absolute 0.10 10*3/mm3      nRBC 0.0 /100 WBC     High Sensitivity Troponin T 2Hr [[id number]]  (Abnormal) Collected: 10/06/23 0538    Specimen: Blood from Arm, Right Updated: 10/06/23 0608     HS Troponin T 22 ng/L      Troponin T Delta 3 ng/L     Narrative:      High Sensitive Troponin T Reference Range:  <10.0 ng/L- Negative Female for AMI  <15.0 ng/L- Negative Male for AMI  >=10 - Abnormal Female indicating possible myocardial injury.  >=15 - Abnormal Male indicating possible myocardial injury.   Clinicians would have to utilize clinical acumen, EKG, Troponin, and serial changes to determine if it is an Acute Myocardial Infarction or myocardial injury due to an underlying chronic condition.         Basic Metabolic Panel [[id number]]  (Abnormal) Collected: 10/06/23 0538    Specimen: Blood from Arm, Right Updated: 10/06/23 0608     Glucose 122 mg/dL      BUN 15 mg/dL      Creatinine 0.84 mg/dL      Sodium 137 mmol/L      Potassium 4.5 mmol/L      Chloride 103 mmol/L      CO2 24.0 mmol/L      Calcium 9.4 mg/dL      BUN/Creatinine Ratio 17.9     Anion Gap 10.0 mmol/L      eGFR 88.2 mL/min/1.73     Narrative:      GFR Normal >60  Chronic Kidney Disease <60  Kidney Failure <15    The GFR formula is only valid for adults with stable renal function between ages 18 and 70.    CBC & Differential [[id number]]  (Normal) Collected: 10/06/23 0538    Specimen: Blood from Arm, Right Updated: 10/06/23 0549    Narrative:      The following orders were created for panel order CBC & Differential.  Procedure                               Abnormality         Status                     ---------                               -----------         ------                     CBC Auto Differential[[id number]]        Normal              Final result                 Please view results for these tests on the individual orders.    CBC Auto Differential [[id number]]  (Normal) Collected: 10/06/23  0538    Specimen: Blood from Arm, Right Updated: 10/06/23 0549     WBC 6.20 10*3/mm3      RBC 4.16 10*6/mm3      Hemoglobin 13.3 g/dL      Hematocrit 39.3 %      MCV 94.4 fL      MCH 31.9 pg      MCHC 33.8 g/dL      RDW 14.5 %      RDW-SD 49.9 fl      MPV 8.2 fL      Platelets 191 10*3/mm3      Neutrophil % 54.7 %      Lymphocyte % 28.3 %      Monocyte % 11.5 %      Eosinophil % 4.7 %      Basophil % 0.8 %      Neutrophils, Absolute 3.40 10*3/mm3      Lymphocytes, Absolute 1.80 10*3/mm3      Monocytes, Absolute 0.70 10*3/mm3      Eosinophils, Absolute 0.30 10*3/mm3      Basophils, Absolute 0.00 10*3/mm3      nRBC 0.1 /100 WBC     High Sensitivity Troponin T [[id number]]  (Abnormal) Collected: 10/05/23 0515    Specimen: Blood from Arm, Left Updated: 10/05/23 0828     HS Troponin T 19 ng/L     Narrative:      High Sensitive Troponin T Reference Range:  <10.0 ng/L- Negative Female for AMI  <15.0 ng/L- Negative Male for AMI  >=10 - Abnormal Female indicating possible myocardial injury.  >=15 - Abnormal Male indicating possible myocardial injury.   Clinicians would have to utilize clinical acumen, EKG, Troponin, and serial changes to determine if it is an Acute Myocardial Infarction or myocardial injury due to an underlying chronic condition.         CBC (No Diff) [[id number]]  (Abnormal) Collected: 10/05/23 0515    Specimen: Blood from Arm, Left Updated: 10/05/23 0542     WBC 4.30 10*3/mm3      RBC 3.86 10*6/mm3      Hemoglobin 12.2 g/dL      Hematocrit 36.6 %      MCV 94.8 fL      MCH 31.6 pg      MCHC 33.3 g/dL      RDW 14.1 %      RDW-SD 49.4 fl      MPV 8.4 fL      Platelets 169 10*3/mm3     [place] Draw [[id number]] Collected: 10/04/23 1856    Specimen: Blood Updated: 10/04/23 2000    Narrative:      The following orders were created for panel order [place] Draw.  Procedure                               Abnormality         Status                     ---------                               -----------         ------                      Green Top (Gel)[[id number]]                                  Final result               Lavender Top[[id number]]                                                                Gold Top - SST[[id number]]                                   Final result               Light Blue Top[[id number]]                                   Final result                 Please view results for these tests on the individual orders.    Green Top (Gel) [[id number]] Collected: 10/04/23 1856    Specimen: Blood Updated: 10/04/23 2000     Extra Tube Hold for add-ons.     Comment: Auto resulted.       Light Blue Top [[id number]] Collected: 10/04/23 1856    Specimen: Blood Updated: 10/04/23 2000     Extra Tube Hold for add-ons.     Comment: Auto resulted       Gold Top - SST [[id number]] Collected: 10/04/23 1856    Specimen: Blood Updated: 10/04/23 2000     Extra Tube Hold for add-ons.     Comment: Auto resulted.       Comprehensive Metabolic Panel [[id number]]  (Abnormal) Collected: 10/04/23 1856    Specimen: Blood Updated: 10/04/23 1949     Glucose 129 mg/dL      BUN 14 mg/dL      Creatinine 0.86 mg/dL      Sodium 137 mmol/L      Potassium 4.6 mmol/L      Comment: Slight hemolysis detected by analyzer. Results may be affected.        Chloride 105 mmol/L      CO2 23.0 mmol/L      Calcium 9.0 mg/dL      Total Protein 6.3 g/dL      Albumin 3.6 g/dL      ALT (SGPT) 19 U/L      AST (SGOT) 26 U/L      Comment: Slight hemolysis detected by analyzer. Results may be affected.        Alkaline Phosphatase 80 U/L      Total Bilirubin 0.2 mg/dL      Globulin 2.7 gm/dL      A/G Ratio 1.3 g/dL      BUN/Creatinine Ratio 16.3     Anion Gap 9.0 mmol/L      eGFR 87.5 mL/min/1.73     Narrative:      GFR Normal >60  Chronic Kidney Disease <60  Kidney Failure <15    The GFR formula is only valid for adults with stable renal function between ages 18 and 70.    Single High Sensitivity Troponin T [[id number]]  (Abnormal) Collected: 10/04/23 1856    Specimen:  Blood Updated: 10/04/23 1943     HS Troponin T 20 ng/L     Narrative:      High Sensitive Troponin T Reference Range:  <10.0 ng/L- Negative Female for AMI  <15.0 ng/L- Negative Male for AMI  >=10 - Abnormal Female indicating possible myocardial injury.  >=15 - Abnormal Male indicating possible myocardial injury.   Clinicians would have to utilize clinical acumen, EKG, Troponin, and serial changes to determine if it is an Acute Myocardial Infarction or myocardial injury due to an underlying chronic condition.         BNP [[id number]]  (Normal) Collected: 10/04/23 1856    Specimen: Blood Updated: 10/04/23 1943     proBNP 496.9 pg/mL     Narrative:      This assay is used as an aid in the diagnosis of individuals suspected of having heart failure. It can be used as an aid in the diagnosis of acute decompensated heart failure (ADHF) in patients presenting with signs and symptoms of ADHF to the emergency department (ED). In addition, NT-proBNP of <300 pg/mL indicates ADHF is not likely.    Age Range Result Interpretation  NT-proBNP Concentration (pg/mL:      <50             Positive            >450                   Gray                 300-450                    Negative             <300    50-75           Positive            >900                  Gray                300-900                  Negative            <300      >75             Positive            >1800                  Gray                300-1800                  Negative            <300    Protime-INR [[id number]]  (Normal) Collected: 10/04/23 1856    Specimen: Blood Updated: 10/04/23 1918     Protime 11.0 Seconds      INR 1.01    aPTT [[id number]]  (Abnormal) Collected: 10/04/23 1856    Specimen: Blood Updated: 10/04/23 1918     PTT 25.1 seconds             Imaging Results (Most Recent)       None          reviewed    ECG/EMG Results (most recent)       Procedure Component Value Units Date/Time    SCANNED - TELEMETRY   [[id number]] Resulted: 10/04/23      Updated: 10/05/23 0601    SCANNED - TELEMETRY   [[id number]] Resulted: 10/04/23     Updated: 10/05/23 0855    SCANNED - TELEMETRY   [[id number]] Resulted: 10/04/23     Updated: 10/05/23 0922    SCANNED - TELEMETRY   [[id number]] Resulted: 10/04/23     Updated: 10/05/23 1023    SCANNED - TELEMETRY   [[id number]] Resulted: 10/04/23     Updated: 10/05/23 1228    SCANNED - TELEMETRY   [[id number]] Resulted: 10/04/23     Updated: 10/05/23 2033    SCANNED - TELEMETRY   [[id number]] Resulted: 10/04/23     Updated: 10/06/23 0530    SCANNED - TELEMETRY   [[id number]] Resulted: 10/04/23     Updated: 10/06/23 0530    SCANNED - TELEMETRY   [[id number]] Resulted: 10/04/23     Updated: 10/06/23 0702    SCANNED - TELEMETRY   [[id number]] Resulted: 10/04/23     Updated: 10/06/23 0825    SCANNED - TELEMETRY   [[id number]] Resulted: 10/04/23     Updated: 10/06/23 1233    SCANNED - TELEMETRY   [[id number]] Resulted: 10/04/23     Updated: 10/06/23 1927    SCANNED - TELEMETRY   [[id number]] Resulted: 10/04/23     Updated: 10/07/23 0240    SCANNED - TELEMETRY   [[id number]] Resulted: 10/04/23     Updated: 10/07/23 0240    ECG 12 Lead Chest Pain [[id number]] Collected: 10/05/23 0505     Updated: 10/07/23 1013     QT Interval 418 ms      QTC Interval 430 ms     Narrative:      HEART RATE= 64  bpm  RR Interval= 944  ms  ID Interval= 172  ms  P Horizontal Axis= -9  deg  P Front Axis= 45  deg  QRSD Interval= 132  ms  QT Interval= 418  ms  QTcB= 430  ms  QRS Axis= -4  deg  T Wave Axis= -14  deg  - ABNORMAL ECG -  Sinus rhythm  Ventricular premature complex  IVCD, consider LBBB  When compared with ECG of 01-Aug-2023 19:30:53,  No significant change  Electronically Signed By: Kee Torres ([facility]) 07-Oct-2023 10:12:51  Date and Time of Study: 2023-10-05 05:05:05    SCANNED - TELEMETRY   [[id number]] Resulted: 10/04/23     Updated: 10/07/23 1816    SCANNED - TELEMETRY   [[id number]] Resulted: 10/04/23     Updated: 10/07/23 1913    SCANNED - TELEMETRY   [[id number]]  Resulted: 10/04/23     Updated: 10/07/23 2117    SCANNED - TELEMETRY   [[id number]] Resulted: 10/04/23     Updated: 10/07/23 2153          reviewed    Results for orders placed during the hospital encounter of 10/04/23    Duplex Venous Lower Extremity - Left CAR    Interpretation Summary    Left popliteal fossa fluid collection.    Normal left lower extremity venous duplex scan.      Results for orders placed during the hospital encounter of 05/22/23    Adult Transthoracic Echo Complete W/ Cont if Necessary Per Protocol    Interpretation Summary    Left ventricular ejection fraction appears to be 31 - 35%.    Moderate aortic valve regurgitation is present.    Moderate mitral valve regurgitation is present.    Estimated right ventricular systolic pressure from tricuspid regurgitation is normal (<35 mmHg).    Mild dilation of the aortic root is present.      Microbiology Results (last 10 days)       ** No results found for the last 240 hours. **            Assessment & Plan     Elevated troponin     Cellulitis  - cbc, cmp, bnp are unremarkable  - trop was slightly elevated at 20,19  - EKG rate 64 sinus w/ pvcs  - us leg reviewed and showing left popliteal fossa fluid collection  - iv rocephin  - id consulted and recommends changing iv abx to ceftaroline  - ID recommended doxy oral and pt refused     Elevated troponin  - cardiology consulted  - trop 20. Repeat 19  - cont bb, zetia and midodrine  - recent echo and stress reviewed     Hypertension  -moderately  Controlled         BP Readings from Last 1 Encounters:   10/05/23 141/78      - Continue metoprolol  - Monitor while admitted       I discussed the patients findings and my recommendations with patient and nursing staff.     Discharge Diagnosis:      Elevated troponin      Hospital Course  Patient is a 82 y.o. male presented with leg swelling. Pt has been seen multiple time this year for same. Er evaluated pt and admitted to observation. Labs including cbc, cmp,  bnp were unremarkable. Trop stable. EKG sinus. Us left leg showing popliteal fluid collection. Cardiology consulted due to troponin elevation and they recommended no changes in current cardia regimen. ID consulted and placed pt on iv abx. Id recommends oral doxy to discharge and pt has declined. Discharge discussed with pt and he is agreeable to plan. Instructed pt to return to er if symptoms reoccur or worsen.      Past Medical History:     Past Medical History:   Diagnosis Date    Aneurysm     Cardiomyopathy     ICD implantation    Cellulitis of left lower extremity 2010    recurrent    CHD (coronary heart disease)     S/P CABG & PCI    Dyslipidemia     History of ventricular tachycardia     Hypertension     Myocardial infarction     Inferior MI    Pinched nerve     L4-L5    Prostate cancer        Past Surgical History:     Past Surgical History:   Procedure Laterality Date    ANGIOPLASTY  2000 04/1996 Stent: Palmaz- Huy stent/LAD 07/1996-RCA: 2000-Tetra stent Guidant to proximal RCA, mid to distal artery 2 sequential Guidant Tetra stents    APPENDECTOMY      CARDIAC ABLATION  April and May 2019    CARDIAC CATHETERIZATION  07/2017    1996 x2, Nov. 2000, 05/2010-cath 08/2015-no stents 2017    CARDIAC DEFIBRILLATOR PLACEMENT      COLONOSCOPY W/ POLYPECTOMY      Mult colonoscopy's for polyp resection     CORONARY ARTERY BYPASS GRAFT  05/2010    x5 vessel: LMA to diagonal, LAD, intermedius, obtuse marginal, RCA    CORONARY STENT PLACEMENT      ENDOSCOPY N/A 6/24/2019    Procedure: ESOPHAGOGASTRODUODENOSCOPY with dilitation Bougie 50, 54;  Surgeon: Roddy Coronel MD;  Location: [facility] ENDOSCOPY;  Service: Gastroenterology    INSERT / REPLACE / REMOVE PACEMAKER      KNEE OPEN LATERAL RELEASE Left     reduction    OTHER SURGICAL HISTORY  01/2018    ICD implantation    PROSTATE SURGERY  2015    Robiotic surgery- Cyber Knife:       Social History:   Social History     Socioeconomic History    Marital status:     Tobacco Use    Smoking status: Former     Packs/day: 1.00     Years: 17.00     Additional pack years: 0.00     Total pack years: 17.00     Types: Cigarettes     Quit date: 2002     Years since quittin.2    Smokeless tobacco: Never   Vaping Use    Vaping Use: Never used   Substance and Sexual Activity    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    Drug use: Never    Sexual activity: Defer       Procedures Performed         Consults:   Consults       Date and Time Order Name Status Description    10/5/2023  8:11 AM Inpatient Infectious Diseases Consult Completed     10/4/2023  8:35 PM Inpatient Cardiology Consult Completed             Condition on Discharge:     Stable    Discharge Disposition      Discharge Medications     Discharge Medications        ASK your doctor about these medications        Instructions Start Date   aspirin 81 MG EC tablet   81 mg, Oral, Daily      chlorhexidine 4 % external liquid  Commonly known as: HIBICLENS   1 application , Topical, Daily PRN, Apply to leg(s)      cholecalciferol 10 MCG (400 UNIT) tablet  Commonly known as: VITAMIN D3   800 Units, Oral, Daily      ezetimibe 10 MG tablet  Commonly known as: ZETIA   10 mg, Oral, Every Evening      fish oil 1000 MG capsule capsule   2,000 mg, Oral, 2 Times Daily With Meals      gabapentin 400 MG capsule  Commonly known as: NEURONTIN   400 mg, Oral, 3 Times Daily      methocarbamol 500 MG tablet  Commonly known as: ROBAXIN   500 mg, Oral, 2 Times Daily      metoprolol succinate XL 50 MG 24 hr tablet  Commonly known as: TOPROL-XL   50 mg, Oral, Daily      midodrine 5 MG tablet  Commonly known as: PROAMATINE   5 mg, Oral, 3 Times Daily Before Meals      mupirocin 2 % ointment  Commonly known as: BACTROBAN   1 application , Topical, 3 Times Daily, Apply to leg(s)      nitroglycerin 0.4 MG SL tablet  Commonly known as: NITROSTAT   0.4 mg, Sublingual, Every 5 Minutes PRN      pantoprazole 40 MG EC  tablet  Commonly known as: PROTONIX   40 mg, Oral, Daily      sennosides-docusate 8.6-50 MG per tablet  Commonly known as: PERICOLACE   1 tablet, Oral, 2 Times Daily      sucralfate 1 g tablet  Commonly known as: CARAFATE   1 g, Oral, 4 Times Daily               Discharge Diet:     Activity at Discharge:     Follow-up Appointments  Future Appointments   Date Time Provider Department Center   10/20/2023 11:00 AM Constantino Arvizu MD [facility]   11/9/2023  3:00 PM ROOM 1,  [facility] None   1/30/2024  2:15 PM Marcin Childers, DO [facility]         Test Results Pending at Discharge       Risk for Readmission (LACE) Score: 9 (10/8/2023  6:00 AM)      Less Than 30 minutes spent in discharge activities for this patient    Andra Kate PA-C  10/08/23  15:07 EDT

## 2023-10-08 NOTE — PLAN OF CARE
Problem: Fall Injury Risk  Goal: Absence of Fall and Fall-Related Injury  Outcome: Met  Intervention: Identify and Manage Contributors  Recent Flowsheet Documentation  Taken 10/8/2023 1035 by Anjelica Su RN  Medication Review/Management: medications reviewed  Taken 10/8/2023 0800 by Anjelica Su RN  Medication Review/Management: medications reviewed  Intervention: Promote Injury-Free Environment  Recent Flowsheet Documentation  Taken 10/8/2023 1421 by Anjelica Su RN  Safety Promotion/Fall Prevention: safety round/check completed  Taken 10/8/2023 1200 by Anjelica Su RN  Safety Promotion/Fall Prevention: safety round/check completed  Taken 10/8/2023 1035 by Anjelica Su RN  Safety Promotion/Fall Prevention: safety round/check completed  Taken 10/8/2023 0800 by Anjelica Su RN  Safety Promotion/Fall Prevention:   safety round/check completed   fall prevention program maintained   assistive device/personal items within reach   clutter free environment maintained     Problem: Hypertension Comorbidity  Goal: Blood Pressure in Desired Range  Outcome: Met  Intervention: Maintain Blood Pressure Management  Recent Flowsheet Documentation  Taken 10/8/2023 1035 by Anjelica Su RN  Medication Review/Management: medications reviewed  Taken 10/8/2023 0800 by Anjelica Su RN  Syncope Management: position changed slowly  Medication Review/Management: medications reviewed     Problem: Pain Chronic (Persistent) (Comorbidity Management)  Goal: Acceptable Pain Control and Functional Ability  Outcome: Met  Intervention: Manage Persistent Pain  Recent Flowsheet Documentation  Taken 10/8/2023 1035 by Anjelica Su RN  Medication Review/Management: medications reviewed  Taken 10/8/2023 0800 by Anjelica Su RN  Medication Review/Management: medications reviewed  Intervention: Develop Pain Management Plan  Recent Flowsheet Documentation  Taken 10/8/2023 0800 by Anjelica Su RN  Pain Management  Interventions: pain management plan reviewed with patient/caregiver  Intervention: Optimize Psychosocial Wellbeing  Recent Flowsheet Documentation  Taken 10/8/2023 0800 by Anjelica Su RN  Diversional Activities: television  Family/Support System Care: self-care encouraged     Problem: Pain Acute  Goal: Acceptable Pain Control and Functional Ability  Outcome: Met  Intervention: Prevent or Manage Pain  Recent Flowsheet Documentation  Taken 10/8/2023 1035 by Anjelica Su RN  Medication Review/Management: medications reviewed  Taken 10/8/2023 0800 by Anjelica Su RN  Sensory Stimulation Regulation: care clustered  Medication Review/Management: medications reviewed  Intervention: Develop Pain Management Plan  Recent Flowsheet Documentation  Taken 10/8/2023 0800 by Anjelica Su RN  Pain Management Interventions: pain management plan reviewed with patient/caregiver  Intervention: Optimize Psychosocial Wellbeing  Recent Flowsheet Documentation  Taken 10/8/2023 0800 by Anjelica Su RN  Diversional Activities: television     Problem: Chest Pain  Goal: Resolution of Chest Pain Symptoms  Outcome: Met  Intervention: Manage Acute Chest Pain  Recent Flowsheet Documentation  Taken 10/8/2023 0800 by Anjelica Su RN  Chest Pain Intervention: cardiac monitoring continued     Problem: Adult Inpatient Plan of Care  Goal: Plan of Care Review  Outcome: Met  Goal: Patient-Specific Goal (Individualized)  Outcome: Met  Goal: Absence of Hospital-Acquired Illness or Injury  Outcome: Met  Intervention: Identify and Manage Fall Risk  Recent Flowsheet Documentation  Taken 10/8/2023 1421 by Anjelica Su RN  Safety Promotion/Fall Prevention: safety round/check completed  Taken 10/8/2023 1200 by Anjelica Su RN  Safety Promotion/Fall Prevention: safety round/check completed  Taken 10/8/2023 1035 by Anjelica Su RN  Safety Promotion/Fall Prevention: safety round/check completed  Taken 10/8/2023 0800 by Anjelica Su  RN  Safety Promotion/Fall Prevention:   safety round/check completed   fall prevention program maintained   assistive device/personal items within reach   clutter free environment maintained  Intervention: Prevent Skin Injury  Recent Flowsheet Documentation  Taken 10/8/2023 0800 by Anjelica Su RN  Body Position:   position changed independently   supine  Intervention: Prevent and Manage VTE (Venous Thromboembolism) Risk  Recent Flowsheet Documentation  Taken 10/8/2023 0800 by Anjelica Su RN  Activity Management: up ad nas  VTE Prevention/Management: sequential compression devices off  Intervention: Prevent Infection  Recent Flowsheet Documentation  Taken 10/8/2023 0800 by Anjelica Su RN  Infection Prevention:   rest/sleep promoted   single patient room provided  Goal: Optimal Comfort and Wellbeing  Outcome: Met  Intervention: Monitor Pain and Promote Comfort  Recent Flowsheet Documentation  Taken 10/8/2023 0800 by Anjelica Su RN  Pain Management Interventions: pain management plan reviewed with patient/caregiver  Intervention: Provide Person-Centered Care  Recent Flowsheet Documentation  Taken 10/8/2023 0800 by Anjelica Su RN  Trust Relationship/Rapport:   choices provided   care explained   questions answered   questions encouraged  Goal: Readiness for Transition of Care  Outcome: Met   Goal Outcome Evaluation:              Outcome Evaluation: Pt receiving IV antibiotics. Cardiology following. Added IV lasix due to increase right lower leg swelling. Vitals WNL. Will continue to monitor.

## 2023-10-09 NOTE — OUTREACH NOTE
Prep Survey      Flowsheet Row Responses   [facility] discharged from? [facility]   Is LACE score < 7 ? No   Eligibility Readm Mgmt   Discharge diagnosis cellulitis of left leg   Does the patient have one of the following disease processes/diagnoses(primary or secondary)? Other   Does the patient have Home health ordered? Yes   What is the Home health agency?  [facility]   Is there a DME ordered? No   Comments regarding appointments call for apmt   Medication alerts for this patient IV ABX   Prep survey completed? Yes            Virgen SH[facility] - Registered Nurse

## 2023-10-09 NOTE — CASE MANAGEMENT/SOCIAL WORK
Case Management Discharge Note      Final Note: Home and resume [facility]              Home Medical Care Coordination complete.      Service Provider Selected Services Address Phone Fax Patient Preferred    [facility] [address] [place] [address] [phone] -- --                                   Transportation Services  Private: Car    Final Discharge Disposition Code: 06 - home with home health care

## 2023-10-11 ENCOUNTER — READMISSION MANAGEMENT (OUTPATIENT)
Dept: CALL CENTER | Facility: HOSPITAL | Age: 83
End: 2023-10-11
Payer: OTHER GOVERNMENT

## 2023-10-11 NOTE — OUTREACH NOTE
Medical Week 1 Survey      Flowsheet Row Responses   [facility] patient discharged from? [facility]   Does the patient have one of the following disease processes/diagnoses(primary or secondary)? Other   Week 1 attempt successful? Yes   Call start time 1706   Call end time 1724   Discharge diagnosis cellulitis of left leg   Person spoke with today (if not patient) and relationship Patient   Meds reviewed with patient/caregiver? Yes   Does the patient have all medications ordered at discharge? N/A   Is the patient taking all medications as directed (includes completed medication regime)? Yes   Comments regarding appointments [facility] 11/9  3pm   Does the patient have a primary care provider?  Yes   Comments regarding PCP [facility],  patient to contact [facility]   Has the patient kept scheduled appointments due by today? Yes   Comments Appt with Dr Arvizu 10/20   What is the Home health agency?  [facility]   Psychosocial issues? No   Did the patient receive a copy of their discharge instructions? Yes   Nursing interventions Reviewed instructions with patient   What is the patient's perception of their health status since discharge? Improving   Is the patient/caregiver able to teach back signs and symptoms related to disease process for when to call PCP? Yes   Is the patient/caregiver able to teach back the hierarchy of who to call/visit for symptoms/problems? PCP, Specialist, Home health nurse, Urgent Care, ED, 911 Yes   If the patient is a current smoker, are they able to teach back resources for cessation? Not a smoker   Week 1 call completed? Yes   Would this patient benefit from a Referral to Amb Social Work? No   Is the patient interested in additional calls from an ambulatory ? No   Call end time 1724            ISIS PRITCHARD - Registered Nurse

## 2023-10-18 ENCOUNTER — READMISSION MANAGEMENT (OUTPATIENT)
Dept: CALL CENTER | Facility: HOSPITAL | Age: 83
End: 2023-10-18
Payer: OTHER GOVERNMENT

## 2023-10-18 NOTE — OUTREACH NOTE
Medical Week 2 Survey      Flowsheet Row Responses   [facility] discharged [facility]? [facility]   Does the patient have one of the following disease processes/diagnoses(primary or secondary)? Other   Week 2 attempt successful? Yes   Call start time 1409   Discharge diagnosis cellulitis of left leg   Call end time 1413   Is the patient having any side effects they believe may be caused by any medication additions or changes? No   Is the patient taking all medications as directed (includes completed medication regime)? Yes   Comments regarding appointments [facility] 11/9  3pm   Does the patient have a primary care provider?  Yes   Does the patient have an appointment with their PCP within 7 days of discharge? Yes   Has the patient kept scheduled appointments due by today? Yes   What is the Home health agency?  [facility]    Has home health visited the patient within 72 hours of discharge? Yes   Did the patient receive a copy of their discharge instructions? Yes   Nursing interventions Reviewed instructions with patient   What is the patient's perception of their health status since discharge? Improving   Is the patient/caregiver able to teach back signs and symptoms related to disease process for when to call PCP? Yes   Is the patient/caregiver able to teach back signs and symptoms related to disease process for when to call 911? Yes   If the patient is a current smoker, are they able to teach back resources for cessation? Not a smoker   Week 2 Call Completed? Yes   Wrap up additional comments brief call, has an  at his house. Stated legs still swollen, weighing himself daily   Call end time 1413            BREN TOLENTINO - Registered Nurse

## 2023-10-20 ENCOUNTER — OFFICE VISIT (OUTPATIENT)
Dept: CARDIOLOGY | Facility: CLINIC | Age: 83
End: 2023-10-20
Payer: MEDICARE

## 2023-10-20 VITALS
SYSTOLIC BLOOD PRESSURE: 125 MMHG | BODY MASS INDEX: 28.77 KG/M2 | WEIGHT: 218 LBS | DIASTOLIC BLOOD PRESSURE: 57 MMHG | HEART RATE: 99 BPM | OXYGEN SATURATION: 95 %

## 2023-10-20 DIAGNOSIS — I47.20 VT (VENTRICULAR TACHYCARDIA): ICD-10-CM

## 2023-10-20 DIAGNOSIS — I25.5 ISCHEMIC CARDIOMYOPATHY: Primary | ICD-10-CM

## 2023-10-20 DIAGNOSIS — I25.810 CORONARY ARTERY DISEASE INVOLVING CORONARY BYPASS GRAFT OF NATIVE HEART WITHOUT ANGINA PECTORIS: ICD-10-CM

## 2023-10-20 DIAGNOSIS — Z95.810 PRESENCE OF AUTOMATIC IMPLANTABLE CARDIOVERTER-DEFIBRILLATOR: ICD-10-CM

## 2023-10-20 PROCEDURE — 99214 OFFICE O/P EST MOD 30 MIN: CPT | Performed by: INTERNAL MEDICINE

## 2023-10-20 NOTE — PROGRESS NOTES
CC--Recurrent VT, ischemic cardiomyopathy      Sub  82-year-old male patient has history of ICD with history of VT and comes in for follow-up.  Patient has recurrent cellulitis in his lower extremities with swelling needing hospitalization.  Patient had prior AF secondary to hyperthyroidism which has been treated with resolution of symptoms.  Patient had prior incessant VT needing VT ablation and had ischemic cardiomyopathy with prior bypass surgery.  EF is 31 to 35% with moderate MR and AI.      My previous history is attached below which is for reference only and has been reviewed        Sub--Pt here for follow up and he had prior history of incessant VT needing and ablation several months ago .  patient started having recurrent VT needing a redo VT ablation  and post ablation a week later developed dysphagia and needed endoscopy the patient underwent esophageal stricture dilatation with improvement of symptoms and resolution of dysphagia .Patient is on amiodarone because of extensive scarring and multiple episodes of VT and since ablation without recurrent ICD therapy--patient has history of chronic ischemic heart disease previous myocardial infarction, s/p previous CABG,  LV systolic dysfunction with   Last  LV ejection fraction of 25 30% which came up to 30- 35% by latest echocardiogram.   He is  status post ICD implant.  denies any chest pain or shortness of breath or syncope--patient had prior ACE inhibitor induced cough and currently is on Aldactone .Patient denies any new symptoms of VT or syncope and is compliant with current medical regimen  He also developed some right shoulder pain being followed [facility] doctor and uses Biofreeze for relief of symptoms   He denies any palpitations or syncope or any chest pain or dyspnea.  Patient could not tolerate additional beta-blockers on top of amiodarone in the past  Patient has noticed photosensitivity of his skin in the past and amiodarone dose reduced to 100 mg  a day and his symptoms have reduced and comes in for follow-up   Patient has noticed some burning sensation in lower extremities and was seen at [facility] on last clinic prescription for gabapentin and he also complains of some calf pain when he is ambulating with some blocks gets better after increased activity and it is not nocturnal in nature--arterial duplex scan without any significant abnormalities  He also has history of orthostatic hypotension started on midodrine and complains of intermittent headache  Patient is doing clinically well from cardiac standpoint of view  Complains of recurrent redness in the left lower extremity with tenderness and warmth around the ankle joint and is being treated with antibiotics and has been to [facility] twice--is being evaluated by vascular surgery and infectious disease specialist and he had his symptoms since 2010 with increasing frequency recently.    Since last seen patient had ICD shock therapy for conducted atrial fibrillation and was brought in back for evaluation  Atrial fibrillation was not seen with prior interrogation of his device and ICD was reprogrammed on recommendation to avoid unnecessary therapy for conducted atrial fibrillation  Patient complains of diffuse subcutaneous bleeding with dual antiplatelet agents and also complains of weight loss and is being investigated by [facility]  Patient was noted to have markedly abnormal thyroid function test consistent with hyperthyroidism and immediately amiodarone was stopped and started on beta-blockers and off Eliquis         Past Medical History:     Reviewed history from 03/13/2018 and no changes required:        Coronary Heart Disease:S/P CABG and PCI         Hypertension        Myocardial Infarction: Inferior MI         Hx: Cellulitis of left lower leg        Dyslipidemia         Pinched nerve L4-L5         Prostate cancer         Cardiomyopathy : ICD implantation       Physical Exam    General:       well developed, well nourished, in no acute distress.    Head:      normocephalic and atraumatic.    Eyes:      PERRL/EOM intact, conjunctivae and sclerae clear without nystagmus.    Neck:      no  thyromegaly, trachea central with normal respiratory effort  Lungs:      clear bilaterally to auscultation.    Heart:       regular rate and rhythm, S1, S2 without murmurs, rubs, or gallops  Skin:      intact without lesions or rashes.    Psych:      alert and cooperative; normal mood and affect; normal attention span and concentration.               assessment plan    Recent potassium is 3.9.  Creatinine is normal hemoglobin and platelets are normal  ICD in situ with normal function on home monitoring  Prior history of VT storm needing VT ablation without recurrence  Hyper thyroidism treated by [facility] physician with resolution  Atrial fibrillation secondary to hyperthyroidism resolved after treatment of hyperthyroidism  Ischemic cardiomyopathy stable without angina  Moderate MR and moderate AI  History of orthostatic hypotension on midodrine  Patient currently on Toprol-XL and aspirin and Zetia  Medications reviewed and follow-up appointments.  Hypertension controlled with Toprol-XL      Electronically signed by Constantino Arvizu MD, 10/20/23, 11:50 AM EDT.

## 2023-10-25 ENCOUNTER — READMISSION MANAGEMENT (OUTPATIENT)
Dept: CALL CENTER | Facility: HOSPITAL | Age: 83
End: 2023-10-25
Payer: OTHER GOVERNMENT

## 2023-10-25 NOTE — OUTREACH NOTE
Medical Week 3 Survey      Flowsheet Row Responses   [facility] facility patient discharged from? [facility]   Does the patient have one of the following disease processes/diagnoses(primary or secondary)? Other   Week 3 attempt successful? No   Unsuccessful attempts Attempt 1            Taylor DUPONT - Licensed Nurse

## 2023-11-27 ENCOUNTER — TELEPHONE (OUTPATIENT)
Dept: CARDIOLOGY | Facility: CLINIC | Age: 83
End: 2023-11-27

## 2023-11-27 NOTE — TELEPHONE ENCOUNTER
Caller: Deion Nicholas    Relationship: Self    Best call back number: [phone]    What is the best time to reach you: ANY    Who are you requesting to speak with (clinical staff, provider,  specific staff member): CLINICAL     What was the call regarding: PT WOULD NOT GIVE ANY SPECIFICS BUT STATED HE WOULD LIKE TO SPEAK WITH MA OR CLINICAL TO DISCUSS DECONGESTANT MEDICATION.

## 2023-11-28 NOTE — TELEPHONE ENCOUNTER
I spoke with the patient-  needs to avoid Sudafed containing products- saline spray would be best or ask PCP

## 2023-12-26 ENCOUNTER — HOSPITAL ENCOUNTER (OUTPATIENT)
Facility: HOSPITAL | Age: 83
Setting detail: OBSERVATION
Discharge: HOME | End: 2023-12-27
Attending: EMERGENCY MEDICINE | Admitting: INTERNAL MEDICINE
Payer: OTHER GOVERNMENT

## 2023-12-26 ENCOUNTER — APPOINTMENT (OUTPATIENT)
Dept: CT IMAGING | Facility: HOSPITAL | Age: 83
End: 2023-12-26
Payer: OTHER GOVERNMENT

## 2023-12-26 DIAGNOSIS — R55 NEAR SYNCOPE: ICD-10-CM

## 2023-12-26 DIAGNOSIS — R07.9 CHEST PAIN IN ADULT: Primary | ICD-10-CM

## 2023-12-26 LAB
ALBUMIN SERPL-MCNC: 4.2 G/DL (ref 3.5–5.2)
ALBUMIN/GLOB SERPL: 1.4 G/DL
ALP SERPL-CCNC: 104 U/L (ref 39–117)
ALT SERPL W P-5'-P-CCNC: 17 U/L (ref 1–41)
ANION GAP SERPL CALCULATED.3IONS-SCNC: 5.3 MMOL/L (ref 5–15)
AST SERPL-CCNC: 21 U/L (ref 1–40)
BASOPHILS # BLD AUTO: 0.03 10*3/MM3 (ref 0–0.2)
BASOPHILS NFR BLD AUTO: 0.5 % (ref 0–1.5)
BILIRUB SERPL-MCNC: 0.4 MG/DL (ref 0–1.2)
BUN SERPL-MCNC: 20 MG/DL (ref 8–23)
BUN/CREAT SERPL: 17.4 (ref 7–25)
CALCIUM SPEC-SCNC: 9.5 MG/DL (ref 8.6–10.5)
CHLORIDE SERPL-SCNC: 101 MMOL/L (ref 98–107)
CO2 SERPL-SCNC: 28.7 MMOL/L (ref 22–29)
CREAT SERPL-MCNC: 1.15 MG/DL (ref 0.76–1.27)
D DIMER PPP FEU-MCNC: 0.82 MCGFEU/ML (ref 0–0.81)
DEPRECATED RDW RBC AUTO: 50.4 FL (ref 37–54)
EGFRCR SERPLBLD CKD-EPI 2021: 63.9 ML/MIN/1.73
EOSINOPHIL # BLD AUTO: 0.23 10*3/MM3 (ref 0–0.4)
EOSINOPHIL NFR BLD AUTO: 4.2 % (ref 0.3–6.2)
ERYTHROCYTE [DISTWIDTH] IN BLOOD BY AUTOMATED COUNT: 14 % (ref 12.3–15.4)
GEN 5 2HR TROPONIN T REFLEX: 24 NG/L
GLOBULIN UR ELPH-MCNC: 2.9 GM/DL
GLUCOSE SERPL-MCNC: 160 MG/DL (ref 65–99)
HCT VFR BLD AUTO: 38.5 % (ref 37.5–51)
HGB BLD-MCNC: 12.2 G/DL (ref 13–17.7)
IMM GRANULOCYTES # BLD AUTO: 0 10*3/MM3 (ref 0–0.05)
IMM GRANULOCYTES NFR BLD AUTO: 0 % (ref 0–0.5)
LYMPHOCYTES # BLD AUTO: 1.74 10*3/MM3 (ref 0.7–3.1)
LYMPHOCYTES NFR BLD AUTO: 31.4 % (ref 19.6–45.3)
MCH RBC QN AUTO: 30.1 PG (ref 26.6–33)
MCHC RBC AUTO-ENTMCNC: 31.7 G/DL (ref 31.5–35.7)
MCV RBC AUTO: 95.1 FL (ref 79–97)
MONOCYTES # BLD AUTO: 0.53 10*3/MM3 (ref 0.1–0.9)
MONOCYTES NFR BLD AUTO: 9.6 % (ref 5–12)
NEUTROPHILS NFR BLD AUTO: 3.01 10*3/MM3 (ref 1.7–7)
NEUTROPHILS NFR BLD AUTO: 54.3 % (ref 42.7–76)
NT-PROBNP SERPL-MCNC: 299.7 PG/ML (ref 0–1800)
PLATELET # BLD AUTO: 167 10*3/MM3 (ref 140–450)
PMV BLD AUTO: 9.9 FL (ref 6–12)
POTASSIUM SERPL-SCNC: 4.1 MMOL/L (ref 3.5–5.2)
PROT SERPL-MCNC: 7.1 G/DL (ref 6–8.5)
QT INTERVAL: 360 MS
QT INTERVAL: 392 MS
QTC INTERVAL: 460 MS
QTC INTERVAL: 465 MS
RBC # BLD AUTO: 4.05 10*6/MM3 (ref 4.14–5.8)
SODIUM SERPL-SCNC: 135 MMOL/L (ref 136–145)
TROPONIN T DELTA: -3 NG/L
TROPONIN T SERPL HS-MCNC: 27 NG/L
WBC NRBC COR # BLD AUTO: 5.54 10*3/MM3 (ref 3.4–10.8)

## 2023-12-26 PROCEDURE — G0378 HOSPITAL OBSERVATION PER HR: HCPCS

## 2023-12-26 PROCEDURE — 93005 ELECTROCARDIOGRAM TRACING: CPT | Performed by: NURSE PRACTITIONER

## 2023-12-26 PROCEDURE — 25510000001 IOPAMIDOL PER 1 ML: Performed by: EMERGENCY MEDICINE

## 2023-12-26 PROCEDURE — 80053 COMPREHEN METABOLIC PANEL: CPT | Performed by: NURSE PRACTITIONER

## 2023-12-26 PROCEDURE — 25010000002 HEPARIN (PORCINE) PER 1000 UNITS: Performed by: STUDENT IN AN ORGANIZED HEALTH CARE EDUCATION/TRAINING PROGRAM

## 2023-12-26 PROCEDURE — 85379 FIBRIN DEGRADATION QUANT: CPT | Performed by: NURSE PRACTITIONER

## 2023-12-26 PROCEDURE — 96372 THER/PROPH/DIAG INJ SC/IM: CPT

## 2023-12-26 PROCEDURE — 84484 ASSAY OF TROPONIN QUANT: CPT | Performed by: NURSE PRACTITIONER

## 2023-12-26 PROCEDURE — 36415 COLL VENOUS BLD VENIPUNCTURE: CPT

## 2023-12-26 PROCEDURE — 93005 ELECTROCARDIOGRAM TRACING: CPT | Performed by: EMERGENCY MEDICINE

## 2023-12-26 PROCEDURE — 83880 ASSAY OF NATRIURETIC PEPTIDE: CPT | Performed by: NURSE PRACTITIONER

## 2023-12-26 PROCEDURE — 99285 EMERGENCY DEPT VISIT HI MDM: CPT

## 2023-12-26 PROCEDURE — 71275 CT ANGIOGRAPHY CHEST: CPT

## 2023-12-26 PROCEDURE — 85025 COMPLETE CBC W/AUTO DIFF WBC: CPT | Performed by: NURSE PRACTITIONER

## 2023-12-26 RX ORDER — POLYETHYLENE GLYCOL 3350 17 G/17G
17 POWDER, FOR SOLUTION ORAL DAILY PRN
Status: DISCONTINUED | OUTPATIENT
Start: 2023-12-26 | End: 2023-12-27 | Stop reason: HOSPADM

## 2023-12-26 RX ORDER — BISACODYL 10 MG
10 SUPPOSITORY, RECTAL RECTAL DAILY PRN
Status: DISCONTINUED | OUTPATIENT
Start: 2023-12-26 | End: 2023-12-27 | Stop reason: HOSPADM

## 2023-12-26 RX ORDER — OXYCODONE HYDROCHLORIDE 5 MG/1
5 TABLET ORAL EVERY 4 HOURS PRN
Status: DISCONTINUED | OUTPATIENT
Start: 2023-12-26 | End: 2023-12-27 | Stop reason: HOSPADM

## 2023-12-26 RX ORDER — GABAPENTIN 400 MG/1
400 CAPSULE ORAL EVERY 6 HOURS SCHEDULED
Status: DISCONTINUED | OUTPATIENT
Start: 2023-12-27 | End: 2023-12-27 | Stop reason: HOSPADM

## 2023-12-26 RX ORDER — AMOXICILLIN 250 MG
2 CAPSULE ORAL 2 TIMES DAILY
Status: DISCONTINUED | OUTPATIENT
Start: 2023-12-26 | End: 2023-12-27 | Stop reason: HOSPADM

## 2023-12-26 RX ORDER — ONDANSETRON 2 MG/ML
4 INJECTION INTRAMUSCULAR; INTRAVENOUS EVERY 6 HOURS PRN
Status: DISCONTINUED | OUTPATIENT
Start: 2023-12-26 | End: 2023-12-27 | Stop reason: HOSPADM

## 2023-12-26 RX ORDER — SODIUM CHLORIDE 9 MG/ML
40 INJECTION, SOLUTION INTRAVENOUS AS NEEDED
Status: DISCONTINUED | OUTPATIENT
Start: 2023-12-26 | End: 2023-12-27 | Stop reason: HOSPADM

## 2023-12-26 RX ORDER — NITROGLYCERIN 0.4 MG/1
0.4 TABLET SUBLINGUAL
Status: DISCONTINUED | OUTPATIENT
Start: 2023-12-26 | End: 2023-12-27 | Stop reason: HOSPADM

## 2023-12-26 RX ORDER — SODIUM CHLORIDE 0.9 % (FLUSH) 0.9 %
10 SYRINGE (ML) INJECTION EVERY 12 HOURS SCHEDULED
Status: DISCONTINUED | OUTPATIENT
Start: 2023-12-26 | End: 2023-12-27 | Stop reason: HOSPADM

## 2023-12-26 RX ORDER — MELATONIN
500 DAILY
Status: DISCONTINUED | OUTPATIENT
Start: 2023-12-27 | End: 2023-12-27 | Stop reason: HOSPADM

## 2023-12-26 RX ORDER — PANTOPRAZOLE SODIUM 40 MG/1
40 TABLET, DELAYED RELEASE ORAL DAILY
Status: DISCONTINUED | OUTPATIENT
Start: 2023-12-27 | End: 2023-12-27 | Stop reason: HOSPADM

## 2023-12-26 RX ORDER — ACETAMINOPHEN 325 MG/1
650 TABLET ORAL EVERY 4 HOURS PRN
Status: DISCONTINUED | OUTPATIENT
Start: 2023-12-26 | End: 2023-12-27 | Stop reason: HOSPADM

## 2023-12-26 RX ORDER — BISACODYL 5 MG/1
5 TABLET, DELAYED RELEASE ORAL DAILY PRN
Status: DISCONTINUED | OUTPATIENT
Start: 2023-12-26 | End: 2023-12-27 | Stop reason: HOSPADM

## 2023-12-26 RX ORDER — TEMAZEPAM 15 MG/1
15 CAPSULE ORAL NIGHTLY PRN
Status: DISCONTINUED | OUTPATIENT
Start: 2023-12-26 | End: 2023-12-27 | Stop reason: HOSPADM

## 2023-12-26 RX ORDER — MIDODRINE HYDROCHLORIDE 5 MG/1
5 TABLET ORAL EVERY 8 HOURS SCHEDULED
Status: DISCONTINUED | OUTPATIENT
Start: 2023-12-26 | End: 2023-12-27 | Stop reason: HOSPADM

## 2023-12-26 RX ORDER — SODIUM CHLORIDE 0.9 % (FLUSH) 0.9 %
10 SYRINGE (ML) INJECTION AS NEEDED
Status: DISCONTINUED | OUTPATIENT
Start: 2023-12-26 | End: 2023-12-27 | Stop reason: HOSPADM

## 2023-12-26 RX ORDER — ASPIRIN 81 MG/1
81 TABLET ORAL DAILY
Status: DISCONTINUED | OUTPATIENT
Start: 2023-12-27 | End: 2023-12-27 | Stop reason: HOSPADM

## 2023-12-26 RX ORDER — ONDANSETRON 4 MG/1
4 TABLET, ORALLY DISINTEGRATING ORAL EVERY 6 HOURS PRN
Status: DISCONTINUED | OUTPATIENT
Start: 2023-12-26 | End: 2023-12-27 | Stop reason: HOSPADM

## 2023-12-26 RX ORDER — AMOXICILLIN 250 MG
1 CAPSULE ORAL 2 TIMES DAILY
Status: DISCONTINUED | OUTPATIENT
Start: 2023-12-26 | End: 2023-12-26 | Stop reason: SDUPTHER

## 2023-12-26 RX ORDER — HEPARIN SODIUM 5000 [USP'U]/ML
5000 INJECTION, SOLUTION INTRAVENOUS; SUBCUTANEOUS EVERY 8 HOURS SCHEDULED
Status: DISCONTINUED | OUTPATIENT
Start: 2023-12-26 | End: 2023-12-27 | Stop reason: HOSPADM

## 2023-12-26 RX ORDER — ASPIRIN 81 MG/1
243 TABLET, CHEWABLE ORAL ONCE
Status: COMPLETED | OUTPATIENT
Start: 2023-12-26 | End: 2023-12-26

## 2023-12-26 RX ORDER — SUCRALFATE 1 G/1
1 TABLET ORAL 4 TIMES DAILY
Status: DISCONTINUED | OUTPATIENT
Start: 2023-12-26 | End: 2023-12-27 | Stop reason: HOSPADM

## 2023-12-26 RX ADMIN — GABAPENTIN 400 MG: 400 CAPSULE ORAL at 23:55

## 2023-12-26 RX ADMIN — ASPIRIN 81 MG CHEWABLE TABLET 243 MG: 81 TABLET CHEWABLE at 15:38

## 2023-12-26 RX ADMIN — IOPAMIDOL 100 ML: 755 INJECTION, SOLUTION INTRAVENOUS at 16:42

## 2023-12-26 RX ADMIN — NITROGLYCERIN 0.4 MG: 0.4 TABLET SUBLINGUAL at 15:38

## 2023-12-26 RX ADMIN — Medication 10 ML: at 23:55

## 2023-12-26 RX ADMIN — SUCRALFATE 1 G: 1 TABLET ORAL at 23:55

## 2023-12-26 RX ADMIN — HEPARIN SODIUM 5000 UNITS: 5000 INJECTION INTRAVENOUS; SUBCUTANEOUS at 23:55

## 2023-12-26 RX ADMIN — SENNOSIDES AND DOCUSATE SODIUM 2 TABLET: 50; 8.6 TABLET ORAL at 23:55

## 2023-12-26 RX ADMIN — MIDODRINE HYDROCHLORIDE 5 MG: 5 TABLET ORAL at 23:55

## 2023-12-26 NOTE — FSED PROVIDER NOTE
"        EMERGENCY DEPARTMENT ENCOUNTER    Room Number:  06/06  Date seen:  12/26/2023  Time seen: 15:23 EST  PCP: Makenna Motta MD  Historian: patient    Discussed/obtained information from independent historians: n/a    HPI:  Chief complaint:chest pain  A complete HPI/ROS/PMH/PSH/SH/FH are unobtainable due to: n/a  Context:Deion Nicholas is a 83 y.o. male with past medical history significant for coronary artery disease, ischemic cardiomyopathy, AICD, hypertension mitral valve regurg and aortic valve insufficiency who presents to the ED with c/o near syncope that happened PTA in which he felt like he was going to \"black out\". He was able to stumble to a chair and call his son.  As soon as it passed he developed moderate to severe left neck and chest pain.  This was improved with nitro.  States chest pain is currently 8/10.  Denies that his AICD fired.  Denies SOA, diaphoresis or n/v.  His cardiologists are Dr. Arvizu and Dr. Childers.      External (non-ED) record review:  Pt has h/o inferior MI, CABG and PCI. I reviewed Dr. Arvizu's most recent office visit dated 10/20/23    Chronic or social conditions impacting care: Not applicable    ALLERGIES  Lovastatin, Pravastatin, Simvastatin, and Spironolactone    PAST MEDICAL HISTORY  Active Ambulatory Problems     Diagnosis Date Noted    Normocytic anemia 06/24/2019    Coronary artery disease 10/02/2013    Ischemic cardiomyopathy 11/09/2016    Paroxysmal ventricular tachycardia 05/03/2019    Presence of automatic implantable cardioverter-defibrillator 01/31/2018    Essential hypertension 08/27/2019    Hyperlipidemia, mixed 08/27/2019    Peripheral neuropathy 05/11/2021    Cellulitis of left lower extremity without foot 05/12/2021    GERD without esophagitis 05/12/2021    B12 deficiency 05/12/2021    Orthostatic hypotension 05/12/2021    Tinea pedis of left foot 05/13/2021    Lower extremity pain, left 05/29/2021    Cellulitis of left leg 07/13/2021    " Baker's cyst of knee, left 07/13/2021    Gynecomastia, male 09/17/2021    Ascending aortic aneurysm 12/11/2021    Thyroid nodule 03/03/2022    Dizziness 10/26/2022    Chest pain, unspecified type 04/16/2023    Edema of left lower extremity 04/16/2023    Elevated troponin 04/17/2023    Left leg cellulitis 05/22/2023    Cellulitis of left lower extremity 06/15/2023    Valvular heart disease 07/19/2023    Nonrheumatic mitral valve regurgitation 07/19/2023    Nonrheumatic aortic valve insufficiency 07/19/2023     Resolved Ambulatory Problems     Diagnosis Date Noted    Shortness of breath 06/23/2019    Esophageal dysphagia 06/22/2019    Shortness of breath 06/24/2019    Chest pain 08/26/2019    Left leg cellulitis 06/16/2021     Past Medical History:   Diagnosis Date    Aneurysm     Cardiomyopathy     CHD (coronary heart disease)     Dyslipidemia     History of ventricular tachycardia     Hypertension     Myocardial infarction     Pinched nerve     Prostate cancer        PAST SURGICAL HISTORY  Past Surgical History:   Procedure Laterality Date    ANGIOPLASTY  2000 04/1996 Stent: Palmaz- Huy stent/LAD 07/1996-RCA: 2000-Tetra stent Guidant to proximal RCA, mid to distal artery 2 sequential Guidant Tetra stents    APPENDECTOMY      CARDIAC ABLATION  April and May 2019    CARDIAC CATHETERIZATION  07/2017    1996 x2, Nov. 2000, 05/2010-cath 08/2015-no stents 2017    CARDIAC DEFIBRILLATOR PLACEMENT      COLONOSCOPY W/ POLYPECTOMY      Mult colonoscopy's for polyp resection     CORONARY ARTERY BYPASS GRAFT  05/2010    x5 vessel: LMA to diagonal, LAD, intermedius, obtuse marginal, RCA    CORONARY STENT PLACEMENT      ENDOSCOPY N/A 6/24/2019    Procedure: ESOPHAGOGASTRODUODENOSCOPY with dilitation Bougie 50, 54;  Surgeon: Roddy Coronel MD;  Location: [facility] ENDOSCOPY;  Service: Gastroenterology    INSERT / REPLACE / REMOVE PACEMAKER      KNEE OPEN LATERAL RELEASE Left     reduction    OTHER SURGICAL HISTORY  01/2018     ICD implantation    PROSTATE SURGERY  2015    Robiotic surgery- Cyber Knife:       FAMILY HISTORY  Family History   Problem Relation Age of Onset    Pancreatic cancer Mother     Heart disease Father        SOCIAL HISTORY  Social History     Socioeconomic History    Marital status:    Tobacco Use    Smoking status: Former     Packs/day: 1.00     Years: 17.00     Additional pack years: 0.00     Total pack years: 17.00     Types: Cigarettes     Quit date: 2002     Years since quittin.4    Smokeless tobacco: Never   Vaping Use    Vaping Use: Never used   Substance and Sexual Activity    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     PHYSICAL EXAM    I have reviewed the triage vital signs and nursing notes.  Vitals:    23 1801   BP: 148/80   Pulse: 88   Resp:    Temp:    SpO2: 97%     Physical Exam    GENERAL: not distressed  HENT: nares patent  EYES: no scleral icterus  NECK: no ROM limitations  CV: regular rhythm, regular rate, no murmur  RESPIRATORY: normal effort, clear to auscultate bilaterally  ABDOMEN: soft  : deferred  MUSCULOSKELETAL: no deformity.  Patient wearing compression stockings bilaterally  NEURO: alert, moves all extremities, follows commands  SKIN: warm, dry    LAB RESULTS  Recent Results (from the past 24 hour(s))   ECG 12 Lead Chest Pain    Collection Time: 23  3:29 PM   Result Value Ref Range    QT Interval 360 ms    QTC Interval 460 ms   Comprehensive Metabolic Panel    Collection Time: 23  3:32 PM    Specimen: Blood   Result Value Ref Range    Glucose 160 (H) 65 - 99 mg/dL    BUN 20 8 - 23 mg/dL    Creatinine 1.15 0.76 - 1.27 mg/dL    Sodium 135 (L) 136 - 145 mmol/L    Potassium 4.1 3.5 - 5.2 mmol/L    Chloride 101 98 - 107 mmol/L    CO2 28.7 22.0 - 29.0 mmol/L    Calcium 9.5 8.6 - 10.5  mg/dL    Total Protein 7.1 6.0 - 8.5 g/dL    Albumin 4.2 3.5 - 5.2 g/dL    ALT (SGPT) 17 1 - 41 U/L    AST (SGOT) 21 1 - 40 U/L    Alkaline Phosphatase 104 39 - 117 U/L    Total Bilirubin 0.4 0.0 - 1.2 mg/dL    Globulin 2.9 gm/dL    A/G Ratio 1.4 g/dL    BUN/Creatinine Ratio 17.4 7.0 - 25.0    Anion Gap 5.3 5.0 - 15.0 mmol/L    eGFR 63.9 >60.0 mL/min/1.73   D-dimer, Quantitative    Collection Time: 12/26/23  3:32 PM    Specimen: Blood   Result Value Ref Range    D-Dimer, Quantitative 0.82 (H) 0.00 - 0.81 MCGFEU/mL   BNP    Collection Time: 12/26/23  3:32 PM    Specimen: Blood   Result Value Ref Range    proBNP 299.7 0.0 - 1,800.0 pg/mL   High Sensitivity Troponin T    Collection Time: 12/26/23  3:32 PM    Specimen: Blood   Result Value Ref Range    HS Troponin T 27 (H) <22 ng/L   CBC Auto Differential    Collection Time: 12/26/23  3:32 PM    Specimen: Blood   Result Value Ref Range    WBC 5.54 3.40 - 10.80 10*3/mm3    RBC 4.05 (L) 4.14 - 5.80 10*6/mm3    Hemoglobin 12.2 (L) 13.0 - 17.7 g/dL    Hematocrit 38.5 37.5 - 51.0 %    MCV 95.1 79.0 - 97.0 fL    MCH 30.1 26.6 - 33.0 pg    MCHC 31.7 31.5 - 35.7 g/dL    RDW 14.0 12.3 - 15.4 %    RDW-SD 50.4 37.0 - 54.0 fl    MPV 9.9 6.0 - 12.0 fL    Platelets 167 140 - 450 10*3/mm3    Neutrophil % 54.3 42.7 - 76.0 %    Lymphocyte % 31.4 19.6 - 45.3 %    Monocyte % 9.6 5.0 - 12.0 %    Eosinophil % 4.2 0.3 - 6.2 %    Basophil % 0.5 0.0 - 1.5 %    Immature Grans % 0.0 0.0 - 0.5 %    Neutrophils, Absolute 3.01 1.70 - 7.00 10*3/mm3    Lymphocytes, Absolute 1.74 0.70 - 3.10 10*3/mm3    Monocytes, Absolute 0.53 0.10 - 0.90 10*3/mm3    Eosinophils, Absolute 0.23 0.00 - 0.40 10*3/mm3    Basophils, Absolute 0.03 0.00 - 0.20 10*3/mm3    Immature Grans, Absolute 0.00 0.00 - 0.05 10*3/mm3   ECG 12 Lead Chest Pain    Collection Time: 12/26/23  5:10 PM   Result Value Ref Range    QT Interval 392 ms    QTC Interval 465 ms   High Sensitivity Troponin T 2Hr    Collection Time: 12/26/23  5:58 PM     Specimen: Blood   Result Value Ref Range    HS Troponin T 24 (H) <22 ng/L    Troponin T Delta -3 >=-4 - <+4 ng/L       Ordered the above labs and independently interpreted results.  My findings will be discussed in the ED course or medical decision making section below    RADIOLOGY RESULTS  CT Angiogram Chest Pulmonary Embolism    Result Date: 12/26/2023  CT ANGIOGRAM CHEST PULMONARY EMBOLISM Date of Exam: 12/26/2023 4:30 PM EST Indication: Pulmonary embolism (PE) suspected, high prob. Comparison: None available. Technique: Axial CT images were obtained of the chest after the uneventful intravenous administration of iodinated contrast utilizing pulmonary embolism protocol.  Sagittal and coronal reconstructions were performed.  Automated exposure control and iterative reconstruction methods were used. Findings:  There are no filling defects suspicious for pulmonary emboli. There are sternal suture wires. There is a left transvenous pacemaker. There are no enlarged mediastinal nodes. There are calcified subcarinal and left hilar nodes. There is mild cardiomegaly. There is no pericardial effusion. There is no pleural effusion. There is cholelithiasis without gallbladder dilatation or wall thickening. The ascending thoracic aorta measures up to 5 cm transversely. The descending thoracic aorta measures  2.8 cm transversely. There is some mild scar or atelectasis in the lung bases. There are no significant lung infiltrates.       Impression: Negative exam for pulmonary embolism. 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 Cholelithiasis without acute cholecystitis. No acute process. Electronically Signed: Olinda Flores MD  12/26/2023 4:49 PM EST  Workstation ID: [id number]      Ordered the above noted radiological studies.  Independently interpreted by me.  My findings will be discussed in the medical decision section below.     PROGRESS, DATA ANALYSIS, CONSULTS AND MEDICAL DECISION MAKING    Please note  that this section constitutes my independent interpretation of clinical data including lab results, radiology, EKG's.  This constitutes my independent professional opinion regarding differential diagnosis and management of this patient.  It may include any factors such as history from outside sources, review of external records, social determinants of health, management of medications, response to those treatments, and discussions with other providers.    ED Course as of 12/26/23 1906   Tue Dec 26, 2023   1540 EKG          EKG time: 1529  Rhythm/Rate: 98, sinus rhythm, PVC couplet  P waves and ID: normal ID, normal MIMI  QRS, axis: normal QRS and axis, LBBB  ST and T waves: anterior T wave inversions in V1-V3     Interpreted Contemporaneously by me, independently viewed  No prior 12 lead available for comparison   [EW]   1611 D-Dimer, Quant(!): 0.82  CTA ordered [EW]   1657 CTA negative for acute PE.  Will trend troponin.  Pt's CP is improved after nitro. Consulting cardiology.  [EW]   1718 Discussed patient with Dr. White, on call  Cardiology.  I discussed chest pain, near syncope, labs.  He would like the patient admitted to hospitalist and Dr. Childers consulted.  [EW]   1730 Discussed admission with Dr. Mathews, hospital medicine at [facility].  He agrees to admit.  [EW]      ED Course User Index  [EW] Lorena Zarate APRN     Orders placed during this visit:  Orders Placed This Encounter   Procedures    CT Angiogram Chest Pulmonary Embolism    Comprehensive Metabolic Panel    D-dimer, Quantitative    BNP    High Sensitivity Troponin T    CBC Auto Differential    High Sensitivity Troponin T 2Hr    Pulse Oximetry Continuous    ECG 12 Lead Chest Pain    ECG 12 Lead Chest Pain    Blood Draw With IV Start    Insert peripheral IV    Initiate Observation Status    CBC & Differential    ED Acknowledgement Form Needed;            Medical Decision Making  Problems Addressed:  Chest pain in adult: complicated acute  illness or injury  Near syncope: complicated acute illness or injury    Amount and/or Complexity of Data Reviewed  Labs: ordered. Decision-making details documented in ED Course.  Radiology: ordered.  ECG/medicine tests: ordered.    Risk  OTC drugs.  Prescription drug management.  Decision regarding hospitalization.      Patient admitted after workup here today.  He presents after near syncopal episode where the room went black and he was able to find himself to a chair where he sat down.  He then began to experience left-sided chest pain radiating up to his neck that did improve somewhat with nitroglycerin taken sublingually.  Given his significant cardiac history, near syncope he will be admitted for further workup.  I did consult cardiology who did recommend admission to.  He did have elevated dimer but his CTA was negative for any acute PE.  He was not hypoxic or tachycardic      DIAGNOSIS  Final diagnoses:   Chest pain in adult   Near syncope          Medication List      No changes were made to your prescriptions during this visit.         Admission      Latest Documented Vital Signs:  As of 19:06 EST  BP- 148/80 HR- 88 Temp- 98.5 °F (36.9 °C) (Oral) O2 sat- 97%    Appropriate PPE utilized throughout this patient encounter to include mask, if indicated, per current protocol. Hand hygiene was performed before donning PPE and after removal when leaving the room.    Please note that portions of this were completed with a voice recognition program.     Note Disclaimer: At [facility], we believe that sharing information builds trust and better relationships. You are receiving this note because you are receiving care at [facility] or recently visited. It is possible you will see health information before a provider has talked with you about it. This kind of information can be easy to misunderstand. To help you fully understand what it means for your health, we urge you to discuss this note with your  provider.

## 2023-12-26 NOTE — ED NOTES
Pt here today with chest pain described as soreness. Also reports left neck and left arm pain that started today. Pt took 1 nitro at home

## 2023-12-27 ENCOUNTER — APPOINTMENT (OUTPATIENT)
Dept: CARDIOLOGY | Facility: HOSPITAL | Age: 83
End: 2023-12-27
Payer: OTHER GOVERNMENT

## 2023-12-27 ENCOUNTER — APPOINTMENT (OUTPATIENT)
Dept: NUCLEAR MEDICINE | Facility: HOSPITAL | Age: 83
End: 2023-12-27
Payer: OTHER GOVERNMENT

## 2023-12-27 VITALS
RESPIRATION RATE: 13 BRPM | TEMPERATURE: 97 F | DIASTOLIC BLOOD PRESSURE: 73 MMHG | HEIGHT: 73 IN | WEIGHT: 210 LBS | BODY MASS INDEX: 27.83 KG/M2 | OXYGEN SATURATION: 100 % | SYSTOLIC BLOOD PRESSURE: 124 MMHG | HEART RATE: 87 BPM

## 2023-12-27 LAB
ALBUMIN SERPL-MCNC: 3.8 G/DL (ref 3.5–5.2)
ALBUMIN/GLOB SERPL: 1.4 G/DL
ALP SERPL-CCNC: 92 U/L (ref 39–117)
ALT SERPL W P-5'-P-CCNC: 16 U/L (ref 1–41)
ANION GAP SERPL CALCULATED.3IONS-SCNC: 9 MMOL/L (ref 5–15)
AST SERPL-CCNC: 22 U/L (ref 1–40)
BASOPHILS # BLD AUTO: 0 10*3/MM3 (ref 0–0.2)
BASOPHILS NFR BLD AUTO: 0.8 % (ref 0–1.5)
BH CV ECHO MEAS - AI P1/2T: 409.6 MSEC
BH CV ECHO MEAS - AO MAX PG: 4.5 MMHG
BH CV ECHO MEAS - AO MEAN PG: 2.8 MMHG
BH CV ECHO MEAS - AO ROOT DIAM: 4 CM
BH CV ECHO MEAS - AO V2 MAX: 105.9 CM/SEC
BH CV ECHO MEAS - AO V2 VTI: 20.9 CM
BH CV ECHO MEAS - AVA(I,D): 4.3 CM2
BH CV ECHO MEAS - EDV(CUBED): 268.8 ML
BH CV ECHO MEAS - EDV(MOD-SP2): 138.1 ML
BH CV ECHO MEAS - EDV(MOD-SP4): 180.9 ML
BH CV ECHO MEAS - EF(MOD-BP): 22 %
BH CV ECHO MEAS - EF(MOD-SP2): 22.2 %
BH CV ECHO MEAS - EF(MOD-SP4): 21.1 %
BH CV ECHO MEAS - ESV(CUBED): 249.4 ML
BH CV ECHO MEAS - ESV(MOD-SP2): 107.5 ML
BH CV ECHO MEAS - ESV(MOD-SP4): 142.8 ML
BH CV ECHO MEAS - FS: 2.47 %
BH CV ECHO MEAS - IVS/LVPW: 1.03 CM
BH CV ECHO MEAS - IVSD: 1.11 CM
BH CV ECHO MEAS - LA DIMENSION: 4.6 CM
BH CV ECHO MEAS - LV DIASTOLIC VOL/BSA (35-75): 82.4 CM2
BH CV ECHO MEAS - LV MASS(C)D: 314.7 GRAMS
BH CV ECHO MEAS - LV MAX PG: 2.09 MMHG
BH CV ECHO MEAS - LV MEAN PG: 1.14 MMHG
BH CV ECHO MEAS - LV SYSTOLIC VOL/BSA (12-30): 65 CM2
BH CV ECHO MEAS - LV V1 MAX: 72.3 CM/SEC
BH CV ECHO MEAS - LV V1 VTI: 15.4 CM
BH CV ECHO MEAS - LVIDD: 6.5 CM
BH CV ECHO MEAS - LVIDS: 6.3 CM
BH CV ECHO MEAS - LVOT AREA: 5.8 CM2
BH CV ECHO MEAS - LVOT DIAM: 2.7 CM
BH CV ECHO MEAS - LVPWD: 1.08 CM
BH CV ECHO MEAS - MV A MAX VEL: 82 CM/SEC
BH CV ECHO MEAS - MV DEC SLOPE: 420.4 CM/SEC2
BH CV ECHO MEAS - MV DEC TIME: 0.12 SEC
BH CV ECHO MEAS - MV E MAX VEL: 50.1 CM/SEC
BH CV ECHO MEAS - MV E/A: 0.61
BH CV ECHO MEAS - MV MAX PG: 3.7 MMHG
BH CV ECHO MEAS - MV MEAN PG: 1.79 MMHG
BH CV ECHO MEAS - MV V2 VTI: 17.3 CM
BH CV ECHO MEAS - MVA(VTI): 5.1 CM2
BH CV ECHO MEAS - PA V2 MAX: 99.8 CM/SEC
BH CV ECHO MEAS - PI END-D VEL: 79.7 CM/SEC
BH CV ECHO MEAS - PULM A REVS DUR: 0.13 SEC
BH CV ECHO MEAS - PULM A REVS VEL: 29 CM/SEC
BH CV ECHO MEAS - PULM DIAS VEL: 36.7 CM/SEC
BH CV ECHO MEAS - PULM S/D: 1.57
BH CV ECHO MEAS - PULM SYS VEL: 57.7 CM/SEC
BH CV ECHO MEAS - RV MAX PG: 3 MMHG
BH CV ECHO MEAS - RV V1 MAX: 86 CM/SEC
BH CV ECHO MEAS - RV V1 VTI: 18.8 CM
BH CV ECHO MEAS - RVDD: 3.5 CM
BH CV ECHO MEAS - SI(MOD-SP2): 13.9 ML/M2
BH CV ECHO MEAS - SI(MOD-SP4): 17.4 ML/M2
BH CV ECHO MEAS - SV(LVOT): 88.8 ML
BH CV ECHO MEAS - SV(MOD-SP2): 30.6 ML
BH CV ECHO MEAS - SV(MOD-SP4): 38.1 ML
BH CV ECHO MEAS - TR MAX PG: 16.1 MMHG
BH CV ECHO MEAS - TR MAX VEL: 199.4 CM/SEC
BH CV NUCLEAR PRIOR STUDY: 3
BH CV REST NUCLEAR ISOTOPE DOSE: 9.3 MCI
BH CV STRESS BP STAGE 1: NORMAL
BH CV STRESS BP STAGE 2: NORMAL
BH CV STRESS BP STAGE 3: NORMAL
BH CV STRESS COMMENTS STAGE 1: NORMAL
BH CV STRESS COMMENTS STAGE 2: NORMAL
BH CV STRESS DOSE REGADENOSON STAGE 1: 0.4
BH CV STRESS DURATION MIN STAGE 1: 0
BH CV STRESS DURATION MIN STAGE 2: 4
BH CV STRESS DURATION SEC STAGE 1: 10
BH CV STRESS DURATION SEC STAGE 2: 0
BH CV STRESS HR STAGE 1: 98
BH CV STRESS HR STAGE 2: 100
BH CV STRESS HR STAGE 3: 94
BH CV STRESS NUCLEAR ISOTOPE DOSE: 33 MCI
BH CV STRESS PROTOCOL 1: NORMAL
BH CV STRESS RECOVERY BP: NORMAL MMHG
BH CV STRESS RECOVERY HR: 93 BPM
BH CV STRESS STAGE 1: 1
BH CV STRESS STAGE 2: 2
BH CV STRESS STAGE 3: 3
BILIRUB SERPL-MCNC: 0.2 MG/DL (ref 0–1.2)
BUN SERPL-MCNC: 16 MG/DL (ref 8–23)
BUN/CREAT SERPL: 17.2 (ref 7–25)
CALCIUM SPEC-SCNC: 9.1 MG/DL (ref 8.6–10.5)
CHLORIDE SERPL-SCNC: 104 MMOL/L (ref 98–107)
CO2 SERPL-SCNC: 27 MMOL/L (ref 22–29)
CREAT SERPL-MCNC: 0.93 MG/DL (ref 0.76–1.27)
DEPRECATED RDW RBC AUTO: 50.8 FL (ref 37–54)
EGFRCR SERPLBLD CKD-EPI 2021: 82.5 ML/MIN/1.73
EOSINOPHIL # BLD AUTO: 0.3 10*3/MM3 (ref 0–0.4)
EOSINOPHIL NFR BLD AUTO: 6.3 % (ref 0.3–6.2)
ERYTHROCYTE [DISTWIDTH] IN BLOOD BY AUTOMATED COUNT: 14.8 % (ref 12.3–15.4)
GLOBULIN UR ELPH-MCNC: 2.8 GM/DL
GLUCOSE SERPL-MCNC: 148 MG/DL (ref 65–99)
HCT VFR BLD AUTO: 36.2 % (ref 37.5–51)
HGB BLD-MCNC: 11.7 G/DL (ref 13–17.7)
LV EF NUC BP: 30 %
LYMPHOCYTES # BLD AUTO: 1.9 10*3/MM3 (ref 0.7–3.1)
LYMPHOCYTES NFR BLD AUTO: 37.8 % (ref 19.6–45.3)
MAXIMAL PREDICTED HEART RATE: 139 BPM
MCH RBC QN AUTO: 29.9 PG (ref 26.6–33)
MCHC RBC AUTO-ENTMCNC: 32.4 G/DL (ref 31.5–35.7)
MCV RBC AUTO: 92.3 FL (ref 79–97)
MONOCYTES # BLD AUTO: 0.6 10*3/MM3 (ref 0.1–0.9)
MONOCYTES NFR BLD AUTO: 11.8 % (ref 5–12)
NEUTROPHILS NFR BLD AUTO: 2.2 10*3/MM3 (ref 1.7–7)
NEUTROPHILS NFR BLD AUTO: 43.3 % (ref 42.7–76)
NRBC BLD AUTO-RTO: 0.1 /100 WBC (ref 0–0.2)
PERCENT MAX PREDICTED HR: 71.94 %
PLATELET # BLD AUTO: 149 10*3/MM3 (ref 140–450)
PMV BLD AUTO: 8.5 FL (ref 6–12)
POTASSIUM SERPL-SCNC: 4 MMOL/L (ref 3.5–5.2)
PROT SERPL-MCNC: 6.6 G/DL (ref 6–8.5)
RBC # BLD AUTO: 3.92 10*6/MM3 (ref 4.14–5.8)
SODIUM SERPL-SCNC: 140 MMOL/L (ref 136–145)
STRESS BASELINE BP: NORMAL MMHG
STRESS BASELINE HR: 88 BPM
STRESS PERCENT HR: 85 %
STRESS POST PEAK BP: NORMAL MMHG
STRESS POST PEAK HR: 100 BPM
STRESS TARGET HR: 118 BPM
WBC NRBC COR # BLD AUTO: 5 10*3/MM3 (ref 3.4–10.8)

## 2023-12-27 PROCEDURE — 0 TECHNETIUM TETROFOSMIN KIT: Performed by: INTERNAL MEDICINE

## 2023-12-27 PROCEDURE — 78452 HT MUSCLE IMAGE SPECT MULT: CPT

## 2023-12-27 PROCEDURE — 93017 CV STRESS TEST TRACING ONLY: CPT

## 2023-12-27 PROCEDURE — G0378 HOSPITAL OBSERVATION PER HR: HCPCS

## 2023-12-27 PROCEDURE — 25010000002 REGADENOSON 0.4 MG/5ML SOLUTION: Performed by: INTERNAL MEDICINE

## 2023-12-27 PROCEDURE — 99222 1ST HOSP IP/OBS MODERATE 55: CPT | Performed by: INTERNAL MEDICINE

## 2023-12-27 PROCEDURE — 80053 COMPREHEN METABOLIC PANEL: CPT | Performed by: STUDENT IN AN ORGANIZED HEALTH CARE EDUCATION/TRAINING PROGRAM

## 2023-12-27 PROCEDURE — 85025 COMPLETE CBC W/AUTO DIFF WBC: CPT | Performed by: STUDENT IN AN ORGANIZED HEALTH CARE EDUCATION/TRAINING PROGRAM

## 2023-12-27 PROCEDURE — 93306 TTE W/DOPPLER COMPLETE: CPT

## 2023-12-27 PROCEDURE — 25810000003 SODIUM CHLORIDE 0.9 % SOLUTION: Performed by: INTERNAL MEDICINE

## 2023-12-27 PROCEDURE — 78452 HT MUSCLE IMAGE SPECT MULT: CPT | Performed by: INTERNAL MEDICINE

## 2023-12-27 PROCEDURE — 96372 THER/PROPH/DIAG INJ SC/IM: CPT

## 2023-12-27 PROCEDURE — A9502 TC99M TETROFOSMIN: HCPCS | Performed by: INTERNAL MEDICINE

## 2023-12-27 PROCEDURE — 93018 CV STRESS TEST I&R ONLY: CPT | Performed by: INTERNAL MEDICINE

## 2023-12-27 PROCEDURE — 25010000002 HEPARIN (PORCINE) PER 1000 UNITS: Performed by: STUDENT IN AN ORGANIZED HEALTH CARE EDUCATION/TRAINING PROGRAM

## 2023-12-27 RX ORDER — REGADENOSON 0.08 MG/ML
0.4 INJECTION, SOLUTION INTRAVENOUS
Status: COMPLETED | OUTPATIENT
Start: 2023-12-27 | End: 2023-12-27

## 2023-12-27 RX ADMIN — SUCRALFATE 1 G: 1 TABLET ORAL at 12:09

## 2023-12-27 RX ADMIN — ACETAMINOPHEN 650 MG: 325 TABLET, FILM COATED ORAL at 16:33

## 2023-12-27 RX ADMIN — Medication 10 ML: at 07:45

## 2023-12-27 RX ADMIN — HEPARIN SODIUM 5000 UNITS: 5000 INJECTION INTRAVENOUS; SUBCUTANEOUS at 05:53

## 2023-12-27 RX ADMIN — Medication 500 UNITS: at 12:09

## 2023-12-27 RX ADMIN — GABAPENTIN 400 MG: 400 CAPSULE ORAL at 12:09

## 2023-12-27 RX ADMIN — ASPIRIN 81 MG: 81 TABLET, COATED ORAL at 12:09

## 2023-12-27 RX ADMIN — TETROFOSMIN 1 DOSE: 1.38 INJECTION, POWDER, LYOPHILIZED, FOR SOLUTION INTRAVENOUS at 08:45

## 2023-12-27 RX ADMIN — TETROFOSMIN 1 DOSE: 1.38 INJECTION, POWDER, LYOPHILIZED, FOR SOLUTION INTRAVENOUS at 10:14

## 2023-12-27 RX ADMIN — REGADENOSON 0.4 MG: 0.08 INJECTION, SOLUTION INTRAVENOUS at 10:15

## 2023-12-27 RX ADMIN — HEPARIN SODIUM 5000 UNITS: 5000 INJECTION INTRAVENOUS; SUBCUTANEOUS at 14:12

## 2023-12-27 RX ADMIN — PANTOPRAZOLE SODIUM 40 MG: 40 TABLET, DELAYED RELEASE ORAL at 12:09

## 2023-12-27 RX ADMIN — SODIUM CHLORIDE 500 ML: 9 INJECTION, SOLUTION INTRAVENOUS at 14:12

## 2023-12-27 NOTE — CASE MANAGEMENT/SOCIAL WORK
Discharge Planning Assessment  [facility]     Patient Name: Deion Nicholas  MRN: [id number]  Today's Date: 12/27/2023    Admit Date: 12/26/2023    Plan: Return home alone. Pt is current with [facility].   Discharge Needs Assessment       Row Name 12/27/23 1345       Living Environment    People in Home alone    Current Living Arrangements home    Potentially Unsafe Housing Conditions none    Primary Care Provided by self    Provides Primary Care For no one    Family Caregiver if Needed child(tai), adult    Family Caregiver Names son Diallo    Quality of Family Relationships helpful;involved;supportive    Able to Return to Prior Arrangements yes       Resource/Environmental Concerns    Resource/Environmental Concerns none    Transportation Concerns none       Transition Planning    Patient/Family Anticipates Transition to home    Patient/Family Anticipated Services at Transition none    Transportation Anticipated family or friend will provide;car, drives self       Discharge Needs Assessment    Readmission Within the Last 30 Days no previous admission in last 30 days    Equipment Currently Used at Home walker, standard;cane, straight;shower chair    Concerns to be Addressed no discharge needs identified;denies needs/concerns at this time    Anticipated Changes Related to Illness none    Equipment Needed After Discharge none    Provided Post Acute Provider List? N/A    Provided Post Acute Provider Quality & Resource List? N/A                   Discharge Plan       Row Name 12/27/23 3104       Plan    Plan Return home alone. Pt is current with [facility].    Patient/Family in Agreement with Plan yes    Provided Post Acute Provider List? N/A    Provided Post Acute Provider Quality & Resource List? N/A    Plan Comments CM met with pt at bedside to discuss discharge needs. Pt lives at home alone, drives and is independent with ADLs. PCP and pharmacy verified- pt enrolled in [facility] as requested. Current DME: canes, walker, shower chair. [facility]  [facility] No additional DME anticipated at [facility]. Pt's son Diallo will provide transport home. [facility] delivered 12-27 per CM.                  Continued Care and Services - Admitted Since 12/26/2023       Home Medical Care       Service Provider Request Status Selected Services Address Phone Fax Patient Preferred    [facility] Request Sent N/A [address] [facility] [place] [id number] [phone] --                     Demographic Summary       Row Name 12/27/23 1344       General Information    Admission Type observation    Arrived From emergency department    Required Notices Provided Observation Status Notice  [facility] 12-27 per CM    Referral Source admission list    Reason for Consult care coordination/care conference;discharge planning    Preferred Language English       Contact Information    Permission Granted to Share Info With                    Functional Status       Row Name 12/27/23 1344       Functional Status    Usual Activity Tolerance good    Current Activity Tolerance moderate       Functional Status, IADL    Medications independent    Meal Preparation independent    Housekeeping independent    Laundry assistive equipment    Shopping assistive equipment and person       Mental Status Summary    Recent Changes in Mental Status/Cognitive Functioning no changes       Employment/    Employment Status disabled             Ranjana Vega RN      Office phone: [phone]  Office fax: [phone]

## 2023-12-27 NOTE — PLAN OF CARE
Goal Outcome Evaluation:                          Patient went for stress test this AM, shows no ishemia. Hospitalist ordered a 500cc bolus and then patient should discharge back home today. Patient A&O X4, up ad nas.

## 2023-12-27 NOTE — DISCHARGE SUMMARY
[facility] Medicine Services  Discharge Summary    Date of Service: 2023  Patient Name: Deion Nicholas  : 1940  MRN: [id number]    Date of Admission: 2023  Discharge Diagnosis:   Chest pain  Valvular heart disease  AICD in place  Ischemic cardiomyopathy  Hypertension      Date of Discharge:  2023  Primary Care Physician: Makenna Motta MD      Presenting Problem:   Chest pain [R07.9]  Near syncope [R55]  Chest pain in adult [R07.9]    Active and Resolved Hospital Problems:  Active Hospital Problems    Diagnosis POA    Chest pain [R07.9] Yes    Valvular heart disease [I38] Yes    GERD without esophagitis [K21.9] Yes    Peripheral neuropathy [G62.9] Yes    Hyperlipidemia, mixed [E78.2] Yes    Essential hypertension [I10] Yes    Presence of automatic implantable cardioverter-defibrillator [Z95.810] Yes    Ischemic cardiomyopathy [I25.5] Yes    Coronary artery disease [I25.10] Yes      Resolved Hospital Problems   No resolved problems to display.         Hospital Course   Hospital Course:  Deion Nicholas is a 83 y.o. male with a CMH of Valvular Heart Disease, HTN, prior MI, AICD placement, Neuropathy, HLD who presented to [facility] on 2023 with chest pain and near syncopal episode.  He went to the ED and cardiology recommended admission for further management.  CTA chest was negative for PE on presentation, no ischemic changes on EKG and mildly elevated troponin.    He had a stress test done today that showed no ischemia.  Discussed with cardiology who thinks he had a vasovagal episode given his nausea/vomiting/diarrhea.  Plan is to discharge patient home with cardiology follow-up.         DISCHARGE Follow Up Recommendations for labs and diagnostics:   None        Reasons For Change In Medications and Indications for New Medications:      Day of Discharge     Vital Signs:  Temp:  [97 °F (36.1 °C)-98.5 °F (36.9 °C)] 97 °F (36.1 °C)  Heart Rate:   [] 87  Resp:  [13-20] 13  BP: (123-149)/(63-83) 124/73    Physical Exam:  Constitutional:       General: He is not in acute distress.  HENT:      Head: Normocephalic and atraumatic.      Right Ear: External ear normal.      Left Ear: External ear normal.   Cardiovascular:      Rate and Rhythm: Normal rate and regular rhythm.      Heart sounds: Normal heart sounds.   Pulmonary:      Effort: Pulmonary effort is normal. No respiratory distress.      Breath sounds: Normal breath sounds.   Abdominal:      General: Bowel sounds are normal.      Palpations: Abdomen is soft.      Tenderness: There is no abdominal tenderness.   Musculoskeletal:      Right lower leg: No edema.      Left lower leg: No edema.   Skin:     General: Skin is warm and dry.   Neurological:      Mental Status: He is alert.      Comments: Moving all extremities   Psychiatric:         Mood and Affect: Mood normal.         Behavior: Behavior normal.       Pertinent  and/or Most Recent Results     LAB RESULTS:      Lab 12/27/23  0348 12/26/23  1532   WBC 5.00 5.54   HEMOGLOBIN 11.7* 12.2*   HEMATOCRIT 36.2* 38.5   PLATELETS 149 167   NEUTROS ABS 2.20 3.01   IMMATURE GRANS (ABS)  --  0.00   LYMPHS ABS 1.90 1.74   MONOS ABS 0.60 0.53   EOS ABS 0.30 0.23   MCV 92.3 95.1   D DIMER QUANT  --  0.82*         Lab 12/27/23  0348 12/26/23  1532   SODIUM 140 135*   POTASSIUM 4.0 4.1   CHLORIDE 104 101   CO2 27.0 28.7   ANION GAP 9.0 5.3   BUN 16 20   CREATININE 0.93 1.15   EGFR 82.5 63.9   GLUCOSE 148* 160*   CALCIUM 9.1 9.5         Lab 12/27/23  0348 12/26/23  1532   TOTAL PROTEIN 6.6 7.1   ALBUMIN 3.8 4.2   GLOBULIN 2.8 2.9   ALT (SGPT) 16 17   AST (SGOT) 22 21   BILIRUBIN 0.2 0.4   ALK PHOS 92 104         Lab 12/26/23  1758 12/26/23  1532   PROBNP  --  299.7   HSTROP T 24* 27*                 Brief Urine Lab Results       None          Microbiology Results (last 10 days)       ** No results found for the last 240 hours. **            CT Angiogram Chest  Pulmonary Embolism    Result Date: 12/26/2023  Impression: Impression: Negative exam for pulmonary embolism. 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 Cholelithiasis without acute cholecystitis. No acute process. Electronically Signed: Olinda Flores MD  12/26/2023 4:49 PM EST  Workstation ID: [id number]     Results for orders placed during the hospital encounter of 10/04/23    Duplex Venous Lower Extremity - Left CAR    Interpretation Summary    Left popliteal fossa fluid collection.    Normal left lower extremity venous duplex scan.      Results for orders placed during the hospital encounter of 10/04/23    Duplex Venous Lower Extremity - Left CAR    Interpretation Summary    Left popliteal fossa fluid collection.    Normal left lower extremity venous duplex scan.      Results for orders placed during the hospital encounter of 05/22/23    Adult Transthoracic Echo Complete W/ Cont if Necessary Per Protocol    Interpretation Summary    Left ventricular ejection fraction appears to be 31 - 35%.    Moderate aortic valve regurgitation is present.    Moderate mitral valve regurgitation is present.    Estimated right ventricular systolic pressure from tricuspid regurgitation is normal (<35 mmHg).    Mild dilation of the aortic root is present.      Labs Pending at Discharge:  None      Procedures Performed  Stress test          Consults:   Consults       Date and Time Order Name Status Description    12/27/2023  3:48 AM Inpatient Cardiology Consult                Discharge Details        Discharge Medications        Continue These Medications        Instructions Start Date   aspirin 81 MG EC tablet   81 mg, Oral, Daily      chlorhexidine 4 % external liquid  Commonly known as: HIBICLENS   1 application , Topical, Daily PRN, Apply to leg(s)      cholecalciferol 10 MCG (400 UNIT) tablet  Commonly known as: VITAMIN D3   800 Units, Oral, Daily      ezetimibe 10 MG tablet  Commonly known as: ZETIA   10 mg, Oral,  Every Evening      fish oil 1000 MG capsule capsule   2,000 mg, Oral, 2 Times Daily With Meals      gabapentin 400 MG capsule  Commonly known as: NEURONTIN   400 mg, Oral, Every 6 Hours      methocarbamol 500 MG tablet  Commonly known as: ROBAXIN   500 mg, Oral, 2 Times Daily      metoprolol succinate XL 50 MG 24 hr tablet  Commonly known as: TOPROL-XL   50 mg, Oral, Daily      midodrine 5 MG tablet  Commonly known as: PROAMATINE   5 mg, Oral, 3 Times Daily Before Meals      mupirocin 2 % ointment  Commonly known as: BACTROBAN   1 application , Topical, 3 Times Daily, Apply to leg(s)      nitroglycerin 0.4 MG SL tablet  Commonly known as: NITROSTAT   0.4 mg, Sublingual, Every 5 Minutes PRN      pantoprazole 40 MG EC tablet  Commonly known as: PROTONIX   40 mg, Oral, Daily      sennosides-docusate 8.6-50 MG per tablet  Commonly known as: PERICOLACE   1 tablet, Oral, 2 Times Daily      sucralfate 1 g tablet  Commonly known as: CARAFATE   1 g, Oral, 4 Times Daily               Allergies   Allergen Reactions    Lovastatin Myalgia    Pravastatin Myalgia and Unknown (See Comments)     unknown    Simvastatin Unknown (See Comments) and Myalgia     unknown    Spironolactone Other (See Comments)     Gynecomastia          Discharge Disposition:   Home or Self Care    Diet:  Hospital:  Diet Order   Procedures    Diet: Cardiac Diets; Healthy Heart (2-3 Na+); Texture: Regular Texture (IDDSI 7); Fluid Consistency: Thin (IDDSI 0)         Discharge Activity:   Activity Instructions       Activity as Tolerated                CODE STATUS:  Code Status and Medical Interventions:   Ordered at: 12/27/23 0345     Level Of Support Discussed With:    Patient     Code Status (Patient has no pulse and is not breathing):    CPR (Attempt to Resuscitate)     Medical Interventions (Patient has pulse or is breathing):    Full Support         Future Appointments   Date Time Provider Department Center   1/30/2024  2:15 PM Marcin Childers  [facility] [place]   2/26/2024  1:00 PM Constantino Arvizu MD [facility]       Additional Instructions for the Follow-ups that You Need to Schedule       Discharge Follow-up with PCP   As directed       Currently Documented PCP:    Makenna Motta MD    PCP Phone Number:    [phone]     Follow Up Details: Routine PCP follow-up                Time spent on Discharge including face to face service:  >30 minutes    Signature: Electronically signed by Reena Mathews MD, 12/27/23, 15:19 EST.  [facility] Hospitalist Team

## 2023-12-27 NOTE — DISCHARGE PLACEMENT REQUEST
"Deion Aden (83 y.o. Male)       Date of Birth   1940    Social Security Number       Address   [address] [place] [address] [place] [address]    Home Phone   [phone]    MRN   [id number]       Mu-ism   Roman Catholic    Marital Status                               Admission Date   12/26/23    Admission Type   Urgent    Admitting Provider   Reena Mathews MD    Attending Provider   Reena Mathews MD    Department, Room/Bed   [address] MEDICAL INPATIENT, 364/1       Discharge Date       Discharge Disposition       Discharge Destination                                 Attending Provider: Reena Mathews MD    Allergies: Lovastatin, Pravastatin, Simvastatin, Spironolactone    Isolation: None   Infection: COVID Screen (preop/placement) (10/29/21)   Code Status: CPR    Ht: 185.4 cm (73\")   Wt: 95.3 kg (210 lb)    Admission Cmt: None   Principal Problem: None                  Active Insurance as of 12/26/2023       Primary Coverage       Payor Plan Insurance Group Employer/Plan Group    [facility] DEPT [id number]       Payor Plan Address Payor Plan Phone Number Payor Plan Fax Number Effective Dates    [facility] OFFICE OF COMMUNITY CARE [phone]  4/16/2023 - None Entered    [address] [address]       [facility] [address]         Subscriber Name Subscriber Birth Date Member ID       DEION ADEN 1940 [id number]               Secondary Coverage       Payor Plan Insurance Group Employer/Plan Group    [employer] MEDICARE REPLACEMENT AN[employer]M MEDICARE ADVANTAGE [id number]       Payor Plan Address Payor Plan Phone Number Payor Plan Fax Number Effective Dates    [address] [phone]  10/1/2019 - None Entered    [facility] [address]         Subscriber Name Subscriber Birth Date Member ID       DEION ADEN 1940 [id number]                     Emergency Contacts        (Rel.) Home Phone Work Phone Mobile Phone    pepesuze (Son) " [phone] -- --    SIM MOROCHO ([place]) [phone] -- --

## 2023-12-27 NOTE — PLAN OF CARE
Goal Outcome Evaluation:               Patient has remained NPO since midnight. Patient stable at this time.

## 2023-12-27 NOTE — H&P
[facility] Medicine Services  History & Physical    Patient Name: Deion Nicholas  : 1940  MRN: [id number]  Primary Care Physician:  Makenna Motta MD  Date of admission: 2023  Date and Time of Service: 2023 at 2128    Subjective      Chief Complaint: Chest Pain    History of Present Illness: Deion Nicholas is a 83 y.o. male with a CMH of Valvular Heart Disease, HTN, prior MI, AICD placement, Neuropathy, HLD who presented to [facility] on 2023 with chest pain.    Earlier today, patient started to feel like he was going to black out.  He did not experience full syncope, but he did start developing chest pain with radiation up his neck after the episode.  He took a nitro tab, and felt relief.  Due to prior MI and cardiac history and concern, presented to [facility].  CTA chest was negative for PE.  STEMI ruled out by EKG and NSTEMI ruled out by downtrending troponins. Admission was requested for further work up as patient is known to Dr. Arvizu and Dr. Childers.      Review of Systems   Constitutional:  Negative for chills and fever.   HENT:  Negative for congestion, rhinorrhea and sore throat.    Eyes:  Negative for visual disturbance.   Respiratory:  Positive for shortness of breath. Negative for chest tightness.    Cardiovascular:  Positive for chest pain. Negative for palpitations.   Gastrointestinal:  Negative for abdominal pain, diarrhea, nausea and vomiting.   Endocrine: Negative for polyuria.   Genitourinary:  Negative for difficulty urinating and dysuria.   Musculoskeletal:  Positive for neck pain. Negative for back pain and myalgias.   Skin:  Negative for rash and wound.   Neurological:  Negative for dizziness, weakness and numbness.   Hematological:  Does not bruise/bleed easily.   Psychiatric/Behavioral:  Negative for agitation, behavioral problems and confusion.        Personal History     Past Medical History:   Diagnosis Date    Aneurysm      Cardiomyopathy     ICD implantation    Cellulitis of left lower extremity 2010    recurrent    CHD (coronary heart disease)     S/P CABG & PCI    Dyslipidemia     History of ventricular tachycardia     Hypertension     Myocardial infarction     Inferior MI    Pinched nerve     L4-L5    Prostate cancer        Past Surgical History:   Procedure Laterality Date    ANGIOPLASTY  2000 04/1996 Stent: Palmaz- Huy stent/LAD 07/1996-RCA: 2000-Tetra stent Guidant to proximal RCA, mid to distal artery 2 sequential Guidant Tetra stents    APPENDECTOMY      CARDIAC ABLATION  April and May 2019    CARDIAC CATHETERIZATION  07/2017    1996 x2, Nov. 2000, 05/2010-cath 08/2015-no stents 2017    CARDIAC DEFIBRILLATOR PLACEMENT      COLONOSCOPY W/ POLYPECTOMY      Mult colonoscopy's for polyp resection     CORONARY ARTERY BYPASS GRAFT  05/2010    x5 vessel: LMA to diagonal, LAD, intermedius, obtuse marginal, RCA    CORONARY STENT PLACEMENT      ENDOSCOPY N/A 6/24/2019    Procedure: ESOPHAGOGASTRODUODENOSCOPY with dilitation Bougie 50, 54;  Surgeon: Roddy Coronel MD;  Location: [facility] ENDOSCOPY;  Service: Gastroenterology    INSERT / REPLACE / REMOVE PACEMAKER      KNEE OPEN LATERAL RELEASE Left     reduction    OTHER SURGICAL HISTORY  01/2018    ICD implantation    PROSTATE SURGERY  2015    Robiotic surgery- Cyber Knife:       Family History: family history includes Heart disease in his father; Pancreatic cancer in his mother. Otherwise pertinent FHx was reviewed and not pertinent to current issue.    Social History:  reports that he quit smoking about 21 years ago. His smoking use included cigarettes. He has a 17.00 pack-year smoking history. He has never used smokeless tobacco. He reports that he does not currently use alcohol. He reports that he does not use drugs.    Home Medications:  Prior to Admission Medications       Prescriptions Last Dose Informant Patient Reported? Taking?    aspirin 81 MG EC tablet 12/26/2023  Yes  Yes    Take 1 tablet by mouth Daily.    Cholecalciferol 10 MCG (400 UNIT) tablet 12/26/2023 Self Yes Yes    Take 2 tablets by mouth Daily.    gabapentin (NEURONTIN) 400 MG capsule 12/26/2023 Self Yes Yes    Take 1 capsule by mouth Every 6 (Six) Hours.    methocarbamol (ROBAXIN) 500 MG tablet Past Week Self Yes Yes    Take 1 tablet by mouth 2 (Two) Times a Day.    metoprolol succinate XL (TOPROL-XL) 50 MG 24 hr tablet Past Month  Yes Yes    Take 1 tablet by mouth Daily.    midodrine (PROAMATINE) 5 MG tablet 12/25/2023 Spouse/Significant Other Yes Yes    Take 1 tablet by mouth 3 (Three) Times a Day Before Meals.    mupirocin (BACTROBAN) 2 % ointment Past Week  Yes Yes    Apply 1 application  topically to the appropriate area as directed 3 (Three) Times a Day. Apply to leg(s)    nitroglycerin (NITROSTAT) 0.4 MG SL tablet 12/26/2023 Self Yes Yes    Place 1 tablet under the tongue Every 5 (Five) Minutes As Needed for Chest Pain.    Omega-3 Fatty Acids (fish oil) 1000 MG capsule capsule 12/26/2023 Self Yes Yes    Take 2 capsules by mouth 2 (Two) Times a Day With Meals.    pantoprazole (PROTONIX) 40 MG EC tablet 12/26/2023 Self Yes Yes    Take 1 tablet by mouth Daily.    sennosides-docusate (PERICOLACE) 8.6-50 MG per tablet 12/26/2023  Yes Yes    Take 1 tablet by mouth 2 (Two) Times a Day.    sucralfate (CARAFATE) 1 g tablet 12/26/2023  Yes Yes    Take 1 tablet by mouth 4 (Four) Times a Day.    chlorhexidine (HIBICLENS) 4 % external liquid   Yes No    Apply 1 application topically to the appropriate area as directed Daily As Needed for Wound Care. Apply to leg(s)    ezetimibe (ZETIA) 10 MG tablet  Self Yes No    Take 1 tablet by mouth Every Evening.              Allergies:  Allergies   Allergen Reactions    Lovastatin Myalgia    Pravastatin Myalgia and Unknown (See Comments)     unknown    Simvastatin Unknown (See Comments) and Myalgia     unknown    Spironolactone Other (See Comments)     Gynecomastia        Objective       Vitals:   Temp:  [97.7 °F (36.5 °C)-98.5 °F (36.9 °C)] 98.1 °F (36.7 °C)  Heart Rate:  [] 83  Resp:  [14-20] 14  BP: (123-149)/(63-80) 138/77  Body mass index is 27.71 kg/m².  Physical Exam  Constitutional:       General: He is not in acute distress.  HENT:      Head: Normocephalic and atraumatic.      Right Ear: External ear normal.      Left Ear: External ear normal.   Cardiovascular:      Rate and Rhythm: Normal rate and regular rhythm.      Heart sounds: Normal heart sounds.   Pulmonary:      Effort: Pulmonary effort is normal. No respiratory distress.      Breath sounds: Normal breath sounds.   Abdominal:      General: Bowel sounds are normal.      Palpations: Abdomen is soft.      Tenderness: There is no abdominal tenderness.   Musculoskeletal:      Right lower leg: No edema.      Left lower leg: No edema.   Skin:     General: Skin is warm and dry.   Neurological:      Mental Status: He is alert.      Comments: Moving all extremities   Psychiatric:         Mood and Affect: Mood normal.         Behavior: Behavior normal.         Diagnostic Data:  Lab Results (last 24 hours)       Procedure Component Value Units Date/Time    High Sensitivity Troponin T 2Hr [[id number]]  (Abnormal) Collected: 12/26/23 1758    Specimen: Blood Updated: 12/26/23 1824     HS Troponin T 24 ng/L      Troponin T Delta -3 ng/L     Narrative:      High Sensitive Troponin T Reference Range:  <14.0 ng/L- Negative Female for AMI  <22.0 ng/L- Negative Male for AMI  >=14 - Abnormal Female indicating possible myocardial injury.  >=22 - Abnormal Male indicating possible myocardial injury.   Clinicians would have to utilize clinical acumen, EKG, Troponin, and serial changes to determine if it is an Acute Myocardial Infarction or myocardial injury due to an underlying chronic condition.         Comprehensive Metabolic Panel [[id number]]  (Abnormal) Collected: 12/26/23 1532    Specimen: Blood Updated: 12/26/23 1604     Glucose 160 mg/dL       BUN 20 mg/dL      Creatinine 1.15 mg/dL      Sodium 135 mmol/L      Potassium 4.1 mmol/L      Chloride 101 mmol/L      CO2 28.7 mmol/L      Calcium 9.5 mg/dL      Total Protein 7.1 g/dL      Albumin 4.2 g/dL      ALT (SGPT) 17 U/L      AST (SGOT) 21 U/L      Alkaline Phosphatase 104 U/L      Total Bilirubin 0.4 mg/dL      Globulin 2.9 gm/dL      A/G Ratio 1.4 g/dL      BUN/Creatinine Ratio 17.4     Anion Gap 5.3 mmol/L      eGFR 63.9 mL/min/1.73     Narrative:      GFR Normal >60  Chronic Kidney Disease <60  Kidney Failure <15    The GFR formula is only valid for adults with stable renal function between ages 18 and 70.    High Sensitivity Troponin T [[id number]]  (Abnormal) Collected: 12/26/23 1532    Specimen: Blood Updated: 12/26/23 1604     HS Troponin T 27 ng/L     Narrative:      High Sensitive Troponin T Reference Range:  <14.0 ng/L- Negative Female for AMI  <22.0 ng/L- Negative Male for AMI  >=14 - Abnormal Female indicating possible myocardial injury.  >=22 - Abnormal Male indicating possible myocardial injury.   Clinicians would have to utilize clinical acumen, EKG, Troponin, and serial changes to determine if it is an Acute Myocardial Infarction or myocardial injury due to an underlying chronic condition.         BNP [[id number]]  (Normal) Collected: 12/26/23 1532    Specimen: Blood Updated: 12/26/23 1603     proBNP 299.7 pg/mL     Narrative:      This assay is used as an aid in the diagnosis of individuals suspected of having heart failure. It can be used as an aid in the diagnosis of acute decompensated heart failure (ADHF) in patients presenting with signs and symptoms of ADHF to the emergency department (ED). In addition, NT-proBNP of <300 pg/mL indicates ADHF is not likely.    Age Range Result Interpretation  NT-proBNP Concentration (pg/mL:      <50             Positive            >450                   Gray                 300-450                    Negative             <300    50-75            "Positive            >900                  Gray                300-900                  Negative            <300      >75             Positive            >1800                  Gray                300-1800                  Negative            <300    D-dimer, Quantitative [[id number]]  (Abnormal) Collected: 12/26/23 1532    Specimen: Blood Updated: 12/26/23 1554     D-Dimer, Quantitative 0.82 MCGFEU/mL     Narrative:      According to the assay 's published package insert, a normal (<0.50 MCGFEU/mL) D-dimer result in conjunction with a non-high clinical probability assessment, excludes deep vein thrombosis (DVT) and pulmonary embolism (PE) with high sensitivity.    D-dimer values increase with age and this can make VTE exclusion of an older population difficult. To address this, the [employer], based on best available evidence and recent guidelines, recommends that clinicians use age-adjusted D-dimer thresholds in patients greater than 50 years of age with: a) a low probability of PE who do not meet all Pulmonary Embolism Rule Out Criteria, or b) in those with intermediate probability of PE.   The formula for an age-adjusted D-dimer cut-off is \"age/100\".  For example, a 60 year old patient would have an age-adjusted cut-off of 0.60 MCGFEU/mL and an 80 year old 0.80 MCGFEU/mL.    CBC & Differential [[id number]]  (Abnormal) Collected: 12/26/23 1532    Specimen: Blood Updated: 12/26/23 1542    Narrative:      The following orders were created for panel order CBC & Differential.  Procedure                               Abnormality         Status                     ---------                               -----------         ------                     CBC Auto Differential[[id number]]        Abnormal            Final result                 Please view results for these tests on the individual orders.    CBC Auto Differential [[id number]]  (Abnormal) Collected: 12/26/23 1532    Specimen: Blood " Updated: 12/26/23 1542     WBC 5.54 10*3/mm3      RBC 4.05 10*6/mm3      Hemoglobin 12.2 g/dL      Hematocrit 38.5 %      MCV 95.1 fL      MCH 30.1 pg      MCHC 31.7 g/dL      RDW 14.0 %      RDW-SD 50.4 fl      MPV 9.9 fL      Platelets 167 10*3/mm3      Neutrophil % 54.3 %      Lymphocyte % 31.4 %      Monocyte % 9.6 %      Eosinophil % 4.2 %      Basophil % 0.5 %      Immature Grans % 0.0 %      Neutrophils, Absolute 3.01 10*3/mm3      Lymphocytes, Absolute 1.74 10*3/mm3      Monocytes, Absolute 0.53 10*3/mm3      Eosinophils, Absolute 0.23 10*3/mm3      Basophils, Absolute 0.03 10*3/mm3      Immature Grans, Absolute 0.00 10*3/mm3              Imaging Results (Last 24 Hours)       Procedure Component Value Units Date/Time    CT Angiogram Chest Pulmonary Embolism [[id number]] Collected: 12/26/23 1646     Updated: 12/26/23 1651    Narrative:      CT ANGIOGRAM CHEST PULMONARY EMBOLISM    Date of Exam: 12/26/2023 4:30 PM EST    Indication: Pulmonary embolism (PE) suspected, high prob.   Comparison: None available.    Technique: Axial CT images were obtained of the chest after the uneventful intravenous administration of iodinated contrast utilizing pulmonary embolism protocol.  Sagittal and coronal reconstructions were performed.  Automated exposure control and   iterative reconstruction methods were used.      Findings:   There are no filling defects suspicious for pulmonary emboli. There are sternal suture wires. There is a left transvenous pacemaker. There are no enlarged mediastinal nodes. There are calcified subcarinal and left hilar nodes. There is mild   cardiomegaly. There is no pericardial effusion. There is no pleural effusion. There is cholelithiasis without gallbladder dilatation or wall thickening. The ascending thoracic aorta measures up to 5 cm transversely. The descending thoracic aorta measures   2.8 cm transversely. There is some mild scar or atelectasis in the lung bases. There are no significant  lung infiltrates.            Impression:      Impression:  Negative exam for pulmonary embolism. 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 Cholelithiasis without acute cholecystitis. No acute process.        Electronically Signed: Olinda Flores MD    12/26/2023 4:49 PM EST    Workstation ID: [id number]              Assessment & Plan        This is a 83 y.o. male with:    Active and Resolved Problems  Active Hospital Problems    Diagnosis  POA    Chest pain [R07.9]  Yes    Valvular heart disease [I38]  Yes    GERD without esophagitis [K21.9]  Yes    Peripheral neuropathy [G62.9]  Yes    Hyperlipidemia, mixed [E78.2]  Yes    Essential hypertension [I10]  Yes    Presence of automatic implantable cardioverter-defibrillator [Z95.810]  Yes    Ischemic cardiomyopathy [I25.5]  Yes    Coronary artery disease [I25.10]  Yes      Resolved Hospital Problems   No resolved problems to display.       #Chest Pain  #Valvular heart disease  #AICD  #CAD  #Cardiomyopathy  This patient's cardiac history is complicated as he seems to have both aortic and mitral valve disease and AICD in place due to compromised cardiac tissues.  Had a negative stress test on 4/18/23.  Given all of this unsure what testing should be done for this patient aside from TTE.  - TTE  - NPO incase cardiology would like to cath or stress test this patient  - Cardiology consult, appreciate recs    #HTN  #HLD  HDS.  - Continue home meds    #Neropathy  Stable.  - Continue home meds      DVT prophylaxis:  Medical DVT prophylaxis orders are present.    The patient desires to be as follows:    CODE STATUS:    Level Of Support Discussed With: Patient  Code Status (Patient has no pulse and is not breathing): CPR (Attempt to Resuscitate)  Medical Interventions (Patient has pulse or is breathing): Full Support        Diallo Nicholas, who can be contacted at [phone], is the designated person to make medical decisions on the patient's behalf if He is incapable  of doing so. This was clarified with patient and/or next of kin on 12/26/2023 during the course of this H&P.    Admission Status:  I believe this patient meets observation status.    Expected Length of Stay: 1-2 nights    PDMP and Medication Dispenses via Sidebar reviewed and consistent with patient reported medications.    I discussed the patient's findings and my recommendations with patient.      Signature:     This document has been electronically signed by Federico Terrazas MD on December 27, 2023 03:48 [facility] Hospitalist Team

## 2023-12-27 NOTE — CONSULTS
CARDIOLOGY CONSULT      Requesting Provider    Reena Mathews MD      PATIENT IDENTIFICATION    Name: Deion Nicholas  Age: 83 y.o. Sex: male : 1940  MRN: [id number]    REASON FOR CONSULTATION:  Chest pain with history of coronary artery disease    CHIEF COMPLAINT:  Chest pain    HISTORY OF PRESENT ILLNESS:   This is an 83-year-old male with an established history of ischemic heart disease.  He is undergone coronary arterial bypass grafting in the past.  He is additional history includes ischemic cardiomyopathy, AICD in situ, hypertension, ventricular tachycardia status post radiofrequency ablation, hypertension, valvular heart disease, orthostatic hypotension, recurrent lower extremity cellulitis, and acid reflux.  He presents to the hospital with a complaint of chest pain.    He reports that he had a near syncopal episode.  He reports that he was able to stumble to a chair and call his son.  As soon as the episode passed he reports that he developed moderate to severe left neck and chest pain.  He reported improvement with nitroglycerin.  He denies that his AICD discharged.  He does report that he had overwhelming nausea and loose bowels.    IMPRESSIONS  Near syncope likely vagally mediated  Chest pain with typical and atypical features  Coronary artery disease with history of CABG  Ischemic cardiomyopathy with AICD in situ  History of ventricular tachycardia status post radiofrequency ablation in the past  Hypertension  Valvular heart disease  Recurrent lower extremity edema and cellulitis    RECOMMENDATIONS:  Cardiac biomarkers are essentially normal and nontrending  Will check nuclear stress test  Further recommendations pending above    Medical History    Past Medical History:   Diagnosis Date    Aneurysm     Cardiomyopathy     ICD implantation    Cellulitis of left lower extremity     recurrent    CHD (coronary heart disease)     S/P CABG & PCI    Dyslipidemia     History of ventricular  tachycardia     Hypertension     Myocardial infarction     Inferior MI    Pinched nerve     L4-L5    Prostate cancer         Surgical History    Past Surgical History:   Procedure Laterality Date    ANGIOPLASTY  1996 Stent: Palmaz- Huy stent/LAD 1996-RCA: -Tetra stent Guidant to proximal RCA, mid to distal artery 2 sequential Guidant Tetra stents    APPENDECTOMY      CARDIAC ABLATION  April and May 2019    CARDIAC CATHETERIZATION  2017    1996 x2, Nov. , 2010-cath 2015-no stents 2017    CARDIAC DEFIBRILLATOR PLACEMENT      COLONOSCOPY W/ POLYPECTOMY      Mult colonoscopy's for polyp resection     CORONARY ARTERY BYPASS GRAFT  05/2010    x5 vessel: LMA to diagonal, LAD, intermedius, obtuse marginal, RCA    CORONARY STENT PLACEMENT      ENDOSCOPY N/A 2019    Procedure: ESOPHAGOGASTRODUODENOSCOPY with dilitation Bougie 50, 54;  Surgeon: Roddy Coronel MD;  Location: [facility] ENDOSCOPY;  Service: Gastroenterology    INSERT / REPLACE / REMOVE PACEMAKER      KNEE OPEN LATERAL RELEASE Left     reduction    OTHER SURGICAL HISTORY  2018    ICD implantation    PROSTATE SURGERY      Robiotic surgery- Cyber Knife:        Family History    Family History   Problem Relation Age of Onset    Pancreatic cancer Mother     Heart disease Father        Social History    Social History     Tobacco Use    Smoking status: Former     Packs/day: 1.00     Years: 17.00     Additional pack years: 0.00     Total pack years: 17.00     Types: Cigarettes     Quit date: 2002     Years since quittin.4    Smokeless tobacco: Never   Substance Use Topics    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        Allergies    Allergies   Allergen Reactions    Lovastatin Myalgia    Pravastatin Myalgia and Unknown (See Comments)     unknown    Simvastatin Unknown (See Comments) and Myalgia     unknown    Spironolactone Other (See Comments)     Gynecomastia        REVIEW OF  "SYSTEMS:  Pertinent items are noted in HPI, all other systems reviewed and negative    OBJECTIVE     VITAL SIGNS:  Visit Vitals  /74 (BP Location: Left arm, Patient Position: Lying)   Pulse 87   Temp 97.5 °F (36.4 °C) (Oral)   Resp 15   Ht 185.4 cm (73\")   Wt 95.3 kg (210 lb)   SpO2 100%   BMI 27.71 kg/m²     Oxygen Therapy  SpO2: 100 %  Pulse Oximetry Type: Intermittent  Device (Oxygen Therapy): room air  Flowsheet Rows      Flowsheet Row First Filed Value   Admission Height 182.9 cm (72\") Documented at 12/26/2023 1525   Admission Weight 95.3 kg (210 lb) Documented at 12/26/2023 1525          Intake & Output (last 3 days)         12/24 0701  12/25 0700 12/25 0701  12/26 0700 12/26 0701  12/27 0700 12/27 0701  12/28 0700    Urine (mL/kg/hr)   300 400 (3.2)    Total Output   300 400    Net   -300 -400                  Lines, Drains & Airways       Active LDAs       Name Placement date Placement time Site Days    Peripheral IV 12/26/23 1529 Right Antecubital 12/26/23  1529  Antecubital  less than 1                  /74 (BP Location: Left arm, Patient Position: Lying)   Pulse 87   Temp 97.5 °F (36.4 °C) (Oral)   Resp 15   Ht 185.4 cm (73\")   Wt 95.3 kg (210 lb)   SpO2 100%   BMI 27.71 kg/m²     INTAKE/OUTPUT:    Intake/Output this shift:  I/O this shift:  In: -   Out: 400 [Urine:400]  Intake/Output last 3 shifts:  I/O last 3 completed shifts:  In: -   Out: 300 [Urine:300]      PHYSICAL EXAM:    General: Alert, cooperative, no distress, appears stated age  Head:  Normocephalic, atraumatic, mucous membranes moist  Eyes:  Conjunctivae/corneas clear, EOM's intact     Neck:  Supple,  no bruit  Lungs: Clear to auscultation bilaterally, no wheezes, rhonchi or rales are noted  Chest wall: No tenderness  Heart::  Regular rate and rhythm, S1 and S2 normal, 1/6 holosystolic murmur.  No rub or gallop  Abdomen: Soft, nontender, nondistended, bowel sounds active  Extremities: No cyanosis, clubbing.  Trace " "edema  Pulses: 2+ and symmetric all extremities  Skin:  No rashes or lesions  Neuro/psych: A&O x3. CN II through XII are grossly intact with appropriate affect    Scheduled Meds:      aspirin, 81 mg, Oral, Daily  cholecalciferol, 500 Units, Oral, Daily  gabapentin, 400 mg, Oral, Q6H  heparin (porcine), 5,000 Units, Subcutaneous, Q8H  midodrine, 5 mg, Oral, Q8H  pantoprazole, 40 mg, Oral, Daily  senna-docusate sodium, 2 tablet, Oral, BID  sodium chloride, 10 mL, Intravenous, Q12H  sucralfate, 1 g, Oral, 4x Daily        Continuous Infusions:         PRN Meds:      acetaminophen    senna-docusate sodium **AND** polyethylene glycol **AND** bisacodyl **AND** bisacodyl    Calcium Replacement - Follow Nurse / BPA Driven Protocol    Magnesium Standard Dose Replacement - Follow Nurse / BPA Driven Protocol    nitroglycerin    ondansetron ODT **OR** ondansetron    oxyCODONE    Phosphorus Replacement - Follow Nurse / BPA Driven Protocol    Potassium Replacement - Follow Nurse / BPA Driven Protocol    [COMPLETED] Insert peripheral IV **AND** sodium chloride    sodium chloride    sodium chloride    temazepam     Data Review:     X-rays, labs reviewed personally by provider.    CBC    Results from last 7 days   Lab Units 12/27/23  0348 12/26/23  1532   WBC 10*3/mm3 5.00 5.54   HEMOGLOBIN g/dL 11.7* 12.2*   PLATELETS 10*3/mm3 149 167     Cr Clearance Estimated Creatinine Clearance: 84 mL/min (by C-G formula based on SCr of 0.93 mg/dL).  Coag     HbA1C   Lab Results   Component Value Date    HGBA1C 6.1 (H) 10/26/2022     Blood Glucose No results found for: \"POCGLU\"  Infection     CMP   Results from last 7 days   Lab Units 12/27/23  0348 12/26/23  1532   SODIUM mmol/L 140 135*   POTASSIUM mmol/L 4.0 4.1   CHLORIDE mmol/L 104 101   CO2 mmol/L 27.0 28.7   BUN mg/dL 16 20   CREATININE mg/dL 0.93 1.15   GLUCOSE mg/dL 148* 160*   ALBUMIN g/dL 3.8 4.2   BILIRUBIN mg/dL 0.2 0.4   ALK PHOS U/L 92 104   AST (SGOT) U/L 22 21   ALT (SGPT) U/L " "16 17     ABG      UA      LEIGH ANN  No results found for: \"POCMETH\", \"POCAMPHET\", \"POCBARBITUR\", \"POCBENZO\", \"POCCOCAINE\", \"POCOPIATES\", \"POCOXYCODO\", \"POCPHENCYC\", \"POCPROPOXY\", \"POCTHC\", \"POCTRICYC\"  Lysis Labs   Results from last 7 days   Lab Units 12/27/23  0348 12/26/23  1532   HEMOGLOBIN g/dL 11.7* 12.2*   PLATELETS 10*3/mm3 149 167   CREATININE mg/dL 0.93 1.15     Radiology(recent) CT Angiogram Chest Pulmonary Embolism    Result Date: 12/26/2023  Impression: Negative exam for pulmonary embolism. 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 Cholelithiasis without acute cholecystitis. No acute process. Electronically Signed: Olidna Flores MD  12/26/2023 4:49 PM EST  Workstation ID: [id number]       Results from last 7 days   Lab Units 12/26/23  1758   HSTROP T ng/L 24*       ECG/EMG Results (most recent)       Procedure Component Value Units Date/Time    ECG 12 Lead Chest Pain [[id number]] Collected: 12/26/23 1529     Updated: 12/26/23 1551     QT Interval 360 ms      QTC Interval 460 ms     Narrative:      HEART RATE= 98  bpm  RR Interval= 612  ms  IN Interval= 171  ms  P Horizontal Axis= 3  deg  P Front Axis= 53  deg  QRSD Interval= 133  ms  QT Interval= 360  ms  QTcB= 460  ms  QRS Axis= 17  deg  T Wave Axis= 208  deg  - ABNORMAL ECG -  Sinus rhythm  Paired ventricular premature complexes  Probable left atrial enlargement  Left bundle branch block  When compared with ECG of 05-Oct-2023 5:05:05,  Significant rate increase  Electronically Signed By: Brett Reid (EROS) 26-Dec-2023 15:51:43  Date and Time of Study: 2023-12-26 15:29:58    ECG 12 Lead Chest Pain [[id number]] Collected: 12/26/23 1710     Updated: 12/26/23 1714     QT Interval 392 ms      QTC Interval 465 ms     Narrative:      HEART RATE= 84  bpm  RR Interval= 712  ms  IN Interval= 186  ms  P Horizontal Axis= 40  deg  P Front Axis= 46  deg  QRSD Interval= 133  ms  QT Interval= 392  ms  QTcB= 465  ms  QRS Axis= -6  deg  T Wave Axis= 6  deg  - " ABNORMAL ECG -  Sinus rhythm  Left bundle branch block  Probable anteroseptal infarct, old  When compared with ECG of 26-Dec-2023 15:29:58,  Significant axis, voltage or hypertrophy change  Electronically Signed By: Brett Reid (EROS) 26-Dec-2023 17:14:39  Date and Time of Study: 2023-12-26 17:10:29    SCANNED - TELEMETRY   [[id number]] Resulted: 12/26/23     Updated: 12/26/23 2343            Imaging:  Imaging Results (Last 72 Hours)       Procedure Component Value Units Date/Time    CT Angiogram Chest Pulmonary Embolism [[id number]] Collected: 12/26/23 1646     Updated: 12/26/23 1651    Narrative:      CT ANGIOGRAM CHEST PULMONARY EMBOLISM    Date of Exam: 12/26/2023 4:30 PM EST    Indication: Pulmonary embolism (PE) suspected, high prob.   Comparison: None available.    Technique: Axial CT images were obtained of the chest after the uneventful intravenous administration of iodinated contrast utilizing pulmonary embolism protocol.  Sagittal and coronal reconstructions were performed.  Automated exposure control and   iterative reconstruction methods were used.      Findings:   There are no filling defects suspicious for pulmonary emboli. There are sternal suture wires. There is a left transvenous pacemaker. There are no enlarged mediastinal nodes. There are calcified subcarinal and left hilar nodes. There is mild   cardiomegaly. There is no pericardial effusion. There is no pleural effusion. There is cholelithiasis without gallbladder dilatation or wall thickening. The ascending thoracic aorta measures up to 5 cm transversely. The descending thoracic aorta measures   2.8 cm transversely. There is some mild scar or atelectasis in the lung bases. There are no significant lung infiltrates.            Impression:      Impression:  Negative exam for pulmonary embolism. 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 Cholelithiasis without acute cholecystitis. No acute process.        Electronically Signed: Olinda  MD Sandra    12/26/2023 4:49 PM EST    Workstation ID: [id number]              Marcin Childers DO  12/27/23  08:19 EST

## 2023-12-28 NOTE — CASE MANAGEMENT/SOCIAL WORK
Case Management Discharge Note      Final Note: home with [facility]      Selected Continued Care - Discharged on 12/27/2023 Admission date: 12/26/2023 - Discharge disposition: Home or Self Care        Home Medical Care Coordination complete.      Service Provider Selected Services Address Phone Fax Patient Preferred    [facility] [facility] [address] [facility], [place] [address] [phone] --               Transportation Services  Private: Car    Final Discharge Disposition Code: 06 - home with home health care

## 2024-01-02 ENCOUNTER — TELEPHONE (OUTPATIENT)
Dept: CARDIOLOGY | Facility: CLINIC | Age: 84
End: 2024-01-02

## 2024-01-02 LAB
ASCENDING AORTA: 4.7 CM
BH CV ECHO MEAS - AI P1/2T: 409.6 MSEC
BH CV ECHO MEAS - AO MAX PG: 4.5 MMHG
BH CV ECHO MEAS - AO MEAN PG: 2.8 MMHG
BH CV ECHO MEAS - AO ROOT DIAM: 4 CM
BH CV ECHO MEAS - AO V2 MAX: 105.9 CM/SEC
BH CV ECHO MEAS - AO V2 VTI: 20.9 CM
BH CV ECHO MEAS - AVA(I,D): 4.3 CM2
BH CV ECHO MEAS - EDV(CUBED): 268.8 ML
BH CV ECHO MEAS - EDV(MOD-SP2): 138.1 ML
BH CV ECHO MEAS - EDV(MOD-SP4): 180.9 ML
BH CV ECHO MEAS - EF(MOD-BP): 22 %
BH CV ECHO MEAS - EF(MOD-SP2): 22.2 %
BH CV ECHO MEAS - EF(MOD-SP4): 21.1 %
BH CV ECHO MEAS - ESV(CUBED): 249.4 ML
BH CV ECHO MEAS - ESV(MOD-SP2): 107.5 ML
BH CV ECHO MEAS - ESV(MOD-SP4): 142.8 ML
BH CV ECHO MEAS - FS: 2.47 %
BH CV ECHO MEAS - IVS/LVPW: 1.03 CM
BH CV ECHO MEAS - IVSD: 1.11 CM
BH CV ECHO MEAS - LA DIMENSION: 4.6 CM
BH CV ECHO MEAS - LV DIASTOLIC VOL/BSA (35-75): 82.4 CM2
BH CV ECHO MEAS - LV MASS(C)D: 314.7 GRAMS
BH CV ECHO MEAS - LV MAX PG: 2.09 MMHG
BH CV ECHO MEAS - LV MEAN PG: 1.14 MMHG
BH CV ECHO MEAS - LV SYSTOLIC VOL/BSA (12-30): 65 CM2
BH CV ECHO MEAS - LV V1 MAX: 72.3 CM/SEC
BH CV ECHO MEAS - LV V1 VTI: 15.4 CM
BH CV ECHO MEAS - LVIDD: 6.5 CM
BH CV ECHO MEAS - LVIDS: 6.3 CM
BH CV ECHO MEAS - LVOT AREA: 5.8 CM2
BH CV ECHO MEAS - LVOT DIAM: 2.7 CM
BH CV ECHO MEAS - LVPWD: 1.08 CM
BH CV ECHO MEAS - MV A MAX VEL: 82 CM/SEC
BH CV ECHO MEAS - MV DEC SLOPE: 420.4 CM/SEC2
BH CV ECHO MEAS - MV DEC TIME: 0.12 SEC
BH CV ECHO MEAS - MV E MAX VEL: 50.1 CM/SEC
BH CV ECHO MEAS - MV E/A: 0.61
BH CV ECHO MEAS - MV MAX PG: 3.7 MMHG
BH CV ECHO MEAS - MV MEAN PG: 1.79 MMHG
BH CV ECHO MEAS - MV V2 VTI: 17.3 CM
BH CV ECHO MEAS - MVA(VTI): 5.1 CM2
BH CV ECHO MEAS - PA V2 MAX: 99.8 CM/SEC
BH CV ECHO MEAS - PI END-D VEL: 79.7 CM/SEC
BH CV ECHO MEAS - PULM A REVS DUR: 0.13 SEC
BH CV ECHO MEAS - PULM A REVS VEL: 29 CM/SEC
BH CV ECHO MEAS - PULM DIAS VEL: 36.7 CM/SEC
BH CV ECHO MEAS - PULM S/D: 1.57
BH CV ECHO MEAS - PULM SYS VEL: 57.7 CM/SEC
BH CV ECHO MEAS - RV MAX PG: 3 MMHG
BH CV ECHO MEAS - RV V1 MAX: 86 CM/SEC
BH CV ECHO MEAS - RV V1 VTI: 18.8 CM
BH CV ECHO MEAS - RVDD: 3.5 CM
BH CV ECHO MEAS - SI(MOD-SP2): 13.9 ML/M2
BH CV ECHO MEAS - SI(MOD-SP4): 17.4 ML/M2
BH CV ECHO MEAS - SV(LVOT): 88.8 ML
BH CV ECHO MEAS - SV(MOD-SP2): 30.6 ML
BH CV ECHO MEAS - SV(MOD-SP4): 38.1 ML
BH CV ECHO MEAS - TR MAX PG: 16.1 MMHG
BH CV ECHO MEAS - TR MAX VEL: 199.4 CM/SEC

## 2024-01-02 NOTE — TELEPHONE ENCOUNTER
Caller: Yu Deion    Relationship: Self    Best call back number: [phone]     Caller requesting test results: PATIENT    What test was performed: STRESS TEST AND ECG    When was the test performed: 12.26.23 AND 12.27.23    Where was the test performed: [facility]    Additional notes: PT STATES HE WAS IN THE HOSPITAL AND SAW DR MUÑOZ FOR A FU AND WAS PRESCRIBED A MEDICATION FOR HIS BP, PT IS WANTING TO CONFIRM MEDICATION INFORMATION AS TO [facility] WITH [facility] - PT STATES HE WOULD LIKE TO SPEAK WITH SOMEONE ABOUT RESULTS OF ECG AND STRESS TEST FROM 12.27.23

## 2024-01-02 NOTE — TELEPHONE ENCOUNTER
I returned the call- answered question about midodrine. Echo is not transcribed to be able to give a result yet. Patient had no further questions

## 2024-01-22 NOTE — PROGRESS NOTES
Cardiology Office Visit      Encounter Date:  01/30/2024    Patient ID:   Deion Nicholas is a 83 y.o. male.    Reason For Followup:  Ischemic cardiomyopathy  Coronary artery disease  Hypertension  Hypertensive cardiovascular disease  Amiodarone therapy  Antiplatelet therapy  Hyperlipidemia    Brief Clinical History:  Dear Makenna Jimenez MD    I had the pleasure of seeing Deion Nicholas today. As you are well aware, this is a 83 y.o. male with an established history of ischemic heart disease.  He has undergone coronary arterial bypass grafting in the past.  He is additional history that includes ischemic cardiomyopathy with most recent ejection fraction of 30 to 35%, ICD implantation, hypertension, hypertensive cardiovascular disease, amiodarone therapy, antiplatelet therapy, and hyperlipidemia.  He presents today for follow-up on the above conditions.     Interval History:  He denies any chest pain pressure heaviness or tightness.  He denies any shortness of breath out of character.  He denies any PND orthopnea.  He denies any syncope or near syncope.  He continues to report unsteady gait.    He continues to report problems with lower extremity edema and recurrent cellulitis.  He has been hospitalized a couple of times for cellulitis.  He has an appointment to see vascular surgery in a couple of weeks.  They will work with him on his lower extremity edema and recurrent cellulitis.    His most recent echocardiogram was performed in May 2023.  Significant left ventricular systolic dysfunction was noted with an ejection fraction of 30 to 35%.  Moderate aortic insufficiency and moderate mitral insufficiency was noted.  I personally reviewed these images and it appears that the AI and MR is likely better characterized as mild to moderate.  Mild dilation of the ascending aorta/aortic root was noted.    01/30/2024    Patient reports legs are getting better. He was placed on a diuretic and is helping quite a bit.  "He is going to have cataract surgery. We reviewed his studies. CTA was done in December his ascending aorta was 5 cm. Will refer to aorta clinic.    Patient had a stress and echo done in Dec 23/Jan 24. Nuclear stress was negative. Echo shows that EF was 20-25%. This represents a decline since 5/23 when it was 30-35%.    Labs pertinent to this visit (including but not limited to CBC, CMP, and lipid profiles) were requested from the patient's primary care provider/hospital/clinical laboratory.  If the labs were available for the visit, they were reviewed with the patient.  If they were not available, when received, special interest will be made to the labs pertinent to this visit.  The patient's most recent \"in-house\" labs are noted below and have been reviewed.  Outside labs pertinent to this visit are scanned into the record and have been reviewed.    Discussions regarding procedures included risk, benefits, and options including but not limited to: Death, MI, stroke, pain, bleeding, infection, and possible need for vascular/thoracic/cardiothoracic surgery.    Copied information within this note was reviewed and is current as of the date of this encounter.    Assessment and plan noted herein and below represents the current plan of care as of the date of this encounter.    Significant resources from our office and staff are inherent in engaging this patient in a continuous and active collaborative plan of care related to their chronic cardiovascular conditions outlined below.  The management of these conditions requires the direction of our service with specialized clinical knowledge, skills, and experience.  This collaborative care includes but is not limited to patient education, expectations and responsibilities, shared decision making around therapeutic goals, and shared commitments to achieve those goals.    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     Recommendations:  Continuation of his current cardiovascular regimen at the present time.     This includes beta-blockers, midodrine, antiplatelet therapy, and anticoagulant therapy.  CT surgery/aorta clinic referral  Follow-up in 6 months time  Follow-up with vascular surgery as scheduled  Follow-up with EP as scheduled.    Diagnoses and all orders for this visit:    1. Coronary artery disease involving coronary bypass graft of native heart without angina pectoris (Primary)  -     Ambulatory Referral to Cardiothoracic Surgery    2. Essential hypertension  -     Ambulatory Referral to Cardiothoracic Surgery    3. Hyperlipidemia, mixed  -     Ambulatory Referral to Cardiothoracic Surgery    4. Ischemic cardiomyopathy  -     Ambulatory Referral to Cardiothoracic Surgery    5. Nonrheumatic aortic valve insufficiency  -     Ambulatory Referral to Cardiothoracic Surgery    6. Nonrheumatic mitral valve regurgitation  -     Ambulatory Referral to Cardiothoracic Surgery    7. Orthostatic hypotension  -     Ambulatory Referral to Cardiothoracic Surgery    8. Presence of automatic implantable cardioverter-defibrillator  -     Ambulatory Referral to Cardiothoracic Surgery    9. Valvular heart disease  -     Ambulatory Referral to Cardiothoracic Surgery    10. Aneurysm of ascending aorta without rupture  -     Ambulatory Referral to Cardiothoracic Surgery            Objective:    Vitals:  Vitals:    01/30/24 1430   BP: 138/72   BP Location:  "Left arm   Pulse: 97   SpO2: 94%   Weight: 96.6 kg (213 lb)   Height: 185.4 cm (73\")       Body mass index is 28.1 kg/m².      Physical Exam:    General: Alert, cooperative, no distress, appears stated age.  Ambulates with 2 canes.  Head:  Normocephalic, atraumatic, mucous membranes moist  Eyes:  Conjunctiva/corneas clear, EOM's intact     Neck:  Supple,  no bruit    Lungs: Clear to auscultation bilaterally, no wheezes rhonchi rales are noted  Chest wall: No tenderness  Heart::  Regular rate and rhythm, S1 and S2 normal, 1/6 holosystolic murmur.  No rub or gallop  Abdomen: Soft, non-tender, nondistended bowel sounds active  Extremities: No cyanosis, clubbing left lower extremity edema  Pulses: Diminished pedal pulses  Skin:  No rashes or lesions  Neuro/psych: A&O x3. CN II through XII are grossly intact with appropriate affect.  Poor ambulatory status.  Ambulates with 2 canes.      Allergies:  Allergies   Allergen Reactions    Lovastatin Myalgia    Pravastatin Myalgia and Unknown (See Comments)     unknown    Simvastatin Unknown (See Comments) and Myalgia     unknown    Spironolactone Other (See Comments)     Gynecomastia        Medication Review:     Current Outpatient Medications:     aspirin 81 MG EC tablet, Take 1 tablet by mouth Daily., Disp: , Rfl:     Cholecalciferol 10 MCG (400 UNIT) tablet, Take 2 tablets by mouth Daily., Disp: , Rfl:     ezetimibe (ZETIA) 10 MG tablet, Take 1 tablet by mouth Every Evening., Disp: , Rfl:     gabapentin (NEURONTIN) 400 MG capsule, Take 1 capsule by mouth Every 6 (Six) Hours., Disp: , Rfl:     methocarbamol (ROBAXIN) 500 MG tablet, Take 1 tablet by mouth 2 (Two) Times a Day., Disp: , Rfl:     metoprolol succinate XL (TOPROL-XL) 50 MG 24 hr tablet, Take 1 tablet by mouth Daily., Disp: , Rfl:     midodrine (PROAMATINE) 5 MG tablet, Take 1 tablet by mouth 3 (Three) Times a Day Before Meals., Disp: , Rfl:     mupirocin (BACTROBAN) 2 % ointment, Apply 1 Application topically to " the appropriate area as directed 3 (Three) Times a Day. Apply to leg(s), Disp: , Rfl:     nitroglycerin (NITROSTAT) 0.4 MG SL tablet, Place 1 tablet under the tongue Every 5 (Five) Minutes As Needed for Chest Pain., Disp: , Rfl:     Omega-3 Fatty Acids (fish oil) 1000 MG capsule capsule, Take 2 capsules by mouth 2 (Two) Times a Day With Meals., Disp: , Rfl:     pantoprazole (PROTONIX) 40 MG EC tablet, Take 1 tablet by mouth Daily., Disp: , Rfl:     sennosides-docusate (PERICOLACE) 8.6-50 MG per tablet, Take 1 tablet by mouth 2 (Two) Times a Day., Disp: , Rfl:     sucralfate (CARAFATE) 1 g tablet, Take 1 tablet by mouth 4 (Four) Times a Day., Disp: , Rfl:     chlorhexidine (HIBICLENS) 4 % external liquid, Apply 1 application topically to the appropriate area as directed Daily As Needed for Wound Care. Apply to leg(s) (Patient not taking: Reported on 1/30/2024), Disp: , Rfl:     Family History:  Family History   Problem Relation Age of Onset    Pancreatic cancer Mother     Heart disease Father        Past Medical History:  Past Medical History:   Diagnosis Date    Aneurysm     Cardiomyopathy     ICD implantation    Cellulitis of left lower extremity 2010    recurrent    CHD (coronary heart disease)     S/P CABG & PCI    Dyslipidemia     History of ventricular tachycardia     Hypertension     Myocardial infarction     Inferior MI    Pinched nerve     L4-L5    Prostate cancer        Past Surgical History:  Past Surgical History:   Procedure Laterality Date    ANGIOPLASTY  2000 04/1996 Stent: Palmaz- Huy stent/LAD 07/1996-RCA: 2000-Tetra stent Guidant to proximal RCA, mid to distal artery 2 sequential Guidant Tetra stents    APPENDECTOMY      CARDIAC ABLATION  April and May 2019    CARDIAC CATHETERIZATION  07/2017    1996 x2, Nov. 2000, 05/2010-cath 08/2015-no stents 2017    CARDIAC DEFIBRILLATOR PLACEMENT      COLONOSCOPY W/ POLYPECTOMY      Mult colonoscopy's for polyp resection     CORONARY ARTERY BYPASS GRAFT   05/2010    x5 vessel: LMA to diagonal, LAD, intermedius, obtuse marginal, RCA    CORONARY STENT PLACEMENT      ENDOSCOPY N/A 2019    Procedure: ESOPHAGOGASTRODUODENOSCOPY with dilitation Bougie 50, 54;  Surgeon: Roddy Coronel MD;  Location: [facility] ENDOSCOPY;  Service: Gastroenterology    INSERT / REPLACE / REMOVE PACEMAKER      KNEE OPEN LATERAL RELEASE Left     reduction    OTHER SURGICAL HISTORY  2018    ICD implantation    PROSTATE SURGERY  2015    Robiotic surgery- Cyber Knife:       Social History:  Social History     Socioeconomic History    Marital status:    Tobacco Use    Smoking status: Former     Packs/day: 1.00     Years: 17.00     Additional pack years: 0.00     Total pack years: 17.00     Types: Cigarettes     Quit date: 2002     Years since quittin.5     Passive exposure: Past    Smokeless tobacco: Never   Vaping Use    Vaping Use: Never used   Substance and Sexual Activity    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 The pertinent cardiovascular findings are reported above with all other cardiovascular points within those systems being negative.    Diagnostic Study Review:     Current Electrocardiogram:  Procedures no new EKG. EKG dated 2023 demonstrates sinus rhythm with a ventricular rate of 84 bpm.    Laboratory Data:  Lab Results   Component Value Date    GLUCOSE 148 (H) 2023    BUN 16 2023    CREATININE 0.93 2023    EGFRIFNONA 100 2021    BCR 17.2 2023    K 4.0 2023    CO2 27.0 2023    CALCIUM 9.1 2023    ALBUMIN 3.8 2023    LABIL2 1.1 2019    AST 22  12/27/2023    ALT 16 12/27/2023     Lab Results   Component Value Date    GLUCOSE 148 (H) 12/27/2023    CALCIUM 9.1 12/27/2023     12/27/2023    K 4.0 12/27/2023    CO2 27.0 12/27/2023     12/27/2023    BUN 16 12/27/2023    CREATININE 0.93 12/27/2023    EGFRIFNONA 100 12/01/2021    BCR 17.2 12/27/2023    ANIONGAP 9.0 12/27/2023     Lab Results   Component Value Date    WBC 5.00 12/27/2023    HGB 11.7 (L) 12/27/2023    HCT 36.2 (L) 12/27/2023    MCV 92.3 12/27/2023     12/27/2023     Lab Results   Component Value Date    CHOL 182 10/27/2022    TRIG 104 10/27/2022    HDL 48 10/27/2022     (H) 10/27/2022     Lab Results   Component Value Date    HGBA1C 6.1 (H) 10/26/2022     Lab Results   Component Value Date    INR 1.01 10/04/2023    INR 0.97 05/09/2023    INR 1.00 04/16/2023    PROTIME 11.0 10/04/2023    PROTIME 10.4 05/09/2023    PROTIME 10.3 04/16/2023       Most Recent Echo:  Results for orders placed during the hospital encounter of 12/26/23    Adult Transthoracic Echo Complete W/ Cont if Necessary Per Protocol    Interpretation Summary    Left ventricular systolic function is severely decreased. Left ventricular ejection fraction appears to be 21 - 25%.    The left ventricular cavity is severely dilated.    Left ventricular diastolic function is consistent with (grade I) impaired relaxation.    The left atrial cavity is moderately dilated.    Abnormal mitral valve structure consistent with dilated annulus.    Moderate dilation of the ascending aorta is present.    Ascending aorta = 4.7 cm       Most Recent Stress Test:  Results for orders placed during the hospital encounter of 12/26/23    Stress Test With Myocardial Perfusion One Day    Interpretation Summary    Myocardial perfusion imaging indicates a small-sized infarct located in the apex with no significant ischemia noted.    Left ventricular ejection fraction is severely reduced (Calculated EF = 30%).    Abnormal LV wall motion  consistent with severe hypokinesis and global hypokinesis.    Impressions are consistent with a low risk study.    the current study reveals no changes.    Findings consistent with a normal ECG stress test.       Most Recent Cardiac Catheterization:   No results found for this or any previous visit.       NOTE: The following portions of the patient's note were reviewed, confirmed and/or updated this visit as appropriate: History of present illness/Interval history, physical examination, assessment & plan, allergies, current medications, past family history, past medical history, past social history, past surgical history and problem list.

## 2024-01-30 ENCOUNTER — OFFICE VISIT (OUTPATIENT)
Dept: CARDIOLOGY | Facility: CLINIC | Age: 84
End: 2024-01-30
Payer: OTHER GOVERNMENT

## 2024-01-30 VITALS
SYSTOLIC BLOOD PRESSURE: 138 MMHG | HEIGHT: 73 IN | DIASTOLIC BLOOD PRESSURE: 72 MMHG | OXYGEN SATURATION: 94 % | HEART RATE: 97 BPM | WEIGHT: 213 LBS | BODY MASS INDEX: 28.23 KG/M2

## 2024-01-30 DIAGNOSIS — I38 VALVULAR HEART DISEASE: ICD-10-CM

## 2024-01-30 DIAGNOSIS — Z95.810 PRESENCE OF AUTOMATIC IMPLANTABLE CARDIOVERTER-DEFIBRILLATOR: Chronic | ICD-10-CM

## 2024-01-30 DIAGNOSIS — I34.0 NONRHEUMATIC MITRAL VALVE REGURGITATION: ICD-10-CM

## 2024-01-30 DIAGNOSIS — I35.1 NONRHEUMATIC AORTIC VALVE INSUFFICIENCY: ICD-10-CM

## 2024-01-30 DIAGNOSIS — I71.21 ANEURYSM OF ASCENDING AORTA WITHOUT RUPTURE: ICD-10-CM

## 2024-01-30 DIAGNOSIS — E78.2 HYPERLIPIDEMIA, MIXED: Chronic | ICD-10-CM

## 2024-01-30 DIAGNOSIS — I25.810 CORONARY ARTERY DISEASE INVOLVING CORONARY BYPASS GRAFT OF NATIVE HEART WITHOUT ANGINA PECTORIS: Primary | Chronic | ICD-10-CM

## 2024-01-30 DIAGNOSIS — I10 ESSENTIAL HYPERTENSION: ICD-10-CM

## 2024-01-30 DIAGNOSIS — I95.1 ORTHOSTATIC HYPOTENSION: Chronic | ICD-10-CM

## 2024-01-30 DIAGNOSIS — I25.5 ISCHEMIC CARDIOMYOPATHY: Chronic | ICD-10-CM

## 2024-02-20 ENCOUNTER — OFFICE VISIT (OUTPATIENT)
Dept: CARDIAC SURGERY | Facility: CLINIC | Age: 84
End: 2024-02-20
Payer: OTHER GOVERNMENT

## 2024-02-20 VITALS
RESPIRATION RATE: 20 BRPM | HEIGHT: 73 IN | SYSTOLIC BLOOD PRESSURE: 130 MMHG | BODY MASS INDEX: 27.83 KG/M2 | DIASTOLIC BLOOD PRESSURE: 80 MMHG | TEMPERATURE: 97.8 F | OXYGEN SATURATION: 95 % | HEART RATE: 88 BPM | WEIGHT: 210 LBS

## 2024-02-20 DIAGNOSIS — I25.5 ISCHEMIC CARDIOMYOPATHY: Chronic | ICD-10-CM

## 2024-02-20 DIAGNOSIS — I25.810 CORONARY ARTERY DISEASE INVOLVING CORONARY BYPASS GRAFT OF NATIVE HEART WITHOUT ANGINA PECTORIS: Chronic | ICD-10-CM

## 2024-02-20 DIAGNOSIS — I71.21 ANEURYSM OF ASCENDING AORTA WITHOUT RUPTURE: ICD-10-CM

## 2024-02-20 DIAGNOSIS — Z95.810 PRESENCE OF AUTOMATIC IMPLANTABLE CARDIOVERTER-DEFIBRILLATOR: Chronic | ICD-10-CM

## 2024-02-20 DIAGNOSIS — R07.9 CHEST PAIN, UNSPECIFIED TYPE: ICD-10-CM

## 2024-02-20 DIAGNOSIS — E78.2 HYPERLIPIDEMIA, MIXED: Chronic | ICD-10-CM

## 2024-02-20 DIAGNOSIS — I47.29 PAROXYSMAL VENTRICULAR TACHYCARDIA: Chronic | ICD-10-CM

## 2024-02-20 DIAGNOSIS — I34.0 NONRHEUMATIC MITRAL VALVE REGURGITATION: Primary | ICD-10-CM

## 2024-02-20 DIAGNOSIS — I35.1 NONRHEUMATIC AORTIC VALVE INSUFFICIENCY: ICD-10-CM

## 2024-02-20 NOTE — LETTER
February 24, 2024     Marcin Childers DO  [address]  [place] [address]    Patient: Deion Nicholas   YOB: 1940   Date of Visit: 2/20/2024     Dear Marcin Childers DO:       Thank you for referring Deion Nicholas to me for evaluation. Below are the relevant portions of my assessment and plan of care.    If you have questions, please do not hesitate to call me. I look forward to following Deion along with you.         Sincerely,        John Barros MD        CC: MD Papo Wilkinson, MD John  02/24/24 0947  Sign when Signing Visit  2/24/2024    Seen on 2/20/2024    Reason for consultation:   Evaluation of ascending aortic aneurysm    Chief Complaint   Same    History of Present Illness:       Dear Jeff and Colleagues,  It was nice to see Deion Nicholas in consultation at your request. He is a 83 y.o. male with hypertension, hypercholesterolemia, peripheral neuropathy, GERD, cellulitis of the lower extremities, paroxysmal ventricular tachycardia and ischemic cardiomyopathy who was initially evaluated for chest pressure and heaviness and he has a history of ischemic cardiomyopathy with cardiac bypass performed years in the past.  The last echocardiogram showed an ejection fraction of 35% although the recent 1 during admission was 20%.  There was moderate aortic insufficiency and moderate mitral insufficiency.  Also there was a dilated aortic root.  He denies syncope, TIA, orthopnea, PND or lower extremity edema. His chest CT showed the ascending aorta of 5 cm without dissection or ulceration.  The descending thoracic aorta was measured at 2.8 cm.  He had a recent repeat echo Carton that showed ejection fraction of 20 to 25%, dilatation of the ascending aorta and show mild mitral regurgitation and mild aortic regurgitation.  He has no family history of aneurysms, dissections or connective tissue disorders.     Patient Active Problem List    Diagnosis   • Normocytic anemia   • Coronary artery disease   • Ischemic cardiomyopathy   • Paroxysmal ventricular tachycardia   • Presence of automatic implantable cardioverter-defibrillator   • Essential hypertension   • Hyperlipidemia, mixed   • Peripheral neuropathy   • Cellulitis of left lower extremity without foot   • GERD without esophagitis   • B12 deficiency   • Orthostatic hypotension   • Tinea pedis of left foot   • Lower extremity pain, left   • Cellulitis of left leg   • Baker's cyst of knee, left   • Gynecomastia, male   • Ascending aortic aneurysm   • Thyroid nodule   • Dizziness   • Chest pain, unspecified type   • Edema of left lower extremity   • Elevated troponin   • Left leg cellulitis   • Cellulitis of left lower extremity   • Valvular heart disease   • Nonrheumatic mitral valve regurgitation   • Nonrheumatic aortic valve insufficiency   • Chest pain       Past Medical History:   Diagnosis Date   • Aneurysm    • Cardiomyopathy     ICD implantation   • Cellulitis of left lower extremity 2010    recurrent   • CHD (coronary heart disease)     S/P CABG & PCI   • Dyslipidemia    • Heart valve disease    • History of ventricular tachycardia    • Hypertension    • Myocardial infarction     Inferior MI   • Pinched nerve     L4-L5   • Prostate cancer        Past Surgical History:   Procedure Laterality Date   • ANGIOPLASTY  2000 04/1996 Stent: Palmaz- Huy stent/LAD 07/1996-RCA: 2000-Tetra stent Guidant to proximal RCA, mid to distal artery 2 sequential Guidant Tetra stents   • APPENDECTOMY     • CARDIAC ABLATION  April and May 2019   • CARDIAC CATHETERIZATION  07/2017    1996 x2, Nov. 2000, 05/2010-cath 08/2015-no stents 2017   • CARDIAC DEFIBRILLATOR PLACEMENT     • COLONOSCOPY W/ POLYPECTOMY      Mult colonoscopy's for polyp resection    • CORONARY ARTERY BYPASS GRAFT  05/2010    x5 vessel: LMA to diagonal, LAD, intermedius, obtuse marginal, RCA   • CORONARY STENT PLACEMENT     • ENDOSCOPY N/A  6/24/2019    Procedure: ESOPHAGOGASTRODUODENOSCOPY with dilitation Bougie 50, 54;  Surgeon: Roddy Coronel MD;  Location: [facility];  Service: Gastroenterology   • INSERT / REPLACE / REMOVE PACEMAKER     • KNEE OPEN LATERAL RELEASE Left     reduction   • OTHER SURGICAL HISTORY  01/2018    ICD implantation   • PROSTATE SURGERY  2015    Robiotic surgery- Cyber Knife:       Allergies   Allergen Reactions   • Lovastatin Myalgia   • Pravastatin Myalgia and Unknown (See Comments)     unknown   • Simvastatin Unknown (See Comments) and Myalgia     unknown   • Spironolactone Other (See Comments)     Gynecomastia          Current Outpatient Medications:   •  aspirin 81 MG EC tablet, Take 1 tablet by mouth Daily., Disp: , Rfl:   •  Cholecalciferol 10 MCG (400 UNIT) tablet, Take 2 tablets by mouth Daily., Disp: , Rfl:   •  ezetimibe (ZETIA) 10 MG tablet, Take 1 tablet by mouth Every Evening., Disp: , Rfl:   •  gabapentin (NEURONTIN) 400 MG capsule, Take 1 capsule by mouth Every 6 (Six) Hours., Disp: , Rfl:   •  methocarbamol (ROBAXIN) 500 MG tablet, Take 1 tablet by mouth 2 (Two) Times a Day., Disp: , Rfl:   •  metoprolol succinate XL (TOPROL-XL) 50 MG 24 hr tablet, Take 1 tablet by mouth Daily., Disp: , Rfl:   •  midodrine (PROAMATINE) 5 MG tablet, Take 1 tablet by mouth 3 (Three) Times a Day Before Meals., Disp: , Rfl:   •  mupirocin (BACTROBAN) 2 % ointment, Apply 1 Application topically to the appropriate area as directed 3 (Three) Times a Day. Apply to leg(s), Disp: , Rfl:   •  nitroglycerin (NITROSTAT) 0.4 MG SL tablet, Place 1 tablet under the tongue Every 5 (Five) Minutes As Needed for Chest Pain., Disp: , Rfl:   •  Omega-3 Fatty Acids (fish oil) 1000 MG capsule capsule, Take 2 capsules by mouth 2 (Two) Times a Day With Meals., Disp: , Rfl:   •  pantoprazole (PROTONIX) 40 MG EC tablet, Take 1 tablet by mouth Daily., Disp: , Rfl:   •  sennosides-docusate (PERICOLACE) 8.6-50 MG per tablet, Take 1 tablet by mouth  2 (Two) Times a Day., Disp: , Rfl:   •  sucralfate (CARAFATE) 1 g tablet, Take 1 tablet by mouth 4 (Four) Times a Day., Disp: , Rfl:     Social History     Socioeconomic History   • Marital status:    Tobacco Use   • Smoking status: Former     Packs/day: 1.00     Years: 17.00     Additional pack years: 0.00     Total pack years: 17.00     Types: Cigarettes     Quit date: 2002     Years since quittin.6     Passive exposure: Past   • Smokeless tobacco: Never   Vaping Use   • Vaping Use: Never used   Substance and Sexual Activity   • 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  Temp: 97.8 °F (36.6 °C)   Heart Rate: 88   BP: 130/80     Constitutional:       Appearance: Well-developed.   Eyes:      Conjunctiva/sclera: Conjunctivae normal.      Pupils: Pupils are equal, round, and reactive to light.   HENT:      Head: Normocephalic and atraumatic.      Nose: Nose normal.   Neck:      Thyroid: No thyromegaly.      Vascular: No JVD.      Lymphadenopathy: No cervical adenopathy.   Pulmonary:      Effort: Pulmonary effort is normal.      Breath sounds: Normal breath sounds. No rales.   Cardiovascular:      Normal rate. Regular rhythm.      Murmurs: There is a grade 1/6 high frequency blowing holosystolic murmur at the apex.      No gallop.    Pulses:     Intact distal pulses. No decreased pulses.   Edema:     Peripheral edema absent.   Abdominal:      General: Bowel sounds are normal. There is no distension.      Palpations: Abdomen is soft. There is no abdominal mass.      Tenderness: There is no abdominal tenderness.   Musculoskeletal: Normal range of motion.         General: No tenderness  or deformity.      Cervical back: Normal range of motion and neck supple. Skin:     General: Skin is warm and dry.      Findings: No erythema or rash.   Neurological:      Mental Status: Alert and oriented to person, place, and time.      Deep Tendon Reflexes: Reflexes are normal and symmetric.   Psychiatric:         Behavior: Behavior normal.         Relevant studies (other than HPI)        Assessment:     Problems Addressed this Visit          Cardiac and Vasculature    Coronary artery disease (Chronic)    Ischemic cardiomyopathy (Chronic)    Paroxysmal ventricular tachycardia (Chronic)    Presence of automatic implantable cardioverter-defibrillator (Chronic)    Hyperlipidemia, mixed (Chronic)    Ascending aortic aneurysm    Relevant Orders    CT Angiogram Chest    Chest pain, unspecified type    Nonrheumatic mitral valve regurgitation - Primary    Relevant Orders    Adult Transthoracic Echo Complete w/ Color, Spectral and Contrast if necessary per protocol    Nonrheumatic aortic valve insufficiency    Relevant Orders    Adult Transthoracic Echo Complete w/ Color, Spectral and Contrast if necessary per protocol     Diagnoses         Codes Comments    Nonrheumatic mitral valve regurgitation    -  Primary ICD-10-CM: I34.0  ICD-9-CM: 424.0     Nonrheumatic aortic valve insufficiency     ICD-10-CM: I35.1  ICD-9-CM: 424.1     Aneurysm of ascending aorta without rupture     ICD-10-CM: I71.21  ICD-9-CM: 441.2     Coronary artery disease involving coronary bypass graft of native heart without angina pectoris     ICD-10-CM: I25.810  ICD-9-CM: 414.05     Ischemic cardiomyopathy     ICD-10-CM: I25.5  ICD-9-CM: 414.8     Paroxysmal ventricular tachycardia     ICD-10-CM: I47.29  ICD-9-CM: 427.1     Presence of automatic implantable cardioverter-defibrillator     ICD-10-CM: Z95.810  ICD-9-CM: V45.02     Hyperlipidemia, mixed     ICD-10-CM: E78.2  ICD-9-CM: 272.2     Chest pain, unspecified type     ICD-10-CM: R07.9  ICD-9-CM:  786.50           Assessment/recommendation:     4.9-5 cm ascending aortic aneurysm without dissection or ulceration.  He has no family history of aneurysms and he seems to be asymptomatic from the aneurysm standpoint.  I discussed with the patient the natural history of aneurysm disease and the guidelines for intervention.  Given the patient's size, height and the fact that he had previous surgery I would favor consideration for repair at a diameter of 5.5 cm or more.  There is no significant aortic valve involvement at this point.  Discussed importance of longitudinal follow-up and a chest CTA in 1 year and follow-up in my office  Initially moderate or more mitral regurgitation but in the last echocardiogram it is only mild.  He has cardiomyopathy ischemic in origin.  It is unclear the status of his coronary arteries.  Most likely the MR is functional.  Recommend aggressive medical treatment of cardiomyopathy to prevent exacerbation of MR.  Hypertension, well-controlled.  I discussed with the patient and his son the importance of blood pressure control to maintain low the DP-DT and prevent aneurysmal expansion and complication.      Thank you for allowing me to participate in his care.    Regards,    John Barros M.D.    I spent over 60 minutes before, during and after the office visit and reviewing the records, examining the patient, reviewing and interpreting myself the echocardiogram, the chest CT, the nuclear stress test, discussing the findings and options with the patient and son, discussing my recommendation of longitudinal follow-up for his aortic aneurysm, discussing the importance of blood pressure control and low-salt diet and the importance of follow-up in our thoracic aortic surgical clinic.  I also discussed the case with the cardiology team and I spent time in documenting in the electronic record

## 2024-02-21 ENCOUNTER — TELEPHONE (OUTPATIENT)
Dept: CARDIOLOGY | Facility: CLINIC | Age: 84
End: 2024-02-21
Payer: OTHER GOVERNMENT

## 2024-02-21 NOTE — TELEPHONE ENCOUNTER
Caller: Deion Nicholas    Relationship to patient: Self    Best call back number: [phone]    Patient is needing: PT MET WITH SURGEON DR. RAGSDALE 2/20 AND THEY DISCUSSED POSSIBILITY OF OPEN HEART SURGERY. PT WANTS TO DISCUSS THIS FURTHER WITH DR. MUÑOZ TO WEIGH HIS OPTIONS AND GET HIS OPINION ON THE SITUATION BEFORE MAKING A DECISION.

## 2024-02-23 NOTE — TELEPHONE ENCOUNTER
Spoke with pt he is aware that Dr Barros has not signed off on his note, and he will reach out next week once he has done that. Pt verbalized understanding.

## 2024-02-23 NOTE — TELEPHONE ENCOUNTER
Caller: Deion Nicholas    Relationship: Self    Best call back number: [phone] (home)     What is the best time to reach you: ANY    Who are you requesting to speak with (clinical staff, provider,  specific staff member): DR MUÑOZ    What was the call regarding: PT STILL HASN'T RECEIVED A CALL BACK YET AND WAS WANTING TO KNOW ABOUT SPEAKING WITH DR MUÑOZ REGARDING SURGERY. PLEASE CALL HIM BACK WHEN ABLE, THANK YOU

## 2024-02-24 NOTE — PROGRESS NOTES
2/24/2024    Seen on 2/20/2024    Reason for consultation:   Evaluation of ascending aortic aneurysm    Chief Complaint   Same    History of Present Illness:       Dear Jeff and Colleagues,  It was nice to see Deion Nicholas in consultation at your request. He is a 83 y.o. male with hypertension, hypercholesterolemia, peripheral neuropathy, GERD, cellulitis of the lower extremities, paroxysmal ventricular tachycardia and ischemic cardiomyopathy who was initially evaluated for chest pressure and heaviness and he has a history of ischemic cardiomyopathy with cardiac bypass performed years in the past.  The last echocardiogram showed an ejection fraction of 35% although the recent 1 during admission was 20%.  There was moderate aortic insufficiency and moderate mitral insufficiency.  Also there was a dilated aortic root.  He denies syncope, TIA, orthopnea, PND or lower extremity edema. His chest CT showed the ascending aorta of 5 cm without dissection or ulceration.  The descending thoracic aorta was measured at 2.8 cm.  He had a recent repeat echo Carton that showed ejection fraction of 20 to 25%, dilatation of the ascending aorta and show mild mitral regurgitation and mild aortic regurgitation.  He has no family history of aneurysms, dissections or connective tissue disorders.     Patient Active Problem List   Diagnosis    Normocytic anemia    Coronary artery disease    Ischemic cardiomyopathy    Paroxysmal ventricular tachycardia    Presence of automatic implantable cardioverter-defibrillator    Essential hypertension    Hyperlipidemia, mixed    Peripheral neuropathy    Cellulitis of left lower extremity without foot    GERD without esophagitis    B12 deficiency    Orthostatic hypotension    Tinea pedis of left foot    Lower extremity pain, left    Cellulitis of left leg    Baker's cyst of knee, left    Gynecomastia, male    Ascending aortic aneurysm    Thyroid nodule    Dizziness    Chest pain, unspecified type     Edema of left lower extremity    Elevated troponin    Left leg cellulitis    Cellulitis of left lower extremity    Valvular heart disease    Nonrheumatic mitral valve regurgitation    Nonrheumatic aortic valve insufficiency    Chest pain       Past Medical History:   Diagnosis Date    Aneurysm     Cardiomyopathy     ICD implantation    Cellulitis of left lower extremity 2010    recurrent    CHD (coronary heart disease)     S/P CABG & PCI    Dyslipidemia     Heart valve disease     History of ventricular tachycardia     Hypertension     Myocardial infarction     Inferior MI    Pinched nerve     L4-L5    Prostate cancer        Past Surgical History:   Procedure Laterality Date    ANGIOPLASTY  2000 04/1996 Stent: Palmaz- Huy stent/LAD 07/1996-RCA: 2000-Tetra stent Guidant to proximal RCA, mid to distal artery 2 sequential Guidant Tetra stents    APPENDECTOMY      CARDIAC ABLATION  April and May 2019    CARDIAC CATHETERIZATION  07/2017    1996 x2, Nov. 2000, 05/2010-cath 08/2015-no stents 2017    CARDIAC DEFIBRILLATOR PLACEMENT      COLONOSCOPY W/ POLYPECTOMY      Mult colonoscopy's for polyp resection     CORONARY ARTERY BYPASS GRAFT  05/2010    x5 vessel: LMA to diagonal, LAD, intermedius, obtuse marginal, RCA    CORONARY STENT PLACEMENT      ENDOSCOPY N/A 6/24/2019    Procedure: ESOPHAGOGASTRODUODENOSCOPY with dilitation Bougie 50, 54;  Surgeon: Roddy Coronel MD;  Location: [facility] ENDOSCOPY;  Service: Gastroenterology    INSERT / REPLACE / REMOVE PACEMAKER      KNEE OPEN LATERAL RELEASE Left     reduction    OTHER SURGICAL HISTORY  01/2018    ICD implantation    PROSTATE SURGERY  2015    Robiotic surgery- Cyber Knife:       Allergies   Allergen Reactions    Lovastatin Myalgia    Pravastatin Myalgia and Unknown (See Comments)     unknown    Simvastatin Unknown (See Comments) and Myalgia     unknown    Spironolactone Other (See Comments)     Gynecomastia          Current Outpatient Medications:     aspirin 81  MG EC tablet, Take 1 tablet by mouth Daily., Disp: , Rfl:     Cholecalciferol 10 MCG (400 UNIT) tablet, Take 2 tablets by mouth Daily., Disp: , Rfl:     ezetimibe (ZETIA) 10 MG tablet, Take 1 tablet by mouth Every Evening., Disp: , Rfl:     gabapentin (NEURONTIN) 400 MG capsule, Take 1 capsule by mouth Every 6 (Six) Hours., Disp: , Rfl:     methocarbamol (ROBAXIN) 500 MG tablet, Take 1 tablet by mouth 2 (Two) Times a Day., Disp: , Rfl:     metoprolol succinate XL (TOPROL-XL) 50 MG 24 hr tablet, Take 1 tablet by mouth Daily., Disp: , Rfl:     midodrine (PROAMATINE) 5 MG tablet, Take 1 tablet by mouth 3 (Three) Times a Day Before Meals., Disp: , Rfl:     mupirocin (BACTROBAN) 2 % ointment, Apply 1 Application topically to the appropriate area as directed 3 (Three) Times a Day. Apply to leg(s), Disp: , Rfl:     nitroglycerin (NITROSTAT) 0.4 MG SL tablet, Place 1 tablet under the tongue Every 5 (Five) Minutes As Needed for Chest Pain., Disp: , Rfl:     Omega-3 Fatty Acids (fish oil) 1000 MG capsule capsule, Take 2 capsules by mouth 2 (Two) Times a Day With Meals., Disp: , Rfl:     pantoprazole (PROTONIX) 40 MG EC tablet, Take 1 tablet by mouth Daily., Disp: , Rfl:     sennosides-docusate (PERICOLACE) 8.6-50 MG per tablet, Take 1 tablet by mouth 2 (Two) Times a Day., Disp: , Rfl:     sucralfate (CARAFATE) 1 g tablet, Take 1 tablet by mouth 4 (Four) Times a Day., Disp: , Rfl:     Social History     Socioeconomic History    Marital status:    Tobacco Use    Smoking status: Former     Packs/day: 1.00     Years: 17.00     Additional pack years: 0.00     Total pack years: 17.00     Types: Cigarettes     Quit date: 2002     Years since quittin.6     Passive exposure: Past    Smokeless tobacco: Never   Vaping Use    Vaping Use: Never used   Substance and Sexual Activity    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  Temp: 97.8 °F (36.6 °C)   Heart Rate: 88   BP: 130/80     Constitutional:       Appearance: Well-developed.   Eyes:      Conjunctiva/sclera: Conjunctivae normal.      Pupils: Pupils are equal, round, and reactive to light.   HENT:      Head: Normocephalic and atraumatic.      Nose: Nose normal.   Neck:      Thyroid: No thyromegaly.      Vascular: No JVD.      Lymphadenopathy: No cervical adenopathy.   Pulmonary:      Effort: Pulmonary effort is normal.      Breath sounds: Normal breath sounds. No rales.   Cardiovascular:      Normal rate. Regular rhythm.      Murmurs: There is a grade 1/6 high frequency blowing holosystolic murmur at the apex.      No gallop.    Pulses:     Intact distal pulses. No decreased pulses.   Edema:     Peripheral edema absent.   Abdominal:      General: Bowel sounds are normal. There is no distension.      Palpations: Abdomen is soft. There is no abdominal mass.      Tenderness: There is no abdominal tenderness.   Musculoskeletal: Normal range of motion.         General: No tenderness or deformity.      Cervical back: Normal range of motion and neck supple. Skin:     General: Skin is warm and dry.      Findings: No erythema or rash.   Neurological:      Mental Status: Alert and oriented to person, place, and time.      Deep Tendon Reflexes: Reflexes are normal and symmetric.   Psychiatric:         Behavior: Behavior normal.         Relevant studies (other than HPI)        Assessment:     Problems Addressed this Visit          Cardiac and Vasculature    Coronary artery disease (Chronic)    Ischemic cardiomyopathy (Chronic)    Paroxysmal ventricular tachycardia (Chronic)    Presence of automatic implantable cardioverter-defibrillator (Chronic)    Hyperlipidemia,  mixed (Chronic)    Ascending aortic aneurysm    Relevant Orders    CT Angiogram Chest    Chest pain, unspecified type    Nonrheumatic mitral valve regurgitation - Primary    Relevant Orders    Adult Transthoracic Echo Complete w/ Color, Spectral and Contrast if necessary per protocol    Nonrheumatic aortic valve insufficiency    Relevant Orders    Adult Transthoracic Echo Complete w/ Color, Spectral and Contrast if necessary per protocol     Diagnoses         Codes Comments    Nonrheumatic mitral valve regurgitation    -  Primary ICD-10-CM: I34.0  ICD-9-CM: 424.0     Nonrheumatic aortic valve insufficiency     ICD-10-CM: I35.1  ICD-9-CM: 424.1     Aneurysm of ascending aorta without rupture     ICD-10-CM: I71.21  ICD-9-CM: 441.2     Coronary artery disease involving coronary bypass graft of native heart without angina pectoris     ICD-10-CM: I25.810  ICD-9-CM: 414.05     Ischemic cardiomyopathy     ICD-10-CM: I25.5  ICD-9-CM: 414.8     Paroxysmal ventricular tachycardia     ICD-10-CM: I47.29  ICD-9-CM: 427.1     Presence of automatic implantable cardioverter-defibrillator     ICD-10-CM: Z95.810  ICD-9-CM: V45.02     Hyperlipidemia, mixed     ICD-10-CM: E78.2  ICD-9-CM: 272.2     Chest pain, unspecified type     ICD-10-CM: R07.9  ICD-9-CM: 786.50           Assessment/recommendation:     4.9-5 cm ascending aortic aneurysm without dissection or ulceration.  He has no family history of aneurysms and he seems to be asymptomatic from the aneurysm standpoint.  I discussed with the patient the natural history of aneurysm disease and the guidelines for intervention.  Given the patient's size, height and the fact that he had previous surgery I would favor consideration for repair at a diameter of 5.5 cm or more.  There is no significant aortic valve involvement at this point.  Discussed importance of longitudinal follow-up and a chest CTA in 1 year and follow-up in my office  Initially moderate or more mitral regurgitation  but in the last echocardiogram it is only mild.  He has cardiomyopathy ischemic in origin.  It is unclear the status of his coronary arteries.  Most likely the MR is functional.  Recommend aggressive medical treatment of cardiomyopathy to prevent exacerbation of MR.  Hypertension, well-controlled.  I discussed with the patient and his son the importance of blood pressure control to maintain low the DP-DT and prevent aneurysmal expansion and complication.      Thank you for allowing me to participate in his care.    Regards,    John Barros M.D.    I spent over 60 minutes before, during and after the office visit and reviewing the records, examining the patient, reviewing and interpreting myself the echocardiogram, the chest CT, the nuclear stress test, discussing the findings and options with the patient and son, discussing my recommendation of longitudinal follow-up for his aortic aneurysm, discussing the importance of blood pressure control and low-salt diet and the importance of follow-up in our thoracic aortic surgical clinic.  I also discussed the case with the cardiology team and I spent time in documenting in the electronic record

## 2024-02-26 ENCOUNTER — OFFICE VISIT (OUTPATIENT)
Dept: CARDIOLOGY | Facility: CLINIC | Age: 84
End: 2024-02-26
Payer: OTHER GOVERNMENT

## 2024-02-26 VITALS
HEIGHT: 73 IN | HEART RATE: 106 BPM | BODY MASS INDEX: 27.96 KG/M2 | OXYGEN SATURATION: 96 % | DIASTOLIC BLOOD PRESSURE: 56 MMHG | WEIGHT: 211 LBS | SYSTOLIC BLOOD PRESSURE: 132 MMHG

## 2024-02-26 DIAGNOSIS — Z95.810 PRESENCE OF AUTOMATIC IMPLANTABLE CARDIOVERTER-DEFIBRILLATOR: ICD-10-CM

## 2024-02-26 DIAGNOSIS — I47.20 VT (VENTRICULAR TACHYCARDIA): ICD-10-CM

## 2024-02-26 DIAGNOSIS — I71.21 ANEURYSM OF ASCENDING AORTA WITHOUT RUPTURE: ICD-10-CM

## 2024-02-26 DIAGNOSIS — I25.810 CORONARY ARTERY DISEASE INVOLVING CORONARY BYPASS GRAFT OF NATIVE HEART WITHOUT ANGINA PECTORIS: Primary | ICD-10-CM

## 2024-02-26 DIAGNOSIS — I38 VALVULAR HEART DISEASE: ICD-10-CM

## 2024-02-26 DIAGNOSIS — I25.5 ISCHEMIC CARDIOMYOPATHY: ICD-10-CM

## 2024-02-26 DIAGNOSIS — I95.1 ORTHOSTATIC HYPOTENSION: ICD-10-CM

## 2024-02-26 DIAGNOSIS — E78.2 HYPERLIPIDEMIA, MIXED: ICD-10-CM

## 2024-02-26 PROCEDURE — 99214 OFFICE O/P EST MOD 30 MIN: CPT | Performed by: INTERNAL MEDICINE

## 2024-02-26 NOTE — PROGRESS NOTES
CC--Recurrent VT, ischemic cardiomyopathy      Sub  83-year-old male patient has ICD in situ well-known to me with prior history of VT.  Patient underwent a stress test in December 23 without ischemia with a small infarct with EF less than 30%.  Patient had prior AF secondary to hyperthyroidism which has been treated with resolution of symptoms.  Patient had prior history of incessant VT needing VT ablation and prior history of bypass surgery.        My previous history is attached below which is for reference only and has been reviewed        Sub--Pt here for follow up and he had prior history of incessant VT needing and ablation several months ago .  patient started having recurrent VT needing a redo VT ablation  and post ablation a week later developed dysphagia and needed endoscopy the patient underwent esophageal stricture dilatation with improvement of symptoms and resolution of dysphagia .Patient is on amiodarone because of extensive scarring and multiple episodes of VT and since ablation without recurrent ICD therapy--patient has history of chronic ischemic heart disease previous myocardial infarction, s/p previous CABG,  LV systolic dysfunction with   Last  LV ejection fraction of 25 30% which came up to 30- 35% by latest echocardiogram.   He is  status post ICD implant.  denies any chest pain or shortness of breath or syncope--patient had prior ACE inhibitor induced cough and currently is on Aldactone .Patient denies any new symptoms of VT or syncope and is compliant with current medical regimen  He also developed some right shoulder pain being [facility] and uses Biofreeze for relief of symptoms   He denies any palpitations or syncope or any chest pain or dyspnea.  Patient could not tolerate additional beta-blockers on top of amiodarone in the past  Patient has noticed photosensitivity of his skin in the past and amiodarone dose reduced to 100 mg a day and his symptoms have reduced and comes  in for follow-up   Patient has noticed some burning sensation in lower extremities and was seen at [facility] on last clinic prescription for gabapentin and he also complains of some calf pain when he is ambulating with some blocks gets better after increased activity and it is not nocturnal in nature--arterial duplex scan without any significant abnormalities  He also has history of orthostatic hypotension started on midodrine and complains of intermittent headache  Patient is doing clinically well from cardiac standpoint of view  Complains of recurrent redness in the left lower extremity with tenderness and warmth around the ankle joint and is being treated with antibiotics and has been to [facility] twice--is being evaluated by vascular surgery and infectious disease specialist and he had his symptoms since 2010 with increasing frequency recently.    Since last seen patient had ICD shock therapy for conducted atrial fibrillation and was brought in back for evaluation  Atrial fibrillation was not seen with prior interrogation of his device and ICD was reprogrammed on recommendation to avoid unnecessary therapy for conducted atrial fibrillation  Patient complains of diffuse subcutaneous bleeding with dual antiplatelet agents and also complains of weight loss and is being [facility]  Patient was noted to have markedly abnormal thyroid function test consistent with hyperthyroidism and immediately amiodarone was stopped and started on beta-blockers and off Eliquis         Past Medical History:     Reviewed history from 03/13/2018 and no changes required:        Coronary Heart Disease:S/P CABG and PCI         Hypertension        Myocardial Infarction: Inferior MI         Hx: Cellulitis of left lower leg        Dyslipidemia         Pinched nerve L4-L5         Prostate cancer         Cardiomyopathy : ICD implantation         Physical Exam    General:      well developed, well nourished, in no acute  distress.    Head:      normocephalic and atraumatic.    Eyes:      PERRL/EOM intact, conjunctivae and sclerae clear without nystagmus.    Neck:      no  thyromegaly, trachea central with normal respiratory effort  Lungs:      clear bilaterally to auscultation.    Heart:       regular rate and rhythm, S1, S2 without murmurs, rubs, or gallops  Skin:      intact without lesions or rashes.    Psych:      alert and cooperative; normal mood and affect; normal attention span and concentration.             assessment plan  Recent hemoglobin 11.7.  Platelets are normal.  Potassium 4.0 and creatinine normal  ICD in situ and interrogated in the office with normal function  Prior history of VT storm needing VT ablation without recurrence  Severe ischemic cardiomyopathy stable without angina functional class III on Toprol and aspirin  History of orthostatic hypotension on midodrine precluding usage of ACE inhibitors or ARB and patient is allergic to spironolactone  History of AF resolved after treatment of hyperthyroidism  Hyperlipidemia on Zetia and patient is intolerant of statins  Ascending aortic aneurysm measuring between 4.7 to 5 cm--- on beta-blockers being followed by CT surgery  Follow-up in 6 months      Electronically signed by Constantino Arvizu MD, 02/26/24, 1:33 PM EST.

## 2024-02-28 ENCOUNTER — TELEPHONE (OUTPATIENT)
Dept: CARDIOLOGY | Facility: CLINIC | Age: 84
End: 2024-02-28
Payer: OTHER GOVERNMENT

## 2024-02-28 NOTE — TELEPHONE ENCOUNTER
Patient informed    ----- Message from Marcin Childers, DO sent at 2/26/2024  5:41 PM EST -----  So Dr. Barros has finished his note and he is not recommending surgery at this point.  He wants to wait until his aneurysm is 5.5 cm.  He can keep his appointment with me as scheduled later this year

## 2024-03-03 PROCEDURE — 93295 DEV INTERROG REMOTE 1/2/MLT: CPT | Performed by: INTERNAL MEDICINE

## 2024-03-03 PROCEDURE — 93296 REM INTERROG EVL PM/IDS: CPT | Performed by: INTERNAL MEDICINE

## 2024-04-30 ENCOUNTER — TELEPHONE (OUTPATIENT)
Dept: CARDIOLOGY | Facility: CLINIC | Age: 84
End: 2024-04-30

## 2024-04-30 NOTE — TELEPHONE ENCOUNTER
Caller: Deion Nicholas    Relationship to patient: Self    Best call back number:  [phone]    Patient is needing: PATIENT SEEN [facility]A FOR OPEN HEART SURGERY FOR LATER IN THE YEAR. PATIENT WANTED TO DISCUSS THAT SURGERY AND GAYATHRI FINDING WITH WANDA .

## 2024-05-22 ENCOUNTER — OFFICE VISIT (OUTPATIENT)
Dept: CARDIOLOGY | Facility: CLINIC | Age: 84
End: 2024-05-22
Payer: OTHER GOVERNMENT

## 2024-05-22 VITALS
SYSTOLIC BLOOD PRESSURE: 118 MMHG | HEIGHT: 72 IN | HEART RATE: 96 BPM | WEIGHT: 205 LBS | OXYGEN SATURATION: 99 % | DIASTOLIC BLOOD PRESSURE: 78 MMHG | BODY MASS INDEX: 27.77 KG/M2

## 2024-05-22 DIAGNOSIS — I35.1 NONRHEUMATIC AORTIC VALVE INSUFFICIENCY: ICD-10-CM

## 2024-05-22 DIAGNOSIS — I10 ESSENTIAL HYPERTENSION: ICD-10-CM

## 2024-05-22 DIAGNOSIS — I34.0 NONRHEUMATIC MITRAL VALVE REGURGITATION: ICD-10-CM

## 2024-05-22 DIAGNOSIS — I25.5 ISCHEMIC CARDIOMYOPATHY: Chronic | ICD-10-CM

## 2024-05-22 DIAGNOSIS — R79.89 ELEVATED TROPONIN: ICD-10-CM

## 2024-05-22 DIAGNOSIS — Z95.810 PRESENCE OF AUTOMATIC IMPLANTABLE CARDIOVERTER-DEFIBRILLATOR: Chronic | ICD-10-CM

## 2024-05-22 DIAGNOSIS — I25.810 CORONARY ARTERY DISEASE INVOLVING CORONARY BYPASS GRAFT OF NATIVE HEART WITHOUT ANGINA PECTORIS: Chronic | ICD-10-CM

## 2024-05-22 DIAGNOSIS — I71.21 ANEURYSM OF ASCENDING AORTA WITHOUT RUPTURE: Primary | ICD-10-CM

## 2024-05-22 DIAGNOSIS — E78.2 HYPERLIPIDEMIA, MIXED: Chronic | ICD-10-CM

## 2024-05-22 PROCEDURE — 99214 OFFICE O/P EST MOD 30 MIN: CPT | Performed by: INTERNAL MEDICINE

## 2024-05-22 NOTE — PROGRESS NOTES
Cardiology Office Visit      Encounter Date:  05/22/2024    Patient ID:   Deion Nicholas is a 83 y.o. male.    Reason For Followup:  Ischemic cardiomyopathy  Coronary artery disease  Hypertension  Hypertensive cardiovascular disease  Amiodarone therapy  Antiplatelet therapy  Hyperlipidemia    Brief Clinical History:  Dear Makenna Jimenez MD    I had the pleasure of seeing Deion Nicholas today. As you are well aware, this is a 83 y.o. male with an established history of ischemic heart disease.  He has undergone coronary arterial bypass grafting in the past.  He is additional history that includes ischemic cardiomyopathy with most recent ejection fraction of 30 to 35%, ICD implantation, hypertension, hypertensive cardiovascular disease, amiodarone therapy, antiplatelet therapy, and hyperlipidemia.  He presents today for follow-up on the above conditions.     Interval History:  He denies any chest pain pressure heaviness or tightness.  He denies any shortness of breath out of character.  He denies any PND orthopnea.  He denies any syncope or near syncope.  He continues to report unsteady gait.    He continues to report problems with lower extremity edema and recurrent cellulitis.  He has been hospitalized a couple of times for cellulitis.  He has an appointment to see vascular surgery in a couple of weeks.  They will work with him on his lower extremity edema and recurrent cellulitis.    His most recent echocardiogram was performed in May 2023.  Significant left ventricular systolic dysfunction was noted with an ejection fraction of 30 to 35%.  Moderate aortic insufficiency and moderate mitral insufficiency was noted.  I personally reviewed these images and it appears that the AI and MR is likely better characterized as mild to moderate.  Mild dilation of the ascending aorta/aortic root was noted.    01/30/2024    Patient reports legs are getting better. He was placed on a diuretic and is helping quite a bit.  He is going to have cataract surgery. We reviewed his studies. CTA was done in December his ascending aorta was 5 cm. Will refer to aorta clinic.    Patient had a stress and echo done in Dec 23/Froylan 24. Nuclear stress was negative. Echo shows that EF was 20-25%. This represents a decline since 5/23 when it was 30-35%.    05/22/2024        The patient reports some occasional chest discomfort.  He reports no shortness of breath out of character.  He presents today essentially to discuss his aortic aneurysm and the surveillance required.  [facility] and they were concerned that they were left out of the loop in regards to his follow-up and treatment plan.  I discussed with the patient that given the size of his aneurysm (5 cm) it is not advisable to proceed with repair at this time.  This is particularly true in the absence of any ischemic driven coronary artery disease or significant valvular disease.  He has a surveillance CT scan ordered for the first of 2025 and a follow-up planned shortly thereafter with the aorta clinic.    Assessment & Plan     Impressions:  Ischemic cardiomyopathy most recent ejection fraction 30 to 35% echocardiogram May 2023  Coronary artery disease  Valvular heart disease     Mild to moderate AI echocardiogram May 2023     Mild to moderate MR echocardiogram May 2023  Hypotension with an orthostatic component  Hypertensive cardiovascular disease  Amiodarone therapy  Antiplatelet therapy  Hyperlipidemia  AICD in situ  History of VT storm status post radiofrequency ablation  Gynecomastia secondary to spironolactone  Ascending aortic aneurysm most recent measurement 4.5 cm August 2022.     Recommendations:  Continuation of his current cardiovascular regimen at the present time.     This includes beta-blockers, midodrine, antiplatelet therapy, and anticoagulant therapy.  CT surgery/aorta clinic referral  Follow-up in 6 months time  Follow-up with vascular surgery as scheduled  Follow-up  "with EP as scheduled.    Diagnoses and all orders for this visit:    1. Aneurysm of ascending aorta without rupture (Primary)    2. Coronary artery disease involving coronary bypass graft of native heart without angina pectoris    3. Elevated troponin    4. Essential hypertension    5. Hyperlipidemia, mixed    6. Ischemic cardiomyopathy    7. Nonrheumatic aortic valve insufficiency    8. Nonrheumatic mitral valve regurgitation    9. Presence of automatic implantable cardioverter-defibrillator              Objective:    Vitals:  Vitals:    05/22/24 1426   BP: 118/78   BP Location: Left arm   Patient Position: Sitting   Pulse: 96   SpO2: 99%   Weight: 93 kg (205 lb)   Height: 182.9 cm (72\")         Body mass index is 27.8 kg/m².      Physical Exam:    General: Alert, cooperative, no distress, appears stated age.  Ambulates with 2 canes.  Head:  Normocephalic, atraumatic, mucous membranes moist  Eyes:  Conjunctiva/corneas clear, EOM's intact     Neck:  Supple,  no bruit    Lungs: Clear to auscultation bilaterally, no wheezes rhonchi rales are noted  Chest wall: No tenderness  Heart::  Regular rate and rhythm, S1 and S2 normal, 1/6 holosystolic murmur.  No rub or gallop  Abdomen: Soft, non-tender, nondistended bowel sounds active  Extremities: No cyanosis, clubbing left lower extremity edema  Pulses: Diminished pedal pulses  Skin:  No rashes or lesions  Neuro/psych: A&O x3. CN II through XII are grossly intact with appropriate affect.  Poor ambulatory status.  Ambulates with 2 canes.      Allergies:  Allergies   Allergen Reactions    Lovastatin Myalgia    Pravastatin Myalgia and Unknown (See Comments)     unknown    Simvastatin Unknown (See Comments) and Myalgia     unknown    Spironolactone Other (See Comments)     Gynecomastia        Medication Review:     Current Outpatient Medications:     aspirin 81 MG EC tablet, Take 1 tablet by mouth Daily., Disp: , Rfl:     Cholecalciferol 10 MCG (400 UNIT) tablet, Take 2 " tablets by mouth Daily., Disp: , Rfl:     ezetimibe (ZETIA) 10 MG tablet, Take 1 tablet by mouth Every Evening., Disp: , Rfl:     gabapentin (NEURONTIN) 400 MG capsule, Take 1 capsule by mouth Every 6 (Six) Hours., Disp: , Rfl:     methocarbamol (ROBAXIN) 500 MG tablet, Take 1 tablet by mouth 2 (Two) Times a Day., Disp: , Rfl:     metoprolol succinate XL (TOPROL-XL) 50 MG 24 hr tablet, Take 1 tablet by mouth Daily., Disp: , Rfl:     midodrine (PROAMATINE) 5 MG tablet, Take 1 tablet by mouth 3 (Three) Times a Day Before Meals., Disp: , Rfl:     mupirocin (BACTROBAN) 2 % ointment, Apply 1 Application topically to the appropriate area as directed 3 (Three) Times a Day. Apply to leg(s), Disp: , Rfl:     nitroglycerin (NITROSTAT) 0.4 MG SL tablet, Place 1 tablet under the tongue Every 5 (Five) Minutes As Needed for Chest Pain., Disp: , Rfl:     Omega-3 Fatty Acids (fish oil) 1000 MG capsule capsule, Take 2 capsules by mouth 2 (Two) Times a Day With Meals., Disp: , Rfl:     pantoprazole (PROTONIX) 40 MG EC tablet, Take 1 tablet by mouth Daily., Disp: , Rfl:     sennosides-docusate (PERICOLACE) 8.6-50 MG per tablet, Take 1 tablet by mouth 2 (Two) Times a Day., Disp: , Rfl:     sucralfate (CARAFATE) 1 g tablet, Take 1 tablet by mouth 4 (Four) Times a Day., Disp: , Rfl:     Family History:  Family History   Problem Relation Age of Onset    Pancreatic cancer Mother     Heart disease Father        Past Medical History:  Past Medical History:   Diagnosis Date    Aneurysm     Cardiomyopathy     ICD implantation    Cellulitis of left lower extremity 2010    recurrent    CHD (coronary heart disease)     S/P CABG & PCI    Dyslipidemia     Heart valve disease     History of ventricular tachycardia     Hypertension     Myocardial infarction     Inferior MI    Pinched nerve     L4-L5    Prostate cancer        Past Surgical History:  Past Surgical History:   Procedure Laterality Date    ANGIOPLASTY  2000 04/1996 Stent: Palmaz-  Huy stent/LAD 1996-RCA: -Tetra stent Guidant to proximal RCA, mid to distal artery 2 sequential Guidant Tetra stents    APPENDECTOMY      CARDIAC ABLATION  April and May 2019    CARDIAC CATHETERIZATION  2017    1996 x2, Nov. , 2010-cath 2015-no stents 2017    CARDIAC DEFIBRILLATOR PLACEMENT      COLONOSCOPY W/ POLYPECTOMY      Mult colonoscopy's for polyp resection     CORONARY ARTERY BYPASS GRAFT  05/2010    x5 vessel: LMA to diagonal, LAD, intermedius, obtuse marginal, RCA    CORONARY STENT PLACEMENT      ENDOSCOPY N/A 2019    Procedure: ESOPHAGOGASTRODUODENOSCOPY with dilitation Bougie 50, 54;  Surgeon: Roddy Coronel MD;  Location: [facility] ENDOSCOPY;  Service: Gastroenterology    INSERT / REPLACE / REMOVE PACEMAKER      KNEE OPEN LATERAL RELEASE Left     reduction    OTHER SURGICAL HISTORY  2018    ICD implantation    PROSTATE SURGERY      Robiotic surgery- Cyber Knife:       Social History:  Social History     Socioeconomic History    Marital status:    Tobacco Use    Smoking status: Former     Current packs/day: 0.00     Average packs/day: 1 pack/day for 17.0 years (17.0 ttl pk-yrs)     Types: Cigarettes     Start date: 1985     Quit date: 2002     Years since quittin.8     Passive exposure: Past    Smokeless tobacco: Never   Vaping Use    Vaping status: Never Used   Substance and Sexual Activity    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 The pertinent cardiovascular findings are reported above with all other cardiovascular points within those systems being negative.    Diagnostic Study Review:     Current  Electrocardiogram:  Procedures no new EKG.  EKG dated 26 December 2023 demonstrates sinus rhythm with a ventricular rate of 84 bpm.    Laboratory Data:  Lab Results   Component Value Date    GLUCOSE 148 (H) 12/27/2023    BUN 16 12/27/2023    CREATININE 0.93 12/27/2023    EGFRIFNONA 100 12/01/2021    BCR 17.2 12/27/2023    K 4.0 12/27/2023    CO2 27.0 12/27/2023    CALCIUM 9.1 12/27/2023    ALBUMIN 3.8 12/27/2023    LABIL2 1.1 06/04/2019    AST 22 12/27/2023    ALT 16 12/27/2023     Lab Results   Component Value Date    GLUCOSE 148 (H) 12/27/2023    CALCIUM 9.1 12/27/2023     12/27/2023    K 4.0 12/27/2023    CO2 27.0 12/27/2023     12/27/2023    BUN 16 12/27/2023    CREATININE 0.93 12/27/2023    EGFRIFNONA 100 12/01/2021    BCR 17.2 12/27/2023    ANIONGAP 9.0 12/27/2023     Lab Results   Component Value Date    WBC 5.00 12/27/2023    HGB 11.7 (L) 12/27/2023    HCT 36.2 (L) 12/27/2023    MCV 92.3 12/27/2023     12/27/2023     Lab Results   Component Value Date    CHOL 182 10/27/2022    TRIG 104 10/27/2022    HDL 48 10/27/2022     (H) 10/27/2022     Lab Results   Component Value Date    HGBA1C 6.1 (H) 10/26/2022     Lab Results   Component Value Date    INR 1.01 10/04/2023    INR 0.97 05/09/2023    INR 1.00 04/16/2023    PROTIME 11.0 10/04/2023    PROTIME 10.4 05/09/2023    PROTIME 10.3 04/16/2023       Most Recent Echo:  Results for orders placed during the hospital encounter of 12/26/23    Adult Transthoracic Echo Complete W/ Cont if Necessary Per Protocol    Interpretation Summary    Left ventricular systolic function is severely decreased. Left ventricular ejection fraction appears to be 21 - 25%.    The left ventricular cavity is severely dilated.    Left ventricular diastolic function is consistent with (grade I) impaired relaxation.    The left atrial cavity is moderately dilated.    Abnormal mitral valve structure consistent with dilated annulus.    Moderate dilation of the ascending  "aorta is present.    Ascending aorta = 4.7 cm       Most Recent Stress Test:  Results for orders placed during the hospital encounter of 12/26/23    Stress Test With Myocardial Perfusion One Day    Interpretation Summary    Myocardial perfusion imaging indicates a small-sized infarct located in the apex with no significant ischemia noted.    Left ventricular ejection fraction is severely reduced (Calculated EF = 30%).    Abnormal LV wall motion consistent with severe hypokinesis and global hypokinesis.    Impressions are consistent with a low risk study.    the current study reveals no changes.    Findings consistent with a normal ECG stress test.       Most Recent Cardiac Catheterization:   No results found for this or any previous visit.       NOTE:     Today,05/22/2024 ,the following portions of the patient's note were reviewed, confirmed and/or updated as appropriate: History of present illness/Interval history, physical examination, assessment & plan, allergies, current medications, past family history, past medical history, past social history, past surgical history and problem list.    Labs pertinent to today's visit on 05/22/2024 (including but not limited to CBC, CMP, and lipid profiles) were requested from the patient's primary care provider/hospital/clinical laboratory.  If the labs were available for the visit, they were reviewed with the patient.  If they were not available, when received, special interest will be made to the labs pertinent to this visit.  The patient's most recent \"in-house\" labs are noted below and have been reviewed.  Outside labs pertinent to this visit are scanned into the record and have been reviewed.    Discussions held today, 05/22/2024,regarding procedures included risk, benefits, and options including but not limited to: Death, MI, stroke, pain, bleeding, infection, and possible need for vascular/thoracic/cardiothoracic surgery.    Copied information within this note was " reviewed and is current as of 05/22/2024.    Assessment and plan noted herein represents the current plan of care as of 05/22/2024.    Significant resources from our office and staff are inherent in engaging this patient today,05/22/2024,and in a continuous and active collaborative plan of care related to their chronic cardiovascular conditions outlined herein.  The management of these conditions requires the direction of our service with specialized clinical knowledge, skills, and experience.  This collaborative care includes but is not limited to patient education, expectations and responsibilities, shared decision making around therapeutic goals, and shared commitments to achieve those goals.

## 2024-06-04 ENCOUNTER — TELEPHONE (OUTPATIENT)
Dept: CARDIOLOGY | Facility: CLINIC | Age: 84
End: 2024-06-04
Payer: OTHER GOVERNMENT

## 2024-06-04 RX ORDER — DAPAGLIFLOZIN 10 MG/1
10 TABLET, FILM COATED ORAL DAILY
COMMUNITY

## 2024-06-04 NOTE — TELEPHONE ENCOUNTER
Spoke with pt he is aware Dr Childers is ok with him taking the Farxiga 10 mg. Medication list was updated.

## 2024-06-04 NOTE — TELEPHONE ENCOUNTER
Caller: ANA    Relationship: SELF    Best call back number: [phone]        What was the call regarding: [facility] ON  FARXIGA 10 MG- HE WANTED TO CHECK WITH DR. MUÑOZ BEFORE STARTING THIS TO MAKE SURE HE WAS OKAY WITH IT.

## 2024-06-05 ENCOUNTER — TELEPHONE (OUTPATIENT)
Dept: CARDIOLOGY | Facility: CLINIC | Age: 84
End: 2024-06-05

## 2024-06-05 NOTE — TELEPHONE ENCOUNTER
150/80 yesterday, 148/75 now. no chest pain but left arm pain yesterday. He reports he has had the dizziness for years and was on meclizine in the past. Per Dr Childers- do not take the NTG, the dizziness does not seem to be related to his blood pressure.  Patient informed

## 2024-06-05 NOTE — TELEPHONE ENCOUNTER
Caller: Deion Nicholas    Relationship: Self    Best call back number: [phone]    What is the best time to reach you: ANYTIME    Who are you requesting to speak with (clinical staff, provider,  specific staff member): CLINICAL        What was the call regarding: PT WOULD LIKE TO KNOW IF IT IS ACCEPTABLE FOR HIM TO TAKE A NITRO PILL, IF HIS BP IS HIGH AND HE IS DIZZY?  PLEASE CALL TO ADVISE PT

## 2024-06-28 ENCOUNTER — TELEPHONE (OUTPATIENT)
Dept: CARDIOLOGY | Facility: CLINIC | Age: 84
End: 2024-06-28

## 2024-06-28 NOTE — TELEPHONE ENCOUNTER
Caller: Deion Nicholas    Relationship: Self    Best call back number: [phone]     Which medication are you concerned about: CEFDINIR    Who prescribed you this medication: PCP    When did you start taking this medication: HASN'T YET    What are your concerns: PT WAS PUT ON AN ANTIBIOTIC CEFDINIR FOR INJURY ON KNEE FOR CELULLITIS AND IS ASKING IF HE IS OKAY TO TAKE IT WITH HIS HEART - PT ASKING FOR CALL BACK ASAP      How long have you had these concerns: TODAY

## 2024-07-12 ENCOUNTER — HOSPITAL ENCOUNTER (EMERGENCY)
Facility: HOSPITAL | Age: 84
Discharge: HOME | End: 2024-07-12
Attending: EMERGENCY MEDICINE
Payer: OTHER GOVERNMENT

## 2024-07-12 ENCOUNTER — APPOINTMENT (OUTPATIENT)
Dept: GENERAL RADIOLOGY | Facility: HOSPITAL | Age: 84
End: 2024-07-12
Payer: OTHER GOVERNMENT

## 2024-07-12 VITALS
SYSTOLIC BLOOD PRESSURE: 123 MMHG | HEIGHT: 73 IN | DIASTOLIC BLOOD PRESSURE: 74 MMHG | BODY MASS INDEX: 27.17 KG/M2 | RESPIRATION RATE: 20 BRPM | HEART RATE: 88 BPM | TEMPERATURE: 98.9 F | WEIGHT: 205 LBS | OXYGEN SATURATION: 93 %

## 2024-07-12 DIAGNOSIS — L03.116 CELLULITIS OF LEFT LEG: ICD-10-CM

## 2024-07-12 DIAGNOSIS — S29.011A MUSCLE STRAIN OF CHEST WALL, INITIAL ENCOUNTER: Primary | ICD-10-CM

## 2024-07-12 LAB
ANION GAP SERPL CALCULATED.3IONS-SCNC: 5.5 MMOL/L (ref 5–15)
BASOPHILS # BLD AUTO: 0.07 10*3/MM3 (ref 0–0.2)
BASOPHILS NFR BLD AUTO: 0.9 % (ref 0–1.5)
BUN SERPL-MCNC: 14 MG/DL (ref 8–23)
BUN/CREAT SERPL: 11.8 (ref 7–25)
CALCIUM SPEC-SCNC: 9.4 MG/DL (ref 8.6–10.5)
CHLORIDE SERPL-SCNC: 106 MMOL/L (ref 98–107)
CO2 SERPL-SCNC: 23.5 MMOL/L (ref 22–29)
CREAT SERPL-MCNC: 1.19 MG/DL (ref 0.76–1.27)
DEPRECATED RDW RBC AUTO: 47.1 FL (ref 37–54)
EGFRCR SERPLBLD CKD-EPI 2021: 61.4 ML/MIN/1.73
EOSINOPHIL # BLD AUTO: 0.45 10*3/MM3 (ref 0–0.4)
EOSINOPHIL NFR BLD AUTO: 5.9 % (ref 0.3–6.2)
ERYTHROCYTE [DISTWIDTH] IN BLOOD BY AUTOMATED COUNT: 13.2 % (ref 12.3–15.4)
GLUCOSE SERPL-MCNC: 102 MG/DL (ref 65–99)
HCT VFR BLD AUTO: 40.9 % (ref 37.5–51)
HGB BLD-MCNC: 13 G/DL (ref 13–17.7)
IMM GRANULOCYTES # BLD AUTO: 0.01 10*3/MM3 (ref 0–0.05)
IMM GRANULOCYTES NFR BLD AUTO: 0.1 % (ref 0–0.5)
LYMPHOCYTES # BLD AUTO: 1.83 10*3/MM3 (ref 0.7–3.1)
LYMPHOCYTES NFR BLD AUTO: 24.1 % (ref 19.6–45.3)
MCH RBC QN AUTO: 30.5 PG (ref 26.6–33)
MCHC RBC AUTO-ENTMCNC: 31.8 G/DL (ref 31.5–35.7)
MCV RBC AUTO: 96 FL (ref 79–97)
MONOCYTES # BLD AUTO: 0.68 10*3/MM3 (ref 0.1–0.9)
MONOCYTES NFR BLD AUTO: 9 % (ref 5–12)
NEUTROPHILS NFR BLD AUTO: 4.54 10*3/MM3 (ref 1.7–7)
NEUTROPHILS NFR BLD AUTO: 60 % (ref 42.7–76)
PLATELET # BLD AUTO: 203 10*3/MM3 (ref 140–450)
PMV BLD AUTO: 10.2 FL (ref 6–12)
POTASSIUM SERPL-SCNC: 4.5 MMOL/L (ref 3.5–5.2)
RBC # BLD AUTO: 4.26 10*6/MM3 (ref 4.14–5.8)
SODIUM SERPL-SCNC: 135 MMOL/L (ref 136–145)
TROPONIN T SERPL HS-MCNC: 24 NG/L
WBC NRBC COR # BLD AUTO: 7.58 10*3/MM3 (ref 3.4–10.8)

## 2024-07-12 PROCEDURE — 84484 ASSAY OF TROPONIN QUANT: CPT | Performed by: EMERGENCY MEDICINE

## 2024-07-12 PROCEDURE — 99284 EMERGENCY DEPT VISIT MOD MDM: CPT

## 2024-07-12 PROCEDURE — 93010 ELECTROCARDIOGRAM REPORT: CPT | Performed by: EMERGENCY MEDICINE

## 2024-07-12 PROCEDURE — 93005 ELECTROCARDIOGRAM TRACING: CPT | Performed by: EMERGENCY MEDICINE

## 2024-07-12 PROCEDURE — 71046 X-RAY EXAM CHEST 2 VIEWS: CPT

## 2024-07-12 PROCEDURE — 96365 THER/PROPH/DIAG IV INF INIT: CPT

## 2024-07-12 PROCEDURE — 99284 EMERGENCY DEPT VISIT MOD MDM: CPT | Performed by: EMERGENCY MEDICINE

## 2024-07-12 PROCEDURE — 85025 COMPLETE CBC W/AUTO DIFF WBC: CPT | Performed by: EMERGENCY MEDICINE

## 2024-07-12 PROCEDURE — 80048 BASIC METABOLIC PNL TOTAL CA: CPT | Performed by: EMERGENCY MEDICINE

## 2024-07-12 RX ORDER — SODIUM CHLORIDE 0.9 % (FLUSH) 0.9 %
10 SYRINGE (ML) INJECTION AS NEEDED
Status: DISCONTINUED | OUTPATIENT
Start: 2024-07-12 | End: 2024-07-12 | Stop reason: HOSPADM

## 2024-07-12 RX ORDER — DOXYCYCLINE 100 MG/1
100 CAPSULE ORAL 2 TIMES DAILY
Qty: 20 CAPSULE | Refills: 0 | Status: SHIPPED | OUTPATIENT
Start: 2024-07-12 | End: 2024-07-22

## 2024-07-12 RX ADMIN — DOXYCYCLINE 100 MG: 100 INJECTION, POWDER, LYOPHILIZED, FOR SOLUTION INTRAVENOUS at 17:21

## 2024-07-12 NOTE — FSED PROVIDER NOTE
Subjective   History of Present Illness  83-year-old male complains of chest pain on the left and some cellulitis on his left leg.  States that the pain is in the lateral left chest, he describes it as being in the back going up into the axilla.  He was doing some light workouts recently doing some dips and felt like that might be contributing but doctor's doctor who told him to come get checked out.  No abnormal dyspnea, no chest pressure, no exertional chest pain.  His area of concern on the leg is proximal lower leg   Review of Systems   Cardiovascular:         As noted in HPI   Skin:         As noted in HPI       Past Medical History:   Diagnosis Date    Aneurysm     Cardiomyopathy     ICD implantation    Cellulitis of left lower extremity 2010    recurrent    CHD (coronary heart disease)     S/P CABG & PCI    Dyslipidemia     Heart valve disease     History of ventricular tachycardia     Hypertension     Myocardial infarction     Inferior MI    Pinched nerve     L4-L5    Prostate cancer        Allergies   Allergen Reactions    Lovastatin Myalgia    Pravastatin Myalgia and Unknown (See Comments)     unknown    Simvastatin Unknown (See Comments) and Myalgia     unknown    Spironolactone Other (See Comments)     Gynecomastia        Past Surgical History:   Procedure Laterality Date    ANGIOPLASTY  2000 04/1996 Stent: Palmaz- Huy stent/LAD 07/1996-RCA: 2000-Tetra stent Guidant to proximal RCA, mid to distal artery 2 sequential Guidant Tetra stents    APPENDECTOMY      CARDIAC ABLATION  April and May 2019    CARDIAC CATHETERIZATION  07/2017    1996 x2, Nov. 2000, 05/2010-cath 08/2015-no stents 2017    CARDIAC DEFIBRILLATOR PLACEMENT      COLONOSCOPY W/ POLYPECTOMY      Mult colonoscopy's for polyp resection     CORONARY ARTERY BYPASS GRAFT  05/2010    x5 vessel: LMA to diagonal, LAD, intermedius, obtuse marginal, RCA    CORONARY STENT PLACEMENT      ENDOSCOPY N/A 6/24/2019    Procedure:  ESOPHAGOGASTRODUODENOSCOPY with dilitation Bougie 50, 54;  Surgeon: Roddy Coronel MD;  Location: [facility] ENDOSCOPY;  Service: Gastroenterology    INSERT / REPLACE / REMOVE PACEMAKER      KNEE OPEN LATERAL RELEASE Left     reduction    OTHER SURGICAL HISTORY  2018    ICD implantation    PROSTATE SURGERY  2015    Robiotic surgery- Cyber Knife:       Family History   Problem Relation Age of Onset    Pancreatic cancer Mother     Heart disease Father        Social History     Socioeconomic History    Marital status:    Tobacco Use    Smoking status: Former     Current packs/day: 0.00     Average packs/day: 1 pack/day for 17.0 years (17.0 ttl pk-yrs)     Types: Cigarettes     Start date: 1985     Quit date: 2002     Years since quittin.0     Passive exposure: Past    Smokeless tobacco: Never   Vaping Use    Vaping status: Never Used   Substance and Sexual Activity    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  INITIAL VITAL SIGNS: Reviewed by me.  Pulse ox normal  GENERAL: Alert. Well developed and well nourished. No respiratory distress.  HEAD: Normocephalic.   EYES: No conjunctival injection.  ENT: Oral mucosa is moist.  NECK/BACK: Supple. Full range of motion.  CARDIOVASCULAR: Regular rhythm and rate, tender to palpation of the left pectoral muscle into the insertion of the humerus.  RESPIRATORY: Non-labored respirations.  EXTREMITIES: No deformity.3 cm diameter area of erythema warmth slight tenderness, no fluctuance.  SKIN: Warm and dry. No rashes. No diaphoresis.  NEUROLOGIC: Alert. Normal gait    Procedures     EKG         EKG time/Interp time: 1638/1643  Rhythm/Rate: Sinus rhythm rate of 94  P waves and DE: Normal DE interval  QRS, axis: Normal QRS duration  ST and T waves: No ST elevations or depressions concerning for acute ischemia, no significant change from prior  EKG  Independently interpreted by me contemporaneously with treatment        ED Course  ED Course as of 07/12/24 1821   Fri Jul 12, 2024   1726 History and physical as above.  83-year-old with significant coronary artery disease history and cardiomyopathy presents complaining of left chest pain.  It certainly seems that it is in the chest wall.  Denies any AICD events.  EKG which have independently interpreted does not have any changes concerning for acute ischemia.  There is no significant change from prior.  Will check a chest x-ray set of labs give him some doxycycline while he is here plan to send him home on Doxy for cellulitis. [RO]   1753 Chest x-ray which have independently reviewed is without evidence for pneumonia.  Labs are reassuring, high-sensitivity troponin is slightly elevated at 24, this is in line with his prior values likely related to his heart failure.  his story is not consistent with ACS but is muscular in nature based on history and physical.  Will discharge home with Doxy for his cellulitis. [RO]      ED Course User Index  [RO] Amrik Ramirez MD                                           Medical Decision Making  Amount and/or Complexity of Data Reviewed  Labs: ordered.  Radiology: ordered.  ECG/medicine tests: ordered and independent interpretation performed. Decision-making details documented in ED Course.    Risk  Prescription drug management.        Final diagnoses:   Muscle strain of chest wall, initial encounter   Cellulitis of left leg       ED Disposition  ED Disposition       ED Disposition   Discharge    Condition   Good    Comment   --               Makenna Motta MD  [address] [facility] [place] [address] [phone]    Call   To schedule follow-up appointment         Medication List        New Prescriptions      doxycycline 100 MG capsule  Commonly known as: MONODOX  Take 1 capsule by mouth 2 (Two) Times a Day for 10 days.               Where to Get Your  Medications        These medications were sent to [id number] - [place], [place] - [address] AT [facility] [address] - [phone] [facility] [phone]   [address] [place] [address]      Phone: [phone]   doxycycline 100 MG capsule

## 2024-07-13 LAB
QT INTERVAL: 365 MS
QTC INTERVAL: 456 MS

## 2024-07-17 ENCOUNTER — TELEPHONE (OUTPATIENT)
Dept: CARDIOLOGY | Facility: CLINIC | Age: 84
End: 2024-07-17
Payer: OTHER GOVERNMENT

## 2024-07-17 NOTE — TELEPHONE ENCOUNTER
Spoke with pt, I let him know his remote looks good. He stated it feels like muscular pain. I advised pt to use caution with his exercise and he can use ice to help with the soreness. Pt will call with any other concerns.

## 2024-07-17 NOTE — TELEPHONE ENCOUNTER
Caller: Deion Nicholas    Relationship: Self    Best call back number: [phone] (home)      What is the best time to reach you: ANYTIME    Who are you requesting to speak with (clinical staff, provider,  specific staff member): DR CARRILLO'S NURSE     What was the call regarding: PT WENT TO THE ER ON 7.12.24 DUE TO LEFT SIDE CHEST AND ARM PAINS RIGHT BY HIS DEFIBRILLATOR. HE IS WONDERING IF WE CAN LOOK AT HIS MONITOR AND SEE IF IT SHOWED ANYTHING THE LAST FEW DAYS. PLEASE CALL PT BACK TO DISCUSS.

## 2024-08-28 ENCOUNTER — TELEPHONE (OUTPATIENT)
Dept: CARDIOLOGY | Facility: CLINIC | Age: 84
End: 2024-08-28
Payer: OTHER GOVERNMENT

## 2024-08-28 NOTE — TELEPHONE ENCOUNTER
Hub staff attempted to follow warm transfer process and was unsuccessful     Caller: Deion Nicholas    Relationship to patient: Self    Best call back number: [phone]    Patient is needing: PATIENT WAS CALLING BECAUSE HE HAS RECEIVED NEW HOME HEART MONITOR FOR HIS BIOTRONIK ICD AND NEEDS TO KNOW IF OFFICE IS RECEIVING TRANSMISSIONS. PLEASE CONTACT PATIENT TO ADVISE.THANK YOU.

## 2024-09-10 ENCOUNTER — APPOINTMENT (OUTPATIENT)
Dept: GENERAL RADIOLOGY | Facility: HOSPITAL | Age: 84
DRG: 603 | End: 2024-09-10
Payer: OTHER GOVERNMENT

## 2024-09-10 ENCOUNTER — HOSPITAL ENCOUNTER (INPATIENT)
Facility: HOSPITAL | Age: 84
LOS: 2 days | Discharge: HOME | DRG: 603 | End: 2024-09-13
Attending: EMERGENCY MEDICINE | Admitting: HOSPITALIST
Payer: OTHER GOVERNMENT

## 2024-09-10 ENCOUNTER — APPOINTMENT (OUTPATIENT)
Dept: CT IMAGING | Facility: HOSPITAL | Age: 84
DRG: 603 | End: 2024-09-10
Payer: OTHER GOVERNMENT

## 2024-09-10 ENCOUNTER — APPOINTMENT (OUTPATIENT)
Dept: CARDIOLOGY | Facility: HOSPITAL | Age: 84
DRG: 603 | End: 2024-09-10
Payer: MEDICARE

## 2024-09-10 DIAGNOSIS — R07.9 CHEST PAIN, UNSPECIFIED TYPE: Primary | ICD-10-CM

## 2024-09-10 DIAGNOSIS — L03.116 CELLULITIS OF LEFT LOWER LEG: ICD-10-CM

## 2024-09-10 LAB
ANION GAP SERPL CALCULATED.3IONS-SCNC: 10.5 MMOL/L (ref 5–15)
APTT PPP: 30.5 SECONDS (ref 61–76.5)
BASOPHILS # BLD AUTO: 0.05 10*3/MM3 (ref 0–0.2)
BASOPHILS NFR BLD AUTO: 0.8 % (ref 0–1.5)
BH CV LOWER VASCULAR LEFT COMMON FEMORAL AUGMENT: NORMAL
BH CV LOWER VASCULAR LEFT COMMON FEMORAL COMPETENT: NORMAL
BH CV LOWER VASCULAR LEFT COMMON FEMORAL COMPRESS: NORMAL
BH CV LOWER VASCULAR LEFT COMMON FEMORAL PHASIC: NORMAL
BH CV LOWER VASCULAR LEFT COMMON FEMORAL SPONT: NORMAL
BH CV LOWER VASCULAR LEFT DISTAL FEMORAL COMPRESS: NORMAL
BH CV LOWER VASCULAR LEFT GASTRONEMIUS COMPRESS: NORMAL
BH CV LOWER VASCULAR LEFT GREATER SAPH AK COMPRESS: NORMAL
BH CV LOWER VASCULAR LEFT GREATER SAPH BK COMPRESS: NORMAL
BH CV LOWER VASCULAR LEFT LESSER SAPH COMPRESS: NORMAL
BH CV LOWER VASCULAR LEFT MID FEMORAL AUGMENT: NORMAL
BH CV LOWER VASCULAR LEFT MID FEMORAL COMPETENT: NORMAL
BH CV LOWER VASCULAR LEFT MID FEMORAL COMPRESS: NORMAL
BH CV LOWER VASCULAR LEFT MID FEMORAL PHASIC: NORMAL
BH CV LOWER VASCULAR LEFT MID FEMORAL SPONT: NORMAL
BH CV LOWER VASCULAR LEFT PERONEAL COMPRESS: NORMAL
BH CV LOWER VASCULAR LEFT POPLITEAL AUGMENT: NORMAL
BH CV LOWER VASCULAR LEFT POPLITEAL COMPETENT: NORMAL
BH CV LOWER VASCULAR LEFT POPLITEAL COMPRESS: NORMAL
BH CV LOWER VASCULAR LEFT POPLITEAL PHASIC: NORMAL
BH CV LOWER VASCULAR LEFT POPLITEAL SPONT: NORMAL
BH CV LOWER VASCULAR LEFT POSTERIOR TIBIAL COMPRESS: NORMAL
BH CV LOWER VASCULAR LEFT PROXIMAL FEMORAL COMPRESS: NORMAL
BH CV LOWER VASCULAR LEFT SAPHENOFEMORAL JUNCTION COMPRESS: NORMAL
BH CV LOWER VASCULAR RIGHT COMMON FEMORAL AUGMENT: NORMAL
BH CV LOWER VASCULAR RIGHT COMMON FEMORAL COMPETENT: NORMAL
BH CV LOWER VASCULAR RIGHT COMMON FEMORAL COMPRESS: NORMAL
BH CV LOWER VASCULAR RIGHT COMMON FEMORAL PHASIC: NORMAL
BH CV LOWER VASCULAR RIGHT COMMON FEMORAL SPONT: NORMAL
BH CV VAS PRELIMINARY FINDINGS SCRIPTING: 1
BUN SERPL-MCNC: 20 MG/DL (ref 8–23)
BUN/CREAT SERPL: 17.2 (ref 7–25)
CALCIUM SPEC-SCNC: 9.9 MG/DL (ref 8.6–10.5)
CHLORIDE SERPL-SCNC: 104 MMOL/L (ref 98–107)
CO2 SERPL-SCNC: 23.5 MMOL/L (ref 22–29)
CREAT SERPL-MCNC: 1.16 MG/DL (ref 0.76–1.27)
D DIMER PPP FEU-MCNC: 1.05 MG/L (FEU) (ref 0–0.81)
DEPRECATED RDW RBC AUTO: 49.1 FL (ref 37–54)
EGFRCR SERPLBLD CKD-EPI 2021: 63.3 ML/MIN/1.73
EOSINOPHIL # BLD AUTO: 0.34 10*3/MM3 (ref 0–0.4)
EOSINOPHIL NFR BLD AUTO: 5.6 % (ref 0.3–6.2)
ERYTHROCYTE [DISTWIDTH] IN BLOOD BY AUTOMATED COUNT: 14.3 % (ref 12.3–15.4)
GLUCOSE SERPL-MCNC: 118 MG/DL (ref 65–99)
HCT VFR BLD AUTO: 41.7 % (ref 37.5–51)
HGB BLD-MCNC: 13.3 G/DL (ref 13–17.7)
HOLD SPECIMEN: NORMAL
IMM GRANULOCYTES # BLD AUTO: 0.01 10*3/MM3 (ref 0–0.05)
IMM GRANULOCYTES NFR BLD AUTO: 0.2 % (ref 0–0.5)
INR PPP: 0.99 (ref 0.93–1.1)
LYMPHOCYTES # BLD AUTO: 1.88 10*3/MM3 (ref 0.7–3.1)
LYMPHOCYTES NFR BLD AUTO: 30.8 % (ref 19.6–45.3)
MCH RBC QN AUTO: 29.8 PG (ref 26.6–33)
MCHC RBC AUTO-ENTMCNC: 31.9 G/DL (ref 31.5–35.7)
MCV RBC AUTO: 93.5 FL (ref 79–97)
MONOCYTES # BLD AUTO: 0.67 10*3/MM3 (ref 0.1–0.9)
MONOCYTES NFR BLD AUTO: 11 % (ref 5–12)
NEUTROPHILS NFR BLD AUTO: 3.16 10*3/MM3 (ref 1.7–7)
NEUTROPHILS NFR BLD AUTO: 51.6 % (ref 42.7–76)
NRBC BLD AUTO-RTO: 0 /100 WBC (ref 0–0.2)
NT-PROBNP SERPL-MCNC: 373 PG/ML (ref 0–1800)
PLATELET # BLD AUTO: 189 10*3/MM3 (ref 140–450)
PMV BLD AUTO: 10.4 FL (ref 6–12)
POTASSIUM SERPL-SCNC: 4.3 MMOL/L (ref 3.5–5.2)
PROTHROMBIN TIME: 10.8 SECONDS (ref 9.6–11.7)
RBC # BLD AUTO: 4.46 10*6/MM3 (ref 4.14–5.8)
SODIUM SERPL-SCNC: 138 MMOL/L (ref 136–145)
TROPONIN T SERPL HS-MCNC: 26 NG/L
WBC NRBC COR # BLD AUTO: 6.11 10*3/MM3 (ref 3.4–10.8)
WHOLE BLOOD HOLD COAG: NORMAL
WHOLE BLOOD HOLD SPECIMEN: NORMAL

## 2024-09-10 PROCEDURE — 93971 EXTREMITY STUDY: CPT

## 2024-09-10 PROCEDURE — 71045 X-RAY EXAM CHEST 1 VIEW: CPT

## 2024-09-10 PROCEDURE — 25010000002 CEFTRIAXONE PER 250 MG: Performed by: EMERGENCY MEDICINE

## 2024-09-10 PROCEDURE — 93971 EXTREMITY STUDY: CPT | Performed by: SURGERY

## 2024-09-10 PROCEDURE — 25510000001 IOPAMIDOL PER 1 ML: Performed by: EMERGENCY MEDICINE

## 2024-09-10 PROCEDURE — G0378 HOSPITAL OBSERVATION PER HR: HCPCS

## 2024-09-10 PROCEDURE — 71275 CT ANGIOGRAPHY CHEST: CPT

## 2024-09-10 PROCEDURE — 84484 ASSAY OF TROPONIN QUANT: CPT | Performed by: EMERGENCY MEDICINE

## 2024-09-10 PROCEDURE — 85730 THROMBOPLASTIN TIME PARTIAL: CPT | Performed by: EMERGENCY MEDICINE

## 2024-09-10 PROCEDURE — 93005 ELECTROCARDIOGRAM TRACING: CPT | Performed by: EMERGENCY MEDICINE

## 2024-09-10 PROCEDURE — 85379 FIBRIN DEGRADATION QUANT: CPT | Performed by: EMERGENCY MEDICINE

## 2024-09-10 PROCEDURE — 85610 PROTHROMBIN TIME: CPT | Performed by: EMERGENCY MEDICINE

## 2024-09-10 PROCEDURE — 93005 ELECTROCARDIOGRAM TRACING: CPT

## 2024-09-10 PROCEDURE — 85025 COMPLETE CBC W/AUTO DIFF WBC: CPT | Performed by: EMERGENCY MEDICINE

## 2024-09-10 PROCEDURE — 99285 EMERGENCY DEPT VISIT HI MDM: CPT

## 2024-09-10 PROCEDURE — 83880 ASSAY OF NATRIURETIC PEPTIDE: CPT | Performed by: EMERGENCY MEDICINE

## 2024-09-10 PROCEDURE — 80048 BASIC METABOLIC PNL TOTAL CA: CPT | Performed by: EMERGENCY MEDICINE

## 2024-09-10 RX ORDER — POLYETHYLENE GLYCOL 3350 17 G/17G
17 POWDER, FOR SOLUTION ORAL DAILY PRN
Status: DISCONTINUED | OUTPATIENT
Start: 2024-09-10 | End: 2024-09-13 | Stop reason: HOSPADM

## 2024-09-10 RX ORDER — MORPHINE SULFATE 2 MG/ML
1 INJECTION, SOLUTION INTRAMUSCULAR; INTRAVENOUS EVERY 4 HOURS PRN
Status: DISCONTINUED | OUTPATIENT
Start: 2024-09-10 | End: 2024-09-13

## 2024-09-10 RX ORDER — GABAPENTIN 400 MG/1
400 CAPSULE ORAL EVERY 6 HOURS
Status: DISCONTINUED | OUTPATIENT
Start: 2024-09-10 | End: 2024-09-13 | Stop reason: HOSPADM

## 2024-09-10 RX ORDER — NITROGLYCERIN 0.4 MG/1
0.4 TABLET SUBLINGUAL
Status: DISCONTINUED | OUTPATIENT
Start: 2024-09-10 | End: 2024-09-13 | Stop reason: HOSPADM

## 2024-09-10 RX ORDER — IOPAMIDOL 755 MG/ML
100 INJECTION, SOLUTION INTRAVASCULAR
Status: COMPLETED | OUTPATIENT
Start: 2024-09-10 | End: 2024-09-10

## 2024-09-10 RX ORDER — SODIUM CHLORIDE 0.9 % (FLUSH) 0.9 %
10 SYRINGE (ML) INJECTION AS NEEDED
Status: DISCONTINUED | OUTPATIENT
Start: 2024-09-10 | End: 2024-09-13 | Stop reason: HOSPADM

## 2024-09-10 RX ORDER — METHOCARBAMOL 500 MG/1
500 TABLET, FILM COATED ORAL 2 TIMES DAILY
Status: DISCONTINUED | OUTPATIENT
Start: 2024-09-10 | End: 2024-09-13 | Stop reason: HOSPADM

## 2024-09-10 RX ORDER — BISACODYL 5 MG/1
5 TABLET, DELAYED RELEASE ORAL DAILY PRN
Status: DISCONTINUED | OUTPATIENT
Start: 2024-09-10 | End: 2024-09-13 | Stop reason: HOSPADM

## 2024-09-10 RX ORDER — NALOXONE HCL 0.4 MG/ML
0.4 VIAL (ML) INJECTION
Status: DISCONTINUED | OUTPATIENT
Start: 2024-09-10 | End: 2024-09-13 | Stop reason: HOSPADM

## 2024-09-10 RX ORDER — AMOXICILLIN 250 MG
2 CAPSULE ORAL 2 TIMES DAILY PRN
Status: DISCONTINUED | OUTPATIENT
Start: 2024-09-10 | End: 2024-09-13 | Stop reason: HOSPADM

## 2024-09-10 RX ORDER — ONDANSETRON 2 MG/ML
4 INJECTION INTRAMUSCULAR; INTRAVENOUS EVERY 6 HOURS PRN
Status: DISCONTINUED | OUTPATIENT
Start: 2024-09-10 | End: 2024-09-13 | Stop reason: HOSPADM

## 2024-09-10 RX ORDER — BISACODYL 10 MG
10 SUPPOSITORY, RECTAL RECTAL DAILY PRN
Status: DISCONTINUED | OUTPATIENT
Start: 2024-09-10 | End: 2024-09-13 | Stop reason: HOSPADM

## 2024-09-10 RX ADMIN — GABAPENTIN 400 MG: 400 CAPSULE ORAL at 21:39

## 2024-09-10 RX ADMIN — METHOCARBAMOL 500 MG: 500 TABLET ORAL at 21:39

## 2024-09-10 RX ADMIN — CEFTRIAXONE 1000 MG: 1 INJECTION, POWDER, FOR SOLUTION INTRAMUSCULAR; INTRAVENOUS at 17:28

## 2024-09-10 RX ADMIN — SENNOSIDES AND DOCUSATE SODIUM 2 TABLET: 50; 8.6 TABLET ORAL at 21:41

## 2024-09-10 RX ADMIN — IOPAMIDOL 100 ML: 755 INJECTION, SOLUTION INTRAVENOUS at 16:14

## 2024-09-10 NOTE — ED PROVIDER NOTES
Subjective   History of Present Illness  Chief complaint: Chest pain    83-year-old male presents with chest pain.  Patient states pain has been intermittent over the past few days.  He has had associated shortness of breath.  He states he has had increased swelling to his left lower leg.  He also accidentally scratched himself just below the left knee and was concerned that he may have developed a skin infection to that area.  He denies fever.  He denies any alleviating or exacerbating factors.    History provided by:  Patient      Review of Systems   Constitutional:  Negative for fever.   HENT:  Negative for congestion.    Respiratory:  Positive for shortness of breath. Negative for cough.    Cardiovascular:  Positive for chest pain and leg swelling.   Gastrointestinal:  Negative for abdominal pain, diarrhea and vomiting.   Musculoskeletal:  Negative for back pain.   Neurological:  Negative for headaches.   Psychiatric/Behavioral:  Negative for confusion.        Past Medical History:   Diagnosis Date    Aneurysm     Cardiomyopathy     ICD implantation    Cellulitis of left lower extremity 2010    recurrent    CHD (coronary heart disease)     S/P CABG & PCI    Dyslipidemia     Heart valve disease     History of ventricular tachycardia     Hypertension     Myocardial infarction     Inferior MI    Pinched nerve     L4-L5    Prostate cancer        Allergies   Allergen Reactions    Lovastatin Myalgia    Pravastatin Myalgia and Unknown (See Comments)     unknown    Simvastatin Unknown (See Comments) and Myalgia     unknown    Spironolactone Other (See Comments)     Gynecomastia        Past Surgical History:   Procedure Laterality Date    ANGIOPLASTY  2000 04/1996 Stent: Palmaz- Huy stent/LAD 07/1996-RCA: 2000-Tetra stent Guidant to proximal RCA, mid to distal artery 2 sequential Guidant Tetra stents    APPENDECTOMY      CARDIAC ABLATION  April and May 2019    CARDIAC CATHETERIZATION  07/2017    1996 x2, Nov. 2000,  "2010-cath 2015-no stents 2017    CARDIAC DEFIBRILLATOR PLACEMENT      COLONOSCOPY W/ POLYPECTOMY      Mult colonoscopy's for polyp resection     CORONARY ARTERY BYPASS GRAFT  05/2010    x5 vessel: LMA to diagonal, LAD, intermedius, obtuse marginal, RCA    CORONARY STENT PLACEMENT      ENDOSCOPY N/A 2019    Procedure: ESOPHAGOGASTRODUODENOSCOPY with dilitation Bougie 50, 54;  Surgeon: Roddy Coronel MD;  Location: [facility] ENDOSCOPY;  Service: Gastroenterology    INSERT / REPLACE / REMOVE PACEMAKER      KNEE OPEN LATERAL RELEASE Left     reduction    OTHER SURGICAL HISTORY  2018    ICD implantation    PROSTATE SURGERY      Robiotic surgery- Cyber Knife:       Family History   Problem Relation Age of Onset    Pancreatic cancer Mother     Heart disease Father        Social History     Socioeconomic History    Marital status:    Tobacco Use    Smoking status: Former     Current packs/day: 0.00     Average packs/day: 1 pack/day for 17.0 years (17.0 ttl pk-yrs)     Types: Cigarettes     Start date: 1985     Quit date: 2002     Years since quittin.1     Passive exposure: Past    Smokeless tobacco: Never   Vaping Use    Vaping status: Never Used   Substance and Sexual Activity    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   Constitutional:       Appearance: He is well-developed.   HENT:      Head: Normocephalic and atraumatic.   Cardiovascular:      Rate and Rhythm: Normal rate and regular rhythm.      Heart sounds: Normal heart sounds.   Pulmonary:      Effort: Pulmonary effort is normal. No respiratory distress.      Breath sounds: Normal breath sounds.   Abdominal:      Palpations: Abdomen is soft.      " Tenderness: There is no abdominal tenderness.   Musculoskeletal:      Comments: There is diffuse edema to the bilateral lower extremities, worse on the left.  There is a small scratch on the left leg just below the knee with no significant erythema or warmth.  No evidence of cellulitis.   Skin:     General: Skin is warm and dry.   Neurological:      Mental Status: He is alert and oriented to person, place, and time.         Procedures           ED Course      My interpretation of EKG shows sinus rhythm, rate of 97, no ST elevation          HEART Score: 5                  Results for orders placed or performed during the hospital encounter of 09/10/24   Basic Metabolic Panel    Specimen: Blood   Result Value Ref Range    Glucose 118 (H) 65 - 99 mg/dL    BUN 20 8 - 23 mg/dL    Creatinine 1.16 0.76 - 1.27 mg/dL    Sodium 138 136 - 145 mmol/L    Potassium 4.3 3.5 - 5.2 mmol/L    Chloride 104 98 - 107 mmol/L    CO2 23.5 22.0 - 29.0 mmol/L    Calcium 9.9 8.6 - 10.5 mg/dL    BUN/Creatinine Ratio 17.2 7.0 - 25.0    Anion Gap 10.5 5.0 - 15.0 mmol/L    eGFR 63.3 >60.0 mL/min/1.73   Protime-INR    Specimen: Blood   Result Value Ref Range    Protime 10.8 9.6 - 11.7 Seconds    INR 0.99 0.93 - 1.10   aPTT    Specimen: Blood   Result Value Ref Range    PTT 30.5 (L) 61.0 - 76.5 seconds   Single High Sensitivity Troponin T    Specimen: Blood   Result Value Ref Range    HS Troponin T 26 (H) <22 ng/L   BNP    Specimen: Blood   Result Value Ref Range    proBNP 373.0 0.0 - 1,800.0 pg/mL   D-dimer, Quantitative    Specimen: Blood   Result Value Ref Range    D-Dimer, Quantitative 1.05 (H) 0.00 - 0.81 mg/L (FEU)   CBC Auto Differential    Specimen: Blood   Result Value Ref Range    WBC 6.11 3.40 - 10.80 10*3/mm3    RBC 4.46 4.14 - 5.80 10*6/mm3    Hemoglobin 13.3 13.0 - 17.7 g/dL    Hematocrit 41.7 37.5 - 51.0 %    MCV 93.5 79.0 - 97.0 fL    MCH 29.8 26.6 - 33.0 pg    MCHC 31.9 31.5 - 35.7 g/dL    RDW 14.3 12.3 - 15.4 %    RDW-SD 49.1 37.0  - 54.0 fl    MPV 10.4 6.0 - 12.0 fL    Platelets 189 140 - 450 10*3/mm3    Neutrophil % 51.6 42.7 - 76.0 %    Lymphocyte % 30.8 19.6 - 45.3 %    Monocyte % 11.0 5.0 - 12.0 %    Eosinophil % 5.6 0.3 - 6.2 %    Basophil % 0.8 0.0 - 1.5 %    Immature Grans % 0.2 0.0 - 0.5 %    Neutrophils, Absolute 3.16 1.70 - 7.00 10*3/mm3    Lymphocytes, Absolute 1.88 0.70 - 3.10 10*3/mm3    Monocytes, Absolute 0.67 0.10 - 0.90 10*3/mm3    Eosinophils, Absolute 0.34 0.00 - 0.40 10*3/mm3    Basophils, Absolute 0.05 0.00 - 0.20 10*3/mm3    Immature Grans, Absolute 0.01 0.00 - 0.05 10*3/mm3    nRBC 0.0 0.0 - 0.2 /100 WBC   ECG 12 Lead Chest Pain   Result Value Ref Range    QT Interval 365 ms    QTC Interval 464 ms   Green Top (Gel)   Result Value Ref Range    Extra Tube Hold for add-ons.    Lavender Top   Result Value Ref Range    Extra Tube hold for add-on    Light Blue Top   Result Value Ref Range    Extra Tube Hold for add-ons.    Duplex Venous Lower Extremity - Left   Result Value Ref Range    Right Common Femoral Spont Y     Right Common Femoral Competent Y     Right Common Femoral Phasic Y     Right Common Femoral Compress C     Right Common Femoral Augment Y     Left Common Femoral Spont Y     Left Common Femoral Competent Y     Left Common Femoral Phasic Y     Left Common Femoral Compress C     Left Common Femoral Augment Y     Left Saphenofemoral Junction Compress C     Left Proximal Femoral Compress C     Left Mid Femoral Spont Y     Left Mid Femoral Competent Y     Left Mid Femoral Phasic Y     Left Mid Femoral Compress C     Left Mid Femoral Augment Y     Left Distal Femoral Compress C     Left Popliteal Spont Y     Left Popliteal Competent Y     Left Popliteal Phasic Y     Left Popliteal Compress C     Left Popliteal Augment Y     Left Posterior Tibial Compress C     Left Peroneal Compress C     Left Gastronemius Compress C     Left Greater Saph AK Compress C     Left Greater Saph BK Compress C     Left Lesser Saph Compress  C     BH CV VAS PRELIMINARY FINDINGS SCRIPTING 1.0      CT Angiogram Chest Pulmonary Embolism    Result Date: 9/10/2024  Impression: 1. No acute CT findings. No evidence of pulmonary embolism. 2. 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 Electronically Signed: Rubén Wyatt MD  9/10/2024 4:24 PM EDT  Workstation ID: [id number]    XR Chest 1 View    Result Date: 9/10/2024  Impression: Stable cardiomegaly. No acute chest finding. Electronically Signed: Grace Dyson MD  9/10/2024 2:50 PM EDT  Workstation ID: [id number]               Medical Decision Making  Amount and/or Complexity of Data Reviewed  Labs: ordered.  Radiology: ordered.  ECG/medicine tests: ordered.    Risk  Prescription drug management.      Patient had the above evaluation.  Results were discussed with the patient.  My interpretation of chest x-ray shows no infiltrate or effusion.  EKG shows no acute ischemia.  Troponin is borderline elevated at 26.  BNP is normal.  D-dimer was elevated at 1.05.  CT PE protocol was obtained which showed no PE and no acute findings.  Patient also had left lower extremity Doppler which was negative for DVT.  BMP is unremarkable.  Patient does appear to have left lower leg cellulitis on exam.  He was given a dose of Rocephin.  He will be placed in observation for further antibiotics as well as cardiology consult for his chest pain.  Patient is agreeable with this plan.      Final diagnoses:   Chest pain, unspecified type   Cellulitis of left lower leg       ED Disposition  ED Disposition       ED Disposition   Decision to Admit    Condition   --    Comment   --               No follow-up provider specified.       Medication List      No changes were made to your prescriptions during this visit.            Montana Faustin MD  09/10/24 9328

## 2024-09-11 ENCOUNTER — APPOINTMENT (OUTPATIENT)
Dept: NUCLEAR MEDICINE | Facility: HOSPITAL | Age: 84
DRG: 603 | End: 2024-09-11
Payer: OTHER GOVERNMENT

## 2024-09-11 LAB
ANION GAP SERPL CALCULATED.3IONS-SCNC: 10.6 MMOL/L (ref 5–15)
BASOPHILS # BLD AUTO: 0.06 10*3/MM3 (ref 0–0.2)
BASOPHILS NFR BLD AUTO: 1.1 % (ref 0–1.5)
BH CV REST NUCLEAR ISOTOPE DOSE: 10.7 MCI
BH CV STRESS BP STAGE 2: NORMAL
BH CV STRESS COMMENTS STAGE 1: NORMAL
BH CV STRESS DOSE REGADENOSON STAGE 1: 0.4
BH CV STRESS DURATION MIN STAGE 1: 0
BH CV STRESS DURATION SEC STAGE 1: 10
BH CV STRESS DURATION SEC STAGE 2: 0
BH CV STRESS HR STAGE 1: 87
BH CV STRESS HR STAGE 2: 94
BH CV STRESS HR STAGE 3: 94
BH CV STRESS NUCLEAR ISOTOPE DOSE: 33 MCI
BH CV STRESS PROTOCOL 1: NORMAL
BH CV STRESS RECOVERY BP: NORMAL MMHG
BH CV STRESS RECOVERY HR: 90 BPM
BH CV STRESS STAGE 1: 1
BH CV STRESS STAGE 2: 2
BH CV STRESS STAGE 3: 3
BUN SERPL-MCNC: 15 MG/DL (ref 8–23)
BUN/CREAT SERPL: 15.5 (ref 7–25)
CALCIUM SPEC-SCNC: 9 MG/DL (ref 8.6–10.5)
CHLORIDE SERPL-SCNC: 105 MMOL/L (ref 98–107)
CHOLEST SERPL-MCNC: 160 MG/DL (ref 0–200)
CO2 SERPL-SCNC: 22.4 MMOL/L (ref 22–29)
CREAT SERPL-MCNC: 0.97 MG/DL (ref 0.76–1.27)
DEPRECATED RDW RBC AUTO: 49.1 FL (ref 37–54)
EGFRCR SERPLBLD CKD-EPI 2021: 78.4 ML/MIN/1.73
EOSINOPHIL # BLD AUTO: 0.38 10*3/MM3 (ref 0–0.4)
EOSINOPHIL NFR BLD AUTO: 6.7 % (ref 0.3–6.2)
ERYTHROCYTE [DISTWIDTH] IN BLOOD BY AUTOMATED COUNT: 14.2 % (ref 12.3–15.4)
GLUCOSE SERPL-MCNC: 100 MG/DL (ref 65–99)
HCT VFR BLD AUTO: 40.5 % (ref 37.5–51)
HDLC SERPL-MCNC: 47 MG/DL (ref 40–60)
HGB BLD-MCNC: 13.2 G/DL (ref 13–17.7)
IMM GRANULOCYTES # BLD AUTO: 0.01 10*3/MM3 (ref 0–0.05)
IMM GRANULOCYTES NFR BLD AUTO: 0.2 % (ref 0–0.5)
LDLC SERPL CALC-MCNC: 95 MG/DL (ref 0–100)
LDLC/HDLC SERPL: 1.98 {RATIO}
LV EF NUC BP: 26 %
LYMPHOCYTES # BLD AUTO: 1.79 10*3/MM3 (ref 0.7–3.1)
LYMPHOCYTES NFR BLD AUTO: 31.6 % (ref 19.6–45.3)
MAXIMAL PREDICTED HEART RATE: 139 BPM
MCH RBC QN AUTO: 30.6 PG (ref 26.6–33)
MCHC RBC AUTO-ENTMCNC: 32.6 G/DL (ref 31.5–35.7)
MCV RBC AUTO: 94 FL (ref 79–97)
MONOCYTES # BLD AUTO: 0.59 10*3/MM3 (ref 0.1–0.9)
MONOCYTES NFR BLD AUTO: 10.4 % (ref 5–12)
NEUTROPHILS NFR BLD AUTO: 2.83 10*3/MM3 (ref 1.7–7)
NEUTROPHILS NFR BLD AUTO: 50 % (ref 42.7–76)
NRBC BLD AUTO-RTO: 0 /100 WBC (ref 0–0.2)
PERCENT MAX PREDICTED HR: 67.63 %
PLATELET # BLD AUTO: 157 10*3/MM3 (ref 140–450)
PMV BLD AUTO: 10.8 FL (ref 6–12)
POTASSIUM SERPL-SCNC: 4 MMOL/L (ref 3.5–5.2)
QT INTERVAL: 365 MS
QTC INTERVAL: 464 MS
RBC # BLD AUTO: 4.31 10*6/MM3 (ref 4.14–5.8)
SODIUM SERPL-SCNC: 138 MMOL/L (ref 136–145)
STRESS BASELINE BP: NORMAL MMHG
STRESS BASELINE HR: 89 BPM
STRESS PERCENT HR: 80 %
STRESS POST PEAK BP: NORMAL MMHG
STRESS POST PEAK HR: 94 BPM
STRESS TARGET HR: 118 BPM
TRIGL SERPL-MCNC: 100 MG/DL (ref 0–150)
TROPONIN T SERPL HS-MCNC: 18 NG/L
VLDLC SERPL-MCNC: 18 MG/DL (ref 5–40)
WBC NRBC COR # BLD AUTO: 5.66 10*3/MM3 (ref 3.4–10.8)

## 2024-09-11 PROCEDURE — 25010000002 CEFTRIAXONE PER 250 MG: Performed by: PHYSICIAN ASSISTANT

## 2024-09-11 PROCEDURE — 78452 HT MUSCLE IMAGE SPECT MULT: CPT | Performed by: INTERNAL MEDICINE

## 2024-09-11 PROCEDURE — 93017 CV STRESS TEST TRACING ONLY: CPT

## 2024-09-11 PROCEDURE — 80061 LIPID PANEL: CPT | Performed by: PHYSICIAN ASSISTANT

## 2024-09-11 PROCEDURE — 99222 1ST HOSP IP/OBS MODERATE 55: CPT | Performed by: INTERNAL MEDICINE

## 2024-09-11 PROCEDURE — 0 TECHNETIUM TETROFOSMIN KIT: Performed by: EMERGENCY MEDICINE

## 2024-09-11 PROCEDURE — A9502 TC99M TETROFOSMIN: HCPCS | Performed by: EMERGENCY MEDICINE

## 2024-09-11 PROCEDURE — 78452 HT MUSCLE IMAGE SPECT MULT: CPT

## 2024-09-11 PROCEDURE — 25010000002 REGADENOSON 0.4 MG/5ML SOLUTION: Performed by: EMERGENCY MEDICINE

## 2024-09-11 PROCEDURE — 80048 BASIC METABOLIC PNL TOTAL CA: CPT | Performed by: EMERGENCY MEDICINE

## 2024-09-11 PROCEDURE — 93018 CV STRESS TEST I&R ONLY: CPT | Performed by: INTERNAL MEDICINE

## 2024-09-11 PROCEDURE — 84484 ASSAY OF TROPONIN QUANT: CPT | Performed by: PHYSICIAN ASSISTANT

## 2024-09-11 PROCEDURE — 85025 COMPLETE CBC W/AUTO DIFF WBC: CPT | Performed by: EMERGENCY MEDICINE

## 2024-09-11 RX ORDER — POTASSIUM CHLORIDE 750 MG/1
10 CAPSULE, EXTENDED RELEASE ORAL DAILY
COMMUNITY

## 2024-09-11 RX ORDER — SUCRALFATE 1 G/1
1 TABLET ORAL 4 TIMES DAILY
Status: DISCONTINUED | OUTPATIENT
Start: 2024-09-11 | End: 2024-09-13 | Stop reason: HOSPADM

## 2024-09-11 RX ORDER — MECLIZINE HYDROCHLORIDE 25 MG/1
25 TABLET ORAL EVERY 6 HOURS PRN
COMMUNITY

## 2024-09-11 RX ORDER — CLINDAMYCIN PHOSPHATE 600 MG/50ML
600 INJECTION, SOLUTION INTRAVENOUS EVERY 8 HOURS
Status: DISCONTINUED | OUTPATIENT
Start: 2024-09-12 | End: 2024-09-13

## 2024-09-11 RX ORDER — RANOLAZINE 500 MG/1
500 TABLET, EXTENDED RELEASE ORAL EVERY 12 HOURS SCHEDULED
Status: DISCONTINUED | OUTPATIENT
Start: 2024-09-11 | End: 2024-09-13 | Stop reason: HOSPADM

## 2024-09-11 RX ORDER — MIDODRINE HYDROCHLORIDE 5 MG/1
5 TABLET ORAL
Status: DISCONTINUED | OUTPATIENT
Start: 2024-09-11 | End: 2024-09-13 | Stop reason: HOSPADM

## 2024-09-11 RX ORDER — METOPROLOL SUCCINATE 50 MG/1
50 TABLET, EXTENDED RELEASE ORAL DAILY
Status: DISCONTINUED | OUTPATIENT
Start: 2024-09-11 | End: 2024-09-13 | Stop reason: HOSPADM

## 2024-09-11 RX ORDER — FUROSEMIDE 20 MG
20 TABLET ORAL DAILY
COMMUNITY

## 2024-09-11 RX ORDER — ASPIRIN 81 MG/1
81 TABLET ORAL DAILY
Status: DISCONTINUED | OUTPATIENT
Start: 2024-09-11 | End: 2024-09-13 | Stop reason: HOSPADM

## 2024-09-11 RX ORDER — PANTOPRAZOLE SODIUM 40 MG/1
40 TABLET, DELAYED RELEASE ORAL DAILY
Status: DISCONTINUED | OUTPATIENT
Start: 2024-09-11 | End: 2024-09-13 | Stop reason: HOSPADM

## 2024-09-11 RX ORDER — FUROSEMIDE 20 MG
20 TABLET ORAL DAILY
Status: DISCONTINUED | OUTPATIENT
Start: 2024-09-11 | End: 2024-09-13 | Stop reason: HOSPADM

## 2024-09-11 RX ORDER — REGADENOSON 0.08 MG/ML
0.4 INJECTION, SOLUTION INTRAVENOUS
Status: COMPLETED | OUTPATIENT
Start: 2024-09-11 | End: 2024-09-11

## 2024-09-11 RX ADMIN — FUROSEMIDE 20 MG: 20 TABLET ORAL at 17:36

## 2024-09-11 RX ADMIN — GABAPENTIN 400 MG: 400 CAPSULE ORAL at 05:10

## 2024-09-11 RX ADMIN — MIDODRINE HYDROCHLORIDE 5 MG: 5 TABLET ORAL at 12:55

## 2024-09-11 RX ADMIN — Medication 10 ML: at 09:01

## 2024-09-11 RX ADMIN — SUCRALFATE 1 G: 1 TABLET ORAL at 12:54

## 2024-09-11 RX ADMIN — RANOLAZINE 500 MG: 500 TABLET, EXTENDED RELEASE ORAL at 20:52

## 2024-09-11 RX ADMIN — CEFTRIAXONE 1000 MG: 1 INJECTION, POWDER, FOR SOLUTION INTRAMUSCULAR; INTRAVENOUS at 17:37

## 2024-09-11 RX ADMIN — SUCRALFATE 1 G: 1 TABLET ORAL at 17:37

## 2024-09-11 RX ADMIN — TETROFOSMIN 1 DOSE: 1.38 INJECTION, POWDER, LYOPHILIZED, FOR SOLUTION INTRAVENOUS at 10:56

## 2024-09-11 RX ADMIN — ASPIRIN 81 MG: 81 TABLET, COATED ORAL at 09:01

## 2024-09-11 RX ADMIN — MIDODRINE HYDROCHLORIDE 5 MG: 5 TABLET ORAL at 07:50

## 2024-09-11 RX ADMIN — REGADENOSON 0.4 MG: 0.08 INJECTION, SOLUTION INTRAVENOUS at 10:56

## 2024-09-11 RX ADMIN — EMPAGLIFLOZIN 25 MG: 25 TABLET, FILM COATED ORAL at 09:01

## 2024-09-11 RX ADMIN — SUCRALFATE 1 G: 1 TABLET ORAL at 07:50

## 2024-09-11 RX ADMIN — PANTOPRAZOLE SODIUM 40 MG: 40 TABLET, DELAYED RELEASE ORAL at 09:01

## 2024-09-11 RX ADMIN — GABAPENTIN 400 MG: 400 CAPSULE ORAL at 17:37

## 2024-09-11 RX ADMIN — GABAPENTIN 400 MG: 400 CAPSULE ORAL at 09:01

## 2024-09-11 RX ADMIN — METHOCARBAMOL 500 MG: 500 TABLET ORAL at 09:01

## 2024-09-11 RX ADMIN — GABAPENTIN 400 MG: 400 CAPSULE ORAL at 20:53

## 2024-09-11 RX ADMIN — SUCRALFATE 1 G: 1 TABLET ORAL at 20:53

## 2024-09-11 RX ADMIN — METHOCARBAMOL 500 MG: 500 TABLET ORAL at 20:53

## 2024-09-11 RX ADMIN — TETROFOSMIN 1 DOSE: 1.38 INJECTION, POWDER, LYOPHILIZED, FOR SOLUTION INTRAVENOUS at 10:22

## 2024-09-11 RX ADMIN — MIDODRINE HYDROCHLORIDE 5 MG: 5 TABLET ORAL at 17:37

## 2024-09-11 NOTE — CONSULTS
[facility] CARDIOLOGY CONSULT  [facility]      Cardiology assessment and plan      Chest pain  Normal troponin  Known cardiomyopathy  Left bundle branch block versus intraventricular conduction delay  CT angiogram of the chest with no pulmonary embolism no aortic dissection ascending aortic aneurysm measuring 4.6 cm  Left lower extremity cellulitis  Coronary artery disease with prior coronary bypass surgery in 2010  Known cardiomyopathy  Status post ICD  Normal proBNP  Prior echocardiogram in December 2023 with LV ejection fraction of 20 to 25%  Mild mitral regurgitation mild aortic insufficiency  History of ventricular tachycardia status post ablation therapy  Known ascending aortic aneurysm  Hypertension  Hyperlipidemia  Chronic renal insufficiency  Myocardial perfusion study with no significant reversible ischemia  Prior known myocardial infarction  Chest pain symptoms are somewhat atypical  Plan to manage conservatively medical therapy  Unless patient has recurrent or worsening symptoms no need for any further invasive workup  Diagnosis and treatment options reviewed and discussed with patient  Continued aggressive risk factor modification  Need for close monitoring and follow-up reviewed and discussed with patient  Tmax is 97.6 pulse is 78 respirations are 18 blood pressure is 116/60 sats are 95%  Normal troponin  Sodium is 138 potassium is 4.0 creatinine is 0.9 hemoglobin is 13.2  Current medications include aspirin 81 mg p.o. once a day patient is on Lasix 20 mg p.o. once a day Toprol-XL 50 mg p.o. once a day midodrine 5 mg p.o. 3 times a day consider DVT prophylaxis  Will add Ranexa 500 mg p.o. twice daily  Stable from cardiac standpoint  Follow-up in office            Subjective:     Encounter Date:09/10/2024      Patient ID: Deion Nicholas is a 83 y.o. male.    Chief Complaint: Chest Pain, LLE redness and swelling      HPI:  Deion Nicholas is a 83 y.o. male known to   [facility] and Dr. Arvizu with a past cardiac history of CAD s/p 5vCABG in 2010, ICM s/p Biotronic ICD in 2018, VT s/p ablation, and 5 cm ascending aortic aneurysm followed by Dr. Barros.  Last nuclear stress test 12/2023 was negative for ischemia.  Last 2D echo 12/2023 showed an EF 21-25% with grade 1 diastolic dysfunction and mild MR/AI.  PMH includes HTN and HLD.  Patient presented with c/o chest pain and cardiology was consulted for further evaluation and management.  He also presents with LLE redness and swelling and diagnosed with cellulitis.    Patient complains of intermittent midsternal chest pain radiating as numbness to the left arm and accompanied by shortness of breath with or without exertion.  He has not tried sublingual nitroglycerin for this.  This has occurred several times a week for months.  He is active lifting weights and using a pedal machine at home and denies experiencing chest pain during these activities.      Past Medical History:   Diagnosis Date    Aneurysm     Cardiomyopathy     ICD implantation    Cellulitis of left lower extremity 2010    recurrent    CHD (coronary heart disease)     S/P CABG & PCI    Dyslipidemia     Heart valve disease     History of ventricular tachycardia     Hypertension     Myocardial infarction     Inferior MI    Pinched nerve     L4-L5    Prostate cancer          Past Surgical History:   Procedure Laterality Date    ANGIOPLASTY  2000 04/1996 Stent: Palmaz- Huy stent/LAD 07/1996-RCA: 2000-Tetra stent Guidant to proximal RCA, mid to distal artery 2 sequential Guidant Tetra stents    APPENDECTOMY      CARDIAC ABLATION  April and May 2019    CARDIAC CATHETERIZATION  07/2017    1996 x2, Nov. 2000, 05/2010-cath 08/2015-no stents 2017    CARDIAC DEFIBRILLATOR PLACEMENT      COLONOSCOPY W/ POLYPECTOMY      Mult colonoscopy's for polyp resection     CORONARY ARTERY BYPASS GRAFT  05/2010    x5 vessel: LMA to diagonal, LAD, intermedius, obtuse marginal, RCA     CORONARY STENT PLACEMENT      ENDOSCOPY N/A 2019    Procedure: ESOPHAGOGASTRODUODENOSCOPY with dilitation Bougie 50, 54;  Surgeon: Roddy Coronel MD;  Location: [facility] ENDOSCOPY;  Service: Gastroenterology    INSERT / REPLACE / REMOVE PACEMAKER      KNEE OPEN LATERAL RELEASE Left     reduction    OTHER SURGICAL HISTORY  2018    ICD implantation    PROSTATE SURGERY      Robiotic surgery- Cyber Knife:         Social History     Socioeconomic History    Marital status:    Tobacco Use    Smoking status: Former     Current packs/day: 0.00     Average packs/day: 1 pack/day for 17.0 years (17.0 ttl pk-yrs)     Types: Cigarettes     Start date: 1985     Quit date: 2002     Years since quittin.1     Passive exposure: Past    Smokeless tobacco: Never   Vaping Use    Vaping status: Never Used   Substance and Sexual Activity    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    Drug use: Never    Sexual activity: Defer       Family History   Problem Relation Age of Onset    Pancreatic cancer Mother     Heart disease Father          Allergies   Allergen Reactions    Lovastatin Myalgia    Pravastatin Myalgia and Unknown (See Comments)     unknown    Simvastatin Unknown (See Comments) and Myalgia     unknown    Spironolactone Other (See Comments)     Gynecomastia        Current Medications:   Scheduled Meds:aspirin, 81 mg, Oral, Daily  cefTRIAXone, 1,000 mg, Intravenous, Q24H  empagliflozin, 25 mg, Oral, Daily  gabapentin, 400 mg, Oral, Q6H  methocarbamol, 500 mg, Oral, BID  [Held by provider] metoprolol succinate XL, 50 mg, Oral, Daily  midodrine, 5 mg, Oral, TID AC  pantoprazole, 40 mg, Oral, Daily  sucralfate, 1 g, Oral, 4x Daily      Continuous Infusions:     Review of Systems   Constitutional: Negative for chills, decreased appetite and malaise/fatigue.   HENT:  Negative for congestion and nosebleeds.    Eyes:  Negative for blurred vision and double vision.   Cardiovascular:   "Positive for chest pain. Negative for dyspnea on exertion, irregular heartbeat, leg swelling, near-syncope, orthopnea, palpitations, paroxysmal nocturnal dyspnea and syncope.   Respiratory:  Negative for cough and shortness of breath.    Hematologic/Lymphatic: Negative for adenopathy. Does not bruise/bleed easily.   Skin:  Negative for color change and rash.   Musculoskeletal:  Negative for back pain and joint pain.   Gastrointestinal:  Negative for bloating, abdominal pain, hematemesis and hematochezia.   Genitourinary:  Negative for flank pain and hematuria.   Neurological:  Negative for dizziness and focal weakness.   Psychiatric/Behavioral:  Negative for altered mental status. The patient does not have insomnia.      All other systems reviewed and are negative.       Objective:         /84 (BP Location: Left arm, Patient Position: Lying)   Pulse 80   Temp 97.6 °F (36.4 °C) (Oral)   Resp 18   Ht 185.4 cm (73\")   Wt 92.4 kg (203 lb 11.3 oz)   SpO2 96%   BMI 26.88 kg/m²       General: Appears younger than stated age, in NAD.  Neuro: AAOx3. No gross deficits.  HEENT: Sclerae clear, no xanthelasmas.  CV: S1S2, RRR. No murmurs or gallops.  Resp: Breathing is unlabored. Lungs CTA throughout.  GI: BS+. Abdomen soft and NTTP.  Ext: Pedal pulses are palpable.  LLE with erythema and 1+ pitting edema.  MS: moves all extremities, no weakness.  Skin: warm, dry.  Psych: calm and cooperative.            Lab Review:     Results from last 7 days   Lab Units 09/11/24  0337 09/10/24  1354   SODIUM mmol/L 138 138   POTASSIUM mmol/L 4.0 4.3   CHLORIDE mmol/L 105 104   CO2 mmol/L 22.4 23.5   BUN mg/dL 15 20   CREATININE mg/dL 0.97 1.16   GLUCOSE mg/dL 100* 118*   CALCIUM mg/dL 9.0 9.9     Results from last 7 days   Lab Units 09/11/24  0337 09/10/24  1354   HSTROP T ng/L 18 26*     Results from last 7 days   Lab Units 09/11/24  0337 09/10/24  1354   WBC 10*3/mm3 5.66 6.11   HEMOGLOBIN g/dL 13.2 13.3   HEMATOCRIT % 40.5 " "41.7   PLATELETS 10*3/mm3 157 189     Results from last 7 days   Lab Units 09/10/24  1354   INR  0.99   APTT seconds 30.5*               Invalid input(s): \"LDLCALC\"  Results from last 7 days   Lab Units 09/10/24  1354   PROBNP pg/mL 373.0           Recent Radiology:  Imaging Results (Most Recent)       Procedure Component Value Units Date/Time    CT Angiogram Chest Pulmonary Embolism [[id number]] Collected: 09/10/24 1618     Updated: 09/10/24 1626    Narrative:      CT ANGIOGRAM CHEST PULMONARY EMBOLISM    Date of Exam: 9/10/2024 4:00 PM EDT    Indication: chest pain.    Comparison: Chest CT 12/26/2023     Technique: Axial CT images were obtained of the chest after the uneventful intravenous administration of iodinated contrast utilizing pulmonary embolism protocol.  Sagittal and coronal reconstructions were performed.  Automated exposure control and   iterative reconstruction methods were used.      Findings:  Thyroid unremarkable. No supraclavicular adenopathy. Aortic atherosclerotic disease with stable aneurysmal dilation of the ascending thoracic aorta measuring up to 4.6 x 4.5 cm. Expected caliber of descending thoracic aorta. Severe coronary   atherosclerotic disease status post CABG. Cardiomegaly including dilated left ventricle up to 6.8 cm similar to prior. Single lead cardiac device in expected location. Small hiatal hernia. No pericardial effusion.    Mildly dilated main pulmonary artery measuring 3.5 cm stable from prior and can be seen with pulmonary arterial hypertension. No suspicious filling defect to suggest pulmonary embolism.    Layering stones versus vicarious excretion of contrast in the gallbladder. Low-density renal cysts not significantly changed from prior. Severely atrophic pancreas. No acute findings in the partially imaged upper abdomen. Gynecomastia. No axillary   adenopathy. Degenerative changes throughout the spine with superimposed findings consistent with diffuse idiopathic skeletal " hyperostosis. No visualized displaced fracture or aggressive bone lesion.    No suspicious mediastinal or hilar adenopathy. Trachea patent. No infectious consolidation, edema, effusion or pneumothorax. Stable parenchymal cystic change most significant in the right lower lobe which could reflect sequelae of a prior   infectious/inflammatory insult. No suspicious or enlarging pulmonary nodules since prior comparison.            Impression:      Impression:  1. No acute CT findings. No evidence of pulmonary embolism.  2. 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        Electronically Signed: Rubén Wyatt MD    9/10/2024 4:24 PM EDT    Workstation ID: [id number]    XR Chest 1 View [[id number]] Collected: 09/10/24 1449     Updated: 09/10/24 1452    Narrative:      XR CHEST 1 VW    Date of Exam: 9/10/2024 2:47 PM EDT    Indication: chest pain    Comparison: PA and lateral chest 7/12/2024.    Findings:  Stable mild to moderate cardiac enlargement. Median sternotomy CABG changes are present. Left chest wall pacemaker and lead appear unchanged. No acute airspace disease. No pleural effusion or pneumothorax. No acute osseous abnormality.      Impression:      Impression:  Stable cardiomegaly. No acute chest finding.      Electronically Signed: Grace Dyson MD    9/10/2024 2:50 PM EDT    Workstation ID: [id number]              ECHOCARDIOGRAM:    Results for orders placed during the hospital encounter of 12/26/23    Adult Transthoracic Echo Complete W/ Cont if Necessary Per Protocol    Interpretation Summary    Left ventricular systolic function is severely decreased. Left ventricular ejection fraction appears to be 21 - 25%.    The left ventricular cavity is severely dilated.    Left ventricular diastolic function is consistent with (grade I)  impaired relaxation.    The left atrial cavity is moderately dilated.    Abnormal mitral valve structure consistent with dilated annulus.    Moderate dilation of the ascending aorta is present.    Ascending aorta = 4.7 cm            Assessment:         Active Hospital Problems    Diagnosis  POA    Chest pain [R07.9]  Yes    Cellulitis of left lower leg [L03.116]  Unknown     1) Chest Pain  -High-sensitivity troponin negative x 2  -EKG nondiagnostic with LBBB  -CTA of the chest negative for aortic dissection or PE; ascending aortic aneurysm 4.6 x 4.5 cm.    2) LLE cellulitis  - per primary team    3) CAD s/p 5vCABG in 2010  - Last nuclear stress test 12/2023 was negative for ischemia.      4) ICM s/p Biotronic ICD in 2018  -   - Last 2D echo 12/2023 showed an EF 21-25% with grade 1 diastolic dysfunction and mild MR/AI.  - appears compensated    5) hx VT s/p ablation    6) hx 5 cm ascending aortic aneurysm  - Dr. Barros following    7) HTN    8) HLD    9) renal insufficiency           Plan:   Will proceed with Lexiscan nuclear stress test.  Case has been discussed with attending cardiologist.       Electronically signed by NIMCO Martinez, 09/11/24, 9:27 AM EDT.

## 2024-09-11 NOTE — H&P
"[facility] H&P    Patient Name: Deion Nicholas  : 1940  MRN: [id number]  Primary Care Physician: Provider, No Known  Date of admission: 9/10/2024     Patient Care Team:  Provider, No Known as PCP - General          Subjective   History Present Illness     Chief Complaint:   Chief Complaint   Patient presents with    Chest Pain     Chest pain, possible cellulitis to lower left leg, pt also has chest pain and some soa.          History of Present Illness  Obtained from admitting physician HPI on 9/10/2024:  83-year-old male presents with chest pain.  Patient states pain has been intermittent over the past few days.  He has had associated shortness of breath.  He states he has had increased swelling to his left lower leg.  He also accidentally scratched himself just below the left knee and was concerned that he may have developed a skin infection to that area.  He denies fever.  He denies any alleviating or exacerbating factors.     24:  Patient confirms the HPI noted above reporting that he has had several week history of a small area of swelling just below his patella on the left lower extremity which initially had some purulent drainage though this has stopped.  He is subsequent developed worsening erythema and edema down his left lower extremity which is painful and has become increasingly swollen and warm to the touch.  He notes that he has been in the hospital for similar symptoms multiple times and that generally when his \"leg acts up he starts having chest and left arm pain\".  Patient does report that he tries to keep himself generally active and has been performing his normal daily activities.  He is aware of plans for surgical intervention on thoracic aneurysm in the coming months.        ROS  Review of Systems   Constitutional: Negative.   HENT: Negative.     Eyes: Negative.    Cardiovascular: Negative.  Positive for chest pain and leg swelling.   Respiratory: Negative.     Skin: " Negative.    Musculoskeletal: Negative.    Gastrointestinal: Negative.    Genitourinary: Negative.    Neurological: Negative.    Psychiatric/Behavioral: Negative.           Personal History     Past Medical History:   Past Medical History:   Diagnosis Date    Aneurysm     Cardiomyopathy     ICD implantation    Cellulitis of left lower extremity 2010    recurrent    CHD (coronary heart disease)     S/P CABG & PCI    Dyslipidemia     Heart valve disease     History of ventricular tachycardia     Hypertension     Myocardial infarction     Inferior MI    Pinched nerve     L4-L5    Prostate cancer        Surgical History:      Past Surgical History:   Procedure Laterality Date    ANGIOPLASTY  2000 04/1996 Stent: Palmaz- Huy stent/LAD 07/1996-RCA: 2000-Tetra stent Guidant to proximal RCA, mid to distal artery 2 sequential Guidant Tetra stents    APPENDECTOMY      CARDIAC ABLATION  April and May 2019    CARDIAC CATHETERIZATION  07/2017    1996 x2, Nov. 2000, 05/2010-cath 08/2015-no stents 2017    CARDIAC DEFIBRILLATOR PLACEMENT      COLONOSCOPY W/ POLYPECTOMY      Mult colonoscopy's for polyp resection     CORONARY ARTERY BYPASS GRAFT  05/2010    x5 vessel: LMA to diagonal, LAD, intermedius, obtuse marginal, RCA    CORONARY STENT PLACEMENT      ENDOSCOPY N/A 6/24/2019    Procedure: ESOPHAGOGASTRODUODENOSCOPY with dilitation Bougie 50, 54;  Surgeon: Roddy Coronel MD;  Location: [facility] ENDOSCOPY;  Service: Gastroenterology    INSERT / REPLACE / REMOVE PACEMAKER      KNEE OPEN LATERAL RELEASE Left     reduction    OTHER SURGICAL HISTORY  01/2018    ICD implantation    PROSTATE SURGERY  2015    Robiotic surgery- Cyber Knife:           Family History: family history includes Heart disease in his father; Pancreatic cancer in his mother. Otherwise pertinent FHx was reviewed and unremarkable.     Social History:  reports that he quit smoking about 22 years ago. His smoking use included cigarettes. He started smoking  about 39 years ago. He has a 17 pack-year smoking history. He has been exposed to tobacco smoke. He has never used smokeless tobacco. He reports that he does not currently use alcohol. He reports that he does not use drugs.      Medications:  Prior to Admission medications    Medication Sig Start Date End Date Taking? Authorizing Provider   aspirin 81 MG EC tablet Take 1 tablet by mouth Daily.   Yes Cyndee Melgar MD   Cholecalciferol 10 MCG (400 UNIT) tablet Take 2 tablets by mouth Daily.   Yes Cyndee Melgar MD   dapagliflozin Propanediol (Farxiga) 10 MG tablet Take 10 mg by mouth Daily. [facility]   Yes Cyndee Melgar MD   ezetimibe (ZETIA) 10 MG tablet Take 1 tablet by mouth Every Evening.   Yes Cyndee Melgar MD   gabapentin (NEURONTIN) 800 MG tablet Take 1 tablet by mouth 3 (Three) Times a Day.   Yes Cyndee Melgar MD   methocarbamol (ROBAXIN) 500 MG tablet Take 1 tablet by mouth 3 (Three) Times a Day.   Yes Cyndee Melgar MD   metoprolol succinate XL (TOPROL-XL) 100 MG 24 hr tablet Take 0.5 tablets by mouth 2 (Two) Times a Day.   Yes Cyndee Melgar MD   midodrine (PROAMATINE) 5 MG tablet Take 1 tablet by mouth 3 (Three) Times a Day Before Meals.   Yes Cyndee Melgar MD   pantoprazole (PROTONIX) 40 MG EC tablet Take 1 tablet by mouth Daily.   Yes Cyndee Melgar MD   sucralfate (CARAFATE) 1 g tablet Take 1 tablet by mouth 4 (Four) Times a Day.   Yes Cyndee Melgar MD   Diclofenac Sodium (VOLTAREN) 1 % gel gel Apply 4 g topically to the appropriate area as directed 4 (Four) Times a Day As Needed.    Cyndee Melgar MD   furosemide (LASIX) 20 MG tablet Take 1 tablet by mouth Daily.    Cyndee Melgar MD   meclizine (ANTIVERT) 25 MG tablet Take 1 tablet by mouth Every 6 (Six) Hours As Needed for Dizziness.    Cyndee Melgar MD   nitroglycerin (NITROSTAT) 0.4 MG SL tablet Place 1 tablet under the tongue Every 5 (Five)  Minutes As Needed for Chest Pain.    Cyndee Melgar MD   Omega-3 Fatty Acids (fish oil) 1000 MG capsule capsule Take 2 capsules by mouth 2 (Two) Times a Day With Meals.    Cyndee Melgar MD   potassium chloride (MICRO-K) 10 MEQ CR capsule Take 1 capsule by mouth Daily.    Cyndee Melgar MD   sennosides-docusate (PERICOLACE) 8.6-50 MG per tablet Take 1 tablet by mouth 2 (Two) Times a Day.    Cyndee Melgar MD   mupirocin (BACTROBAN) 2 % ointment Apply 1 Application topically to the appropriate area as directed 3 (Three) Times a Day. Apply to leg(s)  9/11/24  Cyndee Melgar MD       Allergies:    Allergies   Allergen Reactions    Lovastatin Myalgia    Pravastatin Myalgia and Unknown (See Comments)     unknown    Simvastatin Unknown (See Comments) and Myalgia     unknown    Spironolactone Other (See Comments)     Gynecomastia        Objective   Objective     Vital Signs  Temp:  [97.4 °F (36.3 °C)-98 °F (36.7 °C)] 98 °F (36.7 °C)  Heart Rate:  [73-97] 73  Resp:  [16-18] 16  BP: (130-155)/(71-98) 130/79  SpO2:  [94 %-100 %] 96 %  on   ;   Device (Oxygen Therapy): room air  Body mass index is 26.88 kg/m².    Physical Exam  Physical Exam  Vitals reviewed.   Constitutional:       General: He is not in acute distress.     Appearance: Normal appearance. He is normal weight. He is not ill-appearing, toxic-appearing or diaphoretic.   HENT:      Head: Normocephalic.      Right Ear: External ear normal.      Left Ear: External ear normal.      Nose: Nose normal.      Mouth/Throat:      Mouth: Mucous membranes are moist.   Eyes:      Extraocular Movements: Extraocular movements intact.   Cardiovascular:      Rate and Rhythm: Normal rate and regular rhythm.      Pulses: Normal pulses.      Heart sounds: Normal heart sounds.   Pulmonary:      Effort: Pulmonary effort is normal.      Breath sounds: Normal breath sounds.   Abdominal:      General: Bowel sounds are normal.      Palpations: Abdomen is  soft.      Tenderness: There is no abdominal tenderness.   Musculoskeletal:         General: Normal range of motion.      Cervical back: Normal range of motion.      Right lower leg: No edema.      Left lower leg: Edema present.   Skin:     General: Skin is warm and dry.      Capillary Refill: Capillary refill takes less than 2 seconds.   Neurological:      General: No focal deficit present.      Mental Status: He is alert and oriented to person, place, and time.   Psychiatric:         Mood and Affect: Mood normal.         Behavior: Behavior normal.         Thought Content: Thought content normal.         Judgment: Judgment normal.     Results Review:  I have personally reviewed most recent cardiac tracings, lab results, and radiology images and interpretations and agree with findings, most notably: Troponin, proBNP, D-dimer, lipid panel, CBC, CMP, chest x-ray, CT PE protocol, EKG and venous duplex ultrasound of lower extremity.    Results from last 7 days   Lab Units 09/11/24  0337 09/10/24  1354   WBC 10*3/mm3 5.66 6.11   HEMOGLOBIN g/dL 13.2 13.3   HEMATOCRIT % 40.5 41.7   PLATELETS 10*3/mm3 157 189   INR   --  0.99     Results from last 7 days   Lab Units 09/11/24  0337 09/10/24  1354   SODIUM mmol/L 138 138   POTASSIUM mmol/L 4.0 4.3   CHLORIDE mmol/L 105 104   CO2 mmol/L 22.4 23.5   BUN mg/dL 15 20   CREATININE mg/dL 0.97 1.16   GLUCOSE mg/dL 100* 118*   CALCIUM mg/dL 9.0 9.9   HSTROP T ng/L 18 26*   PROBNP pg/mL  --  373.0     Estimated Creatinine Clearance: 78.1 mL/min (by C-G formula based on SCr of 0.97 mg/dL).  Brief Urine Lab Results       None            Microbiology Results (last 10 days)       ** No results found for the last 240 hours. **            ECG/EMG Results (most recent)       Procedure Component Value Units Date/Time    Telemetry Scan [[id number]] Resulted: 09/10/24     Updated: 09/10/24 2058    ECG 12 Lead Chest Pain [[id number]] Collected: 09/10/24 1334     Updated: 09/11/24 0528     QT  Interval 365 ms      QTC Interval 464 ms     Narrative:      HEART RATE=97  bpm  RR Ooucnnxx=185  ms  ME Kldwiznt=552  ms  P Horizontal Axis=-4  deg  P Front Axis=51  deg  QRSD Mvvyzghc=945  ms  QT Tibtaleg=011  ms  URoH=993  ms  QRS Axis=-21  deg  T Wave Axis=97  deg  - ABNORMAL ECG -  Sinus rhythm  Probable  left atrial enlargement  IVCD, consider LBBB  Electronically Signed By: Montana Faustin (Vinny) 2024-09-11 05:27:54  Date and Time of Study:2024-09-10 13:34:42            Results for orders placed during the hospital encounter of 09/10/24    Duplex Venous Lower Extremity - Left    Interpretation Summary    Normal left lower extremity venous duplex scan.      Results for orders placed during the hospital encounter of 12/26/23    Adult Transthoracic Echo Complete W/ Cont if Necessary Per Protocol    Interpretation Summary    Left ventricular systolic function is severely decreased. Left ventricular ejection fraction appears to be 21 - 25%.    The left ventricular cavity is severely dilated.    Left ventricular diastolic function is consistent with (grade I) impaired relaxation.    The left atrial cavity is moderately dilated.    Abnormal mitral valve structure consistent with dilated annulus.    Moderate dilation of the ascending aorta is present.    Ascending aorta = 4.7 cm      CT Angiogram Chest Pulmonary Embolism    Result Date: 9/10/2024  Impression: 1. No acute CT findings. No evidence of pulmonary embolism. 2. 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 Electronically Signed: Rubén Wyatt MD  9/10/2024 4:24 PM EDT  Workstation ID: [id number]    XR Chest 1 View    Result Date: 9/10/2024  Impression: Stable cardiomegaly. No acute chest finding. Electronically Signed: Grace Dyson MD  9/10/2024 2:50 PM EDT  Workstation ID: [id number]        Estimated Creatinine Clearance: 78.1 mL/min (by C-G formula based on SCr of 0.97 mg/dL).    Assessment & Plan   Assessment/Plan       Active Hospital Problems    Diagnosis  POA    Chest pain [R07.9]  Yes    Cellulitis of left lower leg [L03.116]  Unknown      Resolved Hospital Problems   No resolved problems to display.     Chest pain with a history of CAD and heart failure with reduced ejection fraction  -Previous echocardiogram showed an EF of 21-25% with grade 1 diastolic dysfunction        Lab Results   Component Value Date     TROPONINT 18 09/11/2024     TROPONINT 26 (H) 09/10/2024     TROPONINT 24 (H) 07/12/2024   -proBNP: 373.0  -D-dimer: 1.05  -Lipid panel showed a total cholesterol of 160 with an LDL of 95 and an HDL of 47  -Chest X-ray: Stable cardiomegaly without acute chest finding  -CT PE protocol showed no acute findings including pulmonary embolism with chronic findings similar to previous studies including aneurysmal dilation of the ascending thoracic aorta  -EKG: Showed sinus rhythm at 97 without obvious acute changes with an IVCD and a QTc of 464 ms  -Cardiology consulted  -Stress Test showed large area of severe intensity mostly fixed perfusion defect involving the basal and mid inferior, inferolateral, inferoseptal and apical wall without significant reversible ischemia with an EF calculated at 26% consistent with an intermediate risk study  -Telemetry  -NPO  -Continue aspirin, beta-blocker, midodrine, Farxiga and Lasix with close monitoring of blood pressure and kidney function  -Monitor I's and O's and daily weights  -Recommend continued follow-up with cardiothoracic surgery for continued care of thoracic aortic aneurysm as discussed     Left lower extremity cellulitis  -WBCs: 6.11, 5.66  -Venous duplex ultrasound left lower extremity negative  -Rocephin started in the ED, continue  -Patient does note significant difficulty with mobility beyond his baseline which is already diminished and  typically uses mobility aids including walker  -PT consulted  -UR recommends inpatient admission            VTE Prophylaxis - Active VTE Prophylaxis  Mechanical:        Start        09/10/24 1737  Maintain Sequential Compression Device  Continuous                          Select Pharmacologic VTE Prophylaxis if Desired & Appropriate      CODE STATUS:    Code Status and Medical Interventions: CPR (Attempt to Resuscitate); Full Support   Ordered at: 09/11/24 0654     Code Status (Patient has no pulse and is not breathing):    CPR (Attempt to Resuscitate)     Medical Interventions (Patient has pulse or is breathing):    Full Support       This patient has been examined wearing personal protective equipment.     I discussed the patient's findings and my recommendations with patient and nursing staff.      Signature:Electronically signed by Marcin Oocnnell PA-C, 09/11/24, 3:48 PM EDT.

## 2024-09-11 NOTE — CASE MANAGEMENT/SOCIAL WORK
Discharge Planning Assessment   Daniel     Patient Name: Deion Nicholas  MRN: [id number]  Today's Date: 9/11/2024    Admit Date: 9/10/2024    Plan: From home alone   Discharge Needs Assessment       Row Name 09/11/24 1318       Living Environment    People in Home alone    Current Living Arrangements home    Potentially Unsafe Housing Conditions none    In the past 12 months has the electric, gas, oil, or water company threatened to shut off services in your home? No    Primary Care Provided by self    Provides Primary Care For no one    Family Caregiver if Needed child(tai), adult    Family Caregiver Names Son Diallo    Quality of Family Relationships helpful;involved;supportive    Able to Return to Prior Arrangements yes       Resource/Environmental Concerns    Resource/Environmental Concerns none    Transportation Concerns none       Transportation Needs    In the past 12 months, has lack of transportation kept you from medical appointments or from getting medications? no    In the past 12 months, has lack of transportation kept you from meetings, work, or from getting things needed for daily living? No       Food Insecurity    Within the past 12 months, you worried that your food would run out before you got the money to buy more. Never true    Within the past 12 months, the food you bought just didn't last and you didn't have money to get more. Never true       Transition Planning    Patient/Family Anticipates Transition to home    Patient/Family Anticipated Services at Transition none    Transportation Anticipated car, drives self;family or friend will provide       Discharge Needs Assessment    Readmission Within the Last 30 Days no previous admission in last 30 days    Equipment Currently Used at Home cane, straight;walker, standard    Concerns to be Addressed no discharge needs identified;denies needs/concerns at this time    Anticipated Changes Related to Illness none    Equipment Needed After Discharge  none    Provided Post Acute Provider List? N/A    Provided Post Acute Provider Quality & Resource List? N/A                   Discharge Plan       Row Name 09/11/24 1320       Plan    Plan From home alone    Patient/Family in Agreement with Plan yes    Provided Post Acute Provider List? N/A    Provided Post Acute Provider Quality & Resource List? N/A    Plan Comments CM met with patient at the bedside to review discharge planning. Patient lives at home alone, is IADLs and drives. Son, Diallo, to transport the patient at d/c. [facility] insurance, and pharmacy. Patient accepts [facility]. Patient denies any difficulty affording food, utilities, or medications. Patient is not current with any HHC/OPPT/OT services. DC Barriers: Cardiology following, IV abx, cardiac monitoring                  Continued Care and Services - Admitted Since 9/10/2024    No active coordination exists for this encounter.       Expected Discharge Date and Time       Expected Discharge Date Expected Discharge Time    Sep 13, 2024            Demographic Summary       Row Name 09/11/24 1317       General Information    Admission Type observation    Arrived From emergency department    Required Notices Provided Observation Status Notice    Referral Source admission list    Reason for Consult discharge planning    Preferred Language English       Contact Information    Permission Granted to Share Info With     Contact Information Obtained for                    Functional Status       Row Name 09/11/24 1317       Functional Status    Usual Activity Tolerance good    Current Activity Tolerance moderate       Functional Status, IADL    Medications independent    Meal Preparation independent    Housekeeping independent    Laundry independent    Shopping independent       Mental Status    General Appearance WDL WDL       Mental Status Summary    Recent Changes in Mental Status/Cognitive Functioning no  changes             Lesli Hannah RN     [phone]  [email or web address]

## 2024-09-12 PROCEDURE — 25010000002 CLINDAMYCIN PER 300 MG: Performed by: STUDENT IN AN ORGANIZED HEALTH CARE EDUCATION/TRAINING PROGRAM

## 2024-09-12 PROCEDURE — 99232 SBSQ HOSP IP/OBS MODERATE 35: CPT | Performed by: INTERNAL MEDICINE

## 2024-09-12 PROCEDURE — 97162 PT EVAL MOD COMPLEX 30 MIN: CPT

## 2024-09-12 PROCEDURE — 25010000002 CEFTRIAXONE PER 250 MG: Performed by: STUDENT IN AN ORGANIZED HEALTH CARE EDUCATION/TRAINING PROGRAM

## 2024-09-12 PROCEDURE — 97166 OT EVAL MOD COMPLEX 45 MIN: CPT

## 2024-09-12 RX ADMIN — METOPROLOL SUCCINATE 50 MG: 50 TABLET, EXTENDED RELEASE ORAL at 08:12

## 2024-09-12 RX ADMIN — METHOCARBAMOL 500 MG: 500 TABLET ORAL at 21:54

## 2024-09-12 RX ADMIN — MIDODRINE HYDROCHLORIDE 5 MG: 5 TABLET ORAL at 11:39

## 2024-09-12 RX ADMIN — METHOCARBAMOL 500 MG: 500 TABLET ORAL at 08:12

## 2024-09-12 RX ADMIN — CEFTRIAXONE 2000 MG: 2 INJECTION, POWDER, FOR SOLUTION INTRAMUSCULAR; INTRAVENOUS at 17:36

## 2024-09-12 RX ADMIN — PANTOPRAZOLE SODIUM 40 MG: 40 TABLET, DELAYED RELEASE ORAL at 08:12

## 2024-09-12 RX ADMIN — SUCRALFATE 1 G: 1 TABLET ORAL at 08:11

## 2024-09-12 RX ADMIN — CLINDAMYCIN PHOSPHATE 600 MG: 600 INJECTION, SOLUTION INTRAVENOUS at 08:11

## 2024-09-12 RX ADMIN — SUCRALFATE 1 G: 1 TABLET ORAL at 21:54

## 2024-09-12 RX ADMIN — GABAPENTIN 400 MG: 400 CAPSULE ORAL at 09:45

## 2024-09-12 RX ADMIN — MIDODRINE HYDROCHLORIDE 5 MG: 5 TABLET ORAL at 08:12

## 2024-09-12 RX ADMIN — GABAPENTIN 400 MG: 400 CAPSULE ORAL at 03:49

## 2024-09-12 RX ADMIN — RANOLAZINE 500 MG: 500 TABLET, EXTENDED RELEASE ORAL at 08:12

## 2024-09-12 RX ADMIN — FUROSEMIDE 20 MG: 20 TABLET ORAL at 08:11

## 2024-09-12 RX ADMIN — EMPAGLIFLOZIN 25 MG: 25 TABLET, FILM COATED ORAL at 08:12

## 2024-09-12 RX ADMIN — CLINDAMYCIN PHOSPHATE 600 MG: 600 INJECTION, SOLUTION INTRAVENOUS at 16:44

## 2024-09-12 RX ADMIN — SUCRALFATE 1 G: 1 TABLET ORAL at 17:36

## 2024-09-12 RX ADMIN — CLINDAMYCIN PHOSPHATE 600 MG: 600 INJECTION, SOLUTION INTRAVENOUS at 00:56

## 2024-09-12 RX ADMIN — GABAPENTIN 400 MG: 400 CAPSULE ORAL at 21:54

## 2024-09-12 RX ADMIN — GABAPENTIN 400 MG: 400 CAPSULE ORAL at 16:44

## 2024-09-12 RX ADMIN — MIDODRINE HYDROCHLORIDE 5 MG: 5 TABLET ORAL at 17:36

## 2024-09-12 RX ADMIN — SUCRALFATE 1 G: 1 TABLET ORAL at 11:39

## 2024-09-12 RX ADMIN — ASPIRIN 81 MG: 81 TABLET, COATED ORAL at 08:12

## 2024-09-12 RX ADMIN — RANOLAZINE 500 MG: 500 TABLET, EXTENDED RELEASE ORAL at 21:54

## 2024-09-12 NOTE — CONSULTS
[facility]    Hospitalist History and Physical     Deion Nicholas : 1940 MRN:[id number] LOS:0 ROOM: 109/1     Chief Complaint: chest pain    Assessment / Plan     #Chest pain  #CAD    - s/p CABG    - HS troponin 26 > 18    - proBNP 373    - aspirin daily    - stress myoview on 24 consistent with intermediate risk study    - s/p icd    - cardiology following, plans to manage conservatively, unless patient has recurrent or worsening symptoms no need for any further invasive workup per cardiology    - Continue aspirin, lasix, Toprol, midodrine    - Ranexa 500mg Po BID added by cardiology    #LLE cellulitis    - duplex negative    - Rocephin 2g IV daily    - Clindamycin 600mg IV TID    - pt/ot    #HFrEF    - chronic, stable    - proBNP 373    - continue home Lasix and home meds    - echo on 23 shows ef 21-25%    #h/o cardiomyoapthy    - s/p icd    - stable, monitor    - cardiology following    #Ascending aortic aneurysm    - noted on echo dated 23 at 4.7cm    - CTA shows at 4.6 x 4.5 cm    #HLD    - hx of statin allergy    #HTN    - continue Toprol    - is on PRN midodrine          Code Status (Patient has no pulse and is not breathing): CPR (Attempt to Resuscitate)  Medical Interventions (Patient has pulse or is breathing): Full Support         VTE Prophylaxis: SCDs  Mechanical VTE prophylaxis orders are present.         History of Present illness     Deion Nicholas is a 83 y.o. male with PMHx of HTN, HLD, AAA, h/o cardiomyopathy s/p ICD, HFrEF, CAD s/p CABG who presented to [facility] on 9/10 due to chest pain.  Admitted to intermittent left sided chest pain over the past several days complicated by dyspnea, and increased erythema over the left leg after he scratched it and broke skin.  States his left leg is the one they took veins out of for CABG so gets frequently infected.  Presented to [facility].      Patient seen in the ER and admitted to ER obs unit.  Stress myoview was  determined to be intermediate risk, cardiology assessed patient and plans to medically manage.  He was started on abx for cellulitis.  Hospitalist consulted as patient meets inpatient criteria.         Patient was seen and examined on 09/11/24 at 20:13 EDT .    Subjective / Review of systems     Review of Systems   12 point ROS reviewed and negative except as mentioned above      Past Medical/Surgical/Social/Family History & Allergies     Past Medical History:   Diagnosis Date    Aneurysm     Cardiomyopathy     ICD implantation    Cellulitis of left lower extremity 2010    recurrent    CHD (coronary heart disease)     S/P CABG & PCI    Dyslipidemia     Heart valve disease     History of ventricular tachycardia     Hypertension     Myocardial infarction     Inferior MI    Pinched nerve     L4-L5    Prostate cancer       Past Surgical History:   Procedure Laterality Date    ANGIOPLASTY  2000 04/1996 Stent: Palmaz- Huy stent/LAD 07/1996-RCA: 2000-Tetra stent Guidant to proximal RCA, mid to distal artery 2 sequential Guidant Tetra stents    APPENDECTOMY      CARDIAC ABLATION  April and May 2019    CARDIAC CATHETERIZATION  07/2017    1996 x2, Nov. 2000, 05/2010-cath 08/2015-no stents 2017    CARDIAC DEFIBRILLATOR PLACEMENT      COLONOSCOPY W/ POLYPECTOMY      Mult colonoscopy's for polyp resection     CORONARY ARTERY BYPASS GRAFT  05/2010    x5 vessel: LMA to diagonal, LAD, intermedius, obtuse marginal, RCA    CORONARY STENT PLACEMENT      ENDOSCOPY N/A 6/24/2019    Procedure: ESOPHAGOGASTRODUODENOSCOPY with dilitation Bougie 50, 54;  Surgeon: Roddy Coronel MD;  Location: [facility];  Service: Gastroenterology    INSERT / REPLACE / REMOVE PACEMAKER      KNEE OPEN LATERAL RELEASE Left     reduction    OTHER SURGICAL HISTORY  01/2018    ICD implantation    PROSTATE SURGERY  2015    Robiotic surgery- Cyber Knife:      Social History     Socioeconomic History    Marital status:    Tobacco Use     Smoking status: Former     Current packs/day: 0.00     Average packs/day: 1 pack/day for 17.0 years (17.0 ttl pk-yrs)     Types: Cigarettes     Start date: 1985     Quit date: 2002     Years since quittin.1     Passive exposure: Past    Smokeless tobacco: Never   Vaping Use    Vaping status: Never Used   Substance and Sexual Activity    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    Drug use: Never    Sexual activity: Defer      Family History   Problem Relation Age of Onset    Pancreatic cancer Mother     Heart disease Father       Allergies   Allergen Reactions    Lovastatin Myalgia    Pravastatin Myalgia and Unknown (See Comments)     unknown    Simvastatin Unknown (See Comments) and Myalgia     unknown    Spironolactone Other (See Comments)     Gynecomastia       Social Determinants of Health     Tobacco Use: Medium Risk (9/10/2024)    Patient History     Smoking Tobacco Use: Former     Smokeless Tobacco Use: Never     Passive Exposure: Past   Alcohol Use: Not At Risk (9/10/2024)    AUDIT-C     Frequency of Alcohol Consumption: Never     Average Number of Drinks: Patient does not drink     Frequency of Binge Drinking: Never   Financial Resource Strain: Not on file   Food Insecurity: No Food Insecurity (2024)    Hunger Vital Sign     Worried About Running Out of Food in the Last Year: Never true     Ran Out of Food in the Last Year: Never true   Transportation Needs: No Transportation Needs (2024)    PRAPARE - Transportation     Lack of Transportation (Medical): No     Lack of Transportation (Non-Medical): No   Physical Activity: Not on file   Stress: Not on file   Social Connections: Unknown (10/7/2023)    Family and Community Support     Help with Day-to-Day Activities: Not on file     Lonely or Isolated: Not on file   Interpersonal Safety: Not At Risk (9/10/2024)    Abuse Screen     Unsafe at Home or Work/School: no     Feels Threatened by Someone?: no     Does Anyone Keep  You from Contacting Others or Doint Things Outside the Home?: no     Physical Sign of Abuse Present: no   Depression: Not at risk (10/27/2022)    PHQ-2     PHQ-2 Score: 0   Housing Stability: Not At Risk (9/11/2024)    Housing Stability     Current Living Arrangements: home     Potentially Unsafe Housing Conditions: none   Utilities: Not At Risk (9/11/2024)    [facility] Utilities     Threatened with loss of utilities: No   Health Literacy: Unknown (9/11/2024)    Education     Help with school or training?: Not on file     Preferred Language: English   Employment: Unknown (10/7/2023)    Employment     Do you want help finding or keeping work or a job?: Not on file   Disabilities: Not At Risk (9/10/2024)    Disabilities     Concentrating, Remembering, or Making Decisions Difficulty: no     Doing Errands Independently Difficulty: no        Home Medications     Prior to Admission medications    Medication Sig Start Date End Date Taking? Authorizing Provider   aspirin 81 MG EC tablet Take 1 tablet by mouth Daily.   Yes Cyndee Melgar MD   Cholecalciferol 10 MCG (400 UNIT) tablet Take 2 tablets by mouth Daily.   Yes Cyndee Melgar MD   dapagliflozin Propanediol (Farxiga) 10 MG tablet Take 10 mg by mouth Daily. [facility]   Yes Cyndee Melgar MD   ezetimibe (ZETIA) 10 MG tablet Take 1 tablet by mouth Every Evening.   Yes Cyndee Melgar MD   gabapentin (NEURONTIN) 800 MG tablet Take 1 tablet by mouth 3 (Three) Times a Day.   Yes Cyndee Melgar MD   methocarbamol (ROBAXIN) 500 MG tablet Take 1 tablet by mouth 3 (Three) Times a Day.   Yes Cyndee Melgar MD   metoprolol succinate XL (TOPROL-XL) 100 MG 24 hr tablet Take 0.5 tablets by mouth 2 (Two) Times a Day.   Yes Cyndee Melgar MD   midodrine (PROAMATINE) 5 MG tablet Take 1 tablet by mouth 3 (Three) Times a Day Before Meals.   Yes Cyndee Melgar MD   pantoprazole (PROTONIX) 40 MG EC tablet Take 1 tablet by mouth Daily.    Yes Cyndee Melgar MD   sucralfate (CARAFATE) 1 g tablet Take 1 tablet by mouth 4 (Four) Times a Day.   Yes Cyndee Melgar MD   Diclofenac Sodium (VOLTAREN) 1 % gel gel Apply 4 g topically to the appropriate area as directed 4 (Four) Times a Day As Needed.    Cyndee Melgar MD   furosemide (LASIX) 20 MG tablet Take 1 tablet by mouth Daily.    Cyndee Melgar MD   meclizine (ANTIVERT) 25 MG tablet Take 1 tablet by mouth Every 6 (Six) Hours As Needed for Dizziness.    Cyndee Melgar MD   nitroglycerin (NITROSTAT) 0.4 MG SL tablet Place 1 tablet under the tongue Every 5 (Five) Minutes As Needed for Chest Pain.    Cyndee Melgar MD   Omega-3 Fatty Acids (fish oil) 1000 MG capsule capsule Take 2 capsules by mouth 2 (Two) Times a Day With Meals.    Cyndee Melgar MD   potassium chloride (MICRO-K) 10 MEQ CR capsule Take 1 capsule by mouth Daily.    Cyndee Melgar MD   sennosides-docusate (PERICOLACE) 8.6-50 MG per tablet Take 1 tablet by mouth 2 (Two) Times a Day.    Cyndee Melgar MD   mupirocin (BACTROBAN) 2 % ointment Apply 1 Application topically to the appropriate area as directed 3 (Three) Times a Day. Apply to leg(s)  9/11/24  Cyndee Melgar MD        Objective / Physical Exam     Vital signs:  Temp: 97.6 °F (36.4 °C)  BP: 117/63  Heart Rate: 78  Resp: 18  SpO2: 95 %  Weight: 92.4 kg (203 lb 11.3 oz)    Admission Weight: Weight: 93 kg (205 lb)    Physical Exam  Constitutional:       General: He is not in acute distress.     Appearance: Normal appearance. He is not toxic-appearing.   HENT:      Head: Normocephalic and atraumatic.      Nose: Nose normal. No congestion.      Mouth/Throat:      Pharynx: Oropharynx is clear. No oropharyngeal exudate.   Eyes:      General: No scleral icterus.  Cardiovascular:      Rate and Rhythm: Normal rate and regular rhythm.      Heart sounds: No murmur heard.     No friction rub. No gallop.   Pulmonary:      Effort:  "No respiratory distress.      Breath sounds: No wheezing or rales.   Abdominal:      General: There is no distension.      Tenderness: There is no abdominal tenderness. There is no guarding.   Musculoskeletal:         General: No swelling, deformity or signs of injury.      Cervical back: Normal range of motion. No rigidity.      Comments: Left leg is warm and erythematous   Skin:     Coloration: Skin is not jaundiced.      Findings: Erythema present. No bruising or lesion.   Neurological:      General: No focal deficit present.      Mental Status: He is alert and oriented to person, place, and time.            Labs     Results from last 7 days   Lab Units 09/11/24  0337 09/10/24  1354   WBC 10*3/mm3 5.66 6.11   HEMOGLOBIN g/dL 13.2 13.3   HEMATOCRIT % 40.5 41.7   PLATELETS 10*3/mm3 157 189            Invalid input(s): \"BILI TOTAL\"   Results from last 7 days   Lab Units 09/10/24  1354   PROTIME Seconds 10.8   INR  0.99   APTT seconds 30.5*      Results from last 7 days   Lab Units 09/11/24  0337 09/10/24  1354   SODIUM mmol/L 138 138   POTASSIUM mmol/L 4.0 4.3   CHLORIDE mmol/L 105 104   CO2 mmol/L 22.4 23.5   BUN mg/dL 15 20   CREATININE mg/dL 0.97 1.16   GLUCOSE mg/dL 100* 118*        Imaging     Stress Test With Myocardial Perfusion One Day    Result Date: 9/11/2024    [facility] myocardial perfusion study shows a large area of severe intensity mostly fixed perfusion defect involving the basal and mid inferior inferolateral wall inferoseptal wall and also apical wall with no significant reversible ischemia   Left ventricular ejection fraction is moderately reduced (Calculated EF = 26%).   Abnormal LV wall motion consistent with severe hypokinesis of the inferior and septal wall.   Impressions are consistent with an intermediate risk study.       Chest X ray: CXR without acute cardiopulmonary process.     EKG: EKG NSR without gross ischemia    Current Medications     Scheduled Meds:  aspirin, 81 mg, Oral, " Daily  cefTRIAXone, 1,000 mg, Intravenous, Q24H  empagliflozin, 25 mg, Oral, Daily  furosemide, 20 mg, Oral, Daily  gabapentin, 400 mg, Oral, Q6H  methocarbamol, 500 mg, Oral, BID  metoprolol succinate XL, 50 mg, Oral, Daily  midodrine, 5 mg, Oral, TID AC  pantoprazole, 40 mg, Oral, Daily  ranolazine, 500 mg, Oral, Q12H  sucralfate, 1 g, Oral, 4x Daily         Continuous Infusions:          Preet Richardson [facility]  09/11/24   20:13 EDT

## 2024-09-12 NOTE — PROGRESS NOTES
Cardiology [facility]      Patient Care Team:  Provider, No Known as PCP - General           Cardiology assessment and plan        Chest pain  Normal troponin  Known cardiomyopathy  Left bundle branch block versus intraventricular conduction delay  CT angiogram of the chest with no pulmonary embolism no aortic dissection ascending aortic aneurysm measuring 4.6 cm  Left lower extremity cellulitis  Coronary artery disease with prior coronary bypass surgery in 2010  Known cardiomyopathy  Status post ICD  Normal proBNP  Prior echocardiogram in December 2023 with LV ejection fraction of 20 to 25%  Mild mitral regurgitation mild aortic insufficiency  History of ventricular tachycardia status post ablation therapy  Known ascending aortic aneurysm  Hypertension  Hyperlipidemia  Chronic renal insufficiency  Myocardial perfusion study with no significant reversible ischemia  Prior known myocardial infarction  Chest pain symptoms are somewhat atypical  Plan to manage conservatively medical therapy  Unless patient has recurrent or worsening symptoms no need for any further invasive workup  Diagnosis and treatment options reviewed and discussed with patient  Continued aggressive risk factor modification  Need for close monitoring and follow-up reviewed and discussed with patient  Denies any new complaints  Denies any chest pain  Tmax is 97.6 pulse is 54 respirations are 18 blood pressure is 120/76 sats are 94%  Normal troponin  Sodium is 138 potassium is 4.0 creatinine 0.9 hemoglobin is 13 point  Current medications include aspirin 81 mg p.o. once a day patient is on Lasix 20 mg p.o. once a day Toprol-XL 50 mg p.o. once a day midodrine 5 mg p.o. 3 times a day consider DVT prophylaxis  Will add Ranexa 500 mg p.o. twice daily  Stable from cardiac standpoint  Follow-up in office                   Chief Complaint: Chest pain left arm pain and left leg cellulitis    Subjective no new complaints denies any further chest pain no arm  "pain    Interval History: Feels better wants to go home    History taken from: patient    Review of Systems:  Review of Systems   Constitutional: Negative for chills, decreased appetite and malaise/fatigue.   HENT:  Negative for congestion and nosebleeds.    Eyes:  Negative for blurred vision and double vision.   Cardiovascular:  Negative for chest pain, dyspnea on exertion, irregular heartbeat, leg swelling, near-syncope, orthopnea, palpitations and paroxysmal nocturnal dyspnea.   Respiratory:  Negative for cough and shortness of breath.    Hematologic/Lymphatic: Negative for adenopathy. Does not bruise/bleed easily.   Skin:  Negative for rash.   Musculoskeletal:  Negative for back pain and joint pain.   Gastrointestinal:  Negative for bloating, abdominal pain, hematemesis and hematochezia.   Genitourinary:  Negative for flank pain and hematuria.   Neurological:  Negative for dizziness and focal weakness.   Psychiatric/Behavioral:  Negative for altered mental status. The patient does not have insomnia.      Wrapping of the left leg lower extremity below knee  Objective    Vital Signs  Visit Vitals  /77 (BP Location: Right arm)   Pulse 54   Temp 97.6 °F (36.4 °C) (Oral)   Resp 18   Ht 185.4 cm (73\")   Wt 92.7 kg (204 lb 5.9 oz)   SpO2 93%   BMI 26.96 kg/m²     Oxygen Therapy  SpO2: 93 %  Pulse Oximetry Type: Continuous  Device (Oxygen Therapy): room air  Flowsheet Rows      Flowsheet Row First Filed Value   Admission Height 185.4 cm (73\") Documented at 09/10/2024 1329   Admission Weight 93 kg (205 lb) Documented at 09/10/2024 1329          Intake & Output (last 3 days)         09/09 0701  09/10 0700 09/10 0701  09/11 0700 09/11 0701  09/12 0700 09/12 0701  09/13 0700    P.O.   480 1080    Total Intake(mL/kg)   480 (5.2) 1080 (11.7)    Urine (mL/kg/hr)  1200 900 (0.4) 650 (0.7)    Stool   0     Total Output  1200 900 650    Net  -1200 -420 +430            Urine Unmeasured Occurrence   1 x 1 x    Stool " Unmeasured Occurrence   2 x           Lines, Drains & Airways       Active LDAs       Name Placement date Placement time Site Days    Peripheral IV 09/10/24 1400 Right Antecubital 09/10/24  1400  Antecubital  2                    Physical Exam:  Constitutional:       Appearance: Well-developed.   Eyes:      Conjunctiva/sclera: Conjunctivae normal.      Pupils: Pupils are equal, round, and reactive to light.   HENT:      Head: Normocephalic and atraumatic.   Neck:      Thyroid: No thyromegaly.   Pulmonary:      Effort: Pulmonary effort is normal.      Breath sounds: Normal breath sounds.   Cardiovascular:      Normal rate. Regular rhythm.   Pulses:     Intact distal pulses.   Edema:     Peripheral edema absent.   Abdominal:      General: Bowel sounds are normal.      Palpations: Abdomen is soft.   Musculoskeletal:      Cervical back: Normal range of motion and neck supple. Skin:     General: Skin is warm.   Neurological:      Mental Status: Alert and oriented to person, place, and time.         Results Review:     I reviewed the patient's new clinical results.    Lab Results (last 24 hours)       ** No results found for the last 24 hours. **          Results for orders placed during the hospital encounter of 12/26/23    Adult Transthoracic Echo Complete W/ Cont if Necessary Per Protocol    Interpretation Summary    Left ventricular systolic function is severely decreased. Left ventricular ejection fraction appears to be 21 - 25%.    The left ventricular cavity is severely dilated.    Left ventricular diastolic function is consistent with (grade I) impaired relaxation.    The left atrial cavity is moderately dilated.    Abnormal mitral valve structure consistent with dilated annulus.    Moderate dilation of the ascending aorta is present.    Ascending aorta = 4.7 cm        Medication Review:   I have reviewed the patient's current medication list  Scheduled Meds:aspirin, 81 mg, Oral, Daily  cefTRIAXone (ROCEPHIN)  2,000 mg in sodium chloride 0.9 % 100 mL MBP, 2,000 mg, Intravenous, Q24H  clindamycin, 600 mg, Intravenous, Q8H  empagliflozin, 25 mg, Oral, Daily  furosemide, 20 mg, Oral, Daily  gabapentin, 400 mg, Oral, Q6H  methocarbamol, 500 mg, Oral, BID  metoprolol succinate XL, 50 mg, Oral, Daily  midodrine, 5 mg, Oral, TID AC  pantoprazole, 40 mg, Oral, Daily  ranolazine, 500 mg, Oral, Q12H  sucralfate, 1 g, Oral, 4x Daily      Continuous Infusions:   PRN Meds:.  senna-docusate sodium **AND** polyethylene glycol **AND** bisacodyl **AND** bisacodyl    melatonin    Morphine **AND** naloxone    nitroglycerin    nitroglycerin    ondansetron    [COMPLETED] Insert Peripheral IV **AND** sodium chloride    ECG/EMG Results (last 24 hours)       Procedure Component Value Units Date/Time    Telemetry Scan [[id number]] Resulted: 09/10/24     Updated: 09/12/24 0921    Telemetry Scan [[id number]] Resulted: 09/10/24     Updated: 09/12/24 0921            Imaging Results (Last 24 Hours)       ** No results found for the last 24 hours. **          No results found for this or any previous visit.     Results for orders placed during the hospital encounter of 12/26/23    Adult Transthoracic Echo Complete W/ Cont if Necessary Per Protocol    Interpretation Summary    Left ventricular systolic function is severely decreased. Left ventricular ejection fraction appears to be 21 - 25%.    The left ventricular cavity is severely dilated.    Left ventricular diastolic function is consistent with (grade I) impaired relaxation.    The left atrial cavity is moderately dilated.    Abnormal mitral valve structure consistent with dilated annulus.    Moderate dilation of the ascending aorta is present.    Ascending aorta = 4.7 cm     Lab Results   Component Value Date    GLUCOSE 100 (H) 09/11/2024    BUN 15 09/11/2024    CREATININE 0.97 09/11/2024    EGFR 78.4 09/11/2024    BCR 15.5 09/11/2024    K 4.0 09/11/2024    CO2 22.4 09/11/2024    CALCIUM 9.0  09/11/2024    ALBUMIN 3.8 12/27/2023    BILITOT 0.2 12/27/2023    AST 22 12/27/2023    ALT 16 12/27/2023      Lab Results   Component Value Date    CHOL 160 09/11/2024    TRIG 100 09/11/2024    HDL 47 09/11/2024    LDL 95 09/11/2024      Lab Results   Component Value Date    TROPONINI <0.030 08/27/2019    TROPONINT 18 09/11/2024        Assessment & Plan       Cellulitis of left lower leg    Chest pain        Basia Cruz MD  09/12/24  16:25 EDT

## 2024-09-12 NOTE — PLAN OF CARE
Goal Outcome Evaluation:  Plan of Care Reviewed With: patient     Outcome Evaluation: Pt is a 84 y/o male admitted to [facility] on 9/10/24 with c/o chest pain, dyspnea, and increased edema to LLE. XR chest: stable cardiomegaly. CT angio chest: (-) PE. Duplex Venous: (-). PMHx significant for HTN, HLD, AAA, cardiomyopathy s/p ICD, HFrEF, CAD s/p CABG. PLOF pt resides in Duplex that he leases, single story with 0 WILBERT. Pt reports he is IND in I/ADLs, actively driving, and utilizes cane and/or walker for ambulation. Pt additionally reports that he occasionally wears AFO for R foot drop, however not consistent as he is able to manage without one. Upon evaluation, pt A&O x4 reporting 8/10 bilateral knees though states this is his normal. Pt IND to complete bed mobility and sat EOB SBA. Pt comes to standing and completed all functional t/fs with CGA and FWW. No LOB noted throughout evaluation. BUE WNLs for MMT and ROM. Pt reports he has home therapy PT visits therefore may benefit from continued PT, however no OT needs are identified at this time. OT will sign off and complete orders, please re-consult if pt has a change in status.    Anticipated Discharge Disposition (OT): home with assist

## 2024-09-12 NOTE — PROGRESS NOTES
.    [facility] MEDICINE SERVICE  DAILY PROGRESS NOTE    NAME: Deion Nicholas  : 1940  MRN: [id number]      LOS: 1 day     PROVIDER OF SERVICE: Sameera Loja MD    Chief Complaint: <principal problem not specified>    Subjective:     Interval History:  History taken from: patient    Patient seen and examined at bedside, he is resting comfortably and had his breakfast without any difficulty.  He has been walking with use of walker with physical therapy and doing okay.  He says his left leg is less swollen than on arrival.  Also notes that slight chest pain he had has resolved.  All questions and concerns addressed.        Review of Systems:   Review of Systems negative except as mentioned above    Objective:     Vital Signs  Temp:  [97.6 °F (36.4 °C)-98 °F (36.7 °C)] 97.8 °F (36.6 °C)  Heart Rate:  [73-82] 78  Resp:  [16-18] 17  BP: (115-135)/(63-79) 115/68   Body mass index is 26.96 kg/m².    Physical Exam  Physical Exam  Constitutional:       General: He is not in acute distress.     Appearance: Normal appearance. He is not toxic-appearing.   HENT:      Head: Normocephalic and atraumatic.      Nose: Nose normal. No congestion.      Mouth/Throat:      Pharynx: Oropharynx is clear. No oropharyngeal exudate.   Eyes:      General: No scleral icterus.  Cardiovascular:      Rate and Rhythm: Normal rate and regular rhythm.      Heart sounds: No murmur heard.     No friction rub. No gallop.   Pulmonary:      Effort: No respiratory distress.      Breath sounds: No wheezing or rales.   Abdominal:      General: There is no distension.      Tenderness: There is no abdominal tenderness. There is no guarding.   Musculoskeletal:         General: No swelling, deformity or signs of injury.      Cervical back: Normal range of motion. No rigidity.      Comments: Left leg is warm and erythematous   Skin:     Coloration: Skin is not jaundiced.      Findings: Erythema present. No bruising or lesion.   Neurological:       General: No focal deficit present.      Mental Status: He is alert and oriented to person, place, and time     Diagnostic Data    Results from last 7 days   Lab Units 09/11/24  0337   WBC 10*3/mm3 5.66   HEMOGLOBIN g/dL 13.2   HEMATOCRIT % 40.5   PLATELETS 10*3/mm3 157   GLUCOSE mg/dL 100*   CREATININE mg/dL 0.97   BUN mg/dL 15   SODIUM mmol/L 138   POTASSIUM mmol/L 4.0   ANION GAP mmol/L 10.6       CT Angiogram Chest Pulmonary Embolism    Result Date: 9/10/2024  Impression: 1. No acute CT findings. No evidence of pulmonary embolism. 2. 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 Electronically Signed: Rubén Wyatt MD  9/10/2024 4:24 PM EDT  Workstation ID: [id number]    XR Chest 1 View    Result Date: 9/10/2024  Impression: Stable cardiomegaly. No acute chest finding. Electronically Signed: Grace Dyson MD  9/10/2024 2:50 PM EDT  Workstation ID: [id number]       I reviewed the patient's new clinical results.    Assessment/Plan:     Active and Resolved Problems  Active Hospital Problems    Diagnosis  POA    Chest pain [R07.9]  Yes    Cellulitis of left lower leg [L03.116]  Unknown      Resolved Hospital Problems   No resolved problems to display.       #Chest pain  #CAD    - s/p CABG    - HS troponin 26 > 18    - proBNP 373    - aspirin daily    - stress myoview on 9/11/24 consistent with intermediate risk study    - s/p icd    - cardiology following, plans to manage conservatively, unless patient has recurrent or worsening symptoms no need for any further invasive workup per cardiology    - Continue aspirin, lasix, Toprol, midodrine    - Ranexa 500mg Po BID added by cardiology     #LLE cellulitis    - duplex negative    - Rocephin 2g IV daily    - Clindamycin 600mg IV TID    - pt/ot     #HFrEF    - chronic, stable    - proBNP 373    -  continue home Lasix and home meds    - echo on 12/27/23 shows ef 21-25%     #h/o cardiomyoapthy    - s/p icd    - stable, monitor    - cardiology following     #Ascending aortic aneurysm    - noted on echo dated 12/27/23 at 4.7cm    - CTA shows at 4.6 x 4.5 cm     #HLD    - hx of statin allergy     #HTN    - continue Toprol    - is on PRN midodrine    VTE Prophylaxis:  Mechanical VTE prophylaxis orders are present.             Disposition Planning:     Barriers to Discharge: Left lower leg still erythematous and tender to the touch  Anticipated Date of Discharge: 9/13  Place of Discharge: Home      Time: 30  minutes     Code Status and Medical Interventions: CPR (Attempt to Resuscitate); Full Support   Ordered at: 09/11/24 0654     Code Status (Patient has no pulse and is not breathing):    CPR (Attempt to Resuscitate)     Medical Interventions (Patient has pulse or is breathing):    Full Support       Signature: Electronically signed by Sameera Loja MD, 09/12/24, 10:39 EDT.  [facility] Hospitalist Team

## 2024-09-12 NOTE — THERAPY EVALUATION
Patient Name: Deion Nicholas  : 1940    MRN: [id number]                              Today's Date: 2024       Admit Date: 9/10/2024    Visit Dx:     ICD-10-CM ICD-9-CM   1. Chest pain, unspecified type  R07.9 786.50   2. Cellulitis of left lower leg  L03.116 682.6     Patient Active Problem List   Diagnosis    Normocytic anemia    Coronary artery disease    Ischemic cardiomyopathy    Paroxysmal ventricular tachycardia    Presence of automatic implantable cardioverter-defibrillator    Essential hypertension    Hyperlipidemia, mixed    Peripheral neuropathy    Cellulitis of left lower extremity without foot    GERD without esophagitis    B12 deficiency    Orthostatic hypotension    Tinea pedis of left foot    Lower extremity pain, left    Cellulitis of left leg    Baker's cyst of knee, left    Gynecomastia, male    Ascending aortic aneurysm    Thyroid nodule    Dizziness    Chest pain, unspecified type    Edema of left lower extremity    Elevated troponin    Left leg cellulitis    Cellulitis of left lower leg    Valvular heart disease    Nonrheumatic mitral valve regurgitation    Nonrheumatic aortic valve insufficiency    Chest pain     Past Medical History:   Diagnosis Date    Aneurysm     Cardiomyopathy     ICD implantation    Cellulitis of left lower extremity     recurrent    CHD (coronary heart disease)     S/P CABG & PCI    Dyslipidemia     Heart valve disease     History of ventricular tachycardia     Hypertension     Myocardial infarction     Inferior MI    Pinched nerve     L4-L5    Prostate cancer      Past Surgical History:   Procedure Laterality Date    ANGIOPLASTY  1996 Stent: Palmaz- Huy stent/LAD 1996-RCA: -Tetra stent Guidant to proximal RCA, mid to distal artery 2 sequential Guidant Tetra stents    APPENDECTOMY      CARDIAC ABLATION  April and May 2019    CARDIAC CATHETERIZATION  2017    1996 x2, Nov. , 2010-cath 2015-no stents 2017    CARDIAC  DEFIBRILLATOR PLACEMENT      COLONOSCOPY W/ POLYPECTOMY      Mult colonoscopy's for polyp resection     CORONARY ARTERY BYPASS GRAFT  05/2010    x5 vessel: LMA to diagonal, LAD, intermedius, obtuse marginal, RCA    CORONARY STENT PLACEMENT      ENDOSCOPY N/A 6/24/2019    Procedure: ESOPHAGOGASTRODUODENOSCOPY with dilitation Bougie 50, 54;  Surgeon: Roddy Coronel MD;  Location: [facility] ENDOSCOPY;  Service: Gastroenterology    INSERT / REPLACE / REMOVE PACEMAKER      KNEE OPEN LATERAL RELEASE Left     reduction    OTHER SURGICAL HISTORY  01/2018    ICD implantation    PROSTATE SURGERY  2015    Robiotic surgery- Cyber Knife:      General Information       Row Name 09/12/24 1316          Physical Therapy Time and Intention    Document Type evaluation  -CR     Mode of Treatment physical therapy  -CR       Row Name 09/12/24 1316          General Information    Prior Level of Function independent:;all household mobility;community mobility;driving  -CR     Existing Precautions/Restrictions --  R foot drop  -CR     Barriers to Rehab medically complex  -CR       Row Name 09/12/24 1316          Living Environment    People in Home alone  -CR       Row Name 09/12/24 1316          Home Main Entrance    Number of Stairs, Main Entrance none  -CR       Row Name 09/12/24 1316          Stairs Within Home, Primary    Number of Stairs, Within Home, Primary none  -CR       Row Name 09/12/24 1316          Cognition    Orientation Status (Cognition) oriented x 4  -CR       Row Name 09/12/24 1316          Safety Issues, Functional Mobility    Impairments Affecting Function (Mobility) endurance/activity tolerance;balance;sensation/sensory awareness  -CR               User Key  (r) = Recorded By, (t) = Taken By, (c) = Cosigned By      Initials Name Provider Type    CR Reyes, Carmela, PT Physical Therapist                   Mobility       Row Name 09/12/24 1327          Bed Mobility    Bed Mobility bed mobility (all) activities  -CR      All Activities, [place] (Bed Mobility) independent  -CR       Row Name 09/12/24 1327          Sit-Stand Transfer    Sit-Stand [place] (Transfers) modified independence  -CR     Assistive Device (Sit-Stand Transfers) walker, front-wheeled  -CR       Row Name 09/12/24 1327          Gait/Stairs (Locomotion)    [place] Level (Gait) standby assist  -CR     Assistive Device (Gait) walker, front-wheeled  -CR     Distance in Feet (Gait) 60  -CR     Deviations/Abnormal Patterns (Gait) base of support, wide;gait speed decreased  -CR     Bilateral Gait Deviations forward flexed posture  -CR     Left Sided Gait Deviations foot drop/toe drag  -CR               User Key  (r) = Recorded By, (t) = Taken By, (c) = Cosigned By      Initials Name Provider Type    CR Reyes, Carmela, PT Physical Therapist                   Obj/Interventions       Row Name 09/12/24 1328          Range of Motion Comprehensive    General Range of Motion bilateral lower extremity ROM WNL  -CR     Comment, General Range of Motion except R ankle max limit  -CR       Row Name 09/12/24 1328          Strength Comprehensive (MMT)    Comment, General Manual Muscle Testing (MMT) Assessment BLE grossly wfl except R ankle DF 1/5  -CR       Row Name 09/12/24 1328          Balance    Static Sitting Balance independent  -CR     Dynamic Sitting Balance independent  -CR     Position, Sitting Balance sitting edge of bed  -CR     Static Standing Balance standby assist  -CR     Dynamic Standing Balance standby assist  -CR     Position/Device Used, Standing Balance walker, rolling  -CR       Row Name 09/12/24 1328          Sensory Assessment (Somatosensory)    Sensory Assessment (Somatosensory) --  neuropathy B feet, hands  -CR               User Key  (r) = Recorded By, (t) = Taken By, (c) = Cosigned By      Initials Name Provider Type    CR Reyes, Carmela, PT Physical Therapist                   Goals/Plan    No documentation.                  Clinical  Impression       Row Name 09/12/24 1329          Pain    Additional Documentation Pain Scale: FACES Pre/Post-Treatment (Group)  -CR       Row Name 09/12/24 1329          Pain Scale: FACES Pre/Post-Treatment    Pain: FACES Scale, Pretreatment 4-->hurts little more  -CR     Posttreatment Pain Rating 4-->hurts little more  -CR       Row Name 09/12/24 1329          Plan of Care Review    Plan of Care Reviewed With patient  -CR     Outcome Evaluation 84 y/o male admitted on 9/10 due to chest pain with shortness of breath with increased LE swelling. No PE,no DVT found. PMH includes recurrent cellulitis to LLE, CABG with ICD , prostate Ca, R foot drop. Patient lives alone and normally uses rollator or 2 canes for mobility. Patient issued with R AFO however does not use. At time of eval, patient does not report chest pain nor SOA. Patient is independent with bed mobility and SBA for transfers and gait using rw. Ambulated 60 ft with decreased speed and increased R knee flexion needed to clear R foot. No loss of balance noted. Patient is at his baseline and should be safe to return home. No further skilled PT indicated.  -CR       Row Name 09/12/24 1328          Therapy Assessment/Plan (PT)    Patient/Family Therapy Goals Statement (PT) home  -CR     Criteria for Skilled Interventions Met (PT) no;no problems identified which require skilled intervention  -CR     Therapy Frequency (PT) evaluation only  -CR               User Key  (r) = Recorded By, (t) = Taken By, (c) = Cosigned By      Initials Name Provider Type    CR Reyes, Carmela, PT Physical Therapist                   Outcome Measures       Row Name 09/12/24 1333 09/12/24 0800       How much help from another person do you currently need...    Turning from your back to your side while in flat bed without using bedrails? 4  -CR 4  -RR    Moving from lying on back to sitting on the side of a flat bed without bedrails? 4  -CR 4  -RR    Moving to and from a bed to a chair  (including a wheelchair)? 4  -CR 3  -RR    Standing up from a chair using your arms (e.g., wheelchair, bedside chair)? 4  -CR 3  -RR    Climbing 3-5 steps with a railing? 4  -CR 2  -RR    To walk in hospital room? 4  -CR 3  -RR    AM-PAC 6 Clicks Score (PT) 24  -CR 19  -RR    Highest Level of Mobility Goal 8 --> Walked 250 feet or more  -CR 6 --> Walk 10 steps or more  -RR      Row Name 09/12/24 1332          Functional Assessment    Outcome Measure Options AM-PAC 6 Clicks Daily Activity (OT)  -SP               User Key  (r) = Recorded By, (t) = Taken By, (c) = Cosigned By      Initials Name Provider Type    CR Reyes, Carmela, PT Physical Therapist    RR Olinda Mantilla, RN Registered Nurse    SP Rohan Garg, OT Occupational Therapist                                 Physical Therapy Education       Title: PT OT SLP Therapies (In Progress)       Topic: Physical Therapy (Done)       Point: Mobility training (Done)       Learning Progress Summary             Patient Acceptance, E, DU by CR at 9/12/2024 1333                                         User Key       Initials Effective Dates Name Provider Type Discipline    CR 06/16/21 -  Reyes, Carmela, PT Physical Therapist PT                  PT Recommendation and Plan     Plan of Care Reviewed With: patient  Outcome Evaluation: 82 y/o male admitted on 9/10 due to chest pain with shortness of breath with increased LE swelling. No PE,no DVT found. PMH includes recurrent cellulitis to LLE, CABG with ICD , prostate Ca, R foot drop. Patient lives alone and normally uses rollator or 2 canes for mobility. Patient issued with R AFO however does not use. At time of eval, patient does not report chest pain nor SOA. Patient is independent with bed mobility and SBA for transfers and gait using rw. Ambulated 60 ft with decreased speed and increased R knee flexion needed to clear R foot. No loss of balance noted. Patient is at his baseline and should be safe to return home. No  further skilled PT indicated.     Time Calculation:         PT Charges       Row Name 09/12/24 1333             Time Calculation    Start Time 1130  -CR      Stop Time 1150  -CR      Time Calculation (min) 20 min  -CR      PT Received On 09/12/24  -CR         Time Calculation- PT    Total Timed Code Minutes- PT 0 minute(s)  -CR                User Key  (r) = Recorded By, (t) = Taken By, (c) = Cosigned By      Initials Name Provider Type    CR Reyes, Carmela, PT Physical Therapist                  Therapy Charges for Today       Code Description Service Date Service Provider Modifiers Qty    78150352031 HC PT EVAL MOD COMPLEXITY 3 9/12/2024 Reyes, Carmela, PT GP 1            PT G-Codes  Outcome Measure Options: AM-PAC 6 Clicks Daily Activity (OT)  AM-PAC 6 Clicks Score (PT): 24  AM-PAC 6 Clicks Score (OT): 24  PT Discharge Summary  Anticipated Discharge Disposition (PT): home    Carmela Reyes, PT  9/12/2024

## 2024-09-12 NOTE — PLAN OF CARE
Goal Outcome Evaluation:  Plan of Care Reviewed With: patient   A/O x4 RA continues on IV ABT therapy for LLE cellulitis

## 2024-09-12 NOTE — PLAN OF CARE
Problem: Asthma Comorbidity  Goal: Maintenance of Asthma Control  Intervention: Maintain Asthma Symptom Control  Recent Flowsheet Documentation  Taken 9/11/2024 2000 by Emily Grimaldo RN  Medication Review/Management: medications reviewed     Problem: Behavioral Health Comorbidity  Goal: Maintenance of Behavioral Health Symptom Control  Intervention: Maintain Behavioral Health Symptom Control  Recent Flowsheet Documentation  Taken 9/11/2024 2000 by Emily Grimaldo RN  Medication Review/Management: medications reviewed     Problem: COPD (Chronic Obstructive Pulmonary Disease) Comorbidity  Goal: Maintenance of COPD Symptom Control  Intervention: Maintain COPD-Symptom Control  Recent Flowsheet Documentation  Taken 9/11/2024 2000 by Emily Grimaldo RN  Supportive Measures: active listening utilized  Medication Review/Management: medications reviewed     Problem: Heart Failure Comorbidity  Goal: Maintenance of Heart Failure Symptom Control  Intervention: Maintain Heart Failure-Management  Recent Flowsheet Documentation  Taken 9/11/2024 2000 by Emily Grimaldo RN  Medication Review/Management: medications reviewed     Problem: Hypertension Comorbidity  Goal: Blood Pressure in Desired Range  Intervention: Maintain Blood Pressure Management  Recent Flowsheet Documentation  Taken 9/11/2024 2000 by Emily Grimaldo RN  Medication Review/Management: medications reviewed     Problem: Osteoarthritis Comorbidity  Goal: Maintenance of Osteoarthritis Symptom Control  Intervention: Maintain Osteoarthritis Symptom Control  Recent Flowsheet Documentation  Taken 9/11/2024 2000 by Emily Grimaldo, RN  Activity Management: ambulated to bathroom  Assistive Device Utilized: walker  Medication Review/Management: medications reviewed     Problem: Pain Chronic (Persistent) (Comorbidity Management)  Goal: Acceptable Pain Control and Functional Ability  Intervention: Manage Persistent Pain  Recent Flowsheet Documentation  Taken  9/11/2024 2000 by Emily Grimaldo RN  Medication Review/Management: medications reviewed  Intervention: Optimize Psychosocial Wellbeing  Recent Flowsheet Documentation  Taken 9/11/2024 2000 by Emily Grimaldo RN  Supportive Measures: active listening utilized  Diversional Activities: television     Problem: Fall Injury Risk  Goal: Absence of Fall and Fall-Related Injury  Intervention: Identify and Manage Contributors  Recent Flowsheet Documentation  Taken 9/11/2024 2000 by Emily Grimaldo RN  Medication Review/Management: medications reviewed  Intervention: Promote Injury-Free Environment  Recent Flowsheet Documentation  Taken 9/12/2024 0200 by Emily Grimaldo RN  Safety Promotion/Fall Prevention: safety round/check completed  Taken 9/12/2024 0000 by Emily Grimaldo RN  Safety Promotion/Fall Prevention: safety round/check completed  Taken 9/11/2024 2200 by Emily Grimaldo RN  Safety Promotion/Fall Prevention: safety round/check completed  Taken 9/11/2024 2000 by Emily Grimaldo RN  Safety Promotion/Fall Prevention: safety round/check completed     Problem: Adult Inpatient Plan of Care  Goal: Absence of Hospital-Acquired Illness or Injury  Intervention: Identify and Manage Fall Risk  Recent Flowsheet Documentation  Taken 9/12/2024 0200 by Emily Grimaldo RN  Safety Promotion/Fall Prevention: safety round/check completed  Taken 9/12/2024 0000 by Emily Grimaldo RN  Safety Promotion/Fall Prevention: safety round/check completed  Taken 9/11/2024 2200 by Emily Grimaldo RN  Safety Promotion/Fall Prevention: safety round/check completed  Taken 9/11/2024 2000 by Emily Grimaldo RN  Safety Promotion/Fall Prevention: safety round/check completed  Intervention: Prevent Skin Injury  Recent Flowsheet Documentation  Taken 9/11/2024 2000 by Emily Grimaldo RN  Body Position: position changed independently  Intervention: Prevent and Manage VTE (Venous Thromboembolism) Risk  Recent Flowsheet  Documentation  Taken 9/11/2024 2000 by Emily Grimaldo, RN  Activity Management: ambulated to bathroom  Range of Motion: active ROM (range of motion) encouraged  Intervention: Prevent Infection  Recent Flowsheet Documentation  Taken 9/11/2024 2000 by Emily Grimaldo, RN  Infection Prevention: single patient room provided  Goal: Optimal Comfort and Wellbeing  Intervention: Provide Person-Centered Care  Recent Flowsheet Documentation  Taken 9/11/2024 2000 by Emily Grimaldo, RN  Trust Relationship/Rapport:   choices provided   care explained   emotional support provided   empathic listening provided   questions answered   reassurance provided   questions encouraged   thoughts/feelings acknowledged     Problem: Breathing Pattern Ineffective  Goal: Effective Breathing Pattern  Intervention: Promote Improved Breathing Pattern  Recent Flowsheet Documentation  Taken 9/11/2024 2000 by Emily Grimaldo, RN  Supportive Measures: active listening utilized  Head of Bed (HOB) Positioning: HOB elevated   Goal Outcome Evaluation:         Pt had no complaints through the night. Ivabx started. No further orders at this times. Care continues.

## 2024-09-12 NOTE — PLAN OF CARE
Goal Outcome Evaluation:  Plan of Care Reviewed With: patient           Outcome Evaluation: 84 y/o male admitted on 9/10 due to chest pain with shortness of breath with increased LE swelling. No PE,no DVT found. PMH includes recurrent cellulitis to LLE, CABG with ICD , prostate Ca, R foot drop. Patient lives alone and normally uses rollator or 2 canes for mobility. Patient issued with R AFO however does not use. At time of eval, patient does not report chest pain nor SOA. Patient is independent with bed mobility and SBA for transfers and gait using rw. Ambulated 60 ft with decreased speed and increased R knee flexion needed to clear R foot. No loss of balance noted. Patient is at his baseline and should be safe to return home. No further skilled PT indicated.      Anticipated Discharge Disposition (PT): home

## 2024-09-12 NOTE — THERAPY EVALUATION
Patient Name: Deion Nicholas  : 1940    MRN: [id number]                              Today's Date: 2024       Admit Date: 9/10/2024    Visit Dx:     ICD-10-CM ICD-9-CM   1. Chest pain, unspecified type  R07.9 786.50   2. Cellulitis of left lower leg  L03.116 682.6     Patient Active Problem List   Diagnosis    Normocytic anemia    Coronary artery disease    Ischemic cardiomyopathy    Paroxysmal ventricular tachycardia    Presence of automatic implantable cardioverter-defibrillator    Essential hypertension    Hyperlipidemia, mixed    Peripheral neuropathy    Cellulitis of left lower extremity without foot    GERD without esophagitis    B12 deficiency    Orthostatic hypotension    Tinea pedis of left foot    Lower extremity pain, left    Cellulitis of left leg    Baker's cyst of knee, left    Gynecomastia, male    Ascending aortic aneurysm    Thyroid nodule    Dizziness    Chest pain, unspecified type    Edema of left lower extremity    Elevated troponin    Left leg cellulitis    Cellulitis of left lower leg    Valvular heart disease    Nonrheumatic mitral valve regurgitation    Nonrheumatic aortic valve insufficiency    Chest pain     Past Medical History:   Diagnosis Date    Aneurysm     Cardiomyopathy     ICD implantation    Cellulitis of left lower extremity     recurrent    CHD (coronary heart disease)     S/P CABG & PCI    Dyslipidemia     Heart valve disease     History of ventricular tachycardia     Hypertension     Myocardial infarction     Inferior MI    Pinched nerve     L4-L5    Prostate cancer      Past Surgical History:   Procedure Laterality Date    ANGIOPLASTY  1996 Stent: Palmaz- Huy stent/LAD 1996-RCA: -Tetra stent Guidant to proximal RCA, mid to distal artery 2 sequential Guidant Tetra stents    APPENDECTOMY      CARDIAC ABLATION  April and May 2019    CARDIAC CATHETERIZATION  2017    1996 x2, Nov. , 2010-cath 2015-no stents 2017    CARDIAC  DEFIBRILLATOR PLACEMENT      COLONOSCOPY W/ POLYPECTOMY      Mult colonoscopy's for polyp resection     CORONARY ARTERY BYPASS GRAFT  05/2010    x5 vessel: LMA to diagonal, LAD, intermedius, obtuse marginal, RCA    CORONARY STENT PLACEMENT      ENDOSCOPY N/A 6/24/2019    Procedure: ESOPHAGOGASTRODUODENOSCOPY with dilitation Bougie 50, 54;  Surgeon: Roddy Coronel MD;  Location: [facility] ENDOSCOPY;  Service: Gastroenterology    INSERT / REPLACE / REMOVE PACEMAKER      KNEE OPEN LATERAL RELEASE Left     reduction    OTHER SURGICAL HISTORY  01/2018    ICD implantation    PROSTATE SURGERY  2015    Robiotic surgery- Cyber Knife:      General Information       Row Name 09/12/24 1036          OT Time and Intention    Document Type evaluation  -SP     Mode of Treatment occupational therapy;individual therapy  -SP       Row Name 09/12/24 1036          General Information    Patient Profile Reviewed yes  -SP     Prior Level of Function independent:;ADL's;driving;home management  -SP     Existing Precautions/Restrictions fall  -SP     Barriers to Rehab previous functional deficit  -SP       Row Name 09/12/24 1036          Living Environment    People in Home alone  -SP       Row Name 09/12/24 1036          Home Main Entrance    Number of Stairs, Main Entrance none  -SP       Row Name 09/12/24 1036          Stairs Within Home, Primary    Number of Stairs, Within Home, Primary none  -SP       Row Name 09/12/24 1036          Cognition    Orientation Status (Cognition) oriented x 4  -SP       Row Name 09/12/24 1117          Safety Issues, Functional Mobility    Safety Issues Affecting Function (Mobility) impulsivity;positioning of assistive device  -SP     Impairments Affecting Function (Mobility) pain;endurance/activity tolerance  -SP               User Key  (r) = Recorded By, (t) = Taken By, (c) = Cosigned By      Initials Name Provider Type    SP Rohan Garg OT Occupational Therapist                     Mobility/ADL's        Row Name 09/12/24 1119          Bed Mobility    Bed Mobility bed mobility (all) activities  -SP     All Activities, [place] (Bed Mobility) independent  -SP       Row Name 09/12/24 1119          Transfers    Transfers sit-stand transfer  -SP       Row Name 09/12/24 1119          Sit-Stand Transfer    Sit-Stand [place] (Transfers) contact guard  -SP     Assistive Device (Sit-Stand Transfers) walker, front-wheeled  -SP       Row Name 09/12/24 1119          Functional Mobility    Functional Mobility- Ind. Level contact guard assist  -SP     Functional Mobility- Device walker, front-wheeled  -SP     Functional Mobility- Comment Tactile/verbal cues for FWW positioning and verbal cues for head/neck extension  -SP     Patient was able to Ambulate yes  -SP       Row Name 09/12/24 1119          Activities of Daily Living    BADL Assessment/Intervention lower body dressing  -SP       [facility] Name 09/12/24 1119          Lower Body Dressing Assessment/Training    [place] Level (Lower Body Dressing) don;doff;socks;independent  -SP     Position (Lower Body Dressing) edge of bed sitting;unsupported sitting  -SP               User Key  (r) = Recorded By, (t) = Taken By, (c) = Cosigned By      Initials Name Provider Type    SP Rohan Garg OT Occupational Therapist                   Obj/Interventions       Row Name 09/12/24 1120          Sensory Assessment (Somatosensory)    Sensory Assessment (Somatosensory) UE sensation intact  -SP       [facility] Name 09/12/24 1120          Vision Assessment/Intervention    Visual Impairment/Limitations WFL  -SP       [facility] Name 09/12/24 1120          Range of Motion Comprehensive    General Range of Motion bilateral upper extremity ROM WNL  -SP       [facility] Name 09/12/24 1120          Strength Comprehensive (MMT)    General Manual Muscle Testing (MMT) Assessment no strength deficits identified  -SP     Comment, General Manual Muscle Testing (MMT) Assessment BUE - no strength deficits  identified  -SP       Row Name 09/12/24 1120          Balance    Balance Assessment sitting static balance;sitting dynamic balance;sit to stand dynamic balance;standing static balance;standing dynamic balance  -SP     Static Sitting Balance independent  -SP     Dynamic Sitting Balance independent  -SP     Position, Sitting Balance unsupported;sitting edge of bed  -SP     Sit to Stand Dynamic Balance contact guard  -SP     Static Standing Balance standby assist  -SP     Dynamic Standing Balance contact guard  -SP     Position/Device Used, Standing Balance walker, front-wheeled  -SP               User Key  (r) = Recorded By, (t) = Taken By, (c) = Cosigned By      Initials Name Provider Type    SP Rohan Garg OT Occupational Therapist                   Goals/Plan    No documentation.                  Clinical Impression       Row Name 09/12/24 1127          Pain Assessment    Pretreatment Pain Rating 8/10  -SP     Posttreatment Pain Rating 8/10  -SP     Pain Location - Side/Orientation Bilateral  -SP     Pain Location - knee  -SP     Pain Intervention(s) Repositioned;Therapeutic presence  -SP       Row Name 09/12/24 1127          Plan of Care Review    Plan of Care Reviewed With patient  -SP     Outcome Evaluation Pt is a 82 y/o male admitted [facility] on 9/10/24 with c/o chest pain, dyspnea, and increased edema to LLE. XR chest: stable cardiomegaly. CT angio chest: (-) PE. Duplex Venous: (-). PMHx significant for HTN, HLD, AAA, cardiomyopathy s/p ICD, HFrEF, CAD s/p CABG. PLOF pt resides in Duplex that he leases, single story with 0 WILBERT. Pt reports he is IND in I/ADLs, actively driving, and utilizes cane and/or walker for ambulation. Pt additionally reports that he occasionally wears AFO for R foot drop, however not consistent as he is able to manage without one. Upon evaluation, pt A&O x4 reporting 8/10 bilateral knees though states this is his normal. Pt IND to complete bed mobility and sat EOB SBA. Pt comes to  standing and completed all functional t/fs with CGA and FWW. No LOB noted throughout evaluation. CAMERON WNLs for MMT and ROM. Pt reports he has home therapy PT visits therefore may benefit from continued PT, however no OT needs are identified at this time. OT will sign off and complete orders, please re-consult if pt has a change in status.  -SP       Row Name 09/12/24 1127          Therapy Assessment/Plan (OT)    Criteria for Skilled Therapeutic Interventions Met (OT) no  -SP     Therapy Frequency (OT) evaluation only  -SP       Row Name 09/12/24 1127          Therapy Plan Review/Discharge Plan (OT)    Anticipated Discharge Disposition (OT) home with assist  -SP       Row Name 09/12/24 1127          Vital Signs    O2 Delivery Pre Treatment room air  -SP     O2 Delivery Intra Treatment room air  -SP     O2 Delivery Post Treatment room air  -SP     Pre Patient Position Supine  -SP     Intra Patient Position Standing  -SP     Post Patient Position Supine  fowlers  -SP       Row Name 09/12/24 1127          Positioning and Restraints    Pre-Treatment Position in bed  -SP     Post Treatment Position bed  -SP               User Key  (r) = Recorded By, (t) = Taken By, (c) = Cosigned By      Initials Name Provider Type    SP Rohan Garg, OT Occupational Therapist                   Outcome Measures       Row Name 09/12/24 1332          How much help from another is currently needed...    Putting on and taking off regular lower body clothing? 4  -SP     Bathing (including washing, rinsing, and drying) 4  -SP     Toileting (which includes using toilet bed pan or urinal) 4  -SP     Putting on and taking off regular upper body clothing 4  -SP     Taking care of personal grooming (such as brushing teeth) 4  -SP     Eating meals 4  -SP     AM-PAC 6 Clicks Score (OT) 24  -SP       Row Name 09/12/24 1333 09/12/24 0800       How much help from another person do you currently need...    Turning from your back to your side while in  flat bed without using bedrails? 4  -CR 4  -RR    Moving from lying on back to sitting on the side of a flat bed without bedrails? 4  -CR 4  -RR    Moving to and from a bed to a chair (including a wheelchair)? 4  -CR 3  -RR    Standing up from a chair using your arms (e.g., wheelchair, bedside chair)? 4  -CR 3  -RR    Climbing 3-5 steps with a railing? 4  -CR 2  -RR    To walk in hospital room? 4  -CR 3  -RR    AM-PAC 6 Clicks Score (PT) 24  -CR 19  -RR    Highest Level of Mobility Goal 8 --> Walked 250 feet or more  -CR 6 --> Walk 10 steps or more  -RR      Row Name 09/12/24 1332          Functional Assessment    Outcome Measure Options AM-PAC 6 Clicks Daily Activity (OT)  -SP               User Key  (r) = Recorded By, (t) = Taken By, (c) = Cosigned By      Initials Name Provider Type    CR Reyes, Carmela, PT Physical Therapist    RR Olinda Mantilla, RN Registered Nurse    SP Rohan Garg, OT Occupational Therapist                    Occupational Therapy Education       Title: PT OT SLP Therapies (In Progress)       Topic: Occupational Therapy (In Progress)       Point: ADL training (Done)       Description:   Instruct learner(s) on proper safety adaptation and remediation techniques during self care or transfers.   Instruct in proper use of assistive devices.                  Learning Progress Summary             Patient Acceptance, E,TB, VU by SP at 9/12/2024 1333                         Point: Home exercise program (Not Started)       Description:   Instruct learner(s) on appropriate technique for monitoring, assisting and/or progressing therapeutic exercises/activities.                  Learner Progress:  Not documented in this visit.              Point: Precautions (Not Started)       Description:   Instruct learner(s) on prescribed precautions during self-care and functional transfers.                  Learner Progress:  Not documented in this visit.              Point: Body mechanics (Done)        Description:   Instruct learner(s) on proper positioning and spine alignment during self-care, functional mobility activities and/or exercises.                  Learning Progress Summary             Patient Acceptance, E,TB, VU by SP at 9/12/2024 [id number]                                         User Key       Initials Effective Dates Name Provider Type Discipline    SP 11/15/23 -  Rohan Garg, THO Occupational Therapist OT                  OT Recommendation and Plan  Therapy Frequency (OT): evaluation only  Plan of Care Review  Plan of Care Reviewed With: patient  Outcome Evaluation: Pt is a 82 y/o male admitted to [facility] on 9/10/24 with c/o chest pain, dyspnea, and increased edema to LLE. XR chest: stable cardiomegaly. CT angio chest: (-) PE. Duplex Venous: (-). PMHx significant for HTN, HLD, AAA, cardiomyopathy s/p ICD, HFrEF, CAD s/p CABG. PLOF pt resides in Duplex that he leases, single story with 0 WIL[place]ERT. Pt reports he is IND in I/ADLs, actively driving, and utilizes cane and/or walker for ambulation. Pt additionally reports that he occasionally wears AFO for R foot drop, however not consistent as he is able to manage without one. Upon evaluation, pt A&O x4 reporting 8/10 bilateral knees though states this is his normal. Pt IND to complete bed mobility and sat EOB SBA. Pt comes to standing and completed all functional t/fs with CGA and FWW. No LOB noted throughout evaluation. BUE WNLs for MMT and ROM. Pt reports he has home therapy PT visits therefore may benefit from continued PT, however no OT needs are identified at this time. OT will sign off and complete orders, please re-consult if pt has a change in status.     Time Calculation:         Time Calculation- OT       Row Name 09/12/24 9654             Time Calculation- OT    OT Start Time 1000  -SP      OT Stop Time 1034  -SP      OT Time Calculation (min) 34 min  -SP      OT Received On 09/12/24  -SP                User Key  (r) = Recorded By, (t) = Taken By, (c)  = Cosigned By      Initials Name Provider Type    SP Rohan Garg OT Occupational Therapist                  Therapy Charges for Today       Code Description Service Date Service Provider Modifiers Qty    [id number] HC OT EVAL MOD COMPLEXITY 4 9/12/2024 Rohan Garg OT GO 1                 Rohan Garg OT  9/12/2024

## 2024-09-13 ENCOUNTER — READMISSION MANAGEMENT (OUTPATIENT)
Dept: CALL CENTER | Facility: HOSPITAL | Age: 84
End: 2024-09-13
Payer: OTHER GOVERNMENT

## 2024-09-13 VITALS
OXYGEN SATURATION: 90 % | DIASTOLIC BLOOD PRESSURE: 65 MMHG | BODY MASS INDEX: 26.82 KG/M2 | WEIGHT: 202.38 LBS | SYSTOLIC BLOOD PRESSURE: 116 MMHG | TEMPERATURE: 97.3 F | RESPIRATION RATE: 17 BRPM | HEIGHT: 73 IN | HEART RATE: 68 BPM

## 2024-09-13 PROBLEM — R07.9 CHEST PAIN: Status: RESOLVED | Noted: 2023-12-26 | Resolved: 2024-09-13

## 2024-09-13 LAB
ANION GAP SERPL CALCULATED.3IONS-SCNC: 9.9 MMOL/L (ref 5–15)
BASOPHILS # BLD AUTO: 0.05 10*3/MM3 (ref 0–0.2)
BASOPHILS NFR BLD AUTO: 0.9 % (ref 0–1.5)
BUN SERPL-MCNC: 18 MG/DL (ref 8–23)
BUN/CREAT SERPL: 18.9 (ref 7–25)
CALCIUM SPEC-SCNC: 9.2 MG/DL (ref 8.6–10.5)
CHLORIDE SERPL-SCNC: 104 MMOL/L (ref 98–107)
CO2 SERPL-SCNC: 22.1 MMOL/L (ref 22–29)
CREAT SERPL-MCNC: 0.95 MG/DL (ref 0.76–1.27)
DEPRECATED RDW RBC AUTO: 49.7 FL (ref 37–54)
EGFRCR SERPLBLD CKD-EPI 2021: 80.4 ML/MIN/1.73
EOSINOPHIL # BLD AUTO: 0.32 10*3/MM3 (ref 0–0.4)
EOSINOPHIL NFR BLD AUTO: 5.5 % (ref 0.3–6.2)
ERYTHROCYTE [DISTWIDTH] IN BLOOD BY AUTOMATED COUNT: 14.4 % (ref 12.3–15.4)
GLUCOSE SERPL-MCNC: 107 MG/DL (ref 65–99)
HCT VFR BLD AUTO: 41.1 % (ref 37.5–51)
HGB BLD-MCNC: 13 G/DL (ref 13–17.7)
IMM GRANULOCYTES # BLD AUTO: 0.01 10*3/MM3 (ref 0–0.05)
IMM GRANULOCYTES NFR BLD AUTO: 0.2 % (ref 0–0.5)
LYMPHOCYTES # BLD AUTO: 1.62 10*3/MM3 (ref 0.7–3.1)
LYMPHOCYTES NFR BLD AUTO: 28 % (ref 19.6–45.3)
MCH RBC QN AUTO: 29.7 PG (ref 26.6–33)
MCHC RBC AUTO-ENTMCNC: 31.6 G/DL (ref 31.5–35.7)
MCV RBC AUTO: 93.8 FL (ref 79–97)
MONOCYTES # BLD AUTO: 0.67 10*3/MM3 (ref 0.1–0.9)
MONOCYTES NFR BLD AUTO: 11.6 % (ref 5–12)
NEUTROPHILS NFR BLD AUTO: 3.11 10*3/MM3 (ref 1.7–7)
NEUTROPHILS NFR BLD AUTO: 53.8 % (ref 42.7–76)
NRBC BLD AUTO-RTO: 0 /100 WBC (ref 0–0.2)
PLATELET # BLD AUTO: 184 10*3/MM3 (ref 140–450)
PMV BLD AUTO: 10.4 FL (ref 6–12)
POTASSIUM SERPL-SCNC: 4.3 MMOL/L (ref 3.5–5.2)
RBC # BLD AUTO: 4.38 10*6/MM3 (ref 4.14–5.8)
SODIUM SERPL-SCNC: 136 MMOL/L (ref 136–145)
WBC NRBC COR # BLD AUTO: 5.78 10*3/MM3 (ref 3.4–10.8)

## 2024-09-13 PROCEDURE — 25010000002 CEFTRIAXONE PER 250 MG: Performed by: STUDENT IN AN ORGANIZED HEALTH CARE EDUCATION/TRAINING PROGRAM

## 2024-09-13 PROCEDURE — 25010000002 CLINDAMYCIN PER 300 MG: Performed by: STUDENT IN AN ORGANIZED HEALTH CARE EDUCATION/TRAINING PROGRAM

## 2024-09-13 PROCEDURE — 85025 COMPLETE CBC W/AUTO DIFF WBC: CPT

## 2024-09-13 PROCEDURE — 99232 SBSQ HOSP IP/OBS MODERATE 35: CPT | Performed by: INTERNAL MEDICINE

## 2024-09-13 PROCEDURE — 80048 BASIC METABOLIC PNL TOTAL CA: CPT

## 2024-09-13 RX ADMIN — SUCRALFATE 1 G: 1 TABLET ORAL at 11:58

## 2024-09-13 RX ADMIN — CLINDAMYCIN PHOSPHATE 600 MG: 600 INJECTION, SOLUTION INTRAVENOUS at 01:56

## 2024-09-13 RX ADMIN — GABAPENTIN 400 MG: 400 CAPSULE ORAL at 04:05

## 2024-09-13 RX ADMIN — MIDODRINE HYDROCHLORIDE 5 MG: 5 TABLET ORAL at 16:54

## 2024-09-13 RX ADMIN — SUCRALFATE 1 G: 1 TABLET ORAL at 08:45

## 2024-09-13 RX ADMIN — Medication 10 ML: at 08:46

## 2024-09-13 RX ADMIN — GABAPENTIN 400 MG: 400 CAPSULE ORAL at 08:45

## 2024-09-13 RX ADMIN — MIDODRINE HYDROCHLORIDE 5 MG: 5 TABLET ORAL at 08:45

## 2024-09-13 RX ADMIN — PANTOPRAZOLE SODIUM 40 MG: 40 TABLET, DELAYED RELEASE ORAL at 08:46

## 2024-09-13 RX ADMIN — METHOCARBAMOL 500 MG: 500 TABLET ORAL at 08:46

## 2024-09-13 RX ADMIN — Medication 10 ML: at 16:04

## 2024-09-13 RX ADMIN — MIDODRINE HYDROCHLORIDE 5 MG: 5 TABLET ORAL at 11:58

## 2024-09-13 RX ADMIN — GABAPENTIN 400 MG: 400 CAPSULE ORAL at 15:19

## 2024-09-13 RX ADMIN — CEFTRIAXONE 2000 MG: 2 INJECTION, POWDER, FOR SOLUTION INTRAMUSCULAR; INTRAVENOUS at 16:04

## 2024-09-13 RX ADMIN — METOPROLOL SUCCINATE 50 MG: 50 TABLET, EXTENDED RELEASE ORAL at 08:46

## 2024-09-13 RX ADMIN — FUROSEMIDE 20 MG: 20 TABLET ORAL at 08:45

## 2024-09-13 RX ADMIN — RANOLAZINE 500 MG: 500 TABLET, EXTENDED RELEASE ORAL at 08:45

## 2024-09-13 RX ADMIN — ASPIRIN 81 MG: 81 TABLET, COATED ORAL at 08:45

## 2024-09-13 RX ADMIN — CLINDAMYCIN PHOSPHATE 600 MG: 600 INJECTION, SOLUTION INTRAVENOUS at 08:46

## 2024-09-13 RX ADMIN — EMPAGLIFLOZIN 25 MG: 25 TABLET, FILM COATED ORAL at 08:45

## 2024-09-13 NOTE — CONSULTS
Infectious Diseases Consult Note    Referring Provider: Sameera Loja MD    Reason for Consultation: Left leg cellulitis    No care team member to display    Chief complaint chest pain, shortness of breath, left leg pain swelling and redness    Subjective     History of present illness:      This is a 83-year-old male presents to the hospital on 9/10/2024 with complaints of chest pain shortness of breath, left arm pain and worsening left leg pain swelling and redness after he had a scratch under his knee.  States that arm and chest pain has resolved and shortness of breath is better.  Says his left leg has improved also since being admitted.  Patient denies fever, chills, diaphoresis, , cough, GI symptoms, or any urinary symptoms.    Review of Systems   Review of Systems   Constitutional: Negative.    HENT: Negative.     Eyes: Negative.    Respiratory:  Positive for shortness of breath.    Cardiovascular:  Positive for leg swelling.   Gastrointestinal: Negative.    Endocrine: Negative.    Genitourinary: Negative.    Musculoskeletal: Negative.    Skin:  Positive for color change.   Neurological: Negative.    Psychiatric/Behavioral: Negative.     All other systems reviewed and are negative.      Medications  Medications Prior to Admission   Medication Sig Dispense Refill Last Dose    aspirin 81 MG EC tablet Take 1 tablet by mouth Daily.   9/10/2024    Cholecalciferol 10 MCG (400 UNIT) tablet Take 2 tablets by mouth Daily.   9/10/2024    dapagliflozin Propanediol (Farxiga) 10 MG tablet Take 10 mg by mouth Daily. [facility]   9/10/2024    ezetimibe (ZETIA) 10 MG tablet Take 1 tablet by mouth Every Evening.   9/9/2024    gabapentin (NEURONTIN) 800 MG tablet Take 1 tablet by mouth 3 (Three) Times a Day.   9/10/2024    methocarbamol (ROBAXIN) 500 MG tablet Take 1 tablet by mouth 3 (Three) Times a Day.   9/10/2024    metoprolol succinate XL (TOPROL-XL) 100 MG 24 hr tablet Take 0.5 tablets by mouth 2 (Two) Times a Day.    9/10/2024    midodrine (PROAMATINE) 5 MG tablet Take 1 tablet by mouth 3 (Three) Times a Day Before Meals.   9/10/2024    pantoprazole (PROTONIX) 40 MG EC tablet Take 1 tablet by mouth Daily.   9/10/2024    sucralfate (CARAFATE) 1 g tablet Take 1 tablet by mouth 4 (Four) Times a Day.   9/10/2024    Diclofenac Sodium (VOLTAREN) 1 % gel gel Apply 4 g topically to the appropriate area as directed 4 (Four) Times a Day As Needed.       furosemide (LASIX) 20 MG tablet Take 1 tablet by mouth Daily.       meclizine (ANTIVERT) 25 MG tablet Take 1 tablet by mouth Every 6 (Six) Hours As Needed for Dizziness.       nitroglycerin (NITROSTAT) 0.4 MG SL tablet Place 1 tablet under the tongue Every 5 (Five) Minutes As Needed for Chest Pain.       Omega-3 Fatty Acids (fish oil) 1000 MG capsule capsule Take 2 capsules by mouth 2 (Two) Times a Day With Meals.       potassium chloride (MICRO-K) 10 MEQ CR capsule Take 1 capsule by mouth Daily.       sennosides-docusate (PERICOLACE) 8.6-50 MG per tablet Take 1 tablet by mouth 2 (Two) Times a Day.          History  Past Medical History:   Diagnosis Date    Aneurysm     Cardiomyopathy     ICD implantation    Cellulitis of left lower extremity 2010    recurrent    CHD (coronary heart disease)     S/P CABG & PCI    Dyslipidemia     Heart valve disease     History of ventricular tachycardia     Hypertension     Myocardial infarction     Inferior MI    Pinched nerve     L4-L5    Prostate cancer      Past Surgical History:   Procedure Laterality Date    ANGIOPLASTY  2000 04/1996 Stent: Palmaz- Huy stent/LAD 07/1996-RCA: 2000-Tetra stent Guidant to proximal RCA, mid to distal artery 2 sequential Guidant Tetra stents    APPENDECTOMY      CARDIAC ABLATION  April and May 2019    CARDIAC CATHETERIZATION  07/2017    1996 x2, Nov. 2000, 05/2010-cath 08/2015-no stents 2017    CARDIAC DEFIBRILLATOR PLACEMENT      COLONOSCOPY W/ POLYPECTOMY      Mult colonoscopy's for polyp resection     CORONARY  ARTERY BYPASS GRAFT  05/2010    x5 vessel: LMA to diagonal, LAD, intermedius, obtuse marginal, RCA    CORONARY STENT PLACEMENT      ENDOSCOPY N/A 6/24/2019    Procedure: ESOPHAGOGASTRODUODENOSCOPY with dilitation Bougie 50, 54;  Surgeon: Roddy Coronel MD;  Location: [facility] ENDOSCOPY;  Service: Gastroenterology    INSERT / REPLACE / REMOVE PACEMAKER      KNEE OPEN LATERAL RELEASE Left     reduction    OTHER SURGICAL HISTORY  01/2018    ICD implantation    PROSTATE SURGERY  2015    Robiotic surgery- Cyber Knife:       Family History  Family History   Problem Relation Age of Onset    Pancreatic cancer Mother     Heart disease Father        Social History   reports that he quit smoking about 22 years ago. His smoking use included cigarettes. He started smoking about 39 years ago. He has a 17 pack-year smoking history. He has been exposed to tobacco smoke. He has never used smokeless tobacco. He reports that he does not currently use alcohol. He reports that he does not use drugs.    Allergies  Lovastatin, Pravastatin, Simvastatin, and Spironolactone    Objective     Vital Signs   Vital Signs (last 24 hours)         09/12 0700  09/13 0659 09/13 0700  09/13 1451   Most Recent      Temp (°F) 97.2 -  98.5    97.3 -  98.4     97.3 (36.3) 09/13 1416    Heart Rate 54 -  80      68     68 09/13 1416    Resp 17 -  19    17 -  18     17 09/13 1416    /59 -  138/73    98/56 -  116/65     116/65 09/13 1416    SpO2 (%) 92 -  95    90 -  93     90 09/13 1416            Physical Exam:  Physical Exam  Vitals and nursing note reviewed.   Constitutional:       General: He is not in acute distress.     Appearance: He is well-developed and normal weight. He is ill-appearing. He is not diaphoretic.   HENT:      Head: Normocephalic and atraumatic.   Eyes:      Conjunctiva/sclera: Conjunctivae normal.      Pupils: Pupils are equal, round, and reactive to light.   Cardiovascular:      Rate and Rhythm: Normal rate and regular rhythm.       Heart sounds: Normal heart sounds, S1 normal and S2 normal.   Pulmonary:      Effort: Pulmonary effort is normal. No respiratory distress.      Breath sounds: Normal breath sounds. No stridor. No wheezing or rales.   Abdominal:      General: Bowel sounds are normal. There is no distension.      Palpations: Abdomen is soft. There is no mass.      Tenderness: There is no abdominal tenderness. There is no guarding.   Musculoskeletal:         General: No deformity. Normal range of motion.      Cervical back: Neck supple.      Right lower leg: Edema present.      Left lower leg: Edema present.   Skin:     General: Skin is warm and dry.      Coloration: Skin is not pale.      Findings: Erythema present. No rash.      Comments: Patient has a superficial square area of irritation under the left patella that to be reaction from a bandage    There is some mild erythema, edema and warmth to the left lower leg and right lower leg-left worse than right   Neurological:      Mental Status: He is alert and oriented to person, place, and time.      Cranial Nerves: No cranial nerve deficit.   Psychiatric:         Mood and Affect: Mood normal.       Microbiology  Microbiology Results (last 10 days)       ** No results found for the last 240 hours. **            Laboratory  Results from last 7 days   Lab Units 09/13/24  0539   WBC 10*3/mm3 5.78   HEMOGLOBIN g/dL 13.0   HEMATOCRIT % 41.1   PLATELETS 10*3/mm3 184     Results from last 7 days   Lab Units 09/13/24  0539   SODIUM mmol/L 136   POTASSIUM mmol/L 4.3   CHLORIDE mmol/L 104   CO2 mmol/L 22.1   BUN mg/dL 18   CREATININE mg/dL 0.95   GLUCOSE mg/dL 107*   CALCIUM mg/dL 9.2     Results from last 7 days   Lab Units 09/13/24  0539   SODIUM mmol/L 136   POTASSIUM mmol/L 4.3   CHLORIDE mmol/L 104   CO2 mmol/L 22.1   BUN mg/dL 18   CREATININE mg/dL 0.95   GLUCOSE mg/dL 107*   CALCIUM mg/dL 9.2                   Radiology  Imaging Results (Last 72 Hours)       Procedure Component  Value Units Date/Time    CT Angiogram Chest Pulmonary Embolism [[id number]] Collected: 09/10/24 1618     Updated: 09/10/24 1626    Narrative:      CT ANGIOGRAM CHEST PULMONARY EMBOLISM    Date of Exam: 9/10/2024 4:00 PM EDT    Indication: chest pain.    Comparison: Chest CT 12/26/2023     Technique: Axial CT images were obtained of the chest after the uneventful intravenous administration of iodinated contrast utilizing pulmonary embolism protocol.  Sagittal and coronal reconstructions were performed.  Automated exposure control and   iterative reconstruction methods were used.      Findings:  Thyroid unremarkable. No supraclavicular adenopathy. Aortic atherosclerotic disease with stable aneurysmal dilation of the ascending thoracic aorta measuring up to 4.6 x 4.5 cm. Expected caliber of descending thoracic aorta. Severe coronary   atherosclerotic disease status post CABG. Cardiomegaly including dilated left ventricle up to 6.8 cm similar to prior. Single lead cardiac device in expected location. Small hiatal hernia. No pericardial effusion.    Mildly dilated main pulmonary artery measuring 3.5 cm stable from prior and can be seen with pulmonary arterial hypertension. No suspicious filling defect to suggest pulmonary embolism.    Layering stones versus vicarious excretion of contrast in the gallbladder. Low-density renal cysts not significantly changed from prior. Severely atrophic pancreas. No acute findings in the partially imaged upper abdomen. Gynecomastia. No axillary   adenopathy. Degenerative changes throughout the spine with superimposed findings consistent with diffuse idiopathic skeletal hyperostosis. No visualized displaced fracture or aggressive bone lesion.    No suspicious mediastinal or hilar adenopathy. Trachea patent. No infectious consolidation, edema, effusion or pneumothorax. Stable parenchymal cystic change most significant in the right lower lobe which could reflect sequelae of a prior    infectious/inflammatory insult. No suspicious or enlarging pulmonary nodules since prior comparison.            Impression:      Impression:  1. No acute CT findings. No evidence of pulmonary embolism.  2. 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        Electronically Signed: Rubén Wyatt MD    9/10/2024 4:24 PM EDT    Workstation ID: [id number]    XR Chest 1 View [[id number]] Collected: 09/10/24 1449     Updated: 09/10/24 1452    Narrative:      XR CHEST 1 VW    Date of Exam: 9/10/2024 2:47 PM EDT    Indication: chest pain    Comparison: PA and lateral chest 7/12/2024.    Findings:  Stable mild to moderate cardiac enlargement. Median sternotomy CABG changes are present. Left chest wall pacemaker and lead appear unchanged. No acute airspace disease. No pleural effusion or pneumothorax. No acute osseous abnormality.      Impression:      Impression:  Stable cardiomegaly. No acute chest finding.      Electronically Signed: Grace Dyson MD    9/10/2024 2:50 PM EDT    Workstation ID: [id number]            Cardiology      Results Review:  I have reviewed all clinical data, test, lab, and imaging results.       Schedule Meds  aspirin, 81 mg, Oral, Daily  cefTRIAXone (ROCEPHIN) 2,000 mg in sodium chloride 0.9 % 100 mL MBP, 2,000 mg, Intravenous, Q24H  empagliflozin, 25 mg, Oral, Daily  furosemide, 20 mg, Oral, Daily  gabapentin, 400 mg, Oral, Q6H  methocarbamol, 500 mg, Oral, BID  metoprolol succinate XL, 50 mg, Oral, Daily  midodrine, 5 mg, Oral, TID AC  pantoprazole, 40 mg, Oral, Daily  ranolazine, 500 mg, Oral, Q12H  sucralfate, 1 g, Oral, 4x Daily        Infusion Meds       PRN Meds    senna-docusate sodium **AND** polyethylene glycol **AND** bisacodyl **AND** bisacodyl    melatonin    [DISCONTINUED] Morphine **AND** naloxone     nitroglycerin    nitroglycerin    ondansetron    [COMPLETED] Insert Peripheral IV **AND** sodium chloride      Assessment & Plan       Assessment    Mild left lower leg cellulitis.  Patient also has very mild right lower leg edema and erythema.  Doppler was negative for DVT and patient has improved on Rocephin and clindamycin.    Admitted for chest pain, shortness of breath and left arm pain.  Status post CABG, CAD.  Cardiomyopathy status post ICD placement.  Cardiology following.  Symptoms have resolved    History of recurrent left lower leg cellulitis    Plan    Patient states he does not respond well to oral antibiotics    Recommend giving the patient a dose of IV Rocephin before discharge home today  Keep legs elevated is much as possible  Patient states he does have compression socks at home that he normally uses  Continue supportive care  Okay to discharge from Infectious Disease standpoint  Not much more to add from infectious disease standpoint-we will sign off at this time-please call with any questions.  Case discussed with hospitalist    Tamiko Victor, APRN  09/13/24  14:51 EDT    Note is dictated utilizing voice recognition software/Dragon

## 2024-09-13 NOTE — PLAN OF CARE
Goal Outcome Evaluation:  Plan of Care Reviewed With: patient   A/O x4 RA with IV ABT therapy. Consult for I and D today

## 2024-09-13 NOTE — CASE MANAGEMENT/SOCIAL WORK
Continued Stay Note  [facility]     Patient Name: Deion Nicholas  MRN: [id number]  Today's Date: 9/13/2024    Admit Date: 9/10/2024    Plan: DC PLAN: Routine home     Discharge Plan       Row Name 09/13/24 1436       Plan    Plan DC PLAN: Routine home      Patient/Family in Agreement with Plan yes    Plan Comments Reivewed [facility] and [facility] refusal form, verbalized understanding, signiture obtained and copies left at bedside. DC BARRIERS: Abnormal stress test, Aoric Aneurysm. Dr. Barros following. Pending Clinical course.                      Expected Discharge Date and Time       Expected Discharge Date Expected Discharge Time    Sep 13, 2024             Nicole Darden RN   Care Coordination  [phone]

## 2024-09-13 NOTE — PLAN OF CARE
Goal Outcome Evaluation:    IV antibiotics continued per MAR

## 2024-09-13 NOTE — PROGRESS NOTES
[facility] CARDIOLOGY  [facility]        LOS:  LOS: 2 days   Patient Name: Deion Nicholas  Age/Sex: 83 y.o. male  : 1940  MRN: [id number]    Day of Service: 24   Length of Stay: 2  Encounter Provider: NIMCO Martinez  Place of Service: [facility] CARDIOLOGY  No care team member to display    Cardiology assessment and plan        Chest pain  Normal troponin  Known cardiomyopathy  Left bundle branch block versus intraventricular conduction delay  CT angiogram of the chest with no pulmonary embolism no aortic dissection ascending aortic aneurysm measuring 4.6 cm  Left lower extremity cellulitis  Coronary artery disease with prior coronary bypass surgery in   Known cardiomyopathy  Status post ICD  Normal proBNP  Prior echocardiogram in 2023 with LV ejection fraction of 20 to 25%  Mild mitral regurgitation mild aortic insufficiency  History of ventricular tachycardia status post ablation therapy  Known ascending aortic aneurysm  Hypertension  Hyperlipidemia  Chronic renal insufficiency  Myocardial perfusion study with no significant reversible ischemia  Prior known myocardial infarction  Chest pain symptoms are somewhat atypical  Plan to manage conservatively medical therapy  Unless patient has recurrent or worsening symptoms no need for any further invasive workup  Diagnosis and treatment options reviewed and discussed with patient  Continued aggressive risk factor modification  Need for close monitoring and follow-up reviewed and discussed with patient  Tmax is 97.3 pulse is 66 respirations are 16 blood pressure is 116/65 sats are 90%  Normal troponin  Sodium is 136 potassium is 4.3 creatinine 0.9 hemoglobin is 13  Current medications include aspirin 81 mg p.o. once a day patient is on Lasix 20 mg p.o. once a day Toprol-XL 50 mg p.o. once a day midodrine 5 mg p.o. 3 times a day consider DVT prophylaxis  Will add  Ranexa 500 mg p.o. twice daily  Stable from cardiac standpoint  Follow-up in office            Subjective:     Chief Complaint:  F/U Chest Pain    Subjective:   Patient denies further chest pain.  He has ambulated to the bathroom.    Current Medications:   Scheduled Meds:aspirin, 81 mg, Oral, Daily  cefTRIAXone (ROCEPHIN) 2,000 mg in sodium chloride 0.9 % 100 mL MBP, 2,000 mg, Intravenous, Q24H  empagliflozin, 25 mg, Oral, Daily  furosemide, 20 mg, Oral, Daily  gabapentin, 400 mg, Oral, Q6H  methocarbamol, 500 mg, Oral, BID  metoprolol succinate XL, 50 mg, Oral, Daily  midodrine, 5 mg, Oral, TID AC  pantoprazole, 40 mg, Oral, Daily  ranolazine, 500 mg, Oral, Q12H  sucralfate, 1 g, Oral, 4x Daily      Continuous Infusions:     Allergies:  Allergies   Allergen Reactions    Lovastatin Myalgia    Pravastatin Myalgia and Unknown (See Comments)     unknown    Simvastatin Unknown (See Comments) and Myalgia     unknown    Spironolactone Other (See Comments)     Gynecomastia        Review of Systems   Constitutional: Negative for chills, decreased appetite and malaise/fatigue.   HENT:  Negative for congestion and nosebleeds.    Eyes:  Negative for blurred vision and double vision.   Cardiovascular:  Negative for chest pain, dyspnea on exertion, irregular heartbeat, leg swelling, near-syncope, orthopnea, palpitations and paroxysmal nocturnal dyspnea.   Respiratory:  Negative for cough and shortness of breath.    Hematologic/Lymphatic: Negative for adenopathy. Does not bruise/bleed easily.   Skin:  Negative for rash.   Musculoskeletal:  Negative for back pain and joint pain.   Gastrointestinal:  Negative for bloating, abdominal pain, hematemesis and hematochezia.   Genitourinary:  Negative for flank pain and hematuria.   Neurological:  Negative for dizziness and focal weakness.   Psychiatric/Behavioral:  Negative for altered mental status. The patient does not have insomnia.        Objective:     Temp:  [97.2 °F (36.2 °C)-98.5  °F (36.9 °C)] 97.3 °F (36.3 °C)  Heart Rate:  [67-80] 68  Resp:  [17-19] 17  BP: ()/(56-73) 116/65     Intake/Output Summary (Last 24 hours) at 9/13/2024 1418  Last data filed at 9/13/2024 1416  Gross per 24 hour   Intake 1200 ml   Output 950 ml   Net 250 ml     Body mass index is 26.7 kg/m².      09/11/24  0534 09/12/24  0400 09/13/24  0524   Weight: 92.4 kg (203 lb 11.3 oz) 92.7 kg (204 lb 5.9 oz) 91.8 kg (202 lb 6.1 oz)         Physical Exam:  Neuro:  CV:  Resp:  GI:  Ext:  Tele: AAOx3, no gross deficits  S1S2 RRR, no murmur  Nonlabored, CTA  BS+, abd soft  Pedal pulses palp, LLE wrapped, trace pitting RLE edema  SR with PVCs                                                   Lab Review:   Results from last 7 days   Lab Units 09/13/24  0539 09/11/24  0337   SODIUM mmol/L 136 138   POTASSIUM mmol/L 4.3 4.0   CHLORIDE mmol/L 104 105   CO2 mmol/L 22.1 22.4   BUN mg/dL 18 15   CREATININE mg/dL 0.95 0.97   GLUCOSE mg/dL 107* 100*   CALCIUM mg/dL 9.2 9.0     Results from last 7 days   Lab Units 09/11/24  0337 09/10/24  1354   HSTROP T ng/L 18 26*     Results from last 7 days   Lab Units 09/13/24  0539 09/11/24  0337   WBC 10*3/mm3 5.78 5.66   HEMOGLOBIN g/dL 13.0 13.2   HEMATOCRIT % 41.1 40.5   PLATELETS 10*3/mm3 184 157     Results from last 7 days   Lab Units 09/10/24  1354   INR  0.99   APTT seconds 30.5*         Results from last 7 days   Lab Units 09/11/24  0337   CHOLESTEROL mg/dL 160   TRIGLYCERIDES mg/dL 100   HDL CHOL mg/dL 47     Results from last 7 days   Lab Units 09/10/24  1354   PROBNP pg/mL 373.0           Recent Radiology:  Imaging Results (Most Recent)       Procedure Component Value Units Date/Time    CT Angiogram Chest Pulmonary Embolism [[id number]] Collected: 09/10/24 1618     Updated: 09/10/24 1626    Narrative:      CT ANGIOGRAM CHEST PULMONARY EMBOLISM    Date of Exam: 9/10/2024 4:00 PM EDT    Indication: chest pain.    Comparison: Chest CT 12/26/2023     Technique: Axial CT images were  obtained of the chest after the uneventful intravenous administration of iodinated contrast utilizing pulmonary embolism protocol.  Sagittal and coronal reconstructions were performed.  Automated exposure control and   iterative reconstruction methods were used.      Findings:  Thyroid unremarkable. No supraclavicular adenopathy. Aortic atherosclerotic disease with stable aneurysmal dilation of the ascending thoracic aorta measuring up to 4.6 x 4.5 cm. Expected caliber of descending thoracic aorta. Severe coronary   atherosclerotic disease status post CABG. Cardiomegaly including dilated left ventricle up to 6.8 cm similar to prior. Single lead cardiac device in expected location. Small hiatal hernia. No pericardial effusion.    Mildly dilated main pulmonary artery measuring 3.5 cm stable from prior and can be seen with pulmonary arterial hypertension. No suspicious filling defect to suggest pulmonary embolism.    Layering stones versus vicarious excretion of contrast in the gallbladder. Low-density renal cysts not significantly changed from prior. Severely atrophic pancreas. No acute findings in the partially imaged upper abdomen. Gynecomastia. No axillary   adenopathy. Degenerative changes throughout the spine with superimposed findings consistent with diffuse idiopathic skeletal hyperostosis. No visualized displaced fracture or aggressive bone lesion.    No suspicious mediastinal or hilar adenopathy. Trachea patent. No infectious consolidation, edema, effusion or pneumothorax. Stable parenchymal cystic change most significant in the right lower lobe which could reflect sequelae of a prior   infectious/inflammatory insult. No suspicious or enlarging pulmonary nodules since prior comparison.            Impression:      Impression:  1. No acute CT findings. No evidence of pulmonary embolism.  2. Multiple chronic findings as all similar to previous comparison including aneurysmal dilation of the ascending thoracic  aorta, cardiomegaly, mildly dilated main pulmonary artery, small hiatal hernia, among other findings.        Electronically Signed: Rubén Wyatt MD    9/10/2024 4:24 PM EDT    Workstation ID: [id number]    XR Chest 1 View [[id number]] Collected: 09/10/24 1449     Updated: 09/10/24 1452    Narrative:      XR CHEST 1 VW    Date of Exam: 9/10/2024 2:47 PM EDT    Indication: chest pain    Comparison: PA and lateral chest 7/12/2024.    Findings:  Stable mild to moderate cardiac enlargement. Median sternotomy CABG changes are present. Left chest wall pacemaker and lead appear unchanged. No acute airspace disease. No pleural effusion or pneumothorax. No acute osseous abnormality.      Impression:      Impression:  Stable cardiomegaly. No acute chest finding.      Electronically Signed: Grace Dyson MD    9/10/2024 2:50 PM EDT    Workstation ID: [id number]            ECHOCARDIOGRAM:    Results for orders placed during the hospital encounter of 12/26/23    Adult Transthoracic Echo Complete W/ Cont if Necessary Per Protocol    Interpretation Summary    Left ventricular systolic function is severely decreased. Left ventricular ejection fraction appears to be 21 - 25%.    The left ventricular cavity is severely dilated.    Left ventricular diastolic function is consistent with (grade I) impaired relaxation.    The left atrial cavity is moderately dilated.    Abnormal mitral valve structure consistent with dilated annulus.    Moderate dilation of the ascending aorta is present.    Ascending aorta = 4.7 cm        I reviewed the patient's new clinical results.    EKG:      Assessment:       Cellulitis of left lower leg    1) Chest Pain  -High-sensitivity troponin negative x 2  -EKG nondiagnostic with LBBB  -CTA of the chest negative for aortic dissection or PE; ascending aortic aneurysm 4.6 x 4.5 cm.  - Nuclear stress test showed a fixed defect but no ischemia     2) LLE cellulitis  - per primary team     3) CAD s/p 5vCABG in  2010  - Last nuclear stress test 12/2023 was negative for ischemia.       4) ICM s/p [employer] ICD in 2018  -   - Last 2D echo 12/2023 showed an EF 21-25% with grade 1 diastolic dysfunction and mild MR/AI.  - appears compensated     5) hx VT s/p ablation     6) hx 5 cm ascending aortic aneurysm  - Dr. Barros following     7) HTN     8) HLD     9) renal insufficiency    Plan:   Nuclear stress testing showed a fixed defect but no ischemia.  Ranexa added yesterday.  Patient without further c/o chest pain.  No plan for further CV work-up at this juncture.  F/U with Dr. Childers in 2 weeks.      Electronically signed by NIMCO Martinez, 09/13/24, 2:23 PM EDT.

## 2024-09-13 NOTE — DISCHARGE SUMMARY
".             [facility] Medicine Services  Discharge Summary    Date of Service: 2024  Patient Name: Deion Nicholas  : 1940  MRN: [id number]    Date of Admission: 9/10/2024  Discharge Diagnosis: Chest pain and left lower extremity cellulitis  Date of Discharge: 2024  Primary Care Physician: No primary care provider on file.      Presenting Problem:   Chest pain [R07.9]  Cellulitis of left lower leg [L03.116]  Chest pain, unspecified type [R07.9]    Active and Resolved Hospital Problems:  Active Hospital Problems    Diagnosis POA    Chest pain [R07.9] Yes    Cellulitis of left lower leg [L03.116] Unknown      Resolved Hospital Problems   No resolved problems to display.         Hospital Course     HPI:    \"Deion Nicholas is a 83 y.o. male with PMHx of HTN, HLD, AAA, h/o cardiomyopathy s/p ICD, HFrEF, CAD s/p CABG who presented to [facility] on 9/10 due to chest pain.  Admitted to intermittent left sided chest pain over the past several days complicated by dyspnea, and increased erythema over the left leg after he scratched it and broke skin.  States his left leg is the one they took veins out of for CABG so gets frequently infected.  Presented to [facility].  \"    Hospital Course:  #Chest pain  #CAD    - s/p CABG    - HS troponin 26 > 18    - proBNP 373    - aspirin daily    - stress myoview on 24 consistent with intermediate risk study    - s/p icd    - cardiology following, plans to manage conservatively, unless patient has recurrent or worsening symptoms no need for any further invasive workup per cardiology    - Continue aspirin, lasix, Toprol, midodrine    - Ranexa 500mg Po BID added by cardiology     #LLE cellulitis    - duplex negative    - Rocephin 2g IV daily    - Clindamycin 600mg IV TID    - pt/ot     #HFrEF    - chronic, stable    - proBNP 373    - continue home Lasix and home meds    - echo on 23 shows ef 21-25%     #h/o cardiomyoapthy    - s/p icd    - stable, monitor    - cardiology " following     #Ascending aortic aneurysm    - noted on echo dated 12/27/23 at 4.7cm    - CTA shows at 4.6 x 4.5 cm     #HLD    - hx of statin allergy     #HTN    - continue Toprol    - is on PRN midodrine    9/13/2024: Patient seen and examined at bedside, notes significant improvement in erythema and edema of left lower extremity.  It is being wrapped daily, he is educated to elevate the leg and finish antibiotic course.  Patient notes he has had multiple hospitalization for left lower extremity cellulitis and also has had multiple surgeries on left knee.  ID consulted to help with antibiotic stewardship and recommend, patient is well-known to them and after reviewing the case they recommend completing 1 more dose of IV Rocephin and discharging home without further antibiotic course as left leg cellulitis seems to have resolved.    DISCHARGE Follow Up Recommendations for labs and diagnostics: PCP and cardiology        Day of Discharge     Vital Signs:  Temp:  [97.2 °F (36.2 °C)-98.5 °F (36.9 °C)] 98.4 °F (36.9 °C)  Heart Rate:  [67-80] 68  Resp:  [17-19] 18  BP: ()/(56-73) 98/56    Physical Exam:  Physical Exam   Constitutional:       General: He is not in acute distress.     Appearance: Normal appearance. He is not toxic-appearing.   HENT:      Head: Normocephalic and atraumatic.      Nose: Nose normal. No congestion.      Mouth/Throat:      Pharynx: Oropharynx is clear. No oropharyngeal exudate.   Eyes:      General: No scleral icterus.  Cardiovascular:      Rate and Rhythm: Normal rate and regular rhythm.      Heart sounds: No murmur heard.     No friction rub. No gallop.   Pulmonary:      Effort: No respiratory distress.      Breath sounds: No wheezing or rales.   Abdominal:      General: There is no distension.      Tenderness: There is no abdominal tenderness. There is no guarding.   Musculoskeletal:         General: No swelling, deformity or signs of injury.      Cervical back: Normal range of motion.  No rigidity.      Comments: Left leg erythema improving  Skin:     Coloration: Skin is not jaundiced.      Findings: Erythema present. No bruising or lesion.   Neurological:      General: No focal deficit present.      Mental Status: He is alert and oriented to person, place, and time       Pertinent  and/or Most Recent Results     LAB RESULTS:      Lab 09/13/24  0539 09/11/24  0337 09/10/24  1354   WBC 5.78 5.66 6.11   HEMOGLOBIN 13.0 13.2 13.3   HEMATOCRIT 41.1 40.5 41.7   PLATELETS 184 157 189   NEUTROS ABS 3.11 2.83 3.16   IMMATURE GRANS (ABS) 0.01 0.01 0.01   LYMPHS ABS 1.62 1.79 1.88   MONOS ABS 0.67 0.59 0.67   EOS ABS 0.32 0.38 0.34   MCV 93.8 94.0 93.5   PROTIME  --   --  10.8   APTT  --   --  30.5*         Lab 09/13/24  0539 09/11/24  0337 09/10/24  1354   SODIUM 136 138 138   POTASSIUM 4.3 4.0 4.3   CHLORIDE 104 105 104   CO2 22.1 22.4 23.5   ANION GAP 9.9 10.6 10.5   BUN 18 15 20   CREATININE 0.95 0.97 1.16   EGFR 80.4 78.4 63.3   GLUCOSE 107* 100* 118*   CALCIUM 9.2 9.0 9.9             Lab 09/11/24  0337 09/10/24  1354   PROBNP  --  373.0   HSTROP T 18 26*   PROTIME  --  10.8   INR  --  0.99         Lab 09/11/24  0337   CHOLESTEROL 160   LDL CHOL 95   HDL CHOL 47   TRIGLYCERIDES 100             Brief Urine Lab Results       None          Microbiology Results (last 10 days)       ** No results found for the last 240 hours. **            CT Angiogram Chest Pulmonary Embolism    Result Date: 9/10/2024  Impression: Impression: 1. No acute CT findings. No evidence of pulmonary embolism. 2. 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 Electronically Signed: Rubén Wyatt MD  9/10/2024 4:24 PM EDT  Workstation ID: [id number]    XR Chest 1 View    Result Date: 9/10/2024  Impression: Impression: Stable cardiomegaly. No acute chest  finding. Electronically Signed: Grace Dyson MD  9/10/2024 2:50 PM EDT  Workstation ID: [id number]     Results for orders placed during the hospital encounter of 09/10/24    Duplex Venous Lower Extremity - Left    Interpretation Summary    Normal left lower extremity venous duplex scan.      Results for orders placed during the hospital encounter of 09/10/24    Duplex Venous Lower Extremity - Left    Interpretation Summary    Normal left lower extremity venous duplex scan.      Results for orders placed during the hospital encounter of 12/26/23    Adult Transthoracic Echo Complete W/ Cont if Necessary Per Protocol    Interpretation Summary    Left ventricular systolic function is severely decreased. Left ventricular ejection fraction appears to be 21 - 25%.    The left ventricular cavity is severely dilated.    Left ventricular diastolic function is consistent with (grade I) impaired relaxation.    The left atrial cavity is moderately dilated.    Abnormal mitral valve structure consistent with dilated annulus.    Moderate dilation of the ascending aorta is present.    Ascending aorta = 4.7 cm      Labs Pending at Discharge:      Procedures Performed           Consults:   Consults       Date and Time Order Name Status Description    9/13/2024 10:24 AM Inpatient Infectious Diseases Consult      9/11/2024  6:16 PM Inpatient Hospitalist Consult      9/10/2024  5:37 PM Inpatient Cardiology Consult Completed               Discharge Details        Discharge Medications        ASK your doctor about these medications        Instructions Start Date   aspirin 81 MG EC tablet   81 mg, Oral, Daily      cholecalciferol 10 MCG (400 UNIT) tablet  Commonly known as: VITAMIN D3   800 Units, Oral, Daily      Diclofenac Sodium 1 % gel gel  Commonly known as: VOLTAREN   4 g, Topical, 4 Times Daily PRN      ezetimibe 10 MG tablet  Commonly known as: ZETIA   10 mg, Oral, Every Evening      Farxiga 10 MG tablet  Generic drug: dapagliflozin  Propanediol   10 mg, Oral, Daily, [facility]      fish oil 1000 MG capsule capsule   2,000 mg, Oral, 2 Times Daily With Meals      furosemide 20 MG tablet  Commonly known as: LASIX   20 mg, Oral, Daily      gabapentin 800 MG tablet  Commonly known as: NEURONTIN   800 mg, Oral, 3 Times Daily      meclizine 25 MG tablet  Commonly known as: ANTIVERT   25 mg, Oral, Every 6 Hours PRN      methocarbamol 500 MG tablet  Commonly known as: ROBAXIN   500 mg, Oral, 3 Times Daily      metoprolol succinate  MG 24 hr tablet  Commonly known as: TOPROL-XL   50 mg, Oral, 2 Times Daily      midodrine 5 MG tablet  Commonly known as: PROAMATINE   5 mg, Oral, 3 Times Daily Before Meals      nitroglycerin 0.4 MG SL tablet  Commonly known as: NITROSTAT   0.4 mg, Sublingual, Every 5 Minutes PRN      pantoprazole 40 MG EC tablet  Commonly known as: PROTONIX   40 mg, Oral, Daily      potassium chloride 10 MEQ CR capsule  Commonly known as: MICRO-K   10 mEq, Oral, Daily      sennosides-docusate 8.6-50 MG per tablet  Commonly known as: PERICOLACE   1 tablet, Oral, 2 Times Daily      sucralfate 1 g tablet  Commonly known as: CARAFATE   1 g, Oral, 4 Times Daily               Allergies   Allergen Reactions    Lovastatin Myalgia    Pravastatin Myalgia and Unknown (See Comments)     unknown    Simvastatin Unknown (See Comments) and Myalgia     unknown    Spironolactone Other (See Comments)     Gynecomastia          Discharge Disposition: Home routine      Diet:  Hospital:  Diet Order   Procedures    Diet: Cardiac; Healthy Heart (2-3 Na+); Fluid Consistency: Thin (IDDSI 0)         Discharge Activity:         CODE STATUS:  Code Status and Medical Interventions: CPR (Attempt to Resuscitate); Full Support   Ordered at: 09/11/24 0654     Code Status (Patient has no pulse and is not breathing):    CPR (Attempt to Resuscitate)     Medical Interventions (Patient has pulse or is breathing):    Full Support         Future Appointments   Date Time  Provider Department Center   9/16/2024  2:15 PM Caro Richards APRN [facility]   10/7/2024  3:15 PM Constantino Arvizu MD [facility]   11/20/2024  2:30 PM Marcin Childers DO [facility]   2/27/2025 11:00 AM John Barros MD [facility]           Time spent on Discharge including face to face service:  > 30 minutes    Signature: Electronically signed by Sameera Loja MD, 09/13/24, 13:45 EDT.  [facility] Hospitalist Team

## 2024-09-13 NOTE — PLAN OF CARE
Goal Outcome Evaluation:  Plan of Care Reviewed With: patient      D/C to home

## 2024-09-14 NOTE — OUTREACH NOTE
Prep Survey      Flowsheet Row Responses   [facility] patient discharged from? [facility]   Is LACE score < 7 ? No   Eligibility Readm Mgmt   Discharge diagnosis Cellulitis of left lower leg-Chest pain   Does the patient have one of the following disease processes/diagnoses(primary or secondary)? Other   Does the patient have Home health ordered? No   Is there a DME ordered? No   Prep survey completed? Yes            ISIS RAYMUNDO - Registered Nurse

## 2024-09-16 ENCOUNTER — OFFICE VISIT (OUTPATIENT)
Dept: CARDIOLOGY | Facility: CLINIC | Age: 84
End: 2024-09-16
Payer: OTHER GOVERNMENT

## 2024-09-16 ENCOUNTER — READMISSION MANAGEMENT (OUTPATIENT)
Dept: CALL CENTER | Facility: HOSPITAL | Age: 84
End: 2024-09-16
Payer: OTHER GOVERNMENT

## 2024-09-16 VITALS
SYSTOLIC BLOOD PRESSURE: 157 MMHG | WEIGHT: 202 LBS | DIASTOLIC BLOOD PRESSURE: 75 MMHG | BODY MASS INDEX: 26.77 KG/M2 | HEIGHT: 73 IN | OXYGEN SATURATION: 97 % | HEART RATE: 105 BPM

## 2024-09-16 DIAGNOSIS — I25.5 ISCHEMIC CARDIOMYOPATHY: Chronic | ICD-10-CM

## 2024-09-16 DIAGNOSIS — I10 ESSENTIAL HYPERTENSION: ICD-10-CM

## 2024-09-16 DIAGNOSIS — I25.810 CORONARY ARTERY DISEASE INVOLVING CORONARY BYPASS GRAFT OF NATIVE HEART WITHOUT ANGINA PECTORIS: Primary | Chronic | ICD-10-CM

## 2024-09-16 PROCEDURE — 99213 OFFICE O/P EST LOW 20 MIN: CPT | Performed by: NURSE PRACTITIONER

## 2024-09-16 RX ORDER — RANOLAZINE 500 MG/1
500 TABLET, EXTENDED RELEASE ORAL 2 TIMES DAILY
Qty: 60 TABLET | Refills: 5 | Status: SHIPPED | OUTPATIENT
Start: 2024-09-16 | End: 2024-09-19 | Stop reason: SDUPTHER

## 2024-09-19 RX ORDER — RANOLAZINE 500 MG/1
500 TABLET, EXTENDED RELEASE ORAL 2 TIMES DAILY
Qty: 60 TABLET | Refills: 5 | Status: SHIPPED | OUTPATIENT
Start: 2024-09-19

## 2024-09-25 ENCOUNTER — READMISSION MANAGEMENT (OUTPATIENT)
Dept: CALL CENTER | Facility: HOSPITAL | Age: 84
End: 2024-09-25
Payer: OTHER GOVERNMENT

## 2024-09-27 ENCOUNTER — TELEPHONE (OUTPATIENT)
Dept: CARDIOLOGY | Facility: CLINIC | Age: 84
End: 2024-09-27

## 2024-09-27 NOTE — TELEPHONE ENCOUNTER
Caller: Deino Nicholas     Relationship: PATIENT     Best call back number: [phone]    What is your medical concern? PT IS CALLING TO SEE WHAT PRESCRIPTION WAS SENT IN FOR HIS CHEST PAIN. PLEASE ADVISE

## 2024-10-01 NOTE — TELEPHONE ENCOUNTER
Multiple attempts to reach pt no with call not going through.    Pt was given Ranexa for his chest pain.  Ok for hub to release.

## 2024-10-05 ENCOUNTER — HOSPITAL ENCOUNTER (OUTPATIENT)
Facility: HOSPITAL | Age: 84
Setting detail: OBSERVATION
Discharge: HOME | End: 2024-10-07
Attending: EMERGENCY MEDICINE | Admitting: STUDENT IN AN ORGANIZED HEALTH CARE EDUCATION/TRAINING PROGRAM
Payer: MEDICARE

## 2024-10-05 ENCOUNTER — APPOINTMENT (OUTPATIENT)
Dept: GENERAL RADIOLOGY | Facility: HOSPITAL | Age: 84
End: 2024-10-05
Payer: MEDICARE

## 2024-10-05 DIAGNOSIS — R00.2 PALPITATIONS: Primary | ICD-10-CM

## 2024-10-05 LAB
ALBUMIN SERPL-MCNC: 4.1 G/DL (ref 3.5–5.2)
ALBUMIN/GLOB SERPL: 1.3 G/DL
ALP SERPL-CCNC: 108 U/L (ref 39–117)
ALT SERPL W P-5'-P-CCNC: 15 U/L (ref 1–41)
ANION GAP SERPL CALCULATED.3IONS-SCNC: 8.8 MMOL/L (ref 5–15)
AST SERPL-CCNC: 20 U/L (ref 1–40)
BASOPHILS # BLD AUTO: 0.04 10*3/MM3 (ref 0–0.2)
BASOPHILS NFR BLD AUTO: 0.6 % (ref 0–1.5)
BILIRUB SERPL-MCNC: 0.3 MG/DL (ref 0–1.2)
BUN SERPL-MCNC: 17 MG/DL (ref 8–23)
BUN/CREAT SERPL: 15.6 (ref 7–25)
CALCIUM SPEC-SCNC: 9.1 MG/DL (ref 8.6–10.5)
CHLORIDE SERPL-SCNC: 106 MMOL/L (ref 98–107)
CO2 SERPL-SCNC: 24.2 MMOL/L (ref 22–29)
CREAT SERPL-MCNC: 1.09 MG/DL (ref 0.76–1.27)
DEPRECATED RDW RBC AUTO: 52.9 FL (ref 37–54)
EGFRCR SERPLBLD CKD-EPI 2021: 68.2 ML/MIN/1.73
EOSINOPHIL # BLD AUTO: 0.32 10*3/MM3 (ref 0–0.4)
EOSINOPHIL NFR BLD AUTO: 5.2 % (ref 0.3–6.2)
ERYTHROCYTE [DISTWIDTH] IN BLOOD BY AUTOMATED COUNT: 14.9 % (ref 12.3–15.4)
GLOBULIN UR ELPH-MCNC: 3.1 GM/DL
GLUCOSE SERPL-MCNC: 134 MG/DL (ref 65–99)
HCT VFR BLD AUTO: 41.1 % (ref 37.5–51)
HGB BLD-MCNC: 13.1 G/DL (ref 13–17.7)
IMM GRANULOCYTES # BLD AUTO: 0 10*3/MM3 (ref 0–0.05)
IMM GRANULOCYTES NFR BLD AUTO: 0 % (ref 0–0.5)
LYMPHOCYTES # BLD AUTO: 1.65 10*3/MM3 (ref 0.7–3.1)
LYMPHOCYTES NFR BLD AUTO: 26.7 % (ref 19.6–45.3)
MCH RBC QN AUTO: 30.3 PG (ref 26.6–33)
MCHC RBC AUTO-ENTMCNC: 31.9 G/DL (ref 31.5–35.7)
MCV RBC AUTO: 94.9 FL (ref 79–97)
MONOCYTES # BLD AUTO: 0.61 10*3/MM3 (ref 0.1–0.9)
MONOCYTES NFR BLD AUTO: 9.9 % (ref 5–12)
NEUTROPHILS NFR BLD AUTO: 3.55 10*3/MM3 (ref 1.7–7)
NEUTROPHILS NFR BLD AUTO: 57.6 % (ref 42.7–76)
PLATELET # BLD AUTO: 191 10*3/MM3 (ref 140–450)
PMV BLD AUTO: 9.3 FL (ref 6–12)
POTASSIUM SERPL-SCNC: 4.2 MMOL/L (ref 3.5–5.2)
PROT SERPL-MCNC: 7.2 G/DL (ref 6–8.5)
RBC # BLD AUTO: 4.33 10*6/MM3 (ref 4.14–5.8)
SODIUM SERPL-SCNC: 139 MMOL/L (ref 136–145)
TROPONIN T SERPL HS-MCNC: 24 NG/L
WBC NRBC COR # BLD AUTO: 6.17 10*3/MM3 (ref 3.4–10.8)

## 2024-10-05 PROCEDURE — 36415 COLL VENOUS BLD VENIPUNCTURE: CPT

## 2024-10-05 PROCEDURE — 80053 COMPREHEN METABOLIC PANEL: CPT | Performed by: EMERGENCY MEDICINE

## 2024-10-05 PROCEDURE — 71046 X-RAY EXAM CHEST 2 VIEWS: CPT

## 2024-10-05 PROCEDURE — 85379 FIBRIN DEGRADATION QUANT: CPT | Performed by: EMERGENCY MEDICINE

## 2024-10-05 PROCEDURE — 85025 COMPLETE CBC W/AUTO DIFF WBC: CPT | Performed by: EMERGENCY MEDICINE

## 2024-10-05 PROCEDURE — 93005 ELECTROCARDIOGRAM TRACING: CPT | Performed by: EMERGENCY MEDICINE

## 2024-10-05 PROCEDURE — 84484 ASSAY OF TROPONIN QUANT: CPT | Performed by: EMERGENCY MEDICINE

## 2024-10-05 PROCEDURE — 83880 ASSAY OF NATRIURETIC PEPTIDE: CPT | Performed by: EMERGENCY MEDICINE

## 2024-10-05 PROCEDURE — 99285 EMERGENCY DEPT VISIT HI MDM: CPT

## 2024-10-05 RX ORDER — SODIUM CHLORIDE 0.9 % (FLUSH) 0.9 %
10 SYRINGE (ML) INJECTION AS NEEDED
Status: DISCONTINUED | OUTPATIENT
Start: 2024-10-05 | End: 2024-10-07 | Stop reason: HOSPADM

## 2024-10-06 ENCOUNTER — APPOINTMENT (OUTPATIENT)
Dept: CARDIOLOGY | Facility: HOSPITAL | Age: 84
End: 2024-10-06
Payer: MEDICARE

## 2024-10-06 PROBLEM — R07.9 CHEST PAIN: Status: ACTIVE | Noted: 2024-10-06

## 2024-10-06 PROBLEM — E87.70 FLUID OVERLOAD: Status: ACTIVE | Noted: 2024-10-06

## 2024-10-06 LAB
ANION GAP SERPL CALCULATED.3IONS-SCNC: 8.9 MMOL/L (ref 5–15)
BASOPHILS # BLD AUTO: 0.04 10*3/MM3 (ref 0–0.2)
BASOPHILS NFR BLD AUTO: 0.7 % (ref 0–1.5)
BH CV LOWER VASCULAR LEFT COMMON FEMORAL AUGMENT: NORMAL
BH CV LOWER VASCULAR LEFT COMMON FEMORAL COMPETENT: NORMAL
BH CV LOWER VASCULAR LEFT COMMON FEMORAL COMPRESS: NORMAL
BH CV LOWER VASCULAR LEFT COMMON FEMORAL PHASIC: NORMAL
BH CV LOWER VASCULAR LEFT COMMON FEMORAL SPONT: NORMAL
BH CV LOWER VASCULAR LEFT DISTAL FEMORAL COMPRESS: NORMAL
BH CV LOWER VASCULAR LEFT GASTRONEMIUS COMPRESS: NORMAL
BH CV LOWER VASCULAR LEFT GREATER SAPH BK COMPRESS: NORMAL
BH CV LOWER VASCULAR LEFT LESSER SAPH COMPRESS: NORMAL
BH CV LOWER VASCULAR LEFT MID FEMORAL AUGMENT: NORMAL
BH CV LOWER VASCULAR LEFT MID FEMORAL COMPETENT: NORMAL
BH CV LOWER VASCULAR LEFT MID FEMORAL COMPRESS: NORMAL
BH CV LOWER VASCULAR LEFT MID FEMORAL PHASIC: NORMAL
BH CV LOWER VASCULAR LEFT MID FEMORAL SPONT: NORMAL
BH CV LOWER VASCULAR LEFT PERONEAL COMPRESS: NORMAL
BH CV LOWER VASCULAR LEFT POPLITEAL AUGMENT: NORMAL
BH CV LOWER VASCULAR LEFT POPLITEAL COMPETENT: NORMAL
BH CV LOWER VASCULAR LEFT POPLITEAL COMPRESS: NORMAL
BH CV LOWER VASCULAR LEFT POPLITEAL PHASIC: NORMAL
BH CV LOWER VASCULAR LEFT POPLITEAL SPONT: NORMAL
BH CV LOWER VASCULAR LEFT POSTERIOR TIBIAL COMPRESS: NORMAL
BH CV LOWER VASCULAR LEFT PROXIMAL FEMORAL COMPRESS: NORMAL
BH CV LOWER VASCULAR LEFT SAPHENOFEMORAL JUNCTION COMPRESS: NORMAL
BH CV LOWER VASCULAR RIGHT COMMON FEMORAL AUGMENT: NORMAL
BH CV LOWER VASCULAR RIGHT COMMON FEMORAL COMPETENT: NORMAL
BH CV LOWER VASCULAR RIGHT COMMON FEMORAL COMPRESS: NORMAL
BH CV LOWER VASCULAR RIGHT COMMON FEMORAL PHASIC: NORMAL
BH CV LOWER VASCULAR RIGHT COMMON FEMORAL SPONT: NORMAL
BH CV LOWER VASCULAR RIGHT DISTAL FEMORAL COMPRESS: NORMAL
BH CV LOWER VASCULAR RIGHT GASTRONEMIUS COMPRESS: NORMAL
BH CV LOWER VASCULAR RIGHT GREATER SAPH AK COMPRESS: NORMAL
BH CV LOWER VASCULAR RIGHT GREATER SAPH BK COMPRESS: NORMAL
BH CV LOWER VASCULAR RIGHT LESSER SAPH COMPRESS: NORMAL
BH CV LOWER VASCULAR RIGHT MID FEMORAL AUGMENT: NORMAL
BH CV LOWER VASCULAR RIGHT MID FEMORAL COMPETENT: NORMAL
BH CV LOWER VASCULAR RIGHT MID FEMORAL COMPRESS: NORMAL
BH CV LOWER VASCULAR RIGHT MID FEMORAL PHASIC: NORMAL
BH CV LOWER VASCULAR RIGHT MID FEMORAL SPONT: NORMAL
BH CV LOWER VASCULAR RIGHT PERONEAL COMPRESS: NORMAL
BH CV LOWER VASCULAR RIGHT POPLITEAL AUGMENT: NORMAL
BH CV LOWER VASCULAR RIGHT POPLITEAL COMPETENT: NORMAL
BH CV LOWER VASCULAR RIGHT POPLITEAL COMPRESS: NORMAL
BH CV LOWER VASCULAR RIGHT POPLITEAL PHASIC: NORMAL
BH CV LOWER VASCULAR RIGHT POPLITEAL SPONT: NORMAL
BH CV LOWER VASCULAR RIGHT POSTERIOR TIBIAL COMPRESS: NORMAL
BH CV LOWER VASCULAR RIGHT PROXIMAL FEMORAL COMPRESS: NORMAL
BH CV LOWER VASCULAR RIGHT SAPHENOFEMORAL JUNCTION COMPRESS: NORMAL
BUN SERPL-MCNC: 17 MG/DL (ref 8–23)
BUN/CREAT SERPL: 17.3 (ref 7–25)
CALCIUM SPEC-SCNC: 9 MG/DL (ref 8.6–10.5)
CHLORIDE SERPL-SCNC: 105 MMOL/L (ref 98–107)
CO2 SERPL-SCNC: 23.1 MMOL/L (ref 22–29)
CREAT SERPL-MCNC: 0.98 MG/DL (ref 0.76–1.27)
D DIMER PPP FEU-MCNC: 0.72 MCGFEU/ML (ref 0–0.81)
DEPRECATED RDW RBC AUTO: 51.3 FL (ref 37–54)
EGFRCR SERPLBLD CKD-EPI 2021: 77.5 ML/MIN/1.73
EOSINOPHIL # BLD AUTO: 0.34 10*3/MM3 (ref 0–0.4)
EOSINOPHIL NFR BLD AUTO: 6.1 % (ref 0.3–6.2)
ERYTHROCYTE [DISTWIDTH] IN BLOOD BY AUTOMATED COUNT: 14.6 % (ref 12.3–15.4)
GEN 5 2HR TROPONIN T REFLEX: 22 NG/L
GLUCOSE SERPL-MCNC: 106 MG/DL (ref 65–99)
HBA1C MFR BLD: 6.78 % (ref 4.8–5.6)
HCT VFR BLD AUTO: 37.4 % (ref 37.5–51)
HGB BLD-MCNC: 12 G/DL (ref 13–17.7)
IMM GRANULOCYTES # BLD AUTO: 0.01 10*3/MM3 (ref 0–0.05)
IMM GRANULOCYTES NFR BLD AUTO: 0.2 % (ref 0–0.5)
LYMPHOCYTES # BLD AUTO: 1.76 10*3/MM3 (ref 0.7–3.1)
LYMPHOCYTES NFR BLD AUTO: 31.6 % (ref 19.6–45.3)
MAGNESIUM SERPL-MCNC: 2.2 MG/DL (ref 1.6–2.4)
MCH RBC QN AUTO: 30.2 PG (ref 26.6–33)
MCHC RBC AUTO-ENTMCNC: 32.1 G/DL (ref 31.5–35.7)
MCV RBC AUTO: 94.2 FL (ref 79–97)
MONOCYTES # BLD AUTO: 0.5 10*3/MM3 (ref 0.1–0.9)
MONOCYTES NFR BLD AUTO: 9 % (ref 5–12)
NEUTROPHILS NFR BLD AUTO: 2.92 10*3/MM3 (ref 1.7–7)
NEUTROPHILS NFR BLD AUTO: 52.4 % (ref 42.7–76)
NRBC BLD AUTO-RTO: 0 /100 WBC (ref 0–0.2)
NT-PROBNP SERPL-MCNC: 569.7 PG/ML (ref 0–1800)
PLATELET # BLD AUTO: 178 10*3/MM3 (ref 140–450)
PMV BLD AUTO: 10.2 FL (ref 6–12)
POTASSIUM SERPL-SCNC: 4.2 MMOL/L (ref 3.5–5.2)
QT INTERVAL: 355 MS
QTC INTERVAL: 455 MS
RBC # BLD AUTO: 3.97 10*6/MM3 (ref 4.14–5.8)
SODIUM SERPL-SCNC: 137 MMOL/L (ref 136–145)
TROPONIN T DELTA: -2 NG/L
TSH SERPL DL<=0.05 MIU/L-ACNC: 1.94 UIU/ML (ref 0.27–4.2)
WBC NRBC COR # BLD AUTO: 5.57 10*3/MM3 (ref 3.4–10.8)

## 2024-10-06 PROCEDURE — 96374 THER/PROPH/DIAG INJ IV PUSH: CPT

## 2024-10-06 PROCEDURE — 83735 ASSAY OF MAGNESIUM: CPT | Performed by: NURSE PRACTITIONER

## 2024-10-06 PROCEDURE — 93970 EXTREMITY STUDY: CPT | Performed by: STUDENT IN AN ORGANIZED HEALTH CARE EDUCATION/TRAINING PROGRAM

## 2024-10-06 PROCEDURE — 99284 EMERGENCY DEPT VISIT MOD MDM: CPT | Performed by: EMERGENCY MEDICINE

## 2024-10-06 PROCEDURE — 80048 BASIC METABOLIC PNL TOTAL CA: CPT | Performed by: NURSE PRACTITIONER

## 2024-10-06 PROCEDURE — 25010000002 ENOXAPARIN PER 10 MG: Performed by: NURSE PRACTITIONER

## 2024-10-06 PROCEDURE — G0378 HOSPITAL OBSERVATION PER HR: HCPCS

## 2024-10-06 PROCEDURE — 93970 EXTREMITY STUDY: CPT

## 2024-10-06 PROCEDURE — 96376 TX/PRO/DX INJ SAME DRUG ADON: CPT

## 2024-10-06 PROCEDURE — 85025 COMPLETE CBC W/AUTO DIFF WBC: CPT | Performed by: NURSE PRACTITIONER

## 2024-10-06 PROCEDURE — 99214 OFFICE O/P EST MOD 30 MIN: CPT | Performed by: INTERNAL MEDICINE

## 2024-10-06 PROCEDURE — 83036 HEMOGLOBIN GLYCOSYLATED A1C: CPT | Performed by: NURSE PRACTITIONER

## 2024-10-06 PROCEDURE — 84443 ASSAY THYROID STIM HORMONE: CPT | Performed by: NURSE PRACTITIONER

## 2024-10-06 PROCEDURE — 25010000002 FUROSEMIDE PER 20 MG: Performed by: NURSE PRACTITIONER

## 2024-10-06 PROCEDURE — 84484 ASSAY OF TROPONIN QUANT: CPT | Performed by: EMERGENCY MEDICINE

## 2024-10-06 PROCEDURE — 96372 THER/PROPH/DIAG INJ SC/IM: CPT

## 2024-10-06 RX ORDER — METHOCARBAMOL 500 MG/1
500 TABLET, FILM COATED ORAL 3 TIMES DAILY
Status: DISCONTINUED | OUTPATIENT
Start: 2024-10-06 | End: 2024-10-07 | Stop reason: HOSPADM

## 2024-10-06 RX ORDER — BISACODYL 10 MG
10 SUPPOSITORY, RECTAL RECTAL DAILY PRN
Status: DISCONTINUED | OUTPATIENT
Start: 2024-10-06 | End: 2024-10-07 | Stop reason: HOSPADM

## 2024-10-06 RX ORDER — METOPROLOL SUCCINATE 50 MG/1
50 TABLET, EXTENDED RELEASE ORAL 2 TIMES DAILY
Status: DISCONTINUED | OUTPATIENT
Start: 2024-10-06 | End: 2024-10-07 | Stop reason: HOSPADM

## 2024-10-06 RX ORDER — ASPIRIN 81 MG/1
81 TABLET ORAL DAILY
Status: DISCONTINUED | OUTPATIENT
Start: 2024-10-06 | End: 2024-10-07 | Stop reason: HOSPADM

## 2024-10-06 RX ORDER — ENOXAPARIN SODIUM 100 MG/ML
40 INJECTION SUBCUTANEOUS DAILY
Status: DISCONTINUED | OUTPATIENT
Start: 2024-10-06 | End: 2024-10-07 | Stop reason: HOSPADM

## 2024-10-06 RX ORDER — FUROSEMIDE 10 MG/ML
20 INJECTION INTRAMUSCULAR; INTRAVENOUS EVERY 4 HOURS
Status: COMPLETED | OUTPATIENT
Start: 2024-10-06 | End: 2024-10-06

## 2024-10-06 RX ORDER — ONDANSETRON 4 MG/1
4 TABLET, ORALLY DISINTEGRATING ORAL EVERY 6 HOURS PRN
Status: DISCONTINUED | OUTPATIENT
Start: 2024-10-06 | End: 2024-10-07 | Stop reason: HOSPADM

## 2024-10-06 RX ORDER — SUCRALFATE 1 G/1
1 TABLET ORAL
Status: DISCONTINUED | OUTPATIENT
Start: 2024-10-06 | End: 2024-10-07 | Stop reason: HOSPADM

## 2024-10-06 RX ORDER — SODIUM CHLORIDE 0.9 % (FLUSH) 0.9 %
10 SYRINGE (ML) INJECTION EVERY 12 HOURS SCHEDULED
Status: DISCONTINUED | OUTPATIENT
Start: 2024-10-06 | End: 2024-10-07 | Stop reason: HOSPADM

## 2024-10-06 RX ORDER — MIDODRINE HYDROCHLORIDE 5 MG/1
5 TABLET ORAL
Status: DISCONTINUED | OUTPATIENT
Start: 2024-10-06 | End: 2024-10-07 | Stop reason: HOSPADM

## 2024-10-06 RX ORDER — ACETAMINOPHEN 650 MG/1
650 SUPPOSITORY RECTAL EVERY 4 HOURS PRN
Status: DISCONTINUED | OUTPATIENT
Start: 2024-10-06 | End: 2024-10-07 | Stop reason: HOSPADM

## 2024-10-06 RX ORDER — ACETAMINOPHEN 325 MG/1
650 TABLET ORAL EVERY 4 HOURS PRN
Status: DISCONTINUED | OUTPATIENT
Start: 2024-10-06 | End: 2024-10-07 | Stop reason: HOSPADM

## 2024-10-06 RX ORDER — GABAPENTIN 400 MG/1
800 CAPSULE ORAL EVERY 8 HOURS SCHEDULED
Status: DISCONTINUED | OUTPATIENT
Start: 2024-10-06 | End: 2024-10-07 | Stop reason: HOSPADM

## 2024-10-06 RX ORDER — FUROSEMIDE 20 MG
20 TABLET ORAL DAILY
Status: DISCONTINUED | OUTPATIENT
Start: 2024-10-07 | End: 2024-10-07 | Stop reason: HOSPADM

## 2024-10-06 RX ORDER — RANOLAZINE 500 MG/1
500 TABLET, EXTENDED RELEASE ORAL 2 TIMES DAILY
Status: DISCONTINUED | OUTPATIENT
Start: 2024-10-06 | End: 2024-10-07 | Stop reason: HOSPADM

## 2024-10-06 RX ORDER — AMOXICILLIN 250 MG
1 CAPSULE ORAL DAILY
Status: DISCONTINUED | OUTPATIENT
Start: 2024-10-06 | End: 2024-10-07 | Stop reason: HOSPADM

## 2024-10-06 RX ORDER — SODIUM CHLORIDE 0.9 % (FLUSH) 0.9 %
10 SYRINGE (ML) INJECTION AS NEEDED
Status: DISCONTINUED | OUTPATIENT
Start: 2024-10-06 | End: 2024-10-07 | Stop reason: HOSPADM

## 2024-10-06 RX ORDER — AMOXICILLIN 250 MG
2 CAPSULE ORAL 2 TIMES DAILY PRN
Status: DISCONTINUED | OUTPATIENT
Start: 2024-10-06 | End: 2024-10-07 | Stop reason: HOSPADM

## 2024-10-06 RX ORDER — PANTOPRAZOLE SODIUM 40 MG/1
40 TABLET, DELAYED RELEASE ORAL
Status: DISCONTINUED | OUTPATIENT
Start: 2024-10-06 | End: 2024-10-07 | Stop reason: HOSPADM

## 2024-10-06 RX ORDER — POTASSIUM CHLORIDE 750 MG/1
10 TABLET, FILM COATED, EXTENDED RELEASE ORAL DAILY
Status: DISCONTINUED | OUTPATIENT
Start: 2024-10-06 | End: 2024-10-07 | Stop reason: HOSPADM

## 2024-10-06 RX ORDER — MECLIZINE HYDROCHLORIDE 25 MG/1
25 TABLET ORAL EVERY 6 HOURS PRN
Status: DISCONTINUED | OUTPATIENT
Start: 2024-10-06 | End: 2024-10-07 | Stop reason: HOSPADM

## 2024-10-06 RX ORDER — ACETAMINOPHEN 160 MG/5ML
650 SOLUTION ORAL EVERY 4 HOURS PRN
Status: DISCONTINUED | OUTPATIENT
Start: 2024-10-06 | End: 2024-10-07 | Stop reason: HOSPADM

## 2024-10-06 RX ORDER — NITROGLYCERIN 0.4 MG/1
0.4 TABLET SUBLINGUAL
Status: DISCONTINUED | OUTPATIENT
Start: 2024-10-06 | End: 2024-10-07 | Stop reason: HOSPADM

## 2024-10-06 RX ORDER — POLYETHYLENE GLYCOL 3350 17 G/17G
17 POWDER, FOR SOLUTION ORAL DAILY PRN
Status: DISCONTINUED | OUTPATIENT
Start: 2024-10-06 | End: 2024-10-07 | Stop reason: HOSPADM

## 2024-10-06 RX ORDER — ONDANSETRON 2 MG/ML
4 INJECTION INTRAMUSCULAR; INTRAVENOUS EVERY 6 HOURS PRN
Status: DISCONTINUED | OUTPATIENT
Start: 2024-10-06 | End: 2024-10-07 | Stop reason: HOSPADM

## 2024-10-06 RX ORDER — BISACODYL 5 MG/1
5 TABLET, DELAYED RELEASE ORAL DAILY PRN
Status: DISCONTINUED | OUTPATIENT
Start: 2024-10-06 | End: 2024-10-07 | Stop reason: HOSPADM

## 2024-10-06 RX ADMIN — METOPROLOL SUCCINATE 50 MG: 50 TABLET, EXTENDED RELEASE ORAL at 08:18

## 2024-10-06 RX ADMIN — Medication 10 ML: at 20:32

## 2024-10-06 RX ADMIN — ASPIRIN 81 MG: 81 TABLET, COATED ORAL at 08:18

## 2024-10-06 RX ADMIN — POTASSIUM CHLORIDE 10 MEQ: 750 TABLET, EXTENDED RELEASE ORAL at 08:18

## 2024-10-06 RX ADMIN — MIDODRINE HYDROCHLORIDE 5 MG: 5 TABLET ORAL at 11:43

## 2024-10-06 RX ADMIN — SUCRALFATE 1 G: 1 TABLET ORAL at 11:43

## 2024-10-06 RX ADMIN — METOPROLOL SUCCINATE 50 MG: 50 TABLET, EXTENDED RELEASE ORAL at 20:32

## 2024-10-06 RX ADMIN — GABAPENTIN 800 MG: 400 CAPSULE ORAL at 13:58

## 2024-10-06 RX ADMIN — FUROSEMIDE 20 MG: 10 INJECTION, SOLUTION INTRAMUSCULAR; INTRAVENOUS at 05:07

## 2024-10-06 RX ADMIN — ENOXAPARIN SODIUM 40 MG: 100 INJECTION SUBCUTANEOUS at 16:19

## 2024-10-06 RX ADMIN — Medication 10 ML: at 08:19

## 2024-10-06 RX ADMIN — PANTOPRAZOLE SODIUM 40 MG: 40 TABLET, DELAYED RELEASE ORAL at 07:08

## 2024-10-06 RX ADMIN — SUCRALFATE 1 G: 1 TABLET ORAL at 07:07

## 2024-10-06 RX ADMIN — RANOLAZINE 500 MG: 500 TABLET, FILM COATED, EXTENDED RELEASE ORAL at 08:19

## 2024-10-06 RX ADMIN — METHOCARBAMOL 500 MG: 500 TABLET ORAL at 16:18

## 2024-10-06 RX ADMIN — MIDODRINE HYDROCHLORIDE 5 MG: 5 TABLET ORAL at 16:18

## 2024-10-06 RX ADMIN — SUCRALFATE 1 G: 1 TABLET ORAL at 20:32

## 2024-10-06 RX ADMIN — METHOCARBAMOL 500 MG: 500 TABLET ORAL at 20:32

## 2024-10-06 RX ADMIN — GABAPENTIN 800 MG: 400 CAPSULE ORAL at 05:07

## 2024-10-06 RX ADMIN — GABAPENTIN 800 MG: 400 CAPSULE ORAL at 20:32

## 2024-10-06 RX ADMIN — SUCRALFATE 1 G: 1 TABLET ORAL at 16:18

## 2024-10-06 RX ADMIN — METHOCARBAMOL 500 MG: 500 TABLET ORAL at 08:18

## 2024-10-06 RX ADMIN — RANOLAZINE 500 MG: 500 TABLET, FILM COATED, EXTENDED RELEASE ORAL at 20:32

## 2024-10-06 RX ADMIN — FUROSEMIDE 20 MG: 10 INJECTION, SOLUTION INTRAMUSCULAR; INTRAVENOUS at 08:55

## 2024-10-06 RX ADMIN — EMPAGLIFLOZIN 25 MG: 25 TABLET, FILM COATED ORAL at 08:19

## 2024-10-06 RX ADMIN — MIDODRINE HYDROCHLORIDE 5 MG: 5 TABLET ORAL at 07:08

## 2024-10-06 NOTE — CONSULTS
Referring Provider: Nabil Mckinney MD  Reason for Consultation: 83-year-old male well-known to our group with well-established coronary disease status post 5vCABG in 2010, ICM s/p Biotronic ICD in 2018, VT s/p ablation, and 5 cm ascending aortic aneurysm followed by Dr. Barros.  Last nuclear stress test 9/11/2024 consistent with it intermediate risk. He was prescribed Ranexa on discharge.  Last 2D echo 12/2023 showed an EF 21-25% with grade 1 diastolic dysfunction and mild MR/A; primary HTN, and HLD.      Patient Care Team:  Provider, No Known as PCP - General    Chief complaint chest pain    Subjective .       History of Present Illness: Patient presented to [facility] ER and transferred to [facility] on 10/5/2024 after being evaluated for sudden onset of chest discomfort.  The patient reports that he fell asleep in his recliner and was suddenly awakened to a sensation of sharp pain in his chest.  The patient reports he thought his AICD had fired so he went to the emergency room for further evaluation.  Reports ongoing swelling to both lower extremities, left greater than right.  Patient reports [facility] and was told to take additional doses of his Lasix 6 to 8 hours after his regular a.m. dose due to lower extremity edema. The patient reports this has not helped with the edema.        At the prior facility the device rep was called to interrogate and no evidence of ICD discharge. Due to worsening lower extremity edema and chest pain, the patient was transferred to this facility for cardiology evaluation.     Review of records: Was seen in our office 9/16/2024 for follow-up hospitalization. He patient had been prescribed Ranexa at discharge but stated he did not get the prescription filled.  The prescription was sent to his pharmacy, however he has not yet filled that prescription.  ROS      Today, the patient has been without any chest discomfort, shortness  of breath, palpitations, dizziness or syncope.  Denies having any headache, abdominal pain, nausea, vomiting, diarrhea, constipation, loss of weight or loss of appetite.  Denies having any excessive bruising, hematuria or blood in the stool.    Review of all systems negative except as indicated      History  Past Medical History:   Diagnosis Date    Aneurysm     Cardiomyopathy     ICD implantation    Cellulitis of left lower extremity 2010    recurrent    CHD (coronary heart disease)     S/P CABG & PCI    Dyslipidemia     Heart valve disease     History of ventricular tachycardia     Hypertension     Myocardial infarction     Inferior MI    Pinched nerve     L4-L5    Prostate cancer        Past Surgical History:   Procedure Laterality Date    ANGIOPLASTY  2000 04/1996 Stent: Palmaz- Huy stent/LAD 07/1996-RCA: 2000-Tetra stent Guidant to proximal RCA, mid to distal artery 2 sequential Guidant Tetra stents    APPENDECTOMY      CARDIAC ABLATION  April and May 2019    CARDIAC CATHETERIZATION  07/2017    1996 x2, Nov. 2000, 05/2010-cath 08/2015-no stents 2017    CARDIAC DEFIBRILLATOR PLACEMENT      COLONOSCOPY W/ POLYPECTOMY      Mult colonoscopy's for polyp resection     CORONARY ARTERY BYPASS GRAFT  05/2010    x5 vessel: LMA to diagonal, LAD, intermedius, obtuse marginal, RCA    CORONARY STENT PLACEMENT      ENDOSCOPY N/A 6/24/2019    Procedure: ESOPHAGOGASTRODUODENOSCOPY with dilitation Bougie 50, 54;  Surgeon: Roddy Coronel MD;  Location: [facility] ENDOSCOPY;  Service: Gastroenterology    INSERT / REPLACE / REMOVE PACEMAKER      KNEE OPEN LATERAL RELEASE Left     reduction    OTHER SURGICAL HISTORY  01/2018    ICD implantation    PROSTATE SURGERY  2015    Robiotic surgery- Cyber Knife:       Family History   Problem Relation Age of Onset    Pancreatic cancer Mother     Heart disease Father        Social History     Tobacco Use    Smoking status: Former     Current packs/day: 0.00     Average packs/day: 1  pack/day for 17.0 years (17.0 ttl pk-yrs)     Types: Cigarettes     Start date: 1985     Quit date: 2002     Years since quittin.2     Passive exposure: Past    Smokeless tobacco: Never   Vaping Use    Vaping status: Never Used   Substance Use Topics    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   10/5/2024    Cholecalciferol 10 MCG (400 UNIT) tablet Take 2 tablets by mouth Daily.   10/5/2024    dapagliflozin Propanediol (Farxiga) 10 MG tablet Take 10 mg by mouth Daily. [facility]   10/5/2024    Diclofenac Sodium (VOLTAREN) 1 % gel gel Apply 4 g topically to the appropriate area as directed 4 (Four) Times a Day As Needed.   10/6/2024    ezetimibe (ZETIA) 10 MG tablet Take 1 tablet by mouth Every Evening.   10/5/2024    furosemide (LASIX) 20 MG tablet Take 1 tablet by mouth Daily.   10/5/2024    gabapentin (NEURONTIN) 800 MG tablet Take 1 tablet by mouth Every 6 (Six) Hours.   Patient Taking Differently    meclizine (ANTIVERT) 25 MG tablet Take 1 tablet by mouth Every 6 (Six) Hours As Needed for Dizziness.   Past Week    methocarbamol (ROBAXIN) 500 MG tablet Take 1 tablet by mouth 3 (Three) Times a Day.   10/5/2024    metoprolol succinate XL (TOPROL-XL) 100 MG 24 hr tablet Take 0.5 tablets by mouth 2 (Two) Times a Day.   10/5/2024    midodrine (PROAMATINE) 5 MG tablet Take 1 tablet by mouth 3 (Three) Times a Day Before Meals.   10/5/2024    Omega-3 Fatty Acids (fish oil) 1000 MG capsule capsule Take 2 capsules by mouth 2 (Two) Times a Day With Meals.   10/5/2024    pantoprazole (PROTONIX) 40 MG EC tablet Take 1 tablet by mouth Daily.   10/5/2024    potassium chloride (MICRO-K) 10 MEQ CR capsule Take 1 capsule by mouth Daily.   10/5/2024    ranolazine (Ranexa) 500 MG 12 hr tablet Take 1 tablet by mouth 2 (Two) Times a Day.  Indications: Stable Angina Pectoris 60 tablet 5 10/5/2024    sennosides-docusate (PERICOLACE) 8.6-50 MG per tablet Take 1 tablet by mouth Daily.   Patient Taking Differently    sucralfate (CARAFATE) 1 g tablet Take 1 tablet by mouth 4 (Four) Times a Day.   10/5/2024    nitroglycerin (NITROSTAT) 0.4 MG SL tablet Place 1 tablet under the tongue Every 5 (Five) Minutes As Needed for Chest Pain.   More than a month         Lovastatin, Pravastatin, Simvastatin, and Spironolactone    Scheduled Meds:aspirin, 81 mg, Oral, Daily  empagliflozin, 25 mg, Oral, Daily  enoxaparin, 40 mg, Subcutaneous, Daily  furosemide, 20 mg, Intravenous, Q4H  [START ON 10/7/2024] furosemide, 20 mg, Oral, Daily  gabapentin, 800 mg, Oral, Q8H  methocarbamol, 500 mg, Oral, TID  metoprolol succinate XL, 50 mg, Oral, BID  midodrine, 5 mg, Oral, TID AC  pantoprazole, 40 mg, Oral, QAM AC  potassium chloride, 10 mEq, Oral, Daily  ranolazine, 500 mg, Oral, BID  sennosides-docusate, 1 tablet, Oral, Daily  sodium chloride, 10 mL, Intravenous, Q12H  sucralfate, 1 g, Oral, 4x Daily AC & at Bedtime      Continuous Infusions:Pharmacy to Dose enoxaparin (LOVENOX),       PRN Meds:.  acetaminophen **OR** acetaminophen **OR** acetaminophen    senna-docusate sodium **AND** polyethylene glycol **AND** bisacodyl **AND** bisacodyl    Calcium Replacement - Follow Nurse / BPA Driven Protocol    Diclofenac Sodium    Magnesium Standard Dose Replacement - Follow Nurse / BPA Driven Protocol    meclizine    nitroglycerin    ondansetron ODT **OR** ondansetron    Pharmacy to Dose enoxaparin (LOVENOX)    Phosphorus Replacement - Follow Nurse / BPA Driven Protocol    Potassium Replacement - Follow Nurse / BPA Driven Protocol    sodium chloride    sodium chloride    Objective     VITAL SIGNS  Vitals:    10/06/24 0145 10/06/24 0155 10/06/24 0315 10/06/24 0708   BP:  134/88 147/80 133/76   BP Location:  Right arm Right arm    Patient Position:  Sitting Lying    Pulse: 90 87 89 84  "  Resp:  16 16    Temp:  98.2 °F (36.8 °C) 98.4 °F (36.9 °C)    TempSrc:  Temporal Oral    SpO2: 96% 97% 95%    Weight:   93.4 kg (205 lb 14.6 oz)    Height:   186.7 cm (73.5\")        Flowsheet Rows      Flowsheet Row First Filed Value   Admission Height 186.7 cm (73.5\") Documented at 10/05/2024 2313   Admission Weight 93.4 kg (205 lb 14.6 oz) Documented at 10/06/2024 0315              Intake/Output Summary (Last 24 hours) at 10/6/2024 0742  Last data filed at 10/6/2024 0649  Gross per 24 hour   Intake --   Output 950 ml   Net -950 ml        TELEMETRY:    Physical Exam:  The patient is alert, oriented and in no distress.  Vital signs as noted above.  Head and neck revealed no carotid bruits or jugular venous distention.  No thyromegaly or lymphadenopathy is present  Lungs clear.  No wheezing.  Breath sounds are normal bilaterally.  Heart normal first and second heart sounds. No murmur.  No precordial rub is present.  No gallop is present.  Abdomen soft and nontender.  No organomegaly is present.  Extremities with good peripheral pulses, trace pitting edema to both lower extremities.  Skin warm and dry.  He has small dressing to his right knee and right anterior shin from a fall recently.  Musculoskeletal system is grossly normal  CNS grossly normal    Reviewed and updated.  Results Review:   I reviewed the patient's new clinical results.  Lab Results (last 24 hours)       Procedure Component Value Units Date/Time    Basic Metabolic Panel [[id number]]  (Abnormal) Collected: 10/06/24 0452    Specimen: Blood from Arm, Right Updated: 10/06/24 0532     Glucose 106 mg/dL      BUN 17 mg/dL      Creatinine 0.98 mg/dL      Sodium 137 mmol/L      Potassium 4.2 mmol/L      Comment: Specimen hemolyzed.  Result may be falsely elevated.        Chloride 105 mmol/L      CO2 23.1 mmol/L      Calcium 9.0 mg/dL      BUN/Creatinine Ratio 17.3     Anion Gap 8.9 mmol/L      eGFR 77.5 mL/min/1.73     Narrative:      GFR Normal >60  Chronic " Kidney Disease <60  Kidney Failure <15    The GFR formula is only valid for adults with stable renal function between ages 18 and 70.    Magnesium [[id number]]  (Normal) Collected: 10/06/24 0452    Specimen: Blood from Arm, Right Updated: 10/06/24 0532     Magnesium 2.2 mg/dL     TSH Rfx On Abnormal To Free T4 [[id number]]  (Normal) Collected: 10/06/24 0452    Specimen: Blood from Arm, Right Updated: 10/06/24 0531     TSH 1.940 uIU/mL     Hemoglobin A1c [[id number]]  (Abnormal) Collected: 10/06/24 0452    Specimen: Blood from Arm, Right Updated: 10/06/24 0523     Hemoglobin A1C 6.78 %     CBC Auto Differential [[id number]]  (Abnormal) Collected: 10/06/24 0452    Specimen: Blood from Arm, Right Updated: 10/06/24 0459     WBC 5.57 10*3/mm3      RBC 3.97 10*6/mm3      Hemoglobin 12.0 g/dL      Hematocrit 37.4 %      MCV 94.2 fL      MCH 30.2 pg      MCHC 32.1 g/dL      RDW 14.6 %      RDW-SD 51.3 fl      MPV 10.2 fL      Platelets 178 10*3/mm3      Neutrophil % 52.4 %      Lymphocyte % 31.6 %      Monocyte % 9.0 %      Eosinophil % 6.1 %      Basophil % 0.7 %      Immature Grans % 0.2 %      Neutrophils, Absolute 2.92 10*3/mm3      Lymphocytes, Absolute 1.76 10*3/mm3      Monocytes, Absolute 0.50 10*3/mm3      Eosinophils, Absolute 0.34 10*3/mm3      Basophils, Absolute 0.04 10*3/mm3      Immature Grans, Absolute 0.01 10*3/mm3      nRBC 0.0 /100 WBC     High Sensitivity Troponin T 2Hr [[id number]]  (Abnormal) Collected: 10/06/24 0103    Specimen: Blood Updated: 10/06/24 0129     HS Troponin T 22 ng/L      Troponin T Delta -2 ng/L     Narrative:      High Sensitive Troponin T Reference Range:  <14.0 ng/L- Negative Female for AMI  <22.0 ng/L- Negative Male for AMI  >=14 - Abnormal Female indicating possible myocardial injury.  >=22 - Abnormal Male indicating possible myocardial injury.   Clinicians would have to utilize clinical acumen, EKG, Troponin, and serial changes to determine if it is an Acute Myocardial Infarction or  "myocardial injury due to an underlying chronic condition.         D-dimer, Quantitative [[id number]]  (Normal) Collected: 10/05/24 2345    Specimen: Blood Updated: 10/06/24 0001     D-Dimer, Quantitative 0.72 MCGFEU/mL     Narrative:      According to the assay 's published package insert, a normal (<0.50 MCGFEU/mL) D-dimer result in conjunction with a non-high clinical probability assessment, excludes deep vein thrombosis (DVT) and pulmonary embolism (PE) with high sensitivity.    D-dimer values increase with age and this can make VTE exclusion of an older population difficult. To address this, the [employer], based on best available evidence and recent guidelines, recommends that clinicians use age-adjusted D-dimer thresholds in patients greater than 50 years of age with: a) a low probability of PE who do not meet all Pulmonary Embolism Rule Out Criteria, or b) in those with intermediate probability of PE.   The formula for an age-adjusted D-dimer cut-off is \"age/100\".  For example, a 60 year old patient would have an age-adjusted cut-off of 0.60 MCGFEU/mL and an 80 year old 0.80 MCGFEU/mL.    BNP [[id number]]  (Normal) Collected: 10/05/24 2313    Specimen: Blood Updated: 10/06/24 0000     proBNP 569.7 pg/mL     Narrative:      This assay is used as an aid in the diagnosis of individuals suspected of having heart failure. It can be used as an aid in the diagnosis of acute decompensated heart failure (ADHF) in patients presenting with signs and symptoms of ADHF to the emergency department (ED). In addition, NT-proBNP of <300 pg/mL indicates ADHF is not likely.    Age Range Result Interpretation  NT-proBNP Concentration (pg/mL:      <50             Positive            >450                   Gray                 300-450                    Negative             <300    50-75           Positive            >900                  Gray                300-900                  Negative            " <300      >75             Positive            >1800                  Gray                300-1800                  Negative            <300    Comprehensive Metabolic Panel [[id number]]  (Abnormal) Collected: 10/05/24 2313    Specimen: Blood Updated: 10/05/24 2335     Glucose 134 mg/dL      BUN 17 mg/dL      Creatinine 1.09 mg/dL      Sodium 139 mmol/L      Potassium 4.2 mmol/L      Chloride 106 mmol/L      CO2 24.2 mmol/L      Calcium 9.1 mg/dL      Total Protein 7.2 g/dL      Albumin 4.1 g/dL      ALT (SGPT) 15 U/L      AST (SGOT) 20 U/L      Alkaline Phosphatase 108 U/L      Total Bilirubin 0.3 mg/dL      Globulin 3.1 gm/dL      A/G Ratio 1.3 g/dL      BUN/Creatinine Ratio 15.6     Anion Gap 8.8 mmol/L      eGFR 68.2 mL/min/1.73     Narrative:      GFR Normal >60  Chronic Kidney Disease <60  Kidney Failure <15    The GFR formula is only valid for adults with stable renal function between ages 18 and 70.    High Sensitivity Troponin T [[id number]]  (Abnormal) Collected: 10/05/24 2313    Specimen: Blood Updated: 10/05/24 2334     HS Troponin T 24 ng/L     Narrative:      High Sensitive Troponin T Reference Range:  <14.0 ng/L- Negative Female for AMI  <22.0 ng/L- Negative Male for AMI  >=14 - Abnormal Female indicating possible myocardial injury.  >=22 - Abnormal Male indicating possible myocardial injury.   Clinicians would have to utilize clinical acumen, EKG, Troponin, and serial changes to determine if it is an Acute Myocardial Infarction or myocardial injury due to an underlying chronic condition.         CBC & Differential [[id number]]  (Normal) Collected: 10/05/24 2313    Specimen: Blood Updated: 10/05/24 2315    Narrative:      The following orders were created for panel order CBC & Differential.  Procedure                               Abnormality         Status                     ---------                               -----------         ------                     CBC Auto Differential[[id number]]         Normal              Final result                 Please view results for these tests on the individual orders.    CBC Auto Differential [[id number]]  (Normal) Collected: 10/05/24 2313    Specimen: Blood Updated: 10/05/24 2315     WBC 6.17 10*3/mm3      RBC 4.33 10*6/mm3      Hemoglobin 13.1 g/dL      Hematocrit 41.1 %      MCV 94.9 fL      MCH 30.3 pg      MCHC 31.9 g/dL      RDW 14.9 %      RDW-SD 52.9 fl      MPV 9.3 fL      Platelets 191 10*3/mm3      Neutrophil % 57.6 %      Lymphocyte % 26.7 %      Monocyte % 9.9 %      Eosinophil % 5.2 %      Basophil % 0.6 %      Immature Grans % 0.0 %      Neutrophils, Absolute 3.55 10*3/mm3      Lymphocytes, Absolute 1.65 10*3/mm3      Monocytes, Absolute 0.61 10*3/mm3      Eosinophils, Absolute 0.32 10*3/mm3      Basophils, Absolute 0.04 10*3/mm3      Immature Grans, Absolute 0.00 10*3/mm3             Imaging Results (Last 24 Hours)       Procedure Component Value Units Date/Time    XR Chest 2 View [[id number]] Collected: 10/05/24 2330     Updated: 10/05/24 2333    Narrative:      XR CHEST 2 VW    Date of Exam: 10/5/2024 11:17 PM EDT    Indication: Chest Pain Triage Protocol    Comparison: Chest radiograph 9/10/2024    Findings:  There is a left-sided AICD device with lead over the right ventricle. The heart is enlarged with changes of CABG. The pulmonary vascular markings are normal. There is diffuse emphysema. There are chronic appearing interstitial changes throughout both   lungs similar to the prior study.      Impression:      Impression:  Chronic interstitial changes and emphysema. No active disease.        Electronically Signed: Fei Bradshaw MD    10/5/2024 11:30 PM EDT    Workstation ID: [id number]        LAB RESULTS (LAST 7 DAYS)    CBC  Results from last 7 days   Lab Units 10/06/24  0452 10/05/24  2313   WBC 10*3/mm3 5.57 6.17   RBC 10*6/mm3 3.97* 4.33   HEMOGLOBIN g/dL 12.0* 13.1   HEMATOCRIT % 37.4* 41.1   MCV fL 94.2 94.9   PLATELETS 10*3/mm3 178 191        BMP  Results from last 7 days   Lab Units 10/06/24  0452 10/05/24  2313   SODIUM mmol/L 137 139   POTASSIUM mmol/L 4.2 4.2   CHLORIDE mmol/L 105 106   CO2 mmol/L 23.1 24.2   BUN mg/dL 17 17   CREATININE mg/dL 0.98 1.09   GLUCOSE mg/dL 106* 134*   MAGNESIUM mg/dL 2.2  --        CMP   Results from last 7 days   Lab Units 10/06/24  0452 10/05/24  2313   SODIUM mmol/L 137 139   POTASSIUM mmol/L 4.2 4.2   CHLORIDE mmol/L 105 106   CO2 mmol/L 23.1 24.2   BUN mg/dL 17 17   CREATININE mg/dL 0.98 1.09   GLUCOSE mg/dL 106* 134*   ALBUMIN g/dL  --  4.1   BILIRUBIN mg/dL  --  0.3   ALK PHOS U/L  --  108   AST (SGOT) U/L  --  20   ALT (SGPT) U/L  --  15         BNP        TROPONIN  Results from last 7 days   Lab Units 10/06/24  0103   HSTROP T ng/L 22*       CoAg        Creatinine Clearance  Estimated Creatinine Clearance: 78.1 mL/min (by C-G formula based on SCr of 0.98 mg/dL).    ABG        Radiology  XR Chest 2 View    Result Date: 10/5/2024  Impression: Chronic interstitial changes and emphysema. No active disease. Electronically Signed: Fei Bradshaw MD  10/5/2024 11:30 PM EDT  Workstation ID: [id number]       EKG            I personally viewed and interpreted the patient's EKG/Telemetry data:    ECHOCARDIOGRAM:    Results for orders placed during the hospital encounter of 12/26/23    Adult Transthoracic Echo Complete W/ Cont if Necessary Per Protocol    Interpretation Summary    Left ventricular systolic function is severely decreased. Left ventricular ejection fraction appears to be 21 - 25%.    The left ventricular cavity is severely dilated.    Left ventricular diastolic function is consistent with (grade I) impaired relaxation.    The left atrial cavity is moderately dilated.    Abnormal mitral valve structure consistent with dilated annulus.    Moderate dilation of the ascending aorta is present.    Ascending aorta = 4.7 cm      STRESS TEST  Results for orders placed during the hospital encounter of  09/10/24    Stress Test With Myocardial Perfusion One Day    Interpretation Summary    Lexiscan myocardial perfusion study shows a large area of severe intensity mostly fixed perfusion defect involving the basal and mid inferior inferolateral wall inferoseptal wall and also apical wall with no significant reversible ischemia    Left ventricular ejection fraction is moderately reduced (Calculated EF = 26%).    Abnormal LV wall motion consistent with severe hypokinesis of the inferior and septal wall.    Impressions are consistent with an intermediate risk study.        Cardiolite (Tc-99m sestamibi) stress test    HEART CATHETERIZATION  No results found for this or any previous visit.      OTHER:     Assessment & Plan     Principal Problem:    Fluid overload  Active Problems:    Ischemic cardiomyopathy    Essential hypertension    Hyperlipidemia, mixed    Peripheral neuropathy    Chest pain         ASSESSMENT/PLAN:           1. Coronary artery disease involving coronary bypass graft of native heart without angina pectoris (Primary)     2. Ischemic cardiomyopathy     3. Essential hypertension    Results for orders placed during the hospital encounter of 09/10/24    Stress Test With Myocardial Perfusion One Day    Interpretation Summary    Lexiscan myocardial perfusion study shows a large area of severe intensity mostly fixed perfusion defect involving the basal and mid inferior inferolateral wall inferoseptal wall and also apical wall with no significant reversible ischemia    Left ventricular ejection fraction is moderately reduced (Calculated EF = 26%).    Abnormal LV wall motion consistent with severe hypokinesis of the inferior and septal wall.    Impressions are consistent with an intermediate risk study.    Recommendations: Patient has ruled out for acute coronary syndrome with slight nontrending high-sensitivity troponin.  There was no indication of ICD discharge or ATP therapy per [employer] rep interrogation.   Chest x-ray with no acute findings.  His hemodynamics are quite stable.  Continue aspirin, furosemide, BB, midodrine, ranolazine.  No additional workup planned.  Further recommendations per Dr. Grant.    Aparna Grant MD  10/06/24  07:42 EDT    The patient was seen around 1 PM today.  Chart was reviewed in entirety.  Reviewed and agree with the assessment and plan as documented by nurse practitioner Nakita Diaz.  Majority of the MDM was performed by me.  Patient has history of CABG and ischemic cardiomyopathy.  Patient has ruled out for coronary syndrome.  Evaluation of high-sensitivity troponin.-Nontrending.  Patient has ICD and this is Biotronik.  Medications were reviewed and updated.  Dr. Childers/Dr. Arvizu-primary cardiology team will see the patient tomorrow.  Further plan will depend on patient's progress.  Electronically signed by Aparna Grant MD, 10/06/24, 2:24 PM EDT.

## 2024-10-06 NOTE — FSED PROVIDER NOTE
Subjective   History of Present Illness        83-year-old male presents emergency department with AICD malfunction potentially.  He was watching football game when he dozed off when he felt a jolt in the shock.  He dropped him through the remote he thinks it was a shock from his AICD.  He said this happened before 23 times and another event since then he got an ablation.  He is otherwise stable reports little bit of lower extremity edema and some orthopnea.  He was also having some palpitations.              Review of Systems      All systems negative except as otherwise mentioned in the HPI    Past Medical History:   Diagnosis Date    Aneurysm     Cardiomyopathy     ICD implantation    Cellulitis of left lower extremity 2010    recurrent    CHD (coronary heart disease)     S/P CABG & PCI    Dyslipidemia     Heart valve disease     History of ventricular tachycardia     Hypertension     Myocardial infarction     Inferior MI    Pinched nerve     L4-L5    Prostate cancer        Allergies   Allergen Reactions    Lovastatin Myalgia    Pravastatin Myalgia and Unknown (See Comments)     unknown    Simvastatin Unknown (See Comments) and Myalgia     unknown    Spironolactone Other (See Comments)     Gynecomastia        Past Surgical History:   Procedure Laterality Date    ANGIOPLASTY  2000 04/1996 Stent: Palmaz- Huy stent/LAD 07/1996-RCA: 2000-Tetra stent Guidant to proximal RCA, mid to distal artery 2 sequential Guidant Tetra stents    APPENDECTOMY      CARDIAC ABLATION  April and May 2019    CARDIAC CATHETERIZATION  07/2017    1996 x2, Nov. 2000, 05/2010-cath 08/2015-no stents 2017    CARDIAC DEFIBRILLATOR PLACEMENT      COLONOSCOPY W/ POLYPECTOMY      Mult colonoscopy's for polyp resection     CORONARY ARTERY BYPASS GRAFT  05/2010    x5 vessel: LMA to diagonal, LAD, intermedius, obtuse marginal, RCA    CORONARY STENT PLACEMENT      ENDOSCOPY N/A 6/24/2019    Procedure: ESOPHAGOGASTRODUODENOSCOPY with dilitation  Emerald 50, 54;  Surgeon: Roddy Coronel MD;  Location: [facility];  Service: Gastroenterology    INSERT / REPLACE / REMOVE PACEMAKER      KNEE OPEN LATERAL RELEASE Left     reduction    OTHER SURGICAL HISTORY  2018    ICD implantation    PROSTATE SURGERY  2015    Robiotic surgery- Cyber Knife:       Family History   Problem Relation Age of Onset    Pancreatic cancer Mother     Heart disease Father        Social History     Socioeconomic History    Marital status:    Tobacco Use    Smoking status: Former     Current packs/day: 0.00     Average packs/day: 1 pack/day for 17.0 years (17.0 ttl pk-yrs)     Types: Cigarettes     Start date: 1985     Quit date: 2002     Years since quittin.2     Passive exposure: Past    Smokeless tobacco: Never   Vaping Use    Vaping status: Never Used   Substance and Sexual Activity    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    Drug use: Never    Sexual activity: Defer           Objective   Physical Exam  General: Alert and oriented, conversant  Eye: PERRL, EOMI, nomal conjunctiva  HENT: Normocephalic, normal hearing, moist oral mucosa    Lungs: Nonlabored respiration, no wheezing  Heart: Normal Rate, no mumurs gallops or rubs  Abdomen: Soft, Non tender, no peritoneal signs    Musculoskeletal: Normal range of motion and strength, no tenderness or swelling  Skin: Warm and dry, no alarming rashes  Neurologic: Awake, responsive, moving all extremities, no focal deficits  Psychiatric:  Cooperative, appropriate mood and affect      Procedures           ED Course                                           Medical Decision Making  Problems Addressed:  Palpitations: complicated acute illness or injury    Amount and/or Complexity of Data Reviewed  Labs: ordered.  Radiology: ordered.  ECG/medicine tests: ordered.    Risk  Prescription drug management.  Decision regarding hospitalization.    Patient will be admitted to [facility] for pacemaker  interrogation palpitations management and diuresis    EKG shows normal sinus rhythm with some PVCs  Chest x-ray interpreted as no acute process but there is cardiomegaly    Laboratory studies are generally unremarkable but given his pain and palpitations and his history I think he should be admitted for observation    Final diagnoses:   None       ED Disposition  ED Disposition       ED Disposition   Decision to Admit    Condition   --    Comment   Level of Care: Med/Surg [1]   Diagnosis: Fluid overload [[id number]]   Admitting Physician: ALLAN SALDANA [[id number]]                 No follow-up provider specified.       Medication List      No changes were made to your prescriptions during this visit.

## 2024-10-06 NOTE — PLAN OF CARE
Problem: Adult Inpatient Plan of Care  Goal: Plan of Care Review  Outcome: Progressing  Flowsheets (Taken 10/6/2024 1802)  Outcome Evaluation: Pt has had no c/o of cp/soa today//  ICD rulled out as reason for pain. Pt will cont to be monitored overnight and see interventional card in the am.  Pt has appointment with Dr chavez tomorrow at 2:30.  RN is to notifiy MD in the am and ask if he wants to see pt here or at his office as outpatient. Pt verbalizes understanding.  will cont to monitor   Goal Outcome Evaluation:              Outcome Evaluation: Pt has had no c/o of cp/soa today//  ICD rulled out as reason for pain. Pt will cont to be monitored overnight and see interventional card in the am.  Pt has appointment with Dr chavez tomorrow at 2:30.  RN is to notifiy MD in the am and ask if he wants to see pt here or at his office as outpatient. Pt verbalizes understanding.  will cont to monitor

## 2024-10-06 NOTE — H&P
[facility] Medicine Services  History & Physical    Patient Name: Deion Nicholas  : 1940  MRN: [id number]  Primary Care Physician:  Provider, No Known  Date of admission: 10/5/2024  Date and Time of Service: 10/6/2024 at 04:30    Subjective      Chief Complaint: Sudden transient chest discomfort    History of Present Illness: Deion Nicholas is a 83 y.o. male with a PMH CAD s/p 5vCABG in , ICM s/p Biot[employer] ICD in 2018, VT s/p ablation, and 5 cm ascending aortic aneurysm followed by Dr. Barros.  Last nuclear stress test 2024 consistent with it intermediate risk. He was prescribed Ranexa on discharge.  Last 2D echo 2023 showed an EF 21-25% with grade 1 diastolic dysfunction and mild MR/A; primary HTN, and HLD.  who presented to [facility] ER and transferred to [facility] on 10/5/2024 after being evaluated for sudden onset of chest discomfort.  The patient reports that he fell asleep in his recliner and suddenly awakened to a sensation of pain in his chest.  The patient reports he thought his AICD had deployed so he went to the emergency room for further evaluation.  The patient also complains of worsening pain and swelling to his bilateral lower extremities, right greater than left.  Right lower extremity swelling being greater than left is not abnormal for the patient since having vein harvest from that extremity.  Patient reports [facility] and was told to take additional doses of his Lasix 6 to 8 hours after his regular a.m. dose due to lower extremity edema. The patient reports this has not helped with the edema.  He reports he is supposed to have his right leg wrapped next wee.     At the prior facility the patient had interrogation of his AICD which did NOT show deployment.  Due to worsening lower extremity edema and chest pain awakening from sleep, the patient was transferred to this facility for cardiology evaluation.    Review of records  with summary: Patient had a recent evaluation 9/10/2024 for chest pain. He patient had been prescribed Ranexa at discharge but stated he did not get the prescription. Patient was seen by cardiology outpatient 9/16/2020 for with documentation that new prescription for Ranexa was written.  However, the patient reports that he did not get the prescription because the pharmacy told him he would have to have a prior authorization.      Review of Systems   Respiratory:  Negative for shortness of breath.    Cardiovascular:  Positive for chest pain, palpitations and leg swelling.   Gastrointestinal:  Negative for nausea.       Personal History     Past Medical History:   Diagnosis Date    Aneurysm     Cardiomyopathy     ICD implantation    Cellulitis of left lower extremity 2010    recurrent    CHD (coronary heart disease)     S/P CABG & PCI    Dyslipidemia     Heart valve disease     History of ventricular tachycardia     Hypertension     Myocardial infarction     Inferior MI    Pinched nerve     L4-L5    Prostate cancer        Past Surgical History:   Procedure Laterality Date    ANGIOPLASTY  2000 04/1996 Stent: Palmaz- Huy stent/LAD 07/1996-RCA: 2000-Tetra stent Guidant to proximal RCA, mid to distal artery 2 sequential Guidant Tetra stents    APPENDECTOMY      CARDIAC ABLATION  April and May 2019    CARDIAC CATHETERIZATION  07/2017    1996 x2, Nov. 2000, 05/2010-cath 08/2015-no stents 2017    CARDIAC DEFIBRILLATOR PLACEMENT      COLONOSCOPY W/ POLYPECTOMY      Mult colonoscopy's for polyp resection     CORONARY ARTERY BYPASS GRAFT  05/2010    x5 vessel: LMA to diagonal, LAD, intermedius, obtuse marginal, RCA    CORONARY STENT PLACEMENT      ENDOSCOPY N/A 6/24/2019    Procedure: ESOPHAGOGASTRODUODENOSCOPY with dilitation Bougie 50, 54;  Surgeon: Roddy Coronel MD;  Location: [facility] ENDOSCOPY;  Service: Gastroenterology    INSERT / REPLACE / REMOVE PACEMAKER      KNEE OPEN LATERAL RELEASE Left     reduction     OTHER SURGICAL HISTORY  01/2018    ICD implantation    PROSTATE SURGERY  2015    Robiotic surgery- Cyber Knife:       Family History: family history includes Heart disease in his father; Pancreatic cancer in his mother. Otherwise pertinent FHx was reviewed and not pertinent to current issue.    Social History:  reports that he quit smoking about 22 years ago. His smoking use included cigarettes. He started smoking about 39 years ago. He has a 17 pack-year smoking history. He has been exposed to tobacco smoke. He has never used smokeless tobacco. He reports that he does not currently use alcohol. He reports that he does not use drugs.    Home Medications:  Prior to Admission Medications       Prescriptions Last Dose Informant Patient Reported? Taking?    aspirin 81 MG EC tablet 10/5/2024  Yes Yes    Take 1 tablet by mouth Daily.    Cholecalciferol 10 MCG (400 UNIT) tablet 10/5/2024 Self Yes Yes    Take 2 tablets by mouth Daily.    dapagliflozin Propanediol (Farxiga) 10 MG tablet 10/5/2024 Other Yes Yes    Take 10 mg [facility]    Diclofenac Sodium (VOLTAREN) 1 % gel gel 10/6/2024  Yes Yes    Apply 4 g topically to the appropriate area as directed 4 (Four) Times a Day As Needed.    ezetimibe (ZETIA) 10 MG tablet 10/5/2024 Self Yes Yes    Take 1 tablet by mouth Every Evening.    furosemide (LASIX) 20 MG tablet 10/5/2024  Yes Yes    Take 1 tablet by mouth Daily.    gabapentin (NEURONTIN) 800 MG tablet Patient Taking Differently Self Yes Yes    Take 1 tablet by mouth Every 6 (Six) Hours.    meclizine (ANTIVERT) 25 MG tablet Past Week  Yes Yes    Take 1 tablet by mouth Every 6 (Six) Hours As Needed for Dizziness.    methocarbamol (ROBAXIN) 500 MG tablet 10/5/2024 Self Yes Yes    Take 1 tablet by mouth 3 (Three) Times a Day.    metoprolol succinate XL (TOPROL-XL) 100 MG 24 hr tablet 10/5/2024  Yes Yes    Take 0.5 tablets by mouth 2 (Two) Times a Day.    midodrine (PROAMATINE) 5 MG tablet 10/5/2024  Spouse/Significant Other Yes Yes    Take 1 tablet by mouth 3 (Three) Times a Day Before Meals.    Omega-3 Fatty Acids (fish oil) 1000 MG capsule capsule 10/5/2024 Self Yes Yes    Take 2 capsules by mouth 2 (Two) Times a Day With Meals.    pantoprazole (PROTONIX) 40 MG EC tablet 10/5/2024 Self Yes Yes    Take 1 tablet by mouth Daily.    potassium chloride (MICRO-K) 10 MEQ CR capsule 10/5/2024  Yes Yes    Take 1 capsule by mouth Daily.    ranolazine (Ranexa) 500 MG 12 hr tablet 10/5/2024  No Yes    Take 1 tablet by mouth 2 (Two) Times a Day. Indications: Stable Angina Pectoris    sennosides-docusate (PERICOLACE) 8.6-50 MG per tablet Patient Taking Differently  Yes Yes    Take 1 tablet by mouth Daily.    sucralfate (CARAFATE) 1 g tablet 10/5/2024  Yes Yes    Take 1 tablet by mouth 4 (Four) Times a Day.    nitroglycerin (NITROSTAT) 0.4 MG SL tablet More than a month Self Yes No    Place 1 tablet under the tongue Every 5 (Five) Minutes As Needed for Chest Pain.              Allergies:  Allergies   Allergen Reactions    Lovastatin Myalgia    Pravastatin Myalgia and Unknown (See Comments)     unknown    Simvastatin Unknown (See Comments) and Myalgia     unknown    Spironolactone Other (See Comments)     Gynecomastia        Objective      Vitals:   Temp:  [98 °F (36.7 °C)-98.4 °F (36.9 °C)] 98.4 °F (36.9 °C)  Heart Rate:  [87-96] 89  Resp:  [16-18] 16  BP: (130-147)/(72-88) 147/80  Body mass index is 26.8 kg/m².  Physical Exam  Vitals and nursing note reviewed.   Constitutional:       Appearance: Normal appearance. He is not ill-appearing or diaphoretic.   HENT:      Head: Normocephalic and atraumatic.      Right Ear: External ear normal.      Left Ear: External ear normal.      Nose: Nose normal.      Mouth/Throat:      Mouth: Mucous membranes are moist.   Eyes:      General: No scleral icterus.        Right eye: No discharge.         Left eye: No discharge.      Extraocular Movements: Extraocular movements intact.       Conjunctiva/sclera: Conjunctivae normal.      Pupils: Pupils are equal, round, and reactive to light.   Cardiovascular:      Rate and Rhythm: Normal rate and regular rhythm.      Pulses: Normal pulses.      Heart sounds: Normal heart sounds. No murmur heard.  Pulmonary:      Effort: Pulmonary effort is normal.      Breath sounds: Normal breath sounds.   Abdominal:      General: Bowel sounds are normal.      Palpations: Abdomen is soft.   Musculoskeletal:      Cervical back: Normal range of motion and neck supple.      Right lower leg: Edema present.      Left lower leg: Edema present.   Skin:     General: Skin is warm and dry.      Capillary Refill: Capillary refill takes less than 2 seconds.      Coloration: Skin is not jaundiced or pale.      Findings: Erythema present.   Neurological:      General: No focal deficit present.      Mental Status: He is alert and oriented to person, place, and time.   Psychiatric:         Mood and Affect: Mood normal.         Behavior: Behavior normal.         Thought Content: Thought content normal.         Judgment: Judgment normal.         Diagnostic Data:  Lab Results (last 24 hours)       Procedure Component Value Units Date/Time    CBC Auto Differential [[id number]]  (Abnormal) Collected: 10/06/24 0452    Specimen: Blood from Arm, Right Updated: 10/06/24 0459     WBC 5.57 10*3/mm3      RBC 3.97 10*6/mm3      Hemoglobin 12.0 g/dL      Hematocrit 37.4 %      MCV 94.2 fL      MCH 30.2 pg      MCHC 32.1 g/dL      RDW 14.6 %      RDW-SD 51.3 fl      MPV 10.2 fL      Platelets 178 10*3/mm3      Neutrophil % 52.4 %      Lymphocyte % 31.6 %      Monocyte % 9.0 %      Eosinophil % 6.1 %      Basophil % 0.7 %      Immature Grans % 0.2 %      Neutrophils, Absolute 2.92 10*3/mm3      Lymphocytes, Absolute 1.76 10*3/mm3      Monocytes, Absolute 0.50 10*3/mm3      Eosinophils, Absolute 0.34 10*3/mm3      Basophils, Absolute 0.04 10*3/mm3      Immature Grans, Absolute 0.01 10*3/mm3      nRBC  0.0 /100 WBC     TSH Rfx On Abnormal To Free T4 [[id number]] Collected: 10/06/24 0452    Specimen: Blood from Arm, Right Updated: 10/06/24 0457    Basic Metabolic Panel [[id number]] Collected: 10/06/24 0452    Specimen: Blood from Arm, Right Updated: 10/06/24 0457    Hemoglobin A1c [[id number]] Collected: 10/06/24 0452    Specimen: Blood from Arm, Right Updated: 10/06/24 0457    Magnesium [[id number]] Collected: 10/06/24 0452    Specimen: Blood from Arm, Right Updated: 10/06/24 0457    High Sensitivity Troponin T 2Hr [[id number]]  (Abnormal) Collected: 10/06/24 0103    Specimen: Blood Updated: 10/06/24 0129     HS Troponin T 22 ng/L      Troponin T Delta -2 ng/L     Narrative:      High Sensitive Troponin T Reference Range:  <14.0 ng/L- Negative Female for AMI  <22.0 ng/L- Negative Male for AMI  >=14 - Abnormal Female indicating possible myocardial injury.  >=22 - Abnormal Male indicating possible myocardial injury.   Clinicians would have to utilize clinical acumen, EKG, Troponin, and serial changes to determine if it is an Acute Myocardial Infarction or myocardial injury due to an underlying chronic condition.         D-dimer, Quantitative [[id number]]  (Normal) Collected: 10/05/24 2345    Specimen: Blood Updated: 10/06/24 0001     D-Dimer, Quantitative 0.72 MCGFEU/mL     Narrative:      According to the assay 's published package insert, a normal (<0.50 MCGFEU/mL) D-dimer result in conjunction with a non-high clinical probability assessment, excludes deep vein thrombosis (DVT) and pulmonary embolism (PE) with high sensitivity.    D-dimer values increase with age and this can make VTE exclusion of an older population difficult. To address this, the [employer] College of [employer], based on best available evidence and recent guidelines, recommends that clinicians use age-adjusted D-dimer thresholds in patients greater than 50 years of age with: a) a low probability of PE who do not meet all Pulmonary Embolism  "Rule Out Criteria, or b) in those with intermediate probability of PE.   The formula for an age-adjusted D-dimer cut-off is \"age/100\".  For example, a 60 year old patient would have an age-adjusted cut-off of 0.60 MCGFEU/mL and an 80 year old 0.80 MCGFEU/mL.    BNP [[id number]]  (Normal) Collected: 10/05/24 2313    Specimen: Blood Updated: 10/06/24 0000     proBNP 569.7 pg/mL     Narrative:      This assay is used as an aid in the diagnosis of individuals suspected of having heart failure. It can be used as an aid in the diagnosis of acute decompensated heart failure (ADHF) in patients presenting with signs and symptoms of ADHF to the emergency department (ED). In addition, NT-proBNP of <300 pg/mL indicates ADHF is not likely.    Age Range Result Interpretation  NT-proBNP Concentration (pg/mL:      <50             Positive            >450                   Gray                 300-450                    Negative             <300    50-75           Positive            >900                  Gray                300-900                  Negative            <300      >75             Positive            >1800                  Gray                300-1800                  Negative            <300    Comprehensive Metabolic Panel [[id number]]  (Abnormal) Collected: 10/05/24 2313    Specimen: Blood Updated: 10/05/24 2335     Glucose 134 mg/dL      BUN 17 mg/dL      Creatinine 1.09 mg/dL      Sodium 139 mmol/L      Potassium 4.2 mmol/L      Chloride 106 mmol/L      CO2 24.2 mmol/L      Calcium 9.1 mg/dL      Total Protein 7.2 g/dL      Albumin 4.1 g/dL      ALT (SGPT) 15 U/L      AST (SGOT) 20 U/L      Alkaline Phosphatase 108 U/L      Total Bilirubin 0.3 mg/dL      Globulin 3.1 gm/dL      A/G Ratio 1.3 g/dL      BUN/Creatinine Ratio 15.6     Anion Gap 8.8 mmol/L      eGFR 68.2 mL/min/1.73     Narrative:      GFR Normal >60  Chronic Kidney Disease <60  Kidney Failure <15    The GFR formula is only valid for adults with stable " renal function between ages 18 and 70.    High Sensitivity Troponin T [[id number]]  (Abnormal) Collected: 10/05/24 2313    Specimen: Blood Updated: 10/05/24 2334     HS Troponin T 24 ng/L     Narrative:      High Sensitive Troponin T Reference Range:  <14.0 ng/L- Negative Female for AMI  <22.0 ng/L- Negative Male for AMI  >=14 - Abnormal Female indicating possible myocardial injury.  >=22 - Abnormal Male indicating possible myocardial injury.   Clinicians would have to utilize clinical acumen, EKG, Troponin, and serial changes to determine if it is an Acute Myocardial Infarction or myocardial injury due to an underlying chronic condition.         CBC & Differential [[id number]]  (Normal) Collected: 10/05/24 2313    Specimen: Blood Updated: 10/05/24 2315    Narrative:      The following orders were created for panel order CBC & Differential.  Procedure                               Abnormality         Status                     ---------                               -----------         ------                     CBC Auto Differential[[id number]]        Normal              Final result                 Please view results for these tests on the individual orders.    CBC Auto Differential [[id number]]  (Normal) Collected: 10/05/24 2313    Specimen: Blood Updated: 10/05/24 2315     WBC 6.17 10*3/mm3      RBC 4.33 10*6/mm3      Hemoglobin 13.1 g/dL      Hematocrit 41.1 %      MCV 94.9 fL      MCH 30.3 pg      MCHC 31.9 g/dL      RDW 14.9 %      RDW-SD 52.9 fl      MPV 9.3 fL      Platelets 191 10*3/mm3      Neutrophil % 57.6 %      Lymphocyte % 26.7 %      Monocyte % 9.9 %      Eosinophil % 5.2 %      Basophil % 0.6 %      Immature Grans % 0.0 %      Neutrophils, Absolute 3.55 10*3/mm3      Lymphocytes, Absolute 1.65 10*3/mm3      Monocytes, Absolute 0.61 10*3/mm3      Eosinophils, Absolute 0.32 10*3/mm3      Basophils, Absolute 0.04 10*3/mm3      Immature Grans, Absolute 0.00 10*3/mm3              Imaging Results (Last 24  Hours)       Procedure Component Value Units Date/Time    XR Chest 2 View [[id number]] Collected: 10/05/24 2330     Updated: 10/05/24 2333    Narrative:      XR CHEST 2 VW    Date of Exam: 10/5/2024 11:17 PM EDT    Indication: Chest Pain Triage Protocol    Comparison: Chest radiograph 9/10/2024    Findings:  There is a left-sided AICD device with lead over the right ventricle. The heart is enlarged with changes of CABG. The pulmonary vascular markings are normal. There is diffuse emphysema. There are chronic appearing interstitial changes throughout both   lungs similar to the prior study.      Impression:      Impression:  Chronic interstitial changes and emphysema. No active disease.        Electronically Signed: Fei Bradshaw MD    10/5/2024 11:30 PM EDT    Workstation ID: [id number]              Assessment & Plan        This is a 83 y.o. male with:    Active and Resolved Problems  Active Hospital Problems    Diagnosis  POA    **Fluid overload [E87.70]  Yes    Chest pain [R07.9]  Yes      Resolved Hospital Problems   No resolved problems to display.     Chest pain, uncertain etiology with hx of CAD--cardiac cause needs to be evaluated; patient also has known 4.6 cm thoracic aortic aneurysm, d-dimer negative making dissection unlikely but not excluded; hx of GERD:   Stress test September 2024 listed as a intermediate risk study   Start Ranexa 500 mg every 12 hours, patient has not had filled due to insurance needing prior authorization   Continue aspirin 81 mg daily    HFrEF with most recent EF 21 to 25% with increased lower extremity edema without worsening shortness of breath:   Check bilateral lower extremity Doppler, D-dimer negative making DVT less likely   Continue Farxiga 10 mg daily was formulary substitution Jardiance 25 mg daily   Lasix 20 mg IV every 4 hours x 2; then resume home dose of 20 mg p.o. daily with potassium 10 mEq daily   Continue metoprolol 50 mg twice daily with as needed midodrine 5 mg 3  times daily    GERD, chronic: Continue Protonix 40 mg daily         Chronic shoulder pain:  Continue diclofenac gel 1% 4 times daily  Continue gabapentin 800 mg 3 times daily   Continue Robaxin as needed    Vertigo, chronic:   Continue Antivert 25 mg every 6 hours as needed    HLD, chronic:    Hold Zetia 10 mg every evening secondary to nonformulary at this facility   Hold omega-3    VTE Prophylaxis:  Pharmacologic VTE prophylaxis orders are present.        The patient desires to be as follows:    CODE STATUS:           This was clarified with patient 10/6/2024 during the course of this H&P.    Admission Status:  I believe this patient meets observation status.    Expected Length of Stay: Less than 23 hours unless further cardiac workup indicated.  Patient has not been able to get Ranexa prescription filled which was conservative treatment recommended after evaluation mid-September 2024.    PDMP and Medication Dispenses via Sidebar reviewed and consistent with patient reported medications.    I discussed the patient's findings and my recommendations with patient and nursing staff.      Signature:     This document has been electronically signed by NIMCO Carballo on October 6, 2024 05:04 EDT   [facility]

## 2024-10-06 NOTE — ED NOTES
Constantinronipenelope DASILVA ran report, no evidence of patient being shocked.  Will place report in chart.

## 2024-10-06 NOTE — PLAN OF CARE
No complaints of chest pain. Neurontin given for pain of 9/10 in knees. VSS on room air    Problem: Adult Inpatient Plan of Care  Goal: Plan of Care Review  Outcome: Progressing  Flowsheets (Taken 10/6/2024 0556)  Progress: improving  Plan of Care Reviewed With: patient  Goal: Patient-Specific Goal (Individualized)  Outcome: Progressing  Goal: Absence of Hospital-Acquired Illness or Injury  Outcome: Progressing  Intervention: Identify and Manage Fall Risk  Recent Flowsheet Documentation  Taken 10/6/2024 0400 by Candis oSto RN  Safety Promotion/Fall Prevention:   assistive device/personal items within reach   clutter free environment maintained   nonskid shoes/slippers when out of bed   room organization consistent   safety round/check completed  Intervention: Prevent Skin Injury  Recent Flowsheet Documentation  Taken 10/6/2024 0400 by Candis Soto RN  Body Position: position changed independently  Skin Protection:   adhesive use limited   incontinence pads utilized   tubing/devices free from skin contact  Intervention: Prevent and Manage VTE (Venous Thromboembolism) Risk  Recent Flowsheet Documentation  Taken 10/6/2024 0400 by Candis Soto RN  Activity Management: up ad nas  VTE Prevention/Management:   bilateral   sequential compression devices off   patient refused intervention  Intervention: Prevent Infection  Recent Flowsheet Documentation  Taken 10/6/2024 0400 by Candis Soto RN  Infection Prevention:   environmental surveillance performed   hand hygiene promoted   personal protective equipment utilized   single patient room provided  Goal: Optimal Comfort and Wellbeing  Outcome: Progressing  Intervention: Provide Person-Centered Care  Recent Flowsheet Documentation  Taken 10/6/2024 0400 by Candis Soto RN  Trust Relationship/Rapport:   care explained   choices provided   questions encouraged   reassurance provided  Goal: Readiness for Transition of Care  Outcome:  Progressing  Intervention: Mutually Develop Transition Plan  Recent Flowsheet Documentation  Taken 10/6/2024 0316 by Candis Soto, RN  Transportation Anticipated: car, drives self  Patient/Family Anticipated Services at Transition: none  Patient/Family Anticipates Transition to: home  Taken 10/6/2024 0314 by Candis Soto, RN  Equipment Currently Used at Home:   cane, straight   bp cuff   scales   walker, rolling     Problem: Heart Failure Comorbidity  Goal: Maintenance of Heart Failure Symptom Control  Outcome: Progressing  Intervention: Maintain Heart Failure-Management  Recent Flowsheet Documentation  Taken 10/6/2024 0400 by Candis Soto, RN  Medication Review/Management: medications reviewed   Goal Outcome Evaluation:  Plan of Care Reviewed With: patient        Progress: improving

## 2024-10-06 NOTE — PROGRESS NOTES
[facility] MEDICINE SERVICE  DAILY PROGRESS NOTE    NAME: Deion Nicholas  : 1940  MRN: [id number]      LOS: 0 days     PROVIDER OF SERVICE: Nabil Mckinney MD    Chief Complaint: Fluid overload    Subjective:     Interval History:  History taken from: patient    No acute overnight events. Patient stated he felt shocked yesterday from his device while watching football game, denies any chest pain, fever or chills, also reported BLE edema, He further stated his overall weight only fluctuates by 1-2 lbs, not significantly changed in last few days.    Review of Systems:   Review of Systems Neg unless reported above    Objective:     Vital Signs  Temp:  [98 °F (36.7 °C)-98.4 °F (36.9 °C)] 98.4 °F (36.9 °C)  Heart Rate:  [84-96] 84  Resp:  [16-18] 16  BP: (130-147)/(72-88) 133/76   Body mass index is 26.8 kg/m².    Physical Exam  General Appearance:  Awake, alert   Head:  Atraumatic normocephalic   Eyes:        No sclera icterus   Neck: Normal ROM, non tender   Pulm: Clear to auscultation on exam no crackles present   Cardio: HR normal, Regular rhythm during my eval   Extremities: 2+ edema on BLE   Abdomen: Soft non distended, no rebound or guarding   /Renal: No suprapubic tenderness   Musculoskeletal: Moves all extremities   Neuro: No focal neurological deficits on my exam               Scheduled Meds   aspirin, 81 mg, Oral, Daily  empagliflozin, 25 mg, Oral, Daily  enoxaparin, 40 mg, Subcutaneous, Daily  furosemide, 20 mg, Intravenous, Q4H  [START ON 10/7/2024] furosemide, 20 mg, Oral, Daily  gabapentin, 800 mg, Oral, Q8H  methocarbamol, 500 mg, Oral, TID  metoprolol succinate XL, 50 mg, Oral, BID  midodrine, 5 mg, Oral, TID AC  pantoprazole, 40 mg, Oral, QAM AC  potassium chloride, 10 mEq, Oral, Daily  ranolazine, 500 mg, Oral, BID  sennosides-docusate, 1 tablet, Oral, Daily  sodium chloride, 10 mL, Intravenous, Q12H  sucralfate, 1 g, Oral, 4x Daily AC & at Bedtime       PRN Meds      acetaminophen **OR** acetaminophen **OR** acetaminophen    senna-docusate sodium **AND** polyethylene glycol **AND** bisacodyl **AND** bisacodyl    Calcium Replacement - Follow Nurse / BPA Driven Protocol    Diclofenac Sodium    Magnesium Standard Dose Replacement - Follow Nurse / BPA Driven Protocol    meclizine    nitroglycerin    ondansetron ODT **OR** ondansetron    Pharmacy to Dose enoxaparin (LOVENOX)    Phosphorus Replacement - Follow Nurse / BPA Driven Protocol    Potassium Replacement - Follow Nurse / BPA Driven Protocol    sodium chloride    sodium chloride   Infusions  Pharmacy to Dose enoxaparin (LOVENOX),           Diagnostic Data    Results from last 7 days   Lab Units 10/06/24  0452 10/05/24  2313   WBC 10*3/mm3 5.57 6.17   HEMOGLOBIN g/dL 12.0* 13.1   HEMATOCRIT % 37.4* 41.1   PLATELETS 10*3/mm3 178 191   GLUCOSE mg/dL 106* 134*   CREATININE mg/dL 0.98 1.09   BUN mg/dL 17 17   SODIUM mmol/L 137 139   POTASSIUM mmol/L 4.2 4.2   AST (SGOT) U/L  --  20   ALT (SGPT) U/L  --  15   ALK PHOS U/L  --  108   BILIRUBIN mg/dL  --  0.3   ANION GAP mmol/L 8.9 8.8       XR Chest 2 View    Result Date: 10/5/2024  Impression: Chronic interstitial changes and emphysema. No active disease. Electronically Signed: Fei Bradshaw MD  10/5/2024 11:30 PM EDT  Workstation ID: [id number]       I reviewed the patient's new clinical results.    Assessment/Plan:   Deion Nicholas is a 83 y.o. male with a PMH CAD s/p 5vCABG in 2010, ICM s/p [employer]ot[employer] ICD in 2018, VT s/p ablation, and 5 cm ascending aortic aneurysm followed by Dr. Barros.  Last nuclear stress test 9/11/2024 consistent with it intermediate risk. He was prescribed Ranexa on discharge.  Last 2D echo 12/2023 showed an EF 21-25% with grade 1 diastolic dysfunction and mild MR/A; primary HTN, and HLD.  who presented to [facility] ER and transferred to [facility] on 10/5/2024 after being evaluated for sudden onset of chest discomfort.     Active and Resolved  Problems  Active Hospital Problems    Diagnosis  POA    **Fluid overload [E87.70]  Yes    Chest pain [R07.9]  Yes    Peripheral neuropathy [G62.9]  Yes    Essential hypertension [I10]  Yes    Hyperlipidemia, mixed [E78.2]  Yes    Ischemic cardiomyopathy [I25.5]  Yes      Resolved Hospital Problems   No resolved problems to display.     Plan:   -Continue PO diuretics, edema on BLE chronic, not in heart failure excerbation   -Cards consulted for pacemaker interogation, will follow reccs   -Continue home meds for chronic problems   -AM Labs will follow     VTE Prophylaxis:  Pharmacologic VTE prophylaxis orders are present.         Code status is   There are no questions and answers to display.       Plan for disposition: Cards reccs    Time: 30 minutes    Part of this note may be an electronic transcription/translation of spoken language to printed text using the Dragon Dictation System.    Signature: Electronically signed by Nabil Mckinney MD, 10/06/24, 07:37 EDT.  [facility] Hospitalist Team

## 2024-10-07 VITALS
SYSTOLIC BLOOD PRESSURE: 124 MMHG | OXYGEN SATURATION: 96 % | BODY MASS INDEX: 26.43 KG/M2 | HEIGHT: 74 IN | HEART RATE: 69 BPM | WEIGHT: 205.91 LBS | DIASTOLIC BLOOD PRESSURE: 73 MMHG | RESPIRATION RATE: 16 BRPM | TEMPERATURE: 97.3 F

## 2024-10-07 LAB
ALBUMIN SERPL-MCNC: 3.8 G/DL (ref 3.5–5.2)
ALBUMIN/GLOB SERPL: 1.2 G/DL
ALP SERPL-CCNC: 94 U/L (ref 39–117)
ALT SERPL W P-5'-P-CCNC: 12 U/L (ref 1–41)
ANION GAP SERPL CALCULATED.3IONS-SCNC: 10.8 MMOL/L (ref 5–15)
AST SERPL-CCNC: 25 U/L (ref 1–40)
BASOPHILS # BLD AUTO: 0.05 10*3/MM3 (ref 0–0.2)
BASOPHILS NFR BLD AUTO: 1 % (ref 0–1.5)
BILIRUB SERPL-MCNC: 0.3 MG/DL (ref 0–1.2)
BUN SERPL-MCNC: 16 MG/DL (ref 8–23)
BUN/CREAT SERPL: 17.6 (ref 7–25)
CALCIUM SPEC-SCNC: 9.3 MG/DL (ref 8.6–10.5)
CHLORIDE SERPL-SCNC: 104 MMOL/L (ref 98–107)
CO2 SERPL-SCNC: 21.2 MMOL/L (ref 22–29)
CREAT SERPL-MCNC: 0.91 MG/DL (ref 0.76–1.27)
DEPRECATED RDW RBC AUTO: 54.4 FL (ref 37–54)
EGFRCR SERPLBLD CKD-EPI 2021: 84.7 ML/MIN/1.73
EOSINOPHIL # BLD AUTO: 0.3 10*3/MM3 (ref 0–0.4)
EOSINOPHIL NFR BLD AUTO: 6.3 % (ref 0.3–6.2)
ERYTHROCYTE [DISTWIDTH] IN BLOOD BY AUTOMATED COUNT: 14.9 % (ref 12.3–15.4)
GLOBULIN UR ELPH-MCNC: 3.1 GM/DL
GLUCOSE SERPL-MCNC: 116 MG/DL (ref 65–99)
HCT VFR BLD AUTO: 45.7 % (ref 37.5–51)
HGB BLD-MCNC: 13.6 G/DL (ref 13–17.7)
IMM GRANULOCYTES # BLD AUTO: 0.01 10*3/MM3 (ref 0–0.05)
IMM GRANULOCYTES NFR BLD AUTO: 0.2 % (ref 0–0.5)
LYMPHOCYTES # BLD AUTO: 1.8 10*3/MM3 (ref 0.7–3.1)
LYMPHOCYTES NFR BLD AUTO: 37.6 % (ref 19.6–45.3)
MAGNESIUM SERPL-MCNC: 2.4 MG/DL (ref 1.6–2.4)
MCH RBC QN AUTO: 29.4 PG (ref 26.6–33)
MCHC RBC AUTO-ENTMCNC: 29.8 G/DL (ref 31.5–35.7)
MCV RBC AUTO: 98.9 FL (ref 79–97)
MONOCYTES # BLD AUTO: 0.5 10*3/MM3 (ref 0.1–0.9)
MONOCYTES NFR BLD AUTO: 10.4 % (ref 5–12)
NEUTROPHILS NFR BLD AUTO: 2.13 10*3/MM3 (ref 1.7–7)
NEUTROPHILS NFR BLD AUTO: 44.5 % (ref 42.7–76)
NRBC BLD AUTO-RTO: 0 /100 WBC (ref 0–0.2)
PHOSPHATE SERPL-MCNC: 3.1 MG/DL (ref 2.5–4.5)
PLATELET # BLD AUTO: 137 10*3/MM3 (ref 140–450)
PMV BLD AUTO: 9.9 FL (ref 6–12)
POTASSIUM SERPL-SCNC: 4.7 MMOL/L (ref 3.5–5.2)
PROT SERPL-MCNC: 6.9 G/DL (ref 6–8.5)
RBC # BLD AUTO: 4.62 10*6/MM3 (ref 4.14–5.8)
SODIUM SERPL-SCNC: 136 MMOL/L (ref 136–145)
WBC NRBC COR # BLD AUTO: 4.79 10*3/MM3 (ref 3.4–10.8)

## 2024-10-07 PROCEDURE — 80053 COMPREHEN METABOLIC PANEL: CPT | Performed by: STUDENT IN AN ORGANIZED HEALTH CARE EDUCATION/TRAINING PROGRAM

## 2024-10-07 PROCEDURE — G0378 HOSPITAL OBSERVATION PER HR: HCPCS

## 2024-10-07 PROCEDURE — 99214 OFFICE O/P EST MOD 30 MIN: CPT | Performed by: INTERNAL MEDICINE

## 2024-10-07 PROCEDURE — 85025 COMPLETE CBC W/AUTO DIFF WBC: CPT | Performed by: STUDENT IN AN ORGANIZED HEALTH CARE EDUCATION/TRAINING PROGRAM

## 2024-10-07 PROCEDURE — 83735 ASSAY OF MAGNESIUM: CPT | Performed by: STUDENT IN AN ORGANIZED HEALTH CARE EDUCATION/TRAINING PROGRAM

## 2024-10-07 PROCEDURE — 84100 ASSAY OF PHOSPHORUS: CPT | Performed by: STUDENT IN AN ORGANIZED HEALTH CARE EDUCATION/TRAINING PROGRAM

## 2024-10-07 RX ADMIN — FUROSEMIDE 20 MG: 20 TABLET ORAL at 09:15

## 2024-10-07 RX ADMIN — SUCRALFATE 1 G: 1 TABLET ORAL at 13:05

## 2024-10-07 RX ADMIN — MIDODRINE HYDROCHLORIDE 5 MG: 5 TABLET ORAL at 13:05

## 2024-10-07 RX ADMIN — Medication 10 ML: at 09:23

## 2024-10-07 RX ADMIN — EMPAGLIFLOZIN 25 MG: 25 TABLET, FILM COATED ORAL at 09:15

## 2024-10-07 RX ADMIN — METHOCARBAMOL 500 MG: 500 TABLET ORAL at 09:15

## 2024-10-07 RX ADMIN — RANOLAZINE 500 MG: 500 TABLET, FILM COATED, EXTENDED RELEASE ORAL at 09:15

## 2024-10-07 RX ADMIN — SUCRALFATE 1 G: 1 TABLET ORAL at 09:15

## 2024-10-07 RX ADMIN — POTASSIUM CHLORIDE 10 MEQ: 750 TABLET, EXTENDED RELEASE ORAL at 09:15

## 2024-10-07 RX ADMIN — METOPROLOL SUCCINATE 50 MG: 50 TABLET, EXTENDED RELEASE ORAL at 09:15

## 2024-10-07 RX ADMIN — GABAPENTIN 800 MG: 400 CAPSULE ORAL at 05:03

## 2024-10-07 RX ADMIN — ASPIRIN 81 MG: 81 TABLET, COATED ORAL at 09:15

## 2024-10-07 RX ADMIN — PANTOPRAZOLE SODIUM 40 MG: 40 TABLET, DELAYED RELEASE ORAL at 09:15

## 2024-10-07 RX ADMIN — MIDODRINE HYDROCHLORIDE 5 MG: 5 TABLET ORAL at 09:15

## 2024-10-07 NOTE — PLAN OF CARE
Problem: Adult Inpatient Plan of Care  Goal: Plan of Care Review  Outcome: Met  Goal: Patient-Specific Goal (Individualized)  Outcome: Met  Goal: Absence of Hospital-Acquired Illness or Injury  Outcome: Met  Intervention: Identify and Manage Fall Risk  Recent Flowsheet Documentation  Taken 10/7/2024 0800 by Jeanne Carvalho RN  Safety Promotion/Fall Prevention: safety round/check completed  Intervention: Prevent Skin Injury  Recent Flowsheet Documentation  Taken 10/7/2024 0800 by Jeanne Carvalho RN  Body Position: position changed independently  Skin Protection: adhesive use limited  Intervention: Prevent and Manage VTE (Venous Thromboembolism) Risk  Recent Flowsheet Documentation  Taken 10/7/2024 0800 by Jeanne Carvalho RN  Activity Management: activity encouraged  Range of Motion: active ROM (range of motion) encouraged  Intervention: Prevent Infection  Recent Flowsheet Documentation  Taken 10/7/2024 0800 by Jeanne Carvalho RN  Infection Prevention: single patient room provided  Goal: Optimal Comfort and Wellbeing  Outcome: Met  Goal: Readiness for Transition of Care  Outcome: Met     Problem: Heart Failure Comorbidity  Goal: Maintenance of Heart Failure Symptom Control  Outcome: Met  Intervention: Maintain Heart Failure-Management  Recent Flowsheet Documentation  Taken 10/7/2024 0800 by Jeanne Carvalho RN  Medication Review/Management: medications reviewed     Problem: Fall Injury Risk  Goal: Absence of Fall and Fall-Related Injury  Outcome: Met  Intervention: Identify and Manage Contributors  Recent Flowsheet Documentation  Taken 10/7/2024 0800 by Jeanne Carvalho RN  Medication Review/Management: medications reviewed  Intervention: Promote Injury-Free Environment  Recent Flowsheet Documentation  Taken 10/7/2024 0800 by Jeanne Carvalho RN  Safety Promotion/Fall Prevention: safety round/check completed   Goal Outcome Evaluation:         Pt cleared by Dr. Rees, Cardiology to discharge.  Pt to follow up with   Lis[facility].  Pt has appointment with Dr. Mckeon today and will be keeping that appointment.  Pt has made contact with Dr. Mckeon's office and they are expecting him by 15:30 today.    Pt will be leaving per son, whom pt is calling.     NAD noted. Denies pain/CP.

## 2024-10-07 NOTE — PROGRESS NOTES
Cardiology [facility]      Patient Care Team:  Provider, No Known as PCP - General        Cardiology assessment and plan    Congestive heart failure  Volume overload  Patient felt like he had ICD shock that made him to come to the emergency room   Nonspecific chest discomfort intermittent symptoms not exertional related  Prior coronary artery disease prior coronary artery bypass surgery  Ischemic cardiomyopathy  Lower extremity edema  Recent stress test with no significant reversible ischemia  Severe LV dysfunction  No acute myocardial infarction  Known cardiomyopathy  Left bundle branch block versus intraventricular conduction delay  CT angiogram of the chest with no pulmonary embolism no aortic dissection ascending aortic aneurysm measuring 4.6 cm  Left lower extremity cellulitis  Coronary artery disease with prior coronary bypass surgery in 2010  Known cardiomyopathy  Status post ICD  Normal proBNP  Prior echocardiogram in December 2023 with LV ejection fraction of 20 to 25%  Mild mitral regurgitation mild aortic insufficiency  History of ventricular tachycardia status post ablation therapy  Known ascending aortic aneurysm  Hypertension  Hyperlipidemia  Chronic renal insufficiency  Myocardial perfusion study with no significant reversible ischemia  Prior known myocardial infarction  Chest pain symptoms are somewhat atypical  Plan to manage conservatively medical therapy  Unless patient has recurrent or worsening symptoms no need for any further invasive workup  Diagnosis and treatment options reviewed and discussed with patient  Continued aggressive risk factor modification  Need for close monitoring and follow-up reviewed and discussed with patient  Device interrogation with no significant cardiac arrhythmia    Patient clinically feels better  Volume status is better  Denies any active chest discomfort  Tmax is 97.8 pulse is 66-75 respirations are 16 blood pressure is 115/64 sats are 95%  No significant elevation of  troponin  Normal proBNP  Sodium is 136 potassium is 4.7 creatinine 0.9 LFTs are normal normal thyroid function hemoglobin is 13.6  Current medications include aspirin 81 mg p.o. once a day patient is on Lasix 20 mg p.o. daily patient is on Toprol-XL 50 mg p.o. once a day patient is on midodrine 5 mg p.o. 3 times a day patient is on Ranexa 500 mg p.o. twice daily he is on Lovenox for DVT prophylaxis  Venous Doppler of the bilateral lower extremities with no DVT  Prior workup diagnosis and treatment options reviewed and discussed with patient  Patient clinically feels better            Chief Complaint: Lower extremity edema volume overload/patient felt like his advisement of that made him to come to the emergency room    Subjective no new complaints denies any new complaints denies any chest pain    Interval History: Clinically feels better    History taken from: patient    Review of Systems:  Review of Systems   Constitutional: Negative for chills, decreased appetite and malaise/fatigue.   HENT:  Negative for congestion and nosebleeds.    Eyes:  Negative for blurred vision and double vision.   Cardiovascular:  Positive for dyspnea on exertion and leg swelling. Negative for chest pain, irregular heartbeat, near-syncope, orthopnea, palpitations, paroxysmal nocturnal dyspnea and syncope.   Respiratory:  Negative for cough and shortness of breath.    Hematologic/Lymphatic: Negative for adenopathy. Does not bruise/bleed easily.   Skin:  Negative for color change and rash.   Gastrointestinal:  Negative for bloating, abdominal pain, hematemesis and hematochezia.   Genitourinary:  Negative for flank pain and hematuria.   Neurological:  Negative for dizziness and focal weakness.   Psychiatric/Behavioral:  Negative for altered mental status. The patient does not have insomnia.        Objective    Vital Signs  Visit Vitals  /64 (BP Location: Left arm, Patient Position: Lying)   Pulse 67   Temp 97.7 °F (36.5 °C) (Oral)  "  Resp 16   Ht 186.7 cm (73.5\")   Wt 93.4 kg (205 lb 14.6 oz)   SpO2 95%   BMI 26.80 kg/m²     Oxygen Therapy  SpO2: 95 %  Pulse Oximetry Type: Continuous  Device (Oxygen Therapy): room air  Flowsheet Rows      Flowsheet Row First Filed Value   Admission Height 186.7 cm (73.5\") Documented at 10/05/2024 2313   Admission Weight 93.4 kg (205 lb 14.6 oz) Documented at 10/06/2024 0315          Intake & Output (last 3 days)         10/04 0701  10/05 0700 10/05 0701  10/06 0700 10/06 0701  10/07 0700 10/07 0701  10/08 0700    P.O.   960 240    Total Intake(mL/kg)   960 (10.3) 240 (2.6)    Urine (mL/kg/hr)  950 2350 (1)     Total Output  950 2350     Net  -950 -1390 +240                  Lines, Drains & Airways       Active LDAs       Name Placement date Placement time Site Days    Peripheral IV 10/05/24 2315 Anterior;Right;Upper Arm 10/05/24  2315  Arm  1                    Physical Exam:  Constitutional:       Appearance: Well-developed.   Eyes:      Conjunctiva/sclera: Conjunctivae normal.      Pupils: Pupils are equal, round, and reactive to light.   HENT:      Head: Normocephalic and atraumatic.   Neck:      Thyroid: No thyromegaly.   Pulmonary:      Effort: Pulmonary effort is normal.      Breath sounds: Normal breath sounds.   Cardiovascular:      Abnormal PMI. Normal rate. Regular rhythm.      Murmurs: There is a grade 2/6 holosystolic murmur.      S3 and S4 Gallop.    Pulses:     Intact distal pulses.   Edema:     Peripheral edema present.     Thigh: bilateral 1+ pitting edema of the thigh.     Pretibial: bilateral 1+ pitting edema of the pretibial area.     Ankle: bilateral 1+ pitting edema of the ankle.     Feet: bilateral 1+ pitting edema of the feet.  Abdominal:      General: Bowel sounds are normal.      Palpations: Abdomen is soft.   Musculoskeletal:      Cervical back: Normal range of motion and neck supple. Skin:     General: Skin is warm.   Neurological:      Mental Status: Alert and oriented to person, " place, and time.         Results Review:     I reviewed the patient's new clinical results.    Lab Results (last 24 hours)       Procedure Component Value Units Date/Time    Comprehensive Metabolic Panel [[id number]]  (Abnormal) Collected: 10/07/24 0513    Specimen: Blood from Arm, Right Updated: 10/07/24 0611     Glucose 116 mg/dL      BUN 16 mg/dL      Creatinine 0.91 mg/dL      Sodium 136 mmol/L      Potassium 4.7 mmol/L      Comment: Slight hemolysis detected by analyzer. Result may be falsely elevated.        Chloride 104 mmol/L      CO2 21.2 mmol/L      Calcium 9.3 mg/dL      Total Protein 6.9 g/dL      Albumin 3.8 g/dL      ALT (SGPT) 12 U/L      AST (SGOT) 25 U/L      Comment: Slight hemolysis detected by analyzer. Result may be falsely elevated.        Alkaline Phosphatase 94 U/L      Total Bilirubin 0.3 mg/dL      Globulin 3.1 gm/dL      A/G Ratio 1.2 g/dL      BUN/Creatinine Ratio 17.6     Anion Gap 10.8 mmol/L      eGFR 84.7 mL/min/1.73     Narrative:      GFR Normal >60  Chronic Kidney Disease <60  Kidney Failure <15    The GFR formula is only valid for adults with stable renal function between ages 18 and 70.    Magnesium [[id number]]  (Normal) Collected: 10/07/24 0513    Specimen: Blood from Arm, Right Updated: 10/07/24 0611     Magnesium 2.4 mg/dL     CBC & Differential [[id number]]  (Abnormal) Collected: 10/07/24 0513    Specimen: Blood from Arm, Right Updated: 10/07/24 0607    Narrative:      The following orders were created for panel order CBC & Differential.  Procedure                               Abnormality         Status                     ---------                               -----------         ------                     CBC Auto Differential[[id number]]        Abnormal            Final result               Scan Slide[[id number]]                                                                    Please view results for these tests on the individual orders.    CBC Auto Differential  [[id number]]  (Abnormal) Collected: 10/07/24 0513    Specimen: Blood from Arm, Right Updated: 10/07/24 0607     WBC 4.79 10*3/mm3      RBC 4.62 10*6/mm3      Hemoglobin 13.6 g/dL      Hematocrit 45.7 %      MCV 98.9 fL      MCH 29.4 pg      MCHC 29.8 g/dL      RDW 14.9 %      RDW-SD 54.4 fl      MPV 9.9 fL      Platelets 137 10*3/mm3      Neutrophil % 44.5 %      Lymphocyte % 37.6 %      Monocyte % 10.4 %      Eosinophil % 6.3 %      Basophil % 1.0 %      Immature Grans % 0.2 %      Neutrophils, Absolute 2.13 10*3/mm3      Lymphocytes, Absolute 1.80 10*3/mm3      Monocytes, Absolute 0.50 10*3/mm3      Eosinophils, Absolute 0.30 10*3/mm3      Basophils, Absolute 0.05 10*3/mm3      Immature Grans, Absolute 0.01 10*3/mm3      nRBC 0.0 /100 WBC     Phosphorus [[id number]]  (Normal) Collected: 10/07/24 0513    Specimen: Blood from Arm, Right Updated: 10/07/24 0604     Phosphorus 3.1 mg/dL           Results for orders placed during the hospital encounter of 12/26/23    Adult Transthoracic Echo Complete W/ Cont if Necessary Per Protocol    Interpretation Summary    Left ventricular systolic function is severely decreased. Left ventricular ejection fraction appears to be 21 - 25%.    The left ventricular cavity is severely dilated.    Left ventricular diastolic function is consistent with (grade I) impaired relaxation.    The left atrial cavity is moderately dilated.    Abnormal mitral valve structure consistent with dilated annulus.    Moderate dilation of the ascending aorta is present.    Ascending aorta = 4.7 cm        Medication Review:   I have reviewed the patient's current medication list  Scheduled Meds:aspirin, 81 mg, Oral, Daily  empagliflozin, 25 mg, Oral, Daily  enoxaparin, 40 mg, Subcutaneous, Daily  furosemide, 20 mg, Oral, Daily  gabapentin, 800 mg, Oral, Q8H  methocarbamol, 500 mg, Oral, TID  metoprolol succinate XL, 50 mg, Oral, BID  midodrine, 5 mg, Oral, TID AC  pantoprazole, 40 mg, Oral, QAM AC  potassium  chloride, 10 mEq, Oral, Daily  ranolazine, 500 mg, Oral, BID  sennosides-docusate, 1 tablet, Oral, Daily  sodium chloride, 10 mL, Intravenous, Q12H  sucralfate, 1 g, Oral, 4x Daily AC & at Bedtime      Continuous Infusions:Pharmacy to Dose enoxaparin (LOVENOX),       PRN Meds:.  acetaminophen **OR** acetaminophen **OR** acetaminophen    senna-docusate sodium **AND** polyethylene glycol **AND** bisacodyl **AND** bisacodyl    Calcium Replacement - Follow Nurse / BPA Driven Protocol    Diclofenac Sodium    Magnesium Standard Dose Replacement - Follow Nurse / BPA Driven Protocol    meclizine    nitroglycerin    ondansetron ODT **OR** ondansetron    Pharmacy to Dose enoxaparin (LOVENOX)    Phosphorus Replacement - Follow Nurse / BPA Driven Protocol    Potassium Replacement - Follow Nurse / BPA Driven Protocol    sodium chloride    sodium chloride    ECG/EMG Results (last 24 hours)       Procedure Component Value Units Date/Time    Telemetry Scan [[id number]] Resulted: 10/05/24     Updated: 10/07/24 0123    Telemetry Scan [[id number]] Resulted: 10/05/24     Updated: 10/07/24 0231    Telemetry Scan [[id number]] Resulted: 10/05/24     Updated: 10/07/24 0243    Telemetry Scan [[id number]] Resulted: 10/05/24     Updated: 10/07/24 0323    Telemetry Scan [[id number]] Resulted: 10/05/24     Updated: 10/07/24 0440    Telemetry Scan [[id number]] Resulted: 10/05/24     Updated: 10/07/24 0525    Telemetry Scan [[id number]] Resulted: 10/05/24     Updated: 10/07/24 0758    Telemetry Scan [[id number]] Resulted: 10/05/24     Updated: 10/07/24 0902    Telemetry Scan [[id number]] Resulted: 10/05/24     Updated: 10/07/24 0902            Imaging Results (Last 24 Hours)       ** No results found for the last 24 hours. **          No results found for this or any previous visit.     Results for orders placed during the hospital encounter of 12/26/23    Adult Transthoracic Echo Complete W/ Cont if Necessary Per Protocol    Interpretation Summary     Left ventricular systolic function is severely decreased. Left ventricular ejection fraction appears to be 21 - 25%.    The left ventricular cavity is severely dilated.    Left ventricular diastolic function is consistent with (grade I) impaired relaxation.    The left atrial cavity is moderately dilated.    Abnormal mitral valve structure consistent with dilated annulus.    Moderate dilation of the ascending aorta is present.    Ascending aorta = 4.7 cm     Lab Results   Component Value Date    GLUCOSE 116 (H) 10/07/2024    BUN 16 10/07/2024    CREATININE 0.91 10/07/2024    EGFR 84.7 10/07/2024    BCR 17.6 10/07/2024    K 4.7 10/07/2024    CO2 21.2 (L) 10/07/2024    CALCIUM 9.3 10/07/2024    ALBUMIN 3.8 10/07/2024    BILITOT 0.3 10/07/2024    AST 25 10/07/2024    ALT 12 10/07/2024      Lab Results   Component Value Date    CHOL 160 09/11/2024    TRIG 100 09/11/2024    HDL 47 09/11/2024    LDL 95 09/11/2024      Lab Results   Component Value Date    TROPONINI <0.030 08/27/2019    TROPONINT 22 (H) 10/06/2024        Assessment & Plan       Fluid overload    Ischemic cardiomyopathy    Essential hypertension    Hyperlipidemia, mixed    Peripheral neuropathy    Chest pain        Basia Cruz MD  10/07/24  09:41 EDT

## 2024-10-07 NOTE — PLAN OF CARE
Goal Outcome Evaluation:      Pt resting comfortably this shift. Pt appears to have some sleep apnea, with sats dropping while sleeping but quickly recovering. VSS. No complaints at this time.      Problem: Adult Inpatient Plan of Care  Goal: Plan of Care Review  Outcome: Progressing  Goal: Patient-Specific Goal (Individualized)  Outcome: Progressing  Goal: Absence of Hospital-Acquired Illness or Injury  Outcome: Progressing  Intervention: Identify and Manage Fall Risk  Recent Flowsheet Documentation  Taken 10/7/2024 0415 by Erin Law RN  Safety Promotion/Fall Prevention:   safety round/check completed   room organization consistent   nonskid shoes/slippers when out of bed   fall prevention program maintained   clutter free environment maintained   assistive device/personal items within reach  Taken 10/7/2024 0230 by Erin Law RN  Safety Promotion/Fall Prevention:   safety round/check completed   room organization consistent   nonskid shoes/slippers when out of bed   fall prevention program maintained   clutter free environment maintained   assistive device/personal items within reach  Taken 10/7/2024 0015 by Erin Law RN  Safety Promotion/Fall Prevention:   safety round/check completed   room organization consistent   nonskid shoes/slippers when out of bed   fall prevention program maintained   clutter free environment maintained   assistive device/personal items within reach  Taken 10/6/2024 2215 by Erin Law, RN  Safety Promotion/Fall Prevention:   safety round/check completed   room organization consistent   nonskid shoes/slippers when out of bed   fall prevention program maintained   clutter free environment maintained   assistive device/personal items within reach  Taken 10/6/2024 2030 by Erin Law, RN  Safety Promotion/Fall Prevention:   safety round/check completed   room organization consistent   nonskid shoes/slippers when out of bed   fall  prevention program maintained   clutter free environment maintained   assistive device/personal items within reach  Intervention: Prevent Skin Injury  Recent Flowsheet Documentation  Taken 10/7/2024 0415 by Erin Law RN  Body Position: position changed independently  Skin Protection:   adhesive use limited   tubing/devices free from skin contact  Taken 10/7/2024 0230 by Erin Law RN  Body Position:   position changed independently   supine   weight shifting  Taken 10/7/2024 0015 by Erin Law RN  Body Position: position changed independently  Skin Protection:   adhesive use limited   tubing/devices free from skin contact  Taken 10/6/2024 2215 by Erin Law RN  Body Position:   position changed independently   supine   weight shifting  Taken 10/6/2024 2030 by Erin Law RN  Body Position:   position changed independently   sitting up in bed   weight shifting  Skin Protection:   adhesive use limited   incontinence pads utilized   silicone foam dressing in place   tubing/devices free from skin contact  Intervention: Prevent and Manage VTE (Venous Thromboembolism) Risk  Recent Flowsheet Documentation  Taken 10/7/2024 0415 by Erin Law RN  Activity Management: activity encouraged  Taken 10/7/2024 0230 by Erin Law RN  Activity Management: activity encouraged  Taken 10/7/2024 0015 by Erin Law RN  Activity Management: activity encouraged  Taken 10/6/2024 2215 by Erin Law RN  Activity Management: activity encouraged  Taken 10/6/2024 2030 by Erin Law RN  Activity Management: activity encouraged  VTE Prevention/Management:   bilateral   sequential compression devices off  Range of Motion: active ROM (range of motion) encouraged  Intervention: Prevent Infection  Recent Flowsheet Documentation  Taken 10/7/2024 0415 by Erin Law RN  Infection Prevention:   single patient room provided   rest/sleep  promoted   personal protective equipment utilized   hand hygiene promoted   equipment surfaces disinfected  Taken 10/7/2024 0230 by Erin Law RN  Infection Prevention:   single patient room provided   rest/sleep promoted   personal protective equipment utilized   hand hygiene promoted   equipment surfaces disinfected  Taken 10/7/2024 0015 by Erin Law RN  Infection Prevention:   single patient room provided   rest/sleep promoted   personal protective equipment utilized   hand hygiene promoted   equipment surfaces disinfected  Taken 10/6/2024 2215 by Erin Law RN  Infection Prevention:   single patient room provided   rest/sleep promoted   personal protective equipment utilized   hand hygiene promoted   equipment surfaces disinfected  Taken 10/6/2024 2030 by Erin Law RN  Infection Prevention:   single patient room provided   rest/sleep promoted   personal protective equipment utilized   hand hygiene promoted   equipment surfaces disinfected  Goal: Optimal Comfort and Wellbeing  Outcome: Progressing  Intervention: Monitor Pain and Promote Comfort  Recent Flowsheet Documentation  Taken 10/7/2024 0415 by Erin Law RN  Pain Management Interventions:   care clustered   pain management plan reviewed with patient/caregiver   pillow support provided   position adjusted   quiet environment facilitated   relaxation techniques promoted  Taken 10/7/2024 0015 by Erin Law RN  Pain Management Interventions:   care clustered   pain management plan reviewed with patient/caregiver   pillow support provided   position adjusted   quiet environment facilitated   relaxation techniques promoted  Taken 10/6/2024 2030 by Erin Law RN  Pain Management Interventions:   care clustered   pain management plan reviewed with patient/caregiver   pillow support provided   position adjusted   quiet environment facilitated   relaxation techniques  promoted  Intervention: Provide Person-Centered Care  Recent Flowsheet Documentation  Taken 10/6/2024 2030 by Erin Law, RN  Trust Relationship/Rapport:   care explained   questions answered   questions encouraged  Goal: Readiness for Transition of Care  Outcome: Progressing

## 2024-10-09 NOTE — DISCHARGE SUMMARY
"             [facility] Medicine Services  Discharge Summary    Date of Service: 10/7/2024  Patient Name: Deion Nicholas  : 1940  MRN: [id number]    Date of Admission: 10/5/2024  Discharge Diagnosis:         Date of Discharge:  10/7/2024  Primary Care Physician: Provider, No Known      Presenting Problem:   Palpitations [R00.2]  Fluid overload [E87.70]  Chest pain [R07.9]    Active and Resolved Hospital Problems:  Active Hospital Problems    Diagnosis POA    **Fluid overload [E87.70] Yes    Chest pain [R07.9] Yes    Peripheral neuropathy [G62.9] Yes    Essential hypertension [I10] Yes    Hyperlipidemia, mixed [E78.2] Yes    Ischemic cardiomyopathy [I25.5] Yes      Resolved Hospital Problems   No resolved problems to display.         Hospital Course     HPI:  Per the H&P written by Taylor Mancuso APRN , dated 10/06/24 :  \"Sudden transient chest discomfort \"    Hospital Course:  Deion Nicholas is a 83 y.o. male with a PMH CAD s/p 5vCABG in , ICM s/p Biot[employer] ICD in 2018, VT s/p ablation, and 5 cm ascending aortic aneurysm followed by Dr. Barros.  Last nuclear stress test 2024 consistent with it intermediate risk. He was prescribed Ranexa on discharge.  Last 2D echo 2023 showed an EF 21-25% with grade 1 diastolic dysfunction and mild MR/A; primary HTN, and HLD.  who presented to [facility] ER and transferred to [facility] on 10/5/2024 after being evaluated for sudden onset of chest discomfort.      Lower extremity edema, likely is a chronic edema, patient stated his weight has been stable with a fluctuation of only 1 to 2 pounds, compliant with his diuretics, ultrasound of the bilateral lower extremities was obtained negative for any DVTs.    Cardiology was consulted for chest discomfort given patient's significant cardiac history,, recommended patient's to follow-up and keep his appointments for today with Dr. Arvizu electrophysiologist.  Cardiology cleared the patient for " discharge with a follow-up with Dr. Gama, patient currently is not having any chest pain, otherwise clinically stable.    Plan for discharge discussed with the patient prior to discharge, patient agreed with the plan. Patient advised to return to hospital or go near by hospital or call 911, should patient's symptoms worsen or recur. Patient also advised to follow up with PCP within 1-5 days for recent hospitalization, monitoring and further management of patient's health problems.  Patient acknowledged understanding and agreed with the discharge plan.     DISCHARGE Follow Up Recommendations for labs and diagnostics:     -Follow-up with your doctor physiologist/cardiologist for further work up    -Follow-up with PCP for recent hospitalization    Reasons For Change In Medications and Indications for New Medications:      Day of Discharge     Vital Signs:       Physical Exam  General Appearance:  Awake, alert   Head:  Atraumatic normocephalic   Eyes:        No sclera icterus   Neck: Normal ROM, non tender   Pulm: Clear to auscultation on exam no crackles present   Cardio: HR normal, Regular rhythm during my eval   Extremities: 2+ edema on BLE   Abdomen: Soft non distended, no rebound or guarding   /Renal: No suprapubic tenderness   Musculoskeletal: Moves all extremities   Neuro: No focal neurological deficits on my exam         Pertinent  and/or Most Recent Results     LAB RESULTS:      Lab 10/07/24  0513 10/06/24  0452 10/05/24  2345 10/05/24  2313   WBC 4.79 5.57  --  6.17   HEMOGLOBIN 13.6 12.0*  --  13.1   HEMATOCRIT 45.7 37.4*  --  41.1   PLATELETS 137* 178  --  191   NEUTROS ABS 2.13 2.92  --  3.55   IMMATURE GRANS (ABS) 0.01 0.01  --  0.00   LYMPHS ABS 1.80 1.76  --  1.65   MONOS ABS 0.50 0.50  --  0.61   EOS ABS 0.30 0.34  --  0.32   MCV 98.9* 94.2  --  94.9   D DIMER QUANT  --   --  0.72  --          Lab 10/07/24  0513 10/06/24  0452 10/05/24  2313   SODIUM 136 137 139   POTASSIUM 4.7 4.2 4.2   CHLORIDE  104 105 106   CO2 21.2* 23.1 24.2   ANION GAP 10.8 8.9 8.8   BUN 16 17 17   CREATININE 0.91 0.98 1.09   EGFR 84.7 77.5 68.2   GLUCOSE 116* 106* 134*   CALCIUM 9.3 9.0 9.1   MAGNESIUM 2.4 2.2  --    PHOSPHORUS 3.1  --   --    HEMOGLOBIN A1C  --  6.78*  --    TSH  --  1.940  --          Lab 10/07/24  0513 10/05/24  2313   TOTAL PROTEIN 6.9 7.2   ALBUMIN 3.8 4.1   GLOBULIN 3.1 3.1   ALT (SGPT) 12 15   AST (SGOT) 25 20   BILIRUBIN 0.3 0.3   ALK PHOS 94 108         Lab 10/06/24  0103 10/05/24  2313   PROBNP  --  569.7   HSTROP T 22* 24*                 Brief Urine Lab Results       None          Microbiology Results (last 10 days)       ** No results found for the last 240 hours. **            XR Chest 2 View    Result Date: 10/5/2024  Impression: Impression: Chronic interstitial changes and emphysema. No active disease. Electronically Signed: Fei Bradshaw MD  10/5/2024 11:30 PM EDT  Workstation ID: [id number]    CT Angiogram Chest Pulmonary Embolism    Result Date: 9/10/2024  Impression: Impression: 1. No acute CT findings. No evidence of pulmonary embolism. 2. 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 Electronically Signed: Rubén Wyatt MD  9/10/2024 4:24 PM EDT  Workstation ID: [id number]    XR Chest 1 View    Result Date: 9/10/2024  Impression: Impression: Stable cardiomegaly. No acute chest finding. Electronically Signed: Grace Dyson MD  9/10/2024 2:50 PM EDT  Workstation ID: [id number]     Results for orders placed during the hospital encounter of 10/05/24    Duplex Venous Lower Extremity - Bilateral CAR    Interpretation Summary    Normal bilateral lower extremity venous duplex scan.      Results for orders placed during the hospital encounter of 10/05/24    Duplex Venous Lower Extremity - Bilateral CAR    Interpretation Summary     Normal bilateral lower extremity venous duplex scan.      Results for orders placed during the hospital encounter of 12/26/23    Adult Transthoracic Echo Complete W/ Cont if Necessary Per Protocol    Interpretation Summary    Left ventricular systolic function is severely decreased. Left ventricular ejection fraction appears to be 21 - 25%.    The left ventricular cavity is severely dilated.    Left ventricular diastolic function is consistent with (grade I) impaired relaxation.    The left atrial cavity is moderately dilated.    Abnormal mitral valve structure consistent with dilated annulus.    Moderate dilation of the ascending aorta is present.    Ascending aorta = 4.7 cm      Labs Pending at Discharge:      Procedures Performed           Consults:   Consults       Date and Time Order Name Status Description    9/13/2024 10:24 AM Inpatient Infectious Diseases Consult Completed     9/10/2024  5:37 PM Inpatient Cardiology Consult Completed               Discharge Details        Discharge Medications        Continue These Medications        Instructions Start Date   aspirin 81 MG EC tablet   81 mg, Oral, Daily      cholecalciferol 10 MCG (400 UNIT) tablet  Commonly known as: VITAMIN D3   800 Units, Oral, Daily      Diclofenac Sodium 1 % gel gel  Commonly known as: VOLTAREN   4 g, Topical, 4 Times Daily PRN      ezetimibe 10 MG tablet  Commonly known as: ZETIA   10 mg, Oral, Every Evening      Farxiga 10 MG tablet  Generic drug: dapagliflozin Propanediol   10 mg, Oral, Daily, [facility]      fish oil 1000 MG capsule capsule   2,000 mg, Oral, 2 Times Daily With Meals      furosemide 20 MG tablet  Commonly known as: LASIX   20 mg, Oral, Daily      gabapentin 800 MG tablet  Commonly known as: NEURONTIN   800 mg, Oral, Every 6 Hours      meclizine 25 MG tablet  Commonly known as: ANTIVERT   25 mg, Oral, Every 6 Hours PRN      methocarbamol 500 MG tablet  Commonly known as: ROBAXIN   500 mg, Oral, 3 Times Daily       metoprolol succinate  MG 24 hr tablet  Commonly known as: TOPROL-XL   50 mg, Oral, 2 Times Daily      midodrine 5 MG tablet  Commonly known as: PROAMATINE   5 mg, Oral, 3 Times Daily Before Meals      nitroglycerin 0.4 MG SL tablet  Commonly known as: NITROSTAT   0.4 mg, Sublingual, Every 5 Minutes PRN      pantoprazole 40 MG EC tablet  Commonly known as: PROTONIX   40 mg, Oral, Daily      potassium chloride 10 MEQ CR capsule  Commonly known as: MICRO-K   10 mEq, Oral, Daily      ranolazine 500 MG 12 hr tablet  Commonly known as: Ranexa   500 mg, Oral, 2 Times Daily      sennosides-docusate 8.6-50 MG per tablet  Commonly known as: PERICOLACE   1 tablet, Oral, Daily      sucralfate 1 g tablet  Commonly known as: CARAFATE   1 g, Oral, 4 Times Daily               Allergies   Allergen Reactions    Lovastatin Myalgia    Pravastatin Myalgia and Unknown (See Comments)     unknown    Simvastatin Unknown (See Comments) and Myalgia     unknown    Spironolactone Other (See Comments)     Gynecomastia          Discharge Disposition:   Home or Self Care    Diet:  Hospital:No active diet order        Discharge Activity:         CODE STATUS:  There are no questions and answers to display.         Future Appointments   Date Time Provider Department Center   11/20/2024  2:30 PM Marcin Childers DO [facility]   2/27/2025 11:00 AM John Barros MD [facility]           Time spent on Discharge including face to face service:  >30 minutes    Part of this note may be an electronic transcription/translation of spoken language to printed text using the Dragon Dictation System.    Signature: Electronically signed by Nabil Mckinney MD, 10/09/24, 15:38 EDT.  [facility]ist Team

## 2024-10-29 ENCOUNTER — APPOINTMENT (OUTPATIENT)
Dept: GENERAL RADIOLOGY | Facility: HOSPITAL | Age: 84
End: 2024-10-29
Payer: OTHER GOVERNMENT

## 2024-10-29 ENCOUNTER — TELEPHONE (OUTPATIENT)
Dept: CARDIOLOGY | Facility: CLINIC | Age: 84
End: 2024-10-29

## 2024-10-29 ENCOUNTER — HOSPITAL ENCOUNTER (EMERGENCY)
Facility: HOSPITAL | Age: 84
Discharge: HOME | End: 2024-10-29
Attending: EMERGENCY MEDICINE
Payer: OTHER GOVERNMENT

## 2024-10-29 VITALS
HEART RATE: 84 BPM | OXYGEN SATURATION: 93 % | RESPIRATION RATE: 18 BRPM | HEIGHT: 73 IN | DIASTOLIC BLOOD PRESSURE: 69 MMHG | SYSTOLIC BLOOD PRESSURE: 119 MMHG | TEMPERATURE: 98.3 F | BODY MASS INDEX: 26.77 KG/M2 | WEIGHT: 202 LBS

## 2024-10-29 DIAGNOSIS — R07.9 CHEST PAIN, RULE OUT ACUTE MYOCARDIAL INFARCTION: Primary | ICD-10-CM

## 2024-10-29 LAB
ALBUMIN SERPL-MCNC: 3.9 G/DL (ref 3.5–5.2)
ALBUMIN/GLOB SERPL: 1.3 G/DL
ALP SERPL-CCNC: 92 U/L (ref 39–117)
ALT SERPL W P-5'-P-CCNC: 17 U/L (ref 1–41)
ANION GAP SERPL CALCULATED.3IONS-SCNC: 8.2 MMOL/L (ref 5–15)
AST SERPL-CCNC: 18 U/L (ref 1–40)
BASOPHILS # BLD AUTO: 0.05 10*3/MM3 (ref 0–0.2)
BASOPHILS NFR BLD AUTO: 0.7 % (ref 0–1.5)
BILIRUB SERPL-MCNC: 0.3 MG/DL (ref 0–1.2)
BUN SERPL-MCNC: 18 MG/DL (ref 8–23)
BUN/CREAT SERPL: 17.3 (ref 7–25)
CALCIUM SPEC-SCNC: 9.1 MG/DL (ref 8.6–10.5)
CHLORIDE SERPL-SCNC: 104 MMOL/L (ref 98–107)
CO2 SERPL-SCNC: 24.8 MMOL/L (ref 22–29)
CREAT SERPL-MCNC: 1.04 MG/DL (ref 0.76–1.27)
DEPRECATED RDW RBC AUTO: 51.4 FL (ref 37–54)
EGFRCR SERPLBLD CKD-EPI 2021: 71.7 ML/MIN/1.73
EOSINOPHIL # BLD AUTO: 0.23 10*3/MM3 (ref 0–0.4)
EOSINOPHIL NFR BLD AUTO: 3.3 % (ref 0.3–6.2)
ERYTHROCYTE [DISTWIDTH] IN BLOOD BY AUTOMATED COUNT: 14.6 % (ref 12.3–15.4)
GEN 5 2HR TROPONIN T REFLEX: 27 NG/L
GLOBULIN UR ELPH-MCNC: 2.9 GM/DL
GLUCOSE SERPL-MCNC: 127 MG/DL (ref 65–99)
HCT VFR BLD AUTO: 41.1 % (ref 37.5–51)
HGB BLD-MCNC: 13.1 G/DL (ref 13–17.7)
HOLD SPECIMEN: NORMAL
HOLD SPECIMEN: NORMAL
IMM GRANULOCYTES # BLD AUTO: 0.01 10*3/MM3 (ref 0–0.05)
IMM GRANULOCYTES NFR BLD AUTO: 0.1 % (ref 0–0.5)
LYMPHOCYTES # BLD AUTO: 1.41 10*3/MM3 (ref 0.7–3.1)
LYMPHOCYTES NFR BLD AUTO: 20.3 % (ref 19.6–45.3)
MCH RBC QN AUTO: 29.8 PG (ref 26.6–33)
MCHC RBC AUTO-ENTMCNC: 31.9 G/DL (ref 31.5–35.7)
MCV RBC AUTO: 93.6 FL (ref 79–97)
MONOCYTES # BLD AUTO: 0.5 10*3/MM3 (ref 0.1–0.9)
MONOCYTES NFR BLD AUTO: 7.2 % (ref 5–12)
NEUTROPHILS NFR BLD AUTO: 4.75 10*3/MM3 (ref 1.7–7)
NEUTROPHILS NFR BLD AUTO: 68.4 % (ref 42.7–76)
PLATELET # BLD AUTO: 195 10*3/MM3 (ref 140–450)
PMV BLD AUTO: 9.5 FL (ref 6–12)
POTASSIUM SERPL-SCNC: 4.1 MMOL/L (ref 3.5–5.2)
PROT SERPL-MCNC: 6.8 G/DL (ref 6–8.5)
RBC # BLD AUTO: 4.39 10*6/MM3 (ref 4.14–5.8)
SODIUM SERPL-SCNC: 137 MMOL/L (ref 136–145)
TROPONIN T DELTA: -4 NG/L
TROPONIN T SERPL HS-MCNC: 31 NG/L
WBC NRBC COR # BLD AUTO: 6.95 10*3/MM3 (ref 3.4–10.8)
WHOLE BLOOD HOLD COAG: NORMAL
WHOLE BLOOD HOLD SPECIMEN: NORMAL

## 2024-10-29 PROCEDURE — 71046 X-RAY EXAM CHEST 2 VIEWS: CPT

## 2024-10-29 PROCEDURE — 93010 ELECTROCARDIOGRAM REPORT: CPT | Performed by: EMERGENCY MEDICINE

## 2024-10-29 PROCEDURE — 85025 COMPLETE CBC W/AUTO DIFF WBC: CPT | Performed by: EMERGENCY MEDICINE

## 2024-10-29 PROCEDURE — 93005 ELECTROCARDIOGRAM TRACING: CPT | Performed by: EMERGENCY MEDICINE

## 2024-10-29 PROCEDURE — 99284 EMERGENCY DEPT VISIT MOD MDM: CPT | Performed by: EMERGENCY MEDICINE

## 2024-10-29 PROCEDURE — 84484 ASSAY OF TROPONIN QUANT: CPT | Performed by: EMERGENCY MEDICINE

## 2024-10-29 PROCEDURE — 99284 EMERGENCY DEPT VISIT MOD MDM: CPT

## 2024-10-29 PROCEDURE — 80053 COMPREHEN METABOLIC PANEL: CPT | Performed by: EMERGENCY MEDICINE

## 2024-10-29 PROCEDURE — 36415 COLL VENOUS BLD VENIPUNCTURE: CPT

## 2024-10-29 RX ORDER — ASPIRIN 325 MG
325 TABLET ORAL ONCE
Status: COMPLETED | OUTPATIENT
Start: 2024-10-29 | End: 2024-10-29

## 2024-10-29 RX ORDER — SODIUM CHLORIDE 0.9 % (FLUSH) 0.9 %
10 SYRINGE (ML) INJECTION AS NEEDED
Status: DISCONTINUED | OUTPATIENT
Start: 2024-10-29 | End: 2024-10-29 | Stop reason: HOSPADM

## 2024-10-29 RX ADMIN — ASPIRIN 325 MG ORAL TABLET 325 MG: 325 PILL ORAL at 18:35

## 2024-10-29 NOTE — TELEPHONE ENCOUNTER
Hub staff attempted to follow warm transfer process and was unsuccessful     Caller: Deion Nicholas    Relationship to patient: Self    Best call back number: [phone]    Patient is needing: PT IS CALLING TO SPEAK TO DR. CARRILLO'S NURSE SO THAT THEY CAN CHECK HIS DEFIBRILLATOR. HE SAID HE NEARLY PASSED OUT AND WANTED TO MAKE SURE EVERYTHING WAS OK. TRIED TO WT BUT WAS ON HOLD FOR OVER A MINUTE. PLEASE CALL PT

## 2024-10-29 NOTE — TELEPHONE ENCOUNTER
Caller: Deion Nicholas    Relationship: Self    Best call back number: [phone]     What was the call regarding: PT CALLING BACK TO SPEAK WITH CLINICAL TEAM - HE STATES HE IS WANTING ADVISEMENT ON WHAT TO DO AND WHAT COULDVE HAPPENED - HE WOULD LIKE A CALL BACK ASAP

## 2024-10-30 LAB
QT INTERVAL: 498 MS
QTC INTERVAL: 594 MS

## 2024-10-30 NOTE — FSED PROVIDER NOTE
Subjective   History of Present Illness  Patient comes into the emergency room because he had a hot flash and was worried that it might be related to his heart patient has history of heart disease.  He denies any chest pain at this time has no shortness of breath has no other complaints.  Patient has a longstanding history of heart disease which required a CABG surgery many years ago he has a cardiology appointment coming up soon he denies any shortness of breath at this time no fever no chills no cough    History provided by:  Patient      Review of Systems   All other systems reviewed and are negative.      Past Medical History:   Diagnosis Date    Aneurysm     Cardiomyopathy     ICD implantation    Cellulitis of left lower extremity 2010    recurrent    CHD (coronary heart disease)     S/P CABG & PCI    Dyslipidemia     Heart valve disease     History of ventricular tachycardia     Hypertension     Myocardial infarction     Inferior MI    Pinched nerve     L4-L5    Prostate cancer        Allergies   Allergen Reactions    Lovastatin Myalgia    Pravastatin Myalgia and Unknown (See Comments)     unknown    Simvastatin Unknown (See Comments) and Myalgia     unknown    Spironolactone Other (See Comments)     Gynecomastia        Past Surgical History:   Procedure Laterality Date    ANGIOPLASTY  2000 04/1996 Stent: Palmaz- Huy stent/LAD 07/1996-RCA: 2000-Tetra stent Guidant to proximal RCA, mid to distal artery 2 sequential Guidant Tetra stents    APPENDECTOMY      CARDIAC ABLATION  April and May 2019    CARDIAC CATHETERIZATION  07/2017    1996 x2, Nov. 2000, 05/2010-cath 08/2015-no stents 2017    CARDIAC DEFIBRILLATOR PLACEMENT      COLONOSCOPY W/ POLYPECTOMY      Mult colonoscopy's for polyp resection     CORONARY ARTERY BYPASS GRAFT  05/2010    x5 vessel: LMA to diagonal, LAD, intermedius, obtuse marginal, RCA    CORONARY STENT PLACEMENT      ENDOSCOPY N/A 6/24/2019    Procedure: ESOPHAGOGASTRODUODENOSCOPY with  dilitation Emerald 50, 54;  Surgeon: Roddy Coronel MD;  Location: [facility] ENDOSCOPY;  Service: Gastroenterology    INSERT / REPLACE / REMOVE PACEMAKER      KNEE OPEN LATERAL RELEASE Left     reduction    OTHER SURGICAL HISTORY  2018    ICD implantation    PROSTATE SURGERY  2015    Robiotic surgery- Cyber Knife:       Family History   Problem Relation Age of Onset    Pancreatic cancer Mother     Heart disease Father        Social History     Socioeconomic History    Marital status:    Tobacco Use    Smoking status: Former     Current packs/day: 0.00     Average packs/day: 1 pack/day for 17.0 years (17.0 ttl pk-yrs)     Types: Cigarettes     Start date: 1985     Quit date: 2002     Years since quittin.3     Passive exposure: Past    Smokeless tobacco: Never   Vaping Use    Vaping status: Never Used   Substance and Sexual Activity    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   Constitutional:       Appearance: He is well-developed.   HENT:      Head: Normocephalic.   Cardiovascular:      Rate and Rhythm: Normal rate and regular rhythm.      Heart sounds: Normal heart sounds.   Pulmonary:      Effort: Pulmonary effort is normal.      Breath sounds: Normal breath sounds.   Abdominal:      Palpations: Abdomen is soft.   Musculoskeletal:         General: Normal range of motion.   Neurological:      General: No focal deficit present.      Mental Status: He is alert and oriented to person, place, and time. He is disoriented.   Psychiatric:         Mood and Affect: Mood normal.         Behavior: Behavior normal.         Procedures           ED Course                HEART Score: 2                            Medical Decision Making  Patient's troponin was slightly elevated the first time second set went down.  Patient is not hypoxic tachypneic is afebrile normotensive  x-ray does not reveal any acute findings.  Patient has had normal vital signs otherwise will be discharged advised to follow-up with his cardiologist as soon as possible.    Amount and/or Complexity of Data Reviewed  External Data Reviewed: ECG.     Details: Patient is EKG shows normal sinus rhythm no axis deviation no heart block  Labs: ordered.  Radiology: ordered.  ECG/medicine tests: ordered.    Risk  OTC drugs.  Prescription drug management.        Final diagnoses:   Chest pain, rule out acute myocardial infarction       ED Disposition  ED Disposition       ED Disposition   Discharge    Condition   Stable    Comment   --               Provider, No Known  [facility] [place] [id number]               Medication List      No changes were made to your prescriptions during this visit.

## 2024-10-31 ENCOUNTER — TELEPHONE (OUTPATIENT)
Dept: CARDIOLOGY | Facility: CLINIC | Age: 84
End: 2024-10-31
Payer: OTHER GOVERNMENT

## 2024-10-31 NOTE — TELEPHONE ENCOUNTER
Caller: Deion Nicholas     Best call back number: [phone]     What is your medical concern? PT CALLING TO SPEAK WITH [facility] AS HE IS HAVING THE FEELINGS IN HIS CHEST AS HE DID WITH LAST EPISODE AND WANTS TO SEE IF HIS DEFIB IS GOING OFF AGAIN - PT ASKING FOR CALL BACK ASAP

## 2024-10-31 NOTE — TELEPHONE ENCOUNTER
Spoke with pt he is aware we have not received report for possible shock. Pt is aware he needs to keep tomorrows appt with Dr Arvizu to get device interrogated. Pt verbalized understanding.

## 2024-11-01 ENCOUNTER — OFFICE VISIT (OUTPATIENT)
Dept: CARDIOLOGY | Facility: CLINIC | Age: 84
End: 2024-11-01
Payer: MEDICARE

## 2024-11-01 VITALS
DIASTOLIC BLOOD PRESSURE: 64 MMHG | OXYGEN SATURATION: 95 % | HEIGHT: 73 IN | SYSTOLIC BLOOD PRESSURE: 146 MMHG | HEART RATE: 84 BPM | WEIGHT: 204 LBS | BODY MASS INDEX: 27.04 KG/M2

## 2024-11-01 DIAGNOSIS — I71.21 ANEURYSM OF ASCENDING AORTA WITHOUT RUPTURE: ICD-10-CM

## 2024-11-01 DIAGNOSIS — I10 ESSENTIAL HYPERTENSION: ICD-10-CM

## 2024-11-01 DIAGNOSIS — I25.810 CORONARY ARTERY DISEASE INVOLVING CORONARY BYPASS GRAFT OF NATIVE HEART WITHOUT ANGINA PECTORIS: ICD-10-CM

## 2024-11-01 DIAGNOSIS — Z95.810 PRESENCE OF AUTOMATIC IMPLANTABLE CARDIOVERTER-DEFIBRILLATOR: Primary | ICD-10-CM

## 2024-11-01 DIAGNOSIS — I25.5 ISCHEMIC CARDIOMYOPATHY: ICD-10-CM

## 2024-11-01 DIAGNOSIS — I95.1 ORTHOSTATIC HYPOTENSION: ICD-10-CM

## 2024-11-01 DIAGNOSIS — I47.20 VT (VENTRICULAR TACHYCARDIA): ICD-10-CM

## 2024-11-01 RX ORDER — MEXILETINE HYDROCHLORIDE 150 MG/1
150 CAPSULE ORAL 2 TIMES DAILY
Qty: 60 CAPSULE | Refills: 2 | Status: SHIPPED | OUTPATIENT
Start: 2024-11-01

## 2024-11-01 NOTE — PROGRESS NOTES
CC--Recurrent VT, ischemic cardiomyopathy      Sub  84-year-old male patient started having recurrent VT and came for evaluation.  Recent stress test showed significant prior MI without ischemia with an EF less than 30%  Patient had refractory VT needing VT ablation back in 2019 without clinical recurrence for several years  Patient has ischemic cardiomyopathy  Patient also had atrial fibrillation secondary to hyperthyroidism which resolved after treatment of thyroid disorder  Patient had history of coronary disease with prior bypass surgery with ischemic cardiomyopathy and ICD in situ  He also has significant history of recurrent cellulitis in his lower extremities with edema and history of orthostatic hypotension in the past.  Patient is intolerant to amiodarone in the past.      My previous history is attached below which is for reference only and has been reviewed        Sub--Pt here for follow up and he had prior history of incessant VT needing and ablation several months ago .  patient started having recurrent VT needing a redo VT ablation  and post ablation a week later developed dysphagia and needed endoscopy the patient underwent esophageal stricture dilatation with improvement of symptoms and resolution of dysphagia .Patient is on amiodarone because of extensive scarring and multiple episodes of VT and since ablation without recurrent ICD therapy--patient has history of chronic ischemic heart disease previous myocardial infarction, s/p previous CABG,  LV systolic dysfunction with   Last  LV ejection fraction of 25 30% which came up to 30- 35% by latest echocardiogram.   He is  status post ICD implant.  denies any chest pain or shortness of breath or syncope--patient had prior ACE inhibitor induced cough and currently is on Aldactone .Patient denies any new symptoms of VT or syncope and is compliant with current medical regimen  He also developed some right shoulder pain [facility] and  uses Biofreeze for relief of symptoms   He denies any palpitations or syncope or any chest pain or dyspnea.  Patient could not tolerate additional beta-blockers on top of amiodarone in the past  Patient has noticed photosensitivity of his skin in the past and amiodarone dose reduced to 100 mg a day and his symptoms have reduced and comes in for follow-up   Patient has noticed some burning sensation in lower extremities and was seen at [facility] on last clinic prescription for gabapentin and he also complains of some calf pain when he is ambulating with some blocks gets better after increased activity and it is not nocturnal in nature--arterial duplex scan without any significant abnormalities  He also has history of orthostatic hypotension started on midodrine and complains of intermittent headache  Patient is doing clinically well from cardiac standpoint of view  Complains of recurrent redness in the left lower extremity with tenderness and warmth around the ankle joint and is being treated with antibiotics and has been to [facility] twice--is being evaluated by vascular surgery and infectious disease specialist and he had his symptoms since 2010 with increasing frequency recently.    Since last seen patient had ICD shock therapy for conducted atrial fibrillation and was brought in back for evaluation  Atrial fibrillation was not seen with prior interrogation of his device and ICD was reprogrammed on recommendation to avoid unnecessary therapy for conducted atrial fibrillation  Patient complains of diffuse subcutaneous bleeding with dual antiplatelet agents and also complains of weight loss and is being [facility]  Patient was noted to have markedly abnormal thyroid function test consistent with hyperthyroidism and immediately amiodarone was stopped and started on beta-blockers and off Eliquis         Past Medical History:     Reviewed history from 03/13/2018 and no changes required:         Coronary Heart Disease:S/P CABG and PCI         Hypertension        Myocardial Infarction: Inferior MI         Hx: Cellulitis of left lower leg        Dyslipidemia         Pinched nerve L4-L5         Prostate cancer         Cardiomyopathy : ICD implantation           Physical Exam    General:      well developed, well nourished, in no acute distress.    Head:      normocephalic and atraumatic.    Eyes:      PERRL/EOM intact, conjunctivae and sclerae clear without nystagmus.    Neck:      no  thyromegaly, trachea central with normal respiratory effort  Lungs:      clear bilaterally to auscultation.    Heart:       regular rate and rhythm, S1, S2 without murmurs, rubs, or gallops  Skin:      intact without lesions or rashes.    Psych:      alert and cooperative; normal mood and affect; normal attention span and concentration.             assessment plan    Recent  labs reviewed.  Creatinine 1.04 and potassium normal.  Liver function tests hemoglobin platelets normal  TSH and BNP normal  Recurrent VT and ICD interrogated today and interrogation attached to chart.  Prior incessant VT storm needing VT ablation in 2019 and clinical recurrence noted today and patient intolerant amiodarone  Patient is to be scheduled for left heart catheterization for definitive diagnosis for progressive coronary disease prior to VT ablation--- discussed with interventional cardiology Dr. Marcin Childers and cardiac catheterization to be scheduled as an outpatient  In the interim patient to be started on Mexitil 150 mg every 12 hours  Prior history of AF secondary to hyperthyroidism resolved  Hyperlipidemia on Zetia and intolerant to statins  History of orthostatic hypotension on midodrine  Ascending aortic aneurysm measuring 4.7 to 5 cm on beta-blockers followed by CT surgery--- most recent  CT chest revealed 4.5 to 4.6 cm aneurysm  Patient intolerant to ACE inhibitors or ARB and allergic to spironolactone in the past  Close clinical  follow-up to be done since patient had VT storm in the past    Electronically signed by Constantino Arvizu MD, 11/01/24, 1:41 PM EDT.

## 2024-11-01 NOTE — H&P (VIEW-ONLY)
CC--Recurrent VT, ischemic cardiomyopathy      Sub  84-year-old male patient started having recurrent VT and came for evaluation.  Recent stress test showed significant prior MI without ischemia with an EF less than 30%  Patient had refractory VT needing VT ablation back in 2019 without clinical recurrence for several years  Patient has ischemic cardiomyopathy  Patient also had atrial fibrillation secondary to hyperthyroidism which resolved after treatment of thyroid disorder  Patient had history of coronary disease with prior bypass surgery with ischemic cardiomyopathy and ICD in situ  He also has significant history of recurrent cellulitis in his lower extremities with edema and history of orthostatic hypotension in the past.  Patient is intolerant to amiodarone in the past.      My previous history is attached below which is for reference only and has been reviewed        Sub--Pt here for follow up and he had prior history of incessant VT needing and ablation several months ago .  patient started having recurrent VT needing a redo VT ablation  and post ablation a week later developed dysphagia and needed endoscopy the patient underwent esophageal stricture dilatation with improvement of symptoms and resolution of dysphagia .Patient is on amiodarone because of extensive scarring and multiple episodes of VT and since ablation without recurrent ICD therapy--patient has history of chronic ischemic heart disease previous myocardial infarction, s/p previous CABG,  LV systolic dysfunction with   Last  LV ejection fraction of 25 30% which came up to 30- 35% by latest echocardiogram.   He is  status post ICD implant.  denies any chest pain or shortness of breath or syncope--patient had prior ACE inhibitor induced cough and currently is on Aldactone .Patient denies any new symptoms of VT or syncope and is compliant with current medical regimen  He also developed some right shoulder pain being [facility] and  uses Biofreeze for relief of symptoms   He denies any palpitations or syncope or any chest pain or dyspnea.  Patient could not tolerate additional beta-blockers on top of amiodarone in the past  Patient has noticed photosensitivity of his skin in the past and amiodarone dose reduced to 100 mg a day and his symptoms have reduced and comes in for follow-up   Patient has noticed some burning sensation in lower extremities and was seen at [facility] on last clinic prescription for gabapentin and he also complains of some calf pain when he is ambulating with some blocks gets better after increased activity and it is not nocturnal in nature--arterial duplex scan without any significant abnormalities  He also has history of orthostatic hypotension started on midodrine and complains of intermittent headache  Patient is doing clinically well from cardiac standpoint of view  Complains of recurrent redness in the left lower extremity with tenderness and warmth around the ankle joint and is being treated with antibiotics and has been to [facility] twice--is being evaluated by vascular surgery and infectious disease specialist and he had his symptoms since 2010 with increasing frequency recently.    Since last seen patient had ICD shock therapy for conducted atrial fibrillation and was brought in back for evaluation  Atrial fibrillation was not seen with prior interrogation of his device and ICD was reprogrammed on recommendation to avoid unnecessary therapy for conducted atrial fibrillation  Patient complains of diffuse subcutaneous bleeding with dual antiplatelet agents and also complains of weight loss and is [facility]  Patient was noted to have markedly abnormal thyroid function test consistent with hyperthyroidism and immediately amiodarone was stopped and started on beta-blockers and off Eliquis         Past Medical History:     Reviewed history from 03/13/2018 and no changes required:         Coronary Heart Disease:S/P CABG and PCI         Hypertension        Myocardial Infarction: Inferior MI         Hx: Cellulitis of left lower leg        Dyslipidemia         Pinched nerve L4-L5         Prostate cancer         Cardiomyopathy : ICD implantation           Physical Exam    General:      well developed, well nourished, in no acute distress.    Head:      normocephalic and atraumatic.    Eyes:      PERRL/EOM intact, conjunctivae and sclerae clear without nystagmus.    Neck:      no  thyromegaly, trachea central with normal respiratory effort  Lungs:      clear bilaterally to auscultation.    Heart:       regular rate and rhythm, S1, S2 without murmurs, rubs, or gallops  Skin:      intact without lesions or rashes.    Psych:      alert and cooperative; normal mood and affect; normal attention span and concentration.             assessment plan    Recent  labs reviewed.  Creatinine 1.04 and potassium normal.  Liver function tests hemoglobin platelets normal  TSH and BNP normal  Recurrent VT and ICD interrogated today and interrogation attached to chart.  Prior incessant VT storm needing VT ablation in 2019 and clinical recurrence noted today and patient intolerant amiodarone  Patient is to be scheduled for left heart catheterization for definitive diagnosis for progressive coronary disease prior to VT ablation--- discussed with interventional cardiology Dr. Marcin Childers and cardiac catheterization to be scheduled as an outpatient  In the interim patient to be started on Mexitil 150 mg every 12 hours  Prior history of AF secondary to hyperthyroidism resolved  Hyperlipidemia on Zetia and intolerant to statins  History of orthostatic hypotension on midodrine  Ascending aortic aneurysm measuring 4.7 to 5 cm on beta-blockers followed by CT surgery--- most recent  CT chest revealed 4.5 to 4.6 cm aneurysm  Patient intolerant to ACE inhibitors or ARB and allergic to spironolactone in the past  Close clinical  follow-up to be done since patient had VT storm in the past    Electronically signed by Constantino Arvizu MD, 11/01/24, 1:41 PM EDT.

## 2024-11-02 DIAGNOSIS — I25.810 CORONARY ARTERY DISEASE INVOLVING CORONARY BYPASS GRAFT OF NATIVE HEART WITHOUT ANGINA PECTORIS: Primary | Chronic | ICD-10-CM

## 2024-11-02 DIAGNOSIS — I47.29 PAROXYSMAL VENTRICULAR TACHYCARDIA: Chronic | ICD-10-CM

## 2024-11-02 RX ORDER — SODIUM CHLORIDE 0.9 % (FLUSH) 0.9 %
3 SYRINGE (ML) INJECTION EVERY 12 HOURS SCHEDULED
OUTPATIENT
Start: 2024-11-02

## 2024-11-02 RX ORDER — SODIUM CHLORIDE 9 MG/ML
40 INJECTION, SOLUTION INTRAVENOUS AS NEEDED
OUTPATIENT
Start: 2024-11-02

## 2024-11-02 RX ORDER — SODIUM CHLORIDE 0.9 % (FLUSH) 0.9 %
3-10 SYRINGE (ML) INJECTION AS NEEDED
OUTPATIENT
Start: 2024-11-02

## 2024-11-02 RX ORDER — NITROGLYCERIN 0.4 MG/1
0.4 TABLET SUBLINGUAL
OUTPATIENT
Start: 2024-11-02

## 2024-11-02 RX ORDER — HYDROCODONE BITARTRATE AND ACETAMINOPHEN 5; 325 MG/1; MG/1
1 TABLET ORAL EVERY 4 HOURS PRN
OUTPATIENT
Start: 2024-11-02 | End: 2024-11-12

## 2024-11-02 RX ORDER — ACETAMINOPHEN 325 MG/1
500 TABLET ORAL EVERY 4 HOURS PRN
OUTPATIENT
Start: 2024-11-02

## 2024-11-02 RX ORDER — ONDANSETRON 2 MG/ML
4 INJECTION INTRAMUSCULAR; INTRAVENOUS EVERY 6 HOURS PRN
OUTPATIENT
Start: 2024-11-02

## 2024-11-02 RX ORDER — ASPIRIN 81 MG/1
324 TABLET, CHEWABLE ORAL ONCE
OUTPATIENT
Start: 2024-11-02 | End: 2024-11-02

## 2024-11-02 RX ORDER — ASPIRIN 81 MG/1
81 TABLET ORAL DAILY
OUTPATIENT
Start: 2024-11-02

## 2024-11-13 ENCOUNTER — HOSPITAL ENCOUNTER (OUTPATIENT)
Facility: HOSPITAL | Age: 84
Discharge: HOME | End: 2024-11-14
Attending: INTERNAL MEDICINE | Admitting: INTERNAL MEDICINE
Payer: MEDICARE

## 2024-11-13 DIAGNOSIS — I25.810 CORONARY ARTERY DISEASE INVOLVING CORONARY BYPASS GRAFT OF NATIVE HEART WITHOUT ANGINA PECTORIS: ICD-10-CM

## 2024-11-13 DIAGNOSIS — I47.29 PAROXYSMAL VENTRICULAR TACHYCARDIA: ICD-10-CM

## 2024-11-13 PROBLEM — I25.10 CAD (CORONARY ARTERY DISEASE): Status: ACTIVE | Noted: 2024-11-13

## 2024-11-13 LAB
ACT BLD: 170 SECONDS (ref 89–137)
ACT BLD: 170 SECONDS (ref 89–137)
ACT BLD: 176 SECONDS (ref 89–137)
ACT BLD: 204 SECONDS (ref 89–137)
ANION GAP SERPL CALCULATED.3IONS-SCNC: 9.9 MMOL/L (ref 5–15)
BASOPHILS # BLD AUTO: 0.04 10*3/MM3 (ref 0–0.2)
BASOPHILS NFR BLD AUTO: 0.6 % (ref 0–1.5)
BUN SERPL-MCNC: 17 MG/DL (ref 8–23)
BUN/CREAT SERPL: 15.9 (ref 7–25)
CALCIUM SPEC-SCNC: 9.9 MG/DL (ref 8.6–10.5)
CHLORIDE SERPL-SCNC: 103 MMOL/L (ref 98–107)
CHOLEST SERPL-MCNC: 168 MG/DL (ref 0–200)
CO2 SERPL-SCNC: 25.1 MMOL/L (ref 22–29)
CREAT SERPL-MCNC: 1.07 MG/DL (ref 0.76–1.27)
DEPRECATED RDW RBC AUTO: 49.7 FL (ref 37–54)
EGFRCR SERPLBLD CKD-EPI 2021: 69.3 ML/MIN/1.73
EOSINOPHIL # BLD AUTO: 0.31 10*3/MM3 (ref 0–0.4)
EOSINOPHIL NFR BLD AUTO: 4.8 % (ref 0.3–6.2)
ERYTHROCYTE [DISTWIDTH] IN BLOOD BY AUTOMATED COUNT: 14.6 % (ref 12.3–15.4)
GLUCOSE SERPL-MCNC: 117 MG/DL (ref 65–99)
HCT VFR BLD AUTO: 42.6 % (ref 37.5–51)
HDLC SERPL-MCNC: 51 MG/DL (ref 40–60)
HGB BLD-MCNC: 13.7 G/DL (ref 13–17.7)
IMM GRANULOCYTES # BLD AUTO: 0.01 10*3/MM3 (ref 0–0.05)
IMM GRANULOCYTES NFR BLD AUTO: 0.2 % (ref 0–0.5)
INR PPP: 1.05 (ref 2–3)
LDLC SERPL CALC-MCNC: 97 MG/DL (ref 0–100)
LDLC/HDLC SERPL: 1.87 {RATIO}
LYMPHOCYTES # BLD AUTO: 1.84 10*3/MM3 (ref 0.7–3.1)
LYMPHOCYTES NFR BLD AUTO: 28.4 % (ref 19.6–45.3)
MCH RBC QN AUTO: 29.8 PG (ref 26.6–33)
MCHC RBC AUTO-ENTMCNC: 32.2 G/DL (ref 31.5–35.7)
MCV RBC AUTO: 92.6 FL (ref 79–97)
MONOCYTES # BLD AUTO: 0.62 10*3/MM3 (ref 0.1–0.9)
MONOCYTES NFR BLD AUTO: 9.6 % (ref 5–12)
NEUTROPHILS NFR BLD AUTO: 3.67 10*3/MM3 (ref 1.7–7)
NEUTROPHILS NFR BLD AUTO: 56.4 % (ref 42.7–76)
NRBC BLD AUTO-RTO: 0 /100 WBC (ref 0–0.2)
PLATELET # BLD AUTO: 195 10*3/MM3 (ref 140–450)
PMV BLD AUTO: 10 FL (ref 6–12)
POTASSIUM SERPL-SCNC: 3.9 MMOL/L (ref 3.5–5.2)
PROTHROMBIN TIME: 13.8 SECONDS (ref 19.4–28.5)
RBC # BLD AUTO: 4.6 10*6/MM3 (ref 4.14–5.8)
SODIUM SERPL-SCNC: 138 MMOL/L (ref 136–145)
TRIGL SERPL-MCNC: 109 MG/DL (ref 0–150)
VLDLC SERPL-MCNC: 20 MG/DL (ref 5–40)
WBC NRBC COR # BLD AUTO: 6.49 10*3/MM3 (ref 3.4–10.8)

## 2024-11-13 PROCEDURE — C1894 INTRO/SHEATH, NON-LASER: HCPCS | Performed by: INTERNAL MEDICINE

## 2024-11-13 PROCEDURE — 63710000001 METOPROLOL SUCCINATE XL 50 MG TABLET SUSTAINED-RELEASE 24 HOUR: Performed by: INTERNAL MEDICINE

## 2024-11-13 PROCEDURE — C1874 STENT, COATED/COV W/DEL SYS: HCPCS | Performed by: INTERNAL MEDICINE

## 2024-11-13 PROCEDURE — 25010000002 MIDAZOLAM PER 1 MG: Performed by: INTERNAL MEDICINE

## 2024-11-13 PROCEDURE — 25010000002 FENTANYL CITRATE (PF) 100 MCG/2ML SOLUTION: Performed by: INTERNAL MEDICINE

## 2024-11-13 PROCEDURE — 63710000001 ACETAMINOPHEN 325 MG TABLET: Performed by: INTERNAL MEDICINE

## 2024-11-13 PROCEDURE — 80048 BASIC METABOLIC PNL TOTAL CA: CPT | Performed by: INTERNAL MEDICINE

## 2024-11-13 PROCEDURE — 63710000001 GABAPENTIN 400 MG CAPSULE: Performed by: INTERNAL MEDICINE

## 2024-11-13 PROCEDURE — C1887 CATHETER, GUIDING: HCPCS | Performed by: INTERNAL MEDICINE

## 2024-11-13 PROCEDURE — 63710000001 MEXILETINE 150 MG CAPSULE: Performed by: INTERNAL MEDICINE

## 2024-11-13 PROCEDURE — 99152 MOD SED SAME PHYS/QHP 5/>YRS: CPT | Performed by: INTERNAL MEDICINE

## 2024-11-13 PROCEDURE — 92937 PRQ TRLUML REVSC CAB GRF 1: CPT | Performed by: INTERNAL MEDICINE

## 2024-11-13 PROCEDURE — C1725 CATH, TRANSLUMIN NON-LASER: HCPCS | Performed by: INTERNAL MEDICINE

## 2024-11-13 PROCEDURE — 25010000002 LIDOCAINE 1 % SOLUTION: Performed by: INTERNAL MEDICINE

## 2024-11-13 PROCEDURE — 63710000001 METHOCARBAMOL 500 MG TABLET: Performed by: INTERNAL MEDICINE

## 2024-11-13 PROCEDURE — 63710000001 SUCRALFATE 1 G TABLET: Performed by: INTERNAL MEDICINE

## 2024-11-13 PROCEDURE — 99153 MOD SED SAME PHYS/QHP EA: CPT | Performed by: INTERNAL MEDICINE

## 2024-11-13 PROCEDURE — C1769 GUIDE WIRE: HCPCS | Performed by: INTERNAL MEDICINE

## 2024-11-13 PROCEDURE — A9270 NON-COVERED ITEM OR SERVICE: HCPCS | Performed by: INTERNAL MEDICINE

## 2024-11-13 PROCEDURE — 25510000001 IOPAMIDOL PER 1 ML: Performed by: INTERNAL MEDICINE

## 2024-11-13 PROCEDURE — 63710000001 RANOLAZINE 500 MG TABLET SUSTAINED-RELEASE 12 HOUR: Performed by: INTERNAL MEDICINE

## 2024-11-13 PROCEDURE — 93455 CORONARY ART/GRFT ANGIO S&I: CPT | Performed by: INTERNAL MEDICINE

## 2024-11-13 PROCEDURE — 25010000002 HEPARIN (PORCINE) PER 1000 UNITS: Performed by: INTERNAL MEDICINE

## 2024-11-13 PROCEDURE — 85347 COAGULATION TIME ACTIVATED: CPT

## 2024-11-13 PROCEDURE — 80061 LIPID PANEL: CPT | Performed by: INTERNAL MEDICINE

## 2024-11-13 PROCEDURE — 85025 COMPLETE CBC W/AUTO DIFF WBC: CPT | Performed by: INTERNAL MEDICINE

## 2024-11-13 PROCEDURE — C9604 PERC D-E COR REVASC T CABG S: HCPCS | Performed by: INTERNAL MEDICINE

## 2024-11-13 PROCEDURE — 85610 PROTHROMBIN TIME: CPT | Performed by: INTERNAL MEDICINE

## 2024-11-13 DEVICE — STNT CORNRY RESOLUTE ONYX RX 2X18MM: Type: IMPLANTABLE DEVICE | Site: CORONARY | Status: FUNCTIONAL

## 2024-11-13 RX ORDER — FUROSEMIDE 20 MG/1
20 TABLET ORAL DAILY
Status: DISCONTINUED | OUTPATIENT
Start: 2024-11-14 | End: 2024-11-14 | Stop reason: HOSPADM

## 2024-11-13 RX ORDER — FENTANYL CITRATE 50 UG/ML
INJECTION, SOLUTION INTRAMUSCULAR; INTRAVENOUS
Status: DISCONTINUED | OUTPATIENT
Start: 2024-11-13 | End: 2024-11-13 | Stop reason: HOSPADM

## 2024-11-13 RX ORDER — ASPIRIN 81 MG/1
81 TABLET ORAL DAILY
Status: DISCONTINUED | OUTPATIENT
Start: 2024-11-14 | End: 2024-11-13 | Stop reason: HOSPADM

## 2024-11-13 RX ORDER — CLOPIDOGREL BISULFATE 75 MG/1
75 TABLET ORAL DAILY
Status: DISCONTINUED | OUTPATIENT
Start: 2024-11-14 | End: 2024-11-14 | Stop reason: HOSPADM

## 2024-11-13 RX ORDER — SODIUM CHLORIDE 9 MG/ML
250 INJECTION, SOLUTION INTRAVENOUS ONCE AS NEEDED
Status: DISCONTINUED | OUTPATIENT
Start: 2024-11-13 | End: 2024-11-14 | Stop reason: HOSPADM

## 2024-11-13 RX ORDER — NITROGLYCERIN 0.4 MG/1
0.4 TABLET SUBLINGUAL
Status: DISCONTINUED | OUTPATIENT
Start: 2024-11-13 | End: 2024-11-13 | Stop reason: HOSPADM

## 2024-11-13 RX ORDER — MECLIZINE HYDROCHLORIDE 25 MG/1
25 TABLET ORAL EVERY 6 HOURS PRN
Status: DISCONTINUED | OUTPATIENT
Start: 2024-11-13 | End: 2024-11-14 | Stop reason: HOSPADM

## 2024-11-13 RX ORDER — NITROGLYCERIN 0.4 MG/1
0.4 TABLET SUBLINGUAL
Status: DISCONTINUED | OUTPATIENT
Start: 2024-11-13 | End: 2024-11-14 | Stop reason: HOSPADM

## 2024-11-13 RX ORDER — ONDANSETRON 2 MG/ML
4 INJECTION INTRAMUSCULAR; INTRAVENOUS EVERY 6 HOURS PRN
Status: DISCONTINUED | OUTPATIENT
Start: 2024-11-13 | End: 2024-11-13 | Stop reason: HOSPADM

## 2024-11-13 RX ORDER — ACETAMINOPHEN 500 MG
500 TABLET ORAL EVERY 4 HOURS PRN
Status: DISCONTINUED | OUTPATIENT
Start: 2024-11-13 | End: 2024-11-13 | Stop reason: HOSPADM

## 2024-11-13 RX ORDER — PANTOPRAZOLE SODIUM 40 MG/1
40 TABLET, DELAYED RELEASE ORAL DAILY
Status: DISCONTINUED | OUTPATIENT
Start: 2024-11-14 | End: 2024-11-14 | Stop reason: HOSPADM

## 2024-11-13 RX ORDER — MIDODRINE HYDROCHLORIDE 5 MG/1
5 TABLET ORAL
Status: DISCONTINUED | OUTPATIENT
Start: 2024-11-14 | End: 2024-11-14 | Stop reason: HOSPADM

## 2024-11-13 RX ORDER — ONDANSETRON 2 MG/ML
4 INJECTION INTRAMUSCULAR; INTRAVENOUS EVERY 6 HOURS PRN
Status: DISCONTINUED | OUTPATIENT
Start: 2024-11-13 | End: 2024-11-14 | Stop reason: HOSPADM

## 2024-11-13 RX ORDER — ALUMINA, MAGNESIA, AND SIMETHICONE 2400; 2400; 240 MG/30ML; MG/30ML; MG/30ML
15 SUSPENSION ORAL EVERY 6 HOURS PRN
Status: DISCONTINUED | OUTPATIENT
Start: 2024-11-13 | End: 2024-11-14 | Stop reason: HOSPADM

## 2024-11-13 RX ORDER — MEXILETINE HYDROCHLORIDE 150 MG/1
150 CAPSULE ORAL 2 TIMES DAILY
Status: DISCONTINUED | OUTPATIENT
Start: 2024-11-13 | End: 2024-11-14 | Stop reason: HOSPADM

## 2024-11-13 RX ORDER — AMOXICILLIN 250 MG
1 CAPSULE ORAL DAILY
Status: DISCONTINUED | OUTPATIENT
Start: 2024-11-14 | End: 2024-11-14 | Stop reason: HOSPADM

## 2024-11-13 RX ORDER — METOPROLOL SUCCINATE 50 MG/1
50 TABLET, EXTENDED RELEASE ORAL 2 TIMES DAILY
Status: DISCONTINUED | OUTPATIENT
Start: 2024-11-13 | End: 2024-11-14 | Stop reason: HOSPADM

## 2024-11-13 RX ORDER — HEPARIN SODIUM 1000 [USP'U]/ML
INJECTION, SOLUTION INTRAVENOUS; SUBCUTANEOUS
Status: DISCONTINUED | OUTPATIENT
Start: 2024-11-13 | End: 2024-11-13 | Stop reason: HOSPADM

## 2024-11-13 RX ORDER — SODIUM CHLORIDE 0.9 % (FLUSH) 0.9 %
3 SYRINGE (ML) INJECTION EVERY 12 HOURS SCHEDULED
Status: DISCONTINUED | OUTPATIENT
Start: 2024-11-13 | End: 2024-11-13 | Stop reason: HOSPADM

## 2024-11-13 RX ORDER — NITROGLYCERIN 0.4 MG/1
0.4 TABLET SUBLINGUAL
Status: DISCONTINUED | OUTPATIENT
Start: 2024-11-13 | End: 2024-11-13 | Stop reason: SDUPTHER

## 2024-11-13 RX ORDER — SODIUM CHLORIDE 9 MG/ML
40 INJECTION, SOLUTION INTRAVENOUS AS NEEDED
Status: DISCONTINUED | OUTPATIENT
Start: 2024-11-13 | End: 2024-11-13 | Stop reason: HOSPADM

## 2024-11-13 RX ORDER — METHOCARBAMOL 500 MG/1
500 TABLET, FILM COATED ORAL 3 TIMES DAILY
Status: DISCONTINUED | OUTPATIENT
Start: 2024-11-13 | End: 2024-11-14 | Stop reason: HOSPADM

## 2024-11-13 RX ORDER — SUCRALFATE 1 G/1
1 TABLET ORAL 4 TIMES DAILY
Status: DISCONTINUED | OUTPATIENT
Start: 2024-11-13 | End: 2024-11-14 | Stop reason: HOSPADM

## 2024-11-13 RX ORDER — RANOLAZINE 500 MG/1
500 TABLET, EXTENDED RELEASE ORAL 2 TIMES DAILY
Status: DISCONTINUED | OUTPATIENT
Start: 2024-11-13 | End: 2024-11-14 | Stop reason: HOSPADM

## 2024-11-13 RX ORDER — ONDANSETRON 4 MG/1
4 TABLET, ORALLY DISINTEGRATING ORAL EVERY 6 HOURS PRN
Status: DISCONTINUED | OUTPATIENT
Start: 2024-11-13 | End: 2024-11-14 | Stop reason: HOSPADM

## 2024-11-13 RX ORDER — ASPIRIN 81 MG/1
81 TABLET ORAL DAILY
Status: DISCONTINUED | OUTPATIENT
Start: 2024-11-14 | End: 2024-11-14 | Stop reason: HOSPADM

## 2024-11-13 RX ORDER — POTASSIUM CHLORIDE 750 MG/1
10 TABLET, FILM COATED, EXTENDED RELEASE ORAL DAILY
Status: DISCONTINUED | OUTPATIENT
Start: 2024-11-14 | End: 2024-11-14 | Stop reason: HOSPADM

## 2024-11-13 RX ORDER — METHYLPREDNISOLONE SODIUM SUCCINATE 125 MG/2ML
125 INJECTION, POWDER, LYOPHILIZED, FOR SOLUTION INTRAMUSCULAR; INTRAVENOUS ONCE
Status: DISCONTINUED | OUTPATIENT
Start: 2024-11-13 | End: 2024-11-13 | Stop reason: HOSPADM

## 2024-11-13 RX ORDER — MIDAZOLAM HYDROCHLORIDE 1 MG/ML
INJECTION, SOLUTION INTRAMUSCULAR; INTRAVENOUS
Status: DISCONTINUED | OUTPATIENT
Start: 2024-11-13 | End: 2024-11-13 | Stop reason: HOSPADM

## 2024-11-13 RX ORDER — HYDROCODONE BITARTRATE AND ACETAMINOPHEN 5; 325 MG/1; MG/1
1 TABLET ORAL EVERY 4 HOURS PRN
Status: DISCONTINUED | OUTPATIENT
Start: 2024-11-13 | End: 2024-11-13 | Stop reason: HOSPADM

## 2024-11-13 RX ORDER — SODIUM CHLORIDE 9 MG/ML
30 INJECTION, SOLUTION INTRAVENOUS CONTINUOUS
Status: DISPENSED | OUTPATIENT
Start: 2024-11-13 | End: 2024-11-13

## 2024-11-13 RX ORDER — IOPAMIDOL 755 MG/ML
INJECTION, SOLUTION INTRAVASCULAR
Status: DISCONTINUED | OUTPATIENT
Start: 2024-11-13 | End: 2024-11-13 | Stop reason: HOSPADM

## 2024-11-13 RX ORDER — ASPIRIN 81 MG/1
324 TABLET, CHEWABLE ORAL ONCE
Status: DISCONTINUED | OUTPATIENT
Start: 2024-11-13 | End: 2024-11-13 | Stop reason: HOSPADM

## 2024-11-13 RX ORDER — ASPIRIN 81 MG/1
81 TABLET ORAL DAILY
Status: DISCONTINUED | OUTPATIENT
Start: 2024-11-13 | End: 2024-11-14

## 2024-11-13 RX ORDER — NICOTINE 21 MG/24HR
1 PATCH, TRANSDERMAL 24 HOURS TRANSDERMAL DAILY PRN
Status: DISCONTINUED | OUTPATIENT
Start: 2024-11-13 | End: 2024-11-14 | Stop reason: HOSPADM

## 2024-11-13 RX ORDER — ACETAMINOPHEN 325 MG/1
650 TABLET ORAL EVERY 4 HOURS PRN
Status: DISCONTINUED | OUTPATIENT
Start: 2024-11-13 | End: 2024-11-14 | Stop reason: HOSPADM

## 2024-11-13 RX ORDER — DIPHENHYDRAMINE HCL 25 MG
25 CAPSULE ORAL EVERY 6 HOURS PRN
Status: DISCONTINUED | OUTPATIENT
Start: 2024-11-13 | End: 2024-11-14 | Stop reason: HOSPADM

## 2024-11-13 RX ORDER — GABAPENTIN 400 MG/1
800 CAPSULE ORAL EVERY 8 HOURS SCHEDULED
Status: DISCONTINUED | OUTPATIENT
Start: 2024-11-13 | End: 2024-11-14 | Stop reason: HOSPADM

## 2024-11-13 RX ORDER — CLOPIDOGREL 300 MG/1
600 TABLET, FILM COATED ORAL ONCE
Status: DISCONTINUED | OUTPATIENT
Start: 2024-11-13 | End: 2024-11-14 | Stop reason: HOSPADM

## 2024-11-13 RX ORDER — CLOPIDOGREL 300 MG/1
TABLET, FILM COATED ORAL
Status: DISCONTINUED | OUTPATIENT
Start: 2024-11-13 | End: 2024-11-13 | Stop reason: HOSPADM

## 2024-11-13 RX ORDER — SODIUM CHLORIDE 0.9 % (FLUSH) 0.9 %
3-10 SYRINGE (ML) INJECTION AS NEEDED
Status: DISCONTINUED | OUTPATIENT
Start: 2024-11-13 | End: 2024-11-13 | Stop reason: HOSPADM

## 2024-11-13 RX ORDER — LIDOCAINE HYDROCHLORIDE 10 MG/ML
INJECTION, SOLUTION INFILTRATION; PERINEURAL
Status: DISCONTINUED | OUTPATIENT
Start: 2024-11-13 | End: 2024-11-13 | Stop reason: HOSPADM

## 2024-11-13 RX ADMIN — METOPROLOL SUCCINATE 50 MG: 50 TABLET, EXTENDED RELEASE ORAL at 20:30

## 2024-11-13 RX ADMIN — METHOCARBAMOL 500 MG: 500 TABLET ORAL at 22:47

## 2024-11-13 RX ADMIN — ACETAMINOPHEN 650 MG: 325 TABLET, FILM COATED ORAL at 22:22

## 2024-11-13 RX ADMIN — RANOLAZINE 500 MG: 500 TABLET, EXTENDED RELEASE ORAL at 20:30

## 2024-11-13 RX ADMIN — GABAPENTIN 800 MG: 400 CAPSULE ORAL at 20:29

## 2024-11-13 RX ADMIN — MEXILETINE HYDROCHLORIDE 150 MG: 150 CAPSULE ORAL at 22:22

## 2024-11-13 RX ADMIN — SUCRALFATE 1 G: 1 TABLET ORAL at 20:30

## 2024-11-13 NOTE — Clinical Note
No in lab complications Take medications as prescribed. Follow up with your regular doctor. Return to the ED if symptoms worsen.         Bladder Infection, Female (Adult)    Normally, bacteria do not stay in the urine. But when they do, the urine can become infected. This is called a urinary tract infection (UTI). An infection can occur anywhere in the urinary tract, from the kidney to the bladder and urethra. The most common place for a UTI is in the bladder. This is called a bladder infection. This is one of the most common infections in women. Most bladder infections are easily treated. They are not serious unless the infection spreads up to the kidney.  The phrases \"bladder infection\", \"UTI,\" and \"cystitis,\" are often used to describe the same thing, but they are not always the same. Cystitis is an inflammation of the bladder. The most common cause of cystitis is an infection.  In summary:  · Infections in the urine are called UTIs.  · Cystitis is usually caused by a UTI.  · Not al UTIs and cases of cystitis are bladder infections.  · Bladder infections are the most common type of cystitis.  Symptoms  The infection causes inflammation in the urethra and bladder, which causes many of the symptoms. The most common symptoms of a bladder infection are:  · Pain or burning when urinating  · Having to urinate more often than usual  · Urgent need to urinate  · Only a small amount of urine comes out  · Blood in urine  · Abdominal discomfort, usually in the lower abdomen, above the pubic bone  · Cloudy, strong, or bad smelling urine  · Urinary retention, being unable to urinate  · Unable to hold urine in (urinary incontinence)  · Fever  · Loss of appetite  · Confusion (in older adults)  Causes  Bladder infections are not contagious. You can't get one from someone else, from a toilet seat, or from sharing a bath.  The most common cause of bladder infections is bacteria from the bowels. The bacteria get onto the skin around the opening of the  urethra. From there it can get into the urine and travel up to the bladder, causing inflammation and infection. This usually happens because of:  · Wiping improperly after urinating. Always wipe from front to back.  · Bowel incontinence  · Pregnancy  · Procedures such as having a catheter inserted  · Older age  · Not emptying your bladder (stagnated urine gives bacteria a chance to grow)  · Dehydration  · Constipation  · Sex  · Use of a diaphragm for birth control   Treatment  Bladder infections are diagnosed by a urine test. They are treated with antibiotics and usually clear up quickly without complications. Treatment helps prevent a more serious kidney infection.  Medicines  Medicines can help in the treatment of a bladder infection:  · Take antibiotics until they are used up, even if you feel better. It is important to finish them to make sure the infection has cleared.  · You can use acetaminophen or ibuprofen for pain, fever, or discomfort, unless another medicine was prescribed. You can also alternate them, or use both together. They work differently and are a different class of medicines, so taking them together is not an overdose. If you have chronic liver or kidney disease, talk with your healthcare provider before using these medicines. Also talk with your provider if you've ever had a stomach ulcer or gastrointestinal bleeding, or are taking blood-thinner medicines.  · If you are given phenazopydridine to reduce burning with urination, it will cause your urine to become a bright orange color. This can stain clothing.  Care and prevention  These self-care steps can help prevent future infections:  · Drink plenty of fluids to prevent dehydration and flush out of the bladder. Do this unless you must restrict fluids for other health reasons, or your doctor told you not to.  · Proper cleaning after going to the bathroom is important. Wipe from front to back after using the toilet to prevent the spread of  bacteria.  · Urinate more often. Don't try to hold urine in for a long time.  · Wear loose-fitting clothes and cotton underwear. Avoid tight-fitting pans.  · Improve your diet and prevent constipation. Eat more fresh fruit and vegetables, and fiber, and less junk and fatty foods.  · Avoid sex until your symptoms are gone.  · Avoid caffeine, alcohol, and spicy foods. These can irritate the bladder.  · Urinate right after intercourse to flush out the bladder.  · If you use birth control pills and have frequent bladder infections, discuss it with your doctor.  Follow-up care  Call your healthcare provider if all symptoms are not gone after 3 days of treatment. This is especially important if you have repeat infections.  If a culture was done, you will be told if your treatment needs to be changed. If directed, you can call to find out the results.  If X-rays were done, you will be told if the results will affect your treatment.  Call 911  Call emergency services if any of the following occur:  · Trouble breathing  · Difficulty arousing or confusion  · Fainting or loss of consciousness  · Rapid heart rate  When to seek medical advice  Call your healthcare provider right away if any of these occur:  · Fever of 100.4ºF (38.0ºC) or higher, or as directed  · Symptoms are not better by the third day of treatment  · Back or belly (abdominal) pain that gets worse  · Repeated vomiting, or unable to keep medicine down  · Weakness or dizziness  · Vaginal discharge  · Pain, redness, or swelling in the outer vaginal area (labia)  © 3546-4472 [employer]. [address] [place] [address], [place] [address]. All rights reserved. This information is not intended as a substitute for professional medical care. Always follow your healthcare professional's instructions.

## 2024-11-13 NOTE — Clinical Note
First balloon inflation max pressure = 12 raf. First balloon inflation duration = 10 seconds. Second inflation of balloon - Max pressure = 18 raf. 2nd Inflation of balloon - Duration = 8 seconds.

## 2024-11-13 NOTE — Clinical Note
A 6 fr sheath was successfully inserted using micropuncture technique into the right femoral artery. Sheath insertion not delayed.

## 2024-11-14 VITALS
BODY MASS INDEX: 27.06 KG/M2 | RESPIRATION RATE: 15 BRPM | HEIGHT: 73 IN | HEART RATE: 61 BPM | OXYGEN SATURATION: 93 % | SYSTOLIC BLOOD PRESSURE: 103 MMHG | TEMPERATURE: 97.5 F | DIASTOLIC BLOOD PRESSURE: 56 MMHG | WEIGHT: 204.15 LBS

## 2024-11-14 PROBLEM — I20.0 UNSTABLE ANGINA: Status: ACTIVE | Noted: 2024-11-14

## 2024-11-14 LAB
ANION GAP SERPL CALCULATED.3IONS-SCNC: 8.8 MMOL/L (ref 5–15)
BUN SERPL-MCNC: 15 MG/DL (ref 8–23)
BUN/CREAT SERPL: 14.4 (ref 7–25)
CALCIUM SPEC-SCNC: 9.1 MG/DL (ref 8.6–10.5)
CHLORIDE SERPL-SCNC: 104 MMOL/L (ref 98–107)
CO2 SERPL-SCNC: 26.2 MMOL/L (ref 22–29)
CREAT SERPL-MCNC: 1.04 MG/DL (ref 0.76–1.27)
DEPRECATED RDW RBC AUTO: 50.7 FL (ref 37–54)
EGFRCR SERPLBLD CKD-EPI 2021: 71.7 ML/MIN/1.73
ERYTHROCYTE [DISTWIDTH] IN BLOOD BY AUTOMATED COUNT: 14.7 % (ref 12.3–15.4)
GLUCOSE SERPL-MCNC: 131 MG/DL (ref 65–99)
HCT VFR BLD AUTO: 43.8 % (ref 37.5–51)
HGB BLD-MCNC: 13.8 G/DL (ref 13–17.7)
MCH RBC QN AUTO: 29.6 PG (ref 26.6–33)
MCHC RBC AUTO-ENTMCNC: 31.5 G/DL (ref 31.5–35.7)
MCV RBC AUTO: 94 FL (ref 79–97)
PLATELET # BLD AUTO: 192 10*3/MM3 (ref 140–450)
PMV BLD AUTO: 9.9 FL (ref 6–12)
POTASSIUM SERPL-SCNC: 4.6 MMOL/L (ref 3.5–5.2)
RBC # BLD AUTO: 4.66 10*6/MM3 (ref 4.14–5.8)
SODIUM SERPL-SCNC: 139 MMOL/L (ref 136–145)
WBC NRBC COR # BLD AUTO: 5.62 10*3/MM3 (ref 3.4–10.8)

## 2024-11-14 PROCEDURE — A9270 NON-COVERED ITEM OR SERVICE: HCPCS | Performed by: INTERNAL MEDICINE

## 2024-11-14 PROCEDURE — 93010 ELECTROCARDIOGRAM REPORT: CPT | Performed by: INTERNAL MEDICINE

## 2024-11-14 PROCEDURE — 63710000001 ASPIRIN 81 MG TABLET DELAYED-RELEASE: Performed by: INTERNAL MEDICINE

## 2024-11-14 PROCEDURE — 63710000001 GABAPENTIN 400 MG CAPSULE: Performed by: INTERNAL MEDICINE

## 2024-11-14 PROCEDURE — 63710000001 RANOLAZINE 500 MG TABLET SUSTAINED-RELEASE 12 HOUR: Performed by: INTERNAL MEDICINE

## 2024-11-14 PROCEDURE — 63710000001 PANTOPRAZOLE 40 MG TABLET DELAYED-RELEASE: Performed by: INTERNAL MEDICINE

## 2024-11-14 PROCEDURE — 63710000001 MIDODRINE 5 MG TABLET: Performed by: INTERNAL MEDICINE

## 2024-11-14 PROCEDURE — 63710000001 EMPAGLIFLOZIN 10 MG TABLET: Performed by: INTERNAL MEDICINE

## 2024-11-14 PROCEDURE — 99238 HOSP IP/OBS DSCHRG MGMT 30/<: CPT | Performed by: INTERNAL MEDICINE

## 2024-11-14 PROCEDURE — 63710000001 METHOCARBAMOL 500 MG TABLET: Performed by: INTERNAL MEDICINE

## 2024-11-14 PROCEDURE — 85027 COMPLETE CBC AUTOMATED: CPT | Performed by: INTERNAL MEDICINE

## 2024-11-14 PROCEDURE — 80048 BASIC METABOLIC PNL TOTAL CA: CPT | Performed by: INTERNAL MEDICINE

## 2024-11-14 PROCEDURE — 63710000001 CLOPIDOGREL 75 MG TABLET: Performed by: INTERNAL MEDICINE

## 2024-11-14 PROCEDURE — 63710000001 METOPROLOL SUCCINATE XL 50 MG TABLET SUSTAINED-RELEASE 24 HOUR: Performed by: INTERNAL MEDICINE

## 2024-11-14 PROCEDURE — 63710000001 POTASSIUM CHLORIDE 10 MEQ TABLET CONTROLLED-RELEASE: Performed by: INTERNAL MEDICINE

## 2024-11-14 PROCEDURE — 63710000001 FUROSEMIDE 20 MG TABLET: Performed by: INTERNAL MEDICINE

## 2024-11-14 PROCEDURE — 63710000001 SENNOSIDES-DOCUSATE 8.6-50 MG TABLET: Performed by: INTERNAL MEDICINE

## 2024-11-14 PROCEDURE — 63710000001 MEXILETINE 150 MG CAPSULE: Performed by: INTERNAL MEDICINE

## 2024-11-14 PROCEDURE — 63710000001 SUCRALFATE 1 G TABLET: Performed by: INTERNAL MEDICINE

## 2024-11-14 PROCEDURE — 93005 ELECTROCARDIOGRAM TRACING: CPT | Performed by: INTERNAL MEDICINE

## 2024-11-14 RX ORDER — SODIUM CHLORIDE 0.9 % (FLUSH) 0.9 %
10 SYRINGE (ML) INJECTION EVERY 12 HOURS SCHEDULED
Status: DISCONTINUED | OUTPATIENT
Start: 2024-11-14 | End: 2024-11-14 | Stop reason: HOSPADM

## 2024-11-14 RX ORDER — SODIUM CHLORIDE 9 MG/ML
40 INJECTION, SOLUTION INTRAVENOUS AS NEEDED
Status: DISCONTINUED | OUTPATIENT
Start: 2024-11-14 | End: 2024-11-14 | Stop reason: HOSPADM

## 2024-11-14 RX ORDER — CLOPIDOGREL BISULFATE 75 MG/1
75 TABLET ORAL DAILY
Qty: 90 TABLET | Refills: 1 | Status: CANCELLED | OUTPATIENT
Start: 2024-11-14

## 2024-11-14 RX ORDER — SODIUM CHLORIDE 0.9 % (FLUSH) 0.9 %
10 SYRINGE (ML) INJECTION AS NEEDED
Status: DISCONTINUED | OUTPATIENT
Start: 2024-11-14 | End: 2024-11-14 | Stop reason: HOSPADM

## 2024-11-14 RX ORDER — CLOPIDOGREL BISULFATE 75 MG/1
75 TABLET ORAL DAILY
Qty: 90 TABLET | Refills: 1 | Status: SHIPPED | OUTPATIENT
Start: 2024-11-14

## 2024-11-14 RX ADMIN — METOPROLOL SUCCINATE 50 MG: 50 TABLET, EXTENDED RELEASE ORAL at 09:19

## 2024-11-14 RX ADMIN — MIDODRINE HYDROCHLORIDE 5 MG: 5 TABLET ORAL at 12:15

## 2024-11-14 RX ADMIN — POTASSIUM CHLORIDE 10 MEQ: 750 TABLET, EXTENDED RELEASE ORAL at 09:21

## 2024-11-14 RX ADMIN — METHOCARBAMOL 500 MG: 500 TABLET ORAL at 09:21

## 2024-11-14 RX ADMIN — FUROSEMIDE 20 MG: 20 TABLET ORAL at 09:19

## 2024-11-14 RX ADMIN — GABAPENTIN 800 MG: 400 CAPSULE ORAL at 12:15

## 2024-11-14 RX ADMIN — ASPIRIN 81 MG: 81 TABLET, COATED ORAL at 09:19

## 2024-11-14 RX ADMIN — SENNOSIDES AND DOCUSATE SODIUM 1 TABLET: 50; 8.6 TABLET ORAL at 09:20

## 2024-11-14 RX ADMIN — MEXILETINE HYDROCHLORIDE 150 MG: 150 CAPSULE ORAL at 09:21

## 2024-11-14 RX ADMIN — EMPAGLIFLOZIN 10 MG: 10 TABLET, FILM COATED ORAL at 09:20

## 2024-11-14 RX ADMIN — GABAPENTIN 800 MG: 400 CAPSULE ORAL at 04:21

## 2024-11-14 RX ADMIN — PANTOPRAZOLE SODIUM 40 MG: 40 TABLET, DELAYED RELEASE ORAL at 09:21

## 2024-11-14 RX ADMIN — SUCRALFATE 1 G: 1 TABLET ORAL at 09:20

## 2024-11-14 RX ADMIN — CLOPIDOGREL BISULFATE 75 MG: 75 TABLET ORAL at 09:21

## 2024-11-14 RX ADMIN — SUCRALFATE 1 G: 1 TABLET ORAL at 12:15

## 2024-11-14 RX ADMIN — RANOLAZINE 500 MG: 500 TABLET, EXTENDED RELEASE ORAL at 09:19

## 2024-11-14 RX ADMIN — MIDODRINE HYDROCHLORIDE 5 MG: 5 TABLET ORAL at 09:25

## 2024-11-14 NOTE — DISCHARGE SUMMARY
Cardiology Discharge Summary      Patient Care Team:  Provider, No Known as PCP - General    Date of Admission: 11/13/2024    Date of Discharge:  11/14/2024    Length of stay:  LOS: 1 day     ADMISSION DIAGNOSIS:    CAD (coronary artery disease)    Coronary artery disease    Paroxysmal ventricular tachycardia    Unstable angina        DISCHARGE DIAGNOSIS:    CAD (coronary artery disease)    Coronary artery disease    Paroxysmal ventricular tachycardia    Unstable angina  Status post percutaneous coronary intervention/stent placement to LAD  Intolerance to multiple statins      CHF Guideline Directed Medical Therapy  Beta Blocker:   ARNI/ACE/ARB:   SGLT 2 inhibitors:   Diuretics:   Aldosterone Antagonist:   Vasodilators & Nitrates:     Presenting Problem/History of Present Illness    Active Hospital Problems    Diagnosis  POA    **CAD (coronary artery disease) [I25.10]  Yes    Unstable angina [I20.0]  Yes    Paroxysmal ventricular tachycardia [I47.29]  Unknown    Coronary artery disease [I25.10]  Unknown      Resolved Hospital Problems   No resolved problems to display.          HOSPITAL COURSE:  Patient is a 84 y.o. male who presented to [facility] 11/13/2024 for elective heart catheterization.  Please see full procedure note for specific details.  He underwent successful PCI/GE to the LAD via the vein graft.  He was monitored in the progressive care unit overnight with no events reported.  Discharge instructions were reviewed with the patient and he is stable for discharge home today.    Successful PCI GE LAD via the vein graft  Residual 99% LAD in the retrograde limb  Residual 99% obtuse marginal branch of circumflex and retrograde limb  Patent LIMA-DG  Patent SVG-RCA  Patent SVG-OM  Patent SVG-LAD  Patent SVG-DG    Past Medical History:     Past Medical History:   Diagnosis Date    Aneurysm     Cardiomyopathy     ICD implantation    Cellulitis of left lower extremity 2010    recurrent    CHD (coronary  heart disease)     S/P CABG & PCI    Dyslipidemia     Heart valve disease     History of ventricular tachycardia     Hypertension     Myocardial infarction     Inferior MI    Pinched nerve     L4-L5    Prostate cancer        Past Surgical History:     Past Surgical History:   Procedure Laterality Date    ANGIOPLASTY  1996 Stent: Palmaz- Huy stent/LAD 1996-RCA: -Tetra stent Guidant to proximal RCA, mid to distal artery 2 sequential Guidant Tetra stents    APPENDECTOMY      CARDIAC ABLATION  April and May 2019    CARDIAC CATHETERIZATION  2017    1996 x2, Nov. , 2010-cath 2015-no stents     CARDIAC CATHETERIZATION N/A 2024    Procedure: LEFT HEART CATH with possible PCI;  Surgeon: Marcin Childers DO;  Location: [facility] CATH INVASIVE LOCATION;  Service: Cardiovascular;  Laterality: N/A;  Local and IV sedation    CARDIAC DEFIBRILLATOR PLACEMENT      COLONOSCOPY W/ POLYPECTOMY      Mult colonoscopy's for polyp resection     CORONARY ARTERY BYPASS GRAFT  05/2010    x5 vessel: LMA to diagonal, LAD, intermedius, obtuse marginal, RCA    CORONARY STENT PLACEMENT      ENDOSCOPY N/A 2019    Procedure: ESOPHAGOGASTRODUODENOSCOPY with dilitation Bougie 50, 54;  Surgeon: Roddy Coronel MD;  Location: [facility] ENDOSCOPY;  Service: Gastroenterology    INSERT / REPLACE / REMOVE PACEMAKER      KNEE OPEN LATERAL RELEASE Left     reduction    OTHER SURGICAL HISTORY  2018    ICD implantation    PROSTATE SURGERY  2015    Robiotic surgery- Cyber Knife:       Social History:   Social History     Socioeconomic History    Marital status:    Tobacco Use    Smoking status: Former     Current packs/day: 0.00     Average packs/day: 1 pack/day for 17.0 years (17.0 ttl pk-yrs)     Types: Cigarettes     Start date: 1985     Quit date: 2002     Years since quittin.3     Passive exposure: Past    Smokeless tobacco: Never   Vaping Use    Vaping status: Never Used  "  Substance and Sexual Activity    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  Coag   Results from last 7 days   Lab Units 11/13/24  1038   INR  1.05*     HbA1C   Lab Results   Component Value Date    HGBA1C 6.78 (H) 10/06/2024    HGBA1C 6.1 (H) 10/26/2022     Blood Glucose No results found for: \"POCGLU\"  Infection     CMP   Results from last 7 days   Lab Units 11/14/24 0238 11/13/24  1038   SODIUM mmol/L 139 138   POTASSIUM mmol/L 4.6 3.9   CHLORIDE mmol/L 104 103   CO2 mmol/L 26.2 25.1   BUN mg/dL 15 17   CREATININE mg/dL 1.04 1.07   GLUCOSE mg/dL 131* 117*     ABG      UA      LEIGH ANN  No results found for: \"POCMETH\", \"POCAMPHET\", \"POCBARBITUR\", \"POCBENZO\", \"POCCOCAINE\", \"POCOPIATES\", \"POCOXYCODO\", \"POCPHENCYC\", \"POCPROPOXY\", \"POCTHC\", \"POCTRICYC\"  Lysis Labs   Results from last 7 days   Lab Units 11/14/24  0238 11/13/24  1038   INR   --  1.05*   HEMOGLOBIN g/dL 13.8 13.7   PLATELETS 10*3/mm3 192 195   CREATININE mg/dL 1.04 1.07     Radiology(recent) No radiology results for the last day             Condition on Discharge: Stable    Vital Signs  Visit Vitals  /67 (BP Location: Right arm, Patient Position: Lying)   Pulse 78   Temp 98.1 °F (36.7 °C) (Oral)   Resp 17   Ht 185.4 cm (73\")   Wt 92.6 kg (204 lb 2.3 oz)   SpO2 94%   BMI 26.93 kg/m²         PHYSICAL EXAM:    General: Alert, cooperative, no distress, appears stated age  Head:  Normocephalic, atraumatic, mucous membranes " moist  Eyes:  Conjunctivae/corneas clear, EOM's intact     Neck:  Supple,  no adenopathy; no JVD or bruit  Lungs: Clear to auscultation bilaterally, no wheezes, rhonchi or rales are noted  Chest wall: No tenderness  Heart::  Regular rate and rhythm, S1 and S2 normal, no murmur, rub or gallop.  Groin site soft with no hematoma, nontender.  Abdomen: Soft, nontender, nondistended, bowel sounds active  Extremities: No cyanosis, clubbing, or edema   Pulses: 2+ and symmetric all extremities  Skin:  No rashes or lesions  Neuro/psych: A&O x3. CN II through XII are grossly intact with appropriate affect         Discharge Disposition: Home      Discharge Medications     Discharge Medications        New Medications        Instructions Start Date   clopidogrel 75 MG tablet  Commonly known as: PLAVIX   75 mg, Oral, Daily             Continue These Medications        Instructions Start Date   aspirin 81 MG EC tablet   81 mg, Daily      cholecalciferol 10 MCG (400 UNIT) tablet  Commonly known as: VITAMIN D3   800 Units, Daily      Diclofenac Sodium 1 % gel gel  Commonly known as: VOLTAREN   4 g, 4 Times Daily PRN      ezetimibe 10 MG tablet  Commonly known as: ZETIA   10 mg, Every Evening      Farxiga 10 MG tablet  Generic drug: dapagliflozin Propanediol   10 mg, Daily      fish oil 1000 MG capsule capsule   2,000 mg, 2 Times Daily With Meals      furosemide 20 MG tablet  Commonly known as: LASIX   20 mg, Daily      gabapentin 800 MG tablet  Commonly known as: NEURONTIN   800 mg, Every 6 Hours      meclizine 25 MG tablet  Commonly known as: ANTIVERT   25 mg, Every 6 Hours PRN      methocarbamol 500 MG tablet  Commonly known as: ROBAXIN   500 mg, 3 Times Daily      metoprolol succinate  MG 24 hr tablet  Commonly known as: TOPROL-XL   50 mg, 2 Times Daily      mexiletine 150 MG capsule  Commonly known as: MEXITIL   150 mg, Oral, 2 Times Daily      midodrine 5 MG tablet  Commonly known as: PROAMATINE   5 mg, 3 Times Daily  "Before Meals      nitroglycerin 0.4 MG SL tablet  Commonly known as: NITROSTAT   0.4 mg, Every 5 Minutes PRN      pantoprazole 40 MG EC tablet  Commonly known as: PROTONIX   40 mg, Daily      potassium chloride 10 MEQ CR capsule  Commonly known as: MICRO-K   10 mEq, Daily      ranolazine 500 MG 12 hr tablet  Commonly known as: Ranexa   500 mg, Oral, 2 Times Daily      sennosides-docusate 8.6-50 MG per tablet  Commonly known as: PERICOLACE   1 tablet, Daily      sucralfate 1 g tablet  Commonly known as: CARAFATE   1 g, 4 Times Daily               Discharge Diet:  Heart healthy    Activity at Discharge:   Routine post PCI discharge instructions    Follow-up Appointments  Future Appointments   Date Time Provider Department Center   11/20/2024  2:30 PM Marcin Childers DO [facility]   12/16/2024  2:30 PM Constantino Arvizu MD [facility]   2/14/2025  9:15 AM [facility] ECHO 1/OUTPATIENT  [facility]   2/14/2025 10:00 AM [facility] CT 3  [facility]   2/27/2025 11:00 AM John Barros MD [facility]         Test Results Pending at Discharge       Risk for Readmission (LACE) Score: 9 (11/14/2024  6:00 AM)      Time: Discharge 15 min    NIMCO Khoury  11/14/24  11:56 EST      EMR Dragon/Transcription:   \"Dictated utilizing Dragon dictation\".       Electronically signed by NIMCO Khoury, 11/14/24, 7:44 AM EST.  Copied text in this note has been reviewed by me and is accurate as of 11/14/24.          Cardiology attending:  Seen and examined.  Chart and labs reviewed. Independent interpretations of cardiac testing was performed. History and exam findings are verified with above changes noted.  Assessment and plan notated by APC after being formulated by attending consultant.  Note that greater than 50% of the time spent in care of the patient was provided by attending consultant.    Patient reports that he had a little chest pain last night but nothing today.  No shortness of breath. "  Reviewed cardiac catheterization films with patient.  Advised that there is one blockage that was unable to be repaired secondary to the small caliber of the vessel.  Patient reports that he has a follow-up appointment with cardiothoracic surgery for serial monitoring of his aneurysm.  This will be early next year.  He has a follow-up appointment scheduled with me next week.  Dual antiplatelet therapy will be prescribed.    Physical Exam:    General: Alert, cooperative, no distress, appears stated age  Head:  Normocephalic, atraumatic, mucous membranes moist  Eyes:  Conjunctivae/corneas clear, EOM's intact     Neck:  Supple,  no bruit  Lungs:            Clear to auscultation bilaterally, no wheezes rhonchi rales are noted  Chest wall: No tenderness  Heart::  Regular rate and rhythm, S1 and S2 normal, 1/6 holosystolic murmur.  No rub or gallop  Abdomen: Soft, nontender, nondistended bowel sounds active  Extremities: No cyanosis, clubbing, or edema  Pulses: Diminished pedal pulses  Skin:  No rashes or lesions  Neuro/psych: A&O x3. CN II through XII are grossly intact with appropriate affect

## 2024-11-14 NOTE — CASE MANAGEMENT/SOCIAL WORK
Discharge Planning Assessment   Daniel     Patient Name: Deion Nicholas  MRN: [id number]  Today's Date: 11/14/2024    Admit Date: 11/13/2024    Plan: Anticipate routine home alone.   Discharge Needs Assessment       Row Name 11/14/24 1409       Living Environment    People in Home alone    Current Living Arrangements home    Potentially Unsafe Housing Conditions none    In the past 12 months has the electric, gas, oil, or water company threatened to shut off services in your home? No    Primary Care Provided by self    Provides Primary Care For no one    Family Caregiver if Needed child(tai), adult    Quality of Family Relationships helpful    Able to Return to Prior Arrangements yes       Resource/Environmental Concerns    Resource/Environmental Concerns none    Transportation Concerns none       Transportation Needs    In the past 12 months, has lack of transportation kept you from medical appointments or from getting medications? no    In the past 12 months, has lack of transportation kept you from meetings, work, or from getting things needed for daily living? No       Food Insecurity    Within the past 12 months, you worried that your food would run out before you got the money to buy more. Never true    Within the past 12 months, the food you bought just didn't last and you didn't have money to get more. Never true       Transition Planning    Patient/Family Anticipates Transition to home    Patient/Family Anticipated Services at Transition none    Transportation Anticipated family or friend will provide       Discharge Needs Assessment    Readmission Within the Last 30 Days no previous admission in last 30 days    Equipment Currently Used at Home cane, straight;rollator    Concerns to be Addressed denies needs/concerns at this time    Anticipated Changes Related to Illness none    Equipment Needed After Discharge none                   Discharge Plan       Row Name 11/14/24 9819       Plan    Plan Anticipate  routine home alone.    Patient/Family in Agreement with Plan yes    Plan Comments CM met with patient at bedside. Patient lives alone, is independent with ADLs and drives. PCP ([facility] at [facility]) and pharmacy ([facility]) verified-denies any difficulty affording meds. Patient denies any d/c needs at this time. [facility]  that [facility] was [facility] and [facility]. Patient denies any d/c needs at this time and family will transport at d/c. Barrier to D/C: cardiac cath yesterday, anticipate d/c once cleared by cardiology.                      Expected Discharge Date and Time       Expected Discharge Date Expected Discharge Time    Nov 14, 2024            Demographic Summary       Row Name 11/14/24 1409       General Information    Admission Type same day    Arrived From procedure suite    Referral Source admission list    Reason for Consult discharge planning    Preferred Language English       Contact Information    Permission Granted to Share Info With                    Functional Status       Row Name 11/14/24 1409       Functional Status    Usual Activity Tolerance moderate    Current Activity Tolerance moderate       Functional Status, IADL    Medications independent    Meal Preparation independent    Housekeeping independent    Laundry independent    Shopping independent       Mental Status    General Appearance WDL WDL       Mental Status Summary    Recent Changes in Mental Status/Cognitive Functioning no changes                  Met with patient in room .  Maintained distance greater than six feet and spent less than 15 minutes in the room.       HOWIE JustinN, RN    [address] [address]    Office: [phone]  Fax: [phone]

## 2024-11-14 NOTE — Clinical Note
Received patient from [facility] at [facility], A&O X4, on RA, SpO2 97%, R. Groin cath site, no bleeding. R. Drsalis pedal pulse weak to palpation, but audible, irregular by doplar. L. Dorsalis pedal pulse weak and palpable and confirmed by doplar too. VS /91 (106). Pt educated on keeping the leg straight

## 2024-11-14 NOTE — NURSING NOTE
Transported patient to 2115 per stretcher; gave bedside report to WING Nugent and did bedside right groin site check with WING Nugent. No bleeding; no hematoma

## 2024-11-14 NOTE — CASE MANAGEMENT/SOCIAL WORK
Case Management Discharge Note      Final Note: Home alone         Selected Continued Care - Admitted Since 11/13/2024          Transportation Services  Private: Car    Final Discharge Disposition Code: 01 - home or self-care

## 2024-11-14 NOTE — PLAN OF CARE
Goal Outcome Evaluation:  Plan of Care Reviewed With: patient        Received patient from [facility] at [facility], A&O X4, on RA, SpO2 97%, R. Groin cath site, no bleeding. R. Drsalis pedal pulse weak to palpation, but audible, irregular by doplar. L. Dorsalis pedal pulse weak and palpable and confirmed by doplar too. VS /91 (106). Pt educated on keeping the leg straight.   Update: This morning the pedal pulses are palpable and audible to doplar. Dressing on the groin dry, no pain verbalized.

## 2024-11-17 LAB
QT INTERVAL: 413 MS
QTC INTERVAL: 432 MS

## 2024-11-20 ENCOUNTER — OFFICE VISIT (OUTPATIENT)
Dept: CARDIOLOGY | Facility: CLINIC | Age: 84
End: 2024-11-20
Payer: MEDICARE

## 2024-11-20 VITALS
SYSTOLIC BLOOD PRESSURE: 154 MMHG | HEART RATE: 80 BPM | BODY MASS INDEX: 27.33 KG/M2 | HEIGHT: 73 IN | OXYGEN SATURATION: 97 % | DIASTOLIC BLOOD PRESSURE: 85 MMHG | WEIGHT: 206.2 LBS

## 2024-11-20 DIAGNOSIS — I38 VALVULAR HEART DISEASE: ICD-10-CM

## 2024-11-20 DIAGNOSIS — I34.0 NONRHEUMATIC MITRAL VALVE REGURGITATION: ICD-10-CM

## 2024-11-20 DIAGNOSIS — I25.5 ISCHEMIC CARDIOMYOPATHY: Chronic | ICD-10-CM

## 2024-11-20 DIAGNOSIS — I35.1 NONRHEUMATIC AORTIC VALVE INSUFFICIENCY: ICD-10-CM

## 2024-11-20 DIAGNOSIS — I10 ESSENTIAL HYPERTENSION: ICD-10-CM

## 2024-11-20 DIAGNOSIS — I25.10 CORONARY ARTERY DISEASE INVOLVING NATIVE CORONARY ARTERY OF NATIVE HEART WITHOUT ANGINA PECTORIS: ICD-10-CM

## 2024-11-20 DIAGNOSIS — Z95.810 PRESENCE OF AUTOMATIC IMPLANTABLE CARDIOVERTER-DEFIBRILLATOR: Chronic | ICD-10-CM

## 2024-11-20 DIAGNOSIS — E78.2 HYPERLIPIDEMIA, MIXED: Chronic | ICD-10-CM

## 2024-11-20 DIAGNOSIS — I71.21 ANEURYSM OF ASCENDING AORTA WITHOUT RUPTURE: Primary | ICD-10-CM

## 2024-11-20 NOTE — PROGRESS NOTES
Cardiology Office Visit      Encounter Date:  11/20/2024    Patient ID:   Deion Nicholas is a 84 y.o. male.    Reason For Followup:  Ischemic cardiomyopathy  Coronary artery disease  Hypertension  Hypertensive cardiovascular disease  Amiodarone therapy  Antiplatelet therapy  Hyperlipidemia    Brief Clinical History:  Dear Dr. Melgar, No Known    I had the pleasure of seeing Deion Nicholas today. As you are well aware, this is a 84 y.o. male with an established history of ischemic heart disease.  He has undergone coronary arterial bypass grafting in the past.  He is additional history that includes ischemic cardiomyopathy with most recent ejection fraction of 30 to 35%, ICD implantation, hypertension, hypertensive cardiovascular disease, amiodarone therapy, antiplatelet therapy, and hyperlipidemia.  He presents today for follow-up on the above conditions.     Interval History:  He denies any chest pain pressure heaviness or tightness.  He denies any shortness of breath out of character.  He denies any PND orthopnea.  He denies any syncope or near syncope.  He continues to report unsteady gait.    He continues to report problems with lower extremity edema and recurrent cellulitis.  He has been hospitalized a couple of times for cellulitis.  He has an appointment to see vascular surgery in a couple of weeks.  They will work with him on his lower extremity edema and recurrent cellulitis.    His most recent echocardiogram was performed in May 2023.  Significant left ventricular systolic dysfunction was noted with an ejection fraction of 30 to 35%.  Moderate aortic insufficiency and moderate mitral insufficiency was noted.  I personally reviewed these images and it appears that the AI and MR is likely better characterized as mild to moderate.  Mild dilation of the ascending aorta/aortic root was noted.    01/30/2024    Patient reports legs are getting better. He was placed on a diuretic and is helping quite a bit. He is  going to have cataract surgery. We reviewed his studies. CTA was done in December his ascending aorta was 5 cm. Will refer to aorta clinic.    Patient had a stress and echo done in Dec 23/Froylan 24. Nuclear stress was negative. Echo shows that EF was 20-25%. This represents a decline since 5/23 when it was 30-35%.    05/22/2024        The patient reports some occasional chest discomfort.  He reports no shortness of breath out of character.  He presents today essentially to discuss his aortic aneurysm and the surveillance required.  [facility] and [facility] were concerned that they were left out of the loop in regards to his follow-up and treatment plan.  I discussed with the patient that given the size of his aneurysm (5 cm) it is not advisable to proceed with repair at this time.  This is particularly true in the absence of any ischemic driven coronary artery disease or significant valvular disease.  He has a surveillance CT scan ordered for the first of 2025 and a follow-up planned shortly thereafter with the aorta clinic.    11/20/2024        He was in the hospital recently and underwent cardiac cath and had PCI of his LAD. He has residual disease of his Cx that is not amenable to stenting. There is the possibility that it could potentially undergo angioplasty. He will think about that and let us know if he would like to proceed.  He has a follow-up with CT surgery in February.  He will schedule a follow-up with us after that.    Assessment & Plan     Impressions:  Ischemic cardiomyopathy most recent ejection fraction 30 to 35% echocardiogram May 2023  Coronary artery disease  Valvular heart disease     Mild to moderate AI echocardiogram May 2023     Mild to moderate MR echocardiogram May 2023  Hypotension with an orthostatic component  Hypertensive cardiovascular disease  Amiodarone therapy  Antiplatelet therapy  Hyperlipidemia  AICD in situ  History of VT storm status post radiofrequency  "ablation  Gynecomastia secondary to spironolactone  Ascending aortic aneurysm most recent measurement 4.5 cm August 2022.     Recommendations:  Continuation of his current cardiovascular regimen at the present time.     This includes beta-blockers, midodrine, antiplatelet therapy, and anticoagulant therapy.  CT surgery/aorta clinic referral  Follow-up in 3 months time  Follow-up with cardiothoracic surgery as scheduled  Follow-up with EP as scheduled.    Diagnoses and all orders for this visit:    1. Aneurysm of ascending aorta without rupture (Primary)    2. Coronary artery disease involving native coronary artery of native heart without angina pectoris    3. Essential hypertension    4. Hyperlipidemia, mixed    5. Ischemic cardiomyopathy    6. Nonrheumatic aortic valve insufficiency    7. Nonrheumatic mitral valve regurgitation    8. Presence of automatic implantable cardioverter-defibrillator    9. Valvular heart disease                Objective:    Vitals:  Vitals:    11/20/24 1435   BP: 154/85   BP Location: Left arm   Patient Position: Sitting   Cuff Size: Adult   Pulse: 80   SpO2: 97%   Weight: 93.5 kg (206 lb 3.2 oz)   Height: 185.4 cm (73\")           Body mass index is 27.2 kg/m².      Physical Exam:    General: Alert, cooperative, no distress, appears stated age.  Ambulates with 2 canes.  Head:  Normocephalic, atraumatic, mucous membranes moist  Eyes:  Conjunctiva/corneas clear, EOM's intact     Neck:  Supple,  no bruit    Lungs: Clear to auscultation bilaterally, no wheezes rhonchi rales are noted  Chest wall: No tenderness  Heart::  Regular rate and rhythm, S1 and S2 normal, 1/6 holosystolic murmur.  No rub or gallop  Abdomen: Soft, non-tender, nondistended bowel sounds active  Extremities: No cyanosis, clubbing left lower extremity edema  Pulses: Diminished pedal pulses  Skin:  No rashes or lesions  Neuro/psych: A&O x3. CN II through XII are grossly intact with appropriate affect.  Poor ambulatory " status.  Ambulates with 2 canes.      Allergies:  Allergies   Allergen Reactions    Lovastatin Myalgia    Pravastatin Myalgia and Unknown (See Comments)     unknown    Simvastatin Unknown (See Comments) and Myalgia     unknown    Spironolactone Other (See Comments)     Gynecomastia        Medication Review:     Current Outpatient Medications:     aspirin 81 MG EC tablet, Take 1 tablet by mouth Daily., Disp: , Rfl:     Cholecalciferol 10 MCG (400 UNIT) tablet, Take 2 tablets by mouth Daily., Disp: , Rfl:     clopidogrel (PLAVIX) 75 MG tablet, Take 1 tablet by mouth Daily., Disp: 90 tablet, Rfl: 1    dapagliflozin Propanediol (Farxiga) 10 MG tablet, Take 10 mg by mouth Daily. [facility], Disp: , Rfl:     Diclofenac Sodium (VOLTAREN) 1 % gel gel, Apply 4 g topically to the appropriate area as directed 4 (Four) Times a Day As Needed., Disp: , Rfl:     ezetimibe (ZETIA) 10 MG tablet, Take 1 tablet by mouth Every Evening., Disp: , Rfl:     furosemide (LASIX) 20 MG tablet, Take 1 tablet by mouth Daily., Disp: , Rfl:     gabapentin (NEURONTIN) 800 MG tablet, Take 1 tablet by mouth Every 6 (Six) Hours., Disp: , Rfl:     meclizine (ANTIVERT) 25 MG tablet, Take 1 tablet by mouth Every 6 (Six) Hours As Needed for Dizziness., Disp: , Rfl:     methocarbamol (ROBAXIN) 500 MG tablet, Take 1 tablet by mouth 3 (Three) Times a Day., Disp: , Rfl:     metoprolol succinate XL (TOPROL-XL) 100 MG 24 hr tablet, Take 0.5 tablets by mouth 2 (Two) Times a Day., Disp: , Rfl:     mexiletine (MEXITIL) 150 MG capsule, Take 1 capsule by mouth 2 (Two) Times a Day., Disp: 60 capsule, Rfl: 2    midodrine (PROAMATINE) 5 MG tablet, Take 1 tablet by mouth 3 (Three) Times a Day Before Meals., Disp: , Rfl:     nitroglycerin (NITROSTAT) 0.4 MG SL tablet, Place 1 tablet under the tongue Every 5 (Five) Minutes As Needed for Chest Pain., Disp: , Rfl:     Omega-3 Fatty Acids (fish oil) 1000 MG capsule capsule, Take 2 capsules by mouth 2 (Two) Times a Day  With Meals., Disp: , Rfl:     pantoprazole (PROTONIX) 40 MG EC tablet, Take 1 tablet by mouth Daily., Disp: , Rfl:     potassium chloride (MICRO-K) 10 MEQ CR capsule, Take 1 capsule by mouth Daily., Disp: , Rfl:     ranolazine (Ranexa) 500 MG 12 hr tablet, Take 1 tablet by mouth 2 (Two) Times a Day. Indications: Stable Angina Pectoris, Disp: 60 tablet, Rfl: 5    sennosides-docusate (PERICOLACE) 8.6-50 MG per tablet, Take 1 tablet by mouth Daily., Disp: , Rfl:     sucralfate (CARAFATE) 1 g tablet, Take 1 tablet by mouth 4 (Four) Times a Day., Disp: , Rfl:     Family History:  Family History   Problem Relation Age of Onset    Pancreatic cancer Mother     Heart disease Father        Past Medical History:  Past Medical History:   Diagnosis Date    Aneurysm     Cardiomyopathy     ICD implantation    Cellulitis of left lower extremity 2010    recurrent    CHD (coronary heart disease)     S/P CABG & PCI    Dyslipidemia     Heart valve disease     History of ventricular tachycardia     Hypertension     Myocardial infarction     Inferior MI    Pinched nerve     L4-L5    Prostate cancer        Past Surgical History:  Past Surgical History:   Procedure Laterality Date    ANGIOPLASTY  2000 04/1996 Stent: Palmaz- Huy stent/LAD 07/1996-RCA: 2000-Tetra stent Guidant to proximal RCA, mid to distal artery 2 sequential Guidant Tetra stents    APPENDECTOMY      CARDIAC ABLATION  April and May 2019    CARDIAC CATHETERIZATION  07/2017    1996 x2, Nov. 2000, 05/2010-cath 08/2015-no stents 2017    CARDIAC CATHETERIZATION N/A 11/13/2024    Procedure: LEFT HEART CATH with possible PCI;  Surgeon: Marcin Childers DO;  Location: [facility] CATH INVASIVE [facility];  Service: Cardiovascular;  Laterality: N/A;  Local and IV sedation    CARDIAC DEFIBRILLATOR PLACEMENT      COLONOSCOPY W/ POLYPECTOMY      Mult colonoscopy's for polyp resection     CORONARY ARTERY BYPASS GRAFT  05/2010    x5 vessel: LMA to diagonal, LAD, intermedius,  obtuse marginal, RCA    CORONARY STENT PLACEMENT      ENDOSCOPY N/A 2019    Procedure: ESOPHAGOGASTRODUODENOSCOPY with dilitation Bougie 50, 54;  Surgeon: Roddy Coronel MD;  Location: [facility] END[facility];  Service: Gastroenterology    INSERT / REPLACE / REMOVE PACEMAKER      KNEE OPEN LATERAL RELEASE Left     reduction    OTHER SURGICAL HISTORY  2018    ICD implantation    PROSTATE SURGERY      Robiotic surgery- Cyber Knife:       Social History:  Social History     Socioeconomic History    Marital status:    Tobacco Use    Smoking status: Former     Current packs/day: 0.00     Average packs/day: 1 pack/day for 17.0 years (17.0 ttl pk-yrs)     Types: Cigarettes     Start date: 1985     Quit date: 2002     Years since quittin.3     Passive exposure: Past    Smokeless tobacco: Never   Vaping Use    Vaping status: Never Used   Substance and Sexual Activity    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 The pertinent cardiovascular findings are reported above with all other cardiovascular points within those systems being negative.    Diagnostic Study Review:     Current Electrocardiogram:  Procedures no new EKG.  EKG dated 2024 demonstrates sinus rhythm with a ventricular rate of 66 bpm.    Laboratory Data:  Lab Results   Component Value Date    GLUCOSE 131 (H) 2024    BUN 15 2024    CREATININE 1.04 2024    EGFRIFNONA 100 2021    BCR 14.4 2024    K 4.6 2024    CO2 26.2 2024    CALCIUM 9.1 2024    ALBUMIN 3.9 10/29/2024    LABIL2 1.1 2019    AST 18 10/29/2024    ALT 17 10/29/2024     Lab  Results   Component Value Date    GLUCOSE 131 (H) 11/14/2024    CALCIUM 9.1 11/14/2024     11/14/2024    K 4.6 11/14/2024    CO2 26.2 11/14/2024     11/14/2024    BUN 15 11/14/2024    CREATININE 1.04 11/14/2024    EGFRIFNONA 100 12/01/2021    BCR 14.4 11/14/2024    ANIONGAP 8.8 11/14/2024     Lab Results   Component Value Date    WBC 5.62 11/14/2024    HGB 13.8 11/14/2024    HCT 43.8 11/14/2024    MCV 94.0 11/14/2024     11/14/2024     Lab Results   Component Value Date    CHOL 168 11/13/2024    TRIG 109 11/13/2024    HDL 51 11/13/2024    LDL 97 11/13/2024     Lab Results   Component Value Date    HGBA1C 6.78 (H) 10/06/2024     Lab Results   Component Value Date    INR 1.05 (L) 11/13/2024    INR 0.99 09/10/2024    INR 1.01 10/04/2023    PROTIME 13.8 (L) 11/13/2024    PROTIME 10.8 09/10/2024    PROTIME 11.0 10/04/2023       Most Recent Echo:  Results for orders placed during the hospital encounter of 12/26/23    Adult Transthoracic Echo Complete W/ Cont if Necessary Per Protocol    Interpretation Summary    Left ventricular systolic function is severely decreased. Left ventricular ejection fraction appears to be 21 - 25%.    The left ventricular cavity is severely dilated.    Left ventricular diastolic function is consistent with (grade I) impaired relaxation.    The left atrial cavity is moderately dilated.    Abnormal mitral valve structure consistent with dilated annulus.    Moderate dilation of the ascending aorta is present.    Ascending aorta = 4.7 cm       Most Recent Stress Test:  Results for orders placed during the hospital encounter of 09/10/24    Stress Test With Myocardial Perfusion One Day    Interpretation Summary    Lexiscan myocardial perfusion study shows a large area of severe intensity mostly fixed perfusion defect involving the basal and mid inferior inferolateral wall inferoseptal wall and also apical wall with no significant reversible ischemia    Left ventricular ejection  "fraction is moderately reduced (Calculated EF = 26%).    Abnormal LV wall motion consistent with severe hypokinesis of the inferior and septal wall.    Impressions are consistent with an intermediate risk study.       Most Recent Cardiac Catheterization:   No results found for this or any previous visit.       NOTE:     Today,11/20/2024 ,the following portions of the patient's note were reviewed, confirmed and/or updated as appropriate: History of present illness/Interval history, physical examination, assessment & plan, allergies, current medications, past family history, past medical history, past social history, past surgical history and problem list.    Labs pertinent to today's visit on 11/20/2024 (including but not limited to CBC, CMP, and lipid profiles) were requested from the patient's primary care provider/hospital/clinical laboratory.  If the labs were available for the visit, they were reviewed with the patient.  If they were not available, when received, special interest will be made to the labs pertinent to this visit.  The patient's most recent \"in-house\" labs are noted below and have been reviewed.  Outside labs pertinent to this visit are scanned into the record and have been reviewed.    Discussions held today, 11/20/2024,regarding procedures included risk, benefits, and options including but not limited to: Death, MI, stroke, pain, bleeding, infection, and possible need for vascular/thoracic/cardiothoracic surgery.    Copied information within this note was reviewed and is current as of 11/20/2024.    Assessment and plan noted herein represents the current plan of care as of 11/20/2024.    Significant resources from our office and staff are inherent in engaging this patient today,11/20/2024,and in a continuous and active collaborative plan of care related to their chronic cardiovascular conditions outlined herein.  The management of these conditions requires the direction of our service with " specialized clinical knowledge, skills, and experience.  This collaborative care includes but is not limited to patient education, expectations and responsibilities, shared decision making around therapeutic goals, and shared commitments to achieve those goals.

## 2024-12-02 PROCEDURE — 93296 REM INTERROG EVL PM/IDS: CPT | Performed by: INTERNAL MEDICINE

## 2024-12-02 PROCEDURE — 93295 DEV INTERROG REMOTE 1/2/MLT: CPT | Performed by: INTERNAL MEDICINE

## 2024-12-16 ENCOUNTER — OFFICE VISIT (OUTPATIENT)
Dept: CARDIOLOGY | Facility: CLINIC | Age: 84
End: 2024-12-16
Payer: OTHER GOVERNMENT

## 2024-12-16 VITALS
WEIGHT: 200 LBS | BODY MASS INDEX: 26.51 KG/M2 | OXYGEN SATURATION: 94 % | HEIGHT: 73 IN | SYSTOLIC BLOOD PRESSURE: 128 MMHG | HEART RATE: 92 BPM | DIASTOLIC BLOOD PRESSURE: 70 MMHG

## 2024-12-16 DIAGNOSIS — I25.5 ISCHEMIC CARDIOMYOPATHY: ICD-10-CM

## 2024-12-16 DIAGNOSIS — I47.29 PAROXYSMAL VENTRICULAR TACHYCARDIA: ICD-10-CM

## 2024-12-16 DIAGNOSIS — Z95.810 PRESENCE OF AUTOMATIC IMPLANTABLE CARDIOVERTER-DEFIBRILLATOR: ICD-10-CM

## 2024-12-16 DIAGNOSIS — I10 ESSENTIAL HYPERTENSION: ICD-10-CM

## 2024-12-16 DIAGNOSIS — I25.10 CORONARY ARTERY DISEASE INVOLVING NATIVE CORONARY ARTERY OF NATIVE HEART WITHOUT ANGINA PECTORIS: ICD-10-CM

## 2024-12-16 DIAGNOSIS — I71.21 ANEURYSM OF ASCENDING AORTA WITHOUT RUPTURE: Primary | ICD-10-CM

## 2024-12-16 PROCEDURE — 99214 OFFICE O/P EST MOD 30 MIN: CPT | Performed by: INTERNAL MEDICINE

## 2024-12-16 RX ORDER — MEXILETINE HYDROCHLORIDE 150 MG/1
150 CAPSULE ORAL 2 TIMES DAILY
Qty: 60 CAPSULE | Refills: 0 | Status: SHIPPED | OUTPATIENT
Start: 2024-12-16 | End: 2024-12-16 | Stop reason: SDUPTHER

## 2024-12-16 RX ORDER — MEXILETINE HYDROCHLORIDE 150 MG/1
150 CAPSULE ORAL 2 TIMES DAILY
Qty: 60 CAPSULE | Refills: 5 | Status: SHIPPED | OUTPATIENT
Start: 2024-12-16

## 2024-12-16 NOTE — TELEPHONE ENCOUNTER
Caller: Deion Nicholas    Relationship: Self    Best call back number: [phone]    Requested Prescriptions:   Requested Prescriptions     Pending Prescriptions Disp Refills    mexiletine (MEXITIL) 150 MG capsule 60 capsule 2     Sig: Take 1 capsule by mouth 2 (Two) Times a Day.        Pharmacy where request should be sent: [id number] [address] AT [address] [phone] [facility] [phone]      Last office visit with prescribing clinician: Visit date not found   Last telemedicine visit with prescribing clinician: Visit date not found   Next office visit with prescribing clinician: Visit date not found         Does the patient have less than a 3 day supply:  [] Yes  [x] No    Would you like a call back once the refill request has been completed: [] Yes [] No    If the office needs to give you a call back, can they leave a voicemail: [] Yes [] No    Yvon Higgins Rep   12/16/24 10:08 EST

## 2024-12-16 NOTE — PROGRESS NOTES
CC--Recurrent VT, ischemic cardiomyopathy      Sub  84-year-old pleasant patient had recurrent VT and underwent cardiac catheterization and cardiac catheterization results available below  Successful PCI GE LAD via the vein graft  Residual 99% LAD in the retrograde limb  Residual 99% obtuse marginal branch of circumflex and retrograde limb  Patent LIMA-DG  Patent SVG-RCA  Patent SVG-OM  Patent SVG-LAD  Patent SVG-DG    Patient had recurrent VT and henceforth was subjected to cardiac catheterization and comes in for follow-up.  No recurrent VT since PCI.  Patient had recurrent VT which was refractory and underwent ablation back in 2019  Patient also had atrial fibrillation secondary to hyperthyroidism resolved after treatment of thyroid disorder  Patient had history of prior bypass surgery and history of recurrent cellulitis in the lower extremities and history of orthostatic hypotension in the past.  Patient is intolerant to amiodarone.        My previous history is attached below which is for reference only and has been reviewed        Sub--Pt here for follow up and he had prior history of incessant VT needing and ablation several months ago .  patient started having recurrent VT needing a redo VT ablation  and post ablation a week later developed dysphagia and needed endoscopy the patient underwent esophageal stricture dilatation with improvement of symptoms and resolution of dysphagia .Patient is on amiodarone because of extensive scarring and multiple episodes of VT and since ablation without recurrent ICD therapy--patient has history of chronic ischemic heart disease previous myocardial infarction, s/p previous CABG,  LV systolic dysfunction with   Last  LV ejection fraction of 25 30% which came up to 30- 35% by latest echocardiogram.   He is  status post ICD implant.  denies any chest pain or shortness of breath or syncope--patient had prior ACE inhibitor induced cough and currently is on Aldactone .Patient  denies any new symptoms of VT or syncope and is compliant with current medical regimen  He also developed some right shoulder pain being [facility] and uses Biofreeze for relief of symptoms   He denies any palpitations or syncope or any chest pain or dyspnea.  Patient could not tolerate additional beta-blockers on top of amiodarone in the past  Patient has noticed photosensitivity of his skin in the past and amiodarone dose reduced to 100 mg a day and his symptoms have reduced and comes in for follow-up   Patient has noticed some burning sensation in lower extremities and was seen at [facility] on last clinic prescription for gabapentin and he also complains of some calf pain when he is ambulating with some blocks gets better after increased activity and it is not nocturnal in nature--arterial duplex scan without any significant abnormalities  He also has history of orthostatic hypotension started on midodrine and complains of intermittent headache  Patient is doing clinically well from cardiac standpoint of view  Complains of recurrent redness in the left lower extremity with tenderness and warmth around the ankle joint and is being treated with antibiotics and has been to [facility] twice--is being evaluated by vascular surgery and infectious disease specialist and he had his symptoms since 2010 with increasing frequency recently.    Since last seen patient had ICD shock therapy for conducted atrial fibrillation and was brought in back for evaluation  Atrial fibrillation was not seen with prior interrogation of his device and ICD was reprogrammed on recommendation to avoid unnecessary therapy for conducted atrial fibrillation  Patient complains of diffuse subcutaneous bleeding with dual antiplatelet agents and also complains of weight loss and is being [facility]  Patient was noted to have markedly abnormal thyroid function test consistent with hyperthyroidism and immediately  amiodarone was stopped and started on beta-blockers and off Eliquis         Past Medical History:     Reviewed history from 03/13/2018 and no changes required:        Coronary Heart Disease:S/P CABG and PCI         Hypertension        Myocardial Infarction: Inferior MI         Hx: Cellulitis of left lower leg        Dyslipidemia         Pinched nerve L4-L5         Prostate cancer         Cardiomyopathy : ICD implantation         Physical Exam    General:      well developed, well nourished, in no acute distress.    Head:      normocephalic and atraumatic.    Eyes:      PERRL/EOM intact, conjunctivae and sclerae clear without nystagmus.    Neck:      no  thyromegaly, trachea central with normal respiratory effort  Lungs:      clear bilaterally to auscultation.    Heart:       regular rate and rhythm, S1, S2 without murmurs, rubs, or gallops  Skin:      intact without lesions or rashes.    Psych:      alert and cooperative; normal mood and affect; normal attention span and concentration.             assessment plan    Progressive  coronary artery disease needing PCI doing clinically well and patient currently on aspirin and Plavix  ICD in situ with normal function without recurrent ventricular arrhythmias  Recurrent VT likely from progressive ischemia without clinical recurrence  Patient currently on mexiletine  Ischemic cardiomyopathy currently on Farxiga/Lasix/metoprolol  History of orthostatic hypotension on midodrine and intolerant to ACE inhibitors in the past  Remote history of atrial fibrillation secondary to hyperthyroidism resolved after resolution of hyperthyroid state  Ascending aortic aneurysm measuring 4.5 to 4.6 cm  Patient allergic to spironolactone and intolerant to amiodarone in the past  Medications reviewed and follow-up appointments made    Mexitil refilled      Electronically signed by Constantino Arvizu MD, 12/16/24, 2:26 PM EST.

## 2025-01-24 ENCOUNTER — TELEPHONE (OUTPATIENT)
Dept: CARDIOLOGY | Facility: CLINIC | Age: 85
End: 2025-01-24

## 2025-01-24 RX ORDER — SPIRONOLACTONE 25 MG/1
25 TABLET ORAL DAILY
COMMUNITY

## 2025-01-24 NOTE — TELEPHONE ENCOUNTER
Caller: Deion Nicholas    Relationship to patient: Self    Best call back number: [phone]     Patient is needing: PT HAS A FEW QUESTIONS IN REGARDS TO MEDICATION- PT IS TAKING 4 MEDICATIONS FOR BP/HEART. [facility]ID [facility] WAS [facility]. PT STATED NO ACTIVE SYMPTOMS AT THE MOMENT BUT BEFORE HE TAKES THIS NEW MEDICATION HE WANTED TO SPEAK TO DR MUÑOZ'S NURSE AND SEE WHAT THEY ADVISE.

## 2025-02-11 RX ORDER — CLOPIDOGREL BISULFATE 75 MG/1
75 TABLET ORAL DAILY
Qty: 90 TABLET | Refills: 1 | Status: SHIPPED | OUTPATIENT
Start: 2025-02-11

## 2025-02-12 RX ORDER — MEXILETINE HYDROCHLORIDE 150 MG/1
150 CAPSULE ORAL 2 TIMES DAILY
Qty: 60 CAPSULE | Refills: 5 | OUTPATIENT
Start: 2025-02-12

## 2025-02-12 NOTE — TELEPHONE ENCOUNTER
I spoke with Leigh Ann yesterday- he still has 7 refills on this at the pharmacy- I confirmed with them again today.

## 2025-02-12 NOTE — TELEPHONE ENCOUNTER
Caller: Deion Nicholas    Relationship: Self    Best call back number: [phone]     Requested Prescriptions:   Requested Prescriptions     Pending Prescriptions Disp Refills    mexiletine (MEXITIL) 150 MG capsule 60 capsule 5     Sig: Take 1 capsule by mouth 2 (Two) Times a Day.        Pharmacy where request should be sent: [id number] [address] AT [address] [phone] [facility] [phone]      Last office visit with prescribing clinician: Visit date not found   Last telemedicine visit with prescribing clinician: Visit date not found   Next office visit with prescribing clinician: Visit date not found     Additional details provided by patient: PT CALLING IN AS HE IS DOWN TO TWO TABLETS AND REQUESTING REFILLS FOR 60 OR 90 DAYS -     Does the patient have less than a 3 day supply:  [x] Yes  [] No    Yvon Franco Rep   02/12/25 11:42 EST

## 2025-02-14 ENCOUNTER — HOSPITAL ENCOUNTER (OUTPATIENT)
Dept: CT IMAGING | Facility: HOSPITAL | Age: 85
Discharge: HOME | End: 2025-02-14
Payer: MEDICARE

## 2025-02-14 ENCOUNTER — HOSPITAL ENCOUNTER (OUTPATIENT)
Dept: CARDIOLOGY | Facility: HOSPITAL | Age: 85
Discharge: HOME | End: 2025-02-14
Payer: MEDICARE

## 2025-02-14 VITALS
DIASTOLIC BLOOD PRESSURE: 50 MMHG | WEIGHT: 200 LBS | HEIGHT: 73 IN | SYSTOLIC BLOOD PRESSURE: 132 MMHG | BODY MASS INDEX: 26.51 KG/M2

## 2025-02-14 DIAGNOSIS — I34.0 NONRHEUMATIC MITRAL VALVE REGURGITATION: ICD-10-CM

## 2025-02-14 DIAGNOSIS — I35.1 NONRHEUMATIC AORTIC VALVE INSUFFICIENCY: ICD-10-CM

## 2025-02-14 DIAGNOSIS — I71.21 ANEURYSM OF ASCENDING AORTA WITHOUT RUPTURE: ICD-10-CM

## 2025-02-14 PROCEDURE — 25510000001 IOPAMIDOL PER 1 ML: Performed by: THORACIC SURGERY (CARDIOTHORACIC VASCULAR SURGERY)

## 2025-02-14 PROCEDURE — 93306 TTE W/DOPPLER COMPLETE: CPT

## 2025-02-14 PROCEDURE — 71275 CT ANGIOGRAPHY CHEST: CPT

## 2025-02-14 RX ORDER — IOPAMIDOL 755 MG/ML
100 INJECTION, SOLUTION INTRAVASCULAR
Status: COMPLETED | OUTPATIENT
Start: 2025-02-14 | End: 2025-02-14

## 2025-02-14 RX ADMIN — IOPAMIDOL 100 ML: 755 INJECTION, SOLUTION INTRAVENOUS at 11:02

## 2025-02-17 ENCOUNTER — HOSPITAL ENCOUNTER (OUTPATIENT)
Facility: HOSPITAL | Age: 85
Discharge: HOME | End: 2025-02-17
Attending: EMERGENCY MEDICINE | Admitting: EMERGENCY MEDICINE
Payer: OTHER GOVERNMENT

## 2025-02-17 VITALS
HEIGHT: 73 IN | TEMPERATURE: 97.9 F | WEIGHT: 202 LBS | DIASTOLIC BLOOD PRESSURE: 58 MMHG | RESPIRATION RATE: 18 BRPM | OXYGEN SATURATION: 97 % | BODY MASS INDEX: 26.77 KG/M2 | HEART RATE: 72 BPM | SYSTOLIC BLOOD PRESSURE: 125 MMHG

## 2025-02-17 DIAGNOSIS — Z51.89 VISIT FOR WOUND CHECK: Primary | ICD-10-CM

## 2025-02-17 LAB
AORTIC DIMENSIONLESS INDEX: 0.69 (DI)
ASCENDING AORTA: 5 CM
AV MEAN PRESS GRAD SYS DOP V1V2: 2 MMHG
AV VMAX SYS DOP: 95.2 CM/SEC
BH CV ECHO MEAS - ACS: 2.1 CM
BH CV ECHO MEAS - AI P1/2T: 529.1 MSEC
BH CV ECHO MEAS - AO MAX PG: 3.6 MMHG
BH CV ECHO MEAS - AO V2 VTI: 18.6 CM
BH CV ECHO MEAS - AVA(I,D): 3.3 CM2
BH CV ECHO MEAS - EDV(CUBED): 205.4 ML
BH CV ECHO MEAS - EDV(MOD-SP4): 273 ML
BH CV ECHO MEAS - EF(MOD-SP4): 20.1 %
BH CV ECHO MEAS - ESV(CUBED): 148.9 ML
BH CV ECHO MEAS - ESV(MOD-SP4): 218 ML
BH CV ECHO MEAS - FS: 10.2 %
BH CV ECHO MEAS - IVS/LVPW: 1 CM
BH CV ECHO MEAS - IVSD: 1.1 CM
BH CV ECHO MEAS - LA DIMENSION: 4.2 CM
BH CV ECHO MEAS - LAT PEAK E' VEL: 6 CM/SEC
BH CV ECHO MEAS - LV DIASTOLIC VOL/BSA (35-75): 128.1 CM2
BH CV ECHO MEAS - LV MASS(C)D: 271.9 GRAMS
BH CV ECHO MEAS - LV MAX PG: 1.74 MMHG
BH CV ECHO MEAS - LV MEAN PG: 1 MMHG
BH CV ECHO MEAS - LV SYSTOLIC VOL/BSA (12-30): 102.3 CM2
BH CV ECHO MEAS - LV V1 MAX: 66 CM/SEC
BH CV ECHO MEAS - LV V1 VTI: 13.4 CM
BH CV ECHO MEAS - LVIDD: 5.9 CM
BH CV ECHO MEAS - LVIDS: 5.3 CM
BH CV ECHO MEAS - LVOT AREA: 4.5 CM2
BH CV ECHO MEAS - LVOT DIAM: 2.4 CM
BH CV ECHO MEAS - LVPWD: 1.1 CM
BH CV ECHO MEAS - MED PEAK E' VEL: 2.39 CM/SEC
BH CV ECHO MEAS - MV A MAX VEL: 94.9 CM/SEC
BH CV ECHO MEAS - MV DEC SLOPE: 310 CM/SEC2
BH CV ECHO MEAS - MV DEC TIME: 0.2 SEC
BH CV ECHO MEAS - MV E MAX VEL: 40.9 CM/SEC
BH CV ECHO MEAS - MV E/A: 0.43
BH CV ECHO MEAS - MV MAX PG: 3.2 MMHG
BH CV ECHO MEAS - MV MEAN PG: 2 MMHG
BH CV ECHO MEAS - MV P1/2T: 47.4 MSEC
BH CV ECHO MEAS - MV V2 VTI: 19 CM
BH CV ECHO MEAS - MVA(P1/2T): 4.6 CM2
BH CV ECHO MEAS - MVA(VTI): 3.2 CM2
BH CV ECHO MEAS - PA ACC TIME: 0.09 SEC
BH CV ECHO MEAS - PA V2 MAX: 89.6 CM/SEC
BH CV ECHO MEAS - RV MAX PG: 2.35 MMHG
BH CV ECHO MEAS - RV V1 MAX: 76.6 CM/SEC
BH CV ECHO MEAS - RV V1 VTI: 14.7 CM
BH CV ECHO MEAS - SV(LVOT): 60.6 ML
BH CV ECHO MEAS - SV(MOD-SP4): 55 ML
BH CV ECHO MEAS - SVI(LVOT): 28.5 ML/M2
BH CV ECHO MEAS - SVI(MOD-SP4): 25.8 ML/M2
BH CV ECHO MEAS - TAPSE (>1.6): 1.28 CM
BH CV ECHO MEASUREMENTS AVERAGE E/E' RATIO: 9.75
BH CV XLRA - TDI S': 8.3 CM/SEC
LV EF BIPLANE MOD: 20 %
SINUS: 4.2 CM
STJ: 3.3 CM

## 2025-02-17 PROCEDURE — 25510000001 IOPAMIDOL PER 1 ML: Performed by: THORACIC SURGERY (CARDIOTHORACIC VASCULAR SURGERY)

## 2025-02-17 PROCEDURE — G0463 HOSPITAL OUTPT CLINIC VISIT: HCPCS

## 2025-02-17 RX ORDER — IOPAMIDOL 755 MG/ML
100 INJECTION, SOLUTION INTRAVASCULAR
Status: COMPLETED | OUTPATIENT
Start: 2025-02-17 | End: 2025-02-17

## 2025-02-17 RX ORDER — DIAPER,BRIEF,INFANT-TODD,DISP
1 EACH MISCELLANEOUS ONCE
Status: COMPLETED | OUTPATIENT
Start: 2025-02-17 | End: 2025-02-17

## 2025-02-17 RX ORDER — CEPHALEXIN 500 MG/1
500 CAPSULE ORAL ONCE
Status: COMPLETED | OUTPATIENT
Start: 2025-02-17 | End: 2025-02-17

## 2025-02-17 RX ORDER — CEPHALEXIN 500 MG/1
500 CAPSULE ORAL 4 TIMES DAILY
Qty: 28 CAPSULE | Refills: 0 | Status: SHIPPED | OUTPATIENT
Start: 2025-02-17 | End: 2025-02-24

## 2025-02-17 RX ADMIN — CEPHALEXIN 500 MG: 500 CAPSULE ORAL at 17:28

## 2025-02-17 RX ADMIN — BACITRACIN 0.9 G: 500 OINTMENT TOPICAL at 17:28

## 2025-02-17 RX ADMIN — IOPAMIDOL 100 ML: 755 INJECTION, SOLUTION INTRAVENOUS at 08:25

## 2025-02-17 NOTE — DISCHARGE INSTRUCTIONS
Take antibiotics as prescribed.    Call and schedule follow-up appointment with wound clinic.    Keep the wound clean with warm soapy water.    Call and schedule follow-up appointment with your primary care provider    Return to the ER with any new or worsening symptoms.

## 2025-02-17 NOTE — FSED PROVIDER NOTE
Subjective   History of Present Illness  Patient is an 84-year-old male with history of cellulitis, CHD, dyslipidemia, hypertension who presents today with a wound to his left leg.  He reports he was pulling up his compression stockings when he noticed his leg was bleed. He reports he takes aspirin and Plavix.  He denies numbness, tingling, fever, or nausea vomiting.        Review of Systems   Skin:  Positive for wound.   All other systems reviewed and are negative.      Past Medical History:   Diagnosis Date    Aneurysm     Cardiomyopathy     ICD implantation    Cellulitis of left lower extremity 2010    recurrent    CHD (coronary heart disease)     S/P CABG & PCI    Dyslipidemia     Heart valve disease     History of ventricular tachycardia     Hypertension     Myocardial infarction     Inferior MI    Pinched nerve     L4-L5    Prostate cancer        Allergies   Allergen Reactions    Lovastatin Myalgia    Pravastatin Myalgia and Unknown (See Comments)     unknown    Simvastatin Unknown (See Comments) and Myalgia     unknown    Spironolactone Other (See Comments)     Gynecomastia        Past Surgical History:   Procedure Laterality Date    ANGIOPLASTY  2000 04/1996 Stent: Palmaz- Huy stent/LAD 07/1996-RCA: 2000-Tetra stent Guidant to proximal RCA, mid to distal artery 2 sequential Guidant Tetra stents    APPENDECTOMY      CARDIAC ABLATION  April and May 2019    CARDIAC CATHETERIZATION  07/2017    1996 x2, Nov. 2000, 05/2010-cath 08/2015-no stents 2017    CARDIAC CATHETERIZATION N/A 11/13/2024    Procedure: LEFT HEART CATH with possible PCI;  Surgeon: Marcin Childers DO;  Location: [facility] INVASIVE LOCATION;  Service: Cardiovascular;  Laterality: N/A;  Local and IV sedation    CARDIAC DEFIBRILLATOR PLACEMENT      COLONOSCOPY W/ POLYPECTOMY      Mult colonoscopy's for polyp resection     CORONARY ARTERY BYPASS GRAFT  05/2010    x5 vessel: LMA to diagonal, LAD, intermedius, obtuse marginal, RCA     CORONARY STENT PLACEMENT      ENDOSCOPY N/A 2019    Procedure: ESOPHAGOGASTRODUODENOSCOPY with dilitation Bougie 50, 54;  Surgeon: Roddy Coronel MD;  Location: [facility] ENDOSCOPY;  Service: Gastroenterology    INSERT / REPLACE / REMOVE PACEMAKER      KNEE OPEN LATERAL RELEASE Left     reduction    OTHER SURGICAL HISTORY  2018    ICD implantation    PROSTATE SURGERY  2015    Robiotic surgery- Cyber Knife:       Family History   Problem Relation Age of Onset    Pancreatic cancer Mother     Heart disease Father        Social History     Socioeconomic History    Marital status:    Tobacco Use    Smoking status: Former     Current packs/day: 0.00     Average packs/day: 1 pack/day for 17.0 years (17.0 ttl pk-yrs)     Types: Cigarettes     Start date: 1985     Quit date: 2002     Years since quittin.6     Passive exposure: Past    Smokeless tobacco: Never   Vaping Use    Vaping status: Never Used   Substance and Sexual Activity    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   Constitutional:       General: He is not in acute distress.     Appearance: Normal appearance. He is normal weight. He is not ill-appearing, toxic-appearing or diaphoretic.   HENT:      Head: Normocephalic.      Nose: Nose normal.      Mouth/Throat:      Mouth: Mucous membranes are moist.   Eyes:      Conjunctiva/sclera: Conjunctivae normal.      Pupils: Pupils are equal, round, and reactive to light.   Cardiovascular:      Rate and Rhythm: Normal rate and regular rhythm.   Pulmonary:      Effort: Pulmonary effort is normal.      Breath sounds: Normal breath sounds.   Musculoskeletal:         General: Normal range of motion.      Cervical back: Normal range of motion.   Skin:     General: Skin is warm and dry.      Capillary Refill: Capillary refill takes less than 2 seconds.    Neurological:      General: No focal deficit present.      Mental Status: He is alert.   Psychiatric:         Mood and Affect: Mood normal.         Behavior: Behavior normal.         Procedures           ED Course                                           Medical Decision Making  Patient is an 84-year-old male with history of cellulitis, CHD, dyslipidemia, hypertension who presents today with a wound to his left leg.  He reports he was pulling up his compression stockings when he noticed his leg was bleed. He reports he takes aspirin and Plavix.  He denies numbness, tingling, fever, or nausea vomiting.    Upon exam patient is awake and alert, nontoxic-appearing, appears in no acute distress, and is answering questions appropriately.  Lung sounds are clear and equal bilaterally.  Heart is normal rate and rhythm.  Mucous membranes are moist.  Patient has 2 small abrasions to his left upper calf.  Patient reports that when he was removing his compression stockings he noticed that his leg was bleeding.  Bleeding is currently controlled.  The wound does not appear infected at this time.  Wound care was performed, and I started him on Keflex per his request.  I advised him to keep the wound clean and take antibiotics as prescribed.  I provided him the number to wound care, and advised him to call and schedule follow-up appointment.  Advised to return to the ER with any new or worsening symptoms.    Risk  OTC drugs.  Prescription drug management.        Final diagnoses:   Visit for wound check       ED Disposition  ED Disposition       ED Disposition   Discharge    Condition   Stable    Comment   --               PATIENT CONNECTION - [facility] [address] [phone]  Schedule an appointment as soon as possible for a visit       [facility]  [facility] [address] [phone]  Schedule an appointment as soon as possible for a visit            Medication  List        New Prescriptions      cephalexin 500 MG capsule  Commonly known as: KEFLEX  Take 1 capsule by mouth 4 (Four) Times a Day for 7 days.               Where to Get Your Medications        [id number] - [place], [place] - [address] AT [address] [place] - [phone] [facility] [phone]   [address] [place] [address]      Phone: [phone]   cephalexin 500 MG capsule

## 2025-02-18 ENCOUNTER — TELEPHONE (OUTPATIENT)
Dept: CARDIOLOGY | Facility: CLINIC | Age: 85
End: 2025-02-18
Payer: OTHER GOVERNMENT

## 2025-02-18 NOTE — TELEPHONE ENCOUNTER
I dont see any results posted from last week; he has an appt next week with Dr Childers- hopefully any results will be sent or posted by then

## 2025-02-18 NOTE — TELEPHONE ENCOUNTER
Caller: Deion Nicholas    Relationship: Self    Best call back number:  [phone]      PATIENT IS REQUESTING A CALL BACK WITH TESTING RESULTS FROM LAST WEEK

## 2025-02-24 NOTE — PROGRESS NOTES
Cardiology Office Visit      Encounter Date:  02/25/2025    Patient ID:   Deion Nicholas is a 84 y.o. male.    Reason For Followup:  Ischemic cardiomyopathy  Coronary artery disease  Hypertension  Hypertensive cardiovascular disease  Amiodarone therapy  Antiplatelet therapy  Hyperlipidemia    Brief Clinical History:  Dear Dr. Melgar, No Known    I had the pleasure of seeing Deion Nicholas today. As you are well aware, this is a 84 y.o. male with an established history of ischemic heart disease.  He has undergone coronary arterial bypass grafting in the past.  He is additional history that includes ischemic cardiomyopathy with most recent ejection fraction of 30 to 35%, ICD implantation, hypertension, hypertensive cardiovascular disease, amiodarone therapy, antiplatelet therapy, and hyperlipidemia.  He presents today for follow-up on the above conditions.     Interval History:  He denies any chest pain pressure heaviness or tightness.  He denies any shortness of breath out of character.  He denies any PND orthopnea.  He denies any syncope or near syncope.  He continues to report unsteady gait.    He continues to report problems with lower extremity edema and recurrent cellulitis.  He has been hospitalized a couple of times for cellulitis.  He has an appointment to see vascular surgery in a couple of weeks.  They will work with him on his lower extremity edema and recurrent cellulitis.    His most recent echocardiogram was performed in May 2023.  Significant left ventricular systolic dysfunction was noted with an ejection fraction of 30 to 35%.  Moderate aortic insufficiency and moderate mitral insufficiency was noted.  I personally reviewed these images and it appears that the AI and MR is likely better characterized as mild to moderate.  Mild dilation of the ascending aorta/aortic root was noted.    01/30/2024    Patient reports legs are getting better. He was placed on a diuretic and is helping quite a bit. He is  going to have cataract surgery. We reviewed his studies. CTA was done in December his ascending aorta was 5 cm. Will refer to aorta clinic.    Patient had a stress and echo done in Dec 23/Froylan 24. Nuclear stress was negative. Echo shows that EF was 20-25%. This represents a decline since 5/23 when it was 30-35%.    05/22/2024        The patient reports some occasional chest discomfort.  He reports no shortness of breath out of character.  He presents today essentially to discuss his aortic aneurysm and the surveillance required.  [facility] were concerned that they were left out of the loop in regards to his follow-up and treatment plan.  I discussed with the patient that given the size of his aneurysm (5 cm) it is not advisable to proceed with repair at this time.  This is particularly true in the absence of any ischemic driven coronary artery disease or significant valvular disease.  He has a surveillance CT scan ordered for the first of 2025 and a follow-up planned shortly thereafter with the aorta clinic.    11/20/2024        He was in the hospital recently and underwent cardiac cath and had PCI of his LAD. He has residual disease of his Cx that is not amenable to stenting. There is the possibility that it could potentially undergo angioplasty. He will think about that and let us know if he would like to proceed.  He has a follow-up with CT surgery in February.  He will schedule a follow-up with us after that.    02/25/2025        He reports that he had some compression socks from [facility]. He scratched his leg taking them off and it got infected. He went to [facility] and they got him some oral antibiotics. His leg is inflamed and erythematous, Not much swelling. He reports that it is burning.     He does have some occasional chest pain. He has some left arm pain as well. He has had a couple of visits and everything has checked out OK.     We discussed what to do with his leg and he will see what it  "looks like tomorrow and will likely go to [facility] tomorrow for IV antibiotics.     He has had some abdominal pain intermittently as well.     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     Recommendations:  Continuation of his current cardiovascular regimen at the present time.     This includes beta-blockers, midodrine, antiplatelet therapy, and anticoagulant therapy.  CT surgery/aorta clinic referral  Follow-up in 6 months time  Follow-up with cardiothoracic surgery as scheduled  Follow-up with EP as scheduled.  Consider ER visit if cellulitis doesn't improve.    Diagnoses and all orders for this visit:    1. Coronary artery disease involving native coronary artery of native heart without angina pectoris (Primary)    2. Aneurysm of ascending aorta without rupture    3. Essential hypertension    4. Hyperlipidemia, mixed    5. Ischemic cardiomyopathy    6. Nonrheumatic aortic valve insufficiency    7. Presence of automatic implantable cardioverter-defibrillator                  Objective:    Vitals:  Vitals:    02/25/25 1418   BP: 147/76   BP Location: Left arm   Patient Position: Sitting   Cuff Size: Adult   Pulse: 84   SpO2: 96%   Weight: 91.6 kg (202 lb)   Height: 185.4 cm (73\")             Body mass index is 26.65 kg/m².      Physical Exam:    General: Alert, cooperative, no distress, appears stated age.  Ambulates with 2 canes.  Head:  Normocephalic, atraumatic, mucous " membranes moist  Eyes:  Conjunctiva/corneas clear, EOM's intact     Neck:  Supple,  no bruit    Lungs: Clear to auscultation bilaterally, no wheezes rhonchi rales are noted  Chest wall: No tenderness  Heart::  Regular rate and rhythm, S1 and S2 normal, 1/6 holosystolic murmur.  No rub or gallop  Abdomen: Soft, non-tender, nondistended bowel sounds active  Extremities: No cyanosis, clubbing left lower extremity edema  Pulses: Diminished pedal pulses  Skin:  No rashes or lesions  Neuro/psych: A&O x3. CN II through XII are grossly intact with appropriate affect.  Poor ambulatory status.  Ambulates with 2 canes.      Allergies:  Allergies   Allergen Reactions    Lovastatin Myalgia    Pravastatin Myalgia and Unknown (See Comments)     unknown    Simvastatin Unknown (See Comments) and Myalgia     unknown    Spironolactone Other (See Comments)     Gynecomastia        Medication Review:     Current Outpatient Medications:     aspirin 81 MG EC tablet, Take 1 tablet by mouth Daily., Disp: , Rfl:     Cholecalciferol 10 MCG (400 UNIT) tablet, Take 2 tablets by mouth Daily., Disp: , Rfl:     clopidogrel (PLAVIX) 75 MG tablet, Take 1 tablet by mouth Daily., Disp: 90 tablet, Rfl: 1    dapagliflozin Propanediol (Farxiga) 10 MG tablet, Take 10 mg by mouth Daily. [facility], Disp: , Rfl:     Diclofenac Sodium (VOLTAREN) 1 % gel gel, Apply 4 g topically to the appropriate area as directed 4 (Four) Times a Day As Needed., Disp: , Rfl:     ezetimibe (ZETIA) 10 MG tablet, Take 1 tablet by mouth Every Evening., Disp: , Rfl:     furosemide (LASIX) 20 MG tablet, Take 1 tablet by mouth Daily., Disp: , Rfl:     gabapentin (NEURONTIN) 800 MG tablet, Take 1 tablet by mouth Every 6 (Six) Hours., Disp: , Rfl:     meclizine (ANTIVERT) 25 MG tablet, Take 1 tablet by mouth Every 6 (Six) Hours As Needed for Dizziness., Disp: , Rfl:     methocarbamol (ROBAXIN) 500 MG tablet, Take 1 tablet by mouth 3 (Three) Times a Day., Disp: , Rfl:     metoprolol  succinate XL (TOPROL-XL) 100 MG 24 hr tablet, Take 0.5 tablets by mouth 2 (Two) Times a Day., Disp: , Rfl:     mexiletine (MEXITIL) 150 MG capsule, Take 1 capsule by mouth 2 (Two) Times a Day., Disp: 60 capsule, Rfl: 5    midodrine (PROAMATINE) 5 MG tablet, Take 1 tablet by mouth 3 (Three) Times a Day Before Meals., Disp: , Rfl:     nitroglycerin (NITROSTAT) 0.4 MG SL tablet, Place 1 tablet under the tongue Every 5 (Five) Minutes As Needed for Chest Pain., Disp: , Rfl:     Omega-3 Fatty Acids (fish oil) 1000 MG capsule capsule, Take 2 capsules by mouth 2 (Two) Times a Day With Meals., Disp: , Rfl:     pantoprazole (PROTONIX) 40 MG EC tablet, Take 1 tablet by mouth Daily., Disp: , Rfl:     potassium chloride (MICRO-K) 10 MEQ CR capsule, Take 1 capsule by mouth Daily., Disp: , Rfl:     ranolazine (Ranexa) 500 MG 12 hr tablet, Take 1 tablet by mouth 2 (Two) Times a Day. Indications: Stable Angina Pectoris, Disp: 60 tablet, Rfl: 5    sennosides-docusate (PERICOLACE) 8.6-50 MG per tablet, Take 1 tablet by mouth Daily., Disp: , Rfl:     spironolactone (ALDACTONE) 25 MG tablet, Take 1 tablet by mouth Daily., Disp: , Rfl:     sucralfate (CARAFATE) 1 g tablet, Take 1 tablet by mouth 4 (Four) Times a Day., Disp: , Rfl:     Family History:  Family History   Problem Relation Age of Onset    Pancreatic cancer Mother     Heart disease Father        Past Medical History:  Past Medical History:   Diagnosis Date    Aneurysm     Cardiomyopathy     ICD implantation    Cellulitis of left lower extremity 2010    recurrent    CHD (coronary heart disease)     S/P CABG & PCI    Dyslipidemia     Heart valve disease     History of ventricular tachycardia     Hypertension     Myocardial infarction     Inferior MI    Pinched nerve     L4-L5    Prostate cancer        Past Surgical History:  Past Surgical History:   Procedure Laterality Date    ANGIOPLASTY  2000 04/1996 Stent: Palmaz- Huy stent/LAD 07/1996-RCA: 2000-Tetra stent Guidant to  proximal RCA, mid to distal artery 2 sequential Guidant Tetra stents    APPENDECTOMY      CARDIAC ABLATION  April and May 2019    CARDIAC CATHETERIZATION  2017    1996 x2, Nov. 2000, 2010-cath 2015-no stents 2017    CARDIAC CATHETERIZATION N/A 2024    Procedure: LEFT HEART CATH with possible PCI;  Surgeon: Marcin Childers DO;  Location: [facility] CATH INVASIVE LOCATION;  Service: Cardiovascular;  Laterality: N/A;  Local and IV sedation    CARDIAC DEFIBRILLATOR PLACEMENT      COLONOSCOPY W/ POLYPECTOMY      Mult colonoscopy's for polyp resection     CORONARY ARTERY BYPASS GRAFT  05/2010    x5 vessel: LMA to diagonal, LAD, intermedius, obtuse marginal, RCA    CORONARY STENT PLACEMENT      ENDOSCOPY N/A 2019    Procedure: ESOPHAGOGASTRODUODENOSCOPY with dilitation Bougie 50, 54;  Surgeon: Roddy Coronel MD;  Location: [facility] ENDOSCOPY;  Service: Gastroenterology    INSERT / REPLACE / REMOVE PACEMAKER      KNEE OPEN LATERAL RELEASE Left     reduction    OTHER SURGICAL HISTORY  2018    ICD implantation    PROSTATE SURGERY      Robiotic surgery- Cyber Knife:       Social History:  Social History     Socioeconomic History    Marital status:    Tobacco Use    Smoking status: Former     Current packs/day: 0.00     Average packs/day: 1 pack/day for 17.0 years (17.0 ttl pk-yrs)     Types: Cigarettes     Start date: 1985     Quit date: 2002     Years since quittin.6     Passive exposure: Past    Smokeless tobacco: Never   Vaping Use    Vaping status: Never Used   Substance and Sexual Activity    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 The pertinent cardiovascular findings are reported above with all other cardiovascular points within those systems being negative.    Diagnostic Study Review:     Current Electrocardiogram:  Procedures no new EKG.  EKG dated 11/14/2024 demonstrates sinus rhythm with a ventricular rate of 66 bpm.    Laboratory Data:  Lab Results   Component Value Date    GLUCOSE 131 (H) 11/14/2024    BUN 15 11/14/2024    CREATININE 1.04 11/14/2024    EGFRIFNONA 100 12/01/2021    BCR 14.4 11/14/2024    K 4.6 11/14/2024    CO2 26.2 11/14/2024    CALCIUM 9.1 11/14/2024    ALBUMIN 3.9 10/29/2024    AST 18 10/29/2024    ALT 17 10/29/2024     Lab Results   Component Value Date    GLUCOSE 131 (H) 11/14/2024    CALCIUM 9.1 11/14/2024     11/14/2024    K 4.6 11/14/2024    CO2 26.2 11/14/2024     11/14/2024    BUN 15 11/14/2024    CREATININE 1.04 11/14/2024    EGFRIFNONA 100 12/01/2021    BCR 14.4 11/14/2024    ANIONGAP 8.8 11/14/2024     Lab Results   Component Value Date    WBC 5.62 11/14/2024    HGB 13.8 11/14/2024    HCT 43.8 11/14/2024    MCV 94.0 11/14/2024     11/14/2024     Lab Results   Component Value Date    CHOL 168 11/13/2024    TRIG 109 11/13/2024    HDL 51 11/13/2024    LDL 97 11/13/2024     Lab Results   Component Value Date    HGBA1C 6.78 (H) 10/06/2024     Lab Results   Component Value Date    INR 1.05 (L) 11/13/2024    INR 0.99 09/10/2024    INR 1.01 10/04/2023    PROTIME 13.8 (L) 11/13/2024    PROTIME 10.8 09/10/2024    PROTIME 11.0 10/04/2023       Most Recent Echo:  Results for orders placed during the hospital encounter of 02/14/25    Adult Transthoracic Echo Complete w/ Color, Spectral and Contrast if necessary per protocol    Interpretation Summary    Left ventricular systolic function is severely decreased. Left ventricular ejection fraction appears to be 21 - 25%.    The left ventricular cavity is moderate to severely dilated.    Left ventricular diastolic function is consistent with  "(grade I) impaired relaxation.    The left atrial cavity is moderately dilated.    Mild dilation of the sinuses of Valsalva is present.       Most Recent Stress Test:  Results for orders placed during the hospital encounter of 09/10/24    Stress Test With Myocardial Perfusion One Day    Interpretation Summary    Lexiscan myocardial perfusion study shows a large area of severe intensity mostly fixed perfusion defect involving the basal and mid inferior inferolateral wall inferoseptal wall and also apical wall with no significant reversible ischemia    Left ventricular ejection fraction is moderately reduced (Calculated EF = 26%).    Abnormal LV wall motion consistent with severe hypokinesis of the inferior and septal wall.    Impressions are consistent with an intermediate risk study.       Most Recent Cardiac Catheterization:   No results found for this or any previous visit.       NOTE:     Today,02/25/2025 ,the following portions of the patient's note were reviewed, confirmed and/or updated as appropriate: History of present illness/Interval history, physical examination, assessment & plan, allergies, current medications, past family history, past medical history, past social history, past surgical history and problem list.    Labs pertinent to today's visit on 02/25/2025 (including but not limited to CBC, CMP, and lipid profiles) were requested from the patient's primary care provider/hospital/clinical laboratory.  If the labs were available for the visit, they were reviewed with the patient.  If they were not available, when received, special interest will be made to the labs pertinent to this visit.  The patient's most recent \"in-house\" labs are noted below and have been reviewed.  Outside labs pertinent to this visit are scanned into the record and have been reviewed.    Discussions held today, 02/25/2025,regarding procedures included risk, benefits, and options including but not limited to: Death, MI, stroke, " pain, bleeding, infection, and possible need for vascular/thoracic/cardiothoracic surgery.    Copied information within this note was reviewed and is current as of 02/25/2025.    Assessment and plan noted herein represents the current plan of care as of 02/25/2025.    Significant resources from our office and staff are inherent in engaging this patient today,02/25/2025,and in a continuous and active collaborative plan of care related to their chronic cardiovascular conditions outlined herein.  The management of these conditions requires the direction of our service with specialized clinical knowledge, skills, and experience.  This collaborative care includes but is not limited to patient education, expectations and responsibilities, shared decision making around therapeutic goals, and shared commitments to achieve those goals.

## 2025-02-25 ENCOUNTER — OFFICE VISIT (OUTPATIENT)
Dept: CARDIOLOGY | Facility: CLINIC | Age: 85
End: 2025-02-25
Payer: MEDICARE

## 2025-02-25 VITALS
DIASTOLIC BLOOD PRESSURE: 76 MMHG | HEIGHT: 73 IN | WEIGHT: 202 LBS | HEART RATE: 84 BPM | OXYGEN SATURATION: 96 % | BODY MASS INDEX: 26.77 KG/M2 | SYSTOLIC BLOOD PRESSURE: 147 MMHG

## 2025-02-25 DIAGNOSIS — I71.21 ANEURYSM OF ASCENDING AORTA WITHOUT RUPTURE: ICD-10-CM

## 2025-02-25 DIAGNOSIS — E78.2 HYPERLIPIDEMIA, MIXED: Chronic | ICD-10-CM

## 2025-02-25 DIAGNOSIS — I25.10 CORONARY ARTERY DISEASE INVOLVING NATIVE CORONARY ARTERY OF NATIVE HEART WITHOUT ANGINA PECTORIS: Primary | ICD-10-CM

## 2025-02-25 DIAGNOSIS — I10 ESSENTIAL HYPERTENSION: ICD-10-CM

## 2025-02-25 DIAGNOSIS — Z95.810 PRESENCE OF AUTOMATIC IMPLANTABLE CARDIOVERTER-DEFIBRILLATOR: Chronic | ICD-10-CM

## 2025-02-25 DIAGNOSIS — I25.5 ISCHEMIC CARDIOMYOPATHY: Chronic | ICD-10-CM

## 2025-02-25 DIAGNOSIS — I35.1 NONRHEUMATIC AORTIC VALVE INSUFFICIENCY: ICD-10-CM

## 2025-02-25 PROCEDURE — 99214 OFFICE O/P EST MOD 30 MIN: CPT | Performed by: INTERNAL MEDICINE

## 2025-02-26 ENCOUNTER — TELEPHONE (OUTPATIENT)
Dept: CARDIAC SURGERY | Facility: CLINIC | Age: 85
End: 2025-02-26
Payer: OTHER GOVERNMENT

## 2025-02-26 NOTE — TELEPHONE ENCOUNTER
Caller: Deion Nicholas    Relationship to patient: Self    Best call back number: [phone]     Chief complaint: PT HAVING BURNING AND REDNESS IN HIS LEG, PT STATED THE BURNING SENSATION WAS NOT NORMAL FOR HIM. PT IS REQUESTING A CALLBACK FROM DR RAGSDALE.     Patient directed to call 911 or go to their nearest emergency room.     Patient verbalized understanding: [x] Yes  [] No  If no, why?    Additional notes:

## 2025-02-26 NOTE — TELEPHONE ENCOUNTER
Returned patient's call. Patient states he has ongoing issues with his legs. Patient states he was taking his compression stockings off and scratched his leg. His leg is now red, warm, and burning. Discussed with patient that he should go to [facility] for evaluation. Patient verbalized understanding. Will notify Dr. Barros pre patient's request.

## 2025-02-27 ENCOUNTER — APPOINTMENT (OUTPATIENT)
Dept: CARDIOLOGY | Facility: HOSPITAL | Age: 85
End: 2025-02-27
Payer: MEDICARE

## 2025-02-27 ENCOUNTER — TELEPHONE (OUTPATIENT)
Dept: CARDIOLOGY | Facility: CLINIC | Age: 85
End: 2025-02-27
Payer: OTHER GOVERNMENT

## 2025-02-27 ENCOUNTER — HOSPITAL ENCOUNTER (OUTPATIENT)
Facility: HOSPITAL | Age: 85
Setting detail: OBSERVATION
Discharge: HOME | End: 2025-02-28
Attending: EMERGENCY MEDICINE | Admitting: EMERGENCY MEDICINE
Payer: MEDICARE

## 2025-02-27 ENCOUNTER — APPOINTMENT (OUTPATIENT)
Dept: GENERAL RADIOLOGY | Facility: HOSPITAL | Age: 85
End: 2025-02-27
Payer: MEDICARE

## 2025-02-27 DIAGNOSIS — R60.0 EDEMA OF LEFT LOWER EXTREMITY: ICD-10-CM

## 2025-02-27 DIAGNOSIS — R07.9 CHEST PAIN, UNSPECIFIED TYPE: Primary | ICD-10-CM

## 2025-02-27 LAB
ALBUMIN SERPL-MCNC: 4.4 G/DL (ref 3.5–5.2)
ALBUMIN/GLOB SERPL: 1.4 G/DL
ALP SERPL-CCNC: 115 U/L (ref 39–117)
ALT SERPL W P-5'-P-CCNC: 23 U/L (ref 1–41)
ANION GAP SERPL CALCULATED.3IONS-SCNC: 12.3 MMOL/L (ref 5–15)
AST SERPL-CCNC: 32 U/L (ref 1–40)
BASOPHILS # BLD AUTO: 0.05 10*3/MM3 (ref 0–0.2)
BASOPHILS NFR BLD AUTO: 0.7 % (ref 0–1.5)
BH CV LOWER VASCULAR LEFT COMMON FEMORAL AUGMENT: NORMAL
BH CV LOWER VASCULAR LEFT COMMON FEMORAL COMPETENT: NORMAL
BH CV LOWER VASCULAR LEFT COMMON FEMORAL COMPRESS: NORMAL
BH CV LOWER VASCULAR LEFT COMMON FEMORAL PHASIC: NORMAL
BH CV LOWER VASCULAR LEFT COMMON FEMORAL SPONT: NORMAL
BH CV LOWER VASCULAR LEFT DISTAL FEMORAL COMPRESS: NORMAL
BH CV LOWER VASCULAR LEFT GASTRONEMIUS COMPRESS: NORMAL
BH CV LOWER VASCULAR LEFT GREATER SAPH AK COMPRESS: NORMAL
BH CV LOWER VASCULAR LEFT GREATER SAPH BK COMPRESS: NORMAL
BH CV LOWER VASCULAR LEFT LESSER SAPH COMPRESS: NORMAL
BH CV LOWER VASCULAR LEFT MID FEMORAL AUGMENT: NORMAL
BH CV LOWER VASCULAR LEFT MID FEMORAL COMPETENT: NORMAL
BH CV LOWER VASCULAR LEFT MID FEMORAL COMPRESS: NORMAL
BH CV LOWER VASCULAR LEFT MID FEMORAL PHASIC: NORMAL
BH CV LOWER VASCULAR LEFT MID FEMORAL SPONT: NORMAL
BH CV LOWER VASCULAR LEFT PERONEAL COMPRESS: NORMAL
BH CV LOWER VASCULAR LEFT POPLITEAL AUGMENT: NORMAL
BH CV LOWER VASCULAR LEFT POPLITEAL COMPETENT: NORMAL
BH CV LOWER VASCULAR LEFT POPLITEAL COMPRESS: NORMAL
BH CV LOWER VASCULAR LEFT POPLITEAL PHASIC: NORMAL
BH CV LOWER VASCULAR LEFT POPLITEAL SPONT: NORMAL
BH CV LOWER VASCULAR LEFT POSTERIOR TIBIAL COMPRESS: NORMAL
BH CV LOWER VASCULAR LEFT PROXIMAL FEMORAL COMPRESS: NORMAL
BH CV LOWER VASCULAR LEFT SAPHENOFEMORAL JUNCTION COMPRESS: NORMAL
BH CV LOWER VASCULAR RIGHT COMMON FEMORAL AUGMENT: NORMAL
BH CV LOWER VASCULAR RIGHT COMMON FEMORAL COMPETENT: NORMAL
BH CV LOWER VASCULAR RIGHT COMMON FEMORAL COMPRESS: NORMAL
BH CV LOWER VASCULAR RIGHT COMMON FEMORAL PHASIC: NORMAL
BH CV LOWER VASCULAR RIGHT COMMON FEMORAL SPONT: NORMAL
BH CV VAS PRELIMINARY FINDINGS SCRIPTING: 1
BILIRUB SERPL-MCNC: 0.2 MG/DL (ref 0–1.2)
BUN SERPL-MCNC: 26 MG/DL (ref 8–23)
BUN/CREAT SERPL: 20 (ref 7–25)
CALCIUM SPEC-SCNC: 9.6 MG/DL (ref 8.6–10.5)
CHLORIDE SERPL-SCNC: 101 MMOL/L (ref 98–107)
CO2 SERPL-SCNC: 24.7 MMOL/L (ref 22–29)
CREAT SERPL-MCNC: 1.3 MG/DL (ref 0.76–1.27)
D DIMER PPP FEU-MCNC: 0.6 MCGFEU/ML (ref 0–0.82)
DEPRECATED RDW RBC AUTO: 50.1 FL (ref 37–54)
EGFRCR SERPLBLD CKD-EPI 2021: 54.8 ML/MIN/1.73
EOSINOPHIL # BLD AUTO: 0.34 10*3/MM3 (ref 0–0.4)
EOSINOPHIL NFR BLD AUTO: 4.8 % (ref 0.3–6.2)
ERYTHROCYTE [DISTWIDTH] IN BLOOD BY AUTOMATED COUNT: 14.5 % (ref 12.3–15.4)
GEN 5 1HR TROPONIN T REFLEX: 21 NG/L
GLOBULIN UR ELPH-MCNC: 3.1 GM/DL
GLUCOSE SERPL-MCNC: 107 MG/DL (ref 65–99)
HCT VFR BLD AUTO: 43 % (ref 37.5–51)
HGB BLD-MCNC: 13.7 G/DL (ref 13–17.7)
HOLD SPECIMEN: NORMAL
IMM GRANULOCYTES # BLD AUTO: 0.01 10*3/MM3 (ref 0–0.05)
IMM GRANULOCYTES NFR BLD AUTO: 0.1 % (ref 0–0.5)
LYMPHOCYTES # BLD AUTO: 1.86 10*3/MM3 (ref 0.7–3.1)
LYMPHOCYTES NFR BLD AUTO: 26.1 % (ref 19.6–45.3)
MAGNESIUM SERPL-MCNC: 2.4 MG/DL (ref 1.6–2.4)
MCH RBC QN AUTO: 29.8 PG (ref 26.6–33)
MCHC RBC AUTO-ENTMCNC: 31.9 G/DL (ref 31.5–35.7)
MCV RBC AUTO: 93.7 FL (ref 79–97)
MONOCYTES # BLD AUTO: 0.74 10*3/MM3 (ref 0.1–0.9)
MONOCYTES NFR BLD AUTO: 10.4 % (ref 5–12)
NEUTROPHILS NFR BLD AUTO: 4.12 10*3/MM3 (ref 1.7–7)
NEUTROPHILS NFR BLD AUTO: 57.9 % (ref 42.7–76)
NRBC BLD AUTO-RTO: 0 /100 WBC (ref 0–0.2)
NT-PROBNP SERPL-MCNC: 399 PG/ML (ref 0–1800)
PLATELET # BLD AUTO: 188 10*3/MM3 (ref 140–450)
PMV BLD AUTO: 9.7 FL (ref 6–12)
POTASSIUM SERPL-SCNC: 4.2 MMOL/L (ref 3.5–5.2)
PROT SERPL-MCNC: 7.5 G/DL (ref 6–8.5)
RBC # BLD AUTO: 4.59 10*6/MM3 (ref 4.14–5.8)
SODIUM SERPL-SCNC: 138 MMOL/L (ref 136–145)
TROPONIN T % DELTA: -13
TROPONIN T NUMERIC DELTA: -3 NG/L
TROPONIN T SERPL HS-MCNC: 24 NG/L
WBC NRBC COR # BLD AUTO: 7.12 10*3/MM3 (ref 3.4–10.8)

## 2025-02-27 PROCEDURE — 80053 COMPREHEN METABOLIC PANEL: CPT | Performed by: EMERGENCY MEDICINE

## 2025-02-27 PROCEDURE — G0378 HOSPITAL OBSERVATION PER HR: HCPCS

## 2025-02-27 PROCEDURE — 85025 COMPLETE CBC W/AUTO DIFF WBC: CPT | Performed by: EMERGENCY MEDICINE

## 2025-02-27 PROCEDURE — 25010000002 ENOXAPARIN PER 10 MG: Performed by: PHYSICIAN ASSISTANT

## 2025-02-27 PROCEDURE — 25010000002 MORPHINE PER 10 MG: Performed by: EMERGENCY MEDICINE

## 2025-02-27 PROCEDURE — 71045 X-RAY EXAM CHEST 1 VIEW: CPT

## 2025-02-27 PROCEDURE — 96375 TX/PRO/DX INJ NEW DRUG ADDON: CPT

## 2025-02-27 PROCEDURE — 93971 EXTREMITY STUDY: CPT | Performed by: STUDENT IN AN ORGANIZED HEALTH CARE EDUCATION/TRAINING PROGRAM

## 2025-02-27 PROCEDURE — 85379 FIBRIN DEGRADATION QUANT: CPT | Performed by: EMERGENCY MEDICINE

## 2025-02-27 PROCEDURE — 96372 THER/PROPH/DIAG INJ SC/IM: CPT

## 2025-02-27 PROCEDURE — 93005 ELECTROCARDIOGRAM TRACING: CPT | Performed by: EMERGENCY MEDICINE

## 2025-02-27 PROCEDURE — 96376 TX/PRO/DX INJ SAME DRUG ADON: CPT

## 2025-02-27 PROCEDURE — 36415 COLL VENOUS BLD VENIPUNCTURE: CPT

## 2025-02-27 PROCEDURE — 96365 THER/PROPH/DIAG IV INF INIT: CPT

## 2025-02-27 PROCEDURE — 99285 EMERGENCY DEPT VISIT HI MDM: CPT

## 2025-02-27 PROCEDURE — 83880 ASSAY OF NATRIURETIC PEPTIDE: CPT | Performed by: EMERGENCY MEDICINE

## 2025-02-27 PROCEDURE — 25010000002 MORPHINE PER 10 MG: Performed by: NURSE PRACTITIONER

## 2025-02-27 PROCEDURE — 84484 ASSAY OF TROPONIN QUANT: CPT | Performed by: EMERGENCY MEDICINE

## 2025-02-27 PROCEDURE — 25010000002 CEFTRIAXONE PER 250 MG: Performed by: EMERGENCY MEDICINE

## 2025-02-27 PROCEDURE — 83735 ASSAY OF MAGNESIUM: CPT | Performed by: EMERGENCY MEDICINE

## 2025-02-27 PROCEDURE — 93971 EXTREMITY STUDY: CPT

## 2025-02-27 RX ORDER — GABAPENTIN 400 MG/1
800 CAPSULE ORAL EVERY 6 HOURS
Status: DISCONTINUED | OUTPATIENT
Start: 2025-02-27 | End: 2025-02-28

## 2025-02-27 RX ORDER — ALUMINA, MAGNESIA, AND SIMETHICONE 2400; 2400; 240 MG/30ML; MG/30ML; MG/30ML
15 SUSPENSION ORAL EVERY 6 HOURS PRN
Status: DISCONTINUED | OUTPATIENT
Start: 2025-02-27 | End: 2025-02-28 | Stop reason: HOSPADM

## 2025-02-27 RX ORDER — BISACODYL 10 MG
10 SUPPOSITORY, RECTAL RECTAL DAILY PRN
Status: DISCONTINUED | OUTPATIENT
Start: 2025-02-27 | End: 2025-02-28 | Stop reason: HOSPADM

## 2025-02-27 RX ORDER — ASPIRIN 81 MG/1
324 TABLET, CHEWABLE ORAL ONCE
Status: COMPLETED | OUTPATIENT
Start: 2025-02-27 | End: 2025-02-27

## 2025-02-27 RX ORDER — MORPHINE SULFATE 2 MG/ML
2 INJECTION, SOLUTION INTRAMUSCULAR; INTRAVENOUS ONCE
Status: COMPLETED | OUTPATIENT
Start: 2025-02-27 | End: 2025-02-27

## 2025-02-27 RX ORDER — ONDANSETRON 2 MG/ML
4 INJECTION INTRAMUSCULAR; INTRAVENOUS EVERY 6 HOURS PRN
Status: DISCONTINUED | OUTPATIENT
Start: 2025-02-27 | End: 2025-02-28 | Stop reason: HOSPADM

## 2025-02-27 RX ORDER — CLOPIDOGREL BISULFATE 75 MG/1
75 TABLET ORAL DAILY
Status: DISCONTINUED | OUTPATIENT
Start: 2025-02-27 | End: 2025-02-28 | Stop reason: HOSPADM

## 2025-02-27 RX ORDER — NITROGLYCERIN 0.4 MG/1
0.4 TABLET SUBLINGUAL
Status: DISCONTINUED | OUTPATIENT
Start: 2025-02-27 | End: 2025-02-28 | Stop reason: HOSPADM

## 2025-02-27 RX ORDER — AMOXICILLIN 250 MG
2 CAPSULE ORAL 2 TIMES DAILY PRN
Status: DISCONTINUED | OUTPATIENT
Start: 2025-02-27 | End: 2025-02-28 | Stop reason: HOSPADM

## 2025-02-27 RX ORDER — ENOXAPARIN SODIUM 100 MG/ML
40 INJECTION SUBCUTANEOUS DAILY
Status: DISCONTINUED | OUTPATIENT
Start: 2025-02-27 | End: 2025-02-28 | Stop reason: HOSPADM

## 2025-02-27 RX ORDER — SODIUM CHLORIDE 0.9 % (FLUSH) 0.9 %
10 SYRINGE (ML) INJECTION EVERY 12 HOURS SCHEDULED
Status: DISCONTINUED | OUTPATIENT
Start: 2025-02-27 | End: 2025-02-28 | Stop reason: HOSPADM

## 2025-02-27 RX ORDER — SODIUM CHLORIDE 0.9 % (FLUSH) 0.9 %
10 SYRINGE (ML) INJECTION AS NEEDED
Status: DISCONTINUED | OUTPATIENT
Start: 2025-02-27 | End: 2025-02-28 | Stop reason: HOSPADM

## 2025-02-27 RX ORDER — POLYETHYLENE GLYCOL 3350 17 G/17G
17 POWDER, FOR SOLUTION ORAL DAILY PRN
Status: DISCONTINUED | OUTPATIENT
Start: 2025-02-27 | End: 2025-02-28 | Stop reason: HOSPADM

## 2025-02-27 RX ORDER — SODIUM CHLORIDE 9 MG/ML
40 INJECTION, SOLUTION INTRAVENOUS AS NEEDED
Status: DISCONTINUED | OUTPATIENT
Start: 2025-02-27 | End: 2025-02-28 | Stop reason: HOSPADM

## 2025-02-27 RX ORDER — ASPIRIN 81 MG/1
81 TABLET ORAL DAILY
Status: DISCONTINUED | OUTPATIENT
Start: 2025-02-28 | End: 2025-02-28 | Stop reason: HOSPADM

## 2025-02-27 RX ORDER — ACETAMINOPHEN 325 MG/1
650 TABLET ORAL EVERY 6 HOURS PRN
Status: DISCONTINUED | OUTPATIENT
Start: 2025-02-27 | End: 2025-02-28 | Stop reason: HOSPADM

## 2025-02-27 RX ORDER — BISACODYL 5 MG/1
5 TABLET, DELAYED RELEASE ORAL DAILY PRN
Status: DISCONTINUED | OUTPATIENT
Start: 2025-02-27 | End: 2025-02-28 | Stop reason: HOSPADM

## 2025-02-27 RX ORDER — MORPHINE SULFATE 2 MG/ML
2 INJECTION, SOLUTION INTRAMUSCULAR; INTRAVENOUS EVERY 4 HOURS PRN
Status: DISCONTINUED | OUTPATIENT
Start: 2025-02-27 | End: 2025-02-28 | Stop reason: HOSPADM

## 2025-02-27 RX ORDER — ONDANSETRON 4 MG/1
4 TABLET, ORALLY DISINTEGRATING ORAL EVERY 6 HOURS PRN
Status: DISCONTINUED | OUTPATIENT
Start: 2025-02-27 | End: 2025-02-28 | Stop reason: HOSPADM

## 2025-02-27 RX ADMIN — MORPHINE SULFATE 2 MG: 2 INJECTION, SOLUTION INTRAMUSCULAR; INTRAVENOUS at 17:45

## 2025-02-27 RX ADMIN — CLOPIDOGREL BISULFATE 75 MG: 75 TABLET ORAL at 17:45

## 2025-02-27 RX ADMIN — ENOXAPARIN SODIUM 40 MG: 100 INJECTION SUBCUTANEOUS at 17:46

## 2025-02-27 RX ADMIN — Medication 10 ML: at 23:21

## 2025-02-27 RX ADMIN — MORPHINE SULFATE 2 MG: 2 INJECTION, SOLUTION INTRAMUSCULAR; INTRAVENOUS at 13:28

## 2025-02-27 RX ADMIN — CEFTRIAXONE 2000 MG: 2 INJECTION, POWDER, FOR SOLUTION INTRAMUSCULAR; INTRAVENOUS at 15:01

## 2025-02-27 RX ADMIN — ASPIRIN 324 MG: 81 TABLET, CHEWABLE ORAL at 15:00

## 2025-02-27 RX ADMIN — MORPHINE SULFATE 2 MG: 2 INJECTION, SOLUTION INTRAMUSCULAR; INTRAVENOUS at 23:20

## 2025-02-27 RX ADMIN — Medication 10 ML: at 11:42

## 2025-02-27 RX ADMIN — GABAPENTIN 800 MG: 400 CAPSULE ORAL at 19:58

## 2025-02-27 RX ADMIN — Medication 10 ML: at 19:59

## 2025-02-27 NOTE — PLAN OF CARE
Goal Outcome Evaluation:  Plan of Care Reviewed With: patient        Progress: improving  Outcome Evaluation: Pt admitted to room 112. Oriented to room, call light, restroom, and meal ordering. Pt denies any chest pain at this time. Pt noted to have 3+ edema to LLE. Extrem noted to be red, but only warm to touch. Pt reports that he 'scratched' his leg 3 days ago when removing compression socks. Image of wound obtained. Pt on cardiac monitoring. Cardio consult called

## 2025-02-27 NOTE — TELEPHONE ENCOUNTER
Spoke with pt he is aware his monitor did not send anything for this time. But I advised pt to let them know at the ED so they will have his device checked by the rep.

## 2025-02-27 NOTE — ED PROVIDER NOTES
Subjective   History of Present Illness  84-year-old male with history of cardiopathy presents for chest pain and left lower extremity edema.  States chest pain started early this morning.  Had episode that was really bad felt somewhat like his heart was racing took a nitro and then improved significantly but still present.  Radiated down his left arm some.  States he is also having some left lower extremity edema.  An injury to left lateral calf and then since then started having increasing erythema.  Currently on antibiotics for several days but does not feel like they are helping.  No fevers.  States he did not feel any pain but thought maybe that his AICD went off.  Follows with Dr. Painting with cardiology.  Review of Systems  See HPI.  Past Medical History:   Diagnosis Date    Aneurysm     Cardiomyopathy     ICD implantation    Cellulitis of left lower extremity 2010    recurrent    CHD (coronary heart disease)     S/P CABG & PCI    Dyslipidemia     Heart valve disease     History of ventricular tachycardia     Hypertension     Myocardial infarction     Inferior MI    Pinched nerve     L4-L5    Prostate cancer        Allergies   Allergen Reactions    Lovastatin Myalgia    Pravastatin Myalgia and Unknown (See Comments)     unknown    Simvastatin Unknown (See Comments) and Myalgia     unknown    Spironolactone Other (See Comments)     Gynecomastia        Past Surgical History:   Procedure Laterality Date    ANGIOPLASTY  2000 04/1996 Stent: Palmaz- Huy stent/LAD 07/1996-RCA: 2000-Tetra stent Guidant to proximal RCA, mid to distal artery 2 sequential Guidant Tetra stents    APPENDECTOMY      CARDIAC ABLATION  April and May 2019    CARDIAC CATHETERIZATION  07/2017    1996 x2, Nov. 2000, 05/2010-cath 08/2015-no stents 2017    CARDIAC CATHETERIZATION N/A 11/13/2024    Procedure: LEFT HEART CATH with possible PCI;  Surgeon: Marcin Childers DO;  Location: [facility] CATH INVASIVE LOCATION;  Service:  "Cardiovascular;  Laterality: N/A;  Local and IV sedation    CARDIAC DEFIBRILLATOR PLACEMENT      COLONOSCOPY W/ POLYPECTOMY      Mult colonoscopy's for polyp resection     CORONARY ARTERY BYPASS GRAFT  05/2010    x5 vessel: LMA to diagonal, LAD, intermedius, obtuse marginal, RCA    CORONARY STENT PLACEMENT      ENDOSCOPY N/A 2019    Procedure: ESOPHAGOGASTRODUODENOSCOPY with dilitation Bougie 50, 54;  Surgeon: Roddy Coronel MD;  Location: [facility] ENDOSCOPY;  Service: Gastroenterology    INSERT / REPLACE / REMOVE PACEMAKER      KNEE OPEN LATERAL RELEASE Left     reduction    OTHER SURGICAL HISTORY  2018    ICD implantation    PROSTATE SURGERY      Robiotic surgery- Cyber Knife:       Family History   Problem Relation Age of Onset    Pancreatic cancer Mother     Heart disease Father        Social History     Socioeconomic History    Marital status:    Tobacco Use    Smoking status: Former     Current packs/day: 0.00     Average packs/day: 1 pack/day for 17.0 years (17.0 ttl pk-yrs)     Types: Cigarettes     Start date: 1985     Quit date: 2002     Years since quittin.6     Passive exposure: Past    Smokeless tobacco: Never   Vaping Use    Vaping status: Never Used   Substance and Sexual Activity    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    Drug use: Never    Sexual activity: Defer           Objective   Physical Exam  No acute distress, erythematous left leg that is quite warm to touch with some edema present, regular rate and rhythm, extremities warm and well-perfused, alert, no tachypnea or increased work of breathing, clear auscultation bilaterally, abdomen soft and nontender without rebound or guarding  Procedures           ED Course        /67 (BP Location: Left arm, Patient Position: Lying)   Pulse 65   Temp 98.5 °F (36.9 °C) (Oral)   Resp 19   Ht 185.4 cm (73\")   Wt 93 kg (205 lb)   SpO2 95%   BMI 27.05 kg/m²   Labs Reviewed   COMPREHENSIVE " METABOLIC PANEL - Abnormal; Notable for the following components:       Result Value    Glucose 107 (*)     BUN 26 (*)     Creatinine 1.30 (*)     eGFR 54.8 (*)     All other components within normal limits    Narrative:     GFR Categories in Chronic Kidney Disease (CKD)      GFR Category          GFR (mL/min/1.73)    Interpretation  G1                     90 or greater         Normal or high (1)  G2                      60-89                Mild decrease (1)  G3a                   45-59                Mild to moderate decrease  G3b                   30-44                Moderate to severe decrease  G4                    15-29                Severe decrease  G5                    14 or less           Kidney failure          (1)In the absence of evidence of kidney disease, neither GFR category G1 or G2 fulfill the criteria for CKD.    eGFR calculation 2021 CKD-EPI creatinine equation, which does not include race as a factor   TROPONIN - Abnormal; Notable for the following components:    HS Troponin T 24 (*)     All other components within normal limits    Narrative:     High Sensitive Troponin T Reference Range:  <14.0 ng/L- Negative Female for AMI  <22.0 ng/L- Negative Male for AMI  >=14 - Abnormal Female indicating possible myocardial injury.  >=22 - Abnormal Male indicating possible myocardial injury.   Clinicians would have to utilize clinical acumen, EKG, Troponin, and serial changes to determine if it is an Acute Myocardial Infarction or myocardial injury due to an underlying chronic condition.        BNP (IN-HOUSE) - Normal    Narrative:     This assay is used as an aid in the diagnosis of individuals suspected of having heart failure. It can be used as an aid in the diagnosis of acute decompensated heart failure (ADHF) in patients presenting with signs and symptoms of ADHF to the emergency department (ED). In addition, NT-proBNP of <300 pg/mL indicates ADHF is not likely.    Age Range Result Interpretation   "NT-proBNP Concentration (pg/mL:      <50             Positive            >450                   Gray                 300-450                    Negative             <300    50-75           Positive            >900                  Gray                300-900                  Negative            <300      >75             Positive            >1800                  Gray                300-1800                  Negative            <300   MAGNESIUM - Normal   D-DIMER, QUANTITATIVE - Normal    Narrative:     According to the assay 's published package insert, a normal (<0.50 MCGFEU/mL) D-dimer result in conjunction with a non-high clinical probability assessment, excludes deep vein thrombosis (DVT) and pulmonary embolism (PE) with high sensitivity.    D-dimer values increase with age and this can make VTE exclusion of an older population difficult. To address this, the [employer], based on best available evidence and recent guidelines, recommends that clinicians use age-adjusted D-dimer thresholds in patients greater than 50 years of age with: a) a low probability of PE who do not meet all Pulmonary Embolism Rule Out Criteria, or b) in those with intermediate probability of PE.   The formula for an age-adjusted D-dimer cut-off is \"age/100\".  For example, a 60 year old patient would have an age-adjusted cut-off of 0.60 MCGFEU/mL and an 80 year old 0.80 MCGFEU/mL.   CBC WITH AUTO DIFFERENTIAL - Normal   HIGH SENSITIVITIY TROPONIN T 1HR    Narrative:     High Sensitive Troponin T Reference Range:  <14.0 ng/L- Negative Female for AMI  <22.0 ng/L- Negative Male for AMI  >=14 - Abnormal Female indicating possible myocardial injury.  >=22 - Abnormal Male indicating possible myocardial injury.   Clinicians would have to utilize clinical acumen, EKG, Troponin, and serial changes to determine if it is an Acute Myocardial Infarction or myocardial injury due to an underlying chronic condition.      "   CBC AND DIFFERENTIAL    Narrative:     The following orders were created for panel order CBC & Differential.  Procedure                               Abnormality         Status                     ---------                               -----------         ------                     CBC Auto Differential[[id number]]        Normal              Final result                 Please view results for these tests on the individual orders.   EXTRA TUBES    Narrative:     The following orders were created for panel order Extra Tubes.  Procedure                               Abnormality         Status                     ---------                               -----------         ------                     Gold Top - SST[[id number]]                                   Final result                 Please view results for these tests on the individual orders.   GOLD TOP - SST     Medications   sodium chloride 0.9 % flush 10 mL (10 mL Intravenous Given 2/27/25 1142)   nitroglycerin (NITROSTAT) SL tablet 0.4 mg (has no administration in time range)   sodium chloride 0.9 % flush 10 mL (has no administration in time range)   sodium chloride 0.9 % flush 10 mL (has no administration in time range)   sodium chloride 0.9 % infusion 40 mL (has no administration in time range)   aluminum-magnesium hydroxide-simethicone (MAALOX MAX) 400-400-40 MG/5ML suspension 15 mL (has no administration in time range)   ondansetron ODT (ZOFRAN-ODT) disintegrating tablet 4 mg (has no administration in time range)     Or   ondansetron (ZOFRAN) injection 4 mg (has no administration in time range)   melatonin tablet 5 mg (has no administration in time range)   clopidogrel (PLAVIX) tablet 75 mg (75 mg Oral Given 2/27/25 1745)   aspirin EC tablet 81 mg (has no administration in time range)   Enoxaparin Sodium (LOVENOX) syringe 40 mg (40 mg Subcutaneous Given 2/27/25 1746)   Potassium Replacement - Follow Nurse / BPA Driven Protocol (has no administration in  time range)   Magnesium Standard Dose Replacement - Follow Nurse / BPA Driven Protocol (has no administration in time range)   Phosphorus Replacement - Follow Nurse / BPA Driven Protocol (has no administration in time range)   Calcium Replacement - Follow Nurse / BPA Driven Protocol (has no administration in time range)   sennosides-docusate (PERICOLACE) 8.6-50 MG per tablet 2 tablet (has no administration in time range)     And   polyethylene glycol (MIRALAX) packet 17 g (has no administration in time range)     And   bisacodyl (DULCOLAX) EC tablet 5 mg (has no administration in time range)     And   bisacodyl (DULCOLAX) suppository 10 mg (has no administration in time range)   morphine injection 2 mg (2 mg Intravenous Given 2/27/25 1745)   acetaminophen (TYLENOL) tablet 650 mg (has no administration in time range)   gabapentin (NEURONTIN) capsule 800 mg (has no administration in time range)   morphine injection 2 mg (2 mg Intravenous Given 2/27/25 1328)   aspirin chewable tablet 324 mg (324 mg Oral Given 2/27/25 1500)   cefTRIAXone (ROCEPHIN) 2,000 mg in sodium chloride 0.9 % 100 mL MBP (0 mg Intravenous Stopped 2/27/25 1531)     XR Chest 1 View   Final Result   Impression:   Mild right basilar airspace opacities, which may be due to atelectasis and/or pneumonia.            Electronically Signed: Linda Ervin     2/27/2025 12:11 PM EST     Workstation ID: [id number]                  HEART Score: 6                                      Medical Decision Making  Problems Addressed:  Chest pain, unspecified type: complicated acute illness or injury  Edema of left lower extremity: complicated acute illness or injury    Amount and/or Complexity of Data Reviewed  Labs: ordered.  Radiology: ordered.  ECG/medicine tests: ordered.    Risk  OTC drugs.  Prescription drug management.  Decision regarding hospitalization.    EKG interpretation: 11:37 AM, rate 76, normal sinus rhythm, normal axis, normal normals, nonspecific ST  changes., PVCs present.    My interpretation chest x-ray negative for pneumothorax.  See system for radiology interpretation.    Patient's AICD was interrogated by [employer] rep and no events were noted.  Troponin negative.  Still having chest pain.  Will place in observation for cardiology consult.  Given antibiotics here for cellulitis.  Ultrasound negative for DVT.  Negative dimer so low suspicion for PE.      Final diagnoses:   Chest pain, unspecified type   Edema of left lower extremity       ED Disposition  ED Disposition       ED Disposition   Decision to Admit    Condition   --    Comment   --               No follow-up provider specified.       Medication List      No changes were made to your prescriptions during this visit.            Alexy Wyman MD  02/27/25 [id number]

## 2025-02-27 NOTE — TELEPHONE ENCOUNTER
Caller: Deion Nicholas    Relationship to patient: Self    Best call back number: [phone]    Patient is needing: PATIENT REQUESTING THAT SOMEONE CHECK HIS DEFIBRILLATOR HE THINKS HE HAD AN INCIDENT  7:38/ 7:39 AM THIS MORNING. HE FELT DIZZY AND ALMOST PASSED OUT AND TOOK A NITRO. HE IS HEADING TO [facility] [place] BECAUSE HE HAS INFECTION IN HIS LEG.

## 2025-02-28 VITALS
OXYGEN SATURATION: 97 % | TEMPERATURE: 97.4 F | BODY MASS INDEX: 26.59 KG/M2 | DIASTOLIC BLOOD PRESSURE: 47 MMHG | HEART RATE: 70 BPM | WEIGHT: 200.62 LBS | RESPIRATION RATE: 15 BRPM | HEIGHT: 73 IN | SYSTOLIC BLOOD PRESSURE: 136 MMHG

## 2025-02-28 LAB
ANION GAP SERPL CALCULATED.3IONS-SCNC: 9.1 MMOL/L (ref 5–15)
BASOPHILS # BLD AUTO: 0.06 10*3/MM3 (ref 0–0.2)
BASOPHILS NFR BLD AUTO: 1 % (ref 0–1.5)
BUN SERPL-MCNC: 20 MG/DL (ref 8–23)
BUN/CREAT SERPL: 16.9 (ref 7–25)
CALCIUM SPEC-SCNC: 8.5 MG/DL (ref 8.6–10.5)
CHLORIDE SERPL-SCNC: 104 MMOL/L (ref 98–107)
CO2 SERPL-SCNC: 24.9 MMOL/L (ref 22–29)
CREAT SERPL-MCNC: 1.18 MG/DL (ref 0.76–1.27)
CRP SERPL-MCNC: 0.44 MG/DL (ref 0–0.5)
DEPRECATED RDW RBC AUTO: 51.2 FL (ref 37–54)
EGFRCR SERPLBLD CKD-EPI 2021: 61.6 ML/MIN/1.73
EOSINOPHIL # BLD AUTO: 0.32 10*3/MM3 (ref 0–0.4)
EOSINOPHIL NFR BLD AUTO: 5.5 % (ref 0.3–6.2)
ERYTHROCYTE [DISTWIDTH] IN BLOOD BY AUTOMATED COUNT: 14.6 % (ref 12.3–15.4)
ERYTHROCYTE [SEDIMENTATION RATE] IN BLOOD: 20 MM/HR (ref 0–20)
GLUCOSE SERPL-MCNC: 104 MG/DL (ref 65–99)
HCT VFR BLD AUTO: 40.5 % (ref 37.5–51)
HGB BLD-MCNC: 13 G/DL (ref 13–17.7)
IMM GRANULOCYTES # BLD AUTO: 0.01 10*3/MM3 (ref 0–0.05)
IMM GRANULOCYTES NFR BLD AUTO: 0.2 % (ref 0–0.5)
LYMPHOCYTES # BLD AUTO: 1.81 10*3/MM3 (ref 0.7–3.1)
LYMPHOCYTES NFR BLD AUTO: 31.4 % (ref 19.6–45.3)
MAGNESIUM SERPL-MCNC: 2.2 MG/DL (ref 1.6–2.4)
MCH RBC QN AUTO: 30.2 PG (ref 26.6–33)
MCHC RBC AUTO-ENTMCNC: 32.1 G/DL (ref 31.5–35.7)
MCV RBC AUTO: 94.2 FL (ref 79–97)
MONOCYTES # BLD AUTO: 0.63 10*3/MM3 (ref 0.1–0.9)
MONOCYTES NFR BLD AUTO: 10.9 % (ref 5–12)
NEUTROPHILS NFR BLD AUTO: 2.94 10*3/MM3 (ref 1.7–7)
NEUTROPHILS NFR BLD AUTO: 51 % (ref 42.7–76)
NRBC BLD AUTO-RTO: 0 /100 WBC (ref 0–0.2)
PLATELET # BLD AUTO: 169 10*3/MM3 (ref 140–450)
PMV BLD AUTO: 10.2 FL (ref 6–12)
POTASSIUM SERPL-SCNC: 4.2 MMOL/L (ref 3.5–5.2)
QT INTERVAL: 370 MS
QTC INTERVAL: 417 MS
RBC # BLD AUTO: 4.3 10*6/MM3 (ref 4.14–5.8)
SODIUM SERPL-SCNC: 138 MMOL/L (ref 136–145)
TROPONIN T SERPL HS-MCNC: 17 NG/L
WBC NRBC COR # BLD AUTO: 5.77 10*3/MM3 (ref 3.4–10.8)

## 2025-02-28 PROCEDURE — 85652 RBC SED RATE AUTOMATED: CPT | Performed by: NURSE PRACTITIONER

## 2025-02-28 PROCEDURE — 94799 UNLISTED PULMONARY SVC/PX: CPT

## 2025-02-28 PROCEDURE — 83735 ASSAY OF MAGNESIUM: CPT | Performed by: PHYSICIAN ASSISTANT

## 2025-02-28 PROCEDURE — 94640 AIRWAY INHALATION TREATMENT: CPT

## 2025-02-28 PROCEDURE — 84484 ASSAY OF TROPONIN QUANT: CPT | Performed by: NURSE PRACTITIONER

## 2025-02-28 PROCEDURE — 85025 COMPLETE CBC W/AUTO DIFF WBC: CPT | Performed by: PHYSICIAN ASSISTANT

## 2025-02-28 PROCEDURE — 80048 BASIC METABOLIC PNL TOTAL CA: CPT | Performed by: PHYSICIAN ASSISTANT

## 2025-02-28 PROCEDURE — G0378 HOSPITAL OBSERVATION PER HR: HCPCS

## 2025-02-28 PROCEDURE — 25010000002 CEFTRIAXONE PER 250 MG: Performed by: NURSE PRACTITIONER

## 2025-02-28 PROCEDURE — 86140 C-REACTIVE PROTEIN: CPT | Performed by: NURSE PRACTITIONER

## 2025-02-28 PROCEDURE — 99214 OFFICE O/P EST MOD 30 MIN: CPT | Performed by: INTERNAL MEDICINE

## 2025-02-28 PROCEDURE — 94761 N-INVAS EAR/PLS OXIMETRY MLT: CPT

## 2025-02-28 RX ORDER — SULFAMETHOXAZOLE AND TRIMETHOPRIM 800; 160 MG/1; MG/1
1 TABLET ORAL 2 TIMES DAILY
Qty: 14 TABLET | Refills: 0 | Status: SHIPPED | OUTPATIENT
Start: 2025-02-28 | End: 2025-03-07

## 2025-02-28 RX ORDER — MIDODRINE HYDROCHLORIDE 5 MG/1
5 TABLET ORAL
Status: DISCONTINUED | OUTPATIENT
Start: 2025-02-28 | End: 2025-02-28 | Stop reason: HOSPADM

## 2025-02-28 RX ORDER — PANTOPRAZOLE SODIUM 40 MG/1
40 TABLET, DELAYED RELEASE ORAL DAILY
Status: DISCONTINUED | OUTPATIENT
Start: 2025-02-28 | End: 2025-02-28 | Stop reason: HOSPADM

## 2025-02-28 RX ORDER — SUCRALFATE 1 G/1
1 TABLET ORAL 4 TIMES DAILY
Status: DISCONTINUED | OUTPATIENT
Start: 2025-02-28 | End: 2025-02-28 | Stop reason: HOSPADM

## 2025-02-28 RX ORDER — POTASSIUM CHLORIDE 750 MG/1
10 TABLET, FILM COATED, EXTENDED RELEASE ORAL DAILY
Status: DISCONTINUED | OUTPATIENT
Start: 2025-02-28 | End: 2025-02-28 | Stop reason: HOSPADM

## 2025-02-28 RX ORDER — MECLIZINE HYDROCHLORIDE 25 MG/1
25 TABLET ORAL EVERY 6 HOURS PRN
Status: DISCONTINUED | OUTPATIENT
Start: 2025-02-28 | End: 2025-02-28 | Stop reason: HOSPADM

## 2025-02-28 RX ORDER — GABAPENTIN 400 MG/1
800 CAPSULE ORAL 3 TIMES DAILY
Status: DISCONTINUED | OUTPATIENT
Start: 2025-02-28 | End: 2025-02-28 | Stop reason: HOSPADM

## 2025-02-28 RX ORDER — MEXILETINE HYDROCHLORIDE 150 MG/1
150 CAPSULE ORAL 2 TIMES DAILY
Status: DISCONTINUED | OUTPATIENT
Start: 2025-02-28 | End: 2025-02-28 | Stop reason: HOSPADM

## 2025-02-28 RX ORDER — METHOCARBAMOL 500 MG/1
500 TABLET, FILM COATED ORAL 3 TIMES DAILY
Status: DISCONTINUED | OUTPATIENT
Start: 2025-02-28 | End: 2025-02-28 | Stop reason: HOSPADM

## 2025-02-28 RX ORDER — SPIRONOLACTONE 25 MG/1
25 TABLET ORAL DAILY
Status: DISCONTINUED | OUTPATIENT
Start: 2025-02-28 | End: 2025-02-28 | Stop reason: HOSPADM

## 2025-02-28 RX ORDER — RANOLAZINE 500 MG/1
500 TABLET, EXTENDED RELEASE ORAL 2 TIMES DAILY
Status: DISCONTINUED | OUTPATIENT
Start: 2025-02-28 | End: 2025-02-28

## 2025-02-28 RX ORDER — METOPROLOL SUCCINATE 50 MG/1
50 TABLET, EXTENDED RELEASE ORAL 2 TIMES DAILY
Status: DISCONTINUED | OUTPATIENT
Start: 2025-02-28 | End: 2025-02-28 | Stop reason: HOSPADM

## 2025-02-28 RX ORDER — HYDROCORTISONE 25 MG/G
1 CREAM TOPICAL EVERY 12 HOURS SCHEDULED
Status: DISCONTINUED | OUTPATIENT
Start: 2025-02-28 | End: 2025-02-28 | Stop reason: HOSPADM

## 2025-02-28 RX ORDER — GABAPENTIN 400 MG/1
800 CAPSULE ORAL 3 TIMES DAILY
Status: DISCONTINUED | OUTPATIENT
Start: 2025-02-28 | End: 2025-02-28

## 2025-02-28 RX ORDER — IPRATROPIUM BROMIDE AND ALBUTEROL SULFATE 2.5; .5 MG/3ML; MG/3ML
3 SOLUTION RESPIRATORY (INHALATION)
Status: DISCONTINUED | OUTPATIENT
Start: 2025-02-28 | End: 2025-02-28 | Stop reason: HOSPADM

## 2025-02-28 RX ORDER — AMOXICILLIN 250 MG
1 CAPSULE ORAL DAILY
Status: DISCONTINUED | OUTPATIENT
Start: 2025-02-28 | End: 2025-02-28 | Stop reason: HOSPADM

## 2025-02-28 RX ORDER — FUROSEMIDE 20 MG/1
20 TABLET ORAL DAILY
Status: DISCONTINUED | OUTPATIENT
Start: 2025-02-28 | End: 2025-02-28 | Stop reason: HOSPADM

## 2025-02-28 RX ADMIN — ASPIRIN 81 MG: 81 TABLET, COATED ORAL at 09:10

## 2025-02-28 RX ADMIN — PANTOPRAZOLE SODIUM 40 MG: 40 TABLET, DELAYED RELEASE ORAL at 12:02

## 2025-02-28 RX ADMIN — METHOCARBAMOL TABLETS 500 MG: 500 TABLET, COATED ORAL at 12:02

## 2025-02-28 RX ADMIN — FUROSEMIDE 20 MG: 20 TABLET ORAL at 12:01

## 2025-02-28 RX ADMIN — GABAPENTIN 800 MG: 400 CAPSULE ORAL at 02:24

## 2025-02-28 RX ADMIN — MEXILETINE HYDROCHLORIDE 150 MG: 150 CAPSULE ORAL at 13:21

## 2025-02-28 RX ADMIN — SPIRONOLACTONE 25 MG: 25 TABLET ORAL at 12:02

## 2025-02-28 RX ADMIN — EMPAGLIFLOZIN 25 MG: 25 TABLET, FILM COATED ORAL at 12:01

## 2025-02-28 RX ADMIN — MIDODRINE HYDROCHLORIDE 5 MG: 5 TABLET ORAL at 12:02

## 2025-02-28 RX ADMIN — POTASSIUM CHLORIDE 10 MEQ: 750 TABLET, EXTENDED RELEASE ORAL at 12:02

## 2025-02-28 RX ADMIN — METOPROLOL SUCCINATE 50 MG: 50 TABLET, EXTENDED RELEASE ORAL at 12:01

## 2025-02-28 RX ADMIN — CLOPIDOGREL BISULFATE 75 MG: 75 TABLET ORAL at 09:10

## 2025-02-28 RX ADMIN — SUCRALFATE 1 G: 1 TABLET ORAL at 12:01

## 2025-02-28 RX ADMIN — Medication 10 ML: at 09:11

## 2025-02-28 RX ADMIN — GABAPENTIN 800 MG: 400 CAPSULE ORAL at 13:21

## 2025-02-28 RX ADMIN — CEFTRIAXONE 2000 MG: 2 INJECTION, POWDER, FOR SOLUTION INTRAMUSCULAR; INTRAVENOUS at 15:20

## 2025-02-28 NOTE — DISCHARGE SUMMARY
[facility]    No primary care provider on file.    CHIEF COMPLAINT:     Chest Pain     HISTORY OF PRESENT ILLNESS:    HPI    84-year-old male with history of cardiopathy presents for chest pain and left lower extremity edema. States chest pain started early this morning. Had episode that was really bad felt somewhat like his heart was racing took a nitro and then improved significantly but still present. Radiated down his left arm some. States he is also having some left lower extremity edema. An injury to left lateral calf and then since then started having increasing erythema. Currently on antibiotics for several days but does not feel like they are helping. No fevers. States he did not feel any pain but thought maybe that his AICD went off. Follows with Dr. Painting with cardiology.     Past Medical History:   Diagnosis Date    Aneurysm     Cardiomyopathy     ICD implantation    Cellulitis of left lower extremity 2010    recurrent    CHD (coronary heart disease)     S/P CABG & PCI    Dyslipidemia     Heart valve disease     History of ventricular tachycardia     Hypertension     Myocardial infarction     Inferior MI    Pinched nerve     L4-L5    Prostate cancer      Past Surgical History:   Procedure Laterality Date    ANGIOPLASTY  2000 04/1996 Stent: Palmaz- Huy stent/LAD 07/1996-RCA: 2000-Tetra stent Guidant to proximal RCA, mid to distal artery 2 sequential Guidant Tetra stents    APPENDECTOMY      CARDIAC ABLATION  April and May 2019    CARDIAC CATHETERIZATION  07/2017    1996 x2, Nov. 2000, 05/2010-cath 08/2015-no stents 2017    CARDIAC CATHETERIZATION N/A 11/13/2024    Procedure: LEFT HEART CATH with possible PCI;  Surgeon: Marcin Childers DO;  Location: [facility] INVASIVE LOCATION;  Service: Cardiovascular;  Laterality: N/A;  Local and IV sedation    CARDIAC DEFIBRILLATOR PLACEMENT      COLONOSCOPY W/ POLYPECTOMY      Mult colonoscopy's for polyp resection      CORONARY ARTERY BYPASS GRAFT  05/2010    x5 vessel: LMA to diagonal, LAD, intermedius, obtuse marginal, RCA    CORONARY STENT PLACEMENT      ENDOSCOPY N/A 2019    Procedure: ESOPHAGOGASTRODUODENOSCOPY with dilitation Bougie 50, 54;  Surgeon: Roddy Coronel MD;  Location: [facility] ENDOSCOPY;  Service: Gastroenterology    INSERT / REPLACE / REMOVE PACEMAKER      KNEE OPEN LATERAL RELEASE Left     reduction    OTHER SURGICAL HISTORY  2018    ICD implantation    PROSTATE SURGERY  2015    Robiotic surgery- Cyber Knife:     Family History   Problem Relation Age of Onset    Pancreatic cancer Mother     Heart disease Father      Social History     Tobacco Use    Smoking status: Former     Current packs/day: 0.00     Average packs/day: 1 pack/day for 17.0 years (17.0 ttl pk-yrs)     Types: Cigarettes     Start date: 1985     Quit date: 2002     Years since quittin.6     Passive exposure: Past    Smokeless tobacco: Never   Vaping Use    Vaping status: Never Used   Substance Use Topics    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       Cholecalciferol 10 MCG (400 UNIT) tablet Take 2 tablets by mouth Daily.       clopidogrel (PLAVIX) 75 MG tablet Take 1 tablet by mouth Daily. 90 tablet 1     dapagliflozin Propanediol (Farxiga) 10 MG tablet Take 10 mg by mouth Daily. [facility]       Diclofenac Sodium (VOLTAREN) 1 % gel gel Apply 4 g topically to the appropriate area as directed 4 (Four) Times a Day As Needed.       ezetimibe (ZETIA) 10 MG tablet Take 1 tablet by mouth Every Evening.       furosemide (LASIX) 20 MG tablet Take 1 tablet by mouth Daily.       gabapentin (NEURONTIN) 800 MG tablet Take 1 tablet by mouth 3 (Three) Times a Day.       meclizine (ANTIVERT) 25 MG tablet Take 1 tablet by mouth Every 6 (Six) Hours As Needed  for Dizziness.       methocarbamol (ROBAXIN) 500 MG tablet Take 1 tablet by mouth 3 (Three) Times a Day.       metoprolol succinate XL (TOPROL-XL) 100 MG 24 hr tablet Take 0.5 tablets by mouth 2 (Two) Times a Day.       mexiletine (MEXITIL) 150 MG capsule Take 1 capsule by mouth 2 (Two) Times a Day. 60 capsule 5     midodrine (PROAMATINE) 5 MG tablet Take 1 tablet by mouth 3 (Three) Times a Day Before Meals.       nitroglycerin (NITROSTAT) 0.4 MG SL tablet Place 1 tablet under the tongue Every 5 (Five) Minutes As Needed for Chest Pain.       Omega-3 Fatty Acids (fish oil) 1000 MG capsule capsule Take 2 capsules by mouth 2 (Two) Times a Day With Meals.       pantoprazole (PROTONIX) 40 MG EC tablet Take 1 tablet by mouth Daily.       potassium chloride (MICRO-K) 10 MEQ CR capsule Take 1 capsule by mouth Daily.       sennosides-docusate (PERICOLACE) 8.6-50 MG per tablet Take 1 tablet by mouth Daily.       spironolactone (ALDACTONE) 25 MG tablet Take 1 tablet by mouth Daily.       sucralfate (CARAFATE) 1 g tablet Take 1 tablet by mouth 4 (Four) Times a Day.        Allergies:  Lovastatin, Pravastatin, Simvastatin, and Spironolactone    Immunization History   Administered Date(s) Administered    COVID-19 (MODERNA) 1st,2nd,3rd Dose Monovalent 02/14/2021, 03/13/2021           REVIEW OF SYSTEMS:    Review of Systems   Constitutional: Negative for fever.   HENT: Negative.     Eyes: Negative.    Cardiovascular:  Positive for chest pain and leg swelling. Negative for dyspnea on exertion.   Respiratory: Negative.  Negative for shortness of breath.    Endocrine: Negative.    Skin:  Positive for color change. Unusual hair distribution: wnl.  Musculoskeletal:  Positive for joint pain.   Gastrointestinal: Negative.    Genitourinary: Negative.    Neurological:  Positive for weakness.   Psychiatric/Behavioral: Negative.         Vital Signs  Temp:  [97.2 °F (36.2 °C)-97.5 °F (36.4 °C)] 97.5 °F (36.4 °C)  Heart Rate:  [58-94] 73  Resp:   [11-20] 16  BP: (101-137)/(58-78) 136/78          Physical Exam:  Physical Exam  Vitals and nursing note reviewed.   Constitutional:       Appearance: Normal appearance.   HENT:      Head: Normocephalic and atraumatic.      Right Ear: External ear normal.      Left Ear: External ear normal.      Nose: Nose normal.      Mouth/Throat:      Pharynx: Oropharynx is clear.   Eyes:      Conjunctiva/sclera: Conjunctivae normal.   Cardiovascular:      Rate and Rhythm: Normal rate and regular rhythm.      Pulses: Normal pulses.      Heart sounds: Murmur heard.   Pulmonary:      Effort: Pulmonary effort is normal.   Abdominal:      General: Bowel sounds are normal.   Musculoskeletal:         General: Tenderness present.      Cervical back: Normal range of motion.      Left lower leg: Edema present.   Skin:     General: Skin is warm.      Capillary Refill: Capillary refill takes less than 2 seconds.      Findings: Erythema present.   Neurological:      Mental Status: He is alert and oriented to person, place, and time.   Psychiatric:         Mood and Affect: Mood normal.         Thought Content: Thought content normal.         Judgment: Judgment normal.         Emotional Behavior:    wnl   Debilities:   none  Results Review:    I reviewed the patient's new clinical results.  Lab Results (most recent)       Procedure Component Value Units Date/Time    High Sensitivity Troponin T [[id number]]  (Normal) Collected: 02/28/25 0229    Specimen: Blood from Arm, Right Updated: 02/28/25 1108     HS Troponin T 17 ng/L     Narrative:      High Sensitive Troponin T Reference Range:  <14.0 ng/L- Negative Female for AMI  <22.0 ng/L- Negative Male for AMI  >=14 - Abnormal Female indicating possible myocardial injury.  >=22 - Abnormal Male indicating possible myocardial injury.   Clinicians would have to utilize clinical acumen, EKG, Troponin, and serial changes to determine if it is an Acute Myocardial Infarction or myocardial injury due to an  underlying chronic condition.         C-reactive Protein [[id number]]  (Normal) Collected: 02/28/25 0229    Specimen: Blood from Arm, Right Updated: 02/28/25 1005     C-Reactive Protein 0.44 mg/dL     Sedimentation Rate [[id number]]  (Normal) Collected: 02/28/25 0229    Specimen: Blood from Arm, Right Updated: 02/28/25 0919     Sed Rate 20 mm/hr     Basic Metabolic Panel [[id number]]  (Abnormal) Collected: 02/28/25 0229    Specimen: Blood from Arm, Right Updated: 02/28/25 0335     Glucose 104 mg/dL      BUN 20 mg/dL      Creatinine 1.18 mg/dL      Sodium 138 mmol/L      Potassium 4.2 mmol/L      Chloride 104 mmol/L      CO2 24.9 mmol/L      Calcium 8.5 mg/dL      BUN/Creatinine Ratio 16.9     Anion Gap 9.1 mmol/L      eGFR 61.6 mL/min/1.73     Narrative:      GFR Categories in Chronic Kidney Disease (CKD)      GFR Category          GFR (mL/min/1.73)    Interpretation  G1                     90 or greater         Normal or high (1)  G2                      60-89                Mild decrease (1)  G3a                   45-59                Mild to moderate decrease  G3b                   30-44                Moderate to severe decrease  G4                    15-29                Severe decrease  G5                    14 or less           Kidney failure          (1)In the absence of evidence of kidney disease, neither GFR category G1 or G2 fulfill the criteria for CKD.    eGFR calculation 2021 CKD-EPI creatinine equation, which does not include race as a factor    Magnesium [[id number]]  (Normal) Collected: 02/28/25 0229    Specimen: Blood from Arm, Right Updated: 02/28/25 0335     Magnesium 2.2 mg/dL     CBC & Differential [[id number]]  (Normal) Collected: 02/28/25 0229    Specimen: Blood from Arm, Right Updated: 02/28/25 0325    Narrative:      The following orders were created for panel order CBC & Differential.  Procedure                               Abnormality         Status                     ---------                                -----------         ------                     CBC Auto Differential[[id number]]        Normal              Final result                 Please view results for these tests on the individual orders.    CBC Auto Differential [[id number]]  (Normal) Collected: 02/28/25 0229    Specimen: Blood from Arm, Right Updated: 02/28/25 0325     WBC 5.77 10*3/mm3      RBC 4.30 10*6/mm3      Hemoglobin 13.0 g/dL      Hematocrit 40.5 %      MCV 94.2 fL      MCH 30.2 pg      MCHC 32.1 g/dL      RDW 14.6 %      RDW-SD 51.2 fl      MPV 10.2 fL      Platelets 169 10*3/mm3      Neutrophil % 51.0 %      Lymphocyte % 31.4 %      Monocyte % 10.9 %      Eosinophil % 5.5 %      Basophil % 1.0 %      Immature Grans % 0.2 %      Neutrophils, Absolute 2.94 10*3/mm3      Lymphocytes, Absolute 1.81 10*3/mm3      Monocytes, Absolute 0.63 10*3/mm3      Eosinophils, Absolute 0.32 10*3/mm3      Basophils, Absolute 0.06 10*3/mm3      Immature Grans, Absolute 0.01 10*3/mm3      nRBC 0.0 /100 WBC     High Sensitivity Troponin T 1Hr [[id number]] Collected: 02/27/25 1238    Specimen: Blood Updated: 02/27/25 1312     HS Troponin T 21 ng/L      Troponin T Numeric Delta -3 ng/L      Troponin T % Delta -13    Narrative:      High Sensitive Troponin T Reference Range:  <14.0 ng/L- Negative Female for AMI  <22.0 ng/L- Negative Male for AMI  >=14 - Abnormal Female indicating possible myocardial injury.  >=22 - Abnormal Male indicating possible myocardial injury.   Clinicians would have to utilize clinical acumen, EKG, Troponin, and serial changes to determine if it is an Acute Myocardial Infarction or myocardial injury due to an underlying chronic condition.         BNP [[id number]]  (Normal) Collected: 02/27/25 1142    Specimen: Blood Updated: 02/27/25 1217     proBNP 399.0 pg/mL     Narrative:      This assay is used as an aid in the diagnosis of individuals suspected of having heart failure. It can be used as an aid in the diagnosis of acute  decompensated heart failure (ADHF) in patients presenting with signs and symptoms of ADHF to the emergency department (ED). In addition, NT-proBNP of <300 pg/mL indicates ADHF is not likely.    Age Range Result Interpretation  NT-proBNP Concentration (pg/mL:      <50             Positive            >450                   Gray                 300-450                    Negative             <300    50-75           Positive            >900                  Gray                300-900                  Negative            <300      >75             Positive            >1800                  Gray                300-1800                  Negative            <300    High Sensitivity Troponin T [[id number]]  (Abnormal) Collected: 02/27/25 1142    Specimen: Blood Updated: 02/27/25 1217     HS Troponin T 24 ng/L     Narrative:      High Sensitive Troponin T Reference Range:  <14.0 ng/L- Negative Female for AMI  <22.0 ng/L- Negative Male for AMI  >=14 - Abnormal Female indicating possible myocardial injury.  >=22 - Abnormal Male indicating possible myocardial injury.   Clinicians would have to utilize clinical acumen, EKG, Troponin, and serial changes to determine if it is an Acute Myocardial Infarction or myocardial injury due to an underlying chronic condition.         Magnesium [[id number]]  (Normal) Collected: 02/27/25 1142    Specimen: Blood Updated: 02/27/25 1217     Magnesium 2.4 mg/dL     Comprehensive Metabolic Panel [[id number]]  (Abnormal) Collected: 02/27/25 1142    Specimen: Blood Updated: 02/27/25 1217     Glucose 107 mg/dL      BUN 26 mg/dL      Creatinine 1.30 mg/dL      Sodium 138 mmol/L      Potassium 4.2 mmol/L      Chloride 101 mmol/L      CO2 24.7 mmol/L      Calcium 9.6 mg/dL      Total Protein 7.5 g/dL      Albumin 4.4 g/dL      ALT (SGPT) 23 U/L      AST (SGOT) 32 U/L      Alkaline Phosphatase 115 U/L      Total Bilirubin 0.2 mg/dL      Globulin 3.1 gm/dL      A/G Ratio 1.4 g/dL      BUN/Creatinine Ratio  "20.0     Anion Gap 12.3 mmol/L      eGFR 54.8 mL/min/1.73     Narrative:      GFR Categories in Chronic Kidney Disease (CKD)      GFR Category          GFR (mL/min/1.73)    Interpretation  G1                     90 or greater         Normal or high (1)  G2                      60-89                Mild decrease (1)  G3a                   45-59                Mild to moderate decrease  G3b                   30-44                Moderate to severe decrease  G4                    15-29                Severe decrease  G5                    14 or less           Kidney failure          (1)In the absence of evidence of kidney disease, neither GFR category G1 or G2 fulfill the criteria for CKD.    eGFR calculation 2021 CKD-EPI creatinine equation, which does not include race as a factor    D-dimer, Quantitative [[id number]]  (Normal) Collected: 02/27/25 1142    Specimen: Blood Updated: 02/27/25 1200     D-Dimer, Quantitative 0.60 MCGFEU/mL     Narrative:      According to the assay 's published package insert, a normal (<0.50 MCGFEU/mL) D-dimer result in conjunction with a non-high clinical probability assessment, excludes deep vein thrombosis (DVT) and pulmonary embolism (PE) with high sensitivity.    D-dimer values increase with age and this can make VTE exclusion of an older population difficult. To address this, the [employer], based on best available evidence and recent guidelines, recommends that clinicians use age-adjusted D-dimer thresholds in patients greater than 50 years of age with: a) a low probability of PE who do not meet all Pulmonary Embolism Rule Out Criteria, or b) in those with intermediate probability of PE.   The formula for an age-adjusted D-dimer cut-off is \"age/100\".  For example, a 60 year old patient would have an age-adjusted cut-off of 0.60 MCGFEU/mL and an 80 year old 0.80 MCGFEU/mL.    Extra Tubes [[id number]] Collected: 02/27/25 1142    Specimen: Blood, Venous " Line Updated: 02/27/25 1200    Narrative:      The following orders were created for panel order Extra Tubes.  Procedure                               Abnormality         Status                     ---------                               -----------         ------                     Gold Top - SST[[id number]]                                   Final result                 Please view results for these tests on the individual orders.    Gold Top - SST [[id number]] Collected: 02/27/25 1142    Specimen: Blood Updated: 02/27/25 1200     Extra Tube Hold for add-ons.     Comment: Auto resulted.       CBC & Differential [[id number]]  (Normal) Collected: 02/27/25 1142    Specimen: Blood Updated: 02/27/25 1148    Narrative:      The following orders were created for panel order CBC & Differential.  Procedure                               Abnormality         Status                     ---------                               -----------         ------                     CBC Auto Differential[[id number]]        Normal              Final result                 Please view results for these tests on the individual orders.    CBC Auto Differential [[id number]]  (Normal) Collected: 02/27/25 1142    Specimen: Blood Updated: 02/27/25 1148     WBC 7.12 10*3/mm3      RBC 4.59 10*6/mm3      Hemoglobin 13.7 g/dL      Hematocrit 43.0 %      MCV 93.7 fL      MCH 29.8 pg      MCHC 31.9 g/dL      RDW 14.5 %      RDW-SD 50.1 fl      MPV 9.7 fL      Platelets 188 10*3/mm3      Neutrophil % 57.9 %      Lymphocyte % 26.1 %      Monocyte % 10.4 %      Eosinophil % 4.8 %      Basophil % 0.7 %      Immature Grans % 0.1 %      Neutrophils, Absolute 4.12 10*3/mm3      Lymphocytes, Absolute 1.86 10*3/mm3      Monocytes, Absolute 0.74 10*3/mm3      Eosinophils, Absolute 0.34 10*3/mm3      Basophils, Absolute 0.05 10*3/mm3      Immature Grans, Absolute 0.01 10*3/mm3      nRBC 0.0 /100 WBC             Imaging Results (Most Recent)       Procedure Component  Value Units Date/Time    XR Chest 1 View [[id number]] Collected: 02/27/25 1208     Updated: 02/27/25 1213    Narrative:      XR CHEST 1 VW    Date of Exam: 2/27/2025 12:00 PM EST    Indication: chest pain    Comparison: CT chest 2/14/2025, two-view chest x-ray 10/29/2024    Findings:  There are mild right basilar airspace opacities. Left lung appears clear. No pneumothorax or large pleural effusion is seen. Cardiac silhouette remains mildly enlarged. Changes from CABG and ICD lead appears stable.      Impression:      Impression:  Mild right basilar airspace opacities, which may be due to atelectasis and/or pneumonia.        Electronically Signed: Linda Ervin    2/27/2025 12:11 PM EST    Workstation ID: [id number]          reviewed    ECG/EMG Results (most recent)       Procedure Component Value Units Date/Time    Telemetry Scan [[id number]] Resulted: 02/27/25     Updated: 02/27/25 1258    Telemetry Scan [[id number]] Resulted: 02/27/25     Updated: 02/27/25 1723    ECG 12 Lead Chest Pain [[id number]] Collected: 02/27/25 1137     Updated: 02/28/25 0627     QT Interval 370 ms      QTC Interval 417 ms     Narrative:      HEART RATE=76  bpm  RR Wapfufjn=907  ms  MA Babuxlkf=572  ms  P Horizontal Axis=8  deg  P Front Axis=55  deg  QRSD Sunncttj=820  ms  QT Etciiejh=063  ms  UXcQ=250  ms  QRS Axis=-10  deg  T Wave Axis=155  deg  - ABNORMAL ECG -  Sinus rhythm  Multiple ventricular premature complexes  Left bundle branch block  When compared with ECG of 14-Nov-2024 06:05:38,  No significant change  Electronically Signed By: Alexy Wyman (EROS) 2025-02-28 06:26:52  Date and Time of Study:2025-02-27 11:37:48    Telemetry Scan [[id number]] Resulted: 02/27/25     Updated: 02/28/25 0813    Telemetry Scan [[id number]] Resulted: 02/27/25     Updated: 02/28/25 0826    Telemetry Scan [[id number]] Resulted: 02/27/25     Updated: 02/28/25 0854    Telemetry Scan [[id number]] Resulted: 02/27/25     Updated: 02/28/25 1006           reviewed    Results for orders placed during the hospital encounter of 02/27/25    Duplex Venous Lower Extremity - LEFT    Interpretation Summary    Normal left lower extremity venous duplex scan.      Results for orders placed during the hospital encounter of 02/14/25    Adult Transthoracic Echo Complete w/ Color, Spectral and Contrast if necessary per protocol    Interpretation Summary    Left ventricular systolic function is severely decreased. Left ventricular ejection fraction appears to be 21 - 25%.    The left ventricular cavity is moderate to severely dilated.    Left ventricular diastolic function is consistent with (grade I) impaired relaxation.    The left atrial cavity is moderately dilated.    Mild dilation of the sinuses of Valsalva is present.      Microbiology Results (last 10 days)       ** No results found for the last 240 hours. **            Assessment & Plan     Chest pain     Chest pain  Lab Results   Component Value Date    TROPONINT 17 02/28/2025    TROPONINT 21 02/27/2025    TROPONINT 24 (H) 02/27/2025   -History of CAD  -CTA: ascending aorta is aneurysmal, measuring up to 4.7 cm, similar to prior study of 9/10/2024  -Continue ASA and Ranexa   -Chest X-ray:Mild right basilar airspace opacities, which may be due to atelectasis and/or pneumonia   -EKG: sinus rhythm with multiple premature complexes  -Telemetry  -Cardiology consulted     Cellulitis of left lower extremity   -WBC 5.77  -CRP 0.44, sed rate 20  -d-dimer negative   -Dressing applied to left lower leg   -IV Rocephin given empirically    Heart failure with reduced ejection fraction   Lab Results   Component Value Date    TROPONINT 17 02/28/2025    TROPONINT 21 02/27/2025    PROBNP 399.0 02/27/2025    PROBNP 569.7 10/05/2024     02/28/2025   -Echocardiogram (2/14/25): LVEF 21-25% with severe dilation of LAE  -Continue lasix, Farxiga, Metoprolol   -Monitor I's and O's and daily weights  -2 g sodium diet               GERD  -Continue PPI                 Chronic shoulder pain  -Continue gabapentin and Robaxin      Chronic Vertigo  -Continue Antivert as needed       I discussed the patients findings and my recommendations with patient.     Discharge Diagnosis:      Chest pain      Hospital Course  Patient is a 84 y.o. male presented with left leg edema.   Patient presented with chest pain left lower leg extremity redness and edema.  Patient states he felt like he was having palpitations and his heart was racing Zyrtec and nitro with some improvement for coming to ED.  Patient states he is currently on oral antibiotics it is not helping.  Patient states he was seen by physician on time ago but says his condition is only helped by IV antibiotics.  White blood cell 5.7, CRP 0.44, sed rate 20.  D-dimer negative.  Dressing was applied to left lower leg and patient on IV Rocephin.  Patient also seen by cardiology due to palpitation and chest pain.  Troponins 2117.  .  Echocardiogram on February 14 of this year showed LVEF of 21 to 25% with severe dilation of LAD.  Cardiology recommend patient monitor blood pressures and follow-up with cardiology outpatient.  Patient remain on regimen.  Testing recommendations reviewed with patient and he agreed with treatment plan.  Patient take medication as prescribed.  Patient to follow-up with specialist outpatient and PCP.  If symptoms worsen or fever develops patient to call 911 or go to nearest ED.    Past Medical History:     Past Medical History:   Diagnosis Date    Aneurysm     Cardiomyopathy     ICD implantation    Cellulitis of left lower extremity 2010    recurrent    CHD (coronary heart disease)     S/P CABG & PCI    Dyslipidemia     Heart valve disease     History of ventricular tachycardia     Hypertension     Myocardial infarction     Inferior MI    Pinched nerve     L4-L5    Prostate cancer        Past Surgical History:     Past Surgical History:   Procedure Laterality Date     ANGIOPLASTY  1996 Stent: Palmaz- Huy stent/LAD 1996-RCA: -Tetra stent Guidant to proximal RCA, mid to distal artery 2 sequential Guidant Tetra stents    APPENDECTOMY      CARDIAC ABLATION  April and May 2019    CARDIAC CATHETERIZATION  2017    1996 x2, Nov. , 2010-cath 2015-no stents 2017    CARDIAC CATHETERIZATION N/A 2024    Procedure: LEFT HEART CATH with possible PCI;  Surgeon: Marcin Childers DO;  Location: [facility] CATH INVASIVE LOCATION;  Service: Cardiovascular;  Laterality: N/A;  Local and IV sedation    CARDIAC DEFIBRILLATOR PLACEMENT      COLONOSCOPY W/ POLYPECTOMY      Mult colonoscopy's for polyp resection     CORONARY ARTERY BYPASS GRAFT  05/2010    x5 vessel: LMA to diagonal, LAD, intermedius, obtuse marginal, RCA    CORONARY STENT PLACEMENT      ENDOSCOPY N/A 2019    Procedure: ESOPHAGOGASTRODUODENOSCOPY with dilitation Bougie 50, 54;  Surgeon: Roddy Coronel MD;  Location: [facility] ENDOSCOPY;  Service: Gastroenterology    INSERT / REPLACE / REMOVE PACEMAKER      KNEE OPEN LATERAL RELEASE Left     reduction    OTHER SURGICAL HISTORY  2018    ICD implantation    PROSTATE SURGERY      Robiotic surgery- Cyber Knife:       Social History:   Social History     Socioeconomic History    Marital status:    Tobacco Use    Smoking status: Former     Current packs/day: 0.00     Average packs/day: 1 pack/day for 17.0 years (17.0 ttl pk-yrs)     Types: Cigarettes     Start date: 1985     Quit date: 2002     Years since quittin.6     Passive exposure: Past    Smokeless tobacco: Never   Vaping Use    Vaping status: Never Used   Substance and Sexual Activity    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    Drug use: Never    Sexual activity: Defer       Procedures Performed         Consults:   Consults       Date and Time Order Name Status Description    2025  4:32 PM Inpatient Cardiology Consult Completed              Condition on Discharge:     Stable    Discharge Disposition      Discharge Medications     Discharge Medications        ASK your doctor about these medications        Instructions Start Date   aspirin 81 MG EC tablet   81 mg, Daily      cholecalciferol 10 MCG (400 UNIT) tablet  Commonly known as: VITAMIN D3   800 Units, Daily      clopidogrel 75 MG tablet  Commonly known as: PLAVIX   75 mg, Oral, Daily      Diclofenac Sodium 1 % gel gel  Commonly known as: VOLTAREN   4 g, 4 Times Daily PRN      ezetimibe 10 MG tablet  Commonly known as: ZETIA   10 mg, Every Evening      Farxiga 10 MG tablet  Generic drug: dapagliflozin Propanediol   10 mg, Daily      fish oil 1000 MG capsule capsule   2,000 mg, 2 Times Daily With Meals      furosemide 20 MG tablet  Commonly known as: LASIX   20 mg, Daily      gabapentin 800 MG tablet  Commonly known as: NEURONTIN   800 mg, Oral, 3 Times Daily      meclizine 25 MG tablet  Commonly known as: ANTIVERT   25 mg, Every 6 Hours PRN      methocarbamol 500 MG tablet  Commonly known as: ROBAXIN   500 mg, 3 Times Daily      metoprolol succinate  MG 24 hr tablet  Commonly known as: TOPROL-XL   50 mg, 2 Times Daily      mexiletine 150 MG capsule  Commonly known as: MEXITIL   150 mg, Oral, 2 Times Daily      midodrine 5 MG tablet  Commonly known as: PROAMATINE   5 mg, 3 Times Daily Before Meals      nitroglycerin 0.4 MG SL tablet  Commonly known as: NITROSTAT   0.4 mg, Every 5 Minutes PRN      pantoprazole 40 MG EC tablet  Commonly known as: PROTONIX   40 mg, Daily      potassium chloride 10 MEQ CR capsule  Commonly known as: MICRO-K   10 mEq, Daily      sennosides-docusate 8.6-50 MG per tablet  Commonly known as: PERICOLACE   1 tablet, Daily      spironolactone 25 MG tablet  Commonly known as: ALDACTONE   25 mg, Daily      sucralfate 1 g tablet  Commonly known as: CARAFATE   1 g, 4 Times Daily               Discharge Diet:     Activity at Discharge:     Follow-up Appointments  Future  Appointments   Date Time Provider Department Center   3/13/2025 11:15 AM John Barros MD [facility]   3/17/2025  2:15 PM Constantino Arvizu MD [facility]   8/26/2025  2:00 PM Marcin Childers DO [facility]         Test Results Pending at Discharge  Pending Results       None             Risk for Readmission (LACE) Score: 5 (2/28/2025  6:00 AM)          NIMCO Renee  02/28/25  12:33 EST

## 2025-02-28 NOTE — PLAN OF CARE
Goal Outcome Evaluation:     AOX4, c/o pain 9/10 in LLE, given prn med which helps, Gabapentin ordered every 6 hours.  Cardiology consult called during dayshift 2/27/25 NPO after midnight.

## 2025-02-28 NOTE — CONSULTS
[facility] CARDIOLOGY CONSULT  [facility]      Cardiology assessment and plan      Chest discomfort  Normal troponin  Normal proBNP  Nondiagnostic EKG secondary to left bundle branch block  Normal D-dimer  CT chest with no evidence of any acute findings and stable ascending aortic aneurysm  Coronary artery disease prior coronary bypass surgery and recent PCI and stenting in November 2024  Ischemic cardiomyopathy status post BiV ICD  Hypertension  Hyperlipidemia  Tmax is 97.4 pulse is 70 respirations are 15 blood pressure is 136/46 sats are 97%  Normal troponin  Sodium is 138 potassium is 4.2 creatinine is 1.1 C-reactive protein is 0.4 hemoglobin is 13.0  Venous Doppler of the bilateral lower extremities with no DVT  Prior cath films reviewed  Current medications include aspirin 81 mg p.o. once a day patient is on Lasix 20 mg p.o. once a day patient is on metoprolol Toprol-XL 50 mg p.o. twice daily patient is on mexiletine 150 mg p.o. twice daily patient is on midodrine 5 mg p.o. 3 times a day patient is on Aldactone 25 mg p.o. once a day and Plavix 75 mg p.o. once a day patient is on Lovenox for DVT prophylaxis  Diagnosis and treatment options reviewed and discussed with patient  Successful PCI GE LAD via the vein graft  Residual 99% LAD in the retrograde limb  Residual 99% obtuse marginal branch of circumflex and retrograde limb  Patent LIMA-DG  Patent SVG-RCA  Patent SVG-OM  Patent SVG-LAD  Patent SVG-DG  Diagnosis and treatment options reviewed and discussed with patient  Patient is currently clinically stable  Continue current medical therapy  Conservative management from cardiac standpoint  Diagnosis and treatment options reviewed and discussed with patient  Need for close monitoring and follow-up reviewed and discussed with patient  Continue current medical therapy  Continue close monitoring and follow-up  Okay to discharge from cardiac standpoint  Follow-up in office in 2  weeks                        Subjective:     Encounter Date:02/27/2025      Patient ID: Deion Nicholas is a 84 y.o. male.    Chief Complaint: Chest Pain      HPI:  Deion Nicholas is a 84 y.o. male a past cardiac history of CAD s/p 5vCABG in 2010 and PCI to LAD via SVG 11/2024, ICM s/p Biotronic ICD in 2018, VT s/p ablation, and ascending aortic aneurysm followed by Dr. Barros.  Last 2D echo 2/2025 showed an EF 21-25% with grade 1 diastolic dysfunction and trace MR/AI.  PMH includes HTN and HLD.  Patient presents to the ER with complaints of chest pain and cardiology consulted for further evaluation and management.  He also c/o LLL edema and being treated for cellulitis.    Patient tells me that yesterday he was walking through his home and developed lightheadedness and midsternal chest discomfort radiating as numbness down the left arm accompanied by shortness of breath.  He took nitroglycerin which relieved his pain.  He was concerned that the symptoms felt similar to when he has received ICD shocks in the past.  He tells me this was different from his prior angina though.  Prior to yesterday he has been able to utilize his pedal machine and do light weights without difficulty.  He reports good p.o. intake.      Past Medical History:   Diagnosis Date    Aneurysm     Cardiomyopathy     ICD implantation    Cellulitis of left lower extremity 2010    recurrent    CHD (coronary heart disease)     S/P CABG & PCI    Dyslipidemia     Heart valve disease     History of ventricular tachycardia     Hypertension     Myocardial infarction     Inferior MI    Pinched nerve     L4-L5    Prostate cancer          Past Surgical History:   Procedure Laterality Date    ANGIOPLASTY  2000 04/1996 Stent: Palmaz- Huy stent/LAD 07/1996-RCA: 2000-Tetra stent Guidant to proximal RCA, mid to distal artery 2 sequential Guidant Tetra stents    APPENDECTOMY      CARDIAC ABLATION  April and May 2019    CARDIAC CATHETERIZATION  07/2017 1996  x2, Nov. , 2010-cath 2015-no stents 2017    CARDIAC CATHETERIZATION N/A 2024    Procedure: LEFT HEART CATH with possible PCI;  Surgeon: Marcin Childers DO;  Location: [facility] CATH INVASIVE LOCATION;  Service: Cardiovascular;  Laterality: N/A;  Local and IV sedation    CARDIAC DEFIBRILLATOR PLACEMENT      COLONOSCOPY W/ POLYPECTOMY      Mult colonoscopy's for polyp resection     CORONARY ARTERY BYPASS GRAFT  05/2010    x5 vessel: LMA to diagonal, LAD, intermedius, obtuse marginal, RCA    CORONARY STENT PLACEMENT      ENDOSCOPY N/A 2019    Procedure: ESOPHAGOGASTRODUODENOSCOPY with dilitation Bougie 50, 54;  Surgeon: Roddy Coronel MD;  Location: [facility] ENDOSCOPY;  Service: Gastroenterology    INSERT / REPLACE / REMOVE PACEMAKER      KNEE OPEN LATERAL RELEASE Left     reduction    OTHER SURGICAL HISTORY  2018    ICD implantation    PROSTATE SURGERY      Robiotic surgery- Cyber Knife:         Social History     Socioeconomic History    Marital status:    Tobacco Use    Smoking status: Former     Current packs/day: 0.00     Average packs/day: 1 pack/day for 17.0 years (17.0 ttl pk-yrs)     Types: Cigarettes     Start date: 1985     Quit date: 2002     Years since quittin.6     Passive exposure: Past    Smokeless tobacco: Never   Vaping Use    Vaping status: Never Used   Substance and Sexual Activity    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    Drug use: Never    Sexual activity: Defer       Family History   Problem Relation Age of Onset    Pancreatic cancer Mother     Heart disease Father          Allergies   Allergen Reactions    Lovastatin Myalgia    Pravastatin Myalgia and Unknown (See Comments)     unknown    Simvastatin Unknown (See Comments) and Myalgia     unknown    Spironolactone Other (See Comments)     Gynecomastia        Current Medications:   Scheduled Meds:aspirin, 81 mg, Oral, Daily  cefTRIAXone, 2,000 mg, Intravenous,  "Q24H  clopidogrel, 75 mg, Oral, Daily  empagliflozin, 25 mg, Oral, Daily  enoxaparin, 40 mg, Subcutaneous, Daily  furosemide, 20 mg, Oral, Daily  gabapentin, 800 mg, Oral, 4x Daily  ipratropium-albuterol, 3 mL, Nebulization, 4x Daily - RT  methocarbamol, 500 mg, Oral, TID  metoprolol succinate XL, 50 mg, Oral, BID  mexiletine, 150 mg, Oral, BID  midodrine, 5 mg, Oral, TID AC  pantoprazole, 40 mg, Oral, Daily  potassium chloride, 10 mEq, Oral, Daily  ranolazine, 500 mg, Oral, BID  sennosides-docusate, 1 tablet, Oral, Daily  sodium chloride, 10 mL, Intravenous, Q12H  spironolactone, 25 mg, Oral, Daily  sucralfate, 1 g, Oral, 4x Daily      Continuous Infusions:     Review of Systems   Constitutional: Negative for chills, decreased appetite and malaise/fatigue.   HENT:  Negative for congestion and nosebleeds.    Eyes:  Negative for blurred vision and double vision.   Cardiovascular:  Positive for chest pain. Negative for dyspnea on exertion, irregular heartbeat, leg swelling, near-syncope, orthopnea, palpitations and paroxysmal nocturnal dyspnea.   Respiratory:  Negative for cough and shortness of breath.    Hematologic/Lymphatic: Negative for adenopathy. Does not bruise/bleed easily.   Skin:  Negative for color change and rash.   Musculoskeletal:  Negative for back pain and joint pain.   Gastrointestinal:  Negative for bloating, abdominal pain, hematemesis and hematochezia.   Genitourinary:  Negative for flank pain and hematuria.   Neurological:  Negative for dizziness and focal weakness.   Psychiatric/Behavioral:  Negative for altered mental status. The patient does not have insomnia.      All other systems reviewed and are negative.       Objective:         /73 (BP Location: Right arm, Patient Position: Lying)   Pulse 74   Temp 97.2 °F (36.2 °C) (Oral)   Resp 11   Ht 185.4 cm (73\")   Wt 91 kg (200 lb 9.9 oz)   SpO2 95%   BMI 26.47 kg/m²       General: Well-developed in NAD.  Neuro: AAOx3. No gross " "deficits.  HEENT: Sclerae clear, no xanthelasmas.  CV: S1S2, RRR. No murmurs or gallops.  Resp: Breathing is unlabored. Lungs CTA throughout.  GI: BS+. Abdomen soft and NTTP.  Ext: Pedal pulses are palpable. LLE edema.  MS: moves all extremities, no weakness.  Skin: warm, dry. LLE erythema.  Psych: calm and cooperative.            Lab Review:     Results from last 7 days   Lab Units 02/28/25  0229 02/27/25  1142   SODIUM mmol/L 138 138   POTASSIUM mmol/L 4.2 4.2   CHLORIDE mmol/L 104 101   CO2 mmol/L 24.9 24.7   BUN mg/dL 20 26*   CREATININE mg/dL 1.18 1.30*   GLUCOSE mg/dL 104* 107*   CALCIUM mg/dL 8.5* 9.6   AST (SGOT) U/L  --  32   ALT (SGPT) U/L  --  23     Results from last 7 days   Lab Units 02/27/25  1238 02/27/25  1142   HSTROP T ng/L 21 24*     Results from last 7 days   Lab Units 02/28/25  0229 02/27/25  1142   WBC 10*3/mm3 5.77 7.12   HEMOGLOBIN g/dL 13.0 13.7   HEMATOCRIT % 40.5 43.0   PLATELETS 10*3/mm3 169 188         Results from last 7 days   Lab Units 02/28/25  0229 02/27/25  1142   MAGNESIUM mg/dL 2.2 2.4           Invalid input(s): \"LDLCALC\"  Results from last 7 days   Lab Units 02/27/25  1142   PROBNP pg/mL 399.0           Recent Radiology:  Imaging Results (Most Recent)       Procedure Component Value Units Date/Time    XR Chest 1 View [[id number]] Collected: 02/27/25 1208     Updated: 02/27/25 1213    Narrative:      XR CHEST 1 VW    Date of Exam: 2/27/2025 12:00 PM EST    Indication: chest pain    Comparison: CT chest 2/14/2025, two-view chest x-ray 10/29/2024    Findings:  There are mild right basilar airspace opacities. Left lung appears clear. No pneumothorax or large pleural effusion is seen. Cardiac silhouette remains mildly enlarged. Changes from CABG and ICD lead appears stable.      Impression:      Impression:  Mild right basilar airspace opacities, which may be due to atelectasis and/or pneumonia.        Electronically Signed: Linda Ervin    2/27/2025 12:11 PM EST    Workstation ID: " [id number]              ECHOCARDIOGRAM:    Results for orders placed during the hospital encounter of 02/14/25    Adult Transthoracic Echo Complete w/ Color, Spectral and Contrast if necessary per protocol    Interpretation Summary    Left ventricular systolic function is severely decreased. Left ventricular ejection fraction appears to be 21 - 25%.    The left ventricular cavity is moderate to severely dilated.    Left ventricular diastolic function is consistent with (grade I) impaired relaxation.    The left atrial cavity is moderately dilated.    Mild dilation of the sinuses of Valsalva is present.            Assessment:         Active Hospital Problems    Diagnosis  POA    Chest pain [R07.9]  Yes     1) Chest Pain  -High-sensitivity troponin 24, 21  -  -EKG is nondiagnostic with chronic LBBB  -D-dimer negative  -CTA of the chest shows no acute findings and stable ascending aortic aneurysm    2) LLE cellulitis  - on abx    3) CAD s/p 5vCABG in 2010 and PCI to LAD via SVG 11/2024  -Continue on uninterrupted DAPT with aspirin and Plavix    4) ICM s/p Biotronic ICD in 2018, VT s/p ablation, and ascending aortic aneurysm followed by Dr. Barros.    - Last 2D echo 2/2025 showed an EF 21-25% with grade 1 diastolic dysfunction and trace MR/AI.      5) HTN    6) HLD           Plan:   Will repeat 1 additional set of cardiac enzymes.  Device interrogation reportedly shows no events and will obtain copy of this report.  Check orthostatics.  CTA of the chest excludes aortic dissection.  Recent PCI 11/2024.  Continue on uninterrupted DAPT with aspirin and Plavix.  No further chest pain.  Will discuss with attending cardiologist for further recommendations.         Electronically signed by NIMCO Martinez, 02/28/25, 10:33 AM EST.

## 2025-02-28 NOTE — PLAN OF CARE
Goal Outcome Evaluation:  Plan of Care Reviewed With: patient        Progress: improving  Outcome Evaluation: Pt continue to remain stable this shift. No further c/o chest pain. Pt continue to have 2+ edema to LLE, but is improving. Redness has decreased as well. IV abx continue. Pt is eager to dc home.

## 2025-02-28 NOTE — NURSING NOTE
This RN contacted [employer] for interrogation reports. Spoke with Jim. She took fax number and reported that she will send to rep for report to be faxed to our unit.

## 2025-02-28 NOTE — CASE MANAGEMENT/SOCIAL WORK
Discharge Planning Assessment   Daniel     Patient Name: Deion Nicholas  MRN: [id number]  Today's Date: 2/28/2025    Admit Date: 2/27/2025    Plan: Routine home. Current with [facility].   Discharge Needs Assessment       Row Name 02/28/25 0310       Living Environment    People in Home alone    Current Living Arrangements home    Potentially Unsafe Housing Conditions none    In the past 12 months has the electric, gas, oil, or water company threatened to shut off services in your home? No    Primary Care Provided by self    Provides Primary Care For no one    Family Caregiver if Needed child(tai), adult    Family Caregiver Names son Diallo    Quality of Family Relationships helpful;involved;supportive    Able to Return to Prior Arrangements yes       Resource/Environmental Concerns    Resource/Environmental Concerns none    Transportation Concerns none       Transportation Needs    In the past 12 months, has lack of transportation kept you from medical appointments or from getting medications? no    In the past 12 months, has lack of transportation kept you from meetings, work, or from getting things needed for daily living? No       Food Insecurity    Within the past 12 months, you worried that your food would run out before you got the money to buy more. Never true    Within the past 12 months, the food you bought just didn't last and you didn't have money to get more. Never true       Transition Planning    Patient/Family Anticipates Transition to home    Patient/Family Anticipated Services at Transition none    Transportation Anticipated car, drives self;family or friend will provide       Discharge Needs Assessment    Readmission Within the Last 30 Days no previous admission in last 30 days    Equipment Currently Used at Home cane, straight;rollator    Concerns to be Addressed denies needs/concerns at this time    Anticipated Changes Related to Illness none    Equipment Needed After Discharge none                    Discharge Plan       Row Name 02/28/25 1551       Plan    Plan Routine home. [facility]    Plan Comments CM met with patient at the bedside. Confirmed PCP ([facility]), insurance, and pharmacy. M2B n/a. Patient denies any difficulty affording medications. Patient is not current with any HHC/OPPT/OT services. Patient lives at home alone, is independent with ADLS/IADLS, and drives. Son Diallo able to provide DC transport. DC Barriers: Cardiology following, venous doppler of LLE negative, IV rocephin.                  Continued Care and Services - Admitted Since 2/27/2025    No active coordination exists for this encounter.       Expected Discharge Date and Time       Expected Discharge Date Expected Discharge Time    Feb 28, 2025            Demographic Summary       Row Name 02/28/25 1550       General Information    Admission Type observation    Arrived From emergency department    Required Notices Provided Observation Status Notice    Referral Source admission list    Reason for Consult discharge planning    Preferred Language English       Contact Information    Permission Granted to Share Info With     Contact Information Obtained for                    Functional Status       Row Name 02/28/25 1550       Functional Status    Usual Activity Tolerance moderate    Current Activity Tolerance moderate       Functional Status, IADL    Medications independent    Meal Preparation independent    Housekeeping independent    Laundry independent    Shopping independent           Mychal Lock RN     Cell number [phone]  Office number [phone]

## 2025-03-03 NOTE — CASE MANAGEMENT/SOCIAL WORK
Case Management Discharge Note                Transportation Services  Private: Car    Final Discharge Disposition Code: 01 - home or self-care

## 2025-03-05 ENCOUNTER — HOSPITAL ENCOUNTER (INPATIENT)
Facility: HOSPITAL | Age: 85
LOS: 2 days | Discharge: HOME | End: 2025-03-09
Attending: EMERGENCY MEDICINE | Admitting: STUDENT IN AN ORGANIZED HEALTH CARE EDUCATION/TRAINING PROGRAM
Payer: MEDICARE

## 2025-03-05 ENCOUNTER — APPOINTMENT (OUTPATIENT)
Dept: GENERAL RADIOLOGY | Facility: HOSPITAL | Age: 85
End: 2025-03-05
Payer: MEDICARE

## 2025-03-05 DIAGNOSIS — N17.9 ACUTE KIDNEY INJURY: ICD-10-CM

## 2025-03-05 DIAGNOSIS — R07.9 CHEST PAIN, UNSPECIFIED TYPE: ICD-10-CM

## 2025-03-05 DIAGNOSIS — L03.116 CELLULITIS OF LEFT LEG WITHOUT FOOT: Primary | ICD-10-CM

## 2025-03-05 LAB
ALBUMIN SERPL-MCNC: 4.3 G/DL (ref 3.5–5.2)
ALBUMIN/GLOB SERPL: 1.4 G/DL
ALP SERPL-CCNC: 109 U/L (ref 39–117)
ALT SERPL W P-5'-P-CCNC: 30 U/L (ref 1–41)
ANION GAP SERPL CALCULATED.3IONS-SCNC: 12.3 MMOL/L (ref 5–15)
APTT PPP: 31 SECONDS (ref 22.7–35.4)
AST SERPL-CCNC: 44 U/L (ref 1–40)
BASOPHILS # BLD AUTO: 0.06 10*3/MM3 (ref 0–0.2)
BASOPHILS NFR BLD AUTO: 1.1 % (ref 0–1.5)
BILIRUB SERPL-MCNC: 0.3 MG/DL (ref 0–1.2)
BUN SERPL-MCNC: 19 MG/DL (ref 8–23)
BUN/CREAT SERPL: 11.9 (ref 7–25)
CALCIUM SPEC-SCNC: 9.3 MG/DL (ref 8.6–10.5)
CHLORIDE SERPL-SCNC: 101 MMOL/L (ref 98–107)
CO2 SERPL-SCNC: 21.7 MMOL/L (ref 22–29)
CREAT SERPL-MCNC: 1.59 MG/DL (ref 0.76–1.27)
DEPRECATED RDW RBC AUTO: 50.3 FL (ref 37–54)
EGFRCR SERPLBLD CKD-EPI 2021: 43.1 ML/MIN/1.73
EOSINOPHIL # BLD AUTO: 0.29 10*3/MM3 (ref 0–0.4)
EOSINOPHIL NFR BLD AUTO: 5.2 % (ref 0.3–6.2)
ERYTHROCYTE [DISTWIDTH] IN BLOOD BY AUTOMATED COUNT: 14.4 % (ref 12.3–15.4)
GEN 5 1HR TROPONIN T REFLEX: 18 NG/L
GLOBULIN UR ELPH-MCNC: 3 GM/DL
GLUCOSE SERPL-MCNC: 121 MG/DL (ref 65–99)
HCT VFR BLD AUTO: 41.4 % (ref 37.5–51)
HGB BLD-MCNC: 13.4 G/DL (ref 13–17.7)
IMM GRANULOCYTES # BLD AUTO: 0.01 10*3/MM3 (ref 0–0.05)
IMM GRANULOCYTES NFR BLD AUTO: 0.2 % (ref 0–0.5)
INR PPP: 1.15 (ref 0.9–1.1)
LYMPHOCYTES # BLD AUTO: 1.14 10*3/MM3 (ref 0.7–3.1)
LYMPHOCYTES NFR BLD AUTO: 20.3 % (ref 19.6–45.3)
MAGNESIUM SERPL-MCNC: 2.4 MG/DL (ref 1.6–2.4)
MCH RBC QN AUTO: 30.4 PG (ref 26.6–33)
MCHC RBC AUTO-ENTMCNC: 32.4 G/DL (ref 31.5–35.7)
MCV RBC AUTO: 93.9 FL (ref 79–97)
MONOCYTES # BLD AUTO: 0.59 10*3/MM3 (ref 0.1–0.9)
MONOCYTES NFR BLD AUTO: 10.5 % (ref 5–12)
NEUTROPHILS NFR BLD AUTO: 3.52 10*3/MM3 (ref 1.7–7)
NEUTROPHILS NFR BLD AUTO: 62.7 % (ref 42.7–76)
NRBC BLD AUTO-RTO: 0 /100 WBC (ref 0–0.2)
NT-PROBNP SERPL-MCNC: 613 PG/ML (ref 0–1800)
PLATELET # BLD AUTO: 183 10*3/MM3 (ref 140–450)
PMV BLD AUTO: 10.3 FL (ref 6–12)
POTASSIUM SERPL-SCNC: 4.9 MMOL/L (ref 3.5–5.2)
PROT SERPL-MCNC: 7.3 G/DL (ref 6–8.5)
PROTHROMBIN TIME: 14.6 SECONDS (ref 11.7–14.2)
RBC # BLD AUTO: 4.41 10*6/MM3 (ref 4.14–5.8)
SODIUM SERPL-SCNC: 135 MMOL/L (ref 136–145)
TROPONIN T % DELTA: -22
TROPONIN T NUMERIC DELTA: -5 NG/L
TROPONIN T SERPL HS-MCNC: 23 NG/L
TSH SERPL DL<=0.05 MIU/L-ACNC: 3.32 UIU/ML (ref 0.27–4.2)
WBC NRBC COR # BLD AUTO: 5.61 10*3/MM3 (ref 3.4–10.8)

## 2025-03-05 PROCEDURE — G0378 HOSPITAL OBSERVATION PER HR: HCPCS

## 2025-03-05 PROCEDURE — 36415 COLL VENOUS BLD VENIPUNCTURE: CPT

## 2025-03-05 PROCEDURE — 83735 ASSAY OF MAGNESIUM: CPT | Performed by: NURSE PRACTITIONER

## 2025-03-05 PROCEDURE — 99285 EMERGENCY DEPT VISIT HI MDM: CPT

## 2025-03-05 PROCEDURE — 85730 THROMBOPLASTIN TIME PARTIAL: CPT | Performed by: NURSE PRACTITIONER

## 2025-03-05 PROCEDURE — 83880 ASSAY OF NATRIURETIC PEPTIDE: CPT | Performed by: NURSE PRACTITIONER

## 2025-03-05 PROCEDURE — 25010000002 ENOXAPARIN PER 10 MG: Performed by: EMERGENCY MEDICINE

## 2025-03-05 PROCEDURE — 84484 ASSAY OF TROPONIN QUANT: CPT | Performed by: NURSE PRACTITIONER

## 2025-03-05 PROCEDURE — 71045 X-RAY EXAM CHEST 1 VIEW: CPT

## 2025-03-05 PROCEDURE — 85610 PROTHROMBIN TIME: CPT | Performed by: NURSE PRACTITIONER

## 2025-03-05 PROCEDURE — 84443 ASSAY THYROID STIM HORMONE: CPT | Performed by: NURSE PRACTITIONER

## 2025-03-05 PROCEDURE — 93005 ELECTROCARDIOGRAM TRACING: CPT | Performed by: EMERGENCY MEDICINE

## 2025-03-05 PROCEDURE — 25810000003 LACTATED RINGERS PER 1000 ML: Performed by: EMERGENCY MEDICINE

## 2025-03-05 PROCEDURE — 25010000002 CEFTRIAXONE PER 250 MG: Performed by: EMERGENCY MEDICINE

## 2025-03-05 PROCEDURE — 80053 COMPREHEN METABOLIC PANEL: CPT | Performed by: NURSE PRACTITIONER

## 2025-03-05 PROCEDURE — 85025 COMPLETE CBC W/AUTO DIFF WBC: CPT | Performed by: NURSE PRACTITIONER

## 2025-03-05 PROCEDURE — 93005 ELECTROCARDIOGRAM TRACING: CPT

## 2025-03-05 RX ORDER — ENOXAPARIN SODIUM 100 MG/ML
40 INJECTION SUBCUTANEOUS ONCE
Status: COMPLETED | OUTPATIENT
Start: 2025-03-05 | End: 2025-03-05

## 2025-03-05 RX ORDER — AMOXICILLIN 250 MG
2 CAPSULE ORAL 2 TIMES DAILY PRN
Status: DISCONTINUED | OUTPATIENT
Start: 2025-03-05 | End: 2025-03-09 | Stop reason: HOSPADM

## 2025-03-05 RX ORDER — BISACODYL 10 MG
10 SUPPOSITORY, RECTAL RECTAL DAILY PRN
Status: DISCONTINUED | OUTPATIENT
Start: 2025-03-05 | End: 2025-03-09 | Stop reason: HOSPADM

## 2025-03-05 RX ORDER — POLYETHYLENE GLYCOL 3350 17 G/17G
17 POWDER, FOR SOLUTION ORAL DAILY PRN
Status: DISCONTINUED | OUTPATIENT
Start: 2025-03-05 | End: 2025-03-09 | Stop reason: HOSPADM

## 2025-03-05 RX ORDER — SODIUM CHLORIDE, SODIUM LACTATE, POTASSIUM CHLORIDE, CALCIUM CHLORIDE 600; 310; 30; 20 MG/100ML; MG/100ML; MG/100ML; MG/100ML
100 INJECTION, SOLUTION INTRAVENOUS CONTINUOUS
Status: DISPENSED | OUTPATIENT
Start: 2025-03-05 | End: 2025-03-06

## 2025-03-05 RX ORDER — SODIUM CHLORIDE 9 MG/ML
40 INJECTION, SOLUTION INTRAVENOUS AS NEEDED
Status: DISCONTINUED | OUTPATIENT
Start: 2025-03-05 | End: 2025-03-09 | Stop reason: HOSPADM

## 2025-03-05 RX ORDER — ENOXAPARIN SODIUM 100 MG/ML
40 INJECTION SUBCUTANEOUS EVERY 24 HOURS
Status: DISCONTINUED | OUTPATIENT
Start: 2025-03-05 | End: 2025-03-09 | Stop reason: HOSPADM

## 2025-03-05 RX ORDER — CLONIDINE HYDROCHLORIDE 0.1 MG/1
0.1 TABLET ORAL ONCE
Status: DISCONTINUED | OUTPATIENT
Start: 2025-03-05 | End: 2025-03-05

## 2025-03-05 RX ORDER — BISACODYL 5 MG/1
5 TABLET, DELAYED RELEASE ORAL DAILY PRN
Status: DISCONTINUED | OUTPATIENT
Start: 2025-03-05 | End: 2025-03-09 | Stop reason: HOSPADM

## 2025-03-05 RX ORDER — GABAPENTIN 400 MG/1
800 CAPSULE ORAL ONCE
Status: COMPLETED | OUTPATIENT
Start: 2025-03-06 | End: 2025-03-05

## 2025-03-05 RX ORDER — SODIUM CHLORIDE 0.9 % (FLUSH) 0.9 %
10 SYRINGE (ML) INJECTION EVERY 12 HOURS SCHEDULED
Status: DISCONTINUED | OUTPATIENT
Start: 2025-03-05 | End: 2025-03-09 | Stop reason: HOSPADM

## 2025-03-05 RX ORDER — ONDANSETRON 2 MG/ML
4 INJECTION INTRAMUSCULAR; INTRAVENOUS EVERY 6 HOURS PRN
Status: DISCONTINUED | OUTPATIENT
Start: 2025-03-05 | End: 2025-03-09 | Stop reason: HOSPADM

## 2025-03-05 RX ORDER — SODIUM CHLORIDE 0.9 % (FLUSH) 0.9 %
10 SYRINGE (ML) INJECTION AS NEEDED
Status: DISCONTINUED | OUTPATIENT
Start: 2025-03-05 | End: 2025-03-09 | Stop reason: HOSPADM

## 2025-03-05 RX ORDER — METHOCARBAMOL 500 MG/1
500 TABLET, FILM COATED ORAL ONCE
Status: COMPLETED | OUTPATIENT
Start: 2025-03-06 | End: 2025-03-05

## 2025-03-05 RX ADMIN — ENOXAPARIN SODIUM 40 MG: 100 INJECTION SUBCUTANEOUS at 18:47

## 2025-03-05 RX ADMIN — SODIUM CHLORIDE, SODIUM LACTATE, POTASSIUM CHLORIDE, AND CALCIUM CHLORIDE 100 ML/HR: 600; 310; 30; 20 INJECTION, SOLUTION INTRAVENOUS at 18:46

## 2025-03-05 RX ADMIN — GABAPENTIN 800 MG: 400 CAPSULE ORAL at 23:39

## 2025-03-05 RX ADMIN — CEFTRIAXONE SODIUM 1000 MG: 1 INJECTION, POWDER, FOR SOLUTION INTRAMUSCULAR; INTRAVENOUS at 18:16

## 2025-03-05 RX ADMIN — METHOCARBAMOL TABLETS 500 MG: 500 TABLET, COATED ORAL at 23:39

## 2025-03-05 RX ADMIN — Medication 10 ML: at 20:22

## 2025-03-05 NOTE — ED PROVIDER NOTES
Subjective   Provider in Triage Note  Chest pain radiating into arm today; better after ntg  Currently on abx for lower extremity cellulitis    Due to significant overcrowding in the emergency department patient was initially seen and evaluated in triage.  Provider in triage recommended patient placement in the treatment area to initiate therapy and movement to an ER bed as soon as possible.   Orders placed; medications will be deferred to main provider per protocol.        History of Present Illness  Patient reports he had been treated for cellulitis left leg.  He reports it was improved but then has gotten worse again.  He is not sure of fever.  He does not report shortness of breath.  He is completing outpatient course of antibiotics.  Review of Systems    Past Medical History:   Diagnosis Date    Aneurysm     Cardiomyopathy     ICD implantation    Cellulitis of left lower extremity 2010    recurrent    CHD (coronary heart disease)     S/P CABG & PCI    Dyslipidemia     Heart valve disease     History of ventricular tachycardia     Hypertension     Myocardial infarction     Inferior MI    Pinched nerve     L4-L5    Prostate cancer        Allergies   Allergen Reactions    Lovastatin Myalgia    Pravastatin Myalgia and Unknown (See Comments)     unknown    Simvastatin Unknown (See Comments) and Myalgia     unknown    Spironolactone Other (See Comments)     Gynecomastia        Past Surgical History:   Procedure Laterality Date    ANGIOPLASTY  2000 04/1996 Stent: Palmaz- Huy stent/LAD 07/1996-RCA: 2000-Tetra stent Guidant to proximal RCA, mid to distal artery 2 sequential Guidant Tetra stents    APPENDECTOMY      CARDIAC ABLATION  April and May 2019    CARDIAC CATHETERIZATION  07/2017    1996 x2, Nov. 2000, 05/2010-cath 08/2015-no stents 2017    CARDIAC CATHETERIZATION N/A 11/13/2024    Procedure: LEFT HEART CATH with possible PCI;  Surgeon: Marcin Childers DO;  Location: [facility] CATH INVASIVE  LOCATION;  Service: Cardiovascular;  Laterality: N/A;  Local and IV sedation    CARDIAC DEFIBRILLATOR PLACEMENT      COLONOSCOPY W/ POLYPECTOMY      Mult colonoscopy's for polyp resection     CORONARY ARTERY BYPASS GRAFT  05/2010    x5 vessel: LMA to diagonal, LAD, intermedius, obtuse marginal, RCA    CORONARY STENT PLACEMENT      ENDOSCOPY N/A 2019    Procedure: ESOPHAGOGASTRODUODENOSCOPY with dilitation Bougie 50, 54;  Surgeon: Roddy Coronel MD;  Location: [facility] ENDOSCOPY;  Service: Gastroenterology    INSERT / REPLACE / REMOVE PACEMAKER      KNEE OPEN LATERAL RELEASE Left     reduction    OTHER SURGICAL HISTORY  2018    ICD implantation    PROSTATE SURGERY      Robiotic surgery- Cyber Knife:       Family History   Problem Relation Age of Onset    Pancreatic cancer Mother     Heart disease Father        Social History     Socioeconomic History    Marital status:    Tobacco Use    Smoking status: Former     Current packs/day: 0.00     Average packs/day: 1 pack/day for 17.0 years (17.0 ttl pk-yrs)     Types: Cigarettes     Start date: 1985     Quit date: 2002     Years since quittin.6     Passive exposure: Past    Smokeless tobacco: Never   Vaping Use    Vaping status: Never Used   Substance and Sexual Activity    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 Authorizing Provider   aspirin 81 MG EC tablet Take 1 tablet by mouth Daily.    ProviderCyndee MD   Cholecalciferol 10 MCG (400 UNIT) tablet Take 2 tablets by mouth Daily.    ProviderCyndee MD   clopidogrel (PLAVIX) 75 MG tablet Take 1 tablet by mouth Daily. 25   Constantino Arvizu MD   dapagliflozin Propanediol (Farxiga) 10 MG tablet Take 10 mg by mouth Daily. [facility]    ProviderCyndee MD   Diclofenac Sodium (VOLTAREN) 1 % gel gel Apply 4 g topically to  the appropriate area as directed 4 (Four) Times a Day As Needed.    Cyndee Melgar MD   ezetimibe (ZETIA) 10 MG tablet Take 1 tablet by mouth Every Evening.    Cyndee Melgar MD   furosemide (LASIX) 20 MG tablet Take 1 tablet by mouth Daily.    Cyndee Melgar MD   gabapentin (NEURONTIN) 800 MG tablet Take 1 tablet by mouth 3 (Three) Times a Day.    Cyndee Melgar MD   meclizine (ANTIVERT) 25 MG tablet Take 1 tablet by mouth Every 6 (Six) Hours As Needed for Dizziness.    Cyndee Melgar MD   methocarbamol (ROBAXIN) 500 MG tablet Take 1 tablet by mouth 3 (Three) Times a Day.    Cyndee Melgar MD   metoprolol succinate XL (TOPROL-XL) 100 MG 24 hr tablet Take 0.5 tablets by mouth 2 (Two) Times a Day.    Cyndee Melgar MD   mexiletine (MEXITIL) 150 MG capsule Take 1 capsule by mouth 2 (Two) Times a Day. 12/16/24   Constantino Arvizu MD   midodrine (PROAMATINE) 5 MG tablet Take 1 tablet by mouth 3 (Three) Times a Day Before Meals.    Cyndee Melgar MD   nitroglycerin (NITROSTAT) 0.4 MG SL tablet Place 1 tablet under the tongue Every 5 (Five) Minutes As Needed for Chest Pain.    Cyndee Melgar MD   Omega-3 Fatty Acids (fish oil) 1000 MG capsule capsule Take 2 capsules by mouth 2 (Two) Times a Day With Meals.    Cyndee Melgar MD   pantoprazole (PROTONIX) 40 MG EC tablet Take 1 tablet by mouth Daily.    Cyndee Melgar MD   potassium chloride (MICRO-K) 10 MEQ CR capsule Take 1 capsule by mouth Daily.    Cyndee Melgar MD   sennosides-docusate (PERICOLACE) 8.6-50 MG per tablet Take 1 tablet by mouth Daily.    Cyndee Melgar MD   spironolactone (ALDACTONE) 25 MG tablet Take 1 tablet by mouth Daily.    Cyndee Melgar MD   sucralfate (CARAFATE) 1 g tablet Take 1 tablet by mouth 4 (Four) Times a Day.    Cyndee Melgar MD   sulfamethoxazole-trimethoprim (Bactrim DS) 800-160 MG per tablet Take 1 tablet by mouth 2 (Two) Times a  Day for 7 days. 2/28/25 3/7/25  Sakina Hou, NIM[employer]           Objective   Physical Exam  84-year-old male awake alert.  Generally well-developed well-nourished.  Chest clear equal breath sounds cardiovascular rate and rhythm abdomen soft nontender examination of left leg he has noted to have some subcu swelling erythema warmth to lower leg.  Superficial healing abrasion noted to lateral aspect.  Procedures           ED Course      Results for orders placed or performed during the hospital encounter of 03/05/25   ECG 12 Lead Chest Pain    Collection Time: 03/05/25  3:33 PM   Result Value Ref Range    QT Interval 371 ms    QTC Interval 426 ms   Comprehensive Metabolic Panel    Collection Time: 03/05/25  3:54 PM    Specimen: Blood   Result Value Ref Range    Glucose 121 (H) 65 - 99 mg/dL    BUN 19 8 - 23 mg/dL    Creatinine 1.59 (H) 0.76 - 1.27 mg/dL    Sodium 135 (L) 136 - 145 mmol/L    Potassium 4.9 3.5 - 5.2 mmol/L    Chloride 101 98 - 107 mmol/L    CO2 21.7 (L) 22.0 - 29.0 mmol/L    Calcium 9.3 8.6 - 10.5 mg/dL    Total Protein 7.3 6.0 - 8.5 g/dL    Albumin 4.3 3.5 - 5.2 g/dL    ALT (SGPT) 30 1 - 41 U/L    AST (SGOT) 44 (H) 1 - 40 U/L    Alkaline Phosphatase 109 39 - 117 U/L    Total Bilirubin 0.3 0.0 - 1.2 mg/dL    Globulin 3.0 gm/dL    A/G Ratio 1.4 g/dL    BUN/Creatinine Ratio 11.9 7.0 - 25.0    Anion Gap 12.3 5.0 - 15.0 mmol/L    eGFR 43.1 (L) >60.0 mL/min/1.73   Protime-INR    Collection Time: 03/05/25  3:54 PM    Specimen: Blood   Result Value Ref Range    Protime 14.6 (H) 11.7 - 14.2 Seconds    INR 1.15 (H) 0.90 - 1.10   aPTT    Collection Time: 03/05/25  3:54 PM    Specimen: Blood   Result Value Ref Range    PTT 31.0 22.7 - 35.4 seconds   High Sensitivity Troponin T    Collection Time: 03/05/25  3:54 PM    Specimen: Blood   Result Value Ref Range    HS Troponin T 23 (H) <22 ng/L   BNP    Collection Time: 03/05/25  3:54 PM    Specimen: Blood   Result Value Ref Range    proBNP 613.0 0.0 - 1,800.0 pg/mL   TSH     Collection Time: 03/05/25  3:54 PM    Specimen: Blood   Result Value Ref Range    TSH 3.320 0.270 - 4.200 uIU/mL   Magnesium    Collection Time: 03/05/25  3:54 PM    Specimen: Blood   Result Value Ref Range    Magnesium 2.4 1.6 - 2.4 mg/dL   CBC Auto Differential    Collection Time: 03/05/25  3:54 PM    Specimen: Blood   Result Value Ref Range    WBC 5.61 3.40 - 10.80 10*3/mm3    RBC 4.41 4.14 - 5.80 10*6/mm3    Hemoglobin 13.4 13.0 - 17.7 g/dL    Hematocrit 41.4 37.5 - 51.0 %    MCV 93.9 79.0 - 97.0 fL    MCH 30.4 26.6 - 33.0 pg    MCHC 32.4 31.5 - 35.7 g/dL    RDW 14.4 12.3 - 15.4 %    RDW-SD 50.3 37.0 - 54.0 fl    MPV 10.3 6.0 - 12.0 fL    Platelets 183 140 - 450 10*3/mm3    Neutrophil % 62.7 42.7 - 76.0 %    Lymphocyte % 20.3 19.6 - 45.3 %    Monocyte % 10.5 5.0 - 12.0 %    Eosinophil % 5.2 0.3 - 6.2 %    Basophil % 1.1 0.0 - 1.5 %    Immature Grans % 0.2 0.0 - 0.5 %    Neutrophils, Absolute 3.52 1.70 - 7.00 10*3/mm3    Lymphocytes, Absolute 1.14 0.70 - 3.10 10*3/mm3    Monocytes, Absolute 0.59 0.10 - 0.90 10*3/mm3    Eosinophils, Absolute 0.29 0.00 - 0.40 10*3/mm3    Basophils, Absolute 0.06 0.00 - 0.20 10*3/mm3    Immature Grans, Absolute 0.01 0.00 - 0.05 10*3/mm3    nRBC 0.0 0.0 - 0.2 /100 WBC   High Sensitivity Troponin T 1Hr    Collection Time: 03/05/25  5:42 PM    Specimen: Blood   Result Value Ref Range    HS Troponin T 18 <22 ng/L    Troponin T Numeric Delta -5 ng/L    Troponin T % Delta -22 Abnormal if >/= 20%     XR Chest 1 View   Final Result   Impression:   Borderline cardiomegaly. No acute process is identified.            Electronically Signed: Tommy Pan MD     3/5/2025 4:09 PM EST     Workstation ID: [id number]        Medications   sodium chloride 0.9 % flush 10 mL (has no administration in time range)   cefTRIAXone (ROCEPHIN) 1,000 mg in sodium chloride 0.9 % 100 mL MBP (has no administration in time range)   lactated ringers infusion (has no administration in time range)     /62    "Pulse 63   Temp 98 °F (36.7 °C) (Oral)   Resp 18   Ht 185.4 cm (73\")   Wt 92.5 kg (204 lb)   SpO2 90%   BMI 26.91 kg/m²                                                    Medical Decision Making  Problems Addressed:  Cellulitis of left leg without foot: complicated acute illness or injury  Chest pain, unspecified type: complicated acute illness or injury    Amount and/or Complexity of Data Reviewed  Labs: ordered.  Radiology: ordered.  ECG/medicine tests: ordered.    Risk  Prescription drug management.  Decision regarding hospitalization.    Chart review: Patient had Doppler ultrasound on 27 February that was negative for DVT.  Comorbidity: As per past history   Differential: Cellulitis, angina, peripheral vascular disease,  My EKG interpretation: Sinus rhythm rate of 79 left bundle branch block left axis deviation.  Compared to previous no significant change other than PVC no longer present  Lab: Normal TSH magnesium BMP  troponin mild elevation 23.  Comprehensive metabolic panel glucose 121 BUN 19 creatinine 1.59 sodium 135.  Repeat troponin 18  My Radiology review and interpretation: Chest x-ray reveals borderline cardiomegaly pacemaker change of previous CABG.  Lungs are clear pulmonary vasculature appears normal  Discussion/treatment: Patient IV placed.  Was started on Rocephin.  Review of his chart shows troponin is at baseline.  Patient's findings were discussed with him.  He will be placed in observation with ID consult in AM.  Will also check LAMAR.  Patient started IV fluids.  Most recent creatinine was 1.18.  Patient was evaluated using appropriate PPE      Final diagnoses:   Cellulitis of left leg without foot   Chest pain, unspecified type   Acute kidney injury       ED Disposition  ED Disposition       ED Disposition   Decision to Admit    Condition   --    Comment   --               No follow-up provider specified.       Medication List      No changes were made to your prescriptions during this " visit.            Neymar Murray MD  03/05/25 1811       Neymar Murray MD  03/05/25 1812

## 2025-03-06 ENCOUNTER — APPOINTMENT (OUTPATIENT)
Dept: CARDIOLOGY | Facility: HOSPITAL | Age: 85
End: 2025-03-06
Payer: OTHER GOVERNMENT

## 2025-03-06 LAB
ANION GAP SERPL CALCULATED.3IONS-SCNC: 10.3 MMOL/L (ref 5–15)
BASOPHILS # BLD AUTO: 0.04 10*3/MM3 (ref 0–0.2)
BASOPHILS NFR BLD AUTO: 0.8 % (ref 0–1.5)
BH CV LOWER ARTERIAL LEFT ABI RATIO: NORMAL
BH CV LOWER ARTERIAL LEFT DORSALIS PEDIS SYS MAX: NORMAL
BH CV LOWER ARTERIAL LEFT GREAT TOE SYS MAX: 64
BH CV LOWER ARTERIAL LEFT POST TIBIAL SYS MAX: NORMAL
BH CV LOWER ARTERIAL LEFT TBI RATIO: 0.56
BH CV LOWER ARTERIAL RIGHT ABI RATIO: NORMAL
BH CV LOWER ARTERIAL RIGHT DORSALIS PEDIS SYS MAX: NORMAL
BH CV LOWER ARTERIAL RIGHT GREAT TOE SYS MAX: 92
BH CV LOWER ARTERIAL RIGHT POST TIBIAL SYS MAX: NORMAL
BH CV LOWER ARTERIAL RIGHT TBI RATIO: 0.8
BUN SERPL-MCNC: 18 MG/DL (ref 8–23)
BUN/CREAT SERPL: 13 (ref 7–25)
CALCIUM SPEC-SCNC: 9 MG/DL (ref 8.6–10.5)
CHLORIDE SERPL-SCNC: 103 MMOL/L (ref 98–107)
CO2 SERPL-SCNC: 22.7 MMOL/L (ref 22–29)
CREAT SERPL-MCNC: 1.38 MG/DL (ref 0.76–1.27)
DEPRECATED RDW RBC AUTO: 50.9 FL (ref 37–54)
EGFRCR SERPLBLD CKD-EPI 2021: 51.1 ML/MIN/1.73
EOSINOPHIL # BLD AUTO: 0.38 10*3/MM3 (ref 0–0.4)
EOSINOPHIL NFR BLD AUTO: 8 % (ref 0.3–6.2)
ERYTHROCYTE [DISTWIDTH] IN BLOOD BY AUTOMATED COUNT: 14.6 % (ref 12.3–15.4)
GLUCOSE SERPL-MCNC: 107 MG/DL (ref 65–99)
HCT VFR BLD AUTO: 39.5 % (ref 37.5–51)
HGB BLD-MCNC: 12.6 G/DL (ref 13–17.7)
IMM GRANULOCYTES # BLD AUTO: 0.01 10*3/MM3 (ref 0–0.05)
IMM GRANULOCYTES NFR BLD AUTO: 0.2 % (ref 0–0.5)
LYMPHOCYTES # BLD AUTO: 1.43 10*3/MM3 (ref 0.7–3.1)
LYMPHOCYTES NFR BLD AUTO: 30.1 % (ref 19.6–45.3)
MCH RBC QN AUTO: 30 PG (ref 26.6–33)
MCHC RBC AUTO-ENTMCNC: 31.9 G/DL (ref 31.5–35.7)
MCV RBC AUTO: 94 FL (ref 79–97)
MONOCYTES # BLD AUTO: 0.63 10*3/MM3 (ref 0.1–0.9)
MONOCYTES NFR BLD AUTO: 13.3 % (ref 5–12)
NEUTROPHILS NFR BLD AUTO: 2.26 10*3/MM3 (ref 1.7–7)
NEUTROPHILS NFR BLD AUTO: 47.6 % (ref 42.7–76)
NRBC BLD AUTO-RTO: 0 /100 WBC (ref 0–0.2)
PLATELET # BLD AUTO: 162 10*3/MM3 (ref 140–450)
PMV BLD AUTO: 10.2 FL (ref 6–12)
POTASSIUM SERPL-SCNC: 4.8 MMOL/L (ref 3.5–5.2)
QT INTERVAL: 371 MS
QTC INTERVAL: 426 MS
RBC # BLD AUTO: 4.2 10*6/MM3 (ref 4.14–5.8)
SODIUM SERPL-SCNC: 136 MMOL/L (ref 136–145)
UPPER ARTERIAL LEFT ARM BRACHIAL SYS MAX: 115
UPPER ARTERIAL RIGHT ARM BRACHIAL SYS MAX: 113
WBC NRBC COR # BLD AUTO: 4.75 10*3/MM3 (ref 3.4–10.8)

## 2025-03-06 PROCEDURE — 93922 UPR/L XTREMITY ART 2 LEVELS: CPT | Performed by: SURGERY

## 2025-03-06 PROCEDURE — 25010000002 CEFTAROLINE FOSAMIL PER 10 MG: Performed by: INTERNAL MEDICINE

## 2025-03-06 PROCEDURE — 85025 COMPLETE CBC W/AUTO DIFF WBC: CPT | Performed by: EMERGENCY MEDICINE

## 2025-03-06 PROCEDURE — 93922 UPR/L XTREMITY ART 2 LEVELS: CPT

## 2025-03-06 PROCEDURE — G0378 HOSPITAL OBSERVATION PER HR: HCPCS

## 2025-03-06 PROCEDURE — 80048 BASIC METABOLIC PNL TOTAL CA: CPT | Performed by: EMERGENCY MEDICINE

## 2025-03-06 PROCEDURE — 25010000002 ENOXAPARIN PER 10 MG: Performed by: EMERGENCY MEDICINE

## 2025-03-06 RX ORDER — METOPROLOL SUCCINATE 50 MG/1
50 TABLET, EXTENDED RELEASE ORAL 2 TIMES DAILY
Status: DISCONTINUED | OUTPATIENT
Start: 2025-03-06 | End: 2025-03-09 | Stop reason: HOSPADM

## 2025-03-06 RX ORDER — GABAPENTIN 400 MG/1
800 CAPSULE ORAL EVERY 8 HOURS SCHEDULED
Status: DISCONTINUED | OUTPATIENT
Start: 2025-03-06 | End: 2025-03-09 | Stop reason: HOSPADM

## 2025-03-06 RX ORDER — MIDODRINE HYDROCHLORIDE 5 MG/1
5 TABLET ORAL
Status: DISCONTINUED | OUTPATIENT
Start: 2025-03-06 | End: 2025-03-09 | Stop reason: HOSPADM

## 2025-03-06 RX ORDER — FUROSEMIDE 20 MG/1
20 TABLET ORAL DAILY
Status: DISCONTINUED | OUTPATIENT
Start: 2025-03-06 | End: 2025-03-08

## 2025-03-06 RX ORDER — METHOCARBAMOL 500 MG/1
500 TABLET, FILM COATED ORAL 3 TIMES DAILY
Status: DISCONTINUED | OUTPATIENT
Start: 2025-03-06 | End: 2025-03-09 | Stop reason: HOSPADM

## 2025-03-06 RX ORDER — MEXILETINE HYDROCHLORIDE 150 MG/1
150 CAPSULE ORAL 2 TIMES DAILY
Status: DISCONTINUED | OUTPATIENT
Start: 2025-03-06 | End: 2025-03-09 | Stop reason: HOSPADM

## 2025-03-06 RX ORDER — HYDROCODONE BITARTRATE AND ACETAMINOPHEN 7.5; 325 MG/1; MG/1
1 TABLET ORAL EVERY 6 HOURS PRN
Status: DISCONTINUED | OUTPATIENT
Start: 2025-03-06 | End: 2025-03-09 | Stop reason: HOSPADM

## 2025-03-06 RX ORDER — ASPIRIN 81 MG/1
81 TABLET ORAL DAILY
Status: DISCONTINUED | OUTPATIENT
Start: 2025-03-06 | End: 2025-03-09 | Stop reason: HOSPADM

## 2025-03-06 RX ORDER — PANTOPRAZOLE SODIUM 40 MG/1
40 TABLET, DELAYED RELEASE ORAL DAILY
Status: DISCONTINUED | OUTPATIENT
Start: 2025-03-06 | End: 2025-03-09 | Stop reason: HOSPADM

## 2025-03-06 RX ORDER — SUCRALFATE 1 G/1
1 TABLET ORAL 4 TIMES DAILY
Status: DISCONTINUED | OUTPATIENT
Start: 2025-03-06 | End: 2025-03-09 | Stop reason: HOSPADM

## 2025-03-06 RX ORDER — CLOPIDOGREL BISULFATE 75 MG/1
75 TABLET ORAL DAILY
Status: DISCONTINUED | OUTPATIENT
Start: 2025-03-06 | End: 2025-03-09 | Stop reason: HOSPADM

## 2025-03-06 RX ADMIN — EMPAGLIFLOZIN 25 MG: 25 TABLET, FILM COATED ORAL at 08:42

## 2025-03-06 RX ADMIN — SUCRALFATE 1 G: 1 TABLET ORAL at 11:52

## 2025-03-06 RX ADMIN — Medication 10 ML: at 21:16

## 2025-03-06 RX ADMIN — METHOCARBAMOL TABLETS 500 MG: 500 TABLET, COATED ORAL at 21:16

## 2025-03-06 RX ADMIN — METHOCARBAMOL TABLETS 500 MG: 500 TABLET, COATED ORAL at 08:43

## 2025-03-06 RX ADMIN — SUCRALFATE 1 G: 1 TABLET ORAL at 21:16

## 2025-03-06 RX ADMIN — ENOXAPARIN SODIUM 40 MG: 100 INJECTION SUBCUTANEOUS at 16:15

## 2025-03-06 RX ADMIN — METOPROLOL SUCCINATE 50 MG: 50 TABLET, EXTENDED RELEASE ORAL at 21:16

## 2025-03-06 RX ADMIN — PANTOPRAZOLE SODIUM 40 MG: 40 TABLET, DELAYED RELEASE ORAL at 08:42

## 2025-03-06 RX ADMIN — MIDODRINE HYDROCHLORIDE 5 MG: 5 TABLET ORAL at 11:52

## 2025-03-06 RX ADMIN — GABAPENTIN 800 MG: 400 CAPSULE ORAL at 21:16

## 2025-03-06 RX ADMIN — MIDODRINE HYDROCHLORIDE 5 MG: 5 TABLET ORAL at 18:15

## 2025-03-06 RX ADMIN — MIDODRINE HYDROCHLORIDE 5 MG: 5 TABLET ORAL at 08:42

## 2025-03-06 RX ADMIN — ASPIRIN 81 MG: 81 TABLET, COATED ORAL at 08:42

## 2025-03-06 RX ADMIN — GABAPENTIN 800 MG: 400 CAPSULE ORAL at 15:23

## 2025-03-06 RX ADMIN — CLOPIDOGREL BISULFATE 75 MG: 75 TABLET ORAL at 08:42

## 2025-03-06 RX ADMIN — MEXILETINE HYDROCHLORIDE 150 MG: 150 CAPSULE ORAL at 21:16

## 2025-03-06 RX ADMIN — GABAPENTIN 800 MG: 400 CAPSULE ORAL at 08:42

## 2025-03-06 RX ADMIN — METOPROLOL SUCCINATE 50 MG: 50 TABLET, EXTENDED RELEASE ORAL at 08:42

## 2025-03-06 RX ADMIN — METHOCARBAMOL TABLETS 500 MG: 500 TABLET, COATED ORAL at 16:15

## 2025-03-06 RX ADMIN — SUCRALFATE 1 G: 1 TABLET ORAL at 18:14

## 2025-03-06 RX ADMIN — CEFTAROLINE FOSAMIL 600 MG: 600 POWDER, FOR SOLUTION INTRAVENOUS at 16:15

## 2025-03-06 RX ADMIN — HYDROCODONE BITARTRATE AND ACETAMINOPHEN 1 TABLET: 7.5; 325 TABLET ORAL at 09:01

## 2025-03-06 RX ADMIN — SUCRALFATE 1 G: 1 TABLET ORAL at 08:42

## 2025-03-06 NOTE — PLAN OF CARE
Problem: Adult Inpatient Plan of Care  Goal: Absence of Hospital-Acquired Illness or Injury  Intervention: Identify and Manage Fall Risk  Recent Flowsheet Documentation  Taken 3/6/2025 1600 by Emily Grimaldo RN  Safety Promotion/Fall Prevention: safety round/check completed  Taken 3/6/2025 0600 by Emily Grimaldo RN  Safety Promotion/Fall Prevention: safety round/check completed     Problem: Fall Injury Risk  Goal: Absence of Fall and Fall-Related Injury  Intervention: Promote Injury-Free Environment  Recent Flowsheet Documentation  Taken 3/6/2025 1600 by Emily Grimaldo RN  Safety Promotion/Fall Prevention: safety round/check completed  Taken 3/6/2025 0600 by Emily Grimaldo RN  Safety Promotion/Fall Prevention: safety round/check completed   Goal Outcome Evaluation:

## 2025-03-06 NOTE — PLAN OF CARE
Problem: Adult Inpatient Plan of Care  Goal: Absence of Hospital-Acquired Illness or Injury  Intervention: Identify and Manage Fall Risk  Recent Flowsheet Documentation  Taken 3/5/2025 2200 by Emily Grimaldo RN  Safety Promotion/Fall Prevention: safety round/check completed  Taken 3/5/2025 2009 by Emily Girmaldo RN  Safety Promotion/Fall Prevention: safety round/check completed  Intervention: Prevent Skin Injury  Recent Flowsheet Documentation  Taken 3/5/2025 2009 by Emily Grimaldo RN  Body Position: position changed independently  Skin Protection: transparent dressing maintained  Intervention: Prevent and Manage VTE (Venous Thromboembolism) Risk  Recent Flowsheet Documentation  Taken 3/5/2025 2009 by Emily Grimaldo RN  VTE Prevention/Management: left  Intervention: Prevent Infection  Recent Flowsheet Documentation  Taken 3/5/2025 2009 by Emily Grimaldo RN  Infection Prevention:   single patient room provided   rest/sleep promoted  Goal: Optimal Comfort and Wellbeing  Intervention: Provide Person-Centered Care  Recent Flowsheet Documentation  Taken 3/5/2025 2009 by Emily Grimaldo RN  Trust Relationship/Rapport:   care explained   choices provided   emotional support provided   empathic listening provided   questions answered   questions encouraged   thoughts/feelings acknowledged   reassurance provided  Goal: Readiness for Transition of Care  Intervention: Mutually Develop Transition Plan  Recent Flowsheet Documentation  Taken 3/5/2025 2020 by Emily Grimaldo, RN  Transportation Anticipated: family or friend will provide  Patient/Family Anticipated Services at Transition: none  Patient/Family Anticipates Transition to: home  Taken 3/5/2025 2010 by Emily Grimaldo, RN  Patient/Family Anticipated Services at Transition: none  Patient/Family Anticipates Transition to: home  Taken 3/5/2025 2006 by Emily Grimaldo, RN  Equipment Currently Used at Home: cane, straight     Problem: Comorbidity  Management  Goal: Blood Pressure in Desired Range  Intervention: Maintain Blood Pressure Management  Recent Flowsheet Documentation  Taken 3/5/2025 2009 by Emily Grimaldo, RN  Medication Review/Management: medications reviewed   Goal Outcome Evaluation:

## 2025-03-06 NOTE — CASE MANAGEMENT/SOCIAL WORK
Discharge Planning Assessment   Daniel     Patient Name: Deion Nicholas  MRN: [id number]  Today's Date: 3/6/2025    Admit Date: 3/5/2025    Plan: Routine home. Son Diallo for transport.   Discharge Needs Assessment       Row Name 03/06/25 1321       Living Environment    People in Home alone    Current Living Arrangements home    Potentially Unsafe Housing Conditions none    In the past 12 months has the electric, gas, oil, or water company threatened to shut off services in your home? No    Primary Care Provided by self    Provides Primary Care For no one    Family Caregiver if Needed child(tai), adult    Family Caregiver Names lena Landrum    Quality of Family Relationships helpful;involved;supportive    Able to Return to Prior Arrangements yes       Resource/Environmental Concerns    Resource/Environmental Concerns none    Transportation Concerns none       Transportation Needs    In the past 12 months, has lack of transportation kept you from medical appointments or from getting medications? no    In the past 12 months, has lack of transportation kept you from meetings, work, or from getting things needed for daily living? No       Food Insecurity    Within the past 12 months, you worried that your food would run out before you got the money to buy more. Never true    Within the past 12 months, the food you bought just didn't last and you didn't have money to get more. Never true       Transition Planning    Patient/Family Anticipates Transition to home    Patient/Family Anticipated Services at Transition none    Transportation Anticipated car, drives self;family or friend will provide       Discharge Needs Assessment    Readmission Within the Last 30 Days no previous admission in last 30 days    Equipment Currently Used at Home cane, straight;rollator    Concerns to be Addressed denies needs/concerns at this time    Do you want help finding or keeping work or a job? I do not need or want help    Do you want help  with school or training? For example, starting or completing job training or getting a high school diploma, GED or equivalent No    Anticipated Changes Related to Illness none    Equipment Needed After Discharge none                   Discharge Plan       Row Name 03/06/25 1321       Plan    Plan Routine home. Son Diallo for transport.    Plan Comments CM met with patient at the bedside. [facility] [place] insurance, and pharmacy. Patient declined M2B. Patient denies any difficulty affording medications. Patient is not current with any HHC/OPPT/OT services. Patient lives at home alone, is independent with ADLS/IADLS, and drives. Patient's son Diallo to provide DC transport. DC Barriers: ID consulted, IV rocephin, ankle doppler ordered.                  Continued Care and Services - Admitted Since 3/5/2025    No active coordination exists for this encounter.       Expected Discharge Date and Time       Expected Discharge Date Expected Discharge Time    Mar 6, 2025            Demographic Summary       Row Name 03/06/25 1320       General Information    Admission Type observation    Arrived From emergency department    Referral Source admission list    Reason for Consult discharge planning    Preferred Language English       Contact Information    Permission Granted to Share Info With     Contact Information Obtained for                    Functional Status       Row Name 03/06/25 1320       Functional Status    Usual Activity Tolerance moderate    Current Activity Tolerance moderate       Functional Status, IADL    Medications independent    Meal Preparation independent    Housekeeping independent    Laundry independent    Shopping independent    If for any reason you need help with day-to-day activities such as bathing, preparing meals, shopping, managing finances, etc., do you get the help you need? I get all the help I need           Mychal Lock RN   Case  Manager  Cell number [phone]  Office number [phone]

## 2025-03-06 NOTE — H&P
[facility] Observation Unit H&P    Patient Name: Deion Nicholas  : 1940  MRN: [id number]  Primary Care Physician: Provider, No Known  Date of admission: 3/5/2025     Patient Care Team:  Provider, No Known as PCP - General          Subjective   History Present Illness     Chief Complaint:   Chief Complaint   Patient presents with    Chest Pain     Pt reports CP today that radiates to left arm, pt reports he took 1 SL nitro with no relief. Pt currently being treated for cellulitis in E-on abx       History of Present Illness  Obtained from admitting physician HPI on 3/5/2025:  Patient reports he had been treated for cellulitis left leg.  He reports it was improved but then has gotten worse again.  He is not sure of fever.  He does not report shortness of breath.  He is completing outpatient course of antibiotics.    25:  Patient confirms the HPI noted above including persistent left lower extremity pain, swelling and erythema.  He was recently seen at this facility for chest discomfort as well as left lower extremity cellulitis and was discharged with a course of Bactrim which she has been compliant with.  He notes that he has had recurrent episodes of cellulitis in his left lower extremity following previous CABG and which veins were removed from that site.  Patient again reports that in the past he is required several days of IV antibiotics before switching to p.o. and has at times had to go home with home health and IV antibiotics to clear these infections.  He also notes some concern regarding the possibility that if the infection continues to spread proximally he has been told it could result in infection of hardware from previous knee replacement.  Symptoms were noted to have begun when he scraped the surface of the skin on the lateral side while removing compression stockings.  No fevers, dyspnea or reported.  No significant change or new or acute symptoms are noted following his admission.  Review of  records does show patient was placed on IV ceftaroline for similar issues previously        Review of Systems   Constitutional: Negative.   HENT: Negative.     Eyes: Negative.    Cardiovascular: Negative.    Respiratory: Negative.     Skin:  Positive for poor wound healing.   Musculoskeletal: Negative.    Gastrointestinal: Negative.    Genitourinary: Negative.    Neurological: Negative.    Psychiatric/Behavioral: Negative.           Personal History     Past Medical History:   Past Medical History:   Diagnosis Date    Aneurysm     Cardiomyopathy     ICD implantation    Cellulitis of left lower extremity 2010    recurrent    CHD (coronary heart disease)     S/P CABG & PCI    Dyslipidemia     Heart valve disease     History of ventricular tachycardia     Hypertension     Myocardial infarction     Inferior MI    Pinched nerve     L4-L5    Prostate cancer        Surgical History:      Past Surgical History:   Procedure Laterality Date    ANGIOPLASTY  2000 04/1996 Stent: Palmaz- Huy stent/LAD 07/1996-RCA: 2000-Tetra stent Guidant to proximal RCA, mid to distal artery 2 sequential Guidant Tetra stents    APPENDECTOMY      CARDIAC ABLATION  April and May 2019    CARDIAC CATHETERIZATION  07/2017    1996 x2, Nov. 2000, 05/2010-cath 08/2015-no stents 2017    CARDIAC CATHETERIZATION N/A 11/13/2024    Procedure: LEFT HEART CATH with possible PCI;  Surgeon: Marcin Childers DO;  Location: [facility] CATH INVASIVE LOCATION;  Service: Cardiovascular;  Laterality: N/A;  Local and IV sedation    CARDIAC DEFIBRILLATOR PLACEMENT      COLONOSCOPY W/ POLYPECTOMY      Mult colonoscopy's for polyp resection     CORONARY ARTERY BYPASS GRAFT  05/2010    x5 vessel: LMA to diagonal, LAD, intermedius, obtuse marginal, RCA    CORONARY STENT PLACEMENT      ENDOSCOPY N/A 6/24/2019    Procedure: ESOPHAGOGASTRODUODENOSCOPY with dilitation Bougie 50, 54;  Surgeon: Roddy Coronel MD;  Location: [facility] ENDOSCOPY;  Service:  Gastroenterology    INSERT / REPLACE / REMOVE PACEMAKER      KNEE OPEN LATERAL RELEASE Left     reduction    OTHER SURGICAL HISTORY  01/2018    ICD implantation    PROSTATE SURGERY  2015    Robiotic surgery- Cyber Knife:           Family History: family history includes Heart disease in his father; Pancreatic cancer in his mother. Otherwise pertinent FHx was reviewed and unremarkable.     Social History:  reports that he quit smoking about 22 years ago. His smoking use included cigarettes. He started smoking about 39 years ago. He has a 17 pack-year smoking history. He has been exposed to tobacco smoke. He has never used smokeless tobacco. He reports that he does not currently use alcohol. He reports that he does not use drugs.      Medications:  Prior to Admission medications    Medication Sig Start Date End Date Taking? Authorizing Provider   aspirin 81 MG EC tablet Take 1 tablet by mouth Daily.    Cyndee Melgar MD   Cholecalciferol 10 MCG (400 UNIT) tablet Take 2 tablets by mouth Daily.    Cyndee Melgar MD   clopidogrel (PLAVIX) 75 MG tablet Take 1 tablet by mouth Daily. 2/11/25   Constantino Arvizu MD   dapagliflozin Propanediol (Farxiga) 10 MG tablet Take 10 mg by mouth Daily. [facility]    Cyndee Melgar MD   Diclofenac Sodium (VOLTAREN) 1 % gel gel Apply 4 g topically to the appropriate area as directed 4 (Four) Times a Day As Needed.    Cyndee Melgar MD   ezetimibe (ZETIA) 10 MG tablet Take 1 tablet by mouth Every Evening.    Cyndee Melgar MD   furosemide (LASIX) 20 MG tablet Take 1 tablet by mouth Daily.    Cyndee Melgar MD   gabapentin (NEURONTIN) 800 MG tablet Take 1 tablet by mouth 3 (Three) Times a Day.    Cyndee Melgar MD   meclizine (ANTIVERT) 25 MG tablet Take 1 tablet by mouth Every 6 (Six) Hours As Needed for Dizziness.    Cyndee Melgar MD   methocarbamol (ROBAXIN) 500 MG tablet Take 1 tablet by mouth 3 (Three) Times a Day.     Cyndee Melgar MD   metoprolol succinate XL (TOPROL-XL) 100 MG 24 hr tablet Take 0.5 tablets by mouth 2 (Two) Times a Day.    Cyndee Melgar MD   mexiletine (MEXITIL) 150 MG capsule Take 1 capsule by mouth 2 (Two) Times a Day. 12/16/24   Constantino Arvizu MD   midodrine (PROAMATINE) 5 MG tablet Take 1 tablet by mouth 3 (Three) Times a Day Before Meals.    Cyndee Melgar MD   nitroglycerin (NITROSTAT) 0.4 MG SL tablet Place 1 tablet under the tongue Every 5 (Five) Minutes As Needed for Chest Pain.    Cyndee Melgar MD   Omega-3 Fatty Acids (fish oil) 1000 MG capsule capsule Take 2 capsules by mouth 2 (Two) Times a Day With Meals.    Cyndee Melgar MD   pantoprazole (PROTONIX) 40 MG EC tablet Take 1 tablet by mouth Daily.    Cyndee Melgar MD   potassium chloride (MICRO-K) 10 MEQ CR capsule Take 1 capsule by mouth Daily.    Cyndee Melgar MD   sennosides-docusate (PERICOLACE) 8.6-50 MG per tablet Take 1 tablet by mouth Daily.    Cyndee Melgar MD   spironolactone (ALDACTONE) 25 MG tablet Take 1 tablet by mouth Daily.    Cyndee Melgar MD   sucralfate (CARAFATE) 1 g tablet Take 1 tablet by mouth 4 (Four) Times a Day.    Cyndee Melgar MD   sulfamethoxazole-trimethoprim (Bactrim DS) 800-160 MG per tablet Take 1 tablet by mouth 2 (Two) Times a Day for 7 days. 2/28/25 3/7/25  Sakina Hou APRN       Allergies:    Allergies   Allergen Reactions    Lovastatin Myalgia    Pravastatin Myalgia and Unknown (See Comments)     unknown    Simvastatin Unknown (See Comments) and Myalgia     unknown    Spironolactone Other (See Comments)     Gynecomastia        Objective   Objective     Vital Signs  Temp:  [97.3 °F (36.3 °C)-98 °F (36.7 °C)] 97.3 °F (36.3 °C)  Heart Rate:  [56-79] 68  Resp:  [13-19] 13  BP: (114-139)/(58-71) 121/58  SpO2:  [90 %-95 %] 94 %  on   ;   Device (Oxygen Therapy): room air  Body mass index is 26.91 kg/m².    Physical Exam  Vitals  reviewed.   Constitutional:       General: He is not in acute distress.     Appearance: Normal appearance. He is normal weight. He is not ill-appearing, toxic-appearing or diaphoretic.   HENT:      Head: Normocephalic.      Right Ear: External ear normal.      Left Ear: External ear normal.      Nose: Nose normal.      Mouth/Throat:      Mouth: Mucous membranes are moist.   Eyes:      Extraocular Movements: Extraocular movements intact.   Cardiovascular:      Rate and Rhythm: Normal rate and regular rhythm.      Pulses: Normal pulses.      Heart sounds: Normal heart sounds.   Pulmonary:      Effort: Pulmonary effort is normal.      Breath sounds: Normal breath sounds.   Abdominal:      General: Bowel sounds are normal.      Palpations: Abdomen is soft.      Tenderness: There is no abdominal tenderness.   Musculoskeletal:         General: Swelling present. Normal range of motion.      Cervical back: Normal range of motion.      Right lower leg: No edema.      Left lower leg: Edema present.   Skin:     General: Skin is warm and dry.      Capillary Refill: Capillary refill takes less than 2 seconds.      Findings: Erythema present.      Comments: Superficial abrasion noted on the lateral portion of left lower extremity   Neurological:      General: No focal deficit present.      Mental Status: He is alert and oriented to person, place, and time.   Psychiatric:         Mood and Affect: Mood normal.         Behavior: Behavior normal.         Thought Content: Thought content normal.         Judgment: Judgment normal.         Results Review:  I have personally reviewed most recent cardiac tracings, lab results, and radiology images and interpretations and agree with findings, most notably: Troponin, CMP, CBC, INR/PT, PTT, chest x-ray and EKG.    Results from last 7 days   Lab Units 03/06/25  0505 03/05/25  1554   WBC 10*3/mm3 4.75 5.61   HEMOGLOBIN g/dL 12.6* 13.4   HEMATOCRIT % 39.5 41.4   PLATELETS 10*3/mm3 162 183   INR    --  1.15*     Results from last 7 days   Lab Units 03/06/25  0505 03/05/25  1742 03/05/25  1554 02/28/25  0229   SODIUM mmol/L 136  --  135* 138   POTASSIUM mmol/L 4.8  --  4.9 4.2   CHLORIDE mmol/L 103  --  101 104   CO2 mmol/L 22.7  --  21.7* 24.9   BUN mg/dL 18  --  19 20   CREATININE mg/dL 1.38*  --  1.59* 1.18   GLUCOSE mg/dL 107*  --  121* 104*   CALCIUM mg/dL 9.0  --  9.3 8.5*   ALK PHOS U/L  --   --  109  --    ALT (SGPT) U/L  --   --  30  --    AST (SGOT) U/L  --   --  44*  --    HSTROP T ng/L  --  18 23* 17   PROBNP pg/mL  --   --  613.0  --      Estimated Creatinine Clearance: 54 mL/min (A) (by C-G formula based on SCr of 1.38 mg/dL (H)).  Brief Urine Lab Results       None            Microbiology Results (last 10 days)       ** No results found for the last 240 hours. **            ECG/EMG Results (most recent)       Procedure Component Value Units Date/Time    ECG 12 Lead Chest Pain [[id number]] Collected: 03/05/25 1533     Updated: 03/06/25 0707     QT Interval 371 ms      QTC Interval 426 ms     Narrative:      HEART RATE=79  bpm  RR Mbtydlod=511  ms  TN Lmyyhsbg=034  ms  P Horizontal Axis=-18  deg  P Front Axis=49  deg  QRSD Cspsslfz=040  ms  QT Arvuiccg=681  ms  FCmU=100  ms  QRS Axis=-22  deg  T Wave Axis=142  deg  - ABNORMAL ECG -  Sinus rhythm  IVCD, consider LBBB  When compared with ECG of 27-Feb-2025 11:37:48,  No significant change  Electronically Signed By: Lawrence Koehler (EROS) 2025-03-06 07:07:47  Date and Time of Study:2025-03-05 15:33:30    Telemetry Scan [[id number]] Resulted: 03/05/25     Updated: 03/06/25 0731    Telemetry Scan [[id number]] Resulted: 03/05/25     Updated: 03/06/25 0833    Telemetry Scan [[id number]] Resulted: 03/05/25     Updated: 03/06/25 0943    Telemetry Scan [[id number]] Resulted: 03/05/25     Updated: 03/06/25 0951    Telemetry Scan [[id number]] Resulted: 03/05/25     Updated: 03/06/25 1003            Results for orders placed during the hospital encounter of  02/27/25    Duplex Venous Lower Extremity - LEFT    Interpretation Summary    Normal left lower extremity venous duplex scan.      Results for orders placed during the hospital encounter of 02/14/25    Adult Transthoracic Echo Complete w/ Color, Spectral and Contrast if necessary per protocol    Interpretation Summary    Left ventricular systolic function is severely decreased. Left ventricular ejection fraction appears to be 21 - 25%.    The left ventricular cavity is moderate to severely dilated.    Left ventricular diastolic function is consistent with (grade I) impaired relaxation.    The left atrial cavity is moderately dilated.    Mild dilation of the sinuses of Valsalva is present.      XR Chest 1 View    Result Date: 3/5/2025  Impression: Borderline cardiomegaly. No acute process is identified. Electronically Signed: Tmomy Pan MD  3/5/2025 4:09 PM EST  Workstation ID: [id number]    XR Chest 1 View    Result Date: 2/27/2025  Impression: Mild right basilar airspace opacities, which may be due to atelectasis and/or pneumonia. Electronically Signed: Linda Ervin  2/27/2025 12:11 PM EST  Workstation ID: [id number]       Estimated Creatinine Clearance: 54 mL/min (A) (by C-G formula based on SCr of 1.38 mg/dL (H)).    Assessment & Plan   Assessment/Plan       Active Hospital Problems    Diagnosis  POA    **Cellulitis of left leg [L03.116]  Yes      Resolved Hospital Problems   No resolved problems to display.       Left lower extremity cellulitis  -WBCs: 5.61, 4.75  -IV Rocephin given in the ED  -LAMAR ordered  -Infectious disease consulted    SISSY  Lab Results   Component Value Date    CREATININE 1.38 (H) 03/06/2025    BUN 18 03/06/2025    BCR 13.0 03/06/2025    EGFR 51.1 (L) 03/06/2025   -Creatinine initially: 1.59 with a BUN of 19 and a BUN/creatinine ratio of 11.9  -Hold Lasix and spironolactone temporarily with close monitoring of volume status  -Avoid nephrotoxic medication IV dye unless urgently  needed  -Monitor BMP and I's and O's while admitted    History of CAD and heart failure with reduced ejection fraction  -Troponin: 23, 18  -proBNP: 613.0  -Chest x-ray showed cardiomegaly without acute process  -EKG showed sinus rhythm at 79 with left bundle branch block and a QTc of 426 ms (similar to previous study)  -Continue aspirin, Jardiance, Plavix, metoprolol and midodrine  -Hold Lasix and spironolactone temporarily due to increased creatinine noted above  -Monitor I's and O's and daily weights  -2 g sodium diet    GERD  -PPI and Carafate            VTE Prophylaxis - Active VTE Prophylaxis:  Pharmacologic:        Start     Dose Route Frequency Stop    03/05/25 1600  enoxaparin sodium (LOVENOX) syringe 40 mg         40 mg SC Every 24 Hours --                  Select Mechanical VTE Prophylaxis if Desired & Appropriate      CODE STATUS:    Code Status and Medical Interventions: CPR (Attempt to Resuscitate); Full Support   Ordered at: 03/06/25 0655     Code Status (Patient has no pulse and is not breathing):    CPR (Attempt to Resuscitate)     Medical Interventions (Patient has pulse or is breathing):    Full Support       This patient has been examined wearing personal protective equipment.     I discussed the patient's findings and my recommendations with patient and nursing staff.      Signature:Electronically signed by Marcin Oconnell PA-C, 03/06/25, 11:20 AM EST.

## 2025-03-06 NOTE — CONSULTS
Infectious Diseases Consult Note    Referring Provider: Neymar Murray MD    Reason for Consultation: Left leg cellulitis    Patient Care Team:  Provider, No Known as PCP - General    Chief complaint worsening left leg pain swelling and redness    Subjective     History of present illness:      This is an 84-year-old male who presents to the hospital on 3/5/2025 with worsening left leg pain swelling and redness.  Patient was just in the hospital and was discharged on 2/8/2025 after being treated for left leg cellulitis with 2 days of IV cefepime and discharged home.  Patient states that symptoms worsened on Sunday including some chest pain which is now resolved.    Review of Systems   Review of Systems   Constitutional: Negative.    HENT: Negative.     Eyes: Negative.    Respiratory:  Positive for shortness of breath.    Cardiovascular:  Positive for chest pain and leg swelling.   Gastrointestinal: Negative.    Endocrine: Negative.    Genitourinary: Negative.    Musculoskeletal: Negative.    Skin:  Positive for color change.   Neurological: Negative.    Psychiatric/Behavioral: Negative.     All other systems reviewed and are negative.      Medications  Medications Prior to Admission   Medication Sig Dispense Refill Last Dose/Taking    aspirin 81 MG EC tablet Take 1 tablet by mouth Daily.       Cholecalciferol 10 MCG (400 UNIT) tablet Take 2 tablets by mouth Daily.       clopidogrel (PLAVIX) 75 MG tablet Take 1 tablet by mouth Daily. 90 tablet 1     dapagliflozin Propanediol (Farxiga) 10 MG tablet Take 10 mg by mouth Daily. [facility]       Diclofenac Sodium (VOLTAREN) 1 % gel gel Apply 4 g topically to the appropriate area as directed 4 (Four) Times a Day As Needed.       ezetimibe (ZETIA) 10 MG tablet Take 1 tablet by mouth Every Evening.       furosemide (LASIX) 20 MG tablet Take 1 tablet by mouth Daily.       gabapentin (NEURONTIN) 800 MG tablet Take 1 tablet by mouth 3 (Three) Times a Day.       meclizine  (ANTIVERT) 25 MG tablet Take 1 tablet by mouth Every 6 (Six) Hours As Needed for Dizziness.       methocarbamol (ROBAXIN) 500 MG tablet Take 1 tablet by mouth 3 (Three) Times a Day.       metoprolol succinate XL (TOPROL-XL) 100 MG 24 hr tablet Take 0.5 tablets by mouth 2 (Two) Times a Day.       mexiletine (MEXITIL) 150 MG capsule Take 1 capsule by mouth 2 (Two) Times a Day. 60 capsule 5     midodrine (PROAMATINE) 5 MG tablet Take 1 tablet by mouth 3 (Three) Times a Day Before Meals.       nitroglycerin (NITROSTAT) 0.4 MG SL tablet Place 1 tablet under the tongue Every 5 (Five) Minutes As Needed for Chest Pain.       Omega-3 Fatty Acids (fish oil) 1000 MG capsule capsule Take 2 capsules by mouth 2 (Two) Times a Day With Meals.       pantoprazole (PROTONIX) 40 MG EC tablet Take 1 tablet by mouth Daily.       potassium chloride (MICRO-K) 10 MEQ CR capsule Take 1 capsule by mouth Daily.       sennosides-docusate (PERICOLACE) 8.6-50 MG per tablet Take 1 tablet by mouth Daily.       spironolactone (ALDACTONE) 25 MG tablet Take 1 tablet by mouth Daily.       sucralfate (CARAFATE) 1 g tablet Take 1 tablet by mouth 4 (Four) Times a Day.       sulfamethoxazole-trimethoprim (Bactrim DS) 800-160 MG per tablet Take 1 tablet by mouth 2 (Two) Times a Day for 7 days. 14 tablet 0        History  Past Medical History:   Diagnosis Date    Aneurysm     Cardiomyopathy     ICD implantation    Cellulitis of left lower extremity 2010    recurrent    CHD (coronary heart disease)     S/P CABG & PCI    Dyslipidemia     Heart valve disease     History of ventricular tachycardia     Hypertension     Myocardial infarction     Inferior MI    Pinched nerve     L4-L5    Prostate cancer      Past Surgical History:   Procedure Laterality Date    ANGIOPLASTY  2000 04/1996 Stent: Palmaz- Huy stent/LAD 07/1996-RCA: 2000-Tetra stent Guidant to proximal RCA, mid to distal artery 2 sequential Guidant Tetra stents    APPENDECTOMY      CARDIAC  ABLATION  April and May 2019    CARDIAC CATHETERIZATION  07/2017    1996 x2, Nov. 2000, 05/2010-cath 08/2015-no stents 2017    CARDIAC CATHETERIZATION N/A 11/13/2024    Procedure: LEFT HEART CATH with possible PCI;  Surgeon: Marcin Childers DO;  Location: [facility] CATH INVASIVE LOCATION;  Service: Cardiovascular;  Laterality: N/A;  Local and IV sedation    CARDIAC DEFIBRILLATOR PLACEMENT      COLONOSCOPY W/ POLYPECTOMY      Mult colonoscopy's for polyp resection     CORONARY ARTERY BYPASS GRAFT  05/2010    x5 vessel: LMA to diagonal, LAD, intermedius, obtuse marginal, RCA    CORONARY STENT PLACEMENT      ENDOSCOPY N/A 6/24/2019    Procedure: ESOPHAGOGASTRODUODENOSCOPY with dilitation Bougie 50, 54;  Surgeon: Roddy Coronel MD;  Location: [facility] ENDOSCOPY;  Service: Gastroenterology    INSERT / REPLACE / REMOVE PACEMAKER      KNEE OPEN LATERAL RELEASE Left     reduction    OTHER SURGICAL HISTORY  01/2018    ICD implantation    PROSTATE SURGERY  2015    Robiotic surgery- Cyber Knife:       Family History  Family History   Problem Relation Age of Onset    Pancreatic cancer Mother     Heart disease Father        Social History   reports that he quit smoking about 22 years ago. His smoking use included cigarettes. He started smoking about 39 years ago. He has a 17 pack-year smoking history. He has been exposed to tobacco smoke. He has never used smokeless tobacco. He reports that he does not currently use alcohol. He reports that he does not use drugs.    Allergies  Lovastatin, Pravastatin, Simvastatin, and Spironolactone    Objective     Vital Signs   Vital Signs (last 24 hours)         03/05 0700  03/06 0659 03/06 0700  03/06 1430   Most Recent      Temp (°F) 97.4 -  98    97.3 -  98.5     98.5 (36.9) 03/06 1325    Heart Rate 56 -  79    67 -  68     67 03/06 1325    Resp 18 -  19    13 -  18     18 03/06 1325    /61 -  139/70    121/58 -  121/59     121/59 03/06 1325    SpO2 (%) 90 -  95    90 -   94     90 03/06 1325            Physical Exam:  Physical Exam  Vitals and nursing note reviewed.   Constitutional:       General: He is not in acute distress.     Appearance: He is well-developed and normal weight. He is ill-appearing. He is not diaphoretic.   HENT:      Head: Normocephalic and atraumatic.   Eyes:      Conjunctiva/sclera: Conjunctivae normal.      Pupils: Pupils are equal, round, and reactive to light.   Cardiovascular:      Rate and Rhythm: Normal rate and regular rhythm.      Heart sounds: Normal heart sounds, S1 normal and S2 normal.   Pulmonary:      Effort: Pulmonary effort is normal. No respiratory distress.      Breath sounds: No stridor. Wheezing present. No rales.   Abdominal:      General: Bowel sounds are normal. There is no distension.      Palpations: Abdomen is soft. There is no mass.      Tenderness: There is no abdominal tenderness. There is no guarding.   Musculoskeletal:         General: No deformity. Normal range of motion.      Cervical back: Neck supple.      Right lower leg: Edema present.   Skin:     General: Skin is warm and dry.      Coloration: Skin is not pale.      Findings: Erythema present. No rash.      Comments: Mild left lower leg edema with some erythema and warmth that does not extend above the knee.  There is a superficial scratch to the left lower leg   Neurological:      Mental Status: He is alert and oriented to person, place, and time.      Cranial Nerves: No cranial nerve deficit.   Psychiatric:         Mood and Affect: Mood normal.         Microbiology  Microbiology Results (last 10 days)       ** No results found for the last 240 hours. **            Laboratory  Results from last 7 days   Lab Units 03/06/25  0505   WBC 10*3/mm3 4.75   HEMOGLOBIN g/dL 12.6*   HEMATOCRIT % 39.5   PLATELETS 10*3/mm3 162     Results from last 7 days   Lab Units 03/06/25  0505   SODIUM mmol/L 136   POTASSIUM mmol/L 4.8   CHLORIDE mmol/L 103   CO2 mmol/L 22.7   BUN mg/dL 18    CREATININE mg/dL 1.38*   GLUCOSE mg/dL 107*   CALCIUM mg/dL 9.0     Results from last 7 days   Lab Units 03/06/25  0505   SODIUM mmol/L 136   POTASSIUM mmol/L 4.8   CHLORIDE mmol/L 103   CO2 mmol/L 22.7   BUN mg/dL 18   CREATININE mg/dL 1.38*   GLUCOSE mg/dL 107*   CALCIUM mg/dL 9.0         Results from last 7 days   Lab Units 02/28/25  0229   SED RATE mm/hr 20           Radiology  Imaging Results (Last 72 Hours)       Procedure Component Value Units Date/Time    XR Chest 1 View [[id number]] Collected: 03/05/25 1607     Updated: 03/05/25 1611    Narrative:      XR CHEST 1 VW    Date of Exam: 3/5/2025 4:02 PM EST    Indication: cp    Comparison: 2/27/2025    Findings:  Heart size is borderline enlarged. There are postoperative changes of prior CABG. A left-sided transvenous pacemaker is in place with the lead in the apex of the right ventricle. The pulmonary vascular markings appear normal. The lungs appear clear with   no acute infiltrates.       Impression:      Impression:  Borderline cardiomegaly. No acute process is identified.        Electronically Signed: Tommy Pan MD    3/5/2025 4:09 PM EST    Workstation ID: [id number]            Cardiology      Results Review:  I have reviewed all clinical data, test, lab, and imaging results.       Schedule Meds  aspirin, 81 mg, Oral, Daily  ceftaroline, 600 mg, Intravenous, Q12H  clopidogrel, 75 mg, Oral, Daily  empagliflozin, 25 mg, Oral, Daily  enoxaparin sodium, 40 mg, Subcutaneous, Q24H  [Held by provider] furosemide, 20 mg, Oral, Daily  gabapentin, 800 mg, Oral, Q8H  methocarbamol, 500 mg, Oral, TID  metoprolol succinate XL, 50 mg, Oral, BID  mexiletine, 150 mg, Oral, BID  midodrine, 5 mg, Oral, TID AC  pantoprazole, 40 mg, Oral, Daily  sodium chloride, 10 mL, Intravenous, Q12H  sucralfate, 1 g, Oral, 4x Daily        Infusion Meds       PRN Meds    senna-docusate sodium **AND** polyethylene glycol **AND** bisacodyl **AND** bisacodyl    HYDROcodone-acetaminophen     melatonin    ondansetron    [COMPLETED] Insert Peripheral IV **AND** sodium chloride    sodium chloride    sodium chloride      Assessment & Plan       Assessment    Left lower extremity cellulitis.  There is no streak above the left knee.  The patient does not appear to be toxic.    History of recurrent left leg cellulitis requiring multiple hospital admissions in the past    Status post CABG, cab, cardiomyopathy status post ICD placement.  Patient presents with chest pain and shortness of breath which has improved    Plan    Discontinue IV Rocephin  Start IV Teflaro 600 mg every 12 hours  Patient will likely need several days of IV antibiotics before he is discharged home  Keep left leg elevated above the right heart level is much as possible  Continue supportive care  A.m. labs      Tamiko Victor, APRN  03/06/25  14:30 EST    Note is dictated utilizing voice recognition software/Dragon

## 2025-03-07 LAB
ANION GAP SERPL CALCULATED.3IONS-SCNC: 10.5 MMOL/L (ref 5–15)
BASOPHILS # BLD AUTO: 0.07 10*3/MM3 (ref 0–0.2)
BASOPHILS NFR BLD AUTO: 1.3 % (ref 0–1.5)
BUN SERPL-MCNC: 19 MG/DL (ref 8–23)
BUN/CREAT SERPL: 16.2 (ref 7–25)
CALCIUM SPEC-SCNC: 9.4 MG/DL (ref 8.6–10.5)
CHLORIDE SERPL-SCNC: 102 MMOL/L (ref 98–107)
CO2 SERPL-SCNC: 20.5 MMOL/L (ref 22–29)
CREAT SERPL-MCNC: 1.17 MG/DL (ref 0.76–1.27)
DEPRECATED RDW RBC AUTO: 51.8 FL (ref 37–54)
EGFRCR SERPLBLD CKD-EPI 2021: 62.2 ML/MIN/1.73
EOSINOPHIL # BLD AUTO: 0.41 10*3/MM3 (ref 0–0.4)
EOSINOPHIL NFR BLD AUTO: 7.8 % (ref 0.3–6.2)
ERYTHROCYTE [DISTWIDTH] IN BLOOD BY AUTOMATED COUNT: 14.9 % (ref 12.3–15.4)
GLUCOSE SERPL-MCNC: 108 MG/DL (ref 65–99)
HCT VFR BLD AUTO: 40.1 % (ref 37.5–51)
HGB BLD-MCNC: 12.6 G/DL (ref 13–17.7)
IMM GRANULOCYTES # BLD AUTO: 0.01 10*3/MM3 (ref 0–0.05)
IMM GRANULOCYTES NFR BLD AUTO: 0.2 % (ref 0–0.5)
LYMPHOCYTES # BLD AUTO: 1.72 10*3/MM3 (ref 0.7–3.1)
LYMPHOCYTES NFR BLD AUTO: 32.8 % (ref 19.6–45.3)
MCH RBC QN AUTO: 29.7 PG (ref 26.6–33)
MCHC RBC AUTO-ENTMCNC: 31.4 G/DL (ref 31.5–35.7)
MCV RBC AUTO: 94.6 FL (ref 79–97)
MONOCYTES # BLD AUTO: 0.54 10*3/MM3 (ref 0.1–0.9)
MONOCYTES NFR BLD AUTO: 10.3 % (ref 5–12)
NEUTROPHILS NFR BLD AUTO: 2.49 10*3/MM3 (ref 1.7–7)
NEUTROPHILS NFR BLD AUTO: 47.6 % (ref 42.7–76)
NRBC BLD AUTO-RTO: 0 /100 WBC (ref 0–0.2)
PLATELET # BLD AUTO: 173 10*3/MM3 (ref 140–450)
PMV BLD AUTO: 10.4 FL (ref 6–12)
POTASSIUM SERPL-SCNC: 4.7 MMOL/L (ref 3.5–5.2)
RBC # BLD AUTO: 4.24 10*6/MM3 (ref 4.14–5.8)
SODIUM SERPL-SCNC: 133 MMOL/L (ref 136–145)
WBC NRBC COR # BLD AUTO: 5.24 10*3/MM3 (ref 3.4–10.8)

## 2025-03-07 PROCEDURE — 80048 BASIC METABOLIC PNL TOTAL CA: CPT | Performed by: NURSE PRACTITIONER

## 2025-03-07 PROCEDURE — 85025 COMPLETE CBC W/AUTO DIFF WBC: CPT | Performed by: NURSE PRACTITIONER

## 2025-03-07 PROCEDURE — 25010000002 ONDANSETRON PER 1 MG: Performed by: EMERGENCY MEDICINE

## 2025-03-07 PROCEDURE — 25010000002 ENOXAPARIN PER 10 MG: Performed by: EMERGENCY MEDICINE

## 2025-03-07 PROCEDURE — 25010000002 CEFTAROLINE FOSAMIL PER 10 MG: Performed by: INTERNAL MEDICINE

## 2025-03-07 RX ADMIN — Medication 10 ML: at 20:23

## 2025-03-07 RX ADMIN — METHOCARBAMOL TABLETS 500 MG: 500 TABLET, COATED ORAL at 20:21

## 2025-03-07 RX ADMIN — SUCRALFATE 1 G: 1 TABLET ORAL at 08:44

## 2025-03-07 RX ADMIN — MEXILETINE HYDROCHLORIDE 150 MG: 150 CAPSULE ORAL at 20:21

## 2025-03-07 RX ADMIN — GABAPENTIN 800 MG: 400 CAPSULE ORAL at 14:30

## 2025-03-07 RX ADMIN — SUCRALFATE 1 G: 1 TABLET ORAL at 17:44

## 2025-03-07 RX ADMIN — METHOCARBAMOL TABLETS 500 MG: 500 TABLET, COATED ORAL at 16:41

## 2025-03-07 RX ADMIN — METHOCARBAMOL TABLETS 500 MG: 500 TABLET, COATED ORAL at 08:44

## 2025-03-07 RX ADMIN — METOPROLOL SUCCINATE 50 MG: 50 TABLET, EXTENDED RELEASE ORAL at 08:44

## 2025-03-07 RX ADMIN — MIDODRINE HYDROCHLORIDE 5 MG: 5 TABLET ORAL at 17:44

## 2025-03-07 RX ADMIN — GABAPENTIN 800 MG: 400 CAPSULE ORAL at 07:37

## 2025-03-07 RX ADMIN — MEXILETINE HYDROCHLORIDE 150 MG: 150 CAPSULE ORAL at 08:44

## 2025-03-07 RX ADMIN — Medication 10 ML: at 08:44

## 2025-03-07 RX ADMIN — SUCRALFATE 1 G: 1 TABLET ORAL at 12:02

## 2025-03-07 RX ADMIN — ONDANSETRON 4 MG: 2 INJECTION, SOLUTION INTRAMUSCULAR; INTRAVENOUS at 08:46

## 2025-03-07 RX ADMIN — CEFTAROLINE FOSAMIL 600 MG: 600 POWDER, FOR SOLUTION INTRAVENOUS at 04:15

## 2025-03-07 RX ADMIN — CLOPIDOGREL BISULFATE 75 MG: 75 TABLET ORAL at 08:44

## 2025-03-07 RX ADMIN — SUCRALFATE 1 G: 1 TABLET ORAL at 20:21

## 2025-03-07 RX ADMIN — ENOXAPARIN SODIUM 40 MG: 100 INJECTION SUBCUTANEOUS at 16:41

## 2025-03-07 RX ADMIN — GABAPENTIN 800 MG: 400 CAPSULE ORAL at 21:58

## 2025-03-07 RX ADMIN — MIDODRINE HYDROCHLORIDE 5 MG: 5 TABLET ORAL at 07:37

## 2025-03-07 RX ADMIN — PANTOPRAZOLE SODIUM 40 MG: 40 TABLET, DELAYED RELEASE ORAL at 08:44

## 2025-03-07 RX ADMIN — HYDROCODONE BITARTRATE AND ACETAMINOPHEN 1 TABLET: 7.5; 325 TABLET ORAL at 14:35

## 2025-03-07 RX ADMIN — MIDODRINE HYDROCHLORIDE 5 MG: 5 TABLET ORAL at 12:01

## 2025-03-07 RX ADMIN — EMPAGLIFLOZIN 25 MG: 25 TABLET, FILM COATED ORAL at 08:44

## 2025-03-07 RX ADMIN — CEFTAROLINE FOSAMIL 600 MG: 600 POWDER, FOR SOLUTION INTRAVENOUS at 14:30

## 2025-03-07 RX ADMIN — ASPIRIN 81 MG: 81 TABLET, COATED ORAL at 08:44

## 2025-03-07 RX ADMIN — Medication 5 MG: at 20:21

## 2025-03-07 NOTE — PLAN OF CARE
Problem: Adult Inpatient Plan of Care  Goal: Plan of Care Review  Outcome: Progressing  Flowsheets (Taken 3/7/2025 1821)  Progress: improving  Plan of Care Reviewed With: patient  Goal: Patient-Specific Goal (Individualized)  Outcome: Progressing  Goal: Absence of Hospital-Acquired Illness or Injury  Outcome: Progressing  Intervention: Identify and Manage Fall Risk  Description: Perform standard risk assessment on admission using a validated tool or comprehensive approach appropriate to the patient; reassess fall risk frequently, with change in status or transfer to another level of care.  Communicate risk to interprofessional healthcare team; ensure fall risk visible cue.  Determine need for increased observation, equipment and environmental modification, as well as use of supportive, nonskid footwear.  Adjust safety measures to individual needs and identified risk factors.  Reinforce the importance of active participation with fall risk prevention, safety, and physical activity with the patient and family.  Perform regular intentional rounding to assess need for position change, pain assessment and personal needs, including assistance with toileting.  Recent Flowsheet Documentation  Taken 3/7/2025 1600 by Angela Basurto RN  Safety Promotion/Fall Prevention: safety round/check completed  Taken 3/7/2025 1400 by Angela Basurto RN  Safety Promotion/Fall Prevention: safety round/check completed  Taken 3/7/2025 1200 by Angela Basurto RN  Safety Promotion/Fall Prevention: safety round/check completed  Taken 3/7/2025 1000 by Angela Basurto RN  Safety Promotion/Fall Prevention: safety round/check completed  Taken 3/7/2025 0800 by Angela Basurto RN  Safety Promotion/Fall Prevention: safety round/check completed  Intervention: Prevent Infection  Description: Maintain skin and mucous membrane integrity; promote hand, oral and pulmonary hygiene.  Optimize fluid balance, nutrition, sleep and glycemic control to  maximize infection resistance.  Identify potential sources of infection early to prevent or mitigate progression of infection (e.g., wound, lines, devices).  Evaluate ongoing need for invasive devices; remove promptly when no longer indicated.  Review vaccination status.  Recent Flowsheet Documentation  Taken 3/7/2025 1600 by Angela Basurto RN  Infection Prevention: single patient room provided  Taken 3/7/2025 1400 by Angela Basurto RN  Infection Prevention: single patient room provided  Taken 3/7/2025 1200 by Angela Basurto RN  Infection Prevention: single patient room provided  Taken 3/7/2025 1000 by Angela Basurto RN  Infection Prevention: single patient room provided  Taken 3/7/2025 0800 by Angela Basurto RN  Infection Prevention: single patient room provided  Goal: Optimal Comfort and Wellbeing  Outcome: Progressing  Intervention: Monitor Pain and Promote Comfort  Description: Assess pain level, treatment efficacy and patient response at regular intervals using a consistent pain scale.  Consider the presence and impact of preexisting chronic pain.  Encourage patient and caregiver involvement in pain assessment, interventions and safety measures.  Promote activity; balance with sleep and rest to enhance healing.  Recent Flowsheet Documentation  Taken 3/7/2025 1435 by Angela Basurto RN  Pain Management Interventions: pain medication given  Goal: Readiness for Transition of Care  Outcome: Progressing     Problem: Comorbidity Management  Goal: Blood Pressure in Desired Range  Outcome: Progressing     Problem: Fall Injury Risk  Goal: Absence of Fall and Fall-Related Injury  Outcome: Progressing  Intervention: Promote Injury-Free Environment  Description: Provide a safe, barrier-free environment that encourages independent activity.  Keep care area uncluttered and well-lighted.  Determine need for increased observation or monitoring.  Avoid use of devices that minimize mobility, such as restraints  or indwelling urinary catheter.  Recent Flowsheet Documentation  Taken 3/7/2025 1600 by Angela Basurto RN  Safety Promotion/Fall Prevention: safety round/check completed  Taken 3/7/2025 1400 by Angela Basurto RN  Safety Promotion/Fall Prevention: safety round/check completed  Taken 3/7/2025 1200 by Angela Basurto RN  Safety Promotion/Fall Prevention: safety round/check completed  Taken 3/7/2025 1000 by Angela Basurto RN  Safety Promotion/Fall Prevention: safety round/check completed  Taken 3/7/2025 0800 by Angela Basurto RN  Safety Promotion/Fall Prevention: safety round/check completed     Problem: Infection  Goal: Absence of Infection Signs and Symptoms  Outcome: Progressing     Problem: Skin Injury Risk Increased  Goal: Skin Health and Integrity  Outcome: Progressing   Goal Outcome Evaluation:  Plan of Care Reviewed With: patient        Progress: improving

## 2025-03-07 NOTE — PROGRESS NOTES
Infectious Diseases Progress Note      LOS: 0 days   No care team member to display    Chief Complaint: Left leg pain swelling and redness    Subjective       The patient has been afebrile for the last 24 hours.  The patient is on room air, hemodynamically stable, and is tolerating antimicrobial therapy.  Patient feeling slightly better today      Review of Systems:   Review of Systems   Constitutional: Negative.    HENT: Negative.     Eyes: Negative.    Respiratory:  Positive for shortness of breath.    Cardiovascular:  Positive for leg swelling.   Gastrointestinal: Negative.    Endocrine: Negative.    Genitourinary: Negative.    Musculoskeletal: Negative.    Skin:  Positive for color change.   Neurological: Negative.    Psychiatric/Behavioral: Negative.     All other systems reviewed and are negative.       Objective     Vital Signs  Temp:  [97.3 °F (36.3 °C)-97.5 °F (36.4 °C)] 97.3 °F (36.3 °C)  Heart Rate:  [53-69] 67  Resp:  [16-19] 16  BP: (110-140)/(53-72) 124/61    Physical Exam:  Physical Exam  Vitals and nursing note reviewed.   Constitutional:       General: He is not in acute distress.     Appearance: He is well-developed and normal weight. He is ill-appearing. He is not diaphoretic.   HENT:      Head: Normocephalic and atraumatic.   Eyes:      Conjunctiva/sclera: Conjunctivae normal.      Pupils: Pupils are equal, round, and reactive to light.   Cardiovascular:      Rate and Rhythm: Normal rate and regular rhythm.      Heart sounds: Normal heart sounds, S1 normal and S2 normal.   Pulmonary:      Effort: Pulmonary effort is normal. No respiratory distress.      Breath sounds: No stridor. Wheezing present. No rales.   Abdominal:      General: Bowel sounds are normal. There is no distension.      Palpations: Abdomen is soft. There is no mass.      Tenderness: There is no abdominal tenderness. There is no guarding.   Musculoskeletal:         General: No deformity. Normal range of motion.      Cervical back:  Neck supple.      Left lower leg: Edema present.   Skin:     General: Skin is warm and dry.      Coloration: Skin is not pale.      Findings: Erythema present. No rash.      Comments: Mild left leg edema erythema and warmth which has improved today.  Small superficial scratch to the lower leg   Neurological:      Mental Status: He is alert and oriented to person, place, and time.      Cranial Nerves: No cranial nerve deficit.   Psychiatric:         Mood and Affect: Mood normal.          Results Review:    I have reviewed all clinical data, test, lab, and imaging results.     Radiology  No Radiology Exams Resulted Within Past 24 Hours    Cardiology    Laboratory    Results from last 7 days   Lab Units 03/07/25  0421 03/06/25  0505 03/05/25  1554   WBC 10*3/mm3 5.24 4.75 5.61   HEMOGLOBIN g/dL 12.6* 12.6* 13.4   HEMATOCRIT % 40.1 39.5 41.4   PLATELETS 10*3/mm3 173 162 183     Results from last 7 days   Lab Units 03/07/25  0421 03/06/25  0505 03/05/25  1554   SODIUM mmol/L 133* 136 135*   POTASSIUM mmol/L 4.7 4.8 4.9   CHLORIDE mmol/L 102 103 101   CO2 mmol/L 20.5* 22.7 21.7*   BUN mg/dL 19 18 19   CREATININE mg/dL 1.17 1.38* 1.59*   GLUCOSE mg/dL 108* 107* 121*   ALBUMIN g/dL  --   --  4.3   BILIRUBIN mg/dL  --   --  0.3   ALK PHOS U/L  --   --  109   AST (SGOT) U/L  --   --  44*   ALT (SGPT) U/L  --   --  30   CALCIUM mg/dL 9.4 9.0 9.3                 Microbiology   Microbiology Results (last 10 days)       ** No results found for the last 240 hours. **            Medication Review:       Schedule Meds  aspirin, 81 mg, Oral, Daily  ceftaroline, 600 mg, Intravenous, Q12H  clopidogrel, 75 mg, Oral, Daily  empagliflozin, 25 mg, Oral, Daily  enoxaparin sodium, 40 mg, Subcutaneous, Q24H  [Held by provider] furosemide, 20 mg, Oral, Daily  gabapentin, 800 mg, Oral, Q8H  methocarbamol, 500 mg, Oral, TID  metoprolol succinate XL, 50 mg, Oral, BID  mexiletine, 150 mg, Oral, BID  midodrine, 5 mg, Oral, TID AC  pantoprazole, 40  mg, Oral, Daily  sodium chloride, 10 mL, Intravenous, Q12H  sucralfate, 1 g, Oral, 4x Daily        Infusion Meds       PRN Meds    senna-docusate sodium **AND** polyethylene glycol **AND** bisacodyl **AND** bisacodyl    HYDROcodone-acetaminophen    melatonin    ondansetron    [COMPLETED] Insert Peripheral IV **AND** sodium chloride    sodium chloride    sodium chloride        Assessment & Plan       Antimicrobial Therapy   1.  IV Teflaro       2.        3.        4.        5.            Assessment     Left lower extremity cellulitis.  There is no streak above the left knee.  The patient does not appear to be toxic.  Some improvement today     History of recurrent left leg cellulitis requiring multiple hospital admissions in the past     Status post CABG, cab, cardiomyopathy status post ICD placement.  Patient presents with chest pain and shortness of breath which has improved     Plan     Continue IV Teflaro 600 mg every 12 hours  Patient will likely need several days of IV antibiotics before he is discharged home  Keep left leg elevated above the right heart level is much as possible  Continue supportive care  A.m. labs  Case discussed with observation ELSA Victor, APRN  03/07/25  16:50 EST    Note is dictated utilizing voice recognition software/Dragon

## 2025-03-07 NOTE — CASE MANAGEMENT/SOCIAL WORK
Continued Stay Note   Daniel     Patient Name: Deion Nicholas  MRN: [id number]  Today's Date: 3/7/2025    Admit Date: 3/5/2025    Plan: Routine home. Son Diallo for transport.   Discharge Plan       Row Name 03/07/25 0927       Plan    Plan Comments DC barriers: ID following, bilateral LAMAR's 3/6, IV teflaro.           Mychal Lock RN     Cell number [phone]  Office number [phone]

## 2025-03-07 NOTE — PROGRESS NOTES
[facility] MEDICINE SERVICE  TRANSFER OF CARE/ACCEPTANCE NOTE    PATIENT NAME: Deion Nicholas  : 1940  MRN: [id number]     Active Hospital Problems    Diagnosis  POA    **Cellulitis of left leg [L03.116]  Yes      Resolved Hospital Problems   No resolved problems to display.       Patient seen and examined by me on 25 at 0900.  Interim History:    Somewhat improving infection to the affected area.  Still quite swollen and irritated.  Cultures not growing anything at this time.  Infectious diseases following and antibiotics are being optimized.    I have reviewed the H&P, diagnostic data and plan of care for Deion Nicholas.  I will be taking over care of this patient during the current hospitalization.        Signature: Electronically signed by Luis Leija MD, 25, 13:47 EST.  [facility] Hospitalist Team

## 2025-03-07 NOTE — PLAN OF CARE
Goal Outcome Evaluation:    IV abx continued BID per MAR

## 2025-03-07 NOTE — H&P
[facility] Medicine Services  History & Physical    Patient Name: Deion Nicholas  : 1940  MRN: [id number]  Primary Care Physician:  Talia, No Known  Date of admission: 3/5/2025  Date and Time of Service: 3/6/2025  00:23    Subjective      Chief Complaint: Left leg redness, swelling, pain     History of Present Illness: Deion Nicholas is a 84 y.o. male with a PMHx of CM, CAD s/p CABG, HTN, and prostate cancer, who presented to [facility] on 3/5/2025 with  complaints of left LE swelling, redness, and pain.  He says he has a hx of having recurrent infections in his left LE.  [facility]  The other day, he was taking one of them off, and his fingernail cut open the skin.  The following day, the leg became more swollen, red, and painful.  He denies any fevers, chills, chest pain, SOB, nausea, vomiting, or diarrhea.  Patient was admitted to ED Observation unit.  We have now been asked to evaluate him and take over his care as he will need a few more days of IV Abx.        Review of Systems  All other ROS negative except for what was stated up in HPI    Personal History     Past Medical History:   Diagnosis Date    Aneurysm     Cardiomyopathy     ICD implantation    Cellulitis of left lower extremity     recurrent    CHD (coronary heart disease)     S/P CABG & PCI    Dyslipidemia     Heart valve disease     History of ventricular tachycardia     Hypertension     Myocardial infarction     Inferior MI    Pinched nerve     L4-L5    Prostate cancer        Past Surgical History:   Procedure Laterality Date    ANGIOPLASTY  1996 Stent: Palmaz- Huy stent/LAD 1996-RCA: -Tetra stent Guidant to proximal RCA, mid to distal artery 2 sequential Guidant Tetra stents    APPENDECTOMY      CARDIAC ABLATION  April and May 2019    CARDIAC CATHETERIZATION  2017    1996 x2, Nov. , 2010-cath 2015-no stents 2017    CARDIAC  CATHETERIZATION N/A 11/13/2024    Procedure: LEFT HEART CATH with possible PCI;  Surgeon: Marcin Childers DO;  Location: [facility] CATH INVASIVE LOCATION;  Service: Cardiovascular;  Laterality: N/A;  Local and IV sedation    CARDIAC DEFIBRILLATOR PLACEMENT      COLONOSCOPY W/ POLYPECTOMY      Mult colonoscopy's for polyp resection     CORONARY ARTERY BYPASS GRAFT  05/2010    x5 vessel: LMA to diagonal, LAD, intermedius, obtuse marginal, RCA    CORONARY STENT PLACEMENT      ENDOSCOPY N/A 6/24/2019    Procedure: ESOPHAGOGASTRODUODENOSCOPY with dilitation Bougie 50, 54;  Surgeon: Roddy Coronel MD;  Location: [facility] ENDOSCOPY;  Service: Gastroenterology    INSERT / REPLACE / REMOVE PACEMAKER      KNEE OPEN LATERAL RELEASE Left     reduction    OTHER SURGICAL HISTORY  01/2018    ICD implantation    PROSTATE SURGERY  2015    Robiotic surgery- Cyber Knife:       Family History: family history includes Heart disease in his father; Pancreatic cancer in his mother. Otherwise pertinent FHx was reviewed and not pertinent to current issue.    Social History:  reports that he quit smoking about 22 years ago. His smoking use included cigarettes. He started smoking about 39 years ago. He has a 17 pack-year smoking history. He has been exposed to tobacco smoke. He has never used smokeless tobacco. He reports that he does not currently use alcohol. He reports that he does not use drugs.    Home Medications:  Prior to Admission Medications       Prescriptions Last Dose Informant Patient Reported? Taking?    aspirin 81 MG EC tablet   Yes No    Take 1 tablet by mouth Daily.    Cholecalciferol 10 MCG (400 UNIT) tablet  Self Yes No    Take 2 tablets by mouth Daily.    clopidogrel (PLAVIX) 75 MG tablet   No No    Take 1 tablet by mouth Daily.    dapagliflozin Propanediol (Farxiga) 10 MG tablet  Other Yes No    Take 10 mg by mouth Daily. [facility]    Diclofenac Sodium (VOLTAREN) 1 % gel gel   Yes No    Apply 4 g topically  to the appropriate area as directed 4 (Four) Times a Day As Needed.    ezetimibe (ZETIA) 10 MG tablet  Self Yes No    Take 1 tablet by mouth Every Evening.    furosemide (LASIX) 20 MG tablet   Yes No    Take 1 tablet by mouth Daily.    gabapentin (NEURONTIN) 800 MG tablet  Self Yes No    Take 1 tablet by mouth 3 (Three) Times a Day.    meclizine (ANTIVERT) 25 MG tablet   Yes No    Take 1 tablet by mouth Every 6 (Six) Hours As Needed for Dizziness.    methocarbamol (ROBAXIN) 500 MG tablet  Self Yes No    Take 1 tablet by mouth 3 (Three) Times a Day.    metoprolol succinate XL (TOPROL-XL) 100 MG 24 hr tablet   Yes No    Take 0.5 tablets by mouth 2 (Two) Times a Day.    mexiletine (MEXITIL) 150 MG capsule   No No    Take 1 capsule by mouth 2 (Two) Times a Day.    midodrine (PROAMATINE) 5 MG tablet  Spouse/Significant Other Yes No    Take 1 tablet by mouth 3 (Three) Times a Day Before Meals.    nitroglycerin (NITROSTAT) 0.4 MG SL tablet  Self Yes No    Place 1 tablet under the tongue Every 5 (Five) Minutes As Needed for Chest Pain.    Omega-3 Fatty Acids (fish oil) 1000 MG capsule capsule  Self Yes No    Take 2 capsules by mouth 2 (Two) Times a Day With Meals.    pantoprazole (PROTONIX) 40 MG EC tablet  Self Yes No    Take 1 tablet by mouth Daily.    potassium chloride (MICRO-K) 10 MEQ CR capsule   Yes No    Take 1 capsule by mouth Daily.    sennosides-docusate (PERICOLACE) 8.6-50 MG per tablet   Yes No    Take 1 tablet by mouth Daily.    spironolactone (ALDACTONE) 25 MG tablet  Self Yes No    Take 1 tablet by mouth Daily.    sucralfate (CARAFATE) 1 g tablet   Yes No    Take 1 tablet by mouth 4 (Four) Times a Day.    sulfamethoxazole-trimethoprim (Bactrim DS) 800-160 MG per tablet   No No    Take 1 tablet by mouth 2 (Two) Times a Day for 7 days.              Allergies:  Allergies   Allergen Reactions    Lovastatin Myalgia    Pravastatin Myalgia and Unknown (See Comments)     unknown    Simvastatin Unknown (See Comments)  and Myalgia     unknown    Spironolactone Other (See Comments)     Gynecomastia        Objective      Vitals:   Temp:  [97.1 °F (36.2 °C)-98.5 °F (36.9 °C)] 97.3 °F (36.3 °C)  Heart Rate:  [56-71] 60  Resp:  [12-19] 18  BP: (110-126)/(53-62) 126/53  Body mass index is 26.99 kg/m².  Physical Exam  General: Sitting up in bed; pleasant; NAD  CV: RRR, S1-S2  Lungs: CTA bilaterally  Abdomen: soft, NT, ND  Extremities:  Left LE with mild edema, erythema, and warmth to palpation    Diagnostic Data:  Lab Results (last 24 hours)       Procedure Component Value Units Date/Time    Basic Metabolic Panel [[id number]]  (Abnormal) Collected: 03/06/25 0505    Specimen: Blood Updated: 03/06/25 0607     Glucose 107 mg/dL      BUN 18 mg/dL      Creatinine 1.38 mg/dL      Sodium 136 mmol/L      Potassium 4.8 mmol/L      Chloride 103 mmol/L      CO2 22.7 mmol/L      Calcium 9.0 mg/dL      BUN/Creatinine Ratio 13.0     Anion Gap 10.3 mmol/L      eGFR 51.1 mL/min/1.73     Narrative:      GFR Categories in Chronic Kidney Disease (CKD)      GFR Category          GFR (mL/min/1.73)    Interpretation  G1                     90 or greater         Normal or high (1)  G2                      60-89                Mild decrease (1)  G3a                   45-59                Mild to moderate decrease  G3b                   30-44                Moderate to severe decrease  G4                    15-29                Severe decrease  G5                    14 or less           Kidney failure          (1)In the absence of evidence of kidney disease, neither GFR category G1 or G2 fulfill the criteria for CKD.    eGFR calculation 2021 CKD-EPI creatinine equation, which does not include race as a factor    CBC & Differential [[id number]]  (Abnormal) Collected: 03/06/25 0505    Specimen: Blood Updated: 03/06/25 0533    Narrative:      The following orders were created for panel order CBC & Differential.  Procedure                               Abnormality          Status                     ---------                               -----------         ------                     CBC Auto Differential[[id number]]        Abnormal            Final result                 Please view results for these tests on the individual orders.    CBC Auto Differential [[id number]]  (Abnormal) Collected: 03/06/25 0505    Specimen: Blood Updated: 03/06/25 0533     WBC 4.75 10*3/mm3      RBC 4.20 10*6/mm3      Hemoglobin 12.6 g/dL      Hematocrit 39.5 %      MCV 94.0 fL      MCH 30.0 pg      MCHC 31.9 g/dL      RDW 14.6 %      RDW-SD 50.9 fl      MPV 10.2 fL      Platelets 162 10*3/mm3      Neutrophil % 47.6 %      Lymphocyte % 30.1 %      Monocyte % 13.3 %      Eosinophil % 8.0 %      Basophil % 0.8 %      Immature Grans % 0.2 %      Neutrophils, Absolute 2.26 10*3/mm3      Lymphocytes, Absolute 1.43 10*3/mm3      Monocytes, Absolute 0.63 10*3/mm3      Eosinophils, Absolute 0.38 10*3/mm3      Basophils, Absolute 0.04 10*3/mm3      Immature Grans, Absolute 0.01 10*3/mm3      nRBC 0.0 /100 WBC              Imaging Results (Last 24 Hours)       ** No results found for the last 24 hours. **              Assessment & Plan        This is a 84 y.o. male with:    Active and Resolved Problems  Active Hospital Problems    Diagnosis  POA    **Cellulitis of left leg [L03.116]  Yes      Resolved Hospital Problems   No resolved problems to display.       Cellulitis of left LE  Appears to be clinically improving  Continue Teflaro per ID recs  Monitor     CAD s/p CABG  Continue aspirin, Plavix, metoprolol    SISSY  Creatinine was 1.59 on admission; 1.38 today; 1.18 at baseline  Improving  Hold spironolactone and lasix one more day; resume in AM if creatinine improed  BMP in AM     Chronic diastolic CHF  EF 21-25% per ECHO from 2/2024  Currently euvolemic  Hold spironolactone and lasix for now; resume tomorrow if creatinine improved             VTE Prophylaxis:  Pharmacologic VTE prophylaxis orders are  present.        The patient desires to be as follows:    CODE STATUS:    Code Status (Patient has no pulse and is not breathing): CPR (Attempt to Resuscitate)  Medical Interventions (Patient has pulse or is breathing): Full Support          Admission Status:  I believe this patient meets Inpatient status.    Expected Length of Stay: >2 midnight stay     PDMP and Medication Dispenses via Sidebar reviewed and consistent with patient reported medications.    I discussed the patient's findings and my recommendations with patient.      Signature:     This document has been electronically signed by Laz Sena DO on March 7, 2025 00:25 [facility] Hospitalist Team

## 2025-03-07 NOTE — PLAN OF CARE
Problem: Adult Inpatient Plan of Care  Goal: Absence of Hospital-Acquired Illness or Injury  Intervention: Identify and Manage Fall Risk  Recent Flowsheet Documentation  Taken 3/6/2025 1921 by Emily Grimaldo RN  Safety Promotion/Fall Prevention: safety round/check completed  Taken 3/6/2025 1600 by Emily Grimaldo RN  Safety Promotion/Fall Prevention: safety round/check completed  Taken 3/6/2025 0600 by Emily Grimaldo RN  Safety Promotion/Fall Prevention: safety round/check completed  Intervention: Prevent Skin Injury  Recent Flowsheet Documentation  Taken 3/6/2025 1921 by Emily Grimaldo RN  Body Position: position changed independently  Skin Protection: transparent dressing maintained  Goal: Optimal Comfort and Wellbeing  Intervention: Provide Person-Centered Care  Recent Flowsheet Documentation  Taken 3/6/2025 1921 by Emily Grimaldo RN  Trust Relationship/Rapport:   care explained   choices provided   emotional support provided   empathic listening provided   questions answered   questions encouraged   reassurance provided   thoughts/feelings acknowledged     Problem: Comorbidity Management  Goal: Blood Pressure in Desired Range  Intervention: Maintain Blood Pressure Management  Recent Flowsheet Documentation  Taken 3/6/2025 1921 by Emily Grimaldo RN  Medication Review/Management: medications reviewed     Problem: Fall Injury Risk  Goal: Absence of Fall and Fall-Related Injury  Intervention: Identify and Manage Contributors  Recent Flowsheet Documentation  Taken 3/6/2025 1921 by Emily Grimaldo RN  Medication Review/Management: medications reviewed  Intervention: Promote Injury-Free Environment  Recent Flowsheet Documentation  Taken 3/6/2025 1921 by Emily Grimaldo RN  Safety Promotion/Fall Prevention: safety round/check completed  Taken 3/6/2025 1600 by Emily Grimaldo RN  Safety Promotion/Fall Prevention: safety round/check completed  Taken 3/6/2025 0600 by Emily Grimaldo RN  Safety  Promotion/Fall Prevention: safety round/check completed   Goal Outcome Evaluation:

## 2025-03-08 LAB
ANION GAP SERPL CALCULATED.3IONS-SCNC: 10.8 MMOL/L (ref 5–15)
ANISOCYTOSIS BLD QL: ABNORMAL
BUN SERPL-MCNC: 22 MG/DL (ref 8–23)
BUN/CREAT SERPL: 18.2 (ref 7–25)
CALCIUM SPEC-SCNC: 9.4 MG/DL (ref 8.6–10.5)
CHLORIDE SERPL-SCNC: 102 MMOL/L (ref 98–107)
CO2 SERPL-SCNC: 22.2 MMOL/L (ref 22–29)
CREAT SERPL-MCNC: 1.21 MG/DL (ref 0.76–1.27)
DEPRECATED RDW RBC AUTO: 51.9 FL (ref 37–54)
EGFRCR SERPLBLD CKD-EPI 2021: 59.8 ML/MIN/1.73
EOSINOPHIL # BLD MANUAL: 0.38 10*3/MM3 (ref 0–0.4)
EOSINOPHIL NFR BLD MANUAL: 7 % (ref 0.3–6.2)
ERYTHROCYTE [DISTWIDTH] IN BLOOD BY AUTOMATED COUNT: 14.8 % (ref 12.3–15.4)
GIANT PLATELETS: ABNORMAL
GLUCOSE SERPL-MCNC: 115 MG/DL (ref 65–99)
HCT VFR BLD AUTO: 40.8 % (ref 37.5–51)
HGB BLD-MCNC: 12.8 G/DL (ref 13–17.7)
LYMPHOCYTES # BLD MANUAL: 1.25 10*3/MM3 (ref 0.7–3.1)
LYMPHOCYTES NFR BLD MANUAL: 6 % (ref 5–12)
MCH RBC QN AUTO: 29.9 PG (ref 26.6–33)
MCHC RBC AUTO-ENTMCNC: 31.4 G/DL (ref 31.5–35.7)
MCV RBC AUTO: 95.3 FL (ref 79–97)
METAMYELOCYTES NFR BLD MANUAL: 2 % (ref 0–0)
MONOCYTES # BLD: 0.33 10*3/MM3 (ref 0.1–0.9)
NEUTROPHILS # BLD AUTO: 3.37 10*3/MM3 (ref 1.7–7)
NEUTROPHILS NFR BLD MANUAL: 62 % (ref 42.7–76)
PLATELET # BLD AUTO: 187 10*3/MM3 (ref 140–450)
PMV BLD AUTO: 10.1 FL (ref 6–12)
POTASSIUM SERPL-SCNC: 4.7 MMOL/L (ref 3.5–5.2)
RBC # BLD AUTO: 4.28 10*6/MM3 (ref 4.14–5.8)
SCAN SLIDE: NORMAL
SMALL PLATELETS BLD QL SMEAR: ADEQUATE
SODIUM SERPL-SCNC: 135 MMOL/L (ref 136–145)
VARIANT LYMPHS NFR BLD MANUAL: 23 % (ref 19.6–45.3)
WBC MORPH BLD: NORMAL
WBC NRBC COR # BLD AUTO: 5.44 10*3/MM3 (ref 3.4–10.8)

## 2025-03-08 PROCEDURE — 25010000002 CEFTAROLINE FOSAMIL PER 10 MG: Performed by: INTERNAL MEDICINE

## 2025-03-08 PROCEDURE — 85025 COMPLETE CBC W/AUTO DIFF WBC: CPT | Performed by: NURSE PRACTITIONER

## 2025-03-08 PROCEDURE — 85007 BL SMEAR W/DIFF WBC COUNT: CPT | Performed by: NURSE PRACTITIONER

## 2025-03-08 PROCEDURE — 80048 BASIC METABOLIC PNL TOTAL CA: CPT | Performed by: NURSE PRACTITIONER

## 2025-03-08 PROCEDURE — 25010000002 ENOXAPARIN PER 10 MG: Performed by: EMERGENCY MEDICINE

## 2025-03-08 RX ORDER — ACETAMINOPHEN 500 MG
1000 TABLET ORAL EVERY 6 HOURS PRN
Status: DISCONTINUED | OUTPATIENT
Start: 2025-03-08 | End: 2025-03-09 | Stop reason: HOSPADM

## 2025-03-08 RX ORDER — FUROSEMIDE 20 MG/1
20 TABLET ORAL DAILY
Status: DISCONTINUED | OUTPATIENT
Start: 2025-03-08 | End: 2025-03-09 | Stop reason: HOSPADM

## 2025-03-08 RX ADMIN — Medication 10 ML: at 12:14

## 2025-03-08 RX ADMIN — SUCRALFATE 1 G: 1 TABLET ORAL at 09:47

## 2025-03-08 RX ADMIN — METHOCARBAMOL TABLETS 500 MG: 500 TABLET, COATED ORAL at 15:06

## 2025-03-08 RX ADMIN — PANTOPRAZOLE SODIUM 40 MG: 40 TABLET, DELAYED RELEASE ORAL at 09:48

## 2025-03-08 RX ADMIN — CLOPIDOGREL BISULFATE 75 MG: 75 TABLET ORAL at 09:48

## 2025-03-08 RX ADMIN — GABAPENTIN 800 MG: 400 CAPSULE ORAL at 22:20

## 2025-03-08 RX ADMIN — METOPROLOL SUCCINATE 50 MG: 50 TABLET, EXTENDED RELEASE ORAL at 09:47

## 2025-03-08 RX ADMIN — ASPIRIN 81 MG: 81 TABLET, COATED ORAL at 09:47

## 2025-03-08 RX ADMIN — SUCRALFATE 1 G: 1 TABLET ORAL at 21:01

## 2025-03-08 RX ADMIN — METHOCARBAMOL TABLETS 500 MG: 500 TABLET, COATED ORAL at 22:20

## 2025-03-08 RX ADMIN — SUCRALFATE 1 G: 1 TABLET ORAL at 12:18

## 2025-03-08 RX ADMIN — GABAPENTIN 800 MG: 400 CAPSULE ORAL at 15:06

## 2025-03-08 RX ADMIN — ENOXAPARIN SODIUM 40 MG: 100 INJECTION SUBCUTANEOUS at 15:06

## 2025-03-08 RX ADMIN — ACETAMINOPHEN 1000 MG: 500 TABLET, FILM COATED ORAL at 09:47

## 2025-03-08 RX ADMIN — METHOCARBAMOL TABLETS 500 MG: 500 TABLET, COATED ORAL at 09:47

## 2025-03-08 RX ADMIN — MEXILETINE HYDROCHLORIDE 150 MG: 150 CAPSULE ORAL at 21:01

## 2025-03-08 RX ADMIN — MIDODRINE HYDROCHLORIDE 5 MG: 5 TABLET ORAL at 09:47

## 2025-03-08 RX ADMIN — FUROSEMIDE 20 MG: 20 TABLET ORAL at 12:18

## 2025-03-08 RX ADMIN — CEFTAROLINE FOSAMIL 600 MG: 600 POWDER, FOR SOLUTION INTRAVENOUS at 03:00

## 2025-03-08 RX ADMIN — EMPAGLIFLOZIN 25 MG: 25 TABLET, FILM COATED ORAL at 09:47

## 2025-03-08 RX ADMIN — MEXILETINE HYDROCHLORIDE 150 MG: 150 CAPSULE ORAL at 09:47

## 2025-03-08 RX ADMIN — Medication 5 MG: at 21:01

## 2025-03-08 RX ADMIN — METOPROLOL SUCCINATE 50 MG: 50 TABLET, EXTENDED RELEASE ORAL at 21:01

## 2025-03-08 RX ADMIN — SUCRALFATE 1 G: 1 TABLET ORAL at 17:54

## 2025-03-08 RX ADMIN — CEFTAROLINE FOSAMIL 600 MG: 600 POWDER, FOR SOLUTION INTRAVENOUS at 15:11

## 2025-03-08 RX ADMIN — Medication 10 ML: at 21:01

## 2025-03-08 RX ADMIN — GABAPENTIN 800 MG: 400 CAPSULE ORAL at 05:43

## 2025-03-08 NOTE — PROGRESS NOTES
Infectious Diseases Progress Note      LOS: 1 day   No care team member to display    Chief Complaint: Left leg pain swelling and redness    Subjective       The patient has been afebrile for the last 24 hours.  The patient is on room air, hemodynamically stable, and is tolerating antimicrobial therapy.  Patient feeling slightly better today      Review of Systems:   Review of Systems   Constitutional: Negative.    HENT: Negative.     Eyes: Negative.    Respiratory:  Positive for shortness of breath.    Cardiovascular:  Positive for leg swelling.   Gastrointestinal: Negative.    Endocrine: Negative.    Genitourinary: Negative.    Musculoskeletal: Negative.    Skin:  Positive for color change.   Neurological: Negative.    Psychiatric/Behavioral: Negative.     All other systems reviewed and are negative.       Objective     Vital Signs  Temp:  [97 °F (36.1 °C)-99 °F (37.2 °C)] 97.4 °F (36.3 °C)  Heart Rate:  [53-83] 70  Resp:  [13-22] 22  BP: (106-128)/(51-67) 128/63    Physical Exam:  Physical Exam  Vitals and nursing note reviewed.   Constitutional:       General: He is not in acute distress.     Appearance: He is well-developed and normal weight. He is ill-appearing. He is not diaphoretic.   HENT:      Head: Normocephalic and atraumatic.   Eyes:      Conjunctiva/sclera: Conjunctivae normal.      Pupils: Pupils are equal, round, and reactive to light.   Cardiovascular:      Rate and Rhythm: Normal rate and regular rhythm.      Heart sounds: Normal heart sounds, S1 normal and S2 normal.   Pulmonary:      Effort: Pulmonary effort is normal. No respiratory distress.      Breath sounds: No stridor. Wheezing present. No rales.   Abdominal:      General: Bowel sounds are normal. There is no distension.      Palpations: Abdomen is soft. There is no mass.      Tenderness: There is no abdominal tenderness. There is no guarding.   Musculoskeletal:         General: No deformity. Normal range of motion.      Cervical back: Neck  supple.      Left lower leg: Edema present.   Skin:     General: Skin is warm and dry.      Coloration: Skin is not pale.      Findings: Erythema present. No rash.      Comments: Mild left leg edema erythema and warmth which has improved today.  Small superficial scratch to the lower leg   Neurological:      Mental Status: He is alert and oriented to person, place, and time.      Cranial Nerves: No cranial nerve deficit.   Psychiatric:         Mood and Affect: Mood normal.          Results Review:    I have reviewed all clinical data, test, lab, and imaging results.     Radiology  No Radiology Exams Resulted Within Past 24 Hours    Cardiology    Laboratory    Results from last 7 days   Lab Units 03/08/25 0005 03/07/25  0421 03/06/25  0505 03/05/25  1554   WBC 10*3/mm3 5.44 5.24 4.75 5.61   HEMOGLOBIN g/dL 12.8* 12.6* 12.6* 13.4   HEMATOCRIT % 40.8 40.1 39.5 41.4   PLATELETS 10*3/mm3 187 173 162 183     Results from last 7 days   Lab Units 03/08/25 0005 03/07/25 0421 03/06/25  0505 03/05/25  1554   SODIUM mmol/L 135* 133* 136 135*   POTASSIUM mmol/L 4.7 4.7 4.8 4.9   CHLORIDE mmol/L 102 102 103 101   CO2 mmol/L 22.2 20.5* 22.7 21.7*   BUN mg/dL 22 19 18 19   CREATININE mg/dL 1.21 1.17 1.38* 1.59*   GLUCOSE mg/dL 115* 108* 107* 121*   ALBUMIN g/dL  --   --   --  4.3   BILIRUBIN mg/dL  --   --   --  0.3   ALK PHOS U/L  --   --   --  109   AST (SGOT) U/L  --   --   --  44*   ALT (SGPT) U/L  --   --   --  30   CALCIUM mg/dL 9.4 9.4 9.0 9.3                 Microbiology   Microbiology Results (last 10 days)       ** No results found for the last 240 hours. **            Medication Review:       Schedule Meds  aspirin, 81 mg, Oral, Daily  ceftaroline, 600 mg, Intravenous, Q12H  clopidogrel, 75 mg, Oral, Daily  empagliflozin, 25 mg, Oral, Daily  enoxaparin sodium, 40 mg, Subcutaneous, Q24H  furosemide, 20 mg, Oral, Daily  gabapentin, 800 mg, Oral, Q8H  methocarbamol, 500 mg, Oral, TID  metoprolol succinate XL, 50 mg,  Oral, BID  mexiletine, 150 mg, Oral, BID  midodrine, 5 mg, Oral, TID AC  pantoprazole, 40 mg, Oral, Daily  sodium chloride, 10 mL, Intravenous, Q12H  sucralfate, 1 g, Oral, 4x Daily        Infusion Meds       PRN Meds    acetaminophen    senna-docusate sodium **AND** polyethylene glycol **AND** bisacodyl **AND** bisacodyl    HYDROcodone-acetaminophen    melatonin    ondansetron    [COMPLETED] Insert Peripheral IV **AND** sodium chloride    sodium chloride    sodium chloride        Assessment & Plan       Antimicrobial Therapy   1.  IV Teflaro       2.        3.        4.        5.            Assessment     Left lower extremity cellulitis.  There is no streak above the left knee.  The patient does not appear to be toxic.  Some improvement today     History of recurrent left leg cellulitis requiring multiple hospital admissions in the past     Status post CABG, cab, cardiomyopathy status post ICD placement.  Patient presents with chest pain and shortness of breath which has improved     Plan     Continue IV Teflaro 600 mg every 12 hours  Likely discharge home tomorrow on p.o. antibiotics probably doxycycline and Keflex for 7 days  Keep left leg elevated above the right heart level is much as possible  Continue supportive care  A.m. labs  Case discussed with hospitalist and RN      Tamiko Victor, APRN  03/08/25  14:12 EST    Note is dictated utilizing voice recognition software/Dragon

## 2025-03-08 NOTE — PROGRESS NOTES
[facility] MEDICINE SERVICE  DAILY PROGRESS NOTE    NAME: Deion Nicholas  : 1940  MRN: [id number]      LOS: 1 day     PROVIDER OF SERVICE: Luis Leija MD    Chief Complaint: Cellulitis of left leg    Subjective:     Interval History:  History taken from: patient    Doing well        Review of Systems:   Review of Systems    Objective:     Vital Signs  Temp:  [97 °F (36.1 °C)-99 °F (37.2 °C)] 97.3 °F (36.3 °C)  Heart Rate:  [53-83] 75  Resp:  [13-18] 15  BP: (106-140)/(51-72) 117/67   Body mass index is 26.96 kg/m².    Physical Exam  Physical Exam       Diagnostic Data    Results from last 7 days   Lab Units 25  0005 25  0505 25  1554   WBC 10*3/mm3 5.44   < > 5.61   HEMOGLOBIN g/dL 12.8*   < > 13.4   HEMATOCRIT % 40.8   < > 41.4   PLATELETS 10*3/mm3 187   < > 183   GLUCOSE mg/dL 115*   < > 121*   CREATININE mg/dL 1.21   < > 1.59*   BUN mg/dL 22   < > 19   SODIUM mmol/L 135*   < > 135*   POTASSIUM mmol/L 4.7   < > 4.9   AST (SGOT) U/L  --   --  44*   ALT (SGPT) U/L  --   --  30   ALK PHOS U/L  --   --  109   BILIRUBIN mg/dL  --   --  0.3   ANION GAP mmol/L 10.8   < > 12.3    < > = values in this interval not displayed.       No radiology results for the last day      I reviewed the patient's new clinical results.    Assessment/Plan:     Active and Resolved Problems  Active Hospital Problems    Diagnosis  POA    **Cellulitis of left leg [L03.116]  Yes      Resolved Hospital Problems   No resolved problems to display.       Cellulitis of left LE  Appears to be clinically improving  Continue Teflaro per ID recs  Monitor      CAD s/p CABG  Continue aspirin, Plavix, metoprolol     SISSY  Creatinine was 1.59 on admission; 1.21 today; 1.18 at baseline  Improving  BMP in AM      Chronic diastolic CHF  EF 21-25% per ECHO from 2024  Currently euvolemic  Lasix is on held.  Improved     VTE Prophylaxis:  Pharmacologic VTE prophylaxis orders are present.             Disposition Planning:      Barriers to Discharge: ID plans, improvement  Anticipated Date of Discharge: 3/10/2025  Place of Discharge: Home      Time: 30 minutes     Code Status and Medical Interventions: CPR (Attempt to Resuscitate); Full Support   Ordered at: 03/06/25 0655     Code Status (Patient has no pulse and is not breathing):    CPR (Attempt to Resuscitate)     Medical Interventions (Patient has pulse or is breathing):    Full Support       Signature: Electronically signed by Luis Leija MD, 03/08/25, 11:16 [facility].  [facility]

## 2025-03-08 NOTE — PLAN OF CARE
Problem: Adult Inpatient Plan of Care  Goal: Plan of Care Review  Outcome: Progressing  Flowsheets  Taken 3/8/2025 1830 by Mishel Carpenter RN  Progress: improving  Taken 3/8/2025 0525 by Wanda Mendoza RN  Plan of Care Reviewed With: patient  Goal: Patient-Specific Goal (Individualized)  Outcome: Progressing  Goal: Absence of Hospital-Acquired Illness or Injury  Outcome: Progressing  Intervention: Identify and Manage Fall Risk  Recent Flowsheet Documentation  Taken 3/8/2025 0828 by Mishel Carpenter RN  Safety Promotion/Fall Prevention:   safety round/check completed   room organization consistent   clutter free environment maintained   lighting adjusted  Intervention: Prevent Skin Injury  Recent Flowsheet Documentation  Taken 3/8/2025 0828 by Mishel Carpenter RN  Body Position: position changed independently  Intervention: Prevent and Manage VTE (Venous Thromboembolism) Risk  Recent Flowsheet Documentation  Taken 3/8/2025 0828 by Mishel Carpenter RN  VTE Prevention/Management: patient refused intervention  Intervention: Prevent Infection  Recent Flowsheet Documentation  Taken 3/8/2025 0828 by Mishel Carpenter RN  Infection Prevention:   rest/sleep promoted   hand hygiene promoted  Goal: Optimal Comfort and Wellbeing  Outcome: Progressing  Intervention: Provide Person-Centered Care  Recent Flowsheet Documentation  Taken 3/8/2025 0828 by Mishel Carpenter RN  Trust Relationship/Rapport:   care explained   choices provided   emotional support provided   empathic listening provided   questions answered   questions encouraged   reassurance provided   thoughts/feelings acknowledged  Goal: Readiness for Transition of Care  Outcome: Progressing     Problem: Comorbidity Management  Goal: Blood Pressure in Desired Range  Outcome: Progressing  Intervention: Maintain Blood Pressure Management  Recent Flowsheet Documentation  Taken 3/8/2025 0828 by Mishel Carpenter RN  Medication Review/Management: medications reviewed      Problem: Fall Injury Risk  Goal: Absence of Fall and Fall-Related Injury  Outcome: Progressing  Intervention: Identify and Manage Contributors  Recent Flowsheet Documentation  Taken 3/8/2025 0828 by Mishel Carpenter RN  Medication Review/Management: medications reviewed  Intervention: Promote Injury-Free Environment  Recent Flowsheet Documentation  Taken 3/8/2025 0828 by Mishel Carpenter, RN  Safety Promotion/Fall Prevention:   safety round/check completed   room organization consistent   clutter free environment maintained   lighting adjusted     Problem: Infection  Goal: Absence of Infection Signs and Symptoms  Outcome: Progressing     Problem: Skin Injury Risk Increased  Goal: Skin Health and Integrity  Outcome: Progressing  Intervention: Optimize Skin Protection  Recent Flowsheet Documentation  Taken 3/8/2025 0828 by Mishel Carpenter, RN  Activity Management: up ad nas  Head of Bed (HOB) Positioning: HOB elevated   Goal Outcome Evaluation:           Progress: improving

## 2025-03-08 NOTE — PLAN OF CARE
Problem: Adult Inpatient Plan of Care  Goal: Plan of Care Review  3/8/2025 0525 by Wanda Mendoza RN  Outcome: Progressing  3/8/2025 0525 by Wanda Mendoza RN  Outcome: Progressing  Flowsheets (Taken 3/8/2025 0525)  Progress: improving  Plan of Care Reviewed With: patient  Goal: Patient-Specific Goal (Individualized)  3/8/2025 0525 by Wanda Mendoza RN  Outcome: Progressing  3/8/2025 0525 by Wanda Mendoza RN  Outcome: Progressing  Goal: Absence of Hospital-Acquired Illness or Injury  3/8/2025 0525 by Wanda Mendoza RN  Outcome: Progressing  3/8/2025 0525 by Wanda Mendoza RN  Outcome: Progressing  Intervention: Identify and Manage Fall Risk  Recent Flowsheet Documentation  Taken 3/8/2025 0200 by Wanda Mendoza RN  Safety Promotion/Fall Prevention:   activity supervised   clutter free environment maintained   fall prevention program maintained   lighting adjusted   nonskid shoes/slippers when out of bed   room organization consistent   safety round/check completed  Taken 3/8/2025 0100 by Wanda Mendoza RN  Safety Promotion/Fall Prevention:   activity supervised   clutter free environment maintained   fall prevention program maintained   lighting adjusted   nonskid shoes/slippers when out of bed   room organization consistent   safety round/check completed  Taken 3/8/2025 0000 by Wanda Mendoza RN  Safety Promotion/Fall Prevention:   activity supervised   clutter free environment maintained   fall prevention program maintained   lighting adjusted   muscle strengthening facilitated   nonskid shoes/slippers when out of bed   room organization consistent   safety round/check completed  Taken 3/7/2025 2300 by Wanda Mendoza RN  Safety Promotion/Fall Prevention:   activity supervised   clutter free environment maintained   fall prevention program maintained   lighting adjusted   nonskid shoes/slippers when out of bed   room organization consistent   safety round/check completed  Taken  3/7/2025 2200 by Wanda Mendoza RN  Safety Promotion/Fall Prevention:   activity supervised   clutter free environment maintained   fall prevention program maintained   lighting adjusted   nonskid shoes/slippers when out of bed   room organization consistent   safety round/check completed  Taken 3/7/2025 2100 by Wanda Mendoza RN  Safety Promotion/Fall Prevention:   activity supervised   clutter free environment maintained   fall prevention program maintained   lighting adjusted   muscle strengthening facilitated   nonskid shoes/slippers when out of bed   room organization consistent   safety round/check completed  Taken 3/7/2025 1951 by Wanda Mendoza RN  Safety Promotion/Fall Prevention:   activity supervised   clutter free environment maintained   fall prevention program maintained   lighting adjusted   muscle strengthening facilitated   nonskid shoes/slippers when out of bed   room organization consistent   safety round/check completed  Intervention: Prevent Skin Injury  Recent Flowsheet Documentation  Taken 3/8/2025 0200 by Wanda Mendoza RN  Body Position: position changed independently  Taken 3/8/2025 0100 by Wanda Mendoza RN  Body Position: position changed independently  Taken 3/8/2025 0000 by Wanda Mendoza RN  Body Position: position changed independently  Skin Protection: transparent dressing maintained  Taken 3/7/2025 2300 by Wanda Mendoza RN  Body Position: position changed independently  Taken 3/7/2025 2200 by Wanda Mendoza RN  Body Position: position changed independently  Taken 3/7/2025 2100 by Wanda Mendoza RN  Body Position: position changed independently  Taken 3/7/2025 1951 by Wanda Mendoza RN  Body Position: position changed independently  Skin Protection: transparent dressing maintained  Intervention: Prevent and Manage VTE (Venous Thromboembolism) Risk  Recent Flowsheet Documentation  Taken 3/7/2025 1951 by Wanda Mendoza RN  VTE Prevention/Management:  left  Intervention: Prevent Infection  Recent Flowsheet Documentation  Taken 3/8/2025 0200 by Wanda Mendoza RN  Infection Prevention:   rest/sleep promoted   single patient room provided  Taken 3/8/2025 0100 by Wanda Mendoza RN  Infection Prevention:   rest/sleep promoted   single patient room provided  Taken 3/8/2025 0000 by Wanda Mendoza RN  Infection Prevention:   rest/sleep promoted   single patient room provided  Taken 3/7/2025 2300 by Wanda Mendoza RN  Infection Prevention: rest/sleep promoted  Taken 3/7/2025 2200 by Wanda Mendoza RN  Infection Prevention:   rest/sleep promoted   single patient room provided  Taken 3/7/2025 2100 by Wanda Mendoza RN  Infection Prevention:   rest/sleep promoted   single patient room provided  Taken 3/7/2025 1951 by Wanda Mendoza RN  Infection Prevention:   rest/sleep promoted   single patient room provided  Goal: Optimal Comfort and Wellbeing  3/8/2025 0525 by Wanda Mendoza RN  Outcome: Progressing  3/8/2025 0525 by Wanda Mendoza RN  Outcome: Progressing  Intervention: Monitor Pain and Promote Comfort  Recent Flowsheet Documentation  Taken 3/8/2025 0000 by Wanda Mendoza RN  Pain Management Interventions: pillow support provided  Taken 3/7/2025 1951 by Wanda Mendoza RN  Pain Management Interventions: pillow support provided  Intervention: Provide Person-Centered Care  Recent Flowsheet Documentation  Taken 3/8/2025 0000 by Wanda Mendoza RN  Trust Relationship/Rapport: care explained  Taken 3/7/2025 1951 by Wanda Mendoza RN  Trust Relationship/Rapport:   care explained   questions answered   questions encouraged  Goal: Readiness for Transition of Care  3/8/2025 0525 by Wanda Mendoza RN  Outcome: Progressing  3/8/2025 0525 by Wanda Mendoza RN  Outcome: Progressing     Problem: Comorbidity Management  Goal: Blood Pressure in Desired Range  3/8/2025 0525 by Wanda Mendoza RN  Outcome: Progressing  3/8/2025 0525 by Kelly  WING Muñoz  Outcome: Progressing  Intervention: Maintain Blood Pressure Management  Recent Flowsheet Documentation  Taken 3/8/2025 0200 by Wanda Mendoza RN  Medication Review/Management: medications reviewed  Taken 3/8/2025 0100 by Wanda Mendoza RN  Medication Review/Management: medications reviewed  Taken 3/8/2025 0000 by Wanda Mendoza RN  Medication Review/Management: medications reviewed  Taken 3/7/2025 2300 by Wanda Mendoza RN  Medication Review/Management: medications reviewed  Taken 3/7/2025 2200 by Wanda Mendoza RN  Medication Review/Management: medications reviewed  Taken 3/7/2025 2100 by Wanda Mendoza RN  Medication Review/Management: medications reviewed  Taken 3/7/2025 1951 by Wanda Mendoza RN  Medication Review/Management: medications reviewed     Problem: Fall Injury Risk  Goal: Absence of Fall and Fall-Related Injury  3/8/2025 0525 by Wanda Mendoza RN  Outcome: Progressing  3/8/2025 0525 by Wanda Mendoza RN  Outcome: Progressing  Intervention: Identify and Manage Contributors  Recent Flowsheet Documentation  Taken 3/8/2025 0200 by Wanda Mendoza RN  Medication Review/Management: medications reviewed  Taken 3/8/2025 0100 by Wanda Mendoza RN  Medication Review/Management: medications reviewed  Taken 3/8/2025 0000 by Wanda Mendoza RN  Medication Review/Management: medications reviewed  Taken 3/7/2025 2300 by Wanda Mendoza RN  Medication Review/Management: medications reviewed  Taken 3/7/2025 2200 by Wanda Mendoza RN  Medication Review/Management: medications reviewed  Taken 3/7/2025 2100 by Wanda Mendoza RN  Medication Review/Management: medications reviewed  Taken 3/7/2025 1951 by Wanda Mendoza RN  Medication Review/Management: medications reviewed  Intervention: Promote Injury-Free Environment  Recent Flowsheet Documentation  Taken 3/8/2025 0200 by Wanda Mendoza RN  Safety Promotion/Fall Prevention:   activity supervised   clutter free  environment maintained   fall prevention program maintained   lighting adjusted   nonskid shoes/slippers when out of bed   room organization consistent   safety round/check completed  Taken 3/8/2025 0100 by Wanda Mendoza, RN  Safety Promotion/Fall Prevention:   activity supervised   clutter free environment maintained   fall prevention program maintained   lighting adjusted   nonskid shoes/slippers when out of bed   room organization consistent   safety round/check completed  Taken 3/8/2025 0000 by Wanda Mendoza RN  Safety Promotion/Fall Prevention:   activity supervised   clutter free environment maintained   fall prevention program maintained   lighting adjusted   muscle strengthening facilitated   nonskid shoes/slippers when out of bed   room organization consistent   safety round/check completed  Taken 3/7/2025 2300 by Wanda Mendoza, RN  Safety Promotion/Fall Prevention:   activity supervised   clutter free environment maintained   fall prevention program maintained   lighting adjusted   nonskid shoes/slippers when out of bed   room organization consistent   safety round/check completed  Taken 3/7/2025 2200 by Wanda Mendoza RN  Safety Promotion/Fall Prevention:   activity supervised   clutter free environment maintained   fall prevention program maintained   lighting adjusted   nonskid shoes/slippers when out of bed   room organization consistent   safety round/check completed  Taken 3/7/2025 2100 by Wanda Mendoza, RN  Safety Promotion/Fall Prevention:   activity supervised   clutter free environment maintained   fall prevention program maintained   lighting adjusted   muscle strengthening facilitated   nonskid shoes/slippers when out of bed   room organization consistent   safety round/check completed  Taken 3/7/2025 1951 by Wanda Mendoza, RN  Safety Promotion/Fall Prevention:   activity supervised   clutter free environment maintained   fall prevention program maintained   lighting  adjusted   muscle strengthening facilitated   nonskid shoes/slippers when out of bed   room organization consistent   safety round/check completed     Problem: Infection  Goal: Absence of Infection Signs and Symptoms  3/8/2025 0525 by Wanda Mendoza RN  Outcome: Progressing  3/8/2025 0525 by Wanda Mendoza RN  Outcome: Progressing     Problem: Skin Injury Risk Increased  Goal: Skin Health and Integrity  3/8/2025 0525 by Wanda Mendoza RN  Outcome: Progressing  3/8/2025 0525 by Wanda Mendoza RN  Outcome: Progressing  Intervention: Optimize Skin Protection  Recent Flowsheet Documentation  Taken 3/8/2025 0200 by Wanda Mendoza RN  Activity Management: back to bed  Head of Bed (HOB) Positioning: HOB elevated  Taken 3/8/2025 0100 by Wanda Mendoza RN  Activity Management: activity encouraged  Head of Bed (HOB) Positioning: HOB elevated  Taken 3/8/2025 0000 by Wanda Mendoza RN  Activity Management: back to bed  Pressure Reduction Techniques: frequent weight shift encouraged  Head of Bed (HOB) Positioning: HOB elevated  Pressure Reduction Devices: pressure-redistributing mattress utilized  Skin Protection: transparent dressing maintained  Taken 3/7/2025 2300 by Wanda Mendoza RN  Activity Management: back to bed  Head of Bed (HOB) Positioning: HOB elevated  Taken 3/7/2025 2200 by Wanda Mendoza RN  Activity Management: back to bed  Head of Bed (HOB) Positioning: HOB elevated  Taken 3/7/2025 2100 by Wanda Mendoza RN  Activity Management: ambulated to bathroom  Head of Bed (HOB) Positioning: HOB elevated  Taken 3/7/2025 1951 by Wanda Mendoza RN  Activity Management: ambulated to bathroom  Pressure Reduction Techniques: frequent weight shift encouraged  Head of Bed (HOB) Positioning: HOB elevated  Pressure Reduction Devices: pressure-redistributing mattress utilized  Skin Protection: transparent dressing maintained   Goal Outcome Evaluation:  Plan of Care Reviewed With: patient         Progress: improving

## 2025-03-08 NOTE — PLAN OF CARE
Problem: Adult Inpatient Plan of Care  Goal: Plan of Care Review  Outcome: Progressing  Flowsheets (Taken 3/8/2025 0525)  Progress: improving  Plan of Care Reviewed With: patient  Goal: Patient-Specific Goal (Individualized)  Outcome: Progressing  Goal: Absence of Hospital-Acquired Illness or Injury  Outcome: Progressing  Intervention: Identify and Manage Fall Risk  Recent Flowsheet Documentation  Taken 3/8/2025 0200 by Wanda Mendoza RN  Safety Promotion/Fall Prevention:   activity supervised   clutter free environment maintained   fall prevention program maintained   lighting adjusted   nonskid shoes/slippers when out of bed   room organization consistent   safety round/check completed  Taken 3/8/2025 0100 by Wanda Mendoza RN  Safety Promotion/Fall Prevention:   activity supervised   clutter free environment maintained   fall prevention program maintained   lighting adjusted   nonskid shoes/slippers when out of bed   room organization consistent   safety round/check completed  Taken 3/8/2025 0000 by Wanda Mendoza RN  Safety Promotion/Fall Prevention:   activity supervised   clutter free environment maintained   fall prevention program maintained   lighting adjusted   muscle strengthening facilitated   nonskid shoes/slippers when out of bed   room organization consistent   safety round/check completed  Taken 3/7/2025 2300 by Wanda Mendoza RN  Safety Promotion/Fall Prevention:   activity supervised   clutter free environment maintained   fall prevention program maintained   lighting adjusted   nonskid shoes/slippers when out of bed   room organization consistent   safety round/check completed  Taken 3/7/2025 2200 by Wanda Mendoza RN  Safety Promotion/Fall Prevention:   activity supervised   clutter free environment maintained   fall prevention program maintained   lighting adjusted   nonskid shoes/slippers when out of bed   room organization consistent   safety round/check completed  Taken  3/7/2025 2100 by Wanda Mendoza RN  Safety Promotion/Fall Prevention:   activity supervised   clutter free environment maintained   fall prevention program maintained   lighting adjusted   muscle strengthening facilitated   nonskid shoes/slippers when out of bed   room organization consistent   safety round/check completed  Taken 3/7/2025 1951 by Wanda Mendoza RN  Safety Promotion/Fall Prevention:   activity supervised   clutter free environment maintained   fall prevention program maintained   lighting adjusted   muscle strengthening facilitated   nonskid shoes/slippers when out of bed   room organization consistent   safety round/check completed  Intervention: Prevent Skin Injury  Recent Flowsheet Documentation  Taken 3/8/2025 0200 by Wanda Mendoza RN  Body Position: position changed independently  Taken 3/8/2025 0100 by Wanda Mendoza RN  Body Position: position changed independently  Taken 3/8/2025 0000 by Wanda Mendoza RN  Body Position: position changed independently  Skin Protection: transparent dressing maintained  Taken 3/7/2025 2300 by Wanda Mendoza RN  Body Position: position changed independently  Taken 3/7/2025 2200 by Wanda Mendoza RN  Body Position: position changed independently  Taken 3/7/2025 2100 by Wanda Mendoza RN  Body Position: position changed independently  Taken 3/7/2025 1951 by Wanda Mendoza RN  Body Position: position changed independently  Skin Protection: transparent dressing maintained  Intervention: Prevent and Manage VTE (Venous Thromboembolism) Risk  Recent Flowsheet Documentation  Taken 3/7/2025 1951 by Wanad Mendoza RN  VTE Prevention/Management: left  Intervention: Prevent Infection  Recent Flowsheet Documentation  Taken 3/8/2025 0200 by Wanda Mendoza RN  Infection Prevention:   rest/sleep promoted   single patient room provided  Taken 3/8/2025 0100 by Wanda Mendoza RN  Infection Prevention:   rest/sleep promoted   single patient room  provided  Taken 3/8/2025 0000 by Wanda Mendoza RN  Infection Prevention:   rest/sleep promoted   single patient room provided  Taken 3/7/2025 2300 by Wanda Mendoza RN  Infection Prevention: rest/sleep promoted  Taken 3/7/2025 2200 by Wanda Mendoza RN  Infection Prevention:   rest/sleep promoted   single patient room provided  Taken 3/7/2025 2100 by Wanda Mendoza RN  Infection Prevention:   rest/sleep promoted   single patient room provided  Taken 3/7/2025 1951 by Wanda Mendoza RN  Infection Prevention:   rest/sleep promoted   single patient room provided  Goal: Optimal Comfort and Wellbeing  Outcome: Progressing  Intervention: Monitor Pain and Promote Comfort  Recent Flowsheet Documentation  Taken 3/8/2025 0000 by Wanda Mendoza RN  Pain Management Interventions: pillow support provided  Taken 3/7/2025 1951 by Wanda Mendoza RN  Pain Management Interventions: pillow support provided  Intervention: Provide Person-Centered Care  Recent Flowsheet Documentation  Taken 3/8/2025 0000 by Wanda Mendoza RN  Trust Relationship/Rapport: care explained  Taken 3/7/2025 1951 by Wanda Mendoza RN  Trust Relationship/Rapport:   care explained   questions answered   questions encouraged  Goal: Readiness for Transition of Care  Outcome: Progressing     Problem: Comorbidity Management  Goal: Blood Pressure in Desired Range  Outcome: Progressing  Intervention: Maintain Blood Pressure Management  Recent Flowsheet Documentation  Taken 3/8/2025 0200 by Wanda Mendoza RN  Medication Review/Management: medications reviewed  Taken 3/8/2025 0100 by Wanda Mendoza RN  Medication Review/Management: medications reviewed  Taken 3/8/2025 0000 by Wanda Mendoza RN  Medication Review/Management: medications reviewed  Taken 3/7/2025 2300 by Wanda Mendoza RN  Medication Review/Management: medications reviewed  Taken 3/7/2025 2200 by Wanda Mendoza RN  Medication Review/Management: medications  reviewed  Taken 3/7/2025 2100 by Wanda Mendoza RN  Medication Review/Management: medications reviewed  Taken 3/7/2025 1951 by Wanda Mendoza RN  Medication Review/Management: medications reviewed     Problem: Fall Injury Risk  Goal: Absence of Fall and Fall-Related Injury  Outcome: Progressing  Intervention: Identify and Manage Contributors  Recent Flowsheet Documentation  Taken 3/8/2025 0200 by Wanda Mendoza RN  Medication Review/Management: medications reviewed  Taken 3/8/2025 0100 by Wanda Mendoza RN  Medication Review/Management: medications reviewed  Taken 3/8/2025 0000 by Wanda Mendoza RN  Medication Review/Management: medications reviewed  Taken 3/7/2025 2300 by Wanda Mendoza RN  Medication Review/Management: medications reviewed  Taken 3/7/2025 2200 by Wanda Mendoza RN  Medication Review/Management: medications reviewed  Taken 3/7/2025 2100 by Wanda Mendoza RN  Medication Review/Management: medications reviewed  Taken 3/7/2025 1951 by Wanda Mendoza RN  Medication Review/Management: medications reviewed  Intervention: Promote Injury-Free Environment  Recent Flowsheet Documentation  Taken 3/8/2025 0200 by Wanda Mendoza RN  Safety Promotion/Fall Prevention:   activity supervised   clutter free environment maintained   fall prevention program maintained   lighting adjusted   nonskid shoes/slippers when out of bed   room organization consistent   safety round/check completed  Taken 3/8/2025 0100 by Wanda Mendoza RN  Safety Promotion/Fall Prevention:   activity supervised   clutter free environment maintained   fall prevention program maintained   lighting adjusted   nonskid shoes/slippers when out of bed   room organization consistent   safety round/check completed  Taken 3/8/2025 0000 by Wanda Mendoza RN  Safety Promotion/Fall Prevention:   activity supervised   clutter free environment maintained   fall prevention program maintained   lighting adjusted   muscle  strengthening facilitated   nonskid shoes/slippers when out of bed   room organization consistent   safety round/check completed  Taken 3/7/2025 2300 by Wanda Mendoza RN  Safety Promotion/Fall Prevention:   activity supervised   clutter free environment maintained   fall prevention program maintained   lighting adjusted   nonskid shoes/slippers when out of bed   room organization consistent   safety round/check completed  Taken 3/7/2025 2200 by Wanda Mendoza RN  Safety Promotion/Fall Prevention:   activity supervised   clutter free environment maintained   fall prevention program maintained   lighting adjusted   nonskid shoes/slippers when out of bed   room organization consistent   safety round/check completed  Taken 3/7/2025 2100 by Wanda Mendoza RN  Safety Promotion/Fall Prevention:   activity supervised   clutter free environment maintained   fall prevention program maintained   lighting adjusted   muscle strengthening facilitated   nonskid shoes/slippers when out of bed   room organization consistent   safety round/check completed  Taken 3/7/2025 1951 by Wanda Mendoza RN  Safety Promotion/Fall Prevention:   activity supervised   clutter free environment maintained   fall prevention program maintained   lighting adjusted   muscle strengthening facilitated   nonskid shoes/slippers when out of bed   room organization consistent   safety round/check completed     Problem: Infection  Goal: Absence of Infection Signs and Symptoms  Outcome: Progressing     Problem: Skin Injury Risk Increased  Goal: Skin Health and Integrity  Outcome: Progressing  Intervention: Optimize Skin Protection  Recent Flowsheet Documentation  Taken 3/8/2025 0200 by Wanda Mendoza RN  Activity Management: back to bed  Head of Bed (HOB) Positioning: HOB elevated  Taken 3/8/2025 0100 by Wanda Mendoza RN  Activity Management: activity encouraged  Head of Bed (HOB) Positioning: HOB elevated  Taken 3/8/2025 0000 by Kelly  WING Muñoz  Activity Management: back to bed  Pressure Reduction Techniques: frequent weight shift encouraged  Head of Bed (HOB) Positioning: HOB elevated  Pressure Reduction Devices: pressure-redistributing mattress utilized  Skin Protection: transparent dressing maintained  Taken 3/7/2025 2300 by Wanda Mendoza RN  Activity Management: back to bed  Head of Bed (HOB) Positioning: HOB elevated  Taken 3/7/2025 2200 by Wanda eMndoza RN  Activity Management: back to bed  Head of Bed (HOB) Positioning: HOB elevated  Taken 3/7/2025 2100 by Wanda Mendoza RN  Activity Management: ambulated to bathroom  Head of Bed (HOB) Positioning: HOB elevated  Taken 3/7/2025 1951 by Wanda Mendoza RN  Activity Management: ambulated to bathroom  Pressure Reduction Techniques: frequent weight shift encouraged  Head of Bed (HOB) Positioning: HOB elevated  Pressure Reduction Devices: pressure-redistributing mattress utilized  Skin Protection: transparent dressing maintained   Goal Outcome Evaluation:  Plan of Care Reviewed With: patient        Progress: improving

## 2025-03-09 ENCOUNTER — READMISSION MANAGEMENT (OUTPATIENT)
Dept: CALL CENTER | Facility: HOSPITAL | Age: 85
End: 2025-03-09
Payer: OTHER GOVERNMENT

## 2025-03-09 VITALS
BODY MASS INDEX: 27.06 KG/M2 | SYSTOLIC BLOOD PRESSURE: 124 MMHG | TEMPERATURE: 98.1 F | RESPIRATION RATE: 17 BRPM | OXYGEN SATURATION: 98 % | WEIGHT: 204.15 LBS | HEART RATE: 65 BPM | HEIGHT: 73 IN | DIASTOLIC BLOOD PRESSURE: 65 MMHG

## 2025-03-09 LAB
ANION GAP SERPL CALCULATED.3IONS-SCNC: 13.2 MMOL/L (ref 5–15)
BASOPHILS # BLD AUTO: 0.04 10*3/MM3 (ref 0–0.2)
BASOPHILS NFR BLD AUTO: 0.7 % (ref 0–1.5)
BUN SERPL-MCNC: 20 MG/DL (ref 8–23)
BUN/CREAT SERPL: 18.3 (ref 7–25)
CALCIUM SPEC-SCNC: 9 MG/DL (ref 8.6–10.5)
CHLORIDE SERPL-SCNC: 101 MMOL/L (ref 98–107)
CO2 SERPL-SCNC: 17.8 MMOL/L (ref 22–29)
CREAT SERPL-MCNC: 1.09 MG/DL (ref 0.76–1.27)
DEPRECATED RDW RBC AUTO: 52.9 FL (ref 37–54)
EGFRCR SERPLBLD CKD-EPI 2021: 67.8 ML/MIN/1.73
EOSINOPHIL # BLD AUTO: 0.44 10*3/MM3 (ref 0–0.4)
EOSINOPHIL NFR BLD AUTO: 7.3 % (ref 0.3–6.2)
ERYTHROCYTE [DISTWIDTH] IN BLOOD BY AUTOMATED COUNT: 14.7 % (ref 12.3–15.4)
GLUCOSE SERPL-MCNC: 109 MG/DL (ref 65–99)
HCT VFR BLD AUTO: 41.8 % (ref 37.5–51)
HGB BLD-MCNC: 13 G/DL (ref 13–17.7)
IMM GRANULOCYTES # BLD AUTO: 0.01 10*3/MM3 (ref 0–0.05)
IMM GRANULOCYTES NFR BLD AUTO: 0.2 % (ref 0–0.5)
LYMPHOCYTES # BLD AUTO: 1.23 10*3/MM3 (ref 0.7–3.1)
LYMPHOCYTES NFR BLD AUTO: 20.3 % (ref 19.6–45.3)
MCH RBC QN AUTO: 30.1 PG (ref 26.6–33)
MCHC RBC AUTO-ENTMCNC: 31.1 G/DL (ref 31.5–35.7)
MCV RBC AUTO: 96.8 FL (ref 79–97)
MONOCYTES # BLD AUTO: 0.49 10*3/MM3 (ref 0.1–0.9)
MONOCYTES NFR BLD AUTO: 8.1 % (ref 5–12)
NEUTROPHILS NFR BLD AUTO: 3.84 10*3/MM3 (ref 1.7–7)
NEUTROPHILS NFR BLD AUTO: 63.4 % (ref 42.7–76)
NRBC BLD AUTO-RTO: 0 /100 WBC (ref 0–0.2)
PLATELET # BLD AUTO: 180 10*3/MM3 (ref 140–450)
PMV BLD AUTO: 10.1 FL (ref 6–12)
POTASSIUM SERPL-SCNC: 4.5 MMOL/L (ref 3.5–5.2)
RBC # BLD AUTO: 4.32 10*6/MM3 (ref 4.14–5.8)
SODIUM SERPL-SCNC: 132 MMOL/L (ref 136–145)
WBC NRBC COR # BLD AUTO: 6.05 10*3/MM3 (ref 3.4–10.8)

## 2025-03-09 PROCEDURE — 85025 COMPLETE CBC W/AUTO DIFF WBC: CPT | Performed by: STUDENT IN AN ORGANIZED HEALTH CARE EDUCATION/TRAINING PROGRAM

## 2025-03-09 PROCEDURE — 97162 PT EVAL MOD COMPLEX 30 MIN: CPT

## 2025-03-09 PROCEDURE — 25010000002 CEFTAROLINE FOSAMIL PER 10 MG: Performed by: INTERNAL MEDICINE

## 2025-03-09 PROCEDURE — 80048 BASIC METABOLIC PNL TOTAL CA: CPT | Performed by: STUDENT IN AN ORGANIZED HEALTH CARE EDUCATION/TRAINING PROGRAM

## 2025-03-09 RX ORDER — CEPHALEXIN 500 MG/1
500 CAPSULE ORAL 2 TIMES DAILY
Qty: 14 CAPSULE | Refills: 0 | Status: SHIPPED | OUTPATIENT
Start: 2025-03-09

## 2025-03-09 RX ORDER — DOXYCYCLINE 100 MG/1
100 CAPSULE ORAL 2 TIMES DAILY
Qty: 14 CAPSULE | Refills: 0 | Status: SHIPPED | OUTPATIENT
Start: 2025-03-09

## 2025-03-09 RX ADMIN — Medication 10 ML: at 09:16

## 2025-03-09 RX ADMIN — CEFTAROLINE FOSAMIL 600 MG: 600 POWDER, FOR SOLUTION INTRAVENOUS at 14:00

## 2025-03-09 RX ADMIN — METOPROLOL SUCCINATE 50 MG: 50 TABLET, EXTENDED RELEASE ORAL at 09:14

## 2025-03-09 RX ADMIN — FUROSEMIDE 20 MG: 20 TABLET ORAL at 09:15

## 2025-03-09 RX ADMIN — PANTOPRAZOLE SODIUM 40 MG: 40 TABLET, DELAYED RELEASE ORAL at 09:15

## 2025-03-09 RX ADMIN — GABAPENTIN 800 MG: 400 CAPSULE ORAL at 05:02

## 2025-03-09 RX ADMIN — EMPAGLIFLOZIN 25 MG: 25 TABLET, FILM COATED ORAL at 09:15

## 2025-03-09 RX ADMIN — MEXILETINE HYDROCHLORIDE 150 MG: 150 CAPSULE ORAL at 09:15

## 2025-03-09 RX ADMIN — ACETAMINOPHEN 1000 MG: 500 TABLET, FILM COATED ORAL at 09:14

## 2025-03-09 RX ADMIN — CLOPIDOGREL BISULFATE 75 MG: 75 TABLET ORAL at 09:14

## 2025-03-09 RX ADMIN — METHOCARBAMOL TABLETS 500 MG: 500 TABLET, COATED ORAL at 09:14

## 2025-03-09 RX ADMIN — GABAPENTIN 800 MG: 400 CAPSULE ORAL at 14:27

## 2025-03-09 RX ADMIN — ASPIRIN 81 MG: 81 TABLET, COATED ORAL at 09:14

## 2025-03-09 RX ADMIN — SUCRALFATE 1 G: 1 TABLET ORAL at 09:15

## 2025-03-09 RX ADMIN — CEFTAROLINE FOSAMIL 600 MG: 600 POWDER, FOR SOLUTION INTRAVENOUS at 01:50

## 2025-03-09 NOTE — OUTREACH NOTE
Prep Survey      Flowsheet Row Responses   [facility] patient discharged from? [facility]   Is LACE score < 7 ? No   Eligibility Readm Mgmt   Discharge diagnosis Cellulitis of left leg   Does the patient have one of the following disease processes/diagnoses(primary or secondary)? Other   Does the patient have Home health ordered? No   Is there a DME ordered? No   Prep survey completed? Yes            Jewell SHAH - Registered Nurse

## 2025-03-09 NOTE — PLAN OF CARE
Problem: Adult Inpatient Plan of Care  Goal: Plan of Care Review  Outcome: Met  Flowsheets  Taken 3/9/2025 1228 by Mishel Carpenter RN  Outcome Evaluation: discharging home this afternoon.  Taken 3/9/2025 1148 by Kishan Dc PT  Plan of Care Reviewed With: patient  Taken 3/9/2025 0458 by Wanda Mendoza RN  Progress: improving  Goal: Patient-Specific Goal (Individualized)  Outcome: Met  Goal: Absence of Hospital-Acquired Illness or Injury  Outcome: Met  Intervention: Identify and Manage Fall Risk  Recent Flowsheet Documentation  Taken 3/9/2025 1200 by Mishel Carpenter RN  Safety Promotion/Fall Prevention:   room organization consistent   safety round/check completed  Taken 3/9/2025 1146 by Mishel Carpenter RN  Safety Promotion/Fall Prevention:   room organization consistent   safety round/check completed  Taken 3/9/2025 1000 by Mishel Carpenter RN  Safety Promotion/Fall Prevention:   safety round/check completed   room organization consistent  Taken 3/9/2025 0900 by Mishel Carpenter RN  Safety Promotion/Fall Prevention:   room organization consistent   safety round/check completed  Taken 3/9/2025 0800 by Mishel Carpenter RN  Safety Promotion/Fall Prevention:   room organization consistent   safety round/check completed  Taken 3/9/2025 0715 by Mishel Carpenter RN  Safety Promotion/Fall Prevention:   activity supervised   fall prevention program maintained   clutter free environment maintained  Taken 3/9/2025 0700 by Mishel Carpenter RN  Safety Promotion/Fall Prevention:   fall prevention program maintained   clutter free environment maintained   room organization consistent   safety round/check completed  Intervention: Prevent Skin Injury  Recent Flowsheet Documentation  Taken 3/9/2025 0715 by Mishel Carpenter RN  Body Position: position changed independently  Intervention: Prevent and Manage VTE (Venous Thromboembolism) Risk  Recent Flowsheet Documentation  Taken 3/9/2025 0715 by Mishel Carpenter RN  VTE  Prevention/Management: patient refused intervention  Intervention: Prevent Infection  Recent Flowsheet Documentation  Taken 3/9/2025 0715 by Mishel Carpenter, RN  Infection Prevention:   hand hygiene promoted   rest/sleep promoted  Goal: Optimal Comfort and Wellbeing  Outcome: Met  Goal: Readiness for Transition of Care  Outcome: Met     Problem: Comorbidity Management  Goal: Blood Pressure in Desired Range  Outcome: Met  Intervention: Maintain Blood Pressure Management  Recent Flowsheet Documentation  Taken 3/9/2025 0715 by Mishel Carpenter, RN  Medication Review/Management: medications reviewed     Problem: Fall Injury Risk  Goal: Absence of Fall and Fall-Related Injury  Outcome: Met  Intervention: Identify and Manage Contributors  Recent Flowsheet Documentation  Taken 3/9/2025 0715 by Mishel Carpenter, RN  Medication Review/Management: medications reviewed  Intervention: Promote Injury-Free Environment  Recent Flowsheet Documentation  Taken 3/9/2025 1200 by Mishel Carpenter, RN  Safety Promotion/Fall Prevention:   room organization consistent   safety round/check completed  Taken 3/9/2025 1146 by Mishel Carpenter, RN  Safety Promotion/Fall Prevention:   room organization consistent   safety round/check completed  Taken 3/9/2025 1000 by Mishel Carpenter, RN  Safety Promotion/Fall Prevention:   safety round/check completed   room organization consistent  Taken 3/9/2025 0900 by Mishel Carpenter, RN  Safety Promotion/Fall Prevention:   room organization consistent   safety round/check completed  Taken 3/9/2025 0800 by Mishel Carpenter, RN  Safety Promotion/Fall Prevention:   room organization consistent   safety round/check completed  Taken 3/9/2025 0715 by Mishel Carpenter, RN  Safety Promotion/Fall Prevention:   activity supervised   fall prevention program maintained   clutter free environment maintained  Taken 3/9/2025 0700 by Mishel Carpenter, RN  Safety Promotion/Fall Prevention:   fall prevention program maintained   clutter free  environment maintained   room organization consistent   safety round/check completed     Problem: Infection  Goal: Absence of Infection Signs and Symptoms  Outcome: Met     Problem: Skin Injury Risk Increased  Goal: Skin Health and Integrity  Outcome: Met  Intervention: Optimize Skin Protection  Recent Flowsheet Documentation  Taken 3/9/2025 0715 by Mishel Carpenter, RN  Activity Management: sitting, edge of bed  Head of Bed (HOB) Positioning: HOB elevated   Goal Outcome Evaluation:           Progress: improving  Outcome Evaluation: discharging home this afternoon.

## 2025-03-09 NOTE — PLAN OF CARE
Goal Outcome Evaluation:  Plan of Care Reviewed With: patient           Outcome Evaluation: Pt is an 85 YO M admitted with cellulitis in LLE, recently admitted and d/c, returned due to LLE remaining red/swollen. Pt this date reports symptoms have mostly resolved, and plans to return home this date. Pt reports living at home alone, typically is independetn with all ADLs, ambulating with 2 canes and reports no recent falls. Pt agitated at AO questions, and when encouraging to mobilize but did participate. Pt ambulated using RWx this date, with poor gait mechanics including forward flexed posture and increased hip flexion in LLE to clear L foot. Pt reports this is his baseline mobility level and has no mobility concerns upon d/c. PT to sign off at this time.    Anticipated Discharge Disposition (PT): home

## 2025-03-09 NOTE — PROGRESS NOTES
Infectious Diseases Progress Note      LOS: 2 days   No care team member to display    Chief Complaint: Left leg pain swelling and redness    Subjective       The patient has been afebrile for the last 24 hours.  The patient is on room air, hemodynamically stable, and is tolerating antimicrobial therapy.  Patient feeling slightly better today      Review of Systems:   Review of Systems   Constitutional: Negative.    HENT: Negative.     Eyes: Negative.    Respiratory:  Positive for shortness of breath.    Cardiovascular:  Positive for leg swelling.   Gastrointestinal: Negative.    Endocrine: Negative.    Genitourinary: Negative.    Musculoskeletal: Negative.    Skin:  Positive for color change.   Neurological: Negative.    Psychiatric/Behavioral: Negative.     All other systems reviewed and are negative.       Objective     Vital Signs  Temp:  [97.4 °F (36.3 °C)-98.1 °F (36.7 °C)] 98.1 °F (36.7 °C)  Heart Rate:  [53-80] 65  Resp:  [17-21] 17  BP: (118-132)/(60-67) 124/65    Physical Exam:  Physical Exam  Vitals and nursing note reviewed.   Constitutional:       General: He is not in acute distress.     Appearance: He is well-developed and normal weight. He is ill-appearing. He is not diaphoretic.   HENT:      Head: Normocephalic and atraumatic.   Eyes:      Conjunctiva/sclera: Conjunctivae normal.      Pupils: Pupils are equal, round, and reactive to light.   Cardiovascular:      Rate and Rhythm: Normal rate and regular rhythm.      Heart sounds: Normal heart sounds, S1 normal and S2 normal.   Pulmonary:      Effort: Pulmonary effort is normal. No respiratory distress.      Breath sounds: No stridor. Wheezing present. No rales.   Abdominal:      General: Bowel sounds are normal. There is no distension.      Palpations: Abdomen is soft. There is no mass.      Tenderness: There is no abdominal tenderness. There is no guarding.   Musculoskeletal:         General: No deformity. Normal range of motion.      Cervical back:  Neck supple.      Left lower leg: Edema present.   Skin:     General: Skin is warm and dry.      Coloration: Skin is not pale.      Findings: Erythema present. No rash.      Comments: Mild left leg edema erythema and warmth which has improved today.  Small superficial scratch to the lower leg   Neurological:      Mental Status: He is alert and oriented to person, place, and time.      Cranial Nerves: No cranial nerve deficit.   Psychiatric:         Mood and Affect: Mood normal.          Results Review:    I have reviewed all clinical data, test, lab, and imaging results.     Radiology  No Radiology Exams Resulted Within Past 24 Hours    Cardiology    Laboratory    Results from last 7 days   Lab Units 03/09/25 0311 03/08/25  0005 03/07/25  0421 03/06/25  0505 03/05/25  1554   WBC 10*3/mm3 6.05 5.44 5.24 4.75 5.61   HEMOGLOBIN g/dL 13.0 12.8* 12.6* 12.6* 13.4   HEMATOCRIT % 41.8 40.8 40.1 39.5 41.4   PLATELETS 10*3/mm3 180 187 173 162 183     Results from last 7 days   Lab Units 03/09/25 0311 03/08/25  0005 03/07/25  0421 03/06/25  0505 03/05/25  1554   SODIUM mmol/L 132* 135* 133* 136 135*   POTASSIUM mmol/L 4.5 4.7 4.7 4.8 4.9   CHLORIDE mmol/L 101 102 102 103 101   CO2 mmol/L 17.8* 22.2 20.5* 22.7 21.7*   BUN mg/dL 20 22 19 18 19   CREATININE mg/dL 1.09 1.21 1.17 1.38* 1.59*   GLUCOSE mg/dL 109* 115* 108* 107* 121*   ALBUMIN g/dL  --   --   --   --  4.3   BILIRUBIN mg/dL  --   --   --   --  0.3   ALK PHOS U/L  --   --   --   --  109   AST (SGOT) U/L  --   --   --   --  44*   ALT (SGPT) U/L  --   --   --   --  30   CALCIUM mg/dL 9.0 9.4 9.4 9.0 9.3                 Microbiology   Microbiology Results (last 10 days)       ** No results found for the last 240 hours. **            Medication Review:       Schedule Meds  aspirin, 81 mg, Oral, Daily  ceftaroline, 600 mg, Intravenous, Q12H  clopidogrel, 75 mg, Oral, Daily  empagliflozin, 25 mg, Oral, Daily  enoxaparin sodium, 40 mg, Subcutaneous, Q24H  furosemide, 20 mg,  Oral, Daily  gabapentin, 800 mg, Oral, Q8H  methocarbamol, 500 mg, Oral, TID  metoprolol succinate XL, 50 mg, Oral, BID  mexiletine, 150 mg, Oral, BID  midodrine, 5 mg, Oral, TID AC  pantoprazole, 40 mg, Oral, Daily  sodium chloride, 10 mL, Intravenous, Q12H  sucralfate, 1 g, Oral, 4x Daily        Infusion Meds       PRN Meds    acetaminophen    senna-docusate sodium **AND** polyethylene glycol **AND** bisacodyl **AND** bisacodyl    HYDROcodone-acetaminophen    melatonin    ondansetron    [COMPLETED] Insert Peripheral IV **AND** sodium chloride    sodium chloride    sodium chloride        Assessment & Plan       Antimicrobial Therapy   1.  IV Teflaro       2.        3.        4.        5.            Assessment     Left lower extremity cellulitis.  There is no streak above the left knee.  The patient does not appear to be toxic.  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  Patient presents with chest pain and shortness of breath which has improved     Plan     Okay to discharge home today.  Recommend to discharge home on p.o. doxycycline 100 mg twice daily and Keflex 500 mg 4 times daily for 7 days  Discontinue Teflaro on discharge  Case was discussed with primary service      Panda Lopes MD  03/09/25  15:35 EDT    Note is dictated utilizing voice recognition software/Dragon

## 2025-03-09 NOTE — DISCHARGE SUMMARY
"             [facility]  Discharge Summary    Date of Service: 3/9/2025  Patient Name: Deion Nicholas  : 1940  MRN: [id number]    Date of Admission: 3/5/2025  Discharge Diagnosis: Cellulitis of left leg  Date of Discharge: 3/9/2025  Primary Care Physician: No primary care provider on file.      Presenting Problem:   Cellulitis of left leg [L03.116]  Cellulitis of left leg without foot [L03.116]  Acute kidney injury [N17.9]  Chest pain, unspecified type [R07.9]    Active and Resolved Hospital Problems:  Active Hospital Problems    Diagnosis POA    **Cellulitis of left leg [L03.116] Yes      Resolved Hospital Problems   No resolved problems to display.         Hospital Course     HPI:    \"Deion Nicholas is a 84 y.o. male with a PMHx of CM, CAD s/p CABG, HTN, and prostate cancer, who presented to [facility] on 3/5/2025 with  complaints of left LE swelling, redness, and pain.  He says he has a hx of having recurrent infections in his left LE.  He [facility]  The other day, he was taking one of them off, and his fingernail cut open the skin.  The following day, the leg became more swollen, red, and painful.  He denies any fevers, chills, chest pain, SOB, nausea, vomiting, or diarrhea.  Patient was admitted to ED Observation unit.  We have now been asked to evaluate him and take over his care as he will need a few more days of IV Abx.  \"    Hospital Course:  Individual presented with left lower extremity cellulitis, frequent presentation for this issue.  Evaluated again by infectious disease service and recommended Teflaro 600 mg IV every 12 hours which rapidly improved his symptoms.  On discharge patient was switched to p.o. doxycycline and Keflex for 7 days.  ID recommended keeping the left leg elevated above the heart level is much as possible.        DISCHARGE Follow Up Recommendations for labs and diagnostics:     [facility], [facility]        Day " of Discharge     Vital Signs:  Temp:  [97.4 °F (36.3 °C)-98.1 °F (36.7 °C)] 98.1 °F (36.7 °C)  Heart Rate:  [53-80] 65  Resp:  [17-21] 17  BP: (118-132)/(60-67) 124/65    Physical Exam:  Physical Exam  Constitutional:       Appearance: Normal appearance.   Cardiovascular:      Rate and Rhythm: Normal rate and regular rhythm.      Pulses: Normal pulses.      Heart sounds: Normal heart sounds.   Pulmonary:      Effort: Pulmonary effort is normal.      Breath sounds: Normal breath sounds.   Neurological:      Mental Status: He is alert.           Pertinent  and/or Most Recent Results     LAB RESULTS:      Lab 03/09/25 0311 03/08/25  0005 03/07/25 0421 03/06/25  0505 03/05/25  1554   WBC 6.05 5.44 5.24 4.75 5.61   HEMOGLOBIN 13.0 12.8* 12.6* 12.6* 13.4   HEMATOCRIT 41.8 40.8 40.1 39.5 41.4   PLATELETS 180 187 173 162 183   NEUTROS ABS 3.84 3.37 2.49 2.26 3.52   IMMATURE GRANS (ABS) 0.01  --  0.01 0.01 0.01   LYMPHS ABS 1.23  --  1.72 1.43 1.14   MONOS ABS 0.49  --  0.54 0.63 0.59   EOS ABS 0.44* 0.38 0.41* 0.38 0.29   MCV 96.8 95.3 94.6 94.0 93.9   PROTIME  --   --   --   --  14.6*   APTT  --   --   --   --  31.0         Lab 03/09/25 0311 03/08/25  0005 03/07/25  0421 03/06/25  0505 03/05/25  1554   SODIUM 132* 135* 133* 136 135*   POTASSIUM 4.5 4.7 4.7 4.8 4.9   CHLORIDE 101 102 102 103 101   CO2 17.8* 22.2 20.5* 22.7 21.7*   ANION GAP 13.2 10.8 10.5 10.3 12.3   BUN 20 22 19 18 19   CREATININE 1.09 1.21 1.17 1.38* 1.59*   EGFR 67.8 59.8* 62.2 51.1* 43.1*   GLUCOSE 109* 115* 108* 107* 121*   CALCIUM 9.0 9.4 9.4 9.0 9.3   MAGNESIUM  --   --   --   --  2.4   TSH  --   --   --   --  3.320         Lab 03/05/25  1554   TOTAL PROTEIN 7.3   ALBUMIN 4.3   GLOBULIN 3.0   ALT (SGPT) 30   AST (SGOT) 44*   BILIRUBIN 0.3   ALK PHOS 109         Lab 03/05/25  1742 03/05/25  1554   PROBNP  --  613.0   HSTROP T 18 23*   PROTIME  --  14.6*   INR  --  1.15*                 Brief Urine Lab Results       None          Microbiology  Results (last 10 days)       ** No results found for the last 240 hours. **            XR Chest 1 View  Result Date: 3/5/2025  Impression: Impression: Borderline cardiomegaly. No acute process is identified. Electronically Signed: Tommy Pan MD  3/5/2025 4:09 PM EST  Workstation ID: [id number]    XR Chest 1 View  Result Date: 2/27/2025  Impression: Impression: Mild right basilar airspace opacities, which may be due to atelectasis and/or pneumonia. Electronically Signed: Linda Ervin  2/27/2025 12:11 PM EST  Workstation ID: [id number]    CT Angiogram Chest  Result Date: 2/18/2025  Impression: Impression: 1.The ascending aorta is aneurysmal, measuring up to 4.7 cm, similar to prior study of 9/10/2024. 2.Additional vascular and nonvascular findings as given above. Electronically Signed: Fei Narayanan MD  2/18/2025 2:21 PM EST  Workstation ID: [id number]      Results for orders placed during the hospital encounter of 03/05/25    Doppler Ankle Brachial Index Single Level CAR    Interpretation Summary    Right Conclusion: The right LAMAR is unable to be assessed due to vessel incompressibility with waveforms indicative of minimal disease. Normal digital pressures.    Left Conclusion: The left LAMAR is unable to be assessed due to vessel incompressibility with waveforms indicative of minimal disease. Mild digital insufficiency.      Results for orders placed during the hospital encounter of 03/05/25    Doppler Ankle Brachial Index Single Level CAR    Interpretation Summary    Right Conclusion: The right LAMAR is unable to be assessed due to vessel incompressibility with waveforms indicative of minimal disease. Normal digital pressures.    Left Conclusion: The left LAMAR is unable to be assessed due to vessel incompressibility with waveforms indicative of minimal disease. Mild digital insufficiency.      Results for orders placed during the hospital encounter of 02/14/25    Adult Transthoracic Echo Complete w/ Color, Spectral and  Contrast if necessary per protocol    Interpretation Summary    Left ventricular systolic function is severely decreased. Left ventricular ejection fraction appears to be 21 - 25%.    The left ventricular cavity is moderate to severely dilated.    Left ventricular diastolic function is consistent with (grade I) impaired relaxation.    The left atrial cavity is moderately dilated.    Mild dilation of the sinuses of Valsalva is present.      Labs Pending at Discharge:  Pending Results       None            Procedures Performed           Consults:   Consults       Date and Time Order Name Status Description    3/6/2025  9:27 PM Inpatient Hospitalist Consult      3/5/2025  6:16 PM Inpatient Infectious Diseases Consult Completed     2/27/2025  4:32 PM Inpatient Cardiology Consult Completed               Discharge Details        Discharge Medications        New Medications        Instructions Start Date   cephalexin 500 MG capsule  Commonly known as: KEFLEX   500 mg, Oral, 2 Times Daily      doxycycline 100 MG capsule  Commonly known as: VIBRAMYCIN   100 mg, Oral, 2 Times Daily             Continue These Medications        Instructions Start Date   aspirin 81 MG EC tablet   81 mg, Daily      cholecalciferol 10 MCG (400 UNIT) tablet  Commonly known as: VITAMIN D3   800 Units, Daily      clopidogrel 75 MG tablet  Commonly known as: PLAVIX   75 mg, Oral, Daily      Diclofenac Sodium 1 % gel gel  Commonly known as: VOLTAREN   4 g, 4 Times Daily PRN      ezetimibe 10 MG tablet  Commonly known as: ZETIA   10 mg, Every Evening      Farxiga 10 MG tablet  Generic drug: dapagliflozin Propanediol   10 mg, Daily      fish oil 1000 MG capsule capsule   2,000 mg, 2 Times Daily With Meals      furosemide 20 MG tablet  Commonly known as: LASIX   20 mg, Daily      gabapentin 800 MG tablet  Commonly known as: NEURONTIN   800 mg, Oral, 3 Times Daily      meclizine 25 MG tablet  Commonly known as: ANTIVERT   25 mg, Every 6 Hours PRN       methocarbamol 500 MG tablet  Commonly known as: ROBAXIN   500 mg, 3 Times Daily      metoprolol succinate  MG 24 hr tablet  Commonly known as: TOPROL-XL   50 mg, 2 Times Daily      mexiletine 150 MG capsule  Commonly known as: MEXITIL   150 mg, Oral, 2 Times Daily      midodrine 5 MG tablet  Commonly known as: PROAMATINE   5 mg, 3 Times Daily Before Meals      nitroglycerin 0.4 MG SL tablet  Commonly known as: NITROSTAT   0.4 mg, Every 5 Minutes PRN      pantoprazole 40 MG EC tablet  Commonly known as: PROTONIX   40 mg, Daily      potassium chloride 10 MEQ CR capsule  Commonly known as: MICRO-K   10 mEq, Daily      sennosides-docusate 8.6-50 MG per tablet  Commonly known as: PERICOLACE   1 tablet, Daily      spironolactone 25 MG tablet  Commonly known as: ALDACTONE   25 mg, Daily      sucralfate 1 g tablet  Commonly known as: CARAFATE   1 g, 4 Times Daily             Stop These Medications      sulfamethoxazole-trimethoprim 800-160 MG per tablet  Commonly known as: Bactrim DS              Allergies   Allergen Reactions    Lovastatin Myalgia    Pravastatin Myalgia and Unknown (See Comments)     unknown    Simvastatin Unknown (See Comments) and Myalgia     unknown    Spironolactone Other (See Comments)     Gynecomastia          Discharge Disposition:   Home or Self Care    Diet:  Hospital:  Diet Order   Procedures    Diet: Cardiac; Low Sodium (2g); Fluid Consistency: Thin (IDDSI 0)         Discharge Activity:         CODE STATUS:  Code Status and Medical Interventions: CPR (Attempt to Resuscitate); Full Support   Ordered at: 03/06/25 0655     Code Status (Patient has no pulse and is not breathing):    CPR (Attempt to Resuscitate)     Medical Interventions (Patient has pulse or is breathing):    Full Support         Future Appointments   Date Time Provider Department Center   3/13/2025 11:15 AM John Barros MD [facility]   3/17/2025  2:15 PM Constantino Arvizu MD [facility]   8/26/2025  2:00  PM Marcin Childers, DO [facility]           Time spent on Discharge including face to face service:  55 minutes    Signature: Electronically signed by Luis Leija MD, 03/09/25, 12:36 EDT.  [facility] Hospitalist Team

## 2025-03-09 NOTE — THERAPY EVALUATION
Patient Name: Deion Nicholas  : 1940    MRN: [id number]                              Today's Date: 3/9/2025       Admit Date: 3/5/2025    Visit Dx:     ICD-10-CM ICD-9-CM   1. Cellulitis of left leg without foot  L03.116 682.6   2. Chest pain, unspecified type  R07.9 786.50   3. Acute kidney injury  N17.9 584.9     Patient Active Problem List   Diagnosis    Normocytic anemia    Coronary artery disease    Ischemic cardiomyopathy    Paroxysmal ventricular tachycardia    Presence of automatic implantable cardioverter-defibrillator    Essential hypertension    Hyperlipidemia, mixed    Peripheral neuropathy    Cellulitis of left lower extremity without foot    GERD without esophagitis    B12 deficiency    Orthostatic hypotension    Tinea pedis of left foot    Lower extremity pain, left    Cellulitis of left leg    Baker's cyst of knee, left    Gynecomastia, male    Ascending aortic aneurysm    Thyroid nodule    Dizziness    Chest pain, unspecified type    Edema of left lower extremity    Elevated troponin    Left leg cellulitis    Cellulitis of left lower leg    Valvular heart disease    Nonrheumatic mitral valve regurgitation    Nonrheumatic aortic valve insufficiency    Fluid overload    Chest pain    CAD (coronary artery disease)    Unstable angina     Past Medical History:   Diagnosis Date    Aneurysm     Cardiomyopathy     ICD implantation    Cellulitis of left lower extremity     recurrent    CHD (coronary heart disease)     S/P CABG & PCI    Dyslipidemia     Heart valve disease     History of ventricular tachycardia     Hypertension     Myocardial infarction     Inferior MI    Pinched nerve     L4-L5    Prostate cancer      Past Surgical History:   Procedure Laterality Date    ANGIOPLASTY  1996 Stent: Palmaz- Huy stent/LAD 1996-RCA: -Tetra stent Guidant to proximal RCA, mid to distal artery 2 sequential Guidant Tetra stents    APPENDECTOMY      CARDIAC ABLATION  April and May 2019     CARDIAC CATHETERIZATION  07/2017    1996 x2, Nov. 2000, 05/2010-cath 08/2015-no stents 2017    CARDIAC CATHETERIZATION N/A 11/13/2024    Procedure: LEFT HEART CATH with possible PCI;  Surgeon: Marcin Childers DO;  Location: [facility] CATH INVASIVE LOCATION;  Service: Cardiovascular;  Laterality: N/A;  Local and IV sedation    CARDIAC DEFIBRILLATOR PLACEMENT      COLONOSCOPY W/ POLYPECTOMY      Mult colonoscopy's for polyp resection     CORONARY ARTERY BYPASS GRAFT  05/2010    x5 vessel: LMA to diagonal, LAD, intermedius, obtuse marginal, RCA    CORONARY STENT PLACEMENT      ENDOSCOPY N/A 6/24/2019    Procedure: ESOPHAGOGASTRODUODENOSCOPY with dilitation Bougie 50, 54;  Surgeon: Roddy Coronel MD;  Location: [facility] ENDOSCOPY;  Service: Gastroenterology    INSERT / REPLACE / REMOVE PACEMAKER      KNEE OPEN LATERAL RELEASE Left     reduction    OTHER SURGICAL HISTORY  01/2018    ICD implantation    PROSTATE SURGERY  2015    Robiotic surgery- Cyber Knife:      General Information       Row Name 03/09/25 1144          Physical Therapy Time and Intention    Document Type evaluation  -EL     Mode of Treatment individual therapy;physical therapy  -EL       Row Name 03/09/25 1144          General Information    Prior Level of Function independent:;all household mobility;ADL's  -EL       Row Name 03/09/25 1144          Living Environment    Current Living Arrangements home  -EL     People in Home alone  -EL       Row Name 03/09/25 1144          Cognition    Orientation Status (Cognition) oriented x 4  -EL       Row Name 03/09/25 1144          Safety Issues/Impairments Affecting Functional Mobility    Impairments Affecting Function (Mobility) balance;cognition  -EL     Cognitive Impairments, Mobility Safety/Performance awareness, need for assistance;insight into deficits/self-awareness;judgment  -EL               User Key  (r) = Recorded By, (t) = Taken By, (c) = Cosigned By      Initials Name Provider Type     Kishan Busby, PT Physical Therapist                   Mobility       Row Name 03/09/25 1146          Bed Mobility    Bed Mobility supine-sit  -EL     Supine-Sit [place] (Bed Mobility) modified independence  -EL     Assistive Device (Bed Mobility) bed rails  -EL       Row Name 03/09/25 1146          Sit-Stand Transfer    Sit-Stand [place] (Transfers) standby assist  -EL     Assistive Device (Sit-Stand Transfers) walker, front-wheeled  -EL       Row Name 03/09/25 1146          Gait/Stairs (Locomotion)    [place] Level (Gait) standby assist  -EL     Assistive Device (Gait) walker, front-wheeled  -EL     Patient was able to Ambulate yes  -EL     Distance in Feet (Gait) 15  -EL     Deviations/Abnormal Patterns (Gait) steppage  -EL     Bilateral Gait Deviations forward flexed posture  -EL     Comment, (Gait/Stairs) significantly impaired gait mechanics, but pt states this is his baseline. Ambulated with RWx this date, uses 2 canes at baseline  -EL               User Key  (r) = Recorded By, (t) = Taken By, (c) = Cosigned By      Initials Name Provider Type    Kishan Busby, PT Physical Therapist                   Obj/Interventions       Row Name 03/09/25 1147          Range of Motion Comprehensive    General Range of Motion bilateral lower extremity ROM WFL  -EL       Row Name 03/09/25 1147          Strength Comprehensive (MMT)    General Manual Muscle Testing (MMT) Assessment lower extremity strength deficits identified  -EL     Comment, General Manual Muscle Testing (MMT) Assessment BLE 4-/5 gross  -EL       Row Name 03/09/25 1147          Sensory Assessment (Somatosensory)    Sensory Assessment (Somatosensory) sensation intact  -EL               User Key  (r) = Recorded By, (t) = Taken By, (c) = Cosigned By      Initials Name Provider Type    Kishan Busby, PT Physical Therapist                   Goals/Plan    No documentation.                  Clinical Impression       Row Name 03/09/25 1148           Pain    Pretreatment Pain Rating 0/10 - no pain  -EL     Posttreatment Pain Rating 0/10 - no pain  -EL       Row Name 03/09/25 1148          Plan of Care Review    Plan of Care Reviewed With patient  -EL     Outcome Evaluation Pt is an 85 YO M admitted with cellulitis in [facility], recently admitted and d/c, returned due to LLE remaining red/swollen. Pt this date reports symptoms have mostly resolved, and plans to return home this date. Pt reports living at home alone, typically is independetn with all ADLs, ambulating with 2 canes and reports no recent falls. Pt agitated at AO questions, and when encouraging to mobilize but did participate. Pt ambulated using RWx this date, with poor gait mechanics including forward flexed posture and increased hip flexion in LLE to clear L foot. Pt reports this is his baseline mobility level and has no mobility concerns upon d/c. PT to sign off at this time.  -       Row Name 03/09/25 1148          Therapy Assessment/Plan (PT)    Therapy Frequency (PT) evaluation only  -       Row Name 03/09/25 1148          Positioning and Restraints    Pre-Treatment Position in bed  -EL     Post Treatment Position bed  -EL               User Key  (r) = Recorded By, (t) = Taken By, (c) = Cosigned By      Initials Name Provider Type    Kishan Busby, PT Physical Therapist                   Outcome Measures       Row Name 03/09/25 1151 03/09/25 0715       How much help from another person do you currently need...    Turning from your back to your side while in flat bed without using bedrails? 4  -EL 4  -JH    Moving from lying on back to sitting on the side of a flat bed without bedrails? 4  -EL 4  -JH    Moving to and from a bed to a chair (including a wheelchair)? 4  -EL 4  -JH    Standing up from a chair using your arms (e.g., wheelchair, bedside chair)? 4  -EL 4  -JH    Climbing 3-5 steps with a railing? 3  -EL 3  -JH    To walk in hospital room? 3  -EL 3  -JH    AM-PAC 6 Clicks Score (PT) 22   - 22  -    Highest Level of Mobility Goal 7 --> Walk 25 feet or more  - 7 --> Walk 25 feet or more  -      Row Name 03/09/25 1151          Functional Assessment    Outcome Measure Options AM-PAC 6 Clicks Basic Mobility (PT)  -               User Key  (r) = Recorded By, (t) = Taken By, (c) = Cosigned By      Initials Name Provider Type    EL Kishan Dc, PT Physical Therapist    Mishel Perez, RN Registered Nurse                                 Physical Therapy Education       Title: PT OT SLP Therapies (Done)       Topic: Physical Therapy (Done)       Point: Mobility training (Done)       Learning Progress Summary            Patient Acceptance, E,TB, VU by  at 3/9/2025 1151                      Point: Precautions (Done)       Learning Progress Summary            Patient Acceptance, E,TB, VU by  at 3/9/2025 1151                                      User Key       Initials Effective Dates Name Provider Type [facility] 06/23/20 -  Kishan Dc, PT Physical Therapist PT                  PT Recommendation and Plan     Outcome Evaluation: Pt is an 85 YO M admitted with cellulitis in LLE, recently admitted and d/c, returned due to LLE remaining red/swollen. Pt this date reports symptoms have mostly resolved, and plans to return home this date. Pt reports living at home alone, typically is independetn with all ADLs, ambulating with 2 canes and reports no recent falls. Pt agitated at AO questions, and when encouraging to mobilize but did participate. Pt ambulated using RWx this date, with poor gait mechanics including forward flexed posture and increased hip flexion in LLE to clear L foot. Pt reports this is his baseline mobility level and has no mobility concerns upon d/c. PT to sign off at this time.     Time Calculation:   PT Evaluation Complexity  History, PT Evaluation Complexity: 1-2 personal factors and/or comorbidities  Examination of Body Systems (PT Eval Complexity): total of 3 or more  elements  Clinical Presentation (PT Evaluation Complexity): evolving  Clinical Decision Making (PT Evaluation Complexity): moderate complexity  Overall Complexity (PT Evaluation Complexity): moderate complexity     PT Charges       Row Name 03/09/25 1152             Time Calculation    Start Time 0922  -EL      Stop Time 0948  -EL      Time Calculation (min) 26 min  -EL      PT Received On 03/09/25  -EL                User Key  (r) = Recorded By, (t) = Taken By, (c) = Cosigned By      Initials Name Provider Type    Kishan Busby PT Physical Therapist                  Therapy Charges for Today       Code Description Service Date Service Provider Modifiers Qty    27582013097 HC PT EVAL MOD COMPLEXITY 4 3/9/2025 Kishan Dc, PT GP 1            PT G-Codes  Outcome Measure Options: AM-PAC 6 Clicks Basic Mobility (PT)  AM-PAC 6 Clicks Score (PT): 22  PT Discharge Summary  Anticipated Discharge Disposition (PT): home    Kishan Dc PT  3/9/2025

## 2025-03-09 NOTE — PLAN OF CARE
Problem: Adult Inpatient Plan of Care  Goal: Plan of Care Review  Outcome: Progressing  Flowsheets (Taken 3/9/2025 0458)  Progress: improving  Plan of Care Reviewed With: patient  Goal: Patient-Specific Goal (Individualized)  Outcome: Progressing  Goal: Absence of Hospital-Acquired Illness or Injury  Outcome: Progressing  Intervention: Identify and Manage Fall Risk  Recent Flowsheet Documentation  Taken 3/9/2025 0400 by Wanda Mendoza RN  Safety Promotion/Fall Prevention:   activity supervised   clutter free environment maintained   fall prevention program maintained   lighting adjusted   muscle strengthening facilitated   nonskid shoes/slippers when out of bed   room organization consistent   safety round/check completed  Taken 3/9/2025 0000 by Wanda Mendoza RN  Safety Promotion/Fall Prevention:   activity supervised   clutter free environment maintained   fall prevention program maintained   lighting adjusted   nonskid shoes/slippers when out of bed   room organization consistent   safety round/check completed  Taken 3/8/2025 2200 by Wanda Mendoza RN  Safety Promotion/Fall Prevention:   activity supervised   clutter free environment maintained   fall prevention program maintained   mobility aid in reach   nonskid shoes/slippers when out of bed   room organization consistent   safety round/check completed  Taken 3/8/2025 1946 by Wanda Mendoza RN  Safety Promotion/Fall Prevention:   activity supervised   clutter free environment maintained   fall prevention program maintained   lighting adjusted   nonskid shoes/slippers when out of bed   room organization consistent   safety round/check completed  Intervention: Prevent Skin Injury  Recent Flowsheet Documentation  Taken 3/9/2025 0400 by Wanda Mendoza RN  Body Position: position changed independently  Taken 3/9/2025 0000 by Wanda Mendoza RN  Body Position: position changed independently  Taken 3/8/2025 2200 by Wanda Mendoza RN  Body  Position: position changed independently  Taken 3/8/2025 1946 by Wanda Mendoza RN  Body Position: position changed independently  Skin Protection: transparent dressing maintained  Intervention: Prevent Infection  Recent Flowsheet Documentation  Taken 3/9/2025 0400 by Wanda Mendoza RN  Infection Prevention: environmental surveillance performed  Taken 3/9/2025 0000 by Wanda Mendoza RN  Infection Prevention:   rest/sleep promoted   single patient room provided  Taken 3/8/2025 2200 by Wanda Mendoza RN  Infection Prevention:   environmental surveillance performed   rest/sleep promoted   single patient room provided  Taken 3/8/2025 1946 by Wanda Mendoza RN  Infection Prevention:   rest/sleep promoted   single patient room provided  Goal: Optimal Comfort and Wellbeing  Outcome: Progressing  Intervention: Monitor Pain and Promote Comfort  Recent Flowsheet Documentation  Taken 3/9/2025 0400 by Wanda Mendoza RN  Pain Management Interventions: pillow support provided  Taken 3/8/2025 1946 by Wanda Mendoza RN  Pain Management Interventions: pillow support provided  Intervention: Provide Person-Centered Care  Recent Flowsheet Documentation  Taken 3/9/2025 0400 by Wanda Mendoza RN  Trust Relationship/Rapport:   questions answered   questions encouraged   care explained  Taken 3/8/2025 1946 by Wanda Mendoza RN  Trust Relationship/Rapport:   care explained   questions answered   questions encouraged  Goal: Readiness for Transition of Care  Outcome: Progressing     Problem: Comorbidity Management  Goal: Blood Pressure in Desired Range  Outcome: Progressing  Intervention: Maintain Blood Pressure Management  Recent Flowsheet Documentation  Taken 3/9/2025 0400 by Wanda Mendoza RN  Medication Review/Management: medications reviewed  Taken 3/9/2025 0000 by Wanda Mendoza RN  Medication Review/Management: medications reviewed  Taken 3/8/2025 2200 by Wanda Mendoza RN  Medication  Review/Management: medications reviewed  Taken 3/8/2025 1946 by Wanda Mendoza RN  Medication Review/Management: medications reviewed     Problem: Fall Injury Risk  Goal: Absence of Fall and Fall-Related Injury  Outcome: Progressing  Intervention: Identify and Manage Contributors  Recent Flowsheet Documentation  Taken 3/9/2025 0400 by Wanda Mendoza RN  Medication Review/Management: medications reviewed  Taken 3/9/2025 0000 by Wanda Mendoza RN  Medication Review/Management: medications reviewed  Taken 3/8/2025 2200 by Wanda Mendoza RN  Medication Review/Management: medications reviewed  Taken 3/8/2025 1946 by Wanda Mendoza RN  Medication Review/Management: medications reviewed  Intervention: Promote Injury-Free Environment  Recent Flowsheet Documentation  Taken 3/9/2025 0400 by Wanda Mendoza RN  Safety Promotion/Fall Prevention:   activity supervised   clutter free environment maintained   fall prevention program maintained   lighting adjusted   muscle strengthening facilitated   nonskid shoes/slippers when out of bed   room organization consistent   safety round/check completed  Taken 3/9/2025 0000 by Wanda Mendoza RN  Safety Promotion/Fall Prevention:   activity supervised   clutter free environment maintained   fall prevention program maintained   lighting adjusted   nonskid shoes/slippers when out of bed   room organization consistent   safety round/check completed  Taken 3/8/2025 2200 by Wanda Mendoza RN  Safety Promotion/Fall Prevention:   activity supervised   clutter free environment maintained   fall prevention program maintained   mobility aid in reach   nonskid shoes/slippers when out of bed   room organization consistent   safety round/check completed  Taken 3/8/2025 1946 by Wanda Mendoza RN  Safety Promotion/Fall Prevention:   activity supervised   clutter free environment maintained   fall prevention program maintained   lighting adjusted   nonskid shoes/slippers when  out of bed   room organization consistent   safety round/check completed     Problem: Infection  Goal: Absence of Infection Signs and Symptoms  Outcome: Progressing     Problem: Skin Injury Risk Increased  Goal: Skin Health and Integrity  Outcome: Progressing  Intervention: Optimize Skin Protection  Recent Flowsheet Documentation  Taken 3/9/2025 0400 by Wanda Mendoza, RN  Activity Management: activity encouraged  Head of Bed (HOB) Positioning: HOB elevated  Taken 3/9/2025 0000 by Wanda Mendoza RN  Activity Management: activity encouraged  Head of Bed (HOB) Positioning: HOB elevated  Taken 3/8/2025 2200 by Wanda Mendoza, RN  Activity Management: up in chair  Head of Bed (HOB) Positioning: HOB elevated  Taken 3/8/2025 1946 by Wanda Mendoza, RN  Activity Management: up ad nas  Pressure Reduction Techniques: frequent weight shift encouraged  Head of Bed (HOB) Positioning: HOB elevated  Pressure Reduction Devices: pressure-redistributing mattress utilized  Skin Protection: transparent dressing maintained   Goal Outcome Evaluation:  Plan of Care Reviewed With: patient        Progress: improving

## 2025-03-11 NOTE — CASE MANAGEMENT/SOCIAL WORK
Case Management Discharge Note      Final Note: routine home         Selected Continued Care - Discharged on 3/9/2025 Admission date: 3/5/2025 - Discharge disposition: Home or Self Care         Transportation Services  Private: Car    Final Discharge Disposition Code: 01 - home or self-care   non thrombose ext hemorrhoids no known allergies

## 2025-03-14 ENCOUNTER — READMISSION MANAGEMENT (OUTPATIENT)
Dept: CALL CENTER | Facility: HOSPITAL | Age: 85
End: 2025-03-14
Payer: OTHER GOVERNMENT

## 2025-03-14 NOTE — OUTREACH NOTE
Medical Week 1 Survey      Flowsheet Row Responses   [facility] facility patient discharged from? [facility]   Does the patient have one of the following disease processes/diagnoses(primary or secondary)? Other   Week 1 attempt successful? No   Unsuccessful attempts Attempt 1            Panda RAYMUNDO - Registered Nurse

## 2025-03-17 ENCOUNTER — OFFICE VISIT (OUTPATIENT)
Dept: CARDIOLOGY | Facility: CLINIC | Age: 85
End: 2025-03-17
Payer: MEDICARE

## 2025-03-17 VITALS
DIASTOLIC BLOOD PRESSURE: 66 MMHG | SYSTOLIC BLOOD PRESSURE: 118 MMHG | HEART RATE: 62 BPM | OXYGEN SATURATION: 93 % | BODY MASS INDEX: 26.77 KG/M2 | WEIGHT: 202 LBS | HEIGHT: 73 IN

## 2025-03-17 DIAGNOSIS — I71.21 ANEURYSM OF ASCENDING AORTA WITHOUT RUPTURE: ICD-10-CM

## 2025-03-17 DIAGNOSIS — E78.2 HYPERLIPIDEMIA, MIXED: ICD-10-CM

## 2025-03-17 DIAGNOSIS — I25.5 ISCHEMIC CARDIOMYOPATHY: ICD-10-CM

## 2025-03-17 DIAGNOSIS — I10 ESSENTIAL HYPERTENSION: ICD-10-CM

## 2025-03-17 DIAGNOSIS — I25.10 CORONARY ARTERY DISEASE INVOLVING NATIVE CORONARY ARTERY OF NATIVE HEART WITHOUT ANGINA PECTORIS: Primary | ICD-10-CM

## 2025-03-17 DIAGNOSIS — Z95.810 PRESENCE OF AUTOMATIC IMPLANTABLE CARDIOVERTER-DEFIBRILLATOR: ICD-10-CM

## 2025-03-17 DIAGNOSIS — I47.29 PAROXYSMAL VENTRICULAR TACHYCARDIA: ICD-10-CM

## 2025-03-17 PROCEDURE — 99214 OFFICE O/P EST MOD 30 MIN: CPT | Performed by: INTERNAL MEDICINE

## 2025-03-17 NOTE — PROGRESS NOTES
CC--Recurrent VT, ischemic cardiomyopathy      Sub  84-year-old male patient with history of VT and ICD in situ comes in for follow-up  Recent left leg cellulitis needing antibiotic treatment back to functional class III without new symptoms    Previous history attached below for reference      84-year-old pleasant patient had recurrent VT and underwent cardiac catheterization and cardiac catheterization results available below  Successful PCI GE LAD via the vein graft  Residual 99% LAD in the retrograde limb  Residual 99% obtuse marginal branch of circumflex and retrograde limb  Patent LIMA-DG  Patent SVG-RCA  Patent SVG-OM  Patent SVG-LAD  Patent SVG-DG    Patient had recurrent VT and henceforth was subjected to cardiac catheterization and comes in for follow-up.  No recurrent VT since PCI.  Patient had recurrent VT which was refractory and underwent ablation back in 2019  Patient also had atrial fibrillation secondary to hyperthyroidism resolved after treatment of thyroid disorder  Patient had history of prior bypass surgery and history of recurrent cellulitis in the lower extremities and history of orthostatic hypotension in the past.  Patient is intolerant to amiodarone.        My previous history is attached below which is for reference only and has been reviewed        Sub--Pt here for follow up and he had prior history of incessant VT needing and ablation several months ago .  patient started having recurrent VT needing a redo VT ablation  and post ablation a week later developed dysphagia and needed endoscopy the patient underwent esophageal stricture dilatation with improvement of symptoms and resolution of dysphagia .Patient is on amiodarone because of extensive scarring and multiple episodes of VT and since ablation without recurrent ICD therapy--patient has history of chronic ischemic heart disease previous myocardial infarction, s/p previous CABG,  LV systolic dysfunction with   Last  LV ejection  fraction of 25 30% which came up to 30- 35% by latest echocardiogram.   He is  status post ICD implant.  denies any chest pain or shortness of breath or syncope--patient had prior ACE inhibitor induced cough and currently is on Aldactone .Patient denies any new symptoms of VT or syncope and is compliant with current medical regimen  He also developed some right shoulder pain being [facility] and uses Biofreeze for relief of symptoms   He denies any palpitations or syncope or any chest pain or dyspnea.  Patient could not tolerate additional beta-blockers on top of amiodarone in the past  Patient has noticed photosensitivity of his skin in the past and amiodarone dose reduced to 100 mg a day and his symptoms have reduced and comes in for follow-up   Patient has noticed some burning sensation in lower extremities and was seen at [facility] on last clinic prescription for gabapentin and he also complains of some calf pain when he is ambulating with some blocks gets better after increased activity and it is not nocturnal in nature--arterial duplex scan without any significant abnormalities  He also has history of orthostatic hypotension started on midodrine and complains of intermittent headache  Patient is doing clinically well from cardiac standpoint of view  Complains of recurrent redness in the left lower extremity with tenderness and warmth around the ankle joint and is being treated with antibiotics and has been to [facility] twice--is being evaluated by vascular surgery and infectious disease specialist and he had his symptoms since 2010 with increasing frequency recently.    Since last seen patient had ICD shock therapy for conducted atrial fibrillation and was brought in back for evaluation  Atrial fibrillation was not seen with prior interrogation of his device and ICD was reprogrammed on recommendation to avoid unnecessary therapy for conducted atrial fibrillation  Patient complains of  diffuse subcutaneous bleeding with dual antiplatelet agents and also complains of weight loss and is being [facility]  Patient was noted to have markedly abnormal thyroid function test consistent with hyperthyroidism and immediately amiodarone was stopped and started on beta-blockers and off Eliquis         Past Medical History:     Reviewed history from 03/13/2018 and no changes required:        Coronary Heart Disease:S/P CABG and PCI         Hypertension        Myocardial Infarction: Inferior MI         Hx: Cellulitis of left lower leg        Dyslipidemia         Pinched nerve L4-L5         Prostate cancer         Cardiomyopathy : ICD implantation           Physical Exam    General:      well developed, well nourished, in no acute distress.    Head:      normocephalic and atraumatic.    Eyes:      PERRL/EOM intact, conjunctivae and sclerae clear without nystagmus.    Neck:      no  thyromegaly, trachea central with normal respiratory effort  Lungs:      clear bilaterally to auscultation.    Heart:       regular rate and rhythm, S1, S2 without murmurs, rubs, or gallops  Skin:      intact without lesions or rashes.    Psych:      alert and cooperative; normal mood and affect; normal attention span and concentration.                 assessment plan  Recent creatinine 1.09, potassium 4.5 hemoglobin platelets normal  Progressive coronary disease needing PCI doing well without angina on aspirin and Plavix  ICD in situ without recurrent VT  VT currently suppressed and treated with Mexitil  Remote history of atrial fibrillation with thyrotoxicosis resolved after treatment of hyperthyroid state  Ascending aortic aneurysm measuring around 4.6 cm  History of orthostatic hypotension on midodrine  Ischemic cardiomyopathy on metoprolol, Farxiga and Aldactone  Prior intolerance of amiodarone  Medications reviewed and follow-up appointments made        Electronically signed by Constantino Arvizu MD, 12/16/24,  2:26 PM EST.

## 2025-03-19 ENCOUNTER — READMISSION MANAGEMENT (OUTPATIENT)
Dept: CALL CENTER | Facility: HOSPITAL | Age: 85
End: 2025-03-19
Payer: OTHER GOVERNMENT

## 2025-03-19 NOTE — OUTREACH NOTE
Medical Week 1 Survey      Flowsheet Row Responses   [facility] patient discharged from? [facility]   Does the patient have one of the following disease processes/diagnoses(primary or secondary)? Other   Week 1 attempt successful? Yes   Call start time 0944   Call end time 0948   Discharge diagnosis Cellulitis of left leg   Meds reviewed with patient/caregiver? Yes   Is the patient having any side effects they believe may be caused by any medication additions or changes? No   Does the patient have all medications ordered at discharge? Yes   Is the patient taking all medications as directed (includes completed medication regime)? Yes   Does the patient have a primary care provider?  Yes   Does the patient have an appointment with their PCP within 7 days of discharge? Yes   Has the patient kept scheduled appointments due by today? Yes   Comments Had conference call with [facility]   Psychosocial issues? No   Did the patient receive a copy of their discharge instructions? Yes   Nursing interventions Reviewed instructions with patient   What is the patient's perception of their health status since discharge? Improving   Is the patient/caregiver able to teach back signs and symptoms related to disease process for when to call PCP? Yes   Is the patient/caregiver able to teach back signs and symptoms related to disease process for when to call 911? Yes   Is the patient/caregiver able to teach back the hierarchy of who to call/visit for symptoms/problems? PCP, Specialist, Home health nurse, Urgent Care, ED, 911 Yes   If the patient is a current smoker, are they able to teach back resources for cessation? Not a smoker   Additional teach back comments He is doing better.  Still wearing compression hose and keeping elevated.  Has appt with Dr. Barros regarding possible open heart surgery.   Week 1 call completed? Yes   Wrap up additional comments Denies any questions or needs at this time.   Call end time 0948            Taylor Barclay  Licensed Nurse

## 2025-03-25 RX ORDER — CLOPIDOGREL BISULFATE 75 MG/1
75 TABLET ORAL DAILY
Qty: 90 TABLET | Refills: 1 | Status: SHIPPED | OUTPATIENT
Start: 2025-03-25

## 2025-03-25 NOTE — TELEPHONE ENCOUNTER
Caller: Deion Nicholas    Relationship: Self    Best call back number: [phone]    Requested Prescriptions:   Requested Prescriptions     Pending Prescriptions Disp Refills    clopidogrel (PLAVIX) 75 MG tablet 90 tablet 1     Sig: Take 1 tablet by mouth Daily.        Pharmacy where request should be sent:      [id number] [place] - [address] AT [address] [phone]  - [phone]    [address] [place] [address]   Phone: [phone] Fax: [phone]       Last office visit with prescribing clinician: Visit date not found   Last telemedicine visit with prescribing clinician: Visit date not found   Next office visit with prescribing clinician: Visit date not found     Additional details provided by patient:     Does the patient have less than a 3 day supply:  [x] Yes  [] No    Would you like a call back once the refill request has been completed: [] Yes [x] No    If the office needs to give you a call back, can they leave a voicemail: [] Yes [x] No    Yvon Lewis Rep   03/25/25 15:58 EDT

## 2025-03-26 ENCOUNTER — TELEPHONE (OUTPATIENT)
Dept: CARDIOLOGY | Facility: CLINIC | Age: 85
End: 2025-03-26

## 2025-03-26 NOTE — TELEPHONE ENCOUNTER
Caller: ANA    Relationship: SELF    Best call back number: [phone]        What was the call regarding:  [facility] TOLD HIM INSURANCE DECLINED HIS CLOPIDOGREL-  THEY SAID THERE WAS AN ORDER IN FEBRUARY AND WOULD NOT APPROVE HIS MEDICATION AS IT WAS NOT TIME FOR A REFILL- HE STATED HE NEVER PICKED IT UP IN [id number] [place] [facility] [address] [place] - [phone]  - [phone]    [address] [place] [address]   Phone: [phone] Fax: [phone]   Hours: Not open 24 hours      Occupational Therapy Discharge Summary    Alma Rosa Helm  MRN: [id number]   Principal Problem: E coli bacteremia      Patient Discharged from acute Occupational Therapy on 12/19/2022.  Please refer to prior OT note dated 12/19/2022 for functional status.    Assessment:      Patient has met all goals and is not appropriate for therapy.    Objective:     GOALS:   Multidisciplinary Problems       Occupational Therapy Goals          Problem: Occupational Therapy    Goal Priority Disciplines Outcome Interventions   Occupational Therapy Goal     OT, PT/OT Ongoing, Progressing    Description: Goals to be met by: 12/30/2022     Patient will increase functional independence with ADLs by performing:    UE Dressing with [place].  LE Dressing with [place].  Grooming with [place].  Toileting from toilet with [place] for hygiene and clothing management.   Toilet transfer to toilet with [place].                         Reasons for Discontinuation of Therapy Services  Satisfactory goal achievement.      Plan:     Patient Discharged to: Home     12/19/2022     [phone]

## 2025-03-28 ENCOUNTER — READMISSION MANAGEMENT (OUTPATIENT)
Dept: CALL CENTER | Facility: HOSPITAL | Age: 85
End: 2025-03-28
Payer: OTHER GOVERNMENT

## 2025-03-28 NOTE — OUTREACH NOTE
Medical Week 2 Survey      Flowsheet Row Responses   [facility] patient discharged from? [facility]   Does the patient have one of the following disease processes/diagnoses(primary or secondary)? Other   Week 2 attempt successful? Yes   Call start time 1240   Discharge diagnosis Cellulitis of left leg   Call end time 1241   Person spoke with today (if not patient) and relationship Patient   Meds reviewed with patient/caregiver? Yes   Does the patient have all medications ordered at discharge? Yes   Is the patient taking all medications as directed (includes completed medication regime)? Yes   Does the patient have a primary care provider?  Yes   Comments regarding PCP Seeing pcp today   Has home health visited the patient within 72 hours of discharge? N/A   Psychosocial issues? No   Did the patient receive a copy of their discharge instructions? Yes   Nursing interventions Reviewed instructions with patient   What is the patient's perception of their health status since discharge? Improving   Is the patient/caregiver able to teach back signs and symptoms related to disease process for when to call PCP? Yes   Is the patient/caregiver able to teach back signs and symptoms related to disease process for when to call 911? Yes   Is the patient/caregiver able to teach back the hierarchy of who to call/visit for symptoms/problems? PCP, Specialist, Home health nurse, Urgent Care, ED, 911 Yes   Week 2 Call Completed? Yes   Graduated Yes   Wrap up additional comments Patient reports doing well. No concerns or questions noted.   Call end time 1241            Estrella MERCER - Registered Nurse

## 2025-04-09 ENCOUNTER — TELEPHONE (OUTPATIENT)
Dept: CARDIAC SURGERY | Facility: CLINIC | Age: 85
End: 2025-04-09
Payer: OTHER GOVERNMENT

## 2025-04-09 NOTE — TELEPHONE ENCOUNTER
Discussed with ,  reviewed testing. Recommends repeat testing in 1 year from last ct. Ct last completed Sept 2024.    Discussed recommendation with .  request appt with  next week. Appt scheduled for 4/17@1115am with 1045am arrival. Patient verbalized understanding and this was agreeable.     ----- Message from Reba CAMERON sent at 4/8/2025  2:32 PM EDT -----  Pt can not make it to his appt on Thursday he is wanting to know if Dr. Barros will call him instead.

## 2025-04-17 ENCOUNTER — OFFICE VISIT (OUTPATIENT)
Dept: CARDIAC SURGERY | Facility: CLINIC | Age: 85
End: 2025-04-17
Payer: MEDICARE

## 2025-04-17 VITALS
BODY MASS INDEX: 26.98 KG/M2 | DIASTOLIC BLOOD PRESSURE: 74 MMHG | OXYGEN SATURATION: 92 % | RESPIRATION RATE: 18 BRPM | HEART RATE: 82 BPM | SYSTOLIC BLOOD PRESSURE: 132 MMHG | WEIGHT: 203.6 LBS | TEMPERATURE: 97.5 F | HEIGHT: 73 IN

## 2025-04-17 DIAGNOSIS — I10 ESSENTIAL HYPERTENSION: ICD-10-CM

## 2025-04-17 DIAGNOSIS — I34.0 NONRHEUMATIC MITRAL VALVE REGURGITATION: ICD-10-CM

## 2025-04-17 DIAGNOSIS — R07.9 CHEST PAIN, UNSPECIFIED TYPE: ICD-10-CM

## 2025-04-17 DIAGNOSIS — I25.5 ISCHEMIC CARDIOMYOPATHY: Chronic | ICD-10-CM

## 2025-04-17 DIAGNOSIS — I35.1 NONRHEUMATIC AORTIC VALVE INSUFFICIENCY: Primary | ICD-10-CM

## 2025-04-17 DIAGNOSIS — I25.10 CORONARY ARTERY DISEASE INVOLVING NATIVE CORONARY ARTERY OF NATIVE HEART WITHOUT ANGINA PECTORIS: ICD-10-CM

## 2025-04-17 DIAGNOSIS — I71.21 ANEURYSM OF ASCENDING AORTA WITHOUT RUPTURE: ICD-10-CM

## 2025-04-17 DIAGNOSIS — Z95.810 PRESENCE OF AUTOMATIC IMPLANTABLE CARDIOVERTER-DEFIBRILLATOR: Chronic | ICD-10-CM

## 2025-04-17 PROCEDURE — 99213 OFFICE O/P EST LOW 20 MIN: CPT | Performed by: THORACIC SURGERY (CARDIOTHORACIC VASCULAR SURGERY)

## 2025-04-17 NOTE — LETTER
April 21, 2025     Marcin Childers DO  [address] [address]  [place] [address]    Patient: Deion Nicholas   YOB: 1940   Date of Visit: 4/17/2025     Dear Marcin Childers DO:       Thank you for referring Deion Nicholas to me for evaluation. Below are the relevant portions of my assessment and plan of care.    If you have questions, please do not hesitate to call me. I look forward to following Deion along with you.         Sincerely,        John Barros MD        CC: No Recipients    John Barros MD  04/21/25 1616  Sign when Signing Visit  4/21/2025    Seen on 4/17/2025    Chief Complaint:     Follow-up evaluation of ascending aortic aneurysm    History of Present Illness:       Dear Jeff and Colleagues,  It was nice to see Deion Nicholas in follow up evaluation for his dilated ascending aorta. He is a 84 y.o. male with a history of multiple medical problems including coronary artery disease status post CABG, ischemic cardiomyopathy, status post AICD, hyperlipidemia, GERD who I saw him last year because of the concern of moderate to severe MR and AI.  In my review of the echocardiogram it did not look bad and we decided to follow-up with another echocardiogram which was performed recently and showed only trace AI and MR and ejection fraction of 2025%.  He complains of dyspnea which is unchanged.  He denies chest pain, syncope, TIA, orthopnea or PND. His last chest CT showed ascending aorta 4.6 cm without dissection or ulceration which is stable from previous study.  He is here for follow-up    Patient Active Problem List   Diagnosis   • Normocytic anemia   • Coronary artery disease   • Ischemic cardiomyopathy   • Paroxysmal ventricular tachycardia   • Presence of automatic implantable cardioverter-defibrillator   • Essential hypertension   • Hyperlipidemia, mixed   • Peripheral neuropathy   • Cellulitis of left lower extremity without foot   • GERD without  esophagitis   • B12 deficiency   • Orthostatic hypotension   • Tinea pedis of left foot   • Lower extremity pain, left   • Cellulitis of left leg   • Baker's cyst of knee, left   • Gynecomastia, male   • Ascending aortic aneurysm   • Thyroid nodule   • Dizziness   • Chest pain, unspecified type   • Edema of left lower extremity   • Elevated troponin   • Left leg cellulitis   • Cellulitis of left lower leg   • Valvular heart disease   • Nonrheumatic mitral valve regurgitation   • Nonrheumatic aortic valve insufficiency   • Fluid overload   • Chest pain   • CAD (coronary artery disease)   • Unstable angina       Past Medical History:   Diagnosis Date   • Aneurysm    • Cardiomyopathy     ICD implantation   • Cellulitis of left lower extremity 2010    recurrent   • CHD (coronary heart disease)     S/P CABG & PCI   • Dyslipidemia    • Heart valve disease    • History of ventricular tachycardia    • Hypertension    • Myocardial infarction     Inferior MI   • Pinched nerve     L4-L5   • Prostate cancer        Past Surgical History:   Procedure Laterality Date   • ANGIOPLASTY  2000 04/1996 Stent: Palmaz- Huy stent/LAD 07/1996-RCA: 2000-Tetra stent Guidant to proximal RCA, mid to distal artery 2 sequential Guidant Tetra stents   • APPENDECTOMY     • CARDIAC ABLATION  April and May 2019   • CARDIAC CATHETERIZATION  07/2017    1996 x2, Nov. 2000, 05/2010-cath 08/2015-no stents 2017   • CARDIAC CATHETERIZATION N/A 11/13/2024    Procedure: LEFT HEART CATH with possible PCI;  Surgeon: Marcin Childers DO;  Location: [facility] INVASIVE LOCATION;  Service: Cardiovascular;  Laterality: N/A;  Local and IV sedation   • CARDIAC DEFIBRILLATOR PLACEMENT     • COLONOSCOPY W/ POLYPECTOMY      Mult colonoscopy's for polyp resection    • CORONARY ARTERY BYPASS GRAFT  05/2010    x5 vessel: LMA to diagonal, LAD, intermedius, obtuse marginal, RCA   • CORONARY STENT PLACEMENT     • ENDOSCOPY N/A 6/24/2019    Procedure:  ESOPHAGOGASTRODUODENOSCOPY with dilitation Bougraghu 50, 54;  Surgeon: Roddy Coronel MD;  Location: [facility] ENDOSCOPY;  Service: Gastroenterology   • INSERT / REPLACE / REMOVE PACEMAKER     • KNEE OPEN LATERAL RELEASE Left     reduction   • OTHER SURGICAL HISTORY  01/2018    ICD implantation   • PROSTATE SURGERY  2015    Robiotic surgery- Cyber Knife:       Allergies   Allergen Reactions   • Lovastatin Myalgia   • Pravastatin Myalgia and Unknown (See Comments)     unknown   • Simvastatin Unknown (See Comments) and Myalgia     unknown   • Spironolactone Other (See Comments)     Gynecomastia          Current Outpatient Medications:   •  aspirin 81 MG EC tablet, Take 1 tablet by mouth Daily., Disp: , Rfl:   •  cephalexin (KEFLEX) 500 MG capsule, Take 1 capsule by mouth 2 (Two) Times a Day., Disp: 14 capsule, Rfl: 0  •  Cholecalciferol 10 MCG (400 UNIT) tablet, Take 2 tablets by mouth Daily., Disp: , Rfl:   •  clopidogrel (PLAVIX) 75 MG tablet, Take 1 tablet by mouth Daily., Disp: 90 tablet, Rfl: 1  •  dapagliflozin Propanediol (Farxiga) 10 MG tablet, Take 10 mg by mouth Daily. [facility], Disp: , Rfl:   •  Diclofenac Sodium (VOLTAREN) 1 % gel gel, Apply 4 g topically to the appropriate area as directed 4 (Four) Times a Day As Needed., Disp: , Rfl:   •  ezetimibe (ZETIA) 10 MG tablet, Take 1 tablet by mouth Every Evening., Disp: , Rfl:   •  furosemide (LASIX) 20 MG tablet, Take 1 tablet by mouth Daily., Disp: , Rfl:   •  gabapentin (NEURONTIN) 800 MG tablet, Take 1 tablet by mouth 3 (Three) Times a Day., Disp: , Rfl:   •  meclizine (ANTIVERT) 25 MG tablet, Take 1 tablet by mouth Every 6 (Six) Hours As Needed for Dizziness., Disp: , Rfl:   •  methocarbamol (ROBAXIN) 500 MG tablet, Take 1 tablet by mouth 3 (Three) Times a Day., Disp: , Rfl:   •  metoprolol succinate XL (TOPROL-XL) 100 MG 24 hr tablet, Take 0.5 tablets by mouth 2 (Two) Times a Day., Disp: , Rfl:   •  mexiletine (MEXITIL) 150 MG capsule, Take 1 capsule  by mouth 2 (Two) Times a Day., Disp: 60 capsule, Rfl: 5  •  midodrine (PROAMATINE) 5 MG tablet, Take 1 tablet by mouth 3 (Three) Times a Day Before Meals., Disp: , Rfl:   •  nitroglycerin (NITROSTAT) 0.4 MG SL tablet, Place 1 tablet under the tongue Every 5 (Five) Minutes As Needed for Chest Pain., Disp: , Rfl:   •  Omega-3 Fatty Acids (fish oil) 1000 MG capsule capsule, Take 2 capsules by mouth 2 (Two) Times a Day With Meals., Disp: , Rfl:   •  pantoprazole (PROTONIX) 40 MG EC tablet, Take 1 tablet by mouth Daily., Disp: , Rfl:   •  potassium chloride (MICRO-K) 10 MEQ CR capsule, Take 1 capsule by mouth Daily., Disp: , Rfl:   •  sennosides-docusate (PERICOLACE) 8.6-50 MG per tablet, Take 1 tablet by mouth Daily., Disp: , Rfl:   •  spironolactone (ALDACTONE) 25 MG tablet, Take 1 tablet by mouth Daily., Disp: , Rfl:   •  sucralfate (CARAFATE) 1 g tablet, Take 1 tablet by mouth 4 (Four) Times a Day., Disp: , Rfl:     Social History     Socioeconomic History   • Marital status:    Tobacco Use   • Smoking status: Former     Current packs/day: 0.00     Average packs/day: 1 pack/day for 17.0 years (17.0 ttl pk-yrs)     Types: Cigarettes     Start date: 1985     Quit date: 2002     Years since quittin.8     Passive exposure: Past   • Smokeless tobacco: Never   Vaping Use   • Vaping status: Never Used   Substance and Sexual Activity   • 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  Temp: 97.5 °F (36.4 °C)   Heart Rate: 82   BP: 132/74     Constitutional:       Appearance: Well-developed.   Eyes:      Conjunctiva/sclera: Conjunctivae normal.      Pupils: Pupils are equal,  round, and reactive to light.   HENT:      Head: Normocephalic and atraumatic.      Nose: Nose normal.   Neck:      Thyroid: No thyromegaly.      Vascular: No JVD.      Lymphadenopathy: No cervical adenopathy.   Pulmonary:      Effort: Pulmonary effort is normal.      Breath sounds: Normal breath sounds. No rales.   Cardiovascular:      Normal rate. Regular rhythm.      Murmurs: There is a grade 2/6 high frequency blowing holosystolic murmur at the apex.      No gallop.    Pulses:     Intact distal pulses. No decreased pulses.   Edema:     Peripheral edema absent.   Abdominal:      General: Bowel sounds are normal. There is no distension.      Palpations: Abdomen is soft. There is no abdominal mass.      Tenderness: There is no abdominal tenderness.   Musculoskeletal: Normal range of motion.         General: No tenderness or deformity.      Cervical back: Normal range of motion and neck supple. Skin:     General: Skin is warm and dry.      Findings: No erythema or rash.   Neurological:      Mental Status: Alert and oriented to person, place, and time.      Deep Tendon Reflexes: Reflexes are normal and symmetric.   Psychiatric:         Behavior: Behavior normal.     Sternal incision is well-healed     Assessment:     Problems Addressed this Visit          Cardiac and Vasculature    Ischemic cardiomyopathy (Chronic)    Presence of automatic implantable cardioverter-defibrillator (Chronic)    Essential hypertension    Ascending aortic aneurysm    Relevant Orders    CT Chest Without Contrast    Adult Transthoracic Echo Complete W/ Cont if Necessary Per Protocol    Nonrheumatic mitral valve regurgitation    Relevant Orders    CT Chest Without Contrast    Adult Transthoracic Echo Complete W/ Cont if Necessary Per Protocol    Nonrheumatic aortic valve insufficiency - Primary    Relevant Orders    CT Chest Without Contrast    Adult Transthoracic Echo Complete W/ Cont if Necessary Per Protocol    CAD (coronary artery  disease)    RESOLVED: Chest pain     Diagnoses         Codes Comments      Nonrheumatic aortic valve insufficiency    -  Primary ICD-10-CM: I35.1  ICD-9-CM: 424.1       Nonrheumatic mitral valve regurgitation     ICD-10-CM: I34.0  ICD-9-CM: 424.0       Aneurysm of ascending aorta without rupture     ICD-10-CM: I71.21  ICD-9-CM: 441.2       Coronary artery disease involving native coronary artery of native heart without angina pectoris     ICD-10-CM: I25.10  ICD-9-CM: 414.01       Chest pain, unspecified type     ICD-10-CM: R07.9  ICD-9-CM: 786.50       Essential hypertension     ICD-10-CM: I10  ICD-9-CM: 401.9       Ischemic cardiomyopathy     ICD-10-CM: I25.5  ICD-9-CM: 414.8       Presence of automatic implantable cardioverter-defibrillator     ICD-10-CM: Z95.810  ICD-9-CM: V45.02           Assessment/recommendation:     Stable 4.6 cm ascending aortic aneurysm, asymptomatic, no family history, no significant aortic valve association.  I discussed with the patient the natural history of aneurysm disease and the guidelines for intervention.  I recommend a chest CT in 1 year and follow-up in our thoracic aortic surgical clinic.  Mitral regurgitation, labeled as moderate now is mild in the last echocardiogram.  Most likely functional MR due to cardiomyopathy and dilated left ventricle  Coronary artery disease and ischemic cardiomyopathy, medical management per the cardiology service    Thank you for allowing me to participate in his care.    Regards,    John Barros M.D.    I spent over 25 minutes before, during after the office visit and reviewing the records, examining the patient, reviewing interpreting myself the echocardiogram and chest CT, spent time discussing the findings and options with the patient and son, discussing the guidelines for treatment and for follow-up and spent time in documenting in the electronic record

## 2025-04-21 ENCOUNTER — TELEPHONE (OUTPATIENT)
Dept: CARDIOLOGY | Facility: CLINIC | Age: 85
End: 2025-04-21

## 2025-04-21 NOTE — TELEPHONE ENCOUNTER
Caller: Deion Nicholas    Relationship to patient: Self    Best call back number:  [phone]    Patient is needing: PATIENT SAID DR RAGSDALE THAT PATIENT NEEDED TO TALK TO HIM ABOUT HIS EXERCISE PROGRAM.

## 2025-04-21 NOTE — PROGRESS NOTES
4/21/2025    Seen on 4/17/2025    Chief Complaint:     Follow-up evaluation of ascending aortic aneurysm    History of Present Illness:       Dear Jeff and Colleagues,  It was nice to see Deion Nicholas in follow up evaluation for his dilated ascending aorta. He is a 84 y.o. male with a history of multiple medical problems including coronary artery disease status post CABG, ischemic cardiomyopathy, status post AICD, hyperlipidemia, GERD who I saw him last year because of the concern of moderate to severe MR and AI.  In my review of the echocardiogram it did not look bad and we decided to follow-up with another echocardiogram which was performed recently and showed only trace AI and MR and ejection fraction of 2025%.  He complains of dyspnea which is unchanged.  He denies chest pain, syncope, TIA, orthopnea or PND. His last chest CT showed ascending aorta 4.6 cm without dissection or ulceration which is stable from previous study.  He is here for follow-up    Patient Active Problem List   Diagnosis    Normocytic anemia    Coronary artery disease    Ischemic cardiomyopathy    Paroxysmal ventricular tachycardia    Presence of automatic implantable cardioverter-defibrillator    Essential hypertension    Hyperlipidemia, mixed    Peripheral neuropathy    Cellulitis of left lower extremity without foot    GERD without esophagitis    B12 deficiency    Orthostatic hypotension    Tinea pedis of left foot    Lower extremity pain, left    Cellulitis of left leg    Baker's cyst of knee, left    Gynecomastia, male    Ascending aortic aneurysm    Thyroid nodule    Dizziness    Chest pain, unspecified type    Edema of left lower extremity    Elevated troponin    Left leg cellulitis    Cellulitis of left lower leg    Valvular heart disease    Nonrheumatic mitral valve regurgitation    Nonrheumatic aortic valve insufficiency    Fluid overload    Chest pain    CAD (coronary artery disease)    Unstable angina       Past Medical  History:   Diagnosis Date    Aneurysm     Cardiomyopathy     ICD implantation    Cellulitis of left lower extremity 2010    recurrent    CHD (coronary heart disease)     S/P CABG & PCI    Dyslipidemia     Heart valve disease     History of ventricular tachycardia     Hypertension     Myocardial infarction     Inferior MI    Pinched nerve     L4-L5    Prostate cancer        Past Surgical History:   Procedure Laterality Date    ANGIOPLASTY  2000 04/1996 Stent: Palmaz- Huy stent/LAD 07/1996-RCA: 2000-Tetra stent Guidant to proximal RCA, mid to distal artery 2 sequential Guidant Tetra stents    APPENDECTOMY      CARDIAC ABLATION  April and May 2019    CARDIAC CATHETERIZATION  07/2017    1996 x2, Nov. 2000, 05/2010-cath 08/2015-no stents 2017    CARDIAC CATHETERIZATION N/A 11/13/2024    Procedure: LEFT HEART CATH with possible PCI;  Surgeon: Marcin Childers DO;  Location: [facility] CATH INVASIVE LOCATION;  Service: Cardiovascular;  Laterality: N/A;  Local and IV sedation    CARDIAC DEFIBRILLATOR PLACEMENT      COLONOSCOPY W/ POLYPECTOMY      Mult colonoscopy's for polyp resection     CORONARY ARTERY BYPASS GRAFT  05/2010    x5 vessel: LMA to diagonal, LAD, intermedius, obtuse marginal, RCA    CORONARY STENT PLACEMENT      ENDOSCOPY N/A 6/24/2019    Procedure: ESOPHAGOGASTRODUODENOSCOPY with dilitation Bougie 50, 54;  Surgeon: Roddy Coronel MD;  Location: [facility] ENDOSCOPY;  Service: Gastroenterology    INSERT / REPLACE / REMOVE PACEMAKER      KNEE OPEN LATERAL RELEASE Left     reduction    OTHER SURGICAL HISTORY  01/2018    ICD implantation    PROSTATE SURGERY  2015    Robiotic surgery- Cyber Knife:       Allergies   Allergen Reactions    Lovastatin Myalgia    Pravastatin Myalgia and Unknown (See Comments)     unknown    Simvastatin Unknown (See Comments) and Myalgia     unknown    Spironolactone Other (See Comments)     Gynecomastia          Current Outpatient Medications:     aspirin 81 MG EC tablet,  Take 1 tablet by mouth Daily., Disp: , Rfl:     cephalexin (KEFLEX) 500 MG capsule, Take 1 capsule by mouth 2 (Two) Times a Day., Disp: 14 capsule, Rfl: 0    Cholecalciferol 10 MCG (400 UNIT) tablet, Take 2 tablets by mouth Daily., Disp: , Rfl:     clopidogrel (PLAVIX) 75 MG tablet, Take 1 tablet by mouth Daily., Disp: 90 tablet, Rfl: 1    dapagliflozin Propanediol (Farxiga) 10 MG tablet, Take 10 mg by mouth Daily. [facility], Disp: , Rfl:     Diclofenac Sodium (VOLTAREN) 1 % gel gel, Apply 4 g topically to the appropriate area as directed 4 (Four) Times a Day As Needed., Disp: , Rfl:     ezetimibe (ZETIA) 10 MG tablet, Take 1 tablet by mouth Every Evening., Disp: , Rfl:     furosemide (LASIX) 20 MG tablet, Take 1 tablet by mouth Daily., Disp: , Rfl:     gabapentin (NEURONTIN) 800 MG tablet, Take 1 tablet by mouth 3 (Three) Times a Day., Disp: , Rfl:     meclizine (ANTIVERT) 25 MG tablet, Take 1 tablet by mouth Every 6 (Six) Hours As Needed for Dizziness., Disp: , Rfl:     methocarbamol (ROBAXIN) 500 MG tablet, Take 1 tablet by mouth 3 (Three) Times a Day., Disp: , Rfl:     metoprolol succinate XL (TOPROL-XL) 100 MG 24 hr tablet, Take 0.5 tablets by mouth 2 (Two) Times a Day., Disp: , Rfl:     mexiletine (MEXITIL) 150 MG capsule, Take 1 capsule by mouth 2 (Two) Times a Day., Disp: 60 capsule, Rfl: 5    midodrine (PROAMATINE) 5 MG tablet, Take 1 tablet by mouth 3 (Three) Times a Day Before Meals., Disp: , Rfl:     nitroglycerin (NITROSTAT) 0.4 MG SL tablet, Place 1 tablet under the tongue Every 5 (Five) Minutes As Needed for Chest Pain., Disp: , Rfl:     Omega-3 Fatty Acids (fish oil) 1000 MG capsule capsule, Take 2 capsules by mouth 2 (Two) Times a Day With Meals., Disp: , Rfl:     pantoprazole (PROTONIX) 40 MG EC tablet, Take 1 tablet by mouth Daily., Disp: , Rfl:     potassium chloride (MICRO-K) 10 MEQ CR capsule, Take 1 capsule by mouth Daily., Disp: , Rfl:     sennosides-docusate (PERICOLACE) 8.6-50 MG per  tablet, Take 1 tablet by mouth Daily., Disp: , Rfl:     spironolactone (ALDACTONE) 25 MG tablet, Take 1 tablet by mouth Daily., Disp: , Rfl:     sucralfate (CARAFATE) 1 g tablet, Take 1 tablet by mouth 4 (Four) Times a Day., Disp: , Rfl:     Social History     Socioeconomic History    Marital status:    Tobacco Use    Smoking status: Former     Current packs/day: 0.00     Average packs/day: 1 pack/day for 17.0 years (17.0 ttl pk-yrs)     Types: Cigarettes     Start date: 1985     Quit date: 2002     Years since quittin.8     Passive exposure: Past    Smokeless tobacco: Never   Vaping Use    Vaping status: Never Used   Substance and Sexual Activity    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  Temp: 97.5 °F (36.4 °C)   Heart Rate: 82   BP: 132/74     Constitutional:       Appearance: Well-developed.   Eyes:      Conjunctiva/sclera: Conjunctivae normal.      Pupils: Pupils are equal, round, and reactive to light.   HENT:      Head: Normocephalic and atraumatic.      Nose: Nose normal.   Neck:      Thyroid: No thyromegaly.      Vascular: No JVD.      Lymphadenopathy: No cervical adenopathy.   Pulmonary:      Effort: Pulmonary effort is normal.      Breath sounds: Normal breath sounds. No rales.   Cardiovascular:      Normal rate. Regular rhythm.      Murmurs: There is a grade 2/6 high frequency blowing holosystolic murmur at the apex.      No gallop.    Pulses:     Intact distal pulses. No decreased pulses.   Edema:     Peripheral edema absent.   Abdominal:      General: Bowel sounds are normal. There is no distension.      Palpations: Abdomen is soft. There is no  abdominal mass.      Tenderness: There is no abdominal tenderness.   Musculoskeletal: Normal range of motion.         General: No tenderness or deformity.      Cervical back: Normal range of motion and neck supple. Skin:     General: Skin is warm and dry.      Findings: No erythema or rash.   Neurological:      Mental Status: Alert and oriented to person, place, and time.      Deep Tendon Reflexes: Reflexes are normal and symmetric.   Psychiatric:         Behavior: Behavior normal.     Sternal incision is well-healed     Assessment:     Problems Addressed this Visit          Cardiac and Vasculature    Ischemic cardiomyopathy (Chronic)    Presence of automatic implantable cardioverter-defibrillator (Chronic)    Essential hypertension    Ascending aortic aneurysm    Relevant Orders    CT Chest Without Contrast    Adult Transthoracic Echo Complete W/ Cont if Necessary Per Protocol    Nonrheumatic mitral valve regurgitation    Relevant Orders    CT Chest Without Contrast    Adult Transthoracic Echo Complete W/ Cont if Necessary Per Protocol    Nonrheumatic aortic valve insufficiency - Primary    Relevant Orders    CT Chest Without Contrast    Adult Transthoracic Echo Complete W/ Cont if Necessary Per Protocol    CAD (coronary artery disease)    RESOLVED: Chest pain     Diagnoses         Codes Comments      Nonrheumatic aortic valve insufficiency    -  Primary ICD-10-CM: I35.1  ICD-9-CM: 424.1       Nonrheumatic mitral valve regurgitation     ICD-10-CM: I34.0  ICD-9-CM: 424.0       Aneurysm of ascending aorta without rupture     ICD-10-CM: I71.21  ICD-9-CM: 441.2       Coronary artery disease involving native coronary artery of native heart without angina pectoris     ICD-10-CM: I25.10  ICD-9-CM: 414.01       Chest pain, unspecified type     ICD-10-CM: R07.9  ICD-9-CM: 786.50       Essential hypertension     ICD-10-CM: I10  ICD-9-CM: 401.9       Ischemic cardiomyopathy     ICD-10-CM: I25.5  ICD-9-CM: 414.8       Presence  of automatic implantable cardioverter-defibrillator     ICD-10-CM: Z95.810  ICD-9-CM: V45.02           Assessment/recommendation:     Stable 4.6 cm ascending aortic aneurysm, asymptomatic, no family history, no significant aortic valve association.  I discussed with the patient the natural history of aneurysm disease and the guidelines for intervention.  I recommend a chest CT in 1 year and follow-up in our thoracic aortic surgical clinic.  Mitral regurgitation, labeled as moderate now is mild in the last echocardiogram.  Most likely functional MR due to cardiomyopathy and dilated left ventricle  Coronary artery disease and ischemic cardiomyopathy, medical management per the cardiology service    Thank you for allowing me to participate in his care.    Regards,    John Barros M.D.    I spent over 25 minutes before, during after the office visit and reviewing the records, examining the patient, reviewing interpreting myself the echocardiogram and chest CT, spent time discussing the findings and options with the patient and son, discussing the guidelines for treatment and for follow-up and spent time in documenting in the electronic record

## 2025-04-23 DIAGNOSIS — I25.5 ISCHEMIC CARDIOMYOPATHY: Chronic | ICD-10-CM

## 2025-04-23 DIAGNOSIS — I25.810 CORONARY ARTERY DISEASE INVOLVING CORONARY BYPASS GRAFT OF NATIVE HEART WITHOUT ANGINA PECTORIS: Primary | Chronic | ICD-10-CM

## 2025-04-23 NOTE — TELEPHONE ENCOUNTER
Spoke with patient- I do not have an answer to his question at this time. I will call him when Dr Childers gives me his recommendation.

## 2025-04-23 NOTE — TELEPHONE ENCOUNTER
[facility] UNABLE TO WT    Caller: Deion Nicholas    Relationship to patient: Self    Best call back number: [phone]     Patient is needing: PT IS FOLLOWING UP ON THIS REQUEST- HE IS AGGERVATED NO ONE HAS GOTTEN BACK TO HIM YET. HUB WAS TRANSFERRED TO [facility] BUT GOT HER VOICEMAIL, PLS CALL PT ASAP IF POSSIBLE, THANK YOU

## 2025-05-15 ENCOUNTER — TELEPHONE (OUTPATIENT)
Dept: CARDIAC REHAB | Facility: HOSPITAL | Age: 85
End: 2025-05-15
Payer: OTHER GOVERNMENT

## 2025-05-15 NOTE — TELEPHONE ENCOUNTER
Attempted to ret call, line is busy.   I am not sure why he has not been set up for Rehab, there are no notes on the referral but it is closed. Message to scheduling to find out

## 2025-05-15 NOTE — TELEPHONE ENCOUNTER
PT UPSET AND WOULD LIKE TO SPEAK TO KIKI. STILL HAS NOT HEARD ANYTHING FROM DR. MUÑOZ. PLEASE CALL PT BACK

## 2025-06-03 ENCOUNTER — TELEPHONE (OUTPATIENT)
Dept: CARDIOLOGY | Facility: CLINIC | Age: 85
End: 2025-06-03

## 2025-06-03 NOTE — TELEPHONE ENCOUNTER
"    Caller: Deion Nicholas    Relationship to patient: Self    Best call back number: [phone]    Patient is needing: PATIENT IS HAVING SOME DIZZY SPELLS AND FELLING \"OFF\" SAYS HE FEELS THE SAME WAY WHEN HIS DEFIBRILLATOR WENT OFF A LOT OF TIMES BACK IN 2019. BP WAS \"A LITTLE LOW BUT NOT TOO BAD\"  WANTS TO SEE IF HE CAN COME IN SOONER THAN HIS SCHEDULED APPOINTMENTS IN END OF JUNE OR START OF JULY. HE SAYS IF HE FEELS LIKE THIS AGAIN HE WILL GO TO THE ER. SAYS HE'S OKAY WITH [facility] OR [facility].           "

## 2025-06-03 NOTE — TELEPHONE ENCOUNTER
Spoke with the patient- best to go to the ER for evaluation. He is agreeable and states  he will go.

## 2025-06-19 ENCOUNTER — TRANSCRIBE ORDERS (OUTPATIENT)
Dept: CARDIAC REHAB | Facility: HOSPITAL | Age: 85
End: 2025-06-19
Payer: OTHER GOVERNMENT

## 2025-06-19 DIAGNOSIS — Z95.5 S/P DRUG ELUTING CORONARY STENT PLACEMENT: Primary | ICD-10-CM

## 2025-06-20 ENCOUNTER — TELEPHONE (OUTPATIENT)
Dept: CARDIOLOGY | Facility: CLINIC | Age: 85
End: 2025-06-20

## 2025-06-20 NOTE — TELEPHONE ENCOUNTER
Caller: Deion Nicholas    Relationship: Self    Best call back number:[phone]      [facility] SPIRON[facility], WAS ADVISED TO CALL YOU ABOUT THIS

## 2025-06-23 ENCOUNTER — TELEPHONE (OUTPATIENT)
Dept: CARDIOLOGY | Facility: CLINIC | Age: 85
End: 2025-06-23

## 2025-06-23 NOTE — TELEPHONE ENCOUNTER
"[facility] received a fax that requires your attention. The document has been indexed to the patient’s chart for your review.      Reason for sending: EXTERNAL MEDICAL RECORD NOTIFICATION     Documents Description: RECORDS, STATING \"PLEASE REVIEW FOLLOWING [facility] OFFICE VISIT NOTE FROM 4.1.25 ALONG WITH CURRENT MEDICATION LIST OF OUR PATIENT. ROX ADVISE IF ANY DISCREPANCIES TO YOUR MEDICATION ORDERS.     Name of Sender: KASSIE MOORE [facility]     Date Indexed:   MEDS 6.20.25  PN 4.1.25  "

## 2025-06-24 ENCOUNTER — TELEPHONE (OUTPATIENT)
Dept: CARDIOLOGY | Facility: CLINIC | Age: 85
End: 2025-06-24
Payer: OTHER GOVERNMENT

## 2025-07-14 ENCOUNTER — OFFICE VISIT (OUTPATIENT)
Dept: CARDIOLOGY | Facility: CLINIC | Age: 85
End: 2025-07-14
Payer: MEDICARE

## 2025-07-14 VITALS
HEART RATE: 86 BPM | DIASTOLIC BLOOD PRESSURE: 74 MMHG | SYSTOLIC BLOOD PRESSURE: 124 MMHG | OXYGEN SATURATION: 95 % | HEIGHT: 73 IN | WEIGHT: 186 LBS | RESPIRATION RATE: 16 BRPM | BODY MASS INDEX: 24.65 KG/M2

## 2025-07-14 DIAGNOSIS — I95.1 ORTHOSTATIC HYPOTENSION: Chronic | ICD-10-CM

## 2025-07-14 DIAGNOSIS — Z95.810 PRESENCE OF AUTOMATIC IMPLANTABLE CARDIOVERTER-DEFIBRILLATOR: Chronic | ICD-10-CM

## 2025-07-14 DIAGNOSIS — I25.708 CORONARY ARTERY DISEASE OF BYPASS GRAFT OF NATIVE HEART WITH STABLE ANGINA PECTORIS: Chronic | ICD-10-CM

## 2025-07-14 DIAGNOSIS — I71.21 ANEURYSM OF ASCENDING AORTA WITHOUT RUPTURE: ICD-10-CM

## 2025-07-14 DIAGNOSIS — I25.5 ISCHEMIC CARDIOMYOPATHY: Primary | Chronic | ICD-10-CM

## 2025-07-14 PROCEDURE — 99214 OFFICE O/P EST MOD 30 MIN: CPT | Performed by: NURSE PRACTITIONER

## 2025-07-14 NOTE — PROGRESS NOTES
[facility] CARDIOLOGY      REASON FOR FOLLOW-UP:  Blood pressure issues          Chief Complaint   Patient presents with    Follow-up    Dizziness         Dear Provider, No Known        Dizziness  Symptoms: chest pain       Deion Nicholas is in 84 y.o. male with an established history of ischemic heart disease.  He has undergone coronary arterial bypass grafting in the past.  He is additional history that includes ischemic cardiomyopathy with most recent ejection fraction of 30 to 35%, ICD implantation, hypertension, hypertensive cardiovascular disease, amiodarone therapy, antiplatelet therapy, hyperlipidemia.   He is followed by cardiothoracic surgeon for ascending aortic aneurysm with last office visit 4/17/2025: Stable 4.6 cm ascending aortic aneurysm, asymptomatic, chest CT in 1 year.    Most recent labs reviewed 3/9/2025: CBC unremarkable, BMP with sodium 132.    The patient presents today in acute office visit with concerns of his blood pressure.  He stated that he is on multiple blood pressure medications and has had intermittent pressures in the 1 teens systolic.  He is wondering why he is on all of these medications.  He was instructed previously to take midodrine 5 mg 3 times daily as needed for systolic pressure less than 110 and states he has only had to take this about 2 times/week.  Otherwise, he denies any significant chest pain, pressure or tightness.  He does take a nitroglycerin on rare occasions.  His shortness of breath is unchanged.  He does have lower extremity edema and wears compression.      ASSESSMENT:  Ischemic cardiomyopathy most recent ejection fraction 30 to 35% echocardiogram May 2023  Coronary artery disease  Valvular heart disease     Mild to moderate AI echocardiogram May 2023     Mild to moderate MR echocardiogram May 2023  Hypotension with an orthostatic component  Hypertensive cardiovascular disease  Amiodarone therapy  Antiplatelet  therapy  Hyperlipidemia  AICD in situ  History of VT storm status post radiofrequency ablation  Gynecomastia secondary to spironolactone  Ascending aortic aneurysm most recent measurement 4.5 cm August 2022.    PLAN:  I reviewed all of the patient's GDMT medications with him and their indication.  I assured him that these medications were needed for his overall heart function.  If his blood pressure becomes too low, we can down titrate as needed.  In the meantime, he should continue midodrine as prescribed.  Monitor fluid and sodium intake.  Monitor daily weights.        CHF Guideline Directed Medical Therapy  Beta Blocker:   ARNI/ACE/ARB:   SGLT 2 inhibitors:   Diuretics:   Aldosterone Antagonist:   Vasodilators & Nitrates:       There are no diagnoses linked to this encounter.      The following portions of the patient's history were reviewed and updated as appropriate: allergies, current medications, past family history, past medical history, past social history, past surgical history, and problem list.    REVIEW OF SYSTEMS:    Review of Systems   Cardiovascular:  Positive for chest pain, dyspnea on exertion and leg swelling.   Neurological:  Positive for dizziness.   All other systems reviewed and are negative.      Vitals:    07/14/25 1401   BP: 124/74   Pulse: 86   Resp: 16   SpO2: 95%         PHYSICAL EXAM:    General: Alert, cooperative, no distress, appears stated age  Head:  Normocephalic, atraumatic, mucous membranes moist  Eyes:  Conjunctiva/corneas clear, EOM's intact     Neck:  Supple,  no JVD or bruit     Lungs: Clear to auscultation bilaterally, no wheezes rhonchi rales are noted  Chest wall: No tenderness  Musculoskeletal:   Ambulates with assistance of a cane  Heart::  Regular rate and rhythm, S1 and S2 normal, no murmur, rub or gallop  Abdomen: Soft, non-tender, nondistended, bowel sounds active, no abdominal bruit  Extremities: Trace edema to lower extremities  Pulses: 2+ and symmetric all  extremities  Skin:  No rashes or lesions  Neuro/psych: A&O x3. CN II through XII are grossly intact with appropriate affect        Past Medical History:   Diagnosis Date    Aneurysm     Cardiomyopathy     ICD implantation    Cellulitis of left lower extremity 2010    recurrent    CHD (coronary heart disease)     S/P CABG & PCI    Dyslipidemia     Heart valve disease     History of ventricular tachycardia     Hypertension     Myocardial infarction     Inferior MI    Pinched nerve     L4-L5    Prostate cancer        Past Surgical History:   Procedure Laterality Date    ANGIOPLASTY  2000 04/1996 Stent: Palmaz- Huy stent/LAD 07/1996-RCA: 2000-Tetra stent Guidant to proximal RCA, mid to distal artery 2 sequential Guidant Tetra stents    APPENDECTOMY      CARDIAC ABLATION  April and May 2019    CARDIAC CATHETERIZATION  07/2017    1996 x2, Nov. 2000, 05/2010-cath 08/2015-no stents 2017    CARDIAC CATHETERIZATION N/A 11/13/2024    Procedure: LEFT HEART CATH with possible PCI;  Surgeon: Marcin Childers DO;  Location: [facility] CATH INVASIVE LOCATION;  Service: Cardiovascular;  Laterality: N/A;  Local and IV sedation    CARDIAC DEFIBRILLATOR PLACEMENT      COLONOSCOPY W/ POLYPECTOMY      Mult colonoscopy's for polyp resection     CORONARY ARTERY BYPASS GRAFT  05/2010    x5 vessel: LMA to diagonal, LAD, intermedius, obtuse marginal, RCA    CORONARY STENT PLACEMENT      ENDOSCOPY N/A 6/24/2019    Procedure: ESOPHAGOGASTRODUODENOSCOPY with dilitation Bougie 50, 54;  Surgeon: Roddy Coronel MD;  Location: [facility] ENDOSCOPY;  Service: Gastroenterology    INSERT / REPLACE / REMOVE PACEMAKER      KNEE OPEN LATERAL RELEASE Left     reduction    OTHER SURGICAL HISTORY  01/2018    ICD implantation    PROSTATE SURGERY  2015    Robiotic surgery- Cyber Knife:         Current Outpatient Medications:     aspirin 81 MG EC tablet, Take 1 tablet by mouth Daily., Disp: , Rfl:     Cholecalciferol 10 MCG (400 UNIT) tablet, Take  2 tablets by mouth Daily., Disp: , Rfl:     clopidogrel (PLAVIX) 75 MG tablet, Take 1 tablet by mouth Daily., Disp: 90 tablet, Rfl: 1    dapagliflozin Propanediol (Farxiga) 10 MG tablet, Take 10 mg by mouth Daily. [facility], Disp: , Rfl:     Diclofenac Sodium (VOLTAREN) 1 % gel gel, Apply 4 g topically to the appropriate area as directed 4 (Four) Times a Day As Needed., Disp: , Rfl:     ezetimibe (ZETIA) 10 MG tablet, Take 1 tablet by mouth Every Evening., Disp: , Rfl:     furosemide (LASIX) 20 MG tablet, Take 1 tablet by mouth Daily., Disp: , Rfl:     gabapentin (NEURONTIN) 800 MG tablet, Take 1 tablet by mouth 3 (Three) Times a Day., Disp: , Rfl:     meclizine (ANTIVERT) 25 MG tablet, Take 1 tablet by mouth Every 6 (Six) Hours As Needed for Dizziness., Disp: , Rfl:     methocarbamol (ROBAXIN) 500 MG tablet, Take 1 tablet by mouth 3 (Three) Times a Day., Disp: , Rfl:     metoprolol succinate XL (TOPROL-XL) 100 MG 24 hr tablet, Take 0.5 tablets by mouth 2 (Two) Times a Day., Disp: , Rfl:     mexiletine (MEXITIL) 150 MG capsule, Take 1 capsule by mouth 2 (Two) Times a Day., Disp: 60 capsule, Rfl: 5    midodrine (PROAMATINE) 5 MG tablet, Take 1 tablet by mouth 3 (Three) Times a Day Before Meals., Disp: , Rfl:     nitroglycerin (NITROSTAT) 0.4 MG SL tablet, Place 1 tablet under the tongue Every 5 (Five) Minutes As Needed for Chest Pain., Disp: , Rfl:     Omega-3 Fatty Acids (fish oil) 1000 MG capsule capsule, Take 2 capsules by mouth 2 (Two) Times a Day With Meals., Disp: , Rfl:     pantoprazole (PROTONIX) 40 MG EC tablet, Take 1 tablet by mouth Daily., Disp: , Rfl:     potassium chloride (MICRO-K) 10 MEQ CR capsule, Take 1 capsule by mouth Daily., Disp: , Rfl:     sennosides-docusate (PERICOLACE) 8.6-50 MG per tablet, Take 1 tablet by mouth Daily., Disp: , Rfl:     spironolactone (ALDACTONE) 25 MG tablet, Take 1 tablet by mouth Daily., Disp: , Rfl:     sucralfate (CARAFATE) 1 g tablet, Take 1 tablet by mouth 4  "(Four) Times a Day., Disp: , Rfl:     Allergies   Allergen Reactions    Lovastatin Myalgia    Pravastatin Myalgia and Unknown (See Comments)     unknown    Simvastatin Unknown (See Comments) and Myalgia     unknown    Spironolactone Other (See Comments)     Gynecomastia        Family History   Problem Relation Age of Onset    Pancreatic cancer Mother     Heart disease Father        Social History     Tobacco Use    Smoking status: Former     Current packs/day: 0.00     Average packs/day: 1 pack/day for 17.0 years (17.0 ttl pk-yrs)     Types: Cigarettes     Start date: 1985     Quit date: 2002     Years since quittin.0     Passive exposure: Past    Smokeless tobacco: Never   Substance Use Topics    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     Copied text in this note has been reviewed by me and is accurate as of 25.    "

## 2025-07-22 ENCOUNTER — TELEPHONE (OUTPATIENT)
Dept: CARDIOLOGY | Facility: CLINIC | Age: 85
End: 2025-07-22

## 2025-08-01 ENCOUNTER — TELEPHONE (OUTPATIENT)
Dept: CARDIOLOGY | Facility: CLINIC | Age: 85
End: 2025-08-01
Payer: OTHER GOVERNMENT

## 2025-08-08 ENCOUNTER — TELEPHONE (OUTPATIENT)
Dept: CARDIAC SURGERY | Facility: CLINIC | Age: 85
End: 2025-08-08
Payer: OTHER GOVERNMENT

## 2025-08-08 DIAGNOSIS — E78.2 HYPERLIPIDEMIA, MIXED: Chronic | ICD-10-CM

## 2025-08-08 DIAGNOSIS — I25.5 ISCHEMIC CARDIOMYOPATHY: Chronic | ICD-10-CM

## 2025-08-08 DIAGNOSIS — Z95.810 PRESENCE OF AUTOMATIC IMPLANTABLE CARDIOVERTER-DEFIBRILLATOR: Chronic | ICD-10-CM

## 2025-08-08 DIAGNOSIS — I10 ESSENTIAL HYPERTENSION: ICD-10-CM

## 2025-08-08 DIAGNOSIS — I25.708 CORONARY ARTERY DISEASE OF BYPASS GRAFT OF NATIVE HEART WITH STABLE ANGINA PECTORIS: Primary | Chronic | ICD-10-CM

## 2025-08-11 ENCOUNTER — TELEPHONE (OUTPATIENT)
Dept: CARDIAC SURGERY | Facility: CLINIC | Age: 85
End: 2025-08-11
Payer: OTHER GOVERNMENT

## 2025-08-15 ENCOUNTER — TELEPHONE (OUTPATIENT)
Dept: CARDIOLOGY | Facility: CLINIC | Age: 85
End: 2025-08-15
Payer: OTHER GOVERNMENT

## 2025-08-15 DIAGNOSIS — I25.708 CORONARY ARTERY DISEASE OF BYPASS GRAFT OF NATIVE HEART WITH STABLE ANGINA PECTORIS: Primary | Chronic | ICD-10-CM

## 2025-08-18 ENCOUNTER — TELEPHONE (OUTPATIENT)
Dept: CARDIAC REHAB | Facility: HOSPITAL | Age: 85
End: 2025-08-18
Payer: OTHER GOVERNMENT

## 2025-08-19 ENCOUNTER — TRANSCRIBE ORDERS (OUTPATIENT)
Dept: CARDIAC REHAB | Facility: HOSPITAL | Age: 85
End: 2025-08-19
Payer: OTHER GOVERNMENT

## 2025-08-19 DIAGNOSIS — Z95.5 S/P DRUG ELUTING CORONARY STENT PLACEMENT: Primary | ICD-10-CM

## (undated) DEVICE — SI AVANTI+ 6F MID W/O GW&OBT: Brand: AVANTI

## (undated) DEVICE — BITEBLOCK ENDO W/STRAP 60F A/ LF DISP

## (undated) DEVICE — CATH MPAC STP 6F: Brand: SUPER TORQUE

## (undated) DEVICE — CATH DIAG IMPULSE LCB 6F 100CM

## (undated) DEVICE — CATH DIAG IMPULSE IMT 6F 100CM

## (undated) DEVICE — ST ACC MICROPUNCTURE STFF/CANN PLAT/TP 4F 21G 40CM

## (undated) DEVICE — GUIDE CATHETER: Brand: MACH1™

## (undated) DEVICE — SUT SILK 2/0 FS BLK 18IN 685G

## (undated) DEVICE — DGW .035 FC J3MM 150CM TEF HEP: Brand: EMERALD

## (undated) DEVICE — Device: Brand: PROWATERFLEX

## (undated) DEVICE — SI AVANTI+ 7F MID W/O GW&OBT: Brand: AVANTI

## (undated) DEVICE — DEV INFL COMPAK W/ACCESSPLUS IN4530

## (undated) DEVICE — PK TRY HEART CATH 50

## (undated) DEVICE — PK ENDO GI 50

## (undated) DEVICE — ELECTRD DEFIB M/FUNC PROPADZ RADIOL 2PK

## (undated) DEVICE — BALN NC/EUPHORA RX 2.00X15MM

## (undated) DEVICE — PAPR PRNT PK SONY W RIBN UPC55

## (undated) DEVICE — CATH DIAG IMPULSE FL5 6F 100CM

## (undated) DEVICE — RADIFOCUS OBTURATOR: Brand: RADIFOCUS

## (undated) DEVICE — TBG NAMIC PRESS MONTR A/ F/M 12IN

## (undated) DEVICE — DESTINATION RENAL GUIDING SHEATH: Brand: DESTINATION